# Patient Record
Sex: FEMALE | Race: WHITE | NOT HISPANIC OR LATINO | Employment: OTHER | URBAN - METROPOLITAN AREA
[De-identification: names, ages, dates, MRNs, and addresses within clinical notes are randomized per-mention and may not be internally consistent; named-entity substitution may affect disease eponyms.]

---

## 2017-01-12 RX ORDER — IOPAMIDOL 755 MG/ML
78 INJECTION, SOLUTION INTRAVASCULAR ONCE
Status: COMPLETED | OUTPATIENT
Start: 2017-01-13 | End: 2017-01-13

## 2017-01-13 ENCOUNTER — HOSPITAL ENCOUNTER (OUTPATIENT)
Dept: CT IMAGING | Facility: CLINIC | Age: 72
Discharge: HOME | End: 2017-01-13
Attending: INTERNAL MEDICINE | Admitting: INTERNAL MEDICINE
Payer: MEDICARE

## 2017-01-13 ENCOUNTER — TELEPHONE (OUTPATIENT)
Dept: FAMILY MEDICINE | Facility: CLINIC | Age: 72
End: 2017-01-13

## 2017-01-13 DIAGNOSIS — C49.A0 GIST (GASTROINTESTINAL STROMAL TUMOR), MALIGNANT (H): ICD-10-CM

## 2017-01-13 DIAGNOSIS — Z85.118 HISTORY OF LUNG CANCER: ICD-10-CM

## 2017-01-13 LAB
ALBUMIN SERPL-MCNC: 3.6 G/DL (ref 3.4–5)
ALP SERPL-CCNC: 56 U/L (ref 40–150)
ALT SERPL W P-5'-P-CCNC: 24 U/L (ref 0–50)
ANION GAP SERPL CALCULATED.3IONS-SCNC: 6 MMOL/L (ref 3–14)
AST SERPL W P-5'-P-CCNC: 27 U/L (ref 0–45)
BASOPHILS # BLD AUTO: 0.1 10E9/L (ref 0–0.2)
BASOPHILS NFR BLD AUTO: 1 %
BILIRUB SERPL-MCNC: 0.4 MG/DL (ref 0.2–1.3)
BUN SERPL-MCNC: 15 MG/DL (ref 7–30)
CALCIUM SERPL-MCNC: 8.4 MG/DL (ref 8.5–10.1)
CHLORIDE SERPL-SCNC: 107 MMOL/L (ref 94–109)
CO2 SERPL-SCNC: 28 MMOL/L (ref 20–32)
CREAT SERPL-MCNC: 1 MG/DL (ref 0.52–1.04)
DIFFERENTIAL METHOD BLD: ABNORMAL
EOSINOPHIL # BLD AUTO: 0.3 10E9/L (ref 0–0.7)
EOSINOPHIL NFR BLD AUTO: 3.6 %
ERYTHROCYTE [DISTWIDTH] IN BLOOD BY AUTOMATED COUNT: 15.2 % (ref 10–15)
GFR SERPL CREATININE-BSD FRML MDRD: 55 ML/MIN/1.7M2
GLUCOSE SERPL-MCNC: 94 MG/DL (ref 70–99)
HCT VFR BLD AUTO: 36.3 % (ref 35–47)
HGB BLD-MCNC: 11.8 G/DL (ref 11.7–15.7)
LYMPHOCYTES # BLD AUTO: 1.7 10E9/L (ref 0.8–5.3)
LYMPHOCYTES NFR BLD AUTO: 24.3 %
MCH RBC QN AUTO: 29.5 PG (ref 26.5–33)
MCHC RBC AUTO-ENTMCNC: 32.5 G/DL (ref 31.5–36.5)
MCV RBC AUTO: 91 FL (ref 78–100)
MONOCYTES # BLD AUTO: 1.1 10E9/L (ref 0–1.3)
MONOCYTES NFR BLD AUTO: 16.1 %
NEUTROPHILS # BLD AUTO: 3.8 10E9/L (ref 1.6–8.3)
NEUTROPHILS NFR BLD AUTO: 55 %
PLATELET # BLD AUTO: 185 10E9/L (ref 150–450)
POTASSIUM SERPL-SCNC: 4.2 MMOL/L (ref 3.4–5.3)
PROT SERPL-MCNC: 6.6 G/DL (ref 6.8–8.8)
RBC # BLD AUTO: 4 10E12/L (ref 3.8–5.2)
SODIUM SERPL-SCNC: 141 MMOL/L (ref 133–144)
WBC # BLD AUTO: 6.9 10E9/L (ref 4–11)

## 2017-01-13 PROCEDURE — 74177 CT ABD & PELVIS W/CONTRAST: CPT

## 2017-01-13 PROCEDURE — 80053 COMPREHEN METABOLIC PANEL: CPT | Performed by: INTERNAL MEDICINE

## 2017-01-13 PROCEDURE — 25000125 ZZHC RX 250: Performed by: RADIOLOGY

## 2017-01-13 PROCEDURE — 36415 COLL VENOUS BLD VENIPUNCTURE: CPT | Performed by: INTERNAL MEDICINE

## 2017-01-13 PROCEDURE — 85025 COMPLETE CBC W/AUTO DIFF WBC: CPT | Performed by: INTERNAL MEDICINE

## 2017-01-13 PROCEDURE — 25500064 ZZH RX 255 OP 636: Performed by: RADIOLOGY

## 2017-01-13 PROCEDURE — 71260 CT THORAX DX C+: CPT

## 2017-01-13 RX ADMIN — IOPAMIDOL 78 ML: 755 INJECTION, SOLUTION INTRAVENOUS at 09:38

## 2017-01-13 RX ADMIN — SODIUM CHLORIDE 59 ML: 9 INJECTION, SOLUTION INTRAVENOUS at 09:39

## 2017-01-13 NOTE — Clinical Note
[facility]  [address]  [facility] [address]  [phone]    January 30, 2017    Idalmis Abdul  PO   [facility] [address]      Dear Ms. Abdul,    During a recent visit to our clinic, your blood pressure was elevated above the target range for your health.  Because untreated high blood pressure could have a negative effect on your long term health, we hope you have had an opportunity to collect more blood pressure measurements and to visit with your primary care physician regarding the results.      Please schedule a brief clinic RN appointment only to have your blood pressure checked. This is a free visit to you and only takes a few minutes. You can call to schedule [phone] and ask to schedule your free RN blood pressure appointment.      Why is high blood pressure a big deal?      If blood pressure is elevated above target levels you have an increased risk of experiencing a heart attack or stroke, developing heart failure, kidney disease or other chronic diseases.    With appropriate early recognition and treatment, these health problems can be avoided in most cases.  Your physician can help you determine the need for treatment and discuss the non-medication and medication routes to treating high blood pressure as well as evaluate you for possible causes and effects of high blood pressure.    The target range for ideal blood pressure control is less than 140/90 for most individuals. For those who have been diagnosed with heart disease, diabetes, stroke or peripheral vascular disease this target range is less than 130/80.    Sincerely,    Dr. Sultana's Care Team

## 2017-01-13 NOTE — TELEPHONE ENCOUNTER
Spoke with the patient. She will schedule a nursing appointment for a blood pressure check only. She will be in next week sometime.    BP Readings from Last 3 Encounters:   12/06/16 176/93   11/25/16 142/74   11/08/16 156/77       Rosibel Dudley CMA

## 2017-01-24 ENCOUNTER — CARE COORDINATION (OUTPATIENT)
Dept: CARE COORDINATION | Facility: CLINIC | Age: 72
End: 2017-01-24

## 2017-01-24 NOTE — PROGRESS NOTES
Clinic Care Coordination Contact  [facility]/Voicemail    Referral Source: PCP  Clinical Data: Care Coordinator Outreach  Outreach attempted.  Left message on voicemail with call back information and requested return call.  Plan: Care Coordinator will try to reach patient again in 2-4 weeks.  Kelli Lama, RN-BSN  RN Clinic Care Coordinator  [facility]  [phone]

## 2017-01-27 ENCOUNTER — TELEPHONE (OUTPATIENT)
Dept: ONCOLOGY | Facility: CLINIC | Age: 72
End: 2017-01-27

## 2017-01-27 NOTE — TELEPHONE ENCOUNTER
Oral Chemotherapy Monitoring Program   Placed call to patient in follow up of Gleevec (imatinib) therapy.   Patient reports that she has been on Gleevec for so long she does not have any questions or concerns. Encouraged pt to call SP with any questions or concerns.     Mariola Garcai RN  SP-Therapy Management  [phone]

## 2017-02-03 ENCOUNTER — OFFICE VISIT (OUTPATIENT)
Dept: FAMILY MEDICINE | Facility: CLINIC | Age: 72
End: 2017-02-03
Payer: COMMERCIAL

## 2017-02-03 VITALS
SYSTOLIC BLOOD PRESSURE: 152 MMHG | BODY MASS INDEX: 30.03 KG/M2 | HEART RATE: 72 BPM | HEIGHT: 62 IN | DIASTOLIC BLOOD PRESSURE: 92 MMHG | WEIGHT: 163.2 LBS

## 2017-02-03 DIAGNOSIS — I10 ESSENTIAL HYPERTENSION: Primary | ICD-10-CM

## 2017-02-03 DIAGNOSIS — D17.30 LIPOMA OF SKIN AND SUBCUTANEOUS TISSUE: ICD-10-CM

## 2017-02-03 DIAGNOSIS — G89.4 CHRONIC PAIN SYNDROME: ICD-10-CM

## 2017-02-03 PROCEDURE — 99214 OFFICE O/P EST MOD 30 MIN: CPT | Performed by: FAMILY MEDICINE

## 2017-02-03 RX ORDER — OXYCODONE HYDROCHLORIDE 5 MG/1
5-10 TABLET ORAL
Qty: 180 TABLET | Refills: 0 | Status: SHIPPED | OUTPATIENT
Start: 2017-02-03 | End: 2017-05-08

## 2017-02-03 RX ORDER — AMLODIPINE BESYLATE 5 MG/1
5 TABLET ORAL DAILY
Qty: 30 TABLET | Refills: 11 | Status: SHIPPED | OUTPATIENT
Start: 2017-02-03 | End: 2018-01-23

## 2017-02-03 NOTE — MR AVS SNAPSHOT
After Visit Summary   2/3/2017    Idalmis Abdul    MRN: [id number]           Patient Information     Date Of Birth          1945        Visit Information        Provider Department      2/3/2017 11:20 AM Darlene Sultana MD [facility]        Today's Diagnoses     Essential hypertension    -  1     Chronic pain syndrome         Lipoma of skin and subcutaneous tissue           Care Instructions          Thank you for choosing [facility].  You may be receiving a survey in the mail from [facility] regarding your visit today.  Please take a few minutes to complete and return the survey to let us know how we are doing.      If you have questions or concerns, please contact us via [employer] or you can contact your care team at [phone].    Our Clinic hours are:  Monday 6:40 am  to 7:00 pm  Tuesday -Friday 6:40 am to 5:00 pm    The [place] outpatient lab hours are:  Monday - Friday 6:10 am to 4:45 pm  Saturdays 7:00 am to 11:00 am  Appointments are required, call [phone]    If you have clinical questions after hours or would like to schedule an appointment,  call the clinic at [phone].          Follow-ups after your visit        Additional Services     GENERAL SURG ADULT REFERRAL       Your provider has referred you to: [facility]: [facility] [phone]   [email or web address]    Please be aware that coverage of these services is subject to the terms and limitations of your health insurance plan.  Call member services at your health plan with any benefit or coverage questions.      Please bring the following to your appointment:  >>   Any x-rays, CTs or MRIs which have been performed.  Contact the facility where they were done to arrange for  prior to your scheduled appointment.  Any new CT, MRI or other procedures ordered by your specialist must be performed at a [place] facility or coordinated by your clinic's  "referral office.    >>   List of current medications   >>   This referral request   >>   Any documents/labs given to you for this referral                  Your next 10 appointments already scheduled     Mar 20, 2017 10:00 AM   Return Visit with Tootie Herman MD   [facility] ([facility])    [address] [facility] [address] [phone]              Who to contact     If you have questions or need follow up information about today's clinic visit or your schedule please contact [facility] directly at [phone].  Normal or non-critical lab and imaging results will be communicated to you by MyChart, letter or phone within 4 business days after the clinic has received the results. If you do not hear from us within 7 days, please contact the clinic through [employer] or phone. If you have a critical or abnormal lab result, we will notify you by phone as soon as possible.  Submit refill requests through [employer] or call your pharmacy and they will forward the refill request to us. Please allow 3 business days for your refill to be completed.          Additional Information About Your Visit        MyChart Information     [employer] lets you send messages to your doctor, view your test results, renew your prescriptions, schedule appointments and more. To sign up, go to [email or web address] . Click on \"Log in\" on the left side of the screen, which will take you to the Welcome page. Then click on \"Sign up Now\" on the right side of the page.     You will be asked to enter the access code listed below, as well as some personal information. Please follow the directions to create your username and password.     Your access code is: [email or web address]  Expires: 2017 11:38 AM     Your access code will  in 90 days. If you need help or a new code, please call your [facility] or [phone].        Care EveryWhere ID     This is your Care " "EveryWhere ID. This could be used by other organizations to access your [facility] medical records  [id number]        Your Vitals Were     Pulse Height BMI (Body Mass Index) Last Period          72 5' 2\" (1.575 m) 29.84 kg/m2 01/01/1992         Blood Pressure from Last 3 Encounters:   02/03/17 205/88   12/06/16 176/93   11/25/16 142/74    Weight from Last 3 Encounters:   02/03/17 163 lb 3.2 oz (74.027 kg)   12/06/16 160 lb 12.8 oz (72.938 kg)   11/25/16 162 lb (73.483 kg)              We Performed the Following     GENERAL SURG ADULT REFERRAL          Today's Medication Changes          These changes are accurate as of: 2/3/17 11:38 AM.  If you have any questions, ask your nurse or doctor.               Start taking these medicines.        Dose/Directions    amLODIPine 5 MG tablet   Commonly known as:  NORVASC   Used for:  Essential hypertension   Started by:  Darlene Sultana MD        Dose:  5 mg   Take 1 tablet (5 mg) by mouth daily   Quantity:  30 tablet   Refills:  11            Where to get your medicines      These medications were sent to [place], [place] - [address]  [address] [facility] [address]     Phone:  [phone]    - amLODIPine 5 MG tablet      Some of these will need a paper prescription and others can be bought over the counter.  Ask your nurse if you have questions.     Bring a paper prescription for each of these medications    - oxyCODONE 5 MG IR tablet             Primary Care Provider Office Phone # Fax #    Darlene Sultana [phone]       [facility] [address] [facility] [address] [facility] [address]        Thank you!     Thank you for choosing [facility]  for your care. Our goal is always to provide you with excellent care. Hearing back from our patients is one way we can continue to improve our services. Please take a few minutes to complete the written survey that you may receive in " the mail after your visit with us. Thank you!             Your Updated Medication List - Protect others around you: Learn how to safely use, store and throw away your medicines at www.disposemymeds.org.          This list is accurate as of: 2/3/17 11:38 AM.  Always use your most recent med list.                   Brand Name Dispense Instructions for use    amLODIPine 5 MG tablet    NORVASC    30 tablet    Take 1 tablet (5 mg) by mouth daily       aspirin 81 MG EC tablet     30 tablet    Take 1 tablet by mouth daily.       calcium-vitamin D-vitamin K 500-500-40 MG-UNT-MCG Chew    VIACTIV     Take 1 tablet by mouth daily       imatinib 400 MG tablet    GLEEVEC    30 tablet    Take 1 tablet (400 mg) by mouth daily Take with a meal and a large glass of water.       lisinopril 40 MG tablet    PRINIVIL/ZESTRIL    90 tablet    Take 1 tablet (40 mg) by mouth daily       LORazepam 1 MG tablet    ATIVAN    30 tablet    Take 1 tablet (1 mg) by mouth At Bedtime       methimazole 10 MG tablet    TAPAZOLE    45 tablet    Take 0.5 tablets (5 mg) by mouth daily       metoprolol 100 MG tablet    LOPRESSOR    180 tablet    Take 1 tablet (100 mg) by mouth 2 times daily       oxyCODONE 5 MG IR tablet    ROXICODONE    180 tablet    Take 1-2 tablets (5-10 mg) by mouth 2 times daily       rosuvastatin 5 MG tablet    CRESTOR    90 tablet    Take 1 tablet (5 mg) by mouth daily

## 2017-02-03 NOTE — PATIENT INSTRUCTIONS
Thank you for choosing [facility].  You may be receiving a survey in the mail from [facility] regarding your visit today.  Please take a few minutes to complete and return the survey to let us know how we are doing.      If you have questions or concerns, please contact us via [employer] or you can contact your care team at [phone].    Our Clinic hours are:  Monday 6:40 am  to 7:00 pm  Tuesday -Friday 6:40 am to 5:00 pm    The [place] outpatient lab hours are:  Monday - Friday 6:10 am to 4:45 pm  Saturdays 7:00 am to 11:00 am  Appointments are required, call [phone]    If you have clinical questions after hours or would like to schedule an appointment,  call the clinic at [phone].

## 2017-02-03 NOTE — NURSING NOTE
"Chief Complaint   Patient presents with     Hypertension     recheck blood pressure     Mass     lump on right jaw line     Refill Request     needs a refill on oxycodone, going on vacation       Initial /92 mmHg  Pulse 72  Ht 5' 2\" (1.575 m)  Wt 163 lb 3.2 oz (74.027 kg)  BMI 29.84 kg/m2  LMP 01/01/1992 Estimated body mass index is 29.84 kg/(m^2) as calculated from the following:    Height as of this encounter: 5' 2\" (1.575 m).    Weight as of this encounter: 163 lb 3.2 oz (74.027 kg).  BP completed using cuff size: regular  "

## 2017-02-03 NOTE — PROGRESS NOTES
a  SUBJECTIVE:                                                    Idalmis Abdul is 71 year old female   Chief Complaint   Patient presents with     Hypertension     recheck blood pressure     Mass     lump on right jaw line     Refill Request     needs a refill on oxycodone, going on vacation     Hypertension Follow-up      Outpatient blood pressures are not being checked.    Low Salt Diet: low salt       Amount of exercise or physical activity: None    Problems taking medications regularly: No    Medication side effects: none  Diet: regular (no restrictions)    Concern - lump on right jaw line     Onset: 1 month    Description:   Hard lump on the right side of her jaw line. Doesn't seem to move    Intensity: mild    Progression of Symptoms:  same    Accompanying Signs & Symptoms:  Right ear pain       Previous history of similar problem:   n/a    Precipitating factors:   Worsened by: n/a    Alleviating factors:  Improved by: n/a       Therapies Tried and outcome: n/a    Medication Followup of Oxycodone    Taking Medication as prescribed: yes    Side Effects:  None    Medication Helping Symptoms:  yes       Problem list and histories reviewed & adjusted, as indicated.  Additional history: as documented    Patient Active Problem List   Diagnosis     Hypertension goal BP (blood pressure) < 140/90     Vaginal prolapse     GIST (gastrointestinal stromal tumor), malignant (H)     Eyelid edema     History of lung cancer     Health Care Home     NSTEMI (non-ST elevated myocardial infarction) (H)     ASCVD (arteriosclerotic cardiovascular disease)     Postsurgical aortocoronary bypass status     S/P CABG x 4     Low back pain     Dyspnea     GERD (gastroesophageal reflux disease)     Hyperlipidemia LDL goal <160     Hyperthyroidism     Senile cataract     Thyroid eye disease     Eyelid retraction or lag     Thyrotoxic exophthalmos     Graves' disease     History of tobacco abuse     History of non-ST elevation  myocardial infarction (NSTEMI)     Chronic back pain     Hypothyroidism     Diplopia     Monocular esotropia     Hypotropia     PCO (posterior capsular opacification)     Senile nuclear sclerosis     PVD (posterior vitreous detachment)     Age-related osteoporosis without current pathological fracture     Strabismus     Hypertropia of right eye     Cortical senile cataract, right     Senile cataract of right eye     Past Surgical History   Procedure Laterality Date     Surgical history of -   1963     cholecystectomy     Surgical history of -   1970's     Tubal Ligation      Surgical history of -   08/03     Explor. Lap, Excision of Small Bowel Tumor      C thoracoscopy,dx w bx  fall 2003     benign tissue     Colonoscopy  4-12-05     Surgical history of -   4/2010     lung cancer removed right lung     Bypass graft artery coronary  6/14/2012     Procedure: BYPASS GRAFT ARTERY CORONARY;  Median Sternotomy Coronary Artery Bypass Graft  x 3        Creation pericardial window  6/15/2012     Procedure: CREATION PERICARDIAL WINDOW;  Mediastinal Exploration, Control of Bleeding;  Surgeon: Dwaine Griffin MD;  Location: [facility] OR     Cholecystectomy  1963     Gyn surgery       tubal ligation     Gi surgery  2003 and 2007     GIST tumor removal     Coronary artery bypass       X4     Eye surgery  2014     left cataract removal     Phacoemulsification with standard intraocular lens implant  3/24/2014     Procedure: PHACOEMULSIFICATION WITH STANDARD INTRAOCULAR LENS IMPLANT;  Left Kelman Phacoemulsification with Intraocular Lens Implant;  Surgeon: Magnus Mckeon MD;  Location: [facility] OR     Cataract iol, rt/lt       LE     Recession resection with adjustable suture bilateral Bilateral 7/14/2016     Procedure: RECESSION RESECTION WITH ADJUSTABLE SUTURE BILATERAL;  Surgeon: Erma Ordaz MD;  Location:  OR       Social History   Substance Use Topics     Smoking status: Former Smoker -- 0.50 packs/day for 49  years     Types: Cigarettes     Quit date: 04/19/2010     Smokeless tobacco: Never Used     Alcohol Use: No     Family History   Problem Relation Age of Onset     HEART DISEASE Mother      OSTEOPOROSIS Mother      Respiratory Mother      Emphezyma     Hypertension Mother      HEART DISEASE Father      Alcohol/Drug Father      Hypertension Father      Hypertension Maternal Grandmother      Hypertension Brother      Hypertension Sister      Hypertension Son      CANCER Son      rectal         Current Outpatient Prescriptions   Medication Sig Dispense Refill     oxyCODONE (ROXICODONE) 5 MG IR tablet Take 1-2 tablets (5-10 mg) by mouth 2 times daily 180 tablet 0     amLODIPine (NORVASC) 5 MG tablet Take 1 tablet (5 mg) by mouth daily 30 tablet 11     imatinib (GLEEVEC) 400 MG tablet Take 1 tablet (400 mg) by mouth daily Take with a meal and a large glass of water. 30 tablet 2     calcium-vitamin D-vitamin K (VIACTIV) 500-500-40 MG-UNT-MCG CHEW Take 1 tablet by mouth daily  0     LORazepam (ATIVAN) 1 MG tablet Take 1 tablet (1 mg) by mouth At Bedtime 30 tablet 5     methimazole (TAPAZOLE) 10 MG tablet Take 0.5 tablets (5 mg) by mouth daily 45 tablet 3     rosuvastatin (CRESTOR) 5 MG tablet Take 1 tablet (5 mg) by mouth daily 90 tablet 3     lisinopril (PRINIVIL,ZESTRIL) 40 MG tablet Take 1 tablet (40 mg) by mouth daily 90 tablet 3     metoprolol (LOPRESSOR) 100 MG tablet Take 1 tablet (100 mg) by mouth 2 times daily 180 tablet 3     aspirin EC 81 MG EC tablet Take 1 tablet by mouth daily. 30 tablet 2     Allergies   Allergen Reactions     Atorvastatin Cramps     Recent Labs   Lab Test  01/13/17   0854  11/08/16   1237  09/20/16   1022   07/08/16   0914  06/27/16   1110   01/15/16   0921   07/17/15   0916   03/14/14   0929   06/19/12   1550   06/15/12   0850   06/15/12   0719   A1C   --    --    --    --    --    --    --    --    --    --    --    --    --   5.4   --   Duplicate request   --   5.2   LDL   --    --   38    "--    --    --    --    --    --   31   --   59   --    --    --    --    --    --    HDL   --    --   53   --    --    --    --    --    --   46*   --   38*   --    --    --    --    --    --    TRIG   --    --   79   --    --    --    --    --    --   72   --   105   < >   --    --    --    --    --    ALT  24   --    --    --   23   --    --   26   < >  23   < >  39   < >   --    < >   --    --    --    CR  1.00   --   1.00   --   1.18*   --    --   1.19*   --   1.06*   < >  1.07*   < >   --    < >   --    --   1.82*   GFRESTIMATED  55*   --   55*   --   45*   --    --   45*   --   51*   < >  51*   < >   --    < >   --    --   28*   GFRESTBLACK  66   --   66   --   55*   --    --   54*   --   62   < >  62   < >   --    < >   --    --   34*   POTASSIUM  4.2   --   4.2   < >  4.0   --    --   4.1   --   4.0   < >  3.8   < >   --    < >  2.8*   < >  4.2   TSH   --   2.17   --    --    --   2.20   < >   --    < >   --    < >  0.03*   < >   --    --    --    --    --     < > = values in this interval not displayed.      BP Readings from Last 3 Encounters:   02/03/17 152/92   12/06/16 176/93   11/25/16 142/74    Wt Readings from Last 3 Encounters:   02/03/17 163 lb 3.2 oz (74.027 kg)   12/06/16 160 lb 12.8 oz (72.938 kg)   11/25/16 162 lb (73.483 kg)         ROS:  Constitutional, HEENT, cardiovascular, pulmonary, gi and gu systems are negative, except as otherwise noted.    OBJECTIVE:                                                    /92 mmHg  Pulse 72  Ht 5' 2\" (1.575 m)  Wt 163 lb 3.2 oz (74.027 kg)  BMI 29.84 kg/m2  LMP 01/01/1992  GENERAL APPEARANCE ADULT: Alert, no acute distress  EYES: PERRL, EOM normal, conjunctiva and lids normal, edematous upper eyelids  HENT: Ears and TMs normal, oral mucosa and posterior oropharynx normal  RESP: lungs clear to auscultation   CV: normal rate, regular rhythm, no murmur or gallop  Diagnostic Test Results:       ASSESSMENT/PLAN:                                        "             1. Essential hypertension  Not at goal on two medications, will add norvasc.  Recheck in 2 weeks  - amLODIPine (NORVASC) 5 MG tablet; Take 1 tablet (5 mg) by mouth daily  Dispense: 30 tablet; Refill: 11    2. Chronic pain syndrome  Back pain still present, has not accelerated dose, not change for now, consider pain management evaluation.  - oxyCODONE (ROXICODONE) 5 MG IR tablet; Take 1-2 tablets (5-10 mg) by mouth 2 times daily  Dispense: 180 tablet; Refill: 0    3. Lipoma of skin and subcutaneous tissue  Benign, will see surgery when get back from [place] in a month.  - GENERAL SURG ADULT REFERRAL    Darlene Sultana MD  [facility]

## 2017-02-16 NOTE — PROGRESS NOTES
Clinic Care Coordination Contact  [facility]    Referral Source: PCP  Clinical Data: Care Coordinator Outreach  Outreach attempted  - looks like pt may be in [place] at this time.  Left message on voicemail with call back information and requested return call.  Plan: Care Coordinator will try to reach patient again in 4-6 weeks.  Kelli Lama, RN-BSN  RN Clinic Care Coordinator  [facility]  [phone]

## 2017-03-10 ENCOUNTER — CARE COORDINATION (OUTPATIENT)
Dept: ONCOLOGY | Facility: CLINIC | Age: 72
End: 2017-03-10

## 2017-03-10 DIAGNOSIS — C49.A0 GIST (GASTROINTESTINAL STROMAL TUMOR), MALIGNANT (H): ICD-10-CM

## 2017-03-10 RX ORDER — LORAZEPAM 1 MG/1
1 TABLET ORAL AT BEDTIME
Qty: 30 TABLET | Refills: 5 | Status: SHIPPED | OUTPATIENT
Start: 2017-03-10 | End: 2017-04-03

## 2017-03-16 ENCOUNTER — TELEPHONE (OUTPATIENT)
Dept: ONCOLOGY | Facility: CLINIC | Age: 72
End: 2017-03-16

## 2017-03-16 DIAGNOSIS — C49.A0 MALIGNANT GASTROINTESTINAL STROMAL TUMOR, UNSPECIFIED SITE (H): Primary | ICD-10-CM

## 2017-03-16 RX ORDER — IMATINIB MESYLATE 400 MG/1
400 TABLET, FILM COATED ORAL DAILY
Qty: 30 TABLET | Refills: 2 | Status: SHIPPED | OUTPATIENT
Start: 2017-03-16 | End: 2017-04-15

## 2017-03-16 NOTE — ORAL ONC MGMT
Oral Chemotherapy Monitoring Program    I spoke with Idalmis today in regards to Gleevec refill.  I informed her that she needs an appointment with her provider.  She indicated that she will make an appointment in the near future with Ara Guzman.  Shakila Juju was contacted and the next months Gleevec refill was released to [place] in [place].  Enoch VICK.Ph.  [facility]  Oral Chemotherapy Program  [phone]

## 2017-03-16 NOTE — TELEPHONE ENCOUNTER
Oral Chemotherapy Monitoring Program    Placed call to patient in follow up of Gleevec therapy.    Calling to see when patient will have appointment with Dr. Schmitz and if she has enough Gleevec until that appointment.    Left message to please call back in follow up of therapy. No patient or drug names were mentioned.    Rocio Soriano, Pharmacy Intern  [facility]  [phone]

## 2017-03-20 ENCOUNTER — TELEPHONE (OUTPATIENT)
Dept: FAMILY MEDICINE | Facility: CLINIC | Age: 72
End: 2017-03-20

## 2017-03-20 NOTE — TELEPHONE ENCOUNTER
Blood pressure not at goal, <140/90.  Please have her get blood pressure check with nurse and if not at goal see provider.

## 2017-03-20 NOTE — TELEPHONE ENCOUNTER
Left message on patient's answering machine to return call.  There is a note in patient's chart stating [place] of [place] during the winter months.  Unsure of when she is supposed to return.  Will postphone for two weeks.

## 2017-03-29 ENCOUNTER — TELEPHONE (OUTPATIENT)
Dept: ENDOCRINOLOGY | Facility: CLINIC | Age: 72
End: 2017-03-29

## 2017-03-29 ENCOUNTER — TELEPHONE (OUTPATIENT)
Dept: OPHTHALMOLOGY | Facility: CLINIC | Age: 72
End: 2017-03-29

## 2017-03-29 DIAGNOSIS — E05.90 HYPERTHYROIDISM: ICD-10-CM

## 2017-03-29 DIAGNOSIS — E03.9 HYPOTHYROIDISM: Primary | ICD-10-CM

## 2017-03-29 NOTE — TELEPHONE ENCOUNTER
----- Message from BISI Martin sent at 3/29/2017  1:32 PM CDT -----  Regarding: FW: Pt called, requesting an appt for a follow up with Dr. Matthews (thyroid)...  Contact: [phone]  Would you like to discuss with pt?  ----- Message -----     From: Federico Matthews MD     Sent: 3/29/2017   1:28 PM       To: BISI Martin  Subject: RE: Pt called, requesting an appt for a foll#    Last time we saw we said stable and ready to have strabismus surgery.  She can see Colten Barragan MD directly if she wants to get her double vision fixed.    m  ----- Message -----     From: Ryan Foster COA     Sent: 3/29/2017  12:31 PM       To: Federico Matthews MD  Subject: FW: Pt called, requesting an appt for a foll#    Any reason you think she should be seen sooner?  ----- Message -----     From: Bety Joseph     Sent: 3/29/2017  11:24 AM       To: BISI Martin  Subject: Pt called, requesting an appt for a follow u#    Pt had surgery last July and was supposed to f/u in 6 mos.  Pt then had cataract surgery and it got to be a lot longer before f/u with Dr. Matthews.  The first avail in thyroid was 7/31/17.  Is that going to be soon enough for this Pt?      Please call Pt at # [phone].    Thank you,  Kyle    Please DO NOT send this message and/or reply back to sender.  Call Center Representatives DO NOT respond to messages.

## 2017-04-03 ENCOUNTER — ONCOLOGY VISIT (OUTPATIENT)
Dept: ONCOLOGY | Facility: CLINIC | Age: 72
End: 2017-04-03
Attending: NURSE PRACTITIONER
Payer: COMMERCIAL

## 2017-04-03 VITALS
WEIGHT: 162.5 LBS | SYSTOLIC BLOOD PRESSURE: 142 MMHG | HEART RATE: 92 BPM | DIASTOLIC BLOOD PRESSURE: 88 MMHG | TEMPERATURE: 98.2 F | RESPIRATION RATE: 18 BRPM | OXYGEN SATURATION: 94 % | BODY MASS INDEX: 28.79 KG/M2 | HEIGHT: 63 IN

## 2017-04-03 DIAGNOSIS — C49.A0 MALIGNANT GASTROINTESTINAL STROMAL TUMOR, UNSPECIFIED SITE (H): Primary | ICD-10-CM

## 2017-04-03 DIAGNOSIS — Z85.118 HISTORY OF LUNG CANCER: ICD-10-CM

## 2017-04-03 PROCEDURE — 99214 OFFICE O/P EST MOD 30 MIN: CPT | Mod: ZP | Performed by: NURSE PRACTITIONER

## 2017-04-03 PROCEDURE — 99212 OFFICE O/P EST SF 10 MIN: CPT | Mod: ZF

## 2017-04-03 RX ORDER — LORAZEPAM 1 MG/1
1 TABLET ORAL AT BEDTIME
Qty: 30 TABLET | Refills: 3 | Status: SHIPPED | OUTPATIENT
Start: 2017-04-03 | End: 2017-04-03

## 2017-04-03 RX ORDER — LORAZEPAM 1 MG/1
1 TABLET ORAL AT BEDTIME
Qty: 30 TABLET | Refills: 3 | Status: SHIPPED | OUTPATIENT
Start: 2017-04-03 | End: 2017-08-01

## 2017-04-03 RX ORDER — MULTIVIT WITH MINERALS/LUTEIN
1 TABLET ORAL
COMMUNITY
Start: 2013-11-12 | End: 2017-04-24

## 2017-04-03 ASSESSMENT — PAIN SCALES - GENERAL: PAINLEVEL: NO PAIN (0)

## 2017-04-03 NOTE — NURSING NOTE
"Idalmis Abdul is a 71 year old female who presents for:  Chief Complaint   Patient presents with     Oncology Clinic Visit     Return: GIST gastrointestinal stromal tumor, malignant        Initial Vitals:  /88 (BP Location: Right arm, Patient Position: Chair, Cuff Size: Adult Regular)  Pulse 92  Temp 98.2  F (36.8  C) (Oral)  Resp 18  Ht 1.588 m (5' 2.5\")  Wt 73.7 kg (162 lb 8 oz)  LMP 01/01/1992  SpO2 94%  BMI 29.25 kg/m2 Estimated body mass index is 29.25 kg/(m^2) as calculated from the following:    Height as of this encounter: 1.588 m (5' 2.5\").    Weight as of this encounter: 73.7 kg (162 lb 8 oz).. Body surface area is 1.8 meters squared. BP completed using cuff size: BP was taken in LAB INTAKE  No Pain (0) Patient's last menstrual period was 01/01/1992. Allergies and medications reviewed.     Medications: MEDICATION REFILLS NEEDED TODAY.  Pharmacy name entered into [facility]:    [facility] - [place], [place] - [address] [facility] AT [facility] [place], [place] - [address] [facility] [place], [place] - [address]    Comments: Lorazepam     6 minutes for nursing intake (face to face time)   Sheeba Kidd CMA          "

## 2017-04-03 NOTE — MR AVS SNAPSHOT
After Visit Summary   4/3/2017    Idalmis Abdul    MRN: [id number]           Patient Information     Date Of Birth          1945        Visit Information        Provider Department      4/3/2017 2:40 PM Ara Guzman APRN CNP [facility]        Today's Diagnoses     Malignant gastrointestinal stromal tumor, unspecified site    -  1    History of lung cancer           Follow-ups after your visit        Your next 10 appointments already scheduled     Apr 07, 2017 10:00 AM CDT   Return Visit with Tootie Herman MD   [facility] PHYSICIAN HEART AT [facility] ([facility])    [address] [facility] [address]   [phone]            Jun 19, 2017 12:00 PM CDT   NEW THYROID EYE with Federico Matthews MD   [facility] Peds Eye General ([facility])    [address] [address] [phone]            Aug 01, 2017 11:00 AM CDT   (Arrive by 10:45 AM)   RETURN ENDOCRINE with Swetha Antoine MD   [facility] Endocrinology ([facility])    [address] [facility]  [address] [facility] [address] [phone]              Future tests that were ordered for you today     Open Future Orders        Priority Expected Expires Ordered    CT Chest/Abdomen/Pelvis w Contrast Routine 7/3/2017 10/3/2017 4/3/2017    CBC with platelets differential Routine 7/3/2017 10/3/2017 4/3/2017    Comprehensive metabolic panel Routine 7/3/2017 10/3/2017 4/3/2017            Who to contact     If you have questions or need follow up information about today's clinic visit or your schedule please contact [facility] directly at [phone].  Normal or non-critical lab and imaging results will be communicated to you by MyChart, letter or phone within 4 business days after the clinic has received the results. If you do not hear from us within 7 days, please contact the  "clinic through [employer] or phone. If you have a critical or abnormal lab result, we will notify you by phone as soon as possible.  Submit refill requests through [employer] or call your pharmacy and they will forward the refill request to us. Please allow 3 business days for your refill to be completed.          Additional Information About Your Visit        [employer] Information     Mir[employer] lets you send messages to your doctor, view your test results, renew your prescriptions, schedule appointments and more. To sign up, go to [email or web address] . Click on \"Log in\" on the left side of the screen, which will take you to the Welcome page. Then click on \"Sign up Now\" on the right side of the page.     You will be asked to enter the access code listed below, as well as some personal information. Please follow the directions to create your username and password.     Your access code is: [email or web address]  Expires: 2017 12:38 PM     Your access code will  in 90 days. If you need help or a new code, please call your [place] clinic or [phone].        Care EveryWhere ID     This is your Care EveryWhere ID. This could be used by other organizations to access your [place] medical records  [id number]        Your Vitals Were     Pulse Temperature Respirations Height Last Period Pulse Oximetry    92 98.2  F (36.8  C) (Oral) 18 1.588 m (5' 2.5\") 1992 94%    BMI (Body Mass Index)                   29.25 kg/m2            Blood Pressure from Last 3 Encounters:   17 142/88   17 (!) 152/92   16 (!) 176/93    Weight from Last 3 Encounters:   17 73.7 kg (162 lb 8 oz)   17 74 kg (163 lb 3.2 oz)   16 72.9 kg (160 lb 12.8 oz)                 Today's Medication Changes          These changes are accurate as of: 4/3/17  3:12 PM.  If you have any questions, ask your nurse or doctor.               Start taking these medicines.        Dose/Directions    LORazepam 1 MG tablet   Commonly " known as:  ATIVAN   Used for:  Malignant gastrointestinal stromal tumor, unspecified site   Started by:  Ara Guzman APRN CNP        Dose:  1 mg   Take 1 tablet (1 mg) by mouth At Bedtime   Quantity:  30 tablet   Refills:  3            Where to get your medicines      Some of these will need a paper prescription and others can be bought over the counter.  Ask your nurse if you have questions.     Bring a paper prescription for each of these medications     LORazepam 1 MG tablet                Primary Care Provider Office Phone # Fax #    Darlene Daniela Sultana [phone]       [facility] [address] [facility] [address]        Thank you!     Thank you for choosing [facility]  for your care. Our goal is always to provide you with excellent care. Hearing back from our patients is one way we can continue to improve our services. Please take a few minutes to complete the written survey that you may receive in the mail after your visit with us. Thank you!             Your Updated Medication List - Protect others around you: Learn how to safely use, store and throw away your medicines at www.disposemymeds.org.          This list is accurate as of: 4/3/17  3:12 PM.  Always use your most recent med list.                   Brand Name Dispense Instructions for use    amLODIPine 5 MG tablet    NORVASC    30 tablet    Take 1 tablet (5 mg) by mouth daily       aspirin 81 MG EC tablet     30 tablet    Take 1 tablet by mouth daily.       calcium-vitamin D-vitamin K 500-500-40 MG-UNT-MCG Chew    VIACTIV     Take 1 tablet by mouth daily Reported on 4/3/2017       CENTRUM SILVER per tablet      Take 1 tablet by mouth       imatinib 400 MG tablet    GLEEVEC    30 tablet    Take 1 tablet (400 mg) by mouth daily Take with a meal and a large glass of water.       lisinopril 40 MG tablet    PRINIVIL/ZESTRIL    90 tablet    Take 1 tablet (40 mg) by mouth daily       LORazepam 1 MG  tablet    ATIVAN    30 tablet    Take 1 tablet (1 mg) by mouth At Bedtime       methimazole 10 MG tablet    TAPAZOLE    45 tablet    Take 0.5 tablets (5 mg) by mouth daily       metoprolol 100 MG tablet    LOPRESSOR    180 tablet    Take 1 tablet (100 mg) by mouth 2 times daily       oxyCODONE 5 MG IR tablet    ROXICODONE    180 tablet    Take 1-2 tablets (5-10 mg) by mouth 2 times daily       rosuvastatin 5 MG tablet    CRESTOR    90 tablet    Take 1 tablet (5 mg) by mouth daily

## 2017-04-03 NOTE — PROGRESS NOTES
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  She also has a history of a resected stage I lung cancer, now over 5 years out. She was due for follow-up in January of this year, but missed that appointment because of the weather, then went to [place].    She has been feeling fairly well except for chronic back pain. She has seen an orthopedic surgeon and had a couple of epidural injections without significant improvement. She is less active as a result of the pain and is gaining weight. Appetite is good. Bowels regular. No bloating. No nausea/vomiting. 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 She has improved to the point that she is going to start driving again. She hopes she will be able to go to the pool to do some exercises now.     In the past few months has had intermittent Right ear pain and fullness. She has also noted a puffiness in the right cheek that is mildy tender. She had her ear evaluated and was thought to not have an infection. She hasn't been to the dentist. No fevers/chills. No skin rash or bruising.  No pain in the ear/jaw for the last 3 weeks.    Other health issues including Grave's disease, hypertention, high cholesterol have been controlled.    Current Outpatient Prescriptions   Medication Sig Dispense Refill     Multiple Vitamins-Minerals (CENTRUM SILVER) per tablet Take 1 tablet by mouth       LORazepam (ATIVAN) 1 MG tablet Take 1 tablet (1 mg) by mouth At Bedtime 30 tablet 3     imatinib (GLEEVEC) 400 MG tablet Take 1 tablet (400 mg) by mouth daily Take with a meal and a large glass of water. 30 tablet 2     oxyCODONE (ROXICODONE) 5 MG IR tablet Take 1-2 tablets (5-10 mg) by mouth 2 times daily 180 tablet 0     amLODIPine (NORVASC) 5 MG tablet Take 1 tablet (5 mg) by mouth daily 30 tablet 11     calcium-vitamin D-vitamin K (VIACTIV)  "500-500-40 MG-UNT-MCG CHEW Take 1 tablet by mouth daily Reported on 4/3/2017  0     methimazole (TAPAZOLE) 10 MG tablet Take 0.5 tablets (5 mg) by mouth daily 45 tablet 3     rosuvastatin (CRESTOR) 5 MG tablet Take 1 tablet (5 mg) by mouth daily 90 tablet 3     lisinopril (PRINIVIL,ZESTRIL) 40 MG tablet Take 1 tablet (40 mg) by mouth daily 90 tablet 3     metoprolol (LOPRESSOR) 100 MG tablet Take 1 tablet (100 mg) by mouth 2 times daily 180 tablet 3     aspirin EC 81 MG EC tablet Take 1 tablet by mouth daily. 30 tablet 2     Exam: alert, appears in NAD. Blood pressure 142/88, pulse 92, temperature 98.2  F (36.8  C), temperature source Oral, resp. rate 18, height 1.588 m (5' 2.5\"), weight 73.7 kg (162 lb 8 oz), last menstrual period 01/01/1992, SpO2 94 %, not currently breastfeeding.    Wt Readings from Last 4 Encounters:   04/03/17 73.7 kg (162 lb 8 oz)   02/03/17 74 kg (163 lb 3.2 oz)   12/06/16 72.9 kg (160 lb 12.8 oz)   11/25/16 73.5 kg (162 lb)       Oropharynx is moist, no focal lesion. No icterus. Mild periorbital edema, tearing noted.  Neck supple and without adenopathy. Lungs:CTA. Heart:RRR, no murmur or rub. Abdomen: soft, nontender, BS active. No masses or organomegaly. Extremities: warm, no edema. Speech clear.    Results for PIEDAD FAN (MRN [id number]) as of 4/3/2017 14:38   Ref. Range 1/13/2017 08:54   Sodium Latest Ref Range: 133 - 144 mmol/L 141   Potassium Latest Ref Range: 3.4 - 5.3 mmol/L 4.2   Chloride Latest Ref Range: 94 - 109 mmol/L 107   Carbon Dioxide Latest Ref Range: 20 - 32 mmol/L 28   Urea Nitrogen Latest Ref Range: 7 - 30 mg/dL 15   Creatinine Latest Ref Range: 0.52 - 1.04 mg/dL 1.00   GFR Estimate Latest Ref Range: >60 mL/min/1.7m2 55 (L)   GFR Estimate If Black Latest Ref Range: >60 mL/min/1.7m2 66   Calcium Latest Ref Range: 8.5 - 10.1 mg/dL 8.4 (L)   Anion Gap Latest Ref Range: 3 - 14 mmol/L 6   Albumin Latest Ref Range: 3.4 - 5.0 g/dL 3.6   Protein Total Latest Ref Range: " 6.8 - 8.8 g/dL 6.6 (L)   Bilirubin Total Latest Ref Range: 0.2 - 1.3 mg/dL 0.4   Alkaline Phosphatase Latest Ref Range: 40 - 150 U/L 56   ALT Latest Ref Range: 0 - 50 U/L 24   AST Latest Ref Range: 0 - 45 U/L 27   Glucose Latest Ref Range: 70 - 99 mg/dL 94   WBC Latest Ref Range: 4.0 - 11.0 10e9/L 6.9   Hemoglobin Latest Ref Range: 11.7 - 15.7 g/dL 11.8   Hematocrit Latest Ref Range: 35.0 - 47.0 % 36.3   Platelet Count Latest Ref Range: 150 - 450 10e9/L 185   RBC Count Latest Ref Range: 3.8 - 5.2 10e12/L 4.00   MCV Latest Ref Range: 78 - 100 fl 91   MCH Latest Ref Range: 26.5 - 33.0 pg 29.5   MCHC Latest Ref Range: 31.5 - 36.5 g/dL 32.5   RDW Latest Ref Range: 10.0 - 15.0 % 15.2 (H)   Diff Method Unknown Automated Method   % Neutrophils Latest Units: % 55.0   % Lymphocytes Latest Units: % 24.3   % Monocytes Latest Units: % 16.1   % Eosinophils Latest Units: % 3.6   % Basophils Latest Units: % 1.0   Absolute Neutrophil Latest Ref Range: 1.6 - 8.3 10e9/L 3.8   Absolute Lymphocytes Latest Ref Range: 0.8 - 5.3 10e9/L 1.7   Absolute Monocytes Latest Ref Range: 0.0 - 1.3 10e9/L 1.1   Absolute Eosinophils Latest Ref Range: 0.0 - 0.7 10e9/L 0.3   Absolute Basophils Latest Ref Range: 0.0 - 0.2 10e9/L 0.1     CT CHEST/ABDOMEN/PELVIS W CONTRAST 1/13/2017 9:51 AM     HISTORY: Gastrointestinal stromal tumor. Prior lung cancer.     CONTRAST: 78 mL Isovue 370.     TECHNIQUE: CT of the chest, abdomen, and pelvis is performed with IV  contrast.     Routine evaluated structures in the chest include the lungs,  mediastinal structures, pleura, and chest wall.     Routine assessed structures in the abdomen include the liver, spleen,  pancreas, adrenal glands, and kidneys. Also assessed are the  retroperitoneum, gastrointestinal tract, and the abdominal wall.     Intrapelvic anatomy is also assessed.     Radiation dose for this scan is reduced using automated exposure  control, adjustment of the mA and/or kV according to patient size,  or  iterative reconstruction technique.     COMPARISON: 7/8/2016.     FINDINGS:     Chest: Centrilobular emphysema is again seen. Bandlike areas of  atelectasis or scarring in each lung are unchanged. A few tiny  subcentimeter noncalcified right lung pulmonary nodules continue to be  stable. Pleural thickening and/or subpleural atelectasis posteriorly  in each hemithorax is unchanged. A borderline in size AP window lymph  node on image #23 approaches 1 cm in short axis dimension. It is  stable from multiple prior studies. A 1.7 cm right thyroid nodule  continues to be stable from remote studies.     Abdomen: Biliary dilatation continues to be stable and no obstructing  lesion is seen. SA small subcentimeter right lobe liver lesion is  stable from remote studies and consistent with a benign entity. A  minimal fat-containing periumbilical hernia is unchanged on image #70.     Pelvis: A cystic lesion in the left ovary continues to be stable from  remote studies and measures 1.9 cm. A fibroid uterus is again seen.         IMPRESSION: Stable examination with no evidence of metastatic or  recurrent neoplasm.     MEGHANN WILKINSON MD      Impression/plan:     1.  Unresectable gastrointestinal stromal tumor.  She continues to have excellent control on Gleevec based on imaging from January. Will continue Gleevec. She will return in July for ongoing surveillance and see Dr. Schmitz at that time. We will then resume the usual 6 month surveillance schedule.    2. Right ear pain/jaw swelling. Mildly tender along the right mandible. Slight soft tissue fullness without discrete adenopathy. Cerrumen impaction on the right noted. Will f/u with PMD for ear irrigation and f/u with dentist. Could possible represent referred pain from parotitis. Suggested that she could increase hydration, suck on sour candies and use a warm pack. If pain or swelling increases, will plan on getting a CT neck    3. Hx of resected lung cancer, no evidence of  recurrence.

## 2017-04-07 ENCOUNTER — OFFICE VISIT (OUTPATIENT)
Dept: CARDIOLOGY | Facility: CLINIC | Age: 72
End: 2017-04-07
Payer: COMMERCIAL

## 2017-04-07 VITALS
BODY MASS INDEX: 29.16 KG/M2 | SYSTOLIC BLOOD PRESSURE: 148 MMHG | DIASTOLIC BLOOD PRESSURE: 88 MMHG | HEART RATE: 67 BPM | WEIGHT: 162 LBS | OXYGEN SATURATION: 97 %

## 2017-04-07 DIAGNOSIS — Z95.1 POSTSURGICAL AORTOCORONARY BYPASS STATUS: Primary | ICD-10-CM

## 2017-04-07 PROCEDURE — 99213 OFFICE O/P EST LOW 20 MIN: CPT | Performed by: INTERNAL MEDICINE

## 2017-04-07 NOTE — MR AVS SNAPSHOT
After Visit Summary   4/7/2017    Idalmis Abdul    MRN: [id number]           Patient Information     Date Of Birth          1945        Visit Information        Provider Department      4/7/2017 10:00 AM Tootie Herman MD [facility] PHYSICIAN HEART AT [facility]         Follow-ups after your visit        Your next 10 appointments already scheduled     Jun 19, 2017 12:00 PM CDT   NEW THYROID EYE with Federico Matthews MD   [facility] Peds Eye General ([facility])    [address]  [place] [address] [facility] [address]   [phone]            Jul 07, 2017  9:00 AM CDT   LAB with [facility] ([facility])    [address] [facility] [address] [phone]           Patient must bring picture ID.  Patient should be prepared to give a urine specimen  Please do not eat 10-12 hours before your appointment if you are coming in fasting for labs on lipids, cholesterol, or glucose (sugar).  Pregnant women should follow their Care Team instructions. Water with medications is okay. Do not drink coffee or other fluids.   If you have concerns about taking  your medications, please ask at office or if scheduling via [employer]eWorks, send a message by clicking on Secure Messaging, Message Your Care Team.            Jul 07, 2017  9:30 AM CDT   CT CHEST/ABDOMEN/PELVIS W CONTRAST with [facility]   [facility] CT ([facility])    [address] [facility] [facility] [address]   [phone]           Please bring any scans or X-rays taken at other hospitals, if similar tests were done. Also bring a list of your medicines, including vitamins, minerals and over-the-counter drugs. It is safest to leave personal items at home.  Be sure to tell your doctor:   If you have any allergies.   If there s any chance you are pregnant.   If you are breastfeeding.   If you have any special needs.  You may have contrast for this exam. To  prepare:   Do not eat or drink for 2 hours before your exam. If you need to take medicine, you may take it with small sips of water. (We may ask you to take liquid medicine as well.)   The day before your exam, drink extra fluids at least six 8-ounce glasses (unless your doctor tells you to restrict your fluids).  Patients over 70 or patients with diabetes or kidney problems:   If you haven t had a blood test (creatinine test) within the last 30 days, go to your clinic or Diagnostic Imaging Department for this test.  If you have diabetes:   If your kidney function is normal, continue taking your metformin (Avandamet, Glucophage, Glucovance, Metaglip) on the day of your exam.   If your kidney function is abnormal, wait 48 hours before restarting this medicine.  You will have oral contrast for this exam:   You will drink the contrast at home. Get this from your clinic or Diagnostic Imaging Department. Please follow the directions given.  Please wear loose clothing, such as a sweat suit or jogging clothes. Avoid snaps, zippers and other metal. We may ask you to undress and put on a hospital gown.  If you have any questions, please call the Imaging Department where you will have your exam.            Jul 10, 2017  8:45 AM CDT   (Arrive by 8:30 AM)   Return Visit with Blayne Schmitz MD   [facility] ([facility])    [address] [place] [id number] [phone]            Aug 01, 2017 11:00 AM CDT   (Arrive by 10:45 AM)   RETURN ENDOCRINE with Swetha Antoine MD   [facility] Endocrinology ([facility])    [address] [place] [id number] [phone]              Who to contact     If you have questions or need follow up information about today's clinic visit or your schedule please contact [facility] PHYSICIAN HEART AT [facility] directly at  "[phone].  Normal or non-critical lab and imaging results will be communicated to you by MyChart, letter or phone within 4 business days after the clinic has received the results. If you do not hear from us within 7 days, please contact the clinic through [employer] or phone. If you have a critical or abnormal lab result, we will notify you by phone as soon as possible.  Submit refill requests through [employer] or call your pharmacy and they will forward the refill request to us. Please allow 3 business days for your refill to be completed.          Additional Information About Your Visit        [employer] Information     [employer] lets you send messages to your doctor, view your test results, renew your prescriptions, schedule appointments and more. To sign up, go to [email or web address] . Click on \"Log in\" on the left side of the screen, which will take you to the Welcome page. Then click on \"Sign up Now\" on the right side of the page.     You will be asked to enter the access code listed below, as well as some personal information. Please follow the directions to create your username and password.     Your access code is: [email or web address]  Expires: 2017 12:38 PM     Your access code will  in 90 days. If you need help or a new code, please call your [place] clinic or [phone].        Care EveryWhere ID     This is your Care EveryWhere ID. This could be used by other organizations to access your [place] medical records  [id number]        Your Vitals Were     Pulse Last Period Pulse Oximetry BMI (Body Mass Index)          67 1992 97% 29.16 kg/m2         Blood Pressure from Last 3 Encounters:   17 148/88   17 142/88   17 (!) 152/92    Weight from Last 3 Encounters:   17 73.5 kg (162 lb)   17 73.7 kg (162 lb 8 oz)   17 74 kg (163 lb 3.2 oz)              Today, you had the following     No orders found for display       Primary Care Provider Office Phone # Fax #    " Darlene Sultana [phone]       [facility] [address] [facility] [address]        Thank you!     Thank you for choosing [facility] PHYSICIAN HEART AT [facility]  for your care. Our goal is always to provide you with excellent care. Hearing back from our patients is one way we can continue to improve our services. Please take a few minutes to complete the written survey that you may receive in the mail after your visit with us. Thank you!             Your Updated Medication List - Protect others around you: Learn how to safely use, store and throw away your medicines at www.disposemymeds.org.          This list is accurate as of: 4/7/17 10:28 AM.  Always use your most recent med list.                   Brand Name Dispense Instructions for use    amLODIPine 5 MG tablet    NORVASC    30 tablet    Take 1 tablet (5 mg) by mouth daily       aspirin 81 MG EC tablet     30 tablet    Take 1 tablet by mouth daily.       calcium-vitamin D-vitamin K 500-500-40 MG-UNT-MCG Chew    VIACTIV     Take 1 tablet by mouth daily Reported on 4/3/2017       CENTRUM SILVER per tablet      Take 1 tablet by mouth       imatinib 400 MG tablet    GLEEVEC    30 tablet    Take 1 tablet (400 mg) by mouth daily Take with a meal and a large glass of water.       lisinopril 40 MG tablet    PRINIVIL/ZESTRIL    90 tablet    Take 1 tablet (40 mg) by mouth daily       LORazepam 1 MG tablet    ATIVAN    30 tablet    Take 1 tablet (1 mg) by mouth At Bedtime       methimazole 10 MG tablet    TAPAZOLE    45 tablet    Take 0.5 tablets (5 mg) by mouth daily       metoprolol 100 MG tablet    LOPRESSOR    180 tablet    Take 1 tablet (100 mg) by mouth 2 times daily       oxyCODONE 5 MG IR tablet    ROXICODONE    180 tablet    Take 1-2 tablets (5-10 mg) by mouth 2 times daily       rosuvastatin 5 MG tablet    CRESTOR    90 tablet    Take 1 tablet (5 mg) by mouth daily

## 2017-04-07 NOTE — PROGRESS NOTES
HPI and Plan:   See dictation    Orders Placed This Encounter   Procedures     Follow-Up with Cardiologist       No orders of the defined types were placed in this encounter.      There are no discontinued medications.      Encounter Diagnosis   Name Primary?     Postsurgical aortocoronary bypass status Yes       CURRENT MEDICATIONS:  Current Outpatient Prescriptions   Medication Sig Dispense Refill     Multiple Vitamins-Minerals (CENTRUM SILVER) per tablet Take 1 tablet by mouth       LORazepam (ATIVAN) 1 MG tablet Take 1 tablet (1 mg) by mouth At Bedtime 30 tablet 3     imatinib (GLEEVEC) 400 MG tablet Take 1 tablet (400 mg) by mouth daily Take with a meal and a large glass of water. 30 tablet 2     oxyCODONE (ROXICODONE) 5 MG IR tablet Take 1-2 tablets (5-10 mg) by mouth 2 times daily 180 tablet 0     amLODIPine (NORVASC) 5 MG tablet Take 1 tablet (5 mg) by mouth daily 30 tablet 11     calcium-vitamin D-vitamin K (VIACTIV) 500-500-40 MG-UNT-MCG CHEW Take 1 tablet by mouth daily Reported on 4/3/2017  0     methimazole (TAPAZOLE) 10 MG tablet Take 0.5 tablets (5 mg) by mouth daily 45 tablet 3     rosuvastatin (CRESTOR) 5 MG tablet Take 1 tablet (5 mg) by mouth daily 90 tablet 3     lisinopril (PRINIVIL,ZESTRIL) 40 MG tablet Take 1 tablet (40 mg) by mouth daily 90 tablet 3     metoprolol (LOPRESSOR) 100 MG tablet Take 1 tablet (100 mg) by mouth 2 times daily 180 tablet 3     aspirin EC 81 MG EC tablet Take 1 tablet by mouth daily. 30 tablet 2       ALLERGIES     Allergies   Allergen Reactions     Atorvastatin Cramps       PAST MEDICAL HISTORY:  Past Medical History:   Diagnosis Date     ASCVD (arteriosclerotic cardiovascular disease) 6/14/2012     Benign neoplasm of duodenum, jejunum, and ileum 2003, 2007    Gastrointestinal Stromal Tumor, seen at [facility], Dr. Schmitz, GI oncology-Gleevec treatment.  Surgical removal in fall 2004-no recurrence as of 3/05.     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     Other chronic pain     back     Postsurgical aortocoronary bypass status 7/4/2012     S/P CABG x 4 8/5/2012     Strabismus      Tobacco use disorder     Quit     Unspecified essential hypertension        PAST SURGICAL HISTORY:  Past Surgical History:   Procedure Laterality Date     BYPASS GRAFT ARTERY CORONARY  6/14/2012    Procedure: BYPASS GRAFT ARTERY CORONARY;  Median Sternotomy Coronary Artery Bypass Graft  x 3        C THORACOSCOPY,DX W BX  fall 2003    benign tissue     CATARACT IOL, RT/LT      LE     CHOLECYSTECTOMY  1963     COLONOSCOPY  4-12-05     CORONARY ARTERY BYPASS      X4     CREATION PERICARDIAL WINDOW  6/15/2012    Procedure: CREATION PERICARDIAL WINDOW;  Mediastinal Exploration, Control of Bleeding;  Surgeon: Dwaine Griffin MD;  [facility]     EYE SURGERY  2014    left cataract removal     GI SURGERY  2003 and 2007    GIST tumor removal     GYN SURGERY      tubal ligation     PHACOEMULSIFICATION WITH STANDARD INTRAOCULAR LENS IMPLANT  3/24/2014    Procedure: PHACOEMULSIFICATION  WITH STANDARD INTRAOCULAR LENS IMPLANT;  Left Kelman Phacoemulsification with Intraocular Lens Implant;  Surgeon: Magnus Mckeon MD;  Location: [facility] OR     RECESSION RESECTION WITH ADJUSTABLE SUTURE BILATERAL Bilateral 7/14/2016    Procedure: RECESSION RESECTION WITH ADJUSTABLE SUTURE BILATERAL;  Surgeon: Erma Ordaz MD;  Location: [facility] OR     SURGICAL HISTORY OF -   1963    cholecystectomy     SURGICAL HISTORY OF -   1970's    Tubal Ligation      SURGICAL HISTORY OF -   08/03    Explor. Lap, Excision of Small Bowel Tumor      SURGICAL HISTORY OF -   4/2010    lung cancer removed right lung       FAMILY HISTORY:  Family History   Problem Relation Age of Onset     HEART DISEASE Mother      OSTEOPOROSIS Mother      Respiratory Mother      Emphezyma     Hypertension Mother      HEART DISEASE Father      Alcohol/Drug Father      Hypertension Father      Hypertension Maternal Grandmother      Hypertension Brother      Hypertension Sister      Hypertension Son      CANCER Son      rectal       SOCIAL HISTORY:  Social History     Social History     Marital status:      Spouse name: N/A     Number of children: N/A     Years of education: N/A     Social History Main Topics     Smoking status: Former Smoker     Packs/day: 0.50     Years: 49.00     Types: Cigarettes     Quit date: 4/19/2010     Smokeless tobacco: Never Used     Alcohol use No     Drug use: No     Sexual activity: Not Currently     Partners: Male      Comment:      Other Topics Concern      Service No     Blood Transfusions No     Caffeine Concern Yes     3 cups     Occupational Exposure No     Hobby Hazards No     Sleep Concern No     Stress Concern No     son  and much happiness in her life, big burden lifted,      Weight Concern No     Special Diet No     Back Care Yes     lower back herniated disc 1970's      Exercise No     Bike Helmet No     Seat Belt Yes     Self-Exams Yes     Parent/Sibling W/ Cabg, Mi Or  Angioplasty Before 65f 55m? 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  Son (Rk, with wife Марина and grandson) lives next door -- quadriplegic and high functioning, cannot provide physical assistance/support.       Review of Systems:  Skin:  Negative       Eyes:  Positive for visual blurring;double vision;glasses Graves Disease  ENT:  Negative      Respiratory:  Positive for shortness of breath     Cardiovascular:  Negative for;chest pain;palpitations;edema;syncope or near-syncope;fatigue;lightheadedness;dizziness   patient doesnt exercise due to back pain  Gastroenterology: Positive for constipation GERD, dry heaves  Genitourinary:  Positive for urinary frequency;urgency    Musculoskeletal:  Positive for back pain    Neurologic:  Positive for headaches    Psychiatric:  Negative      Heme/Lymph/Imm:  Negative      Endocrine:  Positive for thyroid disorder Graves disease    Physical Exam:  Vitals: /88  Pulse 67  Wt 73.5 kg (162 lb)  LMP 01/01/1992  SpO2 97%  BMI 29.16 kg/m2    Constitutional:  cooperative, alert and oriented, well developed, well nourished, in no acute distress        Skin:  warm and dry to the touch        Head:  normocephalic   upper lid droop    Eyes:  sclera white   pupils congruent    ENT:           Neck:           Chest:  normal breath sounds, clear to auscultation, normal A-P diameter, normal symmetry, normal respiratory excursion, no use of accessory muscles          Cardiac: regular rhythm;no S3 or S4       grade 1;systolic ejection murmur          Abdomen:           Vascular:                                          Extremities and Back:  no edema;no deformities, clubbing, cyanosis, erythema observed              Neurological:  affect appropriate, oriented to time, person and place;no gross motor deficits              CC  No referring provider defined for this  encounter.

## 2017-04-07 NOTE — LETTER
2017    Darlene Sultana MD  [facility]   [address] [address]    RE: Idalmis Abdul       Dear Colleague,    I had the pleasure of seeing Idalmis Abdul in the [facility].    Ms. Idalmis Abdul was seen in followup at [facility] in [place].  She was new to myself last year, but she reminded me that I had previously seen her , Atif, who  about 6-7 years ago from colon cancer.      Ms. Abdul has a history of coronary artery disease and prior coronary artery bypass graft surgery.  In , she underwent bypass surgery at [facility] after presenting with chest discomfort to the [facility] ED and being transferred to [facility].  She had evidence of multivessel disease on angiography.      A LIMA was placed to the LAD, a vein graft was placed to the circumflex marginal branch, a vein graft was placed to the ramus branch and a vein graft was placed to the right PDA.  Postoperatively, she experienced a pericardial hematoma and tamponade and that was treated with a pericardial window.  She had postoperative atrial fibrillation and was on anticoagulation, but she has not had any evidence of a recurrence.  On anticoagulation, she experienced an upper GI bleed and underwent clipping of a duodenal ulcer.      She is now doing well.  She suffered from Graves' disease and experienced the eye complications.  She has undergone bilateral strabismus surgery which was a 2-day procedure.  Her diplopia has now resolved and she is practicing her driving in preparation for return to driving.  She also is planning to have upper eyelid Plastic Surgery for impaired vision.  She has undergone cataract surgery.      She has not had any chest, neck, arm or back discomfort.  Her lipids are under excellent control with the LDL fraction being about 38 mg/dL, triglycerides 79 mg/dL 7 months ago when they were checked.  Her blood pressure is  normal.  It was initially high when she came to clinic, but I rechecked it and it was 132/85 mmHg.  She continues on good risk factor intervention including low dose aspirin, beta blockade, ACE inhibition and statin therapy.  She is also on amlodipine for dye hypertensive therapy.      PHYSICAL EXAMINATION:     GENERAL:  This is a woman in no apparent distress.   VITAL SIGNS:  The blood pressure was as noted, heart rate 72 beats per minute and regular, and respiratory rate 14-18 per minute.   CHEST:  Clear to auscultation.   CARDIAC:  On cardiac auscultation, there was an S1 and S2 without extra sounds or murmur.  Rhythm was regular.     Outpatient Encounter Prescriptions as of 4/7/2017   Medication Sig Dispense Refill     Multiple Vitamins-Minerals (CENTRUM SILVER) per tablet Take 1 tablet by mouth       LORazepam (ATIVAN) 1 MG tablet Take 1 tablet (1 mg) by mouth At Bedtime 30 tablet 3     imatinib (GLEEVEC) 400 MG tablet Take 1 tablet (400 mg) by mouth daily Take with a meal and a large glass of water. 30 tablet 2     oxyCODONE (ROXICODONE) 5 MG IR tablet Take 1-2 tablets (5-10 mg) by mouth 2 times daily 180 tablet 0     amLODIPine (NORVASC) 5 MG tablet Take 1 tablet (5 mg) by mouth daily 30 tablet 11     calcium-vitamin D-vitamin K (VIACTIV) 500-500-40 MG-UNT-MCG CHEW Take 1 tablet by mouth daily Reported on 4/3/2017  0     methimazole (TAPAZOLE) 10 MG tablet Take 0.5 tablets (5 mg) by mouth daily 45 tablet 3     rosuvastatin (CRESTOR) 5 MG tablet Take 1 tablet (5 mg) by mouth daily 90 tablet 3     lisinopril (PRINIVIL,ZESTRIL) 40 MG tablet Take 1 tablet (40 mg) by mouth daily 90 tablet 3     metoprolol (LOPRESSOR) 100 MG tablet Take 1 tablet (100 mg) by mouth 2 times daily 180 tablet 3     aspirin EC 81 MG EC tablet Take 1 tablet by mouth daily. 30 tablet 2     No facility-administered encounter medications on file as of 4/7/2017.       ASSESSMENT:  Ms. Abdul has a history of coronary artery disease and prior  coronary artery bypass graft surgery.  She has normal left ventricular systolic performance.  She is asymptomatic and on excellent risk factor intervention.      I have recommended a return at 1 year unless she has earlier symptoms.     Again, thank you for allowing me to participate in the care of your patient.      Sincerely,    Tootie Herman MD     [facility]

## 2017-04-07 NOTE — PROGRESS NOTES
HISTORY OF PRESENT ILLNESS:  Ms. Idalmis Abdul was seen in followup at [facility] in [place].  She was new to myself last year, but she reminded me that I had previously seen her , Atif, who  about 6-7 years ago from colon cancer.      Ms. Abdul has a history of coronary artery disease and prior coronary artery bypass graft surgery.  In , she underwent bypass surgery at [facility] after presenting with chest discomfort to the [facility] ED and being transferred to [facility].  She had evidence of multivessel disease on angiography.      A LIMA was placed to the LAD, a vein graft was placed to the circumflex marginal branch, a vein graft was placed to the ramus branch and a vein graft was placed to the right PDA.  Postoperatively, she experienced a pericardial hematoma and tamponade and that was treated with a pericardial window.  She had postoperative atrial fibrillation and was on anticoagulation, but she has not had any evidence of a recurrence.  On anticoagulation, she experienced an upper GI bleed and underwent clipping of a duodenal ulcer.      She is now doing well.  She suffered from Graves' disease and experienced the eye complications.  She has undergone bilateral strabismus surgery which was a 2-day procedure.  Her diplopia has now resolved and she is practicing her driving in preparation for return to driving.  She also is planning to have upper eyelid Plastic Surgery for impaired vision.  She has undergone cataract surgery.      She has not had any chest, neck, arm or back discomfort.  Her lipids are under excellent control with the LDL fraction being about 38 mg/dL, triglycerides 79 mg/dL 7 months ago when they were checked.  Her blood pressure is normal.  It was initially high when she came to clinic, but I rechecked it and it was 132/85 mmHg.  She continues on good risk factor intervention including low dose aspirin, beta blockade, ACE inhibition and statin  therapy.  She is also on amlodipine for dye hypertensive therapy.      PHYSICAL EXAMINATION:     GENERAL:  This is a woman in no apparent distress.   VITAL SIGNS:  The blood pressure was as noted, heart rate 72 beats per minute and regular, and respiratory rate 14-18 per minute.   CHEST:  Clear to auscultation.   CARDIAC:  On cardiac auscultation, there was an S1 and S2 without extra sounds or murmur.  Rhythm was regular.      ASSESSMENT:  Ms. Fan has a history of coronary artery disease and prior coronary artery bypass graft surgery.  She has normal left ventricular systolic performance.  She is asymptomatic and on excellent risk factor intervention.      I have recommended a return at 1 year unless she has earlier symptoms.         LISA BENNETT MD, [facility]             D: 2017 10:36   T: 2017 14:53   MT: JAMI      Name:     PIEDAD FAN   MRN:      [id number]        Account:      [id number]   :      1945           Service Date: 2017      Document: [id number]

## 2017-04-10 ENCOUNTER — TELEPHONE (OUTPATIENT)
Dept: ENDOCRINOLOGY | Facility: CLINIC | Age: 72
End: 2017-04-10

## 2017-04-11 ENCOUNTER — OFFICE VISIT (OUTPATIENT)
Dept: FAMILY MEDICINE | Facility: CLINIC | Age: 72
End: 2017-04-11
Payer: COMMERCIAL

## 2017-04-11 VITALS
HEART RATE: 72 BPM | TEMPERATURE: 98.1 F | RESPIRATION RATE: 16 BRPM | BODY MASS INDEX: 28.35 KG/M2 | WEIGHT: 160 LBS | HEIGHT: 63 IN | SYSTOLIC BLOOD PRESSURE: 160 MMHG | DIASTOLIC BLOOD PRESSURE: 80 MMHG

## 2017-04-11 DIAGNOSIS — H61.21 IMPACTED CERUMEN OF RIGHT EAR: Primary | ICD-10-CM

## 2017-04-11 PROCEDURE — 69209 REMOVE IMPACTED EAR WAX UNI: CPT | Mod: RT | Performed by: NURSE PRACTITIONER

## 2017-04-11 PROCEDURE — 99213 OFFICE O/P EST LOW 20 MIN: CPT | Mod: 25 | Performed by: NURSE PRACTITIONER

## 2017-04-11 NOTE — PATIENT INSTRUCTIONS
Wax removed from right ear.  If pain returns or any other concerns, can consider consult with ENT for further evaluation.    Follow up if symptoms persist or worsen and as needed.        Thank you for choosing [facility]  You may be receiving a survey in the mail from [employer] regarding your visit today.  Please take a few minutes to complete and return the survey to let us know how we are doing.      Our Clinic hours are:  Mondays    7:20 am - 7 pm  Tues -  Fri  7:20 am - 5 pm    Clinic Phone: [phone]    The clinic lab opens at 7:30 am Mon - Fri and appointments are required.    [facility]  Ph. [phone]  Monday-Thursday 8 am - 7pm  Tues/Wed/Fri 8 am - 5:30 pm

## 2017-04-11 NOTE — PROGRESS NOTES
SUBJECTIVE:                                                    Idalmis Abdul is a 71 year old female who presents to clinic today for the following health issues:      Pt is here for right ear pain she has had on and off for 2 months. She has swelling in her right jaw for 3 months.        Problem list and histories reviewed & adjusted, as indicated.  Additional history: has vague concerns of right ear pain, comes and goes and was really bad over the weekend.  It's better now. She denies itchiness, discharge. No other specific URI complaint. She doesn't feel a bump in or around her ear.  Has decreased hearing but feels that's her age.  Blood pressure is up and she states she's struggled with that. Does take her medications daily as prescribed.  No other concerns and is otherwise doing well today.        Patient Active Problem List   Diagnosis     Hypertension goal BP (blood pressure) < 140/90     Vaginal prolapse     GIST (gastrointestinal stromal tumor), malignant (H)     Eyelid edema     History of lung cancer     Health Care Home     NSTEMI (non-ST elevated myocardial infarction) (H)     ASCVD (arteriosclerotic cardiovascular disease)     Postsurgical aortocoronary bypass status     S/P CABG x 4     Low back pain     Dyspnea     GERD (gastroesophageal reflux disease)     Hyperlipidemia LDL goal <160     Hyperthyroidism     Senile cataract     Thyroid eye disease     Eyelid retraction or lag     Thyrotoxic exophthalmos     Graves' disease     History of tobacco abuse     History of non-ST elevation myocardial infarction (NSTEMI)     Chronic back pain     Hypothyroidism     Diplopia     Monocular esotropia     Hypotropia     PCO (posterior capsular opacification)     Senile nuclear sclerosis     PVD (posterior vitreous detachment)     Age-related osteoporosis without current pathological fracture     Strabismus     Hypertropia of right eye     Cortical senile cataract, right     Senile cataract of right eye      Past Surgical History:   Procedure Laterality Date     BYPASS GRAFT ARTERY CORONARY  6/14/2012    Procedure: BYPASS GRAFT ARTERY CORONARY;  Median Sternotomy Coronary Artery Bypass Graft  x 3        C THORACOSCOPY,DX W BX  fall 2003    benign tissue     CATARACT IOL, RT/LT      LE     CHOLECYSTECTOMY  1963     COLONOSCOPY  4-12-05     CORONARY ARTERY BYPASS      X4     CREATION PERICARDIAL WINDOW  6/15/2012    Procedure: CREATION PERICARDIAL WINDOW;  Mediastinal Exploration, Control of Bleeding;  Surgeon: Dwaine Griffin MD;  Location[facility]     EYE SURGERY  2014    left cataract removal     GI SURGERY  2003 and 2007    GIST tumor removal     GYN SURGERY      tubal ligation     PHACOEMULSIFICATION WITH STANDARD INTRAOCULAR LENS IMPLANT  3/24/2014    Procedure: PHACOEMULSIFICATION WITH STANDARD INTRAOCULAR LENS IMPLANT;  Left Kelman Phacoemulsification with Intraocular Lens Implant;  Surgeon: Magnus Mckeon MD;  Location: [facility] OR     RECESSION RESECTION WITH ADJUSTABLE SUTURE BILATERAL Bilateral 7/14/2016    Procedure: RECESSION RESECTION WITH ADJUSTABLE SUTURE BILATERAL;  Surgeon: Erma Ordaz MD;  Location[facility]     SURGICAL HISTORY OF -   1963    cholecystectomy     SURGICAL HISTORY OF -   1970's    Tubal Ligation      SURGICAL HISTORY OF -   08/03    Explor. Lap, Excision of Small Bowel Tumor      SURGICAL HISTORY OF -   4/2010    lung cancer removed right lung       Social History   Substance Use Topics     Smoking status: Former Smoker     Packs/day: 0.50     Years: 49.00     Types: Cigarettes     Quit date: 4/19/2010     Smokeless tobacco: Never Used     Alcohol use No     Family History   Problem Relation Age of Onset     HEART DISEASE Mother      OSTEOPOROSIS Mother      Respiratory Mother      Emphezyma     Hypertension Mother      HEART DISEASE Father      Alcohol/Drug Father      Hypertension Father      Hypertension Maternal Grandmother      Hypertension Brother      Hypertension  "Sister      Arthritis Sister      Hypertension Son      CANCER Son      rectal         Current Outpatient Prescriptions   Medication Sig Dispense Refill     Multiple Vitamins-Minerals (CENTRUM SILVER) per tablet Take 1 tablet by mouth       LORazepam (ATIVAN) 1 MG tablet Take 1 tablet (1 mg) by mouth At Bedtime 30 tablet 3     imatinib (GLEEVEC) 400 MG tablet Take 1 tablet (400 mg) by mouth daily Take with a meal and a large glass of water. 30 tablet 2     oxyCODONE (ROXICODONE) 5 MG IR tablet Take 1-2 tablets (5-10 mg) by mouth 2 times daily 180 tablet 0     amLODIPine (NORVASC) 5 MG tablet Take 1 tablet (5 mg) by mouth daily 30 tablet 11     calcium-vitamin D-vitamin K (VIACTIV) 500-500-40 MG-UNT-MCG CHEW Take 1 tablet by mouth daily Reported on 4/3/2017  0     methimazole (TAPAZOLE) 10 MG tablet Take 0.5 tablets (5 mg) by mouth daily 45 tablet 3     rosuvastatin (CRESTOR) 5 MG tablet Take 1 tablet (5 mg) by mouth daily 90 tablet 3     lisinopril (PRINIVIL,ZESTRIL) 40 MG tablet Take 1 tablet (40 mg) by mouth daily 90 tablet 3     metoprolol (LOPRESSOR) 100 MG tablet Take 1 tablet (100 mg) by mouth 2 times daily 180 tablet 3     aspirin EC 81 MG EC tablet Take 1 tablet by mouth daily. 30 tablet 2     Allergies   Allergen Reactions     Atorvastatin Cramps     BP Readings from Last 3 Encounters:   04/11/17 160/80   04/07/17 148/88   04/03/17 142/88    Wt Readings from Last 3 Encounters:   04/11/17 160 lb (72.6 kg)   04/07/17 162 lb (73.5 kg)   04/03/17 162 lb 8 oz (73.7 kg)                    Reviewed and updated as needed this visit by clinical staff  Tobacco  Allergies  Med Hx  Surg Hx  Fam Hx  Soc Hx      Reviewed and updated as needed this visit by Provider          ROS: 10 point ROS neg other than the symptoms noted above in the HPI.    OBJECTIVE:                                                    /80  Pulse 72  Temp 98.1  F (36.7  C) (Oral)  Resp 16  Ht 5' 2.5\" (1.588 m)  Wt 160 lb (72.6 kg)  " LMP 01/01/1992  BMI 28.8 kg/m2  Body mass index is 28.8 kg/(m^2).  GENERAL: healthy, alert and no distress  HENT: ear canals right is occluded with cerumen, successfully cleared with lavage only and TM's normal, pharynx without erythema, no sinus tenderness, no obvious swelling of face or ear or jaw  NECK: no adenopathy, no asymmetry  RESP: lungs clear to auscultation - no rales, rhonchi or wheezes  CV: regular rate and rhythm, normal S1 S2, no S3 or S4, no murmur  ABDOMEN: soft, nontender  MS: no gross musculoskeletal defects noted      Diagnostic Test Results:  none      ASSESSMENT/PLAN:                                                            1. Impacted cerumen of right ear    - HC REMOVAL IMPACTED CERUMEN IRRIGATION/LVG UNILAT      Briefly discussed blood pressure issues. She will continue to monitor and may consider increasing Norvasc to 10 mg for better control, she states she'll return within a month or so to further discuss.    See Patient Instructions  Patient Instructions   Wax removed from right ear.  If pain returns or any other concerns, can consider consult with ENT for further evaluation.    Follow up if symptoms persist or worsen and as needed.        Thank you for choosing [facility]  You may be receiving a survey in the mail from [employer] regarding your visit today.  Please take a few minutes to complete and return the survey to let us know how we are doing.      Our Clinic hours are:  Mondays    7:20 am - 7 pm  Tues -  Fri  7:20 am - 5 pm    Clinic Phone: [phone]    The clinic lab opens at 7:30 am Mon - Fri and appointments are required.    [facility]  Ph. [phone]  Monday-Thursday 8 am - 7pm  Tues/Wed/Fri 8 am - 5:30 pm             JAIME Howard CNP  [facility]

## 2017-04-11 NOTE — MR AVS SNAPSHOT
After Visit Summary   4/11/2017    Idalmis Abdul    MRN: [id number]           Patient Information     Date Of Birth          1945        Visit Information        Provider Department      4/11/2017 8:00 AM Emiliana Grier APRN CNP [facility]        Today's Diagnoses     Impacted cerumen of right ear    -  1      Care Instructions    Wax removed from right ear.  If pain returns or any other concerns, can consider consult with ENT for further evaluation.    Follow up if symptoms persist or worsen and as needed.        Thank you for choosing [facility].  You may be receiving a survey in the mail from [employer] regarding your visit today.  Please take a few minutes to complete and return the survey to let us know how we are doing.      Our Clinic hours are:  Mondays    7:20 am - 7 pm  Tues -  Fri  7:20 am - 5 pm    Clinic Phone: [phone]    The clinic lab opens at 7:30 am Mon - Fri and appointments are required.    [facility]. [phone]  Monday-Thursday 8 am - 7pm  Tues/Wed/Fri 8 am - 5:30 pm               Follow-ups after your visit        Your next 10 appointments already scheduled     Jun 19, 2017 12:00 PM CDT   NEW THYROID EYE with Federico Matthews MD   [facility] ([facility])    [address] [facility] [address] [address]   [phone]            Jul 07, 2017  9:00 AM CDT   LAB with [facility] ([facility])    [address] [facility] [address]   [phone]           Patient must bring picture ID.  Patient should be prepared to give a urine specimen  Please do not eat 10-12 hours before your appointment if you are coming in fasting for labs on lipids, cholesterol, or glucose (sugar).  Pregnant women should follow their Care Team instructions. Water with medications is okay. Do not drink coffee or other fluids.   If you have concerns  about taking  your medications, please ask at office or if scheduling via [employer], send a message by clicking on Secure Messaging, Message Your Care Team.            Jul 07, 2017  9:30 AM CDT   CT CHEST/ABDOMEN/PELVIS W CONTRAST with [facility]   [facility] ([facility])    [address]  [facility] [address]   [phone]           Please bring any scans or X-rays taken at other hospitals, if similar tests were done. Also bring a list of your medicines, including vitamins, minerals and over-the-counter drugs. It is safest to leave personal items at home.  Be sure to tell your doctor:   If you have any allergies.   If there s any chance you are pregnant.   If you are breastfeeding.   If you have any special needs.  You may have contrast for this exam. To prepare:   Do not eat or drink for 2 hours before your exam. If you need to take medicine, you may take it with small sips of water. (We may ask you to take liquid medicine as well.)   The day before your exam, drink extra fluids at least six 8-ounce glasses (unless your doctor tells you to restrict your fluids).  Patients over 70 or patients with diabetes or kidney problems:   If you haven t had a blood test (creatinine test) within the last 30 days, go to your clinic or Diagnostic Imaging Department for this test.  If you have diabetes:   If your kidney function is normal, continue taking your metformin (Avandamet, Glucophage, Glucovance, Metaglip) on the day of your exam.   If your kidney function is abnormal, wait 48 hours before restarting this medicine.  You will have oral contrast for this exam:   You will drink the contrast at home. Get this from your clinic or Diagnostic Imaging Department. Please follow the directions given.  Please wear loose clothing, such as a sweat suit or jogging clothes. Avoid snaps, zippers and other metal. We may ask you to undress and put on a hospital gown.  If you have any questions, please call the  "Imaging Department where you will have your exam.            Jul 10, 2017  8:45 AM CDT   (Arrive by 8:30 AM)   Return Visit with Blayne Schmitz MD   [facility] ([facility])    [address] [place] [address]  [address] [facility] [address] [phone]            Aug 01, 2017 11:00 AM CDT   (Arrive by 10:45 AM)   RETURN ENDOCRINE with Swetha Antoine MD   [facility] ([facility])    [address] [place] [id number] [phone]              Who to contact     If you have questions or need follow up information about today's clinic visit or your schedule please contact [facility] directly at [phone].  Normal or non-critical lab and imaging results will be communicated to you by MyChart, letter or phone within 4 business days after the clinic has received the results. If you do not hear from us within 7 days, please contact the clinic through MyChart or phone. If you have a critical or abnormal lab result, we will notify you by phone as soon as possible.  Submit refill requests through [employer] or call your pharmacy and they will forward the refill request to us. Please allow 3 business days for your refill to be completed.          Additional Information About Your Visit        MyChart Information     [employer] lets you send messages to your doctor, view your test results, renew your prescriptions, schedule appointments and more. To sign up, go to [email or web address] . Click on \"Log in\" on the left side of the screen, which will take you to the Welcome page. Then click on \"Sign up Now\" on the right side of the page.     You will be asked to enter the access code listed below, as well as some personal information. Please follow the directions to create your username and password.     Your access code is: [email or web address]  Expires: 5/4/2017 12:38 PM     Your access code " "will  in 90 days. If you need help or a new code, please call your [facility] clinic or [phone].        Care EveryWhere ID     This is your Care EveryWhere ID. This could be used by other organizations to access your [facility] medical records  [id number]        Your Vitals Were     Pulse Temperature Respirations Height Last Period BMI (Body Mass Index)    72 98.1  F (36.7  C) (Oral) 16 5' 2.5\" (1.588 m) 1992 28.8 kg/m2       Blood Pressure from Last 3 Encounters:   17 160/80   17 148/88   17 142/88    Weight from Last 3 Encounters:   17 160 lb (72.6 kg)   17 162 lb (73.5 kg)   17 162 lb 8 oz (73.7 kg)              Today, you had the following     No orders found for display       Primary Care Provider Office Phone # Fax #    Darlene Daniela Sultana [phone]       [facility] [address] [facility] [address]        Thank you!     Thank you for choosing [facility]  for your care. Our goal is always to provide you with excellent care. Hearing back from our patients is one way we can continue to improve our services. Please take a few minutes to complete the written survey that you may receive in the mail after your visit with us. Thank you!             Your Updated Medication List - Protect others around you: Learn how to safely use, store and throw away your medicines at www.disposemymeds.org.          This list is accurate as of: 17  8:25 AM.  Always use your most recent med list.                   Brand Name Dispense Instructions for use    amLODIPine 5 MG tablet    NORVASC    30 tablet    Take 1 tablet (5 mg) by mouth daily       aspirin 81 MG EC tablet     30 tablet    Take 1 tablet by mouth daily.       calcium-vitamin D-vitamin K 500-500-40 MG-UNT-MCG Chew    VIACTIV     Take 1 tablet by mouth daily Reported on 4/3/2017       CENTRUM SILVER per tablet      Take 1 tablet by mouth       imatinib 400 MG tablet "    GLEEVEC    30 tablet    Take 1 tablet (400 mg) by mouth daily Take with a meal and a large glass of water.       lisinopril 40 MG tablet    PRINIVIL/ZESTRIL    90 tablet    Take 1 tablet (40 mg) by mouth daily       LORazepam 1 MG tablet    ATIVAN    30 tablet    Take 1 tablet (1 mg) by mouth At Bedtime       methimazole 10 MG tablet    TAPAZOLE    45 tablet    Take 0.5 tablets (5 mg) by mouth daily       metoprolol 100 MG tablet    LOPRESSOR    180 tablet    Take 1 tablet (100 mg) by mouth 2 times daily       oxyCODONE 5 MG IR tablet    ROXICODONE    180 tablet    Take 1-2 tablets (5-10 mg) by mouth 2 times daily       rosuvastatin 5 MG tablet    CRESTOR    90 tablet    Take 1 tablet (5 mg) by mouth daily

## 2017-04-11 NOTE — NURSING NOTE
"Chief Complaint   Patient presents with     Ear Problem       Initial /75 (BP Location: Right arm, Patient Position: Chair, Cuff Size: Adult Large)  Pulse 72  Temp 98.1  F (36.7  C) (Oral)  Resp 16  Ht 5' 2.5\" (1.588 m)  Wt 160 lb (72.6 kg)  LMP 01/01/1992  BMI 28.8 kg/m2 Estimated body mass index is 28.8 kg/(m^2) as calculated from the following:    Height as of this encounter: 5' 2.5\" (1.588 m).    Weight as of this encounter: 160 lb (72.6 kg).  Medication Reconciliation: complete  "

## 2017-04-17 ENCOUNTER — TELEPHONE (OUTPATIENT)
Dept: FAMILY MEDICINE | Facility: CLINIC | Age: 72
End: 2017-04-17

## 2017-04-18 NOTE — TELEPHONE ENCOUNTER
Spoke to pt and she declined apt   Will call back and schedule later when she has her schedule     Mitra Nguyen  [facility] [place]

## 2017-04-24 ENCOUNTER — OFFICE VISIT (OUTPATIENT)
Dept: FAMILY MEDICINE | Facility: CLINIC | Age: 72
End: 2017-04-24
Payer: COMMERCIAL

## 2017-04-24 VITALS
HEART RATE: 77 BPM | SYSTOLIC BLOOD PRESSURE: 148 MMHG | DIASTOLIC BLOOD PRESSURE: 87 MMHG | WEIGHT: 162.4 LBS | HEIGHT: 63 IN | BODY MASS INDEX: 28.77 KG/M2

## 2017-04-24 DIAGNOSIS — G89.29 CHRONIC MIDLINE LOW BACK PAIN WITH SCIATICA, SCIATICA LATERALITY UNSPECIFIED: ICD-10-CM

## 2017-04-24 DIAGNOSIS — I25.10 ASCVD (ARTERIOSCLEROTIC CARDIOVASCULAR DISEASE): ICD-10-CM

## 2017-04-24 DIAGNOSIS — M54.40 CHRONIC MIDLINE LOW BACK PAIN WITH SCIATICA, SCIATICA LATERALITY UNSPECIFIED: ICD-10-CM

## 2017-04-24 DIAGNOSIS — I10 HTN, GOAL BELOW 140/90: Primary | ICD-10-CM

## 2017-04-24 DIAGNOSIS — Z95.1 POSTSURGICAL AORTOCORONARY BYPASS STATUS: ICD-10-CM

## 2017-04-24 DIAGNOSIS — I21.4 NSTEMI (NON-ST ELEVATED MYOCARDIAL INFARCTION) (H): ICD-10-CM

## 2017-04-24 LAB
AMPHETAMINES UR QL: ABNORMAL NG/ML
ANION GAP SERPL CALCULATED.3IONS-SCNC: 7 MMOL/L (ref 3–14)
BARBITURATES UR QL SCN: ABNORMAL NG/ML
BENZODIAZ UR QL SCN: ABNORMAL NG/ML
BUN SERPL-MCNC: 15 MG/DL (ref 7–30)
BUPRENORPHINE UR QL: ABNORMAL NG/ML
CALCIUM SERPL-MCNC: 8.7 MG/DL (ref 8.5–10.1)
CANNABINOIDS UR QL: ABNORMAL NG/ML
CHLORIDE SERPL-SCNC: 103 MMOL/L (ref 94–109)
CO2 SERPL-SCNC: 26 MMOL/L (ref 20–32)
COCAINE UR QL SCN: ABNORMAL NG/ML
CREAT SERPL-MCNC: 1.02 MG/DL (ref 0.52–1.04)
D-METHAMPHET UR QL: ABNORMAL NG/ML
GFR SERPL CREATININE-BSD FRML MDRD: 53 ML/MIN/1.7M2
GLUCOSE SERPL-MCNC: 108 MG/DL (ref 70–99)
METHADONE UR QL SCN: ABNORMAL NG/ML
OPIATES UR QL SCN: ABNORMAL NG/ML
OXYCODONE UR QL SCN: ABNORMAL NG/ML
PCP UR QL SCN: ABNORMAL NG/ML
POTASSIUM SERPL-SCNC: 4.4 MMOL/L (ref 3.4–5.3)
PROPOXYPH UR QL: ABNORMAL NG/ML
SODIUM SERPL-SCNC: 136 MMOL/L (ref 133–144)
TRICYCLICS UR QL SCN: ABNORMAL NG/ML

## 2017-04-24 PROCEDURE — 99214 OFFICE O/P EST MOD 30 MIN: CPT | Performed by: FAMILY MEDICINE

## 2017-04-24 PROCEDURE — 80048 BASIC METABOLIC PNL TOTAL CA: CPT | Performed by: FAMILY MEDICINE

## 2017-04-24 PROCEDURE — 36415 COLL VENOUS BLD VENIPUNCTURE: CPT | Performed by: FAMILY MEDICINE

## 2017-04-24 PROCEDURE — 80306 DRUG TEST PRSMV INSTRMNT: CPT | Performed by: FAMILY MEDICINE

## 2017-04-24 RX ORDER — HYDROCHLOROTHIAZIDE 12.5 MG/1
12.5 TABLET ORAL DAILY
Qty: 30 TABLET | Refills: 11 | Status: SHIPPED | OUTPATIENT
Start: 2017-04-24 | End: 2017-07-10

## 2017-04-24 RX ORDER — NEBULIZER AND COMPRESSOR
1 EACH MISCELLANEOUS 2 TIMES DAILY
Qty: 1 KIT | Refills: 1 | Status: SHIPPED | OUTPATIENT
Start: 2017-04-24

## 2017-04-24 NOTE — PATIENT INSTRUCTIONS
Thank you for choosing [facility].  You may be receiving a survey in the mail from [employer] regarding your visit today.  Please take a few minutes to complete and return the survey to let us know how we are doing.      If you have questions or concerns, please contact us via [address] or you can contact your care team at [phone].    Our Clinic hours are:  Monday 6:40 am  to 7:00 pm  Tuesday -Friday 6:40 am to 5:00 pm    The [place] outpatient lab hours are:  Monday - Friday 6:10 am to 4:45 pm  Saturdays 7:00 am to 11:00 am  Appointments are required, call [phone]    If you have clinical questions after hours or would like to schedule an appointment,  call the clinic at [phone].

## 2017-04-24 NOTE — NURSING NOTE
"Chief Complaint   Patient presents with     Hypertension     recheck on high blood pressure       Initial /87 (BP Location: Left arm, Patient Position: Chair, Cuff Size: Adult Regular)  Pulse 77  Ht 5' 2.5\" (1.588 m)  Wt 162 lb 6.4 oz (73.7 kg)  LMP 01/01/1992  Breastfeeding? No  BMI 29.23 kg/m2 Estimated body mass index is 29.23 kg/(m^2) as calculated from the following:    Height as of this encounter: 5' 2.5\" (1.588 m).    Weight as of this encounter: 162 lb 6.4 oz (73.7 kg).  Medication Reconciliation: complete  "

## 2017-04-24 NOTE — LETTER
[facility]  [address] [facility] [address] [address]  Phone: [phone]    April 24, 2017    Idalmis Abdul  [address]   [facility] [address]              Dear Ms. Abdul,    Drug screen shows she is taking.   Component      Latest Ref Rng & Units 4/24/2017   Cannabinoids (15-kgq-8-carboxy-9-THC)      NDET ng/mL Not Detected . . .   Phencyclidine (Phencyclidine)      NDET ng/mL Not Detected . . .   Cocaine (Benzoylecgonine)      NDET ng/mL Not Detected . . .   Methamphetamine (d-Methamphetamine)      NDET ng/mL Not Detected . . .   Opiates (Morphine)      NDET ng/mL Not Detected . . .   Amphetamine (d-Amphetamine)      NDET ng/mL Not Detected . . .   Benzodiazepines (Nordiazepam)      NDET ng/mL Detected, Abnormal Result (A) . . .   Tricyclic Antidepressants (Desipramine)      NDET ng/mL Not Detected . . .   Methadone (Methadone)      NDET ng/mL Not Detected . . .   Barbiturates (Butalbital)      NDET ng/mL Not Detected . . .   Oxycodone (Oxycodone)      NDET ng/mL Detected, Abnormal Result (A) . . .   Propoxyphene (Norpropoxyphene)      NDET ng/mL Not Detected . . .   Buprenorphine (Buprenorphine)      NDET ng/mL Not Detected . . .   Sincerely,      Darlene Sultana MD / francesca

## 2017-04-24 NOTE — PROGRESS NOTES
a  SUBJECTIVE:                                                    Idalmis Abdul is 71 year old female   Chief Complaint   Patient presents with     Hypertension     recheck on high blood pressure     Hypertension Follow-up      Outpatient blood pressures are being checked at home.  Results are usually normal.    Low Salt Diet: no added salt       Amount of exercise or physical activity: None    Problems taking medications regularly: No    Medication side effects: none    Diet: regular (no restrictions)      Problem list and histories reviewed & adjusted, as indicated.  Additional history: running bit higher since stopping lasix, feel better off if it.  Previous check provider recommended doubling Norvasc, has not done so.    Patient Active Problem List   Diagnosis     Hypertension goal BP (blood pressure) < 140/90     Vaginal prolapse     GIST (gastrointestinal stromal tumor), malignant (H)     Eyelid edema     History of lung cancer     Health Care Home     NSTEMI (non-ST elevated myocardial infarction) (H)     ASCVD (arteriosclerotic cardiovascular disease)     Postsurgical aortocoronary bypass status     S/P CABG x 4     Dyspnea     GERD (gastroesophageal reflux disease)     Hyperlipidemia LDL goal <160     Hyperthyroidism     Thyroid eye disease     Eyelid retraction or lag     Thyrotoxic exophthalmos     Graves' disease     History of tobacco abuse     History of non-ST elevation myocardial infarction (NSTEMI)     Chronic back pain     PVD (posterior vitreous detachment)     Age-related osteoporosis without current pathological fracture     Past Surgical History:   Procedure Laterality Date     BYPASS GRAFT ARTERY CORONARY  6/14/2012    Procedure: BYPASS GRAFT ARTERY CORONARY;  Median Sternotomy Coronary Artery Bypass Graft  x 3        C THORACOSCOPY,DX W BX  fall 2003    benign tissue     CATARACT IOL, RT/LT      LE     CHOLECYSTECTOMY  1963     COLONOSCOPY  4-12-05     CORONARY ARTERY BYPASS      X4      CREATION PERICARDIAL WINDOW  6/15/2012    Procedure: CREATION PERICARDIAL WINDOW;  Mediastinal Exploration, Control of Bleeding;  Surgeon: Dwaine Griffin MD;  Location: [facility] OR     EYE SURGERY  2014    left cataract removal     GI SURGERY  2003 and 2007    GIST tumor removal     GYN SURGERY      tubal ligation     PHACOEMULSIFICATION WITH STANDARD INTRAOCULAR LENS IMPLANT  3/24/2014    Procedure: PHACOEMULSIFICATION WITH STANDARD INTRAOCULAR LENS IMPLANT;  Left Kelman Phacoemulsification with Intraocular Lens Implant;  Surgeon: Magnus Mckeon MD;  Location: [facility] OR     RECESSION RESECTION WITH ADJUSTABLE SUTURE BILATERAL Bilateral 7/14/2016    Procedure: RECESSION RESECTION WITH ADJUSTABLE SUTURE BILATERAL;  Surgeon: Erma Ordaz MD;  Location: [facility] OR     SURGICAL HISTORY OF -   1963    cholecystectomy     SURGICAL HISTORY OF -   1970's    Tubal Ligation      SURGICAL HISTORY OF -   08/03    Explor. Lap, Excision of Small Bowel Tumor      SURGICAL HISTORY OF -   4/2010    lung cancer removed right lung       Social History   Substance Use Topics     Smoking status: Former Smoker     Packs/day: 0.50     Years: 49.00     Types: Cigarettes     Quit date: 4/19/2010     Smokeless tobacco: Never Used     Alcohol use No     Family History   Problem Relation Age of Onset     HEART DISEASE Mother      OSTEOPOROSIS Mother      Respiratory Mother      Emphezyma     Hypertension Mother      HEART DISEASE Father      Alcohol/Drug Father      Hypertension Father      Hypertension Maternal Grandmother      Hypertension Brother      Hypertension Sister      Arthritis Sister      Hypertension Son      CANCER Son      rectal         Current Outpatient Prescriptions   Medication Sig Dispense Refill     hydrochlorothiazide 12.5 MG TABS tablet Take 1 tablet (12.5 mg) by mouth daily 30 tablet 11     Blood Pressure Monitoring (ADULT BLOOD PRESSURE CUFF LG) KIT 1 Device 2 times daily 1 kit 1     LORazepam (ATIVAN) 1 MG  tablet Take 1 tablet (1 mg) by mouth At Bedtime 30 tablet 3     oxyCODONE (ROXICODONE) 5 MG IR tablet Take 1-2 tablets (5-10 mg) by mouth 2 times daily 180 tablet 0     amLODIPine (NORVASC) 5 MG tablet Take 1 tablet (5 mg) by mouth daily 30 tablet 11     methimazole (TAPAZOLE) 10 MG tablet Take 0.5 tablets (5 mg) by mouth daily 45 tablet 3     rosuvastatin (CRESTOR) 5 MG tablet Take 1 tablet (5 mg) by mouth daily 90 tablet 3     lisinopril (PRINIVIL,ZESTRIL) 40 MG tablet Take 1 tablet (40 mg) by mouth daily 90 tablet 3     metoprolol (LOPRESSOR) 100 MG tablet Take 1 tablet (100 mg) by mouth 2 times daily 180 tablet 3     aspirin EC 81 MG EC tablet Take 1 tablet by mouth daily. 30 tablet 2     imatinib (GLEEVEC) 400 MG tablet Take 1 tablet (400 mg) by mouth daily Take with a meal and a large glass of water. 30 tablet 2     Allergies   Allergen Reactions     Atorvastatin Cramps     Recent Labs   Lab Test  04/24/17   1103  01/13/17   0854  11/08/16   1237  09/20/16   1022   07/08/16   0914  06/27/16   1110   01/15/16   0921   07/17/15   0916   03/14/14   0929   06/19/12   1550   06/15/12   0850   06/15/12   0719   A1C   --    --    --    --    --    --    --    --    --    --    --    --    --    --   5.4   --   Duplicate request   --   5.2   LDL   --    --    --   38   --    --    --    --    --    --   31   --   59   --    --    --    --    --    --    HDL   --    --    --   53   --    --    --    --    --    --   46*   --   38*   --    --    --    --    --    --    TRIG   --    --    --   79   --    --    --    --    --    --   72   --   105   < >   --    --    --    --    --    ALT   --   24   --    --    --   23   --    --   26   < >  23   < >  39   < >   --    < >   --    --    --    CR  1.02  1.00   --   1.00   --   1.18*   --    --   1.19*   --   1.06*   < >  1.07*   < >   --    < >   --    --   1.82*   GFRESTIMATED  53*  55*   --   55*   --   45*   --    --   45*   --   51*   < >  51*   < >   --    < >   --     "--   28*   GFRESTBLACK  64  66   --   66   --   55*   --    --   54*   --   62   < >  62   < >   --    < >   --    --   34*   POTASSIUM  4.4  4.2   --   4.2   < >  4.0   --    --   4.1   --   4.0   < >  3.8   < >   --    < >  2.8*   < >  4.2   TSH   --    --   2.17   --    --    --   2.20   < >   --    < >   --    < >  0.03*   < >   --    --    --    --    --     < > = values in this interval not displayed.      BP Readings from Last 3 Encounters:   04/24/17 148/87   04/11/17 160/80   04/07/17 148/88    Wt Readings from Last 3 Encounters:   04/24/17 162 lb 6.4 oz (73.7 kg)   04/11/17 160 lb (72.6 kg)   04/07/17 162 lb (73.5 kg)         ROS:  Constitutional, HEENT, cardiovascular, pulmonary, gi and gu systems are negative, except as otherwise noted.    OBJECTIVE:                                                    /87 (BP Location: Left arm, Patient Position: Chair, Cuff Size: Adult Regular)  Pulse 77  Ht 5' 2.5\" (1.588 m)  Wt 162 lb 6.4 oz (73.7 kg)  LMP 01/01/1992  Breastfeeding? No  BMI 29.23 kg/m2  GENERAL APPEARANCE ADULT: Alert, no acute distress  RESP: lungs clear to auscultation   CV: normal rate, regular rhythm, no murmur or gallop  Diagnostic Test Results:  Results for orders placed or performed in visit on 04/24/17   Drug Abuse Screen Panel 13, Urine (Pain Care Package)   Result Value Ref Range    Cannabinoids (87-lbs-6-carboxy-9-THC)  NDET ng/mL     Not Detected   Cutoff for a negative cannabinoid is 50 ng/mL or less.      Phencyclidine (Phencyclidine)  NDET ng/mL     Not Detected   Cutoff for a negative PCP is 25 ng/mL or less.      Cocaine (Benzoylecgonine)  NDET ng/mL     Not Detected   Cutoff for a negative cocaine is 150 ng/ml or less.      Methamphetamine (d-Methamphetamine)  NDET ng/mL     Not Detected   Cutoff for a negative methamphetamine is 500 ng/ml or less.      Opiates (Morphine)  NDET ng/mL     Not Detected   Cutoff for a negative opiate is 100 ng/ml or less.      Amphetamine " (d-Amphetamine)  NDET ng/mL     Not Detected   Cutoff for a negative amphetamine is 500 ng/mL or less.      Benzodiazepines (Nordiazepam) (A) NDET ng/mL     Detected, Abnormal Result   Cutoff for a positive benzodiazepines is greater than 150 ng/ml.   This is an unconfirmed screening result to be used for medical purposes only.   Order [id number] for confirmation or individual confirmation tests to [facility].      Tricyclic Antidepressants (Desipramine)  NDET ng/mL     Not Detected   Cutoff for a negative tricyclic antidepressant is 300 ng/ml or less.      Methadone (Methadone)  NDET ng/mL     Not Detected   Cutoff for a negative methadone is 200 ng/ml or less.      Barbiturates (Butalbital)  NDET ng/mL     Not Detected   Cutoff for a negative barbituate is 200 ng/ml or less.      Oxycodone (Oxycodone) (A) NDET ng/mL     Detected, Abnormal Result   Cutoff for a positive oxycodone is greater than 100 ng/ml.   This is an unconfirmed screening result to be used for medical purposes only.   Order [id number] for confirmation or individual confirmation tests to [facility].      Propoxyphene (Norpropoxyphene)  NDET ng/mL     Not Detected   Cutoff for a negative propoxyphene is 300 ng/ml or less      Buprenorphine (Buprenorphine)  NDET ng/mL     Not Detected   Cutoff for a negative buprenorphine is 10 ng/ml or less     Basic metabolic panel  (Ca, Cl, CO2, Creat, Gluc, K, Na, BUN)   Result Value Ref Range    Sodium 136 133 - 144 mmol/L    Potassium 4.4 3.4 - 5.3 mmol/L    Chloride 103 94 - 109 mmol/L    Carbon Dioxide 26 20 - 32 mmol/L    Anion Gap 7 3 - 14 mmol/L    Glucose 108 (H) 70 - 99 mg/dL    Urea Nitrogen 15 7 - 30 mg/dL    Creatinine 1.02 0.52 - 1.04 mg/dL    GFR Estimate 53 (L) >60 mL/min/1.7m2    GFR Estimate If Black 64 >60 mL/min/1.7m2    Calcium 8.7 8.5 - 10.1 mg/dL        ASSESSMENT/PLAN:                                                    1. HTN, goal below 140/90  Will start low dose HCTZ if tolerate and potassium and  sodium remain normal will stay on it  - hydrochlorothiazide 12.5 MG TABS tablet; Take 1 tablet (12.5 mg) by mouth daily  Dispense: 30 tablet; Refill: 11  - Blood Pressure Monitoring (ADULT BLOOD PRESSURE CUFF LG) KIT; 1 Device 2 times daily  Dispense: 1 kit; Refill: 1  - Basic metabolic panel  (Ca, Cl, CO2, Creat, Gluc, K, Na, BUN)    2. NSTEMI (non-ST elevated myocardial infarction) (H)  3. Postsurgical aortocoronary bypass status  4. ASCVD (arteriosclerotic cardiovascular disease)  Cleaning up problem list reviewed ASVD    5. Chronic back pain  Will be out of oxycodone in about 2 weeks.  Due for urine drug screen.  Will refill when return for blood pressure check.  - Drug Abuse Screen Panel 13, Urine (Pain Care Package)    Darlene Sultana MD  [facility]

## 2017-04-24 NOTE — MR AVS SNAPSHOT
After Visit Summary   4/24/2017    Idalmis Abdul    MRN: [id number]           Patient Information     Date Of Birth          1945        Visit Information        Provider Department      4/24/2017 10:20 AM Darlene Sultana MD [facility]        Today's Diagnoses     HTN, goal below 140/90    -  1    NSTEMI (non-ST elevated myocardial infarction) (H)        Postsurgical aortocoronary bypass status        ASCVD (arteriosclerotic cardiovascular disease)          Care Instructions          Thank you for choosing [facility].  You may be receiving a survey in the mail from [facility] regarding your visit today.  Please take a few minutes to complete and return the survey to let us know how we are doing.      If you have questions or concerns, please contact us via [employer] or you can contact your care team at [phone].    Our Clinic hours are:  Monday 6:40 am  to 7:00 pm  Tuesday -Friday 6:40 am to 5:00 pm    The [place] outpatient lab hours are:  Monday - Friday 6:10 am to 4:45 pm  Saturdays 7:00 am to 11:00 am  Appointments are required, call [phone]    If you have clinical questions after hours or would like to schedule an appointment,  call the clinic at [phone].          Follow-ups after your visit        Your next 10 appointments already scheduled     Apr 26, 2017 10:15 AM CDT   New Visit with Rosana Nguyen MD   [facility]    [address] [facility] [address]   [phone]            Jun 19, 2017 12:00 PM CDT   NEW THYROID EYE with Federico Matthews MD   [facility] ([facility])    [address] [address] [address]   [phone]            Jul 07, 2017  9:00 AM CDT   LAB with [facility] ([facility])    [address] [address]   [phone]           Patient must bring picture ID.   Patient should be prepared to give a urine specimen  Please do not eat 10-12 hours before your appointment if you are coming in fasting for labs on lipids, cholesterol, or glucose (sugar).  Pregnant women should follow their Care Team instructions. Water with medications is okay. Do not drink coffee or other fluids.   If you have concerns about taking  your medications, please ask at office or if scheduling via [employer], send a message by clicking on Secure Messaging, Message Your Care Team.            Jul 07, 2017  9:30 AM CDT   CT CHEST/ABDOMEN/PELVIS W CONTRAST with [facility]   [facility] ([facility])    [address] [facility] [facility] [address]   [phone]           Please bring any scans or X-rays taken at other hospitals, if similar tests were done. Also bring a list of your medicines, including vitamins, minerals and over-the-counter drugs. It is safest to leave personal items at home.  Be sure to tell your doctor:   If you have any allergies.   If there s any chance you are pregnant.   If you are breastfeeding.   If you have any special needs.  You may have contrast for this exam. To prepare:   Do not eat or drink for 2 hours before your exam. If you need to take medicine, you may take it with small sips of water. (We may ask you to take liquid medicine as well.)   The day before your exam, drink extra fluids at least six 8-ounce glasses (unless your doctor tells you to restrict your fluids).  Patients over 70 or patients with diabetes or kidney problems:   If you haven t had a blood test (creatinine test) within the last 30 days, go to your clinic or Diagnostic Imaging Department for this test.  If you have diabetes:   If your kidney function is normal, continue taking your metformin (Avandamet, Glucophage, Glucovance, Metaglip) on the day of your exam.   If your kidney function is abnormal, wait 48 hours before restarting this medicine.  You will have oral contrast for this exam:    "You will drink the contrast at home. Get this from your clinic or Diagnostic Imaging Department. Please follow the directions given.  Please wear loose clothing, such as a sweat suit or jogging clothes. Avoid snaps, zippers and other metal. We may ask you to undress and put on a hospital gown.  If you have any questions, please call the Imaging Department where you will have your exam.            Jul 10, 2017  8:45 AM CDT   (Arrive by 8:30 AM)   Return Visit with Blayne Schmitz MD   [facility] ([facility])    [address] [place] [id number] [phone]            Aug 01, 2017 11:00 AM CDT   (Arrive by 10:45 AM)   RETURN ENDOCRINE with Swetha Antoine MD   [facility] ([facility])    [address] [place] [id number] [phone]              Who to contact     If you have questions or need follow up information about today's clinic visit or your schedule please contact [facility] directly at [phone].  Normal or non-critical lab and imaging results will be communicated to you by MyChart, letter or phone within 4 business days after the clinic has received the results. If you do not hear from us within 7 days, please contact the clinic through [employer] or phone. If you have a critical or abnormal lab result, we will notify you by phone as soon as possible.  Submit refill requests through [employer] or call your pharmacy and they will forward the refill request to us. Please allow 3 business days for your refill to be completed.          Additional Information About Your Visit        [employer] Information     [employer] lets you send messages to your doctor, view your test results, renew your prescriptions, schedule appointments and more. To sign up, go to [email or web address] . Click on \"Log in\" on the left side of the screen, which will take you " "to the Welcome page. Then click on \"Sign up Now\" on the right side of the page.     You will be asked to enter the access code listed below, as well as some personal information. Please follow the directions to create your username and password.     Your access code is: [email or web address]  Expires: 2017 12:38 PM     Your access code will  in 90 days. If you need help or a new code, please call your [facility] clinic or [phone].        Care EveryWhere ID     This is your Care EveryWhere ID. This could be used by other organizations to access your [facility] medical records  [id number]        Your Vitals Were     Pulse Height Last Period Breastfeeding? BMI (Body Mass Index)       77 5' 2.5\" (1.588 m) 1992 No 29.23 kg/m2        Blood Pressure from Last 3 Encounters:   17 148/87   17 160/80   17 148/88    Weight from Last 3 Encounters:   17 162 lb 6.4 oz (73.7 kg)   17 160 lb (72.6 kg)   17 162 lb (73.5 kg)              Today, you had the following     No orders found for display         Today's Medication Changes          These changes are accurate as of: 17 10:45 AM.  If you have any questions, ask your nurse or doctor.               Start taking these medicines.        Dose/Directions    Adult Blood Pressure Cuff Lg Kit   Used for:  HTN, goal below 140/90   Started by:  Darlene Sultana MD        Dose:  1 Device   1 Device 2 times daily   Quantity:  1 kit   Refills:  1       hydrochlorothiazide 12.5 MG Tabs tablet   Used for:  HTN, goal below 140/90   Started by:  Darlene Sultana MD        Dose:  12.5 mg   Take 1 tablet (12.5 mg) by mouth daily   Quantity:  30 tablet   Refills:  11         Stop taking these medicines if you haven't already. Please contact your care team if you have questions.     CENTRUM SILVER per tablet   Stopped by:  Darlene Sultana MD                Where to get your medicines      These medications were sent to [facility] " [place] - [address]  [address] [facility] [place], [facility] [address]     Phone:  [phone]     Adult Blood Pressure Cuff Lg Kit    hydrochlorothiazide 12.5 MG Tabs tablet                Primary Care Provider Office Phone # Fax #    Darlene Daniela Sultana [phone]       [facility] [address] [facility] [address]        Thank you!     Thank you for choosing [facility]  for your care. Our goal is always to provide you with excellent care. Hearing back from our patients is one way we can continue to improve our services. Please take a few minutes to complete the written survey that you may receive in the mail after your visit with us. Thank you!             Your Updated Medication List - Protect others around you: Learn how to safely use, store and throw away your medicines at www.disposemymeds.org.          This list is accurate as of: 4/24/17 10:45 AM.  Always use your most recent med list.                   Brand Name Dispense Instructions for use    Adult Blood Pressure Cuff Lg Kit     1 kit    1 Device 2 times daily       amLODIPine 5 MG tablet    NORVASC    30 tablet    Take 1 tablet (5 mg) by mouth daily       aspirin 81 MG EC tablet     30 tablet    Take 1 tablet by mouth daily.       calcium-vitamin D-vitamin K 500-500-40 MG-UNT-MCG Chew    VIACTIV     Take 1 tablet by mouth daily Reported on 4/3/2017       hydrochlorothiazide 12.5 MG Tabs tablet     30 tablet    Take 1 tablet (12.5 mg) by mouth daily       imatinib 400 MG tablet    GLEEVEC    30 tablet    Take 1 tablet (400 mg) by mouth daily Take with a meal and a large glass of water.       lisinopril 40 MG tablet    PRINIVIL/ZESTRIL    90 tablet    Take 1 tablet (40 mg) by mouth daily       LORazepam 1 MG tablet    ATIVAN    30 tablet    Take 1 tablet (1 mg) by mouth At Bedtime       methimazole 10 MG tablet    TAPAZOLE    45 tablet    Take 0.5 tablets (5 mg) by mouth  daily       metoprolol 100 MG tablet    LOPRESSOR    180 tablet    Take 1 tablet (100 mg) by mouth 2 times daily       oxyCODONE 5 MG IR tablet    ROXICODONE    180 tablet    Take 1-2 tablets (5-10 mg) by mouth 2 times daily       rosuvastatin 5 MG tablet    CRESTOR    90 tablet    Take 1 tablet (5 mg) by mouth daily

## 2017-04-26 ENCOUNTER — OFFICE VISIT (OUTPATIENT)
Dept: OTOLARYNGOLOGY | Facility: CLINIC | Age: 72
End: 2017-04-26
Payer: COMMERCIAL

## 2017-04-26 ENCOUNTER — TELEPHONE (OUTPATIENT)
Dept: OTOLARYNGOLOGY | Facility: CLINIC | Age: 72
End: 2017-04-26

## 2017-04-26 VITALS
TEMPERATURE: 98 F | WEIGHT: 162 LBS | SYSTOLIC BLOOD PRESSURE: 151 MMHG | DIASTOLIC BLOOD PRESSURE: 80 MMHG | BODY MASS INDEX: 29.16 KG/M2

## 2017-04-26 DIAGNOSIS — R51.9 FACIAL PAIN: Primary | ICD-10-CM

## 2017-04-26 PROCEDURE — 99203 OFFICE O/P NEW LOW 30 MIN: CPT | Performed by: OTOLARYNGOLOGY

## 2017-04-26 ASSESSMENT — PAIN SCALES - GENERAL: PAINLEVEL: NO PAIN (0)

## 2017-04-26 NOTE — PROGRESS NOTES
History of Present Illness - Idalmis Abdul is a 71 year old female with otalgia on the right side, which has been intermittent since more than 2 months. The patient denies otorrhea, hearing changes, or vertigo. 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 The patient has undergone dental procedures during the course of the development of symptoms. The patient denies trouble swallowing, breathing, or voice changes.    Past Medical History -   Patient Active Problem List   Diagnosis     Hypertension goal BP (blood pressure) < 140/90     Vaginal prolapse     GIST (gastrointestinal stromal tumor), malignant (H)     Eyelid edema     History of lung cancer     Health Care Home     NSTEMI (non-ST elevated myocardial infarction) (H)     ASCVD (arteriosclerotic cardiovascular disease)     Postsurgical aortocoronary bypass status     S/P CABG x 4     Dyspnea     GERD (gastroesophageal reflux disease)     Hyperlipidemia LDL goal <160     Hyperthyroidism     Thyroid eye disease     Eyelid retraction or lag     Thyrotoxic exophthalmos     Graves' disease     History of tobacco abuse     History of non-ST elevation myocardial infarction (NSTEMI)     Chronic back pain     PVD (posterior vitreous detachment)     Age-related osteoporosis without current pathological fracture       Current Medications -   Current Outpatient Prescriptions:      hydrochlorothiazide 12.5 MG TABS tablet, Take 1 tablet (12.5 mg) by mouth daily, Disp: 30 tablet, Rfl: 11     LORazepam (ATIVAN) 1 MG tablet, Take 1 tablet (1 mg) by mouth At Bedtime, Disp: 30 tablet, Rfl: 3     oxyCODONE (ROXICODONE) 5 MG IR tablet, Take 1-2 tablets (5-10 mg) by mouth 2 times daily, Disp: 180 tablet, Rfl: 0     amLODIPine (NORVASC) 5 MG tablet, Take 1 tablet (5 mg) by mouth daily, Disp: 30 tablet, Rfl: 11     methimazole (TAPAZOLE) 10 MG tablet, Take 0.5 tablets (5 mg) by mouth daily, Disp: 45 tablet, Rfl: 3     rosuvastatin (CRESTOR) 5 MG tablet, Take 1 tablet (5 mg)  by mouth daily, Disp: 90 tablet, Rfl: 3     lisinopril (PRINIVIL,ZESTRIL) 40 MG tablet, Take 1 tablet (40 mg) by mouth daily, Disp: 90 tablet, Rfl: 3     metoprolol (LOPRESSOR) 100 MG tablet, Take 1 tablet (100 mg) by mouth 2 times daily, Disp: 180 tablet, Rfl: 3     aspirin EC 81 MG EC tablet, Take 1 tablet by mouth daily., Disp: 30 tablet, Rfl: 2     Blood Pressure Monitoring (ADULT BLOOD PRESSURE CUFF LG) KIT, 1 Device 2 times daily, Disp: 1 kit, Rfl: 1     imatinib (GLEEVEC) 400 MG tablet, Take 1 tablet (400 mg) by mouth daily Take with a meal and a large glass of water., Disp: 30 tablet, Rfl: 2    Allergies -   Allergies   Allergen Reactions     Atorvastatin Cramps       Social History -   Social History     Social History     Marital status:      Spouse name: N/A     Number of children: N/A     Years of education: N/A     Social History Main Topics     Smoking status: Former Smoker     Packs/day: 0.50     Years: 49.00     Types: Cigarettes     Quit date: 4/19/2010     Smokeless tobacco: Never Used     Alcohol use No     Drug use: No     Sexual activity: Not Currently     Partners: Male      Comment:      Other Topics Concern      Service No     Blood Transfusions No     Caffeine Concern Yes     3 cups     Occupational Exposure No     Hobby Hazards No     Sleep Concern No     Stress Concern No     son  and much happiness in her life, big burden lifted,      Weight Concern No     Special Diet No     Back Care Yes     lower back herniated disc 1970's      Exercise No     Bike Helmet No     Seat Belt Yes     Self-Exams Yes     Parent/Sibling W/ Cabg, Mi Or Angioplasty Before 65f 55m? 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  Son (Rk, with wife Марина and grandson) lives next door -- quadriplegic and high functioning, cannot provide physical assistance/support.       Family History -    Family History   Problem Relation Age of Onset     HEART DISEASE Mother      OSTEOPOROSIS Mother      Respiratory Mother      Emphezyma     Hypertension Mother      HEART DISEASE Father      Alcohol/Drug Father      Hypertension Father      Hypertension Maternal Grandmother      Hypertension Brother      Hypertension Sister      Arthritis Sister      Hypertension Son      CANCER Son      rectal       Review of Systems - As per HPI and PMHx, otherwise 7 system review of the head and neck negative. 10+ system review negative.    Physical Exam  /80 (BP Location: Left arm, Patient Position: Chair, Cuff Size: Adult Large)  Temp 98  F (36.7  C) (Oral)  Wt 73.5 kg (162 lb)  LMP 01/01/1992  BMI 29.16 kg/m2  General - The patient is well nourished and well developed, and appears to have good nutritional status.  Alert and oriented to person and place, answers questions and cooperates with examination appropriately.   Head and Face - Normocephalic and atraumatic, with no gross asymmetry noted of the contour of the facial features.  The facial nerve is intact, with strong symmetric movements.  Voice and Breathing - The patient was breathing comfortably without the use of accessory muscles. There was no wheezing, stridor, or stertor.  The patients voice was clear and strong, and had appropriate pitch and quality.  Ears - Bilateral pinna and EACs with normal appearing overlying skin. Tympanic membrane intact with good mobility on pneumatic otoscopy bilaterally. Bony landmarks of the ossicular chain are normal. The tympanic membranes are normal in appearance. No retraction, perforation, or masses.  No fluid or purulence was seen in the external canal or the middle ear.   Eyes - Extraocular movements intact.  Sclera were not icteric or injected, conjunctiva were pink and moist.  Mouth - Examination of the oral cavity showed pink, healthy oral mucosa. No lesions or ulcerations noted.  The tongue was mobile and midline,  and the dentition were in good condition.  Palpation of the muscles of mastication demonstrates no tenderness. Temporomandibular joints have palpable crepitance on the right.  Throat - The walls of the oropharynx were smooth, pink, moist, symmetric, and had no lesions or ulcerations.  The tonsillar pillars and soft palate were symmetric.  The uvula was midline on elevation. Indirect laryngoscopy performed with laryngeal mirror demonstrating normal laryngeal structures.    Neck - Normal midline excursion of the laryngotracheal complex during swallowing.  Full range of motion on passive movement.  Palpation of the occipital, submental, submandibular, internal jugular chain, and supraclavicular nodes did not demonstrate any abnormal lymph nodes or masses.  The carotid pulse was palpable bilaterally.  Palpation of the thyroid was soft and smooth, with no nodules or goiter appreciated.  The trachea was mobile and midline.  Nose - External contour is symmetric, no gross deflection or scars.  Nasal mucosa is pink and moist with no abnormal mucus.  The septum was midline and non-obstructive, turbinates of normal size and position.  No polyps, masses, or purulence noted on examination.  Heart:  Regular rate and rhythm, no murmurs.  Lungs:  Chest clear to auscultation bilaterally      A/P - Idalmis Abdul is a 71 year old female with otalgia on the right side. Based on today's history and physical exam, I can find no evidence of middle ear pathology or eustachian tube dysfunction.  At this point my primary diagnosis is of temporomandibular syndrome.  I have discussed the etiology of TMJ, and the reasons why referred pain can mimic symptoms of ear disease, headaches, and even sinusitis. I have advised the patient to undergo a 2 week trial of soft diet and take ibuprofen 600mg q6h. I have given the patient an instructional sheet of things to be tried at home.  Finally, I counseled the patient that should the therapy not  help, or should the symptoms change, she may want to discuss further with her dentist.           Dr. Rosana Nguyen MD  Otolaryngology  [facility]

## 2017-04-26 NOTE — MR AVS SNAPSHOT
After Visit Summary   4/26/2017    Idalmis Abdul    MRN: [id number]           Patient Information     Date Of Birth          1945        Visit Information        Provider Department      4/26/2017 10:15 AM Rosana Nguyen MD [facility]        Today's Diagnoses     Facial pain    -  1      Care Instructions    Based on today's history and physical exam, I can find no evidence of middle ear pathology or eustachian tube dysfunction.  At this point my primary diagnosis is of temporomandibular syndrome.  I have discussed the etiology of TMJ, and the reasons why referred pain can mimic symptoms of ear disease, headaches, and even sinusitis.  Finally, I counseled the patient that should the therapy not help, or should the symptoms change, that they should return to me.    For the next 2 weeks:  1. Soft diet  2. No chewing gum  3. Use ibuprofen 600mg every 6 hours for 2 weeks  4. Use a bite guard at night, consider Willian's Grind-No-More which is available OTC.    TMJ exercises:  1. Close your mouth to touch your upper and lower teeth without clenching them. Rest the tip of your tongue on your palate behind your upper teeth.  2. Slide the tip of your tongue backwards toward the throat as far as you can, keeping your teeth together.  3. Try to touch the soft palate with the tip of your tongue, and keep it there. Then slowly open your mouth until you feel your tongue is being pulled away from the soft palate. Keep your mouth open in this position for five seconds before closing your mouth to relax.  4. Repeat the above steps slowly for 5 minutes. You may want to check in a mirror to be sure your teeth are closed aligned straight up and down, without your jaw being off to one side.    Do the exercises twice daily for the first 2 weeks, but then you can do it more often if it seems to help. You shouldn't hear any clicks or noise from your joints, and if you do then you should restart the  exercise rather than opening the mouth further. With practice, you will be able to open further without having clicking.          Follow-ups after your visit        Your next 10 appointments already scheduled     May 08, 2017 10:20 AM CDT   [facility]    [address] [address]   [phone]            Jun 19, 2017 12:00 PM CDT   NEW THYROID EYE with Federico Matthews MD   [facility] ([facility])    [address]  [place] [facility] [address]   [phone]            Jul 07, 2017  9:00 AM CDT   LAB with [facility] ([facility])    [address] [address]   [phone]           Patient must bring picture ID.  Patient should be prepared to give a urine specimen  Please do not eat 10-12 hours before your appointment if you are coming in fasting for labs on lipids, cholesterol, or glucose (sugar).  Pregnant women should follow their Care Team instructions. Water with medications is okay. Do not drink coffee or other fluids.   If you have concerns about taking  your medications, please ask at office or if scheduling via [employer], send a message by clicking on Secure Messaging, Message Your Care Team.            Jul 07, 2017  9:30 AM CDT   CT CHEST/ABDOMEN/PELVIS W CONTRAST with [id number]   [facility] ([facility])    [address] [address]   [phone]           Please bring any scans or X-rays taken at other hospitals, if similar tests were done. Also bring a list of your medicines, including vitamins, minerals and over-the-counter drugs. It is safest to leave personal items at home.  Be sure to tell your doctor:   If you have any allergies.   If there s any chance you are pregnant.   If you are breastfeeding.   If you have any special needs.  You may have contrast for this  exam. To prepare:   Do not eat or drink for 2 hours before your exam. If you need to take medicine, you may take it with small sips of water. (We may ask you to take liquid medicine as well.)   The day before your exam, drink extra fluids at least six 8-ounce glasses (unless your doctor tells you to restrict your fluids).  Patients over 70 or patients with diabetes or kidney problems:   If you haven t had a blood test (creatinine test) within the last 30 days, go to your clinic or Diagnostic Imaging Department for this test.  If you have diabetes:   If your kidney function is normal, continue taking your metformin (Avandamet, Glucophage, Glucovance, Metaglip) on the day of your exam.   If your kidney function is abnormal, wait 48 hours before restarting this medicine.  You will have oral contrast for this exam:   You will drink the contrast at home. Get this from your clinic or Diagnostic Imaging Department. Please follow the directions given.  Please wear loose clothing, such as a sweat suit or jogging clothes. Avoid snaps, zippers and other metal. We may ask you to undress and put on a hospital gown.  If you have any questions, please call the Imaging Department where you will have your exam.            Jul 10, 2017  8:45 AM CDT   (Arrive by 8:30 AM)   Return Visit with Blayne Schmitz MD   [facility] ([facility])    [address] [place] [address] [phone]            Aug 01, 2017 11:00 AM CDT   (Arrive by 10:45 AM)   RETURN ENDOCRINE with Swetha Antoine MD   [facility] ([facility])    [address] [place] [address] [phone]              Who to contact     If you have questions or need follow up information about today's clinic visit or your schedule please contact [facility] directly at [phone].  Normal or non-critical lab  "and imaging results will be communicated to you by MyChart, letter or phone within 4 business days after the clinic has received the results. If you do not hear from us within 7 days, please contact the clinic through [employer] or phone. If you have a critical or abnormal lab result, we will notify you by phone as soon as possible.  Submit refill requests through [employer] or call your pharmacy and they will forward the refill request to us. Please allow 3 business days for your refill to be completed.          Additional Information About Your Visit        [employer] Information     [employer] lets you send messages to your doctor, view your test results, renew your prescriptions, schedule appointments and more. To sign up, go to [email or web address] . Click on \"Log in\" on the left side of the screen, which will take you to the Welcome page. Then click on \"Sign up Now\" on the right side of the page.     You will be asked to enter the access code listed below, as well as some personal information. Please follow the directions to create your username and password.     Your access code is: [email or web address]  Expires: 2017 12:38 PM     Your access code will  in 90 days. If you need help or a new code, please call your [facility] clinic or [phone].        Care EveryWhere ID     This is your Care EveryWhere ID. This could be used by other organizations to access your [facility] medical records  [id number]        Your Vitals Were     Temperature Last Period BMI (Body Mass Index)             98  F (36.7  C) (Oral) 1992 29.16 kg/m2          Blood Pressure from Last 3 Encounters:   17 151/80   17 148/87   17 160/80    Weight from Last 3 Encounters:   17 73.5 kg (162 lb)   17 73.7 kg (162 lb 6.4 oz)   17 72.6 kg (160 lb)              We Performed the Following     ESR: Erythrocyte sedimentation rate        Primary Care Provider Office Phone # Fax #    Darlene Sultana MD " [phone]       [facility] [address] [address]        Thank you!     Thank you for choosing [facility]  for your care. Our goal is always to provide you with excellent care. Hearing back from our patients is one way we can continue to improve our services. Please take a few minutes to complete the written survey that you may receive in the mail after your visit with us. Thank you!             Your Updated Medication List - Protect others around you: Learn how to safely use, store and throw away your medicines at www.disposemymeds.org.          This list is accurate as of: 4/26/17 10:39 AM.  Always use your most recent med list.                   Brand Name Dispense Instructions for use    Adult Blood Pressure Cuff Lg Kit     1 kit    1 Device 2 times daily       amLODIPine 5 MG tablet    NORVASC    30 tablet    Take 1 tablet (5 mg) by mouth daily       aspirin 81 MG EC tablet     30 tablet    Take 1 tablet by mouth daily.       hydrochlorothiazide 12.5 MG Tabs tablet     30 tablet    Take 1 tablet (12.5 mg) by mouth daily       imatinib 400 MG tablet    GLEEVEC    30 tablet    Take 1 tablet (400 mg) by mouth daily Take with a meal and a large glass of water.       lisinopril 40 MG tablet    PRINIVIL/ZESTRIL    90 tablet    Take 1 tablet (40 mg) by mouth daily       LORazepam 1 MG tablet    ATIVAN    30 tablet    Take 1 tablet (1 mg) by mouth At Bedtime       methimazole 10 MG tablet    TAPAZOLE    45 tablet    Take 0.5 tablets (5 mg) by mouth daily       metoprolol 100 MG tablet    LOPRESSOR    180 tablet    Take 1 tablet (100 mg) by mouth 2 times daily       oxyCODONE 5 MG IR tablet    ROXICODONE    180 tablet    Take 1-2 tablets (5-10 mg) by mouth 2 times daily       rosuvastatin 5 MG tablet    CRESTOR    90 tablet    Take 1 tablet (5 mg) by mouth daily

## 2017-04-26 NOTE — PATIENT INSTRUCTIONS
Based on today's history and physical exam, I can find no evidence of middle ear pathology or eustachian tube dysfunction.  At this point my primary diagnosis is of temporomandibular syndrome.  I have discussed the etiology of TMJ, and the reasons why referred pain can mimic symptoms of ear disease, headaches, and even sinusitis.  Finally, I counseled the patient that should the therapy not help, or should the symptoms change, that they should return to me.    For the next 2 weeks:  1. Soft diet  2. No chewing gum  3. Use ibuprofen 600mg every 6 hours for 2 weeks  4. Use a bite guard at night, consider Willian's Grind-No-More which is available OTC.    TMJ exercises:  1. Close your mouth to touch your upper and lower teeth without clenching them. Rest the tip of your tongue on your palate behind your upper teeth.  2. Slide the tip of your tongue backwards toward the throat as far as you can, keeping your teeth together.  3. Try to touch the soft palate with the tip of your tongue, and keep it there. Then slowly open your mouth until you feel your tongue is being pulled away from the soft palate. Keep your mouth open in this position for five seconds before closing your mouth to relax.  4. Repeat the above steps slowly for 5 minutes. You may want to check in a mirror to be sure your teeth are closed aligned straight up and down, without your jaw being off to one side.    Do the exercises twice daily for the first 2 weeks, but then you can do it more often if it seems to help. You shouldn't hear any clicks or noise from your joints, and if you do then you should restart the exercise rather than opening the mouth further. With practice, you will be able to open further without having clicking.

## 2017-04-26 NOTE — TELEPHONE ENCOUNTER
Patient did not present to lab for testing today(ERYTHROCYTE SEDIMENTATION RATE).  The orders have been cancelled as NO SHOW and reordered as FUTURE. Please notify patient to return to lab for testing.    Thank  You for your attention to this matter.    [facility] Lab Staff

## 2017-04-26 NOTE — NURSING NOTE
"Initial /80 (BP Location: Left arm, Patient Position: Chair, Cuff Size: Adult Large)  Temp 98  F (36.7  C) (Oral)  Wt 73.5 kg (162 lb)  LMP 01/01/1992  BMI 29.16 kg/m2 Estimated body mass index is 29.16 kg/(m^2) as calculated from the following:    Height as of 4/24/17: 1.588 m (5' 2.5\").    Weight as of this encounter: 73.5 kg (162 lb). .    Yecenia Beaulieu LPN    "

## 2017-04-27 ENCOUNTER — CARE COORDINATION (OUTPATIENT)
Dept: CARE COORDINATION | Facility: CLINIC | Age: 72
End: 2017-04-27

## 2017-04-27 NOTE — PROGRESS NOTES
Clinic Care Coordination Contact  Care Team Conversations      Clinical Data: RN CC called to f/u with pt. She returned from [place] about a month ago. Recently f/u'd with PCP, started on HCTZ for extra BP management. Discussed concerns about monitoring NA and K. Pt has f/u with PCP on 5/8/17 to check on this. Discussed s/e and s/s to monitor for. Pt verbalized understanding. Pt is now able to see after the eye surgeries done over the past year. She is excited to say that she now has bought a car and can safely drive. Pt even plans to start pool therapy at [facility] for her back pain.     New medications to review:  See above    Clinical Pathway Documentation: NA    Goals/Progress (add to goals section): Goals met    Resources Given: No needs    Plan: RN CC will close pt to active CC outreaches. With her vision back, pt is comfortable being able to meet her own needs and is able to follow up with providers, locally, with access to being able to drive. Pt encouraged to outreach to RN CC at any time with questions or concerns.     Kelli Lama RN, BSN, PHN   Clinic Care Coordinator  [facility]:  [email or web address]   Office: [phone] Fax: [phone]

## 2017-05-08 ENCOUNTER — OFFICE VISIT (OUTPATIENT)
Dept: FAMILY MEDICINE | Facility: CLINIC | Age: 72
End: 2017-05-08
Payer: COMMERCIAL

## 2017-05-08 VITALS
HEIGHT: 63 IN | HEART RATE: 68 BPM | BODY MASS INDEX: 28.67 KG/M2 | SYSTOLIC BLOOD PRESSURE: 130 MMHG | DIASTOLIC BLOOD PRESSURE: 81 MMHG | WEIGHT: 161.8 LBS

## 2017-05-08 DIAGNOSIS — I10 ESSENTIAL HYPERTENSION: Primary | ICD-10-CM

## 2017-05-08 DIAGNOSIS — R51.9 FACIAL PAIN: ICD-10-CM

## 2017-05-08 DIAGNOSIS — G89.4 CHRONIC PAIN SYNDROME: ICD-10-CM

## 2017-05-08 LAB — ERYTHROCYTE [SEDIMENTATION RATE] IN BLOOD BY WESTERGREN METHOD: 17 MM/H (ref 0–30)

## 2017-05-08 PROCEDURE — 99214 OFFICE O/P EST MOD 30 MIN: CPT | Performed by: FAMILY MEDICINE

## 2017-05-08 PROCEDURE — 36415 COLL VENOUS BLD VENIPUNCTURE: CPT | Performed by: OTOLARYNGOLOGY

## 2017-05-08 PROCEDURE — 85652 RBC SED RATE AUTOMATED: CPT | Performed by: OTOLARYNGOLOGY

## 2017-05-08 RX ORDER — OXYCODONE HYDROCHLORIDE 5 MG/1
5-10 TABLET ORAL
Qty: 180 TABLET | Refills: 0 | Status: SHIPPED | OUTPATIENT
Start: 2017-05-08 | End: 2017-08-04

## 2017-05-08 RX ORDER — HYDROCHLOROTHIAZIDE 25 MG/1
25 TABLET ORAL DAILY
Qty: 90 TABLET | Refills: 3 | Status: SHIPPED | OUTPATIENT
Start: 2017-05-08 | End: 2018-05-14

## 2017-05-08 NOTE — PROGRESS NOTES
a  SUBJECTIVE:                                                    Idalmis Abdul is 71 year old female   Chief Complaint   Patient presents with     Hypertension     recheck on blood pressure     Refill Request     oxycodone refill     Forms     renew handicap form     Hypertension Follow-up      Outpatient blood pressures are not being checked.    Low Salt Diet: low salt       Amount of exercise or physical activity: None    Problems taking medications regularly: No    Medication side effects: none    Diet: regular (no restrictions)    Patient also is requesting a form to be filled out to renew her disability parking permit.    She also needs a refill on the Oxycodone today.    Problem list and histories reviewed & adjusted, as indicated.  Additional history: drug screen shows only oxycocone, is taking twice a day not accelerating amound. Back to driving, was off for 3 years.    Patient Active Problem List   Diagnosis     Hypertension goal BP (blood pressure) < 140/90     Vaginal prolapse     GIST (gastrointestinal stromal tumor), malignant (H)     Eyelid edema     History of lung cancer     Health Care Home     NSTEMI (non-ST elevated myocardial infarction) (H)     ASCVD (arteriosclerotic cardiovascular disease)     Postsurgical aortocoronary bypass status     S/P CABG x 4     Dyspnea     GERD (gastroesophageal reflux disease)     Hyperlipidemia LDL goal <160     Hyperthyroidism     Thyroid eye disease     Eyelid retraction or lag     Thyrotoxic exophthalmos     Graves' disease     History of tobacco abuse     History of non-ST elevation myocardial infarction (NSTEMI)     Chronic back pain     PVD (posterior vitreous detachment)     Age-related osteoporosis without current pathological fracture     Past Surgical History:   Procedure Laterality Date     BYPASS GRAFT ARTERY CORONARY  6/14/2012    Procedure: BYPASS GRAFT ARTERY CORONARY;  Median Sternotomy Coronary Artery Bypass Graft  x 3        C THORACOSCOPY,DX  W BX  fall 2003    benign tissue     CATARACT IOL, RT/LT      LE     CHOLECYSTECTOMY  1963     COLONOSCOPY  4-12-05     CORONARY ARTERY BYPASS      X4     CREATION PERICARDIAL WINDOW  6/15/2012    Procedure: CREATION PERICARDIAL WINDOW;  Mediastinal Exploration, Control of Bleeding;  Surgeon: Dwaine Griffin MD;  Location: [facility] OR     EYE SURGERY  2014    left cataract removal     GI SURGERY  2003 and 2007    GIST tumor removal     GYN SURGERY      tubal ligation     PHACOEMULSIFICATION WITH STANDARD INTRAOCULAR LENS IMPLANT  3/24/2014    Procedure: PHACOEMULSIFICATION WITH STANDARD INTRAOCULAR LENS IMPLANT;  Left Kelman Phacoemulsification with Intraocular Lens Implant;  Surgeon: Magnus Mckeon MD;  Location: [facility] OR     RECESSION RESECTION WITH ADJUSTABLE SUTURE BILATERAL Bilateral 7/14/2016    Procedure: RECESSION RESECTION WITH ADJUSTABLE SUTURE BILATERAL;  Surgeon: Erma Ordaz MD;  Location:  OR     SURGICAL HISTORY OF -   1963    cholecystectomy     SURGICAL HISTORY OF -   1970's    Tubal Ligation      SURGICAL HISTORY OF -   08/03    Explor. Lap, Excision of Small Bowel Tumor      SURGICAL HISTORY OF -   4/2010    lung cancer removed right lung       Social History   Substance Use Topics     Smoking status: Former Smoker     Packs/day: 0.50     Years: 49.00     Types: Cigarettes     Quit date: 4/19/2010     Smokeless tobacco: Never Used     Alcohol use No     Family History   Problem Relation Age of Onset     HEART DISEASE Mother      OSTEOPOROSIS Mother      Respiratory Mother      Emphezyma     Hypertension Mother      HEART DISEASE Father      Alcohol/Drug Father      Hypertension Father      Hypertension Maternal Grandmother      Hypertension Brother      Hypertension Sister      Arthritis Sister      Hypertension Son      CANCER Son      rectal         Current Outpatient Prescriptions   Medication Sig Dispense Refill     oxyCODONE (ROXICODONE) 5 MG IR tablet Take 1-2 tablets (5-10  mg) by mouth 2 times daily 180 tablet 0     hydrochlorothiazide (HYDRODIURIL) 25 MG tablet Take 1 tablet (25 mg) by mouth daily 90 tablet 3     hydrochlorothiazide 12.5 MG TABS tablet Take 1 tablet (12.5 mg) by mouth daily 30 tablet 11     Blood Pressure Monitoring (ADULT BLOOD PRESSURE CUFF LG) KIT 1 Device 2 times daily 1 kit 1     LORazepam (ATIVAN) 1 MG tablet Take 1 tablet (1 mg) by mouth At Bedtime 30 tablet 3     amLODIPine (NORVASC) 5 MG tablet Take 1 tablet (5 mg) by mouth daily 30 tablet 11     methimazole (TAPAZOLE) 10 MG tablet Take 0.5 tablets (5 mg) by mouth daily 45 tablet 3     rosuvastatin (CRESTOR) 5 MG tablet Take 1 tablet (5 mg) by mouth daily 90 tablet 3     lisinopril (PRINIVIL,ZESTRIL) 40 MG tablet Take 1 tablet (40 mg) by mouth daily 90 tablet 3     metoprolol (LOPRESSOR) 100 MG tablet Take 1 tablet (100 mg) by mouth 2 times daily 180 tablet 3     aspirin EC 81 MG EC tablet Take 1 tablet by mouth daily. 30 tablet 2     imatinib (GLEEVEC) 400 MG tablet Take 1 tablet (400 mg) by mouth daily Take with a meal and a large glass of water. 30 tablet 2     Allergies   Allergen Reactions     Atorvastatin Cramps     Recent Labs   Lab Test  04/24/17   1103  01/13/17   0854  11/08/16   1237  09/20/16   1022   07/08/16   0914  06/27/16   1110   01/15/16   0921   07/17/15   0916   03/14/14   0929   06/19/12   1550   06/15/12   0850   06/15/12   0719   A1C   --    --    --    --    --    --    --    --    --    --    --    --    --    --   5.4   --   Duplicate request   --   5.2   LDL   --    --    --   38   --    --    --    --    --    --   31   --   59   --    --    --    --    --    --    HDL   --    --    --   53   --    --    --    --    --    --   46*   --   38*   --    --    --    --    --    --    TRIG   --    --    --   79   --    --    --    --    --    --   72   --   105   < >   --    --    --    --    --    ALT   --   24   --    --    --   23   --    --   26   < >  23   < >  39   < >   --    <  ">   --    --    --    CR  1.02  1.00   --   1.00   --   1.18*   --    --   1.19*   --   1.06*   < >  1.07*   < >   --    < >   --    --   1.82*   GFRESTIMATED  53*  55*   --   55*   --   45*   --    --   45*   --   51*   < >  51*   < >   --    < >   --    --   28*   GFRESTBLACK  64  66   --   66   --   55*   --    --   54*   --   62   < >  62   < >   --    < >   --    --   34*   POTASSIUM  4.4  4.2   --   4.2   < >  4.0   --    --   4.1   --   4.0   < >  3.8   < >   --    < >  2.8*   < >  4.2   TSH   --    --   2.17   --    --    --   2.20   < >   --    < >   --    < >  0.03*   < >   --    --    --    --    --     < > = values in this interval not displayed.      BP Readings from Last 3 Encounters:   05/08/17 130/81   04/26/17 151/80   04/24/17 148/87    Wt Readings from Last 3 Encounters:   05/08/17 161 lb 12.8 oz (73.4 kg)   04/26/17 162 lb (73.5 kg)   04/24/17 162 lb 6.4 oz (73.7 kg)         ROS:  Constitutional, HEENT, cardiovascular, pulmonary, gi and gu systems are negative, except as otherwise noted.    OBJECTIVE:                                                    /81  Pulse 68  Ht 5' 2.5\" (1.588 m)  Wt 161 lb 12.8 oz (73.4 kg)  LMP 01/01/1992  BMI 29.12 kg/m2  GENERAL APPEARANCE ADULT: Alert, no acute distress  RESP: lungs clear to auscultation   CV: normal rate, regular rhythm, no murmur or gallop  Diagnostic Test Results:  Results for orders placed or performed in visit on 04/24/17   Drug Abuse Screen Panel 13, Urine (Pain Care Package)   Result Value Ref Range    Cannabinoids (37-omd-9-carboxy-9-THC)  NDET ng/mL     Not Detected   Cutoff for a negative cannabinoid is 50 ng/mL or less.      Phencyclidine (Phencyclidine)  NDET ng/mL     Not Detected   Cutoff for a negative PCP is 25 ng/mL or less.      Cocaine (Benzoylecgonine)  NDET ng/mL     Not Detected   Cutoff for a negative cocaine is 150 ng/ml or less.      Methamphetamine (d-Methamphetamine)  NDET ng/mL     Not Detected   Cutoff for a " negative methamphetamine is 500 ng/ml or less.      Opiates (Morphine)  NDET ng/mL     Not Detected   Cutoff for a negative opiate is 100 ng/ml or less.      Amphetamine (d-Amphetamine)  NDET ng/mL     Not Detected   Cutoff for a negative amphetamine is 500 ng/mL or less.      Benzodiazepines (Nordiazepam) (A) NDET ng/mL     Detected, Abnormal Result   Cutoff for a positive benzodiazepines is greater than 150 ng/ml.   This is an unconfirmed screening result to be used for medical purposes only.   Order [id number] for confirmation or individual confirmation tests to [employer].      Tricyclic Antidepressants (Desipramine)  NDET ng/mL     Not Detected   Cutoff for a negative tricyclic antidepressant is 300 ng/ml or less.      Methadone (Methadone)  NDET ng/mL     Not Detected   Cutoff for a negative methadone is 200 ng/ml or less.      Barbiturates (Butalbital)  NDET ng/mL     Not Detected   Cutoff for a negative barbituate is 200 ng/ml or less.      Oxycodone (Oxycodone) (A) NDET ng/mL     Detected, Abnormal Result   Cutoff for a positive oxycodone is greater than 100 ng/ml.   This is an unconfirmed screening result to be used for medical purposes only.   Order [id number] for confirmation or individual confirmation tests to [employer].      Propoxyphene (Norpropoxyphene)  NDET ng/mL     Not Detected   Cutoff for a negative propoxyphene is 300 ng/ml or less      Buprenorphine (Buprenorphine)  NDET ng/mL     Not Detected   Cutoff for a negative buprenorphine is 10 ng/ml or less     Basic metabolic panel  (Ca, Cl, CO2, Creat, Gluc, K, Na, BUN)   Result Value Ref Range    Sodium 136 133 - 144 mmol/L    Potassium 4.4 3.4 - 5.3 mmol/L    Chloride 103 94 - 109 mmol/L    Carbon Dioxide 26 20 - 32 mmol/L    Anion Gap 7 3 - 14 mmol/L    Glucose 108 (H) 70 - 99 mg/dL    Urea Nitrogen 15 7 - 30 mg/dL    Creatinine 1.02 0.52 - 1.04 mg/dL    GFR Estimate 53 (L) >60 mL/min/1.7m2    GFR Estimate If Black 64 >60 mL/min/1.7m2    Calcium 8.7 8.5 -  10.1 mg/dL          ASSESSMENT/PLAN:                                                    1. Chronic pain syndrome  Back pain, expect to be on rest of life  - oxyCODONE (ROXICODONE) 5 MG IR tablet; Take 1-2 tablets (5-10 mg) by mouth 2 times daily  Dispense: 180 tablet; Refill: 0    2. Essential hypertension  Not at goal will increase to 25 mg.  Recheck blood pressure in 2 weeks.  - hydrochlorothiazide (HYDRODIURIL) 25 MG tablet; Take 1 tablet (25 mg) by mouth daily  Dispense: 90 tablet; Refill: 3  - Basic metabolic panel; Future    Darlene Sultana MD  [facility]

## 2017-05-08 NOTE — PATIENT INSTRUCTIONS
Thank you for choosing [facility].  You may be receiving a survey in the mail from Sadi Snyder regarding your visit today.  Please take a few minutes to complete and return the survey to let us know how we are doing.      If you have questions or concerns, please contact us via [employer]s or you can contact your care team at [phone].    Our Clinic hours are:  Monday 6:40 am  to 7:00 pm  Tuesday -Friday 6:40 am to 5:00 pm    The [place] outpatient lab hours are:  Monday - Friday 6:10 am to 4:45 pm  Saturdays 7:00 am to 11:00 am  Appointments are required, call [phone]    If you have clinical questions after hours or would like to schedule an appointment,  call the clinic at [phone].

## 2017-05-08 NOTE — MR AVS SNAPSHOT
After Visit Summary   5/8/2017    Idalmis Abdul    MRN: [id number]           Patient Information     Date Of Birth          1945        Visit Information        Provider Department      5/8/2017 10:20 AM Darlene Sultana MD [facility]        Today's Diagnoses     Essential hypertension    -  1    Chronic pain syndrome          Care Instructions          Thank you for choosing [facility].  You may be receiving a survey in the mail from [facility] regarding your visit today.  Please take a few minutes to complete and return the survey to let us know how we are doing.      If you have questions or concerns, please contact us via Zoomingo or you can contact your care team at [phone].    Our Clinic hours are:  Monday 6:40 am  to 7:00 pm  Tuesday -Friday 6:40 am to 5:00 pm    The [place] outpatient lab hours are:  Monday - Friday 6:10 am to 4:45 pm  Saturdays 7:00 am to 11:00 am  Appointments are required, call [phone]    If you have clinical questions after hours or would like to schedule an appointment,  call the clinic at [phone].          Follow-ups after your visit        Your next 10 appointments already scheduled     Jun 19, 2017 12:00 PM CDT   NEW THYROID EYE with Federico Matthews MD   [facility] ([facility])    [address] [address]   [phone]            Jul 07, 2017  9:00 AM CDT   LAB with [facility] ([facility])    [address] [facility] [address] [phone]           Patient must bring picture ID.  Patient should be prepared to give a urine specimen  Please do not eat 10-12 hours before your appointment if you are coming in fasting for labs on lipids, cholesterol, or glucose (sugar).  Pregnant women should follow their Care Team instructions. Water with medications is okay. Do not drink coffee or other fluids.   If you have  concerns about taking  your medications, please ask at office or if scheduling via [employer], send a message by clicking on Secure Messaging, Message Your Care Team.            Jul 07, 2017  9:30 AM CDT   CT CHEST/ABDOMEN/PELVIS W CONTRAST with WYCT1   [facility] CT ([facility])    [address]  [facility] [address]   [phone]           Please bring any scans or X-rays taken at other hospitals, if similar tests were done. Also bring a list of your medicines, including vitamins, minerals and over-the-counter drugs. It is safest to leave personal items at home.  Be sure to tell your doctor:   If you have any allergies.   If there s any chance you are pregnant.   If you are breastfeeding.   If you have any special needs.  You may have contrast for this exam. To prepare:   Do not eat or drink for 2 hours before your exam. If you need to take medicine, you may take it with small sips of water. (We may ask you to take liquid medicine as well.)   The day before your exam, drink extra fluids at least six 8-ounce glasses (unless your doctor tells you to restrict your fluids).  Patients over 70 or patients with diabetes or kidney problems:   If you haven t had a blood test (creatinine test) within the last 30 days, go to your clinic or Diagnostic Imaging Department for this test.  If you have diabetes:   If your kidney function is normal, continue taking your metformin (Avandamet, Glucophage, Glucovance, Metaglip) on the day of your exam.   If your kidney function is abnormal, wait 48 hours before restarting this medicine.  You will have oral contrast for this exam:   You will drink the contrast at home. Get this from your clinic or Diagnostic Imaging Department. Please follow the directions given.  Please wear loose clothing, such as a sweat suit or jogging clothes. Avoid snaps, zippers and other metal. We may ask you to undress and put on a hospital gown.  If you have any questions, please  "call the Imaging Department where you will have your exam.            Jul 10, 2017  8:45 AM CDT   (Arrive by 8:30 AM)   Return Visit with Blayne Schmitz MD   [facility] ([facility])    [address] [place] [address]  [address] [facility] [id number] [phone]            Aug 01, 2017 11:00 AM CDT   (Arrive by 10:45 AM)   RETURN ENDOCRINE with Swetha Antoine MD   [facility] Endocrinology ([facility])    [address] [place] [address]  [address] [facility] [id number] [phone]              Future tests that were ordered for you today     Open Future Orders        Priority Expected Expires Ordered    Basic metabolic panel Routine  7/8/2017 5/8/2017            Who to contact     If you have questions or need follow up information about today's clinic visit or your schedule please contact [facility] directly at [phone].  Normal or non-critical lab and imaging results will be communicated to you by Presstlerhart, letter or phone within 4 business days after the clinic has received the results. If you do not hear from us within 7 days, please contact the clinic through Stockez[employer] or phone. If you have a critical or abnormal lab result, we will notify you by phone as soon as possible.  Submit refill requests through [employer] or call your pharmacy and they will forward the refill request to us. Please allow 3 business days for your refill to be completed.          Additional Information About Your Visit        [employer] Information     [employer] lets you send messages to your doctor, view your test results, renew your prescriptions, schedule appointments and more. To sign up, go to [email or web address] . Click on \"Log in\" on the left side of the screen, which will take you to the Welcome page. Then click on \"Sign up Now\" on the right side of the page.     You will be asked to enter the access code listed below, as well as some " "personal information. Please follow the directions to create your username and password.     Your access code is: [email or web address]  Expires: 2017 10:30 AM     Your access code will  in 90 days. If you need help or a new code, please call your [place] clinic or [phone].        Care EveryWhere ID     This is your Care EveryWhere ID. This could be used by other organizations to access your [place] medical records  [id number]        Your Vitals Were     Pulse Height Last Period BMI (Body Mass Index)          68 5' 2.5\" (1.588 m) 1992 29.12 kg/m2         Blood Pressure from Last 3 Encounters:   17 153/65   17 151/80   17 148/87    Weight from Last 3 Encounters:   17 161 lb 12.8 oz (73.4 kg)   17 162 lb (73.5 kg)   17 162 lb 6.4 oz (73.7 kg)                 Today's Medication Changes          These changes are accurate as of: 17 10:34 AM.  If you have any questions, ask your nurse or doctor.               These medicines have changed or have updated prescriptions.        Dose/Directions    * hydrochlorothiazide 12.5 MG Tabs tablet   This may have changed:  Another medication with the same name was added. Make sure you understand how and when to take each.   Used for:  HTN, goal below 140/90   Changed by:  Darlene Sultana MD        Dose:  12.5 mg   Take 1 tablet (12.5 mg) by mouth daily   Quantity:  30 tablet   Refills:  11       * hydrochlorothiazide 25 MG tablet   Commonly known as:  HYDRODIURIL   This may have changed:  You were already taking a medication with the same name, and this prescription was added. Make sure you understand how and when to take each.   Used for:  Essential hypertension   Changed by:  Darlene Sultana MD        Dose:  25 mg   Take 1 tablet (25 mg) by mouth daily   Quantity:  90 tablet   Refills:  3       * Notice:  This list has 2 medication(s) that are the same as other medications prescribed for you. Read the directions " carefully, and ask your doctor or other care provider to review them with you.         Where to get your medicines      These medications were sent to [place], [place] - [address] [facility] [address]  [address] [facility] [address]     Phone:  [phone]     hydrochlorothiazide 25 MG tablet         Some of these will need a paper prescription and others can be bought over the counter.  Ask your nurse if you have questions.     Bring a paper prescription for each of these medications     oxyCODONE 5 MG IR tablet                Primary Care Provider Office Phone # Fax #    Darlene Sultana [phone]       [facility] [address] [facility] [address]        Thank you!     Thank you for choosing [facility]  for your care. Our goal is always to provide you with excellent care. Hearing back from our patients is one way we can continue to improve our services. Please take a few minutes to complete the written survey that you may receive in the mail after your visit with us. Thank you!             Your Updated Medication List - Protect others around you: Learn how to safely use, store and throw away your medicines at www.disposemymeds.org.          This list is accurate as of: 5/8/17 10:34 AM.  Always use your most recent med list.                   Brand Name Dispense Instructions for use    Adult Blood Pressure Cuff Lg Kit     1 kit    1 Device 2 times daily       amLODIPine 5 MG tablet    NORVASC    30 tablet    Take 1 tablet (5 mg) by mouth daily       aspirin 81 MG EC tablet     30 tablet    Take 1 tablet by mouth daily.       * hydrochlorothiazide 12.5 MG Tabs tablet     30 tablet    Take 1 tablet (12.5 mg) by mouth daily       * hydrochlorothiazide 25 MG tablet    HYDRODIURIL    90 tablet    Take 1 tablet (25 mg) by mouth daily       imatinib 400 MG tablet    GLEEVEC    30 tablet    Take 1 tablet (400 mg) by mouth daily  Take with a meal and a large glass of water.       lisinopril 40 MG tablet    PRINIVIL/ZESTRIL    90 tablet    Take 1 tablet (40 mg) by mouth daily       LORazepam 1 MG tablet    ATIVAN    30 tablet    Take 1 tablet (1 mg) by mouth At Bedtime       methimazole 10 MG tablet    TAPAZOLE    45 tablet    Take 0.5 tablets (5 mg) by mouth daily       metoprolol 100 MG tablet    LOPRESSOR    180 tablet    Take 1 tablet (100 mg) by mouth 2 times daily       oxyCODONE 5 MG IR tablet    ROXICODONE    180 tablet    Take 1-2 tablets (5-10 mg) by mouth 2 times daily       rosuvastatin 5 MG tablet    CRESTOR    90 tablet    Take 1 tablet (5 mg) by mouth daily       * Notice:  This list has 2 medication(s) that are the same as other medications prescribed for you. Read the directions carefully, and ask your doctor or other care provider to review them with you.

## 2017-05-08 NOTE — NURSING NOTE
"Chief Complaint   Patient presents with     Hypertension     recheck on blood pressure     Refill Request     oxycodone refill     Forms     renew handicap form       Initial /81  Pulse 68  Ht 5' 2.5\" (1.588 m)  Wt 161 lb 12.8 oz (73.4 kg)  LMP 01/01/1992  BMI 29.12 kg/m2 Estimated body mass index is 29.12 kg/(m^2) as calculated from the following:    Height as of this encounter: 5' 2.5\" (1.588 m).    Weight as of this encounter: 161 lb 12.8 oz (73.4 kg).  Medication Reconciliation: complete  "

## 2017-05-19 ENCOUNTER — ALLIED HEALTH/NURSE VISIT (OUTPATIENT)
Dept: FAMILY MEDICINE | Facility: CLINIC | Age: 72
End: 2017-05-19
Payer: COMMERCIAL

## 2017-05-19 VITALS — SYSTOLIC BLOOD PRESSURE: 136 MMHG | DIASTOLIC BLOOD PRESSURE: 70 MMHG

## 2017-05-19 DIAGNOSIS — Z01.30 BP CHECK: Primary | ICD-10-CM

## 2017-05-19 PROCEDURE — 99207 ZZC NO CHARGE NURSE ONLY: CPT | Performed by: FAMILY MEDICINE

## 2017-05-19 NOTE — PROGRESS NOTES
Idalmis Abdul is enrolled/participating in the retail pharmacy Blood Pressure Goals Achievement Program (BPGAP).  Idalmis Abdul was evaluated at [facility] on May 19, 2017 at which time her blood pressure was:    BP Readings from Last 3 Encounters:   05/19/17 136/70   05/08/17 130/81   04/26/17 151/80     Reviewed lifestyle modifications for blood pressure control and reduction: including making healthy food choices, managing weight, getting regular exercise, smoking cessation, reducing alcohol consumption, monitoring blood pressure regularly.     Idalmis Abdul is not experiencing symptoms.    Follow-Up: BP is at goal of < 140/90mmHg (patient 18+ years of age with or without diabetes).  Recommended follow-up at pharmacy in 6 months.     Recommendation to Provider:    Completed by: luz

## 2017-05-19 NOTE — MR AVS SNAPSHOT
After Visit Summary   5/19/2017    Idalmis Abdul    MRN: [id number]           Patient Information     Date Of Birth          1945        Visit Information        Provider Department      5/19/2017 11:34 AM Darlene Sultana MD [facility]        Today's Diagnoses     BP check    -  1       Follow-ups after your visit        Your next 10 appointments already scheduled     Jun 19, 2017 12:00 PM CDT   NEW THYROID EYE with Federico Matthews MD   [facility] Eye General ([facility])    [address]  [place] [address]  [facility] [address]   [phone]            Jul 07, 2017  9:00 AM CDT   LAB with [facility] ([facility])    [address] [address] [phone]           Patient must bring picture ID.  Patient should be prepared to give a urine specimen  Please do not eat 10-12 hours before your appointment if you are coming in fasting for labs on lipids, cholesterol, or glucose (sugar).  Pregnant women should follow their Care Team instructions. Water with medications is okay. Do not drink coffee or other fluids.   If you have concerns about taking  your medications, please ask at office or if scheduling via [employer], send a message by clicking on Secure Messaging, Message Your Care Team.            Jul 07, 2017  9:30 AM CDT   CT CHEST/ABDOMEN/PELVIS W CONTRAST with WYCT1   [facility] CT ([facility])    [address] [address] [phone]           Please bring any scans or X-rays taken at other hospitals, if similar tests were done. Also bring a list of your medicines, including vitamins, minerals and over-the-counter drugs. It is safest to leave personal items at home.  Be sure to tell your doctor:   If you have any allergies.   If there s any chance you are pregnant.   If you are breastfeeding.   If you have any special needs.  You may have  contrast for this exam. To prepare:   Do not eat or drink for 2 hours before your exam. If you need to take medicine, you may take it with small sips of water. (We may ask you to take liquid medicine as well.)   The day before your exam, drink extra fluids at least six 8-ounce glasses (unless your doctor tells you to restrict your fluids).  Patients over 70 or patients with diabetes or kidney problems:   If you haven t had a blood test (creatinine test) within the last 30 days, go to your clinic or Diagnostic Imaging Department for this test.  If you have diabetes:   If your kidney function is normal, continue taking your metformin (Avandamet, Glucophage, Glucovance, Metaglip) on the day of your exam.   If your kidney function is abnormal, wait 48 hours before restarting this medicine.  You will have oral contrast for this exam:   You will drink the contrast at home. Get this from your clinic or Diagnostic Imaging Department. Please follow the directions given.  Please wear loose clothing, such as a sweat suit or jogging clothes. Avoid snaps, zippers and other metal. We may ask you to undress and put on a hospital gown.  If you have any questions, please call the Imaging Department where you will have your exam.            Jul 10, 2017  8:45 AM CDT   (Arrive by 8:30 AM)   Return Visit with Blayne Schmitz MD   [facility] ([facility])    [address]  2nd Floor  [facility] [address] [phone]            Aug 01, 2017 11:00 AM CDT   (Arrive by 10:45 AM)   RETURN ENDOCRINE with Swetha Antoine MD   [facility] Endocrinology ([facility])    [address]  3rd Floor  [facility] [address] [phone]              Who to contact     If you have questions or need follow up information about today's clinic visit or your schedule please contact [facility] directly at [phone].  Normal  "or non-critical lab and imaging results will be communicated to you by MyChart, letter or phone within 4 business days after the clinic has received the results. If you do not hear from us within 7 days, please contact the clinic through [employer] or phone. If you have a critical or abnormal lab result, we will notify you by phone as soon as possible.  Submit refill requests through [employer] or call your pharmacy and they will forward the refill request to us. Please allow 3 business days for your refill to be completed.          Additional Information About Your Visit        [employer] Information     [employer] lets you send messages to your doctor, view your test results, renew your prescriptions, schedule appointments and more. To sign up, go to [email or web address] . Click on \"Log in\" on the left side of the screen, which will take you to the Welcome page. Then click on \"Sign up Now\" on the right side of the page.     You will be asked to enter the access code listed below, as well as some personal information. Please follow the directions to create your username and password.     Your access code is: [email or web address]  Expires: 2017 10:30 AM     Your access code will  in 90 days. If you need help or a new code, please call your [place] clinic or [phone].        Care EveryWhere ID     This is your Care EveryWhere ID. This could be used by other organizations to access your [place] medical records  [id number]        Your Vitals Were     Last Period                   1992            Blood Pressure from Last 3 Encounters:   17 136/70   17 130/81   17 151/80    Weight from Last 3 Encounters:   17 161 lb 12.8 oz (73.4 kg)   17 162 lb (73.5 kg)   17 162 lb 6.4 oz (73.7 kg)              Today, you had the following     No orders found for display       Primary Care Provider Office Phone # Fax #    Darleneyaritza Sultana [phone]       [facility] [address] [facility] [address]        Thank you!     Thank you for choosing [facility]  for your care. Our goal is always to provide you with excellent care. Hearing back from our patients is one way we can continue to improve our services. Please take a few minutes to complete the written survey that you may receive in the mail after your visit with us. Thank you!             Your Updated Medication List - Protect others around you: Learn how to safely use, store and throw away your medicines at www.disposemymeds.org.          This list is accurate as of: 5/19/17 11:38 AM.  Always use your most recent med list.                   Brand Name Dispense Instructions for use    Adult Blood Pressure Cuff Lg Kit     1 kit    1 Device 2 times daily       amLODIPine 5 MG tablet    NORVASC    30 tablet    Take 1 tablet (5 mg) by mouth daily       aspirin 81 MG EC tablet     30 tablet    Take 1 tablet by mouth daily.       * hydrochlorothiazide 12.5 MG Tabs tablet     30 tablet    Take 1 tablet (12.5 mg) by mouth daily       * hydrochlorothiazide 25 MG tablet    HYDRODIURIL    90 tablet    Take 1 tablet (25 mg) by mouth daily       imatinib 400 MG tablet    GLEEVEC    30 tablet    Take 1 tablet (400 mg) by mouth daily Take with a meal and a large glass of water.       lisinopril 40 MG tablet    PRINIVIL/ZESTRIL    90 tablet    Take 1 tablet (40 mg) by mouth daily       LORazepam 1 MG tablet    ATIVAN    30 tablet    Take 1 tablet (1 mg) by mouth At Bedtime       methimazole 10 MG tablet    TAPAZOLE    45 tablet    Take 0.5 tablets (5 mg) by mouth daily       metoprolol 100 MG tablet    LOPRESSOR    180 tablet    Take 1 tablet (100 mg) by mouth 2 times daily       oxyCODONE 5 MG IR tablet    ROXICODONE    180 tablet    Take 1-2 tablets (5-10 mg) by mouth 2 times daily       rosuvastatin 5 MG tablet    CRESTOR    90 tablet    Take 1 tablet (5 mg) by mouth daily       * Notice:  This list has 2  medication(s) that are the same as other medications prescribed for you. Read the directions carefully, and ask your doctor or other care provider to review them with you.

## 2017-06-05 DIAGNOSIS — C49.A0 MALIGNANT GASTROINTESTINAL STROMAL TUMOR, UNSPECIFIED SITE (H): Primary | ICD-10-CM

## 2017-06-05 RX ORDER — IMATINIB MESYLATE 400 MG/1
400 TABLET, FILM COATED ORAL DAILY
Qty: 30 TABLET | Refills: 2 | Status: SHIPPED | OUTPATIENT
Start: 2017-06-05 | End: 2017-08-09

## 2017-07-06 RX ORDER — IOPAMIDOL 755 MG/ML
79 INJECTION, SOLUTION INTRAVASCULAR ONCE
Status: COMPLETED | OUTPATIENT
Start: 2017-07-07 | End: 2017-07-07

## 2017-07-07 ENCOUNTER — HOSPITAL ENCOUNTER (OUTPATIENT)
Dept: CT IMAGING | Facility: CLINIC | Age: 72
Discharge: HOME | End: 2017-07-07
Attending: NURSE PRACTITIONER | Admitting: NURSE PRACTITIONER
Payer: MEDICARE

## 2017-07-07 DIAGNOSIS — Z85.118 HISTORY OF LUNG CANCER: ICD-10-CM

## 2017-07-07 DIAGNOSIS — E03.9 HYPOTHYROIDISM: ICD-10-CM

## 2017-07-07 DIAGNOSIS — C49.A0 MALIGNANT GASTROINTESTINAL STROMAL TUMOR, UNSPECIFIED SITE (H): ICD-10-CM

## 2017-07-07 DIAGNOSIS — E05.90 HYPERTHYROIDISM: ICD-10-CM

## 2017-07-07 LAB
ALBUMIN SERPL-MCNC: 3.6 G/DL (ref 3.4–5)
ALP SERPL-CCNC: 54 U/L (ref 40–150)
ALT SERPL W P-5'-P-CCNC: 27 U/L (ref 0–50)
ANION GAP SERPL CALCULATED.3IONS-SCNC: 8 MMOL/L (ref 3–14)
AST SERPL W P-5'-P-CCNC: 30 U/L (ref 0–45)
BASOPHILS # BLD AUTO: 0.1 10E9/L (ref 0–0.2)
BASOPHILS NFR BLD AUTO: 0.8 %
BILIRUB SERPL-MCNC: 0.3 MG/DL (ref 0.2–1.3)
BUN SERPL-MCNC: 21 MG/DL (ref 7–30)
CALCIUM SERPL-MCNC: 8.6 MG/DL (ref 8.5–10.1)
CHLORIDE SERPL-SCNC: 100 MMOL/L (ref 94–109)
CO2 SERPL-SCNC: 25 MMOL/L (ref 20–32)
CREAT BLD-MCNC: 1.2 MG/DL (ref 0.52–1.04)
CREAT SERPL-MCNC: 1.2 MG/DL (ref 0.52–1.04)
DIFFERENTIAL METHOD BLD: ABNORMAL
EOSINOPHIL # BLD AUTO: 0.4 10E9/L (ref 0–0.7)
EOSINOPHIL NFR BLD AUTO: 5.7 %
ERYTHROCYTE [DISTWIDTH] IN BLOOD BY AUTOMATED COUNT: 14.4 % (ref 10–15)
GFR SERPL CREATININE-BSD FRML MDRD: 44 ML/MIN/1.7M2
GFR SERPL CREATININE-BSD FRML MDRD: 44 ML/MIN/1.7M2
GLUCOSE SERPL-MCNC: 100 MG/DL (ref 70–99)
HCT VFR BLD AUTO: 33.8 % (ref 35–47)
HGB BLD-MCNC: 11.1 G/DL (ref 11.7–15.7)
LYMPHOCYTES # BLD AUTO: 1.3 10E9/L (ref 0.8–5.3)
LYMPHOCYTES NFR BLD AUTO: 20.2 %
MCH RBC QN AUTO: 29.6 PG (ref 26.5–33)
MCHC RBC AUTO-ENTMCNC: 32.8 G/DL (ref 31.5–36.5)
MCV RBC AUTO: 90 FL (ref 78–100)
MONOCYTES # BLD AUTO: 1.3 10E9/L (ref 0–1.3)
MONOCYTES NFR BLD AUTO: 19.9 %
NEUTROPHILS # BLD AUTO: 3.4 10E9/L (ref 1.6–8.3)
NEUTROPHILS NFR BLD AUTO: 53.4 %
PLATELET # BLD AUTO: 234 10E9/L (ref 150–450)
POTASSIUM SERPL-SCNC: 4.2 MMOL/L (ref 3.4–5.3)
PROT SERPL-MCNC: 6.5 G/DL (ref 6.8–8.8)
RBC # BLD AUTO: 3.75 10E12/L (ref 3.8–5.2)
SODIUM SERPL-SCNC: 133 MMOL/L (ref 133–144)
T3 SERPL-MCNC: 93 NG/DL (ref 60–181)
T4 FREE SERPL-MCNC: 1.19 NG/DL (ref 0.76–1.46)
TSH SERPL DL<=0.05 MIU/L-ACNC: 1.27 MU/L (ref 0.4–4)
WBC # BLD AUTO: 6.3 10E9/L (ref 4–11)

## 2017-07-07 PROCEDURE — 82565 ASSAY OF CREATININE: CPT

## 2017-07-07 PROCEDURE — 25000128 H RX IP 250 OP 636: Performed by: NURSE PRACTITIONER

## 2017-07-07 PROCEDURE — 74177 CT ABD & PELVIS W/CONTRAST: CPT

## 2017-07-07 PROCEDURE — 84480 ASSAY TRIIODOTHYRONINE (T3): CPT | Performed by: INTERNAL MEDICINE

## 2017-07-07 PROCEDURE — 71260 CT THORAX DX C+: CPT

## 2017-07-07 PROCEDURE — 80050 GENERAL HEALTH PANEL: CPT | Performed by: INTERNAL MEDICINE

## 2017-07-07 PROCEDURE — 84439 ASSAY OF FREE THYROXINE: CPT | Performed by: INTERNAL MEDICINE

## 2017-07-07 PROCEDURE — 36415 COLL VENOUS BLD VENIPUNCTURE: CPT | Performed by: INTERNAL MEDICINE

## 2017-07-07 PROCEDURE — 25000125 ZZHC RX 250: Performed by: NURSE PRACTITIONER

## 2017-07-07 RX ADMIN — IOPAMIDOL 79 ML: 755 INJECTION, SOLUTION INTRAVENOUS at 10:05

## 2017-07-07 RX ADMIN — SODIUM CHLORIDE 60 ML: 9 INJECTION, SOLUTION INTRAVENOUS at 10:04

## 2017-07-10 ENCOUNTER — ONCOLOGY VISIT (OUTPATIENT)
Dept: ONCOLOGY | Facility: CLINIC | Age: 72
End: 2017-07-10
Attending: INTERNAL MEDICINE
Payer: COMMERCIAL

## 2017-07-10 VITALS
DIASTOLIC BLOOD PRESSURE: 82 MMHG | BODY MASS INDEX: 28.57 KG/M2 | TEMPERATURE: 97.2 F | WEIGHT: 158.73 LBS | SYSTOLIC BLOOD PRESSURE: 127 MMHG | OXYGEN SATURATION: 95 % | RESPIRATION RATE: 18 BRPM | HEART RATE: 75 BPM

## 2017-07-10 DIAGNOSIS — Z85.118 HISTORY OF LUNG CANCER: ICD-10-CM

## 2017-07-10 DIAGNOSIS — C49.A0 MALIGNANT GASTROINTESTINAL STROMAL TUMOR, UNSPECIFIED SITE (H): Primary | ICD-10-CM

## 2017-07-10 DIAGNOSIS — K59.00 CONSTIPATION, UNSPECIFIED CONSTIPATION TYPE: ICD-10-CM

## 2017-07-10 PROCEDURE — 99213 OFFICE O/P EST LOW 20 MIN: CPT | Mod: ZF

## 2017-07-10 PROCEDURE — 99215 OFFICE O/P EST HI 40 MIN: CPT | Mod: ZP | Performed by: INTERNAL MEDICINE

## 2017-07-10 ASSESSMENT — PAIN SCALES - GENERAL: PAINLEVEL: NO PAIN (0)

## 2017-07-10 NOTE — NURSING NOTE
"Oncology Rooming Note    July 10, 2017 8:43 AM   Idalmis Abdul is a 71 year old female who presents for:    Chief Complaint   Patient presents with     Oncology Clinic Visit     CT results, [facility]     Initial Vitals: /82 (BP Location: Left arm, Patient Position: Chair, Cuff Size: Adult Regular)  Pulse 75  Temp 97.2  F (36.2  C) (Oral)  Resp 18  Wt 72 kg (158 lb 11.7 oz)  LMP 01/01/1992  SpO2 95%  BMI 28.57 kg/m2 Estimated body mass index is 28.57 kg/(m^2) as calculated from the following:    Height as of 5/8/17: 1.588 m (5' 2.5\").    Weight as of this encounter: 72 kg (158 lb 11.7 oz). Body surface area is 1.78 meters squared.  No Pain (0) Comment: Data Unavailable   Patient's last menstrual period was 01/01/1992.  Allergies reviewed: Yes  Medications reviewed: Yes    Medications: Medication refills not needed today.  Pharmacy name entered into [facility]:    [facility] [place], [place] - [address] [facility] [address] [facility] - [place], [place] - [address] [facility] [place] [facility] [place] - [address]    Clinical concerns: results  Nadir was notified.    8 minutes for nursing intake (face to face time)     Tegan Serrano MA              "

## 2017-07-10 NOTE — PROGRESS NOTES
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    Mrs. Abdul was originally diagnosed many years ago had a successful resection and a long period of adjuvant treatment after going off her adjuvant treatment she recurred with unresectable disease that has been back on Gleevec ever since with excellent control of her disease. She also along the way had a stage I lung cancer resected and is been free of evidence of disease from that for more than 5 years now     She's had a multitude of other problems including coronary artery disease requiring bypass surgery in the past, chronic severe back pain for which she is on opioids, and Graves' disease.  On complete review of systems she complains today of a new problem with constipation over the past several weeks. She takes MiraLAX for this with partial relief. She's not had any blood in her stools she doesn't have any real abdominal pain. She does note that her chronic problems with mild nausea have been worse which she associates with the constipation. She believes her last colonoscopy was about 10 years ago and her primary care physician is been trying to get her to follow-up with a fresh colonoscopy in the last year or so. The remainder of her review of systems is notable for her chronic back pain which is unchanged chronic difficulty with periorbital edema with her eyelids being swollen enough to interfere with vision.    On physical exam today she is alert and well-appearing with unremarkable vital signs.  She has marked periorbital edema with mild conjunctival injection. No lesions are visible in the oropharynx.  She has no palpable adenopathy in the neck or supra-clavicular spaces. I could not palpate her thyroid.  Her lungs are clear to auscultation but with somewhat diminished breath sounds throughout and no dullness to percussion.  Her heart rate and rhythm are regular without audible murmur or gallop. I could not  discern her jugular venous pressure.  Her abdomen is soft and nontender without palpable mass organomegaly or ascites.  She has no peripheral edema she has no cyanosis.  Her cranial nerves are grossly intact she has fluent speech and unremarkable gait and station.    I reviewed with her her CT scan and lab work she has mild renal insufficiency perhaps slightly worse than typical for her. Otherwise her electrolytes and liver function are unremarkable she has marginal anemia and otherwise unremarkable blood counts.  On her CT scan she has a stable thyroid nodule nothing to suggest recurrence of either her lung cancer or her chest. There is nothing to explain her new constipation.    Assessment/plan:    #1 history of stage I lung cancer. She has no evidence of recurrence.     #2 metastatic gastrointestinal stromal tumor. She has no evidence of progressive disease her tumor is well controlled on her current dose of Gleevec. Her main ongoing toxicity from this is related to her severe periorbital edema. We will plan on seeing her back in 6 months with another CT scan. She will continue on her current dose and schedule of Gleevec.    #3 constipation. This is perhaps new or at least worse than typical. I suspect is related to her chronic opioids and part. She certainly should have a follow-up colonoscopy however and I have ordered that for her.    #4 periorbital edema secondary to Gleevec. This is sufficient that it's interfering with her vision and I think she would benefit from plastic surgery to help with that.

## 2017-07-10 NOTE — LETTER
7/10/2017      RE: Idalmis Abdul  [address]   [facility] [place] [id number]       Idalmis Abdul is here today in planned follow-up of recurrent gastrointestinal stromal tumor.    Mrs. Abdul was originally diagnosed many years ago had a successful resection and a long period of adjuvant treatment after going off her adjuvant treatment she recurred with unresectable disease that has been back on Gleevec ever since with excellent control of her disease. She also along the way had a stage I lung cancer resected and is been free of evidence of disease from that for more than 5 years now     She's had a multitude of other problems including coronary artery disease requiring bypass surgery in the past, chronic severe back pain for which she is on opioids, and Graves' disease.  On complete review of systems she complains today of a new problem with constipation over the past several weeks. She takes MiraLAX for this with partial relief. She's not had any blood in her stools she doesn't have any real abdominal pain. She does note that her chronic problems with mild nausea have been worse which she associates with the constipation. She believes her last colonoscopy was about 10 years ago and her primary care physician is been trying to get her to follow-up with a fresh colonoscopy in the last year or so. The remainder of her review of systems is notable for her chronic back pain which is unchanged chronic difficulty with periorbital edema with her eyelids being swollen enough to interfere with vision.    On physical exam today she is alert and well-appearing with unremarkable vital signs.  She has marked periorbital edema with mild conjunctival injection. No lesions are visible in the oropharynx.  She has no palpable adenopathy in the neck or supra-clavicular spaces. I could not palpate her thyroid.  Her lungs are clear to auscultation but with somewhat diminished breath sounds throughout and no dullness to  percussion.  Her heart rate and rhythm are regular without audible murmur or gallop. I could not discern her jugular venous pressure.  Her abdomen is soft and nontender without palpable mass organomegaly or ascites.  She has no peripheral edema she has no cyanosis.  Her cranial nerves are grossly intact she has fluent speech and unremarkable gait and station.    I reviewed with her her CT scan and lab work she has mild renal insufficiency perhaps slightly worse than typical for her. Otherwise her electrolytes and liver function are unremarkable she has marginal anemia and otherwise unremarkable blood counts.  On her CT scan she has a stable thyroid nodule nothing to suggest recurrence of either her lung cancer or her chest. There is nothing to explain her new constipation.    Assessment/plan:    #1 history of stage I lung cancer. She has no evidence of recurrence.     #2 metastatic gastrointestinal stromal tumor. She has no evidence of progressive disease her tumor is well controlled on her current dose of Gleevec. Her main ongoing toxicity from this is related to her severe periorbital edema. We will plan on seeing her back in 6 months with another CT scan. She will continue on her current dose and schedule of Gleevec.    #3 constipation. This is perhaps new or at least worse than typical. I suspect is related to her chronic opioids and part. She certainly should have a follow-up colonoscopy however and I have ordered that for her.    #4 periorbital edema secondary to Gleevec. This is sufficient that it's interfering with her vision and I think she would benefit from plastic surgery to help with that.      Blayne Schmitz MD

## 2017-07-10 NOTE — MR AVS SNAPSHOT
After Visit Summary   7/10/2017    Idalmis Abdul    MRN: [id number]           Patient Information     Date Of Birth          1945        Visit Information        Provider Department      7/10/2017 8:45 AM Blayne Schmitz MD [facility]        Today's Diagnoses     Malignant gastrointestinal stromal tumor, unspecified site (H)    -  1    History of lung cancer        Constipation, unspecified constipation type           Follow-ups after your visit        Additional Services     GASTROENTEROLOGY ADULT REF PROCEDURE ONLY       Last Lab Result: Creatinine (mg/dL)       Date                     Value                 07/07/2017               1.20 (H)         ----------  Body mass index is 28.57 kg/(m^2).     Needed:  No  Language:  English    Patient will be contacted to schedule procedure.     Please be aware that coverage of these services is subject to the terms and limitations of your health insurance plan.  Call member services at your health plan with any benefit or coverage questions.  Any procedures must be performed at a [facility] facility OR coordinated by your clinic's referral office.    Please bring the following with you to your appointment:    (1) Any X-Rays, CTs or MRIs which have been performed.  Contact the facility where they were done to arrange for  prior to your scheduled appointment.    (2) List of current medications   (3) This referral request   (4) Any documents/labs given to you for this referral                  Follow-up notes from your care team     Return in about 6 months (around 1/10/2018) for MD visit with CT and labs.      Your next 10 appointments already scheduled     Aug 01, 2017 11:00 AM CDT   (Arrive by 10:45 AM)   RETURN ENDOCRINE with Swetha Antoine MD   [facility] ([facility])    [address] [place] [id number] [phone]          "     Future tests that were ordered for you today     Open Future Orders        Priority Expected Expires Ordered    CT Chest Abdomen Pelvis w/o & w Contrast Routine 1/10/2018 3/10/2018 7/10/2017    Comprehensive metabolic panel Routine 1/10/2018 3/10/2018 7/10/2017    CBC with platelets differential Routine 1/10/2018 3/10/2018 7/10/2017            Who to contact     If you have questions or need follow up information about today's clinic visit or your schedule please contact [facility] directly at [phone].  Normal or non-critical lab and imaging results will be communicated to you by Connecturehart, letter or phone within 4 business days after the clinic has received the results. If you do not hear from us within 7 days, please contact the clinic through Connecturehart or phone. If you have a critical or abnormal lab result, we will notify you by phone as soon as possible.  Submit refill requests through Preedo or call your pharmacy and they will forward the refill request to us. Please allow 3 business days for your refill to be completed.          Additional Information About Your Visit        Connecturehart Information     Preedo lets you send messages to your doctor, view your test results, renew your prescriptions, schedule appointments and more. To sign up, go to [email or web address] . Click on \"Log in\" on the left side of the screen, which will take you to the Welcome page. Then click on \"Sign up Now\" on the right side of the page.     You will be asked to enter the access code listed below, as well as some personal information. Please follow the directions to create your username and password.     Your access code is: [email or web address]  Expires: 2017 10:30 AM     Your access code will  in 90 days. If you need help or a new code, please call your [facility] clinic or [phone].        Care EveryWhere ID     This is your Care EveryWhere ID. This could be used by other organizations to access " your [facility] medical records  [id number]        Your Vitals Were     Pulse Temperature Respirations Last Period Pulse Oximetry BMI (Body Mass Index)    75 97.2  F (36.2  C) (Oral) 18 01/01/1992 95% 28.57 kg/m2       Blood Pressure from Last 3 Encounters:   07/10/17 127/82   05/19/17 136/70   05/08/17 130/81    Weight from Last 3 Encounters:   07/10/17 72 kg (158 lb 11.7 oz)   05/08/17 73.4 kg (161 lb 12.8 oz)   04/26/17 73.5 kg (162 lb)              We Performed the Following     GASTROENTEROLOGY ADULT REF PROCEDURE ONLY          Today's Medication Changes          These changes are accurate as of: 7/10/17  9:28 AM.  If you have any questions, ask your nurse or doctor.               These medicines have changed or have updated prescriptions.        Dose/Directions    hydrochlorothiazide 25 MG tablet   Commonly known as:  HYDRODIURIL   This may have changed:  Another medication with the same name was removed. Continue taking this medication, and follow the directions you see here.   Used for:  Essential hypertension   Changed by:  Darlene Sultana MD        Dose:  25 mg   Take 1 tablet (25 mg) by mouth daily   Quantity:  90 tablet   Refills:  3                Primary Care Provider Office Phone # Fax #    Darlene Sultana [phone]       [facility] [address] [place] [address]        Equal Access to Services     [facility] AH: Hadii cornelia burgoso Sonemesio, waaxda luqadaha, qaybta kaalmada patricio starr . So [facility] [phone].    ATENCIÓN: Si habla español, tiene a cassidy disposición servicios gratuitos de asistencia lingüística. Llame al [phone].    We comply with applicable federal civil rights laws and [place] laws. We do not discriminate on the basis of race, color, national origin, age, disability sex, sexual orientation or gender identity.            Thank you!     Thank you for choosing [facility]  for  your care. Our goal is always to provide you with excellent care. Hearing back from our patients is one way we can continue to improve our services. Please take a few minutes to complete the written survey that you may receive in the mail after your visit with us. Thank you!             Your Updated Medication List - Protect others around you: Learn how to safely use, store and throw away your medicines at www.disposemymeds.org.          This list is accurate as of: 7/10/17  9:28 AM.  Always use your most recent med list.                   Brand Name Dispense Instructions for use Diagnosis    Adult Blood Pressure Cuff Lg Kit     1 kit    1 Device 2 times daily    HTN, goal below 140/90       amLODIPine 5 MG tablet    NORVASC    30 tablet    Take 1 tablet (5 mg) by mouth daily    Essential hypertension       aspirin 81 MG EC tablet     30 tablet    Take 1 tablet by mouth daily.    NSTEMI (non-ST elevated myocardial infarction) (H), ACS (acute coronary syndrome) (H)       hydrochlorothiazide 25 MG tablet    HYDRODIURIL    90 tablet    Take 1 tablet (25 mg) by mouth daily    Essential hypertension       imatinib 400 MG tablet    GLEEVEC    30 tablet    Take 1 tablet (400 mg) by mouth daily Take with a meal and a large glass of water.    Malignant gastrointestinal stromal tumor, unspecified site (H)       lisinopril 40 MG tablet    PRINIVIL/ZESTRIL    90 tablet    Take 1 tablet (40 mg) by mouth daily    Essential hypertension with goal blood pressure less than 140/90       LORazepam 1 MG tablet    ATIVAN    30 tablet    Take 1 tablet (1 mg) by mouth At Bedtime    Malignant gastrointestinal stromal tumor, unspecified site (H)       methimazole 10 MG tablet    TAPAZOLE    45 tablet    Take 0.5 tablets (5 mg) by mouth daily    Thyrotoxicosis       metoprolol 100 MG tablet    LOPRESSOR    180 tablet    Take 1 tablet (100 mg) by mouth 2 times daily    Essential hypertension with goal blood pressure less than 140/90        oxyCODONE 5 MG IR tablet    ROXICODONE    180 tablet    Take 1-2 tablets (5-10 mg) by mouth 2 times daily    Chronic pain syndrome       rosuvastatin 5 MG tablet    CRESTOR    90 tablet    Take 1 tablet (5 mg) by mouth daily    Hyperlipidemia LDL goal <160

## 2017-07-11 ENCOUNTER — TELEPHONE (OUTPATIENT)
Dept: ONCOLOGY | Facility: CLINIC | Age: 72
End: 2017-07-11

## 2017-08-01 ENCOUNTER — OFFICE VISIT (OUTPATIENT)
Dept: ENDOCRINOLOGY | Facility: CLINIC | Age: 72
End: 2017-08-01

## 2017-08-01 VITALS
BODY MASS INDEX: 27.98 KG/M2 | HEART RATE: 78 BPM | DIASTOLIC BLOOD PRESSURE: 75 MMHG | HEIGHT: 63 IN | WEIGHT: 157.9 LBS | SYSTOLIC BLOOD PRESSURE: 115 MMHG

## 2017-08-01 DIAGNOSIS — E05.90 HYPERTHYROIDISM: Primary | ICD-10-CM

## 2017-08-01 DIAGNOSIS — C49.A0 MALIGNANT GASTROINTESTINAL STROMAL TUMOR, UNSPECIFIED SITE (H): ICD-10-CM

## 2017-08-01 RX ORDER — LORAZEPAM 1 MG/1
1 TABLET ORAL AT BEDTIME
Qty: 30 TABLET | Refills: 5 | Status: SHIPPED | OUTPATIENT
Start: 2017-08-01 | End: 2018-02-19

## 2017-08-01 RX ORDER — METHIMAZOLE 5 MG/1
5 TABLET ORAL DAILY
Qty: 90 TABLET | Refills: 3 | Status: SHIPPED | OUTPATIENT
Start: 2017-08-01 | End: 2018-05-22

## 2017-08-01 ASSESSMENT — PAIN SCALES - GENERAL: PAINLEVEL: NO PAIN (0)

## 2017-08-01 NOTE — MR AVS SNAPSHOT
After Visit Summary   8/1/2017    Idalmis Abdul    MRN: [id number]           Patient Information     Date Of Birth          1945        Visit Information        Provider Department      8/1/2017 11:00 AM Swetha Antoine MD [facility]        Today's Diagnoses     Hyperthyroidism    -  1       Follow-ups after your visit        Follow-up notes from your care team     Return in about 4 months (around 12/1/2017).      Your next 10 appointments already scheduled     Aug 03, 2017 10:00 AM CDT   SHORT with Darlene Sultana MD   [facility] ([facility]    [address] [address]   [phone]            Aug 09, 2017 11:00 AM CDT   TELEMEDICINE with Jessy Osman [facility] ([facility])    [address] [address]   [phone]           Note: this is not an onsite visit; there is no need to come to the facility.            Jan 09, 2018  9:00 AM CST   US THYROID with UCUS2   [facility] ([facility])    [address] [place] [address] [phone]           Please bring a list of your medicines (including vitamins, minerals and over-the-counter drugs). Also, tell your doctor about any allergies you may have. Wear comfortable clothes and leave your valuables at home.  You do not need to do anything special to prepare for your exam.  Please call the Imaging Department at your exam site with any questions.            Jan 09, 2018 11:00 AM CST   (Arrive by 10:45 AM)   RETURN ENDOCRINE with Swetha Antoine MD   [facility] ([facility])    [address] [place] [id number] [phone]            Jan 12, 2018  9:30 AM CST   CT CHEST ABDOMEN PELVIS W/O & W CONTRAST with [id number]   [facility] CT ([place]  [facility]    [address] [address]   [phone]           Please bring any scans or X-rays taken at other hospitals, if similar tests were done. Also bring a list of your medicines, including vitamins, minerals and over-the-counter drugs. It is safest to leave personal items at home.  Be sure to tell your doctor:   If you have any allergies.   If there s any chance you are pregnant.   If you are breastfeeding.   If you have any special needs.  You may have contrast for this exam. To prepare:   Do not eat or drink for 2 hours before your exam. If you need to take medicine, you may take it with small sips of water. (We may ask you to take liquid medicine as well.)   The day before your exam, drink extra fluids at least six 8-ounce glasses (unless your doctor tells you to restrict your fluids).  Patients over 70 or patients with diabetes or kidney problems:   If you haven t had a blood test (creatinine test) within the last 30 days, go to your clinic or Diagnostic Imaging Department for this test.  If you have diabetes:   If your kidney function is normal, continue taking your metformin (Avandamet, Glucophage, Glucovance, Metaglip) on the day of your exam.   If your kidney function is abnormal, wait 48 hours before restarting this medicine.  You will have oral contrast for this exam:   You will drink the contrast at home. Get this from your clinic or Diagnostic Imaging Department. Please follow the directions given.  Please wear loose clothing, such as a sweat suit or jogging clothes. Avoid snaps, zippers and other metal. We may ask you to undress and put on a hospital gown.  If you have any questions, please call the Imaging Department where you will have your exam.            Marcos 15, 2018  8:45 AM CST   (Arrive by 8:30 AM)   Return Visit with Blayne Schmitz MD   [facility] ([facility])    [address] [place] [address]  "[place] [address]   [phone]              Future tests that were ordered for you today     Open Future Orders        Priority Expected Expires Ordered    US Thyroid Routine  2018    TSH Routine 2017 10/1/2017 2017    T4 free Routine 2017 10/1/2017 2017    T3 total Routine 2017 10/1/2017 2017    Thyroid stimulating immunoglobulin Routine 2017 10/1/2017 2017            Who to contact     Please call your clinic at [phone] to:    Ask questions about your health    Make or cancel appointments    Discuss your medicines    Learn about your test results    Speak to your doctor   If you have compliments or concerns about an experience at your clinic, or if you wish to file a complaint, please contact [facility] Patient Relations at [phone] or email us at [email or web address]         Additional Information About Your Visit        [employer] Information     [employer] is an electronic gateway that provides easy, online access to your medical records. With [employer], you can request a clinic appointment, read your test results, renew a prescription or communicate with your care team.     To sign up for [employer] visit the website at [email or web address]   You will be asked to enter the access code listed below, as well as some personal information. Please follow the directions to create your username and password.     Your access code is: [email or web address]  Expires: 2017 10:30 AM     Your access code will  in 90 days. If you need help or a new code, please contact your [facility] or call [phone] for assistance.        Care EveryWhere ID     This is your Care EveryWhere ID. This could be used by other organizations to access your [place] medical records  [id number]        Your Vitals Were     Pulse Height Last Period BMI (Body Mass Index)          78 1.588 m (5' 2.5\") 1992 28.42 kg/m2      "    Blood Pressure from Last 3 Encounters:   08/01/17 115/75   07/10/17 127/82   05/19/17 136/70    Weight from Last 3 Encounters:   08/01/17 71.6 kg (157 lb 14.4 oz)   07/10/17 72 kg (158 lb 11.7 oz)   05/08/17 73.4 kg (161 lb 12.8 oz)                 Today's Medication Changes          These changes are accurate as of: 8/1/17 12:09 PM.  If you have any questions, ask your nurse or doctor.               These medicines have changed or have updated prescriptions.        Dose/Directions    * methimazole 10 MG tablet   Commonly known as:  TAPAZOLE   This may have changed:  Another medication with the same name was added. Make sure you understand how and when to take each.   Used for:  Thyrotoxicosis        Dose:  5 mg   Take 0.5 tablets (5 mg) by mouth daily   Quantity:  45 tablet   Refills:  3       * methimazole 5 MG tablet   Commonly known as:  TAPAZOLE   This may have changed:  You were already taking a medication with the same name, and this prescription was added. Make sure you understand how and when to take each.   Used for:  Hyperthyroidism        Dose:  5 mg   Take 1 tablet (5 mg) by mouth daily ALTERNATE 5 MG AND 2.5 MG (1/2 TABLET) EVERY DAY   Quantity:  90 tablet   Refills:  3       * Notice:  This list has 2 medication(s) that are the same as other medications prescribed for you. Read the directions carefully, and ask your doctor or other care provider to review them with you.         Where to get your medicines      These medications were sent to [place] - [address]  [address] [address] [facility] [address] [address]     Phone:  [phone]     methimazole 5 MG tablet                Primary Care Provider Office Phone # Fax #    Darlene Sultana [phone]       [facility] [address] [address]        Equal Access to Services     KIKE EWING AH: haritha Grimes qaybta  patricio nievestierra gomez ah. [facility] [phone].    ATENCIÓN: Si holly cevallos, tiene a cassidy disposición servicios gratuitos de asistencia lingüística. Cherry al [phone].    We comply with applicable federal civil rights laws and [place] laws. We do not discriminate on the basis of race, color, national origin, age, disability sex, sexual orientation or gender identity.            Thank you!     Thank you for choosing [facility]  for your care. Our goal is always to provide you with excellent care. Hearing back from our patients is one way we can continue to improve our services. Please take a few minutes to complete the written survey that you may receive in the mail after your visit with us. Thank you!             Your Updated Medication List - Protect others around you: Learn how to safely use, store and throw away your medicines at www.disposemymeds.org.          This list is accurate as of: 8/1/17 12:09 PM.  Always use your most recent med list.                   Brand Name Dispense Instructions for use Diagnosis    Adult Blood Pressure Cuff Lg Kit     1 kit    1 Device 2 times daily    HTN, goal below 140/90       amLODIPine 5 MG tablet    NORVASC    30 tablet    Take 1 tablet (5 mg) by mouth daily    Essential hypertension       aspirin 81 MG EC tablet     30 tablet    Take 1 tablet by mouth daily.    NSTEMI (non-ST elevated myocardial infarction) (H), ACS (acute coronary syndrome) (H)       hydrochlorothiazide 25 MG tablet    HYDRODIURIL    90 tablet    Take 1 tablet (25 mg) by mouth daily    Essential hypertension       imatinib 400 MG tablet    GLEEVEC    30 tablet    Take 1 tablet (400 mg) by mouth daily Take with a meal and a large glass of water.    Malignant gastrointestinal stromal tumor, unspecified site (H)       lisinopril 40 MG tablet    PRINIVIL/ZESTRIL    90 tablet    Take 1 tablet (40 mg) by mouth daily    Essential hypertension with goal  blood pressure less than 140/90       LORazepam 1 MG tablet    ATIVAN    30 tablet    Take 1 tablet (1 mg) by mouth At Bedtime    Malignant gastrointestinal stromal tumor, unspecified site (H)       * methimazole 10 MG tablet    TAPAZOLE    45 tablet    Take 0.5 tablets (5 mg) by mouth daily    Thyrotoxicosis       * methimazole 5 MG tablet    TAPAZOLE    90 tablet    Take 1 tablet (5 mg) by mouth daily ALTERNATE 5 MG AND 2.5 MG (1/2 TABLET) EVERY DAY    Hyperthyroidism       metoprolol 100 MG tablet    LOPRESSOR    180 tablet    Take 1 tablet (100 mg) by mouth 2 times daily    Essential hypertension with goal blood pressure less than 140/90       oxyCODONE 5 MG IR tablet    ROXICODONE    180 tablet    Take 1-2 tablets (5-10 mg) by mouth 2 times daily    Chronic pain syndrome       rosuvastatin 5 MG tablet    CRESTOR    90 tablet    Take 1 tablet (5 mg) by mouth daily    Hyperlipidemia LDL goal <160       * Notice:  This list has 2 medication(s) that are the same as other medications prescribed for you. Read the directions carefully, and ask your doctor or other care provider to review them with you.

## 2017-08-01 NOTE — LETTER
8/1/2017       RE: Idalmis Abdul  PO   [facility] [id number]     Dear Colleague,    Thank you for referring your patient, Idalmis Abdul, to the [facility] ENDOCRINOLOGY at [facility]. Please see a copy of my visit note below.    Endocrinology and diabetes outpatient clinic  Name: Idalmis Abdul  HPI:  Ms. Abdul is a very pleasant to 71-year-old female patient with thyroid dysfunction diagnosed in Feb 2014.  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 She is here today with her grandson  Thyroid dysfunction: Briefly, Ms. Abdul reported thyroid function studies were abnormal since August 2013. The patient was seen by Dr. Rama Coburn in February 2014.  Hyperthyroidism was confirmed, and a thyroid uptake and scan prior to starting therapy showed an homogenous uptake of 19%. Ms. Abdul was placed on methimazole 20 mg BID and the dose was adjusted multiple times up to 60 mg per day. Ms. Abdul had discontinued methimazole 1 and 1/2 week before I initially saw her in clinic, when she was told she was hypothyroid.  I have previously reviewed the patient's medical records, that showed that Ms. Abdul actually had a suppressed TSH and a positive TSI in June 2012.  She reports she was at the hospital at that time due to heart issues (atrial fibrillation).  It seems the patient was seen by the endocrinology consult team at that time, but she didn't have any follow-up as an outpatient.   I have initially titrated her methimazole up, and more recently titrated it down due to hypothyroidism. The patient continues on methimazole 5 mg/day and she is feeling well. She had eye surgery in 7/2016, and that has helped with her diplopia.  She denies palpitations, tremors or changes in her bowel movements. She has chronic constipation, likely related to chronic oxycodone use (for back pain). She takes senna docusate to help with  her constipation.   Ms. Abdul previous medical history includes a gastrointestinal stromal tumor diagnosed in 2003. The tumor was surgically removed  She was on Gleevec for 1 year, then off for 3 years, and had a recurrence in 2007, with 3 new tumors.  She has continuously been on Gleevec since then, and the plans are for her to be on it long term, as her tumor has been stable while on it.  She continues to follow with Dr. Schmitz in oncology, and has a CT armenta with contrast every 6 months.   Lung cancer diagnosed in 2010.  She is a former smoker, quitting in 2010.  Ms. Abdul had quadruple bypass in 2012. She has HTN, and is on lisinopril, metoprolol, and Lasix, as per her cardiologist.  She is also on Rosuvastatin.  She also had a perforated bowel mid 2014 that was managed conservatively, and has lost renal function during that hospital admission, likely related to antibiotic use.  Her GFR has somewhat recovered and is now around 50 mL/min  There are no changes on her interval history.  PMH/PSH:  Past Medical History:   Diagnosis Date     ASCVD (arteriosclerotic cardiovascular disease) 6/14/2012     Benign neoplasm of duodenum, jejunum, and ileum 2003, 2007    Gastrointestinal Stromal Tumor, seen at [facility], Dr. Schmitz, GI oncology-Gleevec treatment.  Surgical removal in fall 2004-no recurrence as of 3/05.     CA - lung cancer 4/2010    [facility], treated surgically     choledochal cyst 1963    CHOLEDOCHAL CYST, mild dilatation of biliary system     Coronary artery disease      Dislocated finger, left middle PIP 7/26/2013     Dyspnea 8/27/2013     Eyelid edema 12/15/2011    side effect of gastric cancer medication      GERD (gastroesophageal reflux disease) 10/8/2013     GIST (gastrointestinal stromal tumor), malignant (H) 5/10/2011     Graves disease      Grief reaction 5/11/2009     History of lung cancer 12/15/2011    S/p resection of right lung.  Sees  @ [facility]      Hyperlipidaemia      Hyperthyroidism 2/4/2014      Hypokalemia 8/5/2012     LBP (low back pain) 7/26/2013     Leiomyoma of uterus, unspecified      NSTEMI (non-ST elevated myocardial infarction) (H) 6/12/2012     NSTEMI (non-ST elevated myocardial infarction) (H)      osteopenia      Other benign neoplasm of connective and other soft tissue of thorax 2003    Hamartoma, lung-?right-s/p  resection 2004     Other chronic pain     back     Postsurgical aortocoronary bypass status 7/4/2012     S/P CABG x 4 8/5/2012     Strabismus      Tobacco use disorder     Quit     Unspecified essential hypertension      Past Surgical History:   Procedure Laterality Date     BYPASS GRAFT ARTERY CORONARY  6/14/2012    Procedure: BYPASS GRAFT ARTERY CORONARY;  Median Sternotomy Coronary Artery Bypass Graft  x 3        C THORACOSCOPY,DX W BX  fall 2003    benign tissue     CATARACT IOL, RT/LT      LE     CHOLECYSTECTOMY  1963     COLONOSCOPY  4-12-05     CORONARY ARTERY BYPASS      X4     CREATION PERICARDIAL WINDOW  6/15/2012    Procedure: CREATION PERICARDIAL WINDOW;  Mediastinal Exploration, Control of Bleeding;  Surgeon: Dwaine Griffin MD;  Location:  OR     EYE SURGERY  2014    left cataract removal     GI SURGERY  2003 and 2007    GIST tumor removal     GYN SURGERY      tubal ligation     PHACOEMULSIFICATION WITH STANDARD INTRAOCULAR LENS IMPLANT  3/24/2014    Procedure: PHACOEMULSIFICATION WITH STANDARD INTRAOCULAR LENS IMPLANT;  Left Kelman Phacoemulsification with Intraocular Lens Implant;  Surgeon: Magnus Mckeon MD;  Location: [facility] OR     RECESSION RESECTION WITH ADJUSTABLE SUTURE BILATERAL Bilateral 7/14/2016    Procedure: RECESSION RESECTION WITH ADJUSTABLE SUTURE BILATERAL;  Surgeon: Erma Ordaz MD;  Location: [facility] OR     SURGICAL HISTORY OF -   1963    cholecystectomy     SURGICAL HISTORY OF -   1970's    Tubal Ligation      SURGICAL HISTORY OF -   08/03    Explor. Lap, Excision of Small Bowel Tumor      SURGICAL HISTORY OF -   4/2010    lung  cancer removed right lung     Family Hx:  Family History   Problem Relation Age of Onset     HEART DISEASE Mother      OSTEOPOROSIS Mother      Respiratory Mother      Emphezyma     Hypertension Mother      HEART DISEASE Father      Alcohol/Drug Father      Hypertension Father      Hypertension Maternal Grandmother      Hypertension Brother      Hypertension Sister      Arthritis Sister      Hypertension Son      CANCER Son      rectal   Father:  at age 58, heat attack, alcoholism  Mother:  at age 84, heart attack, emphysema  2 siblings: Sister has arthritis and has had multiple surgeries due to that, also has HTN.  Brother also has HTN  Thyroid disease: No         DM2: No         Autoimmune: there is no family history of DM1, SLE, RA, or Vitiligo     Social Hx:  Social History     Social History     Marital status:      Spouse name: N/A     Number of children: N/A     Years of education: N/A     Occupational History     Not on file.     Social History Main Topics     Smoking status: Former Smoker     Packs/day: 0.50     Years: 49.00     Types: Cigarettes     Quit date: 2010     Smokeless tobacco: Never Used     Alcohol use No     Drug use: No     Sexual activity: Not Currently     Partners: Male      Comment:      Other Topics Concern      Service No     Blood Transfusions No     Caffeine Concern Yes     3 cups     Occupational Exposure No     Hobby Hazards No     Sleep Concern No     Stress Concern No     son  and much happiness in her life, big burden lifted,      Weight Concern No     Special Diet No     Back Care Yes     lower back herniated disc 's      Exercise No     Bike Helmet No     Seat Belt Yes     Self-Exams Yes     Parent/Sibling W/ Cabg, Mi Or Angioplasty Before 65f 55m? 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   in .  Son (Rk, with wife Марина and  "grandson) lives next door -- quadriplegic and high functioning, cannot provide physical assistance/support.          MEDICATIONS:  Current Outpatient Prescriptions   Medication     oxyCODONE (ROXICODONE) 5 MG IR tablet     hydrochlorothiazide (HYDRODIURIL) 25 MG tablet     Blood Pressure Monitoring (ADULT BLOOD PRESSURE CUFF LG) KIT     LORazepam (ATIVAN) 1 MG tablet     amLODIPine (NORVASC) 5 MG tablet     methimazole (TAPAZOLE) 10 MG tablet     rosuvastatin (CRESTOR) 5 MG tablet     lisinopril (PRINIVIL,ZESTRIL) 40 MG tablet     metoprolol (LOPRESSOR) 100 MG tablet     aspirin EC 81 MG EC tablet     imatinib (GLEEVEC) 400 MG tablet     No current facility-administered medications for this visit.        ROS   ROS: 11 point ROS neg other than the symptoms noted above in the HPI.    Physical Exam   VS: /75  Pulse 78  Ht 1.588 m (5' 2.5\")  Wt 71.6 kg (157 lb 14.4 oz)  LMP 01/01/1992  BMI 28.42 kg/m2  GENERAL: NAD, well dressed, answering questions appropriately, appears stated age.  HEENT: OP clear, no exophthalmos, no proptosis, EOMI, no lig lag, no retraction, no scleral icterus.    THYROID: Thyroid is palpable, about 1.5X normal size.  A nodule is palpable on the lower aspect of the right thyroid lobe, this is similar to previous evaluation.  RESPIRATORY: Normal respiratory effort.  CARDIOVASCULAR: No peripheral edema.  EXTREMITIES: no edema, +pulses, no rashes, no lesions  NEUROLOGY: CN grossly intact, + tremor out of the outstretched hands (more significant on the right hand than on the left)  MSK: grossly intact, No digital cyanosis. Normal gait and station.  SKIN: no rashes, no lesions, no ulcers, skin warm with normal turgor and brisk capillary refill.   PSYCH: Intact judgment and insight. A&OX3 with a cordial affect.      LABS:  TFTs:    ENDO THYROID LABS-[facility] Latest Ref Rng & Units 7/7/2017 11/8/2016   TSH 0.40 - 4.00 mU/L 1.27 2.17   T4 FREE 0.76 - 1.46 ng/dL 1.19 1.14   FREE T3 2.3 - 4.2 pg/mL   "   TRIIODOTHYRONINE(T3) 60 - 181 ng/dL 93 109     ENDO THYROID LABS-[facility] Latest Ref Rng 6/27/2016   TSH 0.40 - 4.00 mU/L 2.20   T4 FREE 0.76 - 1.46 ng/dL 1.38   FREE T3 2.3 - 4.2 pg/mL    TRIIODOTHYRONINE(T3) 60 - 181 ng/dL 93   THYR PEROXIDASE PAPO <35 IU/mL    THYROID STIM IMMUNOG  3.2 (H)       ENDO THYROID LABS-[facility] Latest Ref Rng 3/3/2016 12/21/2015   TSH 0.40 - 4.00 mU/L 1.94 2.35   T4 FREE 0.76 - 1.46 ng/dL 1.07 1.07   FREE T3 2.3 - 4.2 pg/mL     TRIIODOTHYRONINE(T3) 60 - 181 ng/dL 112    THYR PEROXIDASE PAPO <35 IU/mL     THYROID STIM IMMUNOG  1.6 (H)      [facility]   TSH 0.40 - 4.00 mU/L 2.42   T4 FREE 0.76 - 1.46 ng/dL 1.16   FREE T3 2.3 - 4.2 pg/mL    TRIIODOTHYRONINE(T3) 60 - 181 ng/dL 103   THYR PEROXIDASE PAPO <35 IU/mL    THYROID STIM IMMUNOG  1.8 (H)     [facility] 6/30/2015   TSH 0.40 - 4.00 mU/L 9.27 (H)   T4 FREE 0.76 - 1.46 ng/dL 0.92   FREE T3 2.3 - 4.2 pg/mL    TRIIODOTHYRONINE(T3) 60 - 181 ng/dL 129   This on Methimazole 10 mg/day      [facility] 3/30/2015   TSH 0.40 - 4.00 mU/L 1.62   T4 FREE 0.76 - 1.46 ng/dL 0.89   FREE T3 2.3 - 4.2 pg/mL    TRIIODOTHYRONINE(T3) 60 - 181 ng/dL 98     [facility] 1/9/2015   TSH 0.40 - 4.00 mU/L 0.02 (L)   T4 FREE 0.76 - 1.46 ng/dL 1.87 (H)   FREE T3 2.3 - 4.2 pg/mL    TRIIODOTHYRONINE(T3) 60 - 181 ng/dL 156     ENDO THYROID LABS-[facility] Latest Ref Rng 10/16/2014 9/10/2014   TSH 0.40 - 4.00 mU/L 0.02 (L) 0.26 (L)   T4 FREE 0.76 - 1.46 ng/dL 1.59 (H) 1.32   FREE T3 2.3 - 4.2 pg/mL     TRIIODOTHYRONINE(T3) 60 - 181 ng/dL 155 137     ENDO THYROID LABS-[facility] Latest Ref Rng 8/14/2014 8/7/2014 6/24/2014   TSH 0.40 - 4.00 mU/L 115.57 (H) 96.24 (H)    T4 FREE 0.76 - 1.46 ng/dL 0.40 (L) 0.39 (L)    FREE T3 2.3 - 4.2 pg/mL 2.2 (L) 1.7 (L)    THYROID STIM IMMUNOG    7.4 (H)     ENDO THYROID LABS-[facility] Latest Ref Rng 5/23/2014   TSH 0.40 - 4.00 mU/L 0.05 (L)   T4 FREE  0.76 - 1.46 ng/dL 0.96     !THYROID Latest Ref Rng 4/25/2014 3/31/2014 3/14/2014   TSH 0.4 - 5.0 mU/L 0.07 (L) 0.03 (L) 0.03 (L)   T4 FREE 0.70 - 1.85 ng/dL 2.03 (H) 2.82 (H) 3.02 (H)     TSI:  Component    Latest Ref Rng 2/25/2014   Thyroid Stim Immunog     4.6 (H)     CBC:  !HEMATOLOGY Latest Ref Rng 11/12/2013   WBC  4.9   RBC  3.55   HGB 11.7 - 15.7 gm/dL 9.8 (A)   HCT  29.8   MCV  84   MCH  27.6   MCHC  32.9   RDW  15.4     LFTs:  !COMPREHENSIVE Latest Ref Rng 11/12/2013 11/12/2013   CALCIUM 8.5 - 10.4 mg/dL 9.1 9.1   ALBUMIN  3.4 3.4   PROTEIN, TOTAL  6.1 6.1   AST  26 26   ALT  20 20   ALKPHOS  106 106   BILIRUBIN TOTAL  0.3 0.3     US Thyroid:  US THYROID 2/5/2014 10:06 AM    HISTORY: Hyperthyroid.    COMPARISON: None.    FINDINGS: The right lobe of the thyroid gland measures 5.6 x 1.4 x 1.5 cm. The left lobe of the thyroid gland measures 3.5 x 1.6 x 1.5 cm. The isthmus measures 0.2 cm.  There is a somewhat ill-defined hypoechoic nodule in the mid to upper pole of the right lobe of the thyroid gland that measures 0.4 x 0.4 x 0.3 cm. This has calcifications within it. There is a predominantly solid nodule at the posterior margin of the lower pole of the right lobe of the thyroid gland that measures 2.0 x 1.3 x 1.1 cm. This nodule shows no significant change in size in retrospect from prior chest CTs dating back to 9/11/2012. This stability is supportive evidence for a benign entity.  Uptake and Scan: 2/28/2014  The uptake at 24 hours was measured at 19 %. The normal range at 24 hours is from 10 to 30%. Uptake on right: 12 %, Uptake on Left: 6.8 %.    Total computer estimated weight of the gland: 44.8 kg, Right gland weight: 29.9 kg, Left gland weight: 14.9 kg.    Images of the gland demonstrates normal appearance of the right and left lobes. No additional findings. No autonomous nodules were identified.    All pertinent notes, labs, and images personally reviewed by me.      Assessment and Plan:  Ms.Suzanne MAYORGA  Franko is a 71 year old female here for the management of thyroid dysfunction.    1. Graves' Disease vs. Gleevec induced Hyperthyroidism.  The positive TSI and eye disease suggest this is Graves' disease, although thyroid dysfunction (usually hypothyroidism) but also hyperthyroidism have been reported with Gleevec.  Ms. Abdul is clinically euthyroid. We will try to reduce methimazole dose by using 5 mg (M, W, F) and 2.5 mg (Tu, Th, Sa and Reyes), and we will check TFTs 4 weeks later. We will continue to adjust methimazole dose as needed.  We will also obtain a TSI.  If TSI remains positive, it is unlikely we will be able to d/c methimazole in the near future.     2. Thyroid Nodule.  Ms. Abdul has thyroid nodules.  We will plan for a US now and subsequent FNA of these nodules if indicated.      3. 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  Eye symptoms significantly improved after surgery and she no longer has diplopia. She continues presenting lacrimation, and she is followed by ophthalmology.        Orders Placed This Encounter   Procedures     US Thyroid     TSH     T4 free     T3 total     Thyroid stimulating immunoglobulin     I will contact the patient with the test results and see her back in clinic in three months or sooner if indicated.    Zenia Antoine MD PhD    Division of Endocrinology and Diabetes

## 2017-08-01 NOTE — PROGRESS NOTES
Endocrinology and diabetes outpatient clinic  Name: Idalmis Abdul  HPI:  Ms. Abdul is a very pleasant to 71-year-old female patient with thyroid dysfunction diagnosed in Feb 2014.  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 She is here today with her grandson  Thyroid dysfunction: Briefly, Ms. Abdul reported thyroid function studies were abnormal since August 2013. The patient was seen by Dr. Rama Coburn in February 2014.  Hyperthyroidism was confirmed, and a thyroid uptake and scan prior to starting therapy showed an homogenous uptake of 19%. Ms. Abdul was placed on methimazole 20 mg BID and the dose was adjusted multiple times up to 60 mg per day. Ms. Abdul had discontinued methimazole 1 and 1/2 week before I initially saw her in clinic, when she was told she was hypothyroid.  I have previously reviewed the patient's medical records, that showed that Ms. Abdul actually had a suppressed TSH and a positive TSI in June 2012.  She reports she was at the hospital at that time due to heart issues (atrial fibrillation).  It seems the patient was seen by the endocrinology consult team at that time, but she didn't have any follow-up as an outpatient.   I have initially titrated her methimazole up, and more recently titrated it down due to hypothyroidism. The patient continues on methimazole 5 mg/day and she is feeling well. She had eye surgery in 7/2016, and that has helped with her diplopia.  She denies palpitations, tremors or changes in her bowel movements. She has chronic constipation, likely related to chronic oxycodone use (for back pain). She takes senna docusate to help with her constipation.   Ms. Abdul previous medical history includes a gastrointestinal stromal tumor diagnosed in 2003. The tumor was surgically removed  She was on Gleevec for 1 year, then off for 3 years, and had a recurrence in 2007, with 3 new tumors.  She has continuously been on  Gleevec since then, and the plans are for her to be on it long term, as her tumor has been stable while on it.  She continues to follow with Dr. Schmitz in oncology, and has a CT armenta with contrast every 6 months.   Lung cancer diagnosed in 2010.  She is a former smoker, quitting in 2010.  Ms. Abdul had quadruple bypass in 2012. She has HTN, and is on lisinopril, metoprolol, and Lasix, as per her cardiologist.  She is also on Rosuvastatin.  She also had a perforated bowel mid 2014 that was managed conservatively, and has lost renal function during that hospital admission, likely related to antibiotic use.  Her GFR has somewhat recovered and is now around 50 mL/min  There are no changes on her interval history.  PMH/PSH:  Past Medical History:   Diagnosis Date     ASCVD (arteriosclerotic cardiovascular disease) 6/14/2012     Benign neoplasm of duodenum, jejunum, and ileum 2003, 2007    Gastrointestinal Stromal Tumor, seen at [facility], Dr. Schmitz, GI oncology-Gleevec treatment.  Surgical removal in fall 2004-no recurrence as of 3/05.     CA - lung cancer 4/2010    [facility], treated surgically     choledochal cyst 1963    CHOLEDOCHAL CYST, mild dilatation of biliary system     Coronary artery disease      Dislocated finger, left middle PIP 7/26/2013     Dyspnea 8/27/2013     Eyelid edema 12/15/2011    side effect of gastric cancer medication      GERD (gastroesophageal reflux disease) 10/8/2013     GIST (gastrointestinal stromal tumor), malignant (H) 5/10/2011     Graves disease      Grief reaction 5/11/2009     History of lung cancer 12/15/2011    S/p resection of right lung.  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-s/p  resection 2004     Other chronic pain     back     Postsurgical aortocoronary bypass status 7/4/2012     S/P CABG x 4 8/5/2012     Strabismus      Tobacco use disorder     Quit     Unspecified essential hypertension      Past Surgical History:   Procedure Laterality Date     BYPASS GRAFT ARTERY CORONARY  6/14/2012    Procedure: BYPASS GRAFT ARTERY CORONARY;  Median Sternotomy Coronary Artery Bypass Graft  x 3        C THORACOSCOPY,DX W BX  fall 2003    benign tissue     CATARACT IOL, RT/LT      LE     CHOLECYSTECTOMY  1963     COLONOSCOPY  4-12-05     CORONARY ARTERY BYPASS      X4     CREATION PERICARDIAL WINDOW  6/15/2012    Procedure: CREATION PERICARDIAL WINDOW;  Mediastinal Exploration, Control of Bleeding;  Surgeon: Dwaine Griffin MD;  Location: [facility] OR     EYE SURGERY  2014    left cataract removal     GI SURGERY  2003 and 2007    GIST tumor removal     GYN SURGERY      tubal ligation     PHACOEMULSIFICATION WITH STANDARD INTRAOCULAR LENS IMPLANT  3/24/2014    Procedure: PHACOEMULSIFICATION WITH STANDARD INTRAOCULAR LENS IMPLANT;  Left Kelman Phacoemulsification with Intraocular Lens Implant;  Surgeon: Magnus Mckeon MD;  Location: [facility] OR     RECESSION RESECTION WITH ADJUSTABLE SUTURE BILATERAL Bilateral 7/14/2016    Procedure: RECESSION RESECTION WITH ADJUSTABLE SUTURE BILATERAL;  Surgeon: Erma Ordaz MD;  Location: [facility] OR     SURGICAL HISTORY OF -   1963    cholecystectomy     SURGICAL HISTORY OF -   1970's    Tubal Ligation      SURGICAL HISTORY OF -   08/03    Explor. Lap, Excision of Small Bowel Tumor      SURGICAL HISTORY OF -   4/2010    lung cancer removed right lung     Family Hx:  Family History   Problem Relation Age of Onset     HEART DISEASE Mother      OSTEOPOROSIS Mother      Respiratory Mother      Emphezyma     Hypertension Mother      HEART DISEASE Father      Alcohol/Drug Father      Hypertension Father       Hypertension Maternal Grandmother      Hypertension Brother      Hypertension Sister      Arthritis Sister      Hypertension Son      CANCER Son      rectal   Father:  at age 58, heat attack, alcoholism  Mother:  at age 84, heart attack, emphysema  2 siblings: Sister has arthritis and has had multiple surgeries due to that, also has HTN.  Brother also has HTN  Thyroid disease: No         DM2: No         Autoimmune: there is no family history of DM1, SLE, RA, or Vitiligo     Social Hx:  Social History     Social History     Marital status:      Spouse name: N/A     Number of children: N/A     Years of education: N/A     Occupational History     Not on file.     Social History Main Topics     Smoking status: Former Smoker     Packs/day: 0.50     Years: 49.00     Types: Cigarettes     Quit date: 2010     Smokeless tobacco: Never Used     Alcohol use No     Drug use: No     Sexual activity: Not Currently     Partners: Male      Comment:      Other Topics Concern      Service No     Blood Transfusions No     Caffeine Concern Yes     3 cups     Occupational Exposure No     Hobby Hazards No     Sleep Concern No     Stress Concern No     son  and much happiness in her life, big burden lifted,      Weight Concern No     Special Diet No     Back Care Yes     lower back herniated disc       Exercise No     Bike Helmet No     Seat Belt Yes     Self-Exams Yes     Parent/Sibling W/ Cabg, Mi Or Angioplasty Before 65f 55m? 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   in .  Son (Rk, with wife Марина and grandson) lives next door -- quadriplegic and high functioning, cannot provide physical assistance/support.          MEDICATIONS:  Current Outpatient Prescriptions   Medication     oxyCODONE (ROXICODONE) 5 MG IR tablet     hydrochlorothiazide (HYDRODIURIL) 25 MG tablet     Blood Pressure  "Monitoring (ADULT BLOOD PRESSURE CUFF LG) KIT     LORazepam (ATIVAN) 1 MG tablet     amLODIPine (NORVASC) 5 MG tablet     methimazole (TAPAZOLE) 10 MG tablet     rosuvastatin (CRESTOR) 5 MG tablet     lisinopril (PRINIVIL,ZESTRIL) 40 MG tablet     metoprolol (LOPRESSOR) 100 MG tablet     aspirin EC 81 MG EC tablet     imatinib (GLEEVEC) 400 MG tablet     No current facility-administered medications for this visit.        ROS   ROS: 11 point ROS neg other than the symptoms noted above in the HPI.    Physical Exam   VS: /75  Pulse 78  Ht 1.588 m (5' 2.5\")  Wt 71.6 kg (157 lb 14.4 oz)  LMP 01/01/1992  BMI 28.42 kg/m2  GENERAL: NAD, well dressed, answering questions appropriately, appears stated age.  HEENT: OP clear, no exophthalmos, no proptosis, EOMI, no lig lag, no retraction, no scleral icterus.    THYROID: Thyroid is palpable, about 1.5X normal size.  A nodule is palpable on the lower aspect of the right thyroid lobe, this is similar to previous evaluation.  RESPIRATORY: Normal respiratory effort.  CARDIOVASCULAR: No peripheral edema.  EXTREMITIES: no edema, +pulses, no rashes, no lesions  NEUROLOGY: CN grossly intact, + tremor out of the outstretched hands (more significant on the right hand than on the left)  MSK: grossly intact, No digital cyanosis. Normal gait and station.  SKIN: no rashes, no lesions, no ulcers, skin warm with normal turgor and brisk capillary refill.   PSYCH: Intact judgment and insight. A&OX3 with a cordial affect.      LABS:  TFTs:    ENDO THYROID LABS-P Latest Ref Rng & Units 7/7/2017 11/8/2016   TSH 0.40 - 4.00 mU/L 1.27 2.17   T4 FREE 0.76 - 1.46 ng/dL 1.19 1.14   FREE T3 2.3 - 4.2 pg/mL     TRIIODOTHYRONINE(T3) 60 - 181 ng/dL 93 109     ENDO THYROID LABS-[facility] Latest Ref Rng 6/27/2016   TSH 0.40 - 4.00 mU/L 2.20   T4 FREE 0.76 - 1.46 ng/dL 1.38   FREE T3 2.3 - 4.2 pg/mL    TRIIODOTHYRONINE(T3) 60 - 181 ng/dL 93   THYR PEROXIDASE PAPO <35 IU/mL    THYROID STIM IMMUNOG  3.2 (H) "       ENDO THYROID LABS-[facility] Latest Ref Rng 3/3/2016 12/21/2015   TSH 0.40 - 4.00 mU/L 1.94 2.35   T4 FREE 0.76 - 1.46 ng/dL 1.07 1.07   FREE T3 2.3 - 4.2 pg/mL     TRIIODOTHYRONINE(T3) 60 - 181 ng/dL 112    THYR PEROXIDASE PAPO <35 IU/mL     THYROID STIM IMMUNOG  1.6 (H)      ENDO THYROID LABS-[facility] Latest Ref Rng 10/20/2015   TSH 0.40 - 4.00 mU/L 2.42   T4 FREE 0.76 - 1.46 ng/dL 1.16   FREE T3 2.3 - 4.2 pg/mL    TRIIODOTHYRONINE(T3) 60 - 181 ng/dL 103   THYR PEROXIDASE PAPO <35 IU/mL    THYROID STIM IMMUNOG  1.8 (H)     ENDO THYROID LABS-[facility] Latest Ref Rng 6/30/2015   TSH 0.40 - 4.00 mU/L 9.27 (H)   T4 FREE 0.76 - 1.46 ng/dL 0.92   FREE T3 2.3 - 4.2 pg/mL    TRIIODOTHYRONINE(T3) 60 - 181 ng/dL 129   This on Methimazole 10 mg/day      ENDO THYROID LABS-[facility] Latest Ref Rng 3/30/2015   TSH 0.40 - 4.00 mU/L 1.62   T4 FREE 0.76 - 1.46 ng/dL 0.89   FREE T3 2.3 - 4.2 pg/mL    TRIIODOTHYRONINE(T3) 60 - 181 ng/dL 98     ENDO THYROID LABS-[facility] Latest Ref Rng 1/9/2015   TSH 0.40 - 4.00 mU/L 0.02 (L)   T4 FREE 0.76 - 1.46 ng/dL 1.87 (H)   FREE T3 2.3 - 4.2 pg/mL    TRIIODOTHYRONINE(T3) 60 - 181 ng/dL 156     ENDO THYROID LABS-[facility] Latest Ref Rng 10/16/2014 9/10/2014   TSH 0.40 - 4.00 mU/L 0.02 (L) 0.26 (L)   T4 FREE 0.76 - 1.46 ng/dL 1.59 (H) 1.32   FREE T3 2.3 - 4.2 pg/mL     TRIIODOTHYRONINE(T3) 60 - 181 ng/dL 155 137     ENDO THYROID LABS-[facility] Latest Ref Rng 8/14/2014 8/7/2014 6/24/2014   TSH 0.40 - 4.00 mU/L 115.57 (H) 96.24 (H)    T4 FREE 0.76 - 1.46 ng/dL 0.40 (L) 0.39 (L)    FREE T3 2.3 - 4.2 pg/mL 2.2 (L) 1.7 (L)    THYROID STIM IMMUNOG    7.4 (H)     ENDO THYROID LABS-[facility] Latest Ref Rng 5/23/2014   TSH 0.40 - 4.00 mU/L 0.05 (L)   T4 FREE 0.76 - 1.46 ng/dL 0.96     !THYROID Latest Ref Rng 4/25/2014 3/31/2014 3/14/2014   TSH 0.4 - 5.0 mU/L 0.07 (L) 0.03 (L) 0.03 (L)   T4 FREE 0.70 - 1.85 ng/dL 2.03 (H) 2.82 (H) 3.02 (H)     TSI:  Component    Latest Ref Rng 2/25/2014   Thyroid Stim Immunog     4.6 (H)     CBC:  !HEMATOLOGY  Latest Ref Rng 11/12/2013   WBC  4.9   RBC  3.55   HGB 11.7 - 15.7 gm/dL 9.8 (A)   HCT  29.8   MCV  84   MCH  27.6   MCHC  32.9   RDW  15.4     LFTs:  !COMPREHENSIVE Latest Ref Rng 11/12/2013 11/12/2013   CALCIUM 8.5 - 10.4 mg/dL 9.1 9.1   ALBUMIN  3.4 3.4   PROTEIN, TOTAL  6.1 6.1   AST  26 26   ALT  20 20   ALKPHOS  106 106   BILIRUBIN TOTAL  0.3 0.3     US Thyroid:  US THYROID 2/5/2014 10:06 AM    HISTORY: Hyperthyroid.    COMPARISON: None.    FINDINGS: The right lobe of the thyroid gland measures 5.6 x 1.4 x 1.5 cm. The left lobe of the thyroid gland measures 3.5 x 1.6 x 1.5 cm. The isthmus measures 0.2 cm.  There is a somewhat ill-defined hypoechoic nodule in the mid to upper pole of the right lobe of the thyroid gland that measures 0.4 x 0.4 x 0.3 cm. This has calcifications within it. There is a predominantly solid nodule at the posterior margin of the lower pole of the right lobe of the thyroid gland that measures 2.0 x 1.3 x 1.1 cm. This nodule shows no significant change in size in retrospect from prior chest CTs dating back to 9/11/2012. This stability is supportive evidence for a benign entity.  Uptake and Scan: 2/28/2014  The uptake at 24 hours was measured at 19 %. The normal range at 24 hours is from 10 to 30%. Uptake on right: 12 %, Uptake on Left: 6.8 %.    Total computer estimated weight of the gland: 44.8 kg, Right gland weight: 29.9 kg, Left gland weight: 14.9 kg.    Images of the gland demonstrates normal appearance of the right and left lobes. No additional findings. No autonomous nodules were identified.    All pertinent notes, labs, and images personally reviewed by me.      Assessment and Plan:  Ms.Suzanne TIERRA Abdul is a 71 year old female here for the management of thyroid dysfunction.    1. Graves' Disease vs. Gleevec induced Hyperthyroidism.  The positive TSI and eye disease suggest this is Graves' disease, although thyroid dysfunction (usually hypothyroidism) but also hyperthyroidism  have been reported with Gleevec.  Ms. Abdul is clinically euthyroid. We will try to reduce methimazole dose by using 5 mg (M, W, F) and 2.5 mg (Tu, Th, Sa and Reyes), and we will check TFTs 4 weeks later. We will continue to adjust methimazole dose as needed.  We will also obtain a TSI.  If TSI remains positive, it is unlikely we will be able to d/c methimazole in the near future.     2. Thyroid Nodule.  Ms. Abdul has thyroid nodules.  We will plan for a US now and subsequent FNA of these nodules if indicated.      3. 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  Eye symptoms significantly improved after surgery and she no longer has diplopia. She continues presenting lacrimation, and she is followed by ophthalmology.        Orders Placed This Encounter   Procedures     US Thyroid     TSH     T4 free     T3 total     Thyroid stimulating immunoglobulin     I will contact the patient with the test results and see her back in clinic in three months or sooner if indicated.    Zenia Antoine MD PhD    Division of Endocrinology and Diabetes

## 2017-08-04 ENCOUNTER — OFFICE VISIT (OUTPATIENT)
Dept: FAMILY MEDICINE | Facility: CLINIC | Age: 72
End: 2017-08-04
Payer: COMMERCIAL

## 2017-08-04 VITALS
TEMPERATURE: 97.6 F | HEART RATE: 73 BPM | BODY MASS INDEX: 28.26 KG/M2 | OXYGEN SATURATION: 100 % | WEIGHT: 157 LBS | SYSTOLIC BLOOD PRESSURE: 139 MMHG | DIASTOLIC BLOOD PRESSURE: 80 MMHG

## 2017-08-04 DIAGNOSIS — G89.4 CHRONIC PAIN SYNDROME: ICD-10-CM

## 2017-08-04 DIAGNOSIS — R11.0 NAUSEA: Primary | ICD-10-CM

## 2017-08-04 PROCEDURE — 99214 OFFICE O/P EST MOD 30 MIN: CPT | Performed by: FAMILY MEDICINE

## 2017-08-04 RX ORDER — ONDANSETRON 4 MG/1
4-8 TABLET, ORALLY DISINTEGRATING ORAL
Qty: 20 TABLET | Refills: 1 | Status: SHIPPED | OUTPATIENT
Start: 2017-08-04 | End: 2017-10-12

## 2017-08-04 RX ORDER — OXYCODONE HYDROCHLORIDE 5 MG/1
5-10 TABLET ORAL
Qty: 180 TABLET | Refills: 0 | Status: SHIPPED | OUTPATIENT
Start: 2017-08-04 | End: 2017-10-19

## 2017-08-04 NOTE — MR AVS SNAPSHOT
After Visit Summary   8/4/2017    Idalmis Abdul    MRN: [id number]           Patient Information     Date Of Birth          1945        Visit Information        Provider Department      8/4/2017 11:20 AM Darlene Sultana MD [facility]        Today's Diagnoses     Nausea    -  1    Chronic pain syndrome           Follow-ups after your visit        Your next 10 appointments already scheduled     Aug 09, 2017 11:00 AM CDT   TELEMEDICINE with Jessy Osman [facility] ([facility])    [address] [address]   [phone]           Note: this is not an onsite visit; there is no need to come to the facility.            Jan 09, 2018  9:00 AM CST   US THYROID with UCUS2   [facility] ([facility])    [address] [id number] [phone]           Please bring a list of your medicines (including vitamins, minerals and over-the-counter drugs). Also, tell your doctor about any allergies you may have. Wear comfortable clothes and leave your valuables at home.  You do not need to do anything special to prepare for your exam.  Please call the Imaging Department at your exam site with any questions.            Jan 09, 2018 11:00 AM CST   (Arrive by 10:45 AM)   RETURN ENDOCRINE with Swetha Antoine MD   [facility] ([facility])    [address] [place] [id number] [phone]            Jan 12, 2018  9:30 AM CST   CT CHEST ABDOMEN PELVIS W/O & W CONTRAST with [id number]   [facility] ([facility])    [address] [address] [phone]           Please bring any scans or X-rays taken at other hospitals, if similar tests were done. Also bring a list of your medicines, including vitamins, minerals and over-the-counter drugs. It  is safest to leave personal items at home.  Be sure to tell your doctor:   If you have any allergies.   If there s any chance you are pregnant.   If you are breastfeeding.   If you have any special needs.  You may have contrast for this exam. To prepare:   Do not eat or drink for 2 hours before your exam. If you need to take medicine, you may take it with small sips of water. (We may ask you to take liquid medicine as well.)   The day before your exam, drink extra fluids at least six 8-ounce glasses (unless your doctor tells you to restrict your fluids).  Patients over 70 or patients with diabetes or kidney problems:   If you haven t had a blood test (creatinine test) within the last 30 days, go to your clinic or Diagnostic Imaging Department for this test.  If you have diabetes:   If your kidney function is normal, continue taking your metformin (Avandamet, Glucophage, Glucovance, Metaglip) on the day of your exam.   If your kidney function is abnormal, wait 48 hours before restarting this medicine.  You will have oral contrast for this exam:   You will drink the contrast at home. Get this from your clinic or Diagnostic Imaging Department. Please follow the directions given.  Please wear loose clothing, such as a sweat suit or jogging clothes. Avoid snaps, zippers and other metal. We may ask you to undress and put on a hospital gown.  If you have any questions, please call the Imaging Department where you will have your exam.            Marcos 15, 2018  8:45 AM CST   (Arrive by 8:30 AM)   Return Visit with Blayne Schmitz MD   [facility] ([facility])    [address] [address] [phone]              Who to contact     If you have questions or need follow up information about today's clinic visit or your schedule please contact [facility] directly at [phone].  Normal or non-critical lab and imaging results  "will be communicated to you by MyChart, letter or phone within 4 business days after the clinic has received the results. If you do not hear from us within 7 days, please contact the clinic through [employer] or phone. If you have a critical or abnormal lab result, we will notify you by phone as soon as possible.  Submit refill requests through [employer] or call your pharmacy and they will forward the refill request to us. Please allow 3 business days for your refill to be completed.          Additional Information About Your Visit        [employer] Information     [employer] lets you send messages to your doctor, view your test results, renew your prescriptions, schedule appointments and more. To sign up, go to [email or web address] . Click on \"Log in\" on the left side of the screen, which will take you to the Welcome page. Then click on \"Sign up Now\" on the right side of the page.     You will be asked to enter the access code listed below, as well as some personal information. Please follow the directions to create your username and password.     Your access code is: [email or web address]  Expires: 2017 10:30 AM     Your access code will  in 90 days. If you need help or a new code, please call your [place] clinic or [phone].        Care EveryWhere ID     This is your Care EveryWhere ID. This could be used by other organizations to access your [place] medical records  [id number]        Your Vitals Were     Pulse Temperature Last Period Pulse Oximetry BMI (Body Mass Index)       73 97.6  F (36.4  C) (Tympanic) 1992 100% 28.26 kg/m2        Blood Pressure from Last 3 Encounters:   17 139/80   17 115/75   07/10/17 127/82    Weight from Last 3 Encounters:   17 157 lb (71.2 kg)   17 157 lb 14.4 oz (71.6 kg)   07/10/17 158 lb 11.7 oz (72 kg)              Today, you had the following     No orders found for display         Today's Medication Changes          These changes are accurate as of: " 8/4/17 11:43 AM.  If you have any questions, ask your nurse or doctor.               Start taking these medicines.        Dose/Directions    ondansetron 4 MG ODT tab   Commonly known as:  ZOFRAN ODT   Used for:  Nausea        Dose:  4-8 mg   Take 1-2 tablets (4-8 mg) by mouth 3 times daily (before meals)   Quantity:  20 tablet   Refills:  1         These medicines have changed or have updated prescriptions.        Dose/Directions    oxyCODONE 5 MG IR tablet   Commonly known as:  ROXICODONE   This may have changed:  additional instructions   Used for:  Chronic pain syndrome        Dose:  5-10 mg   Take 1-2 tablets (5-10 mg) by mouth 2 times daily Wean off.  1/2 tab and 1 and 1/2 3 days, then 1/2 twice a day for three days, then 1/2 a day for 3 days then every other day for 3 days.   Quantity:  180 tablet   Refills:  0            Where to get your medicines      [place] [facility] [address]  [address] [facility] [address]     Phone:  [phone]     ondansetron 4 MG ODT tab         Some of these will need a paper prescription and others can be bought over the counter.  Ask your nurse if you have questions.     Bring a paper prescription for each of these medications     oxyCODONE 5 MG IR tablet                Primary Care Provider Office Phone # Fax #    Darlene Daniela Sultana [phone]       [facility] [address] [address]        Equal Access to Services     ARNOLD EWING AH: Hadii cornelia ku hadasho Soomaali, waaxda luqadaha, qaybta kaalmada adeegyaeusebio, waxay idimei muse. So [facility] [phone].    ATENCIÓN: Si habla español, tiene a cassidy disposición servicios gratuitos de asistencia lingüística. Llame al [phone].    We comply with applicable federal civil rights laws and [place] laws. We do not discriminate on the basis of race, color, national origin, age,  disability sex, sexual orientation or gender identity.            Thank you!     Thank you for choosing [facility]  for your care. Our goal is always to provide you with excellent care. Hearing back from our patients is one way we can continue to improve our services. Please take a few minutes to complete the written survey that you may receive in the mail after your visit with us. Thank you!             Your Updated Medication List - Protect others around you: Learn how to safely use, store and throw away your medicines at www.disposemymeds.org.          This list is accurate as of: 8/4/17 11:43 AM.  Always use your most recent med list.                   Brand Name Dispense Instructions for use Diagnosis    Adult Blood Pressure Cuff Lg Kit     1 kit    1 Device 2 times daily    HTN, goal below 140/90       amLODIPine 5 MG tablet    NORVASC    30 tablet    Take 1 tablet (5 mg) by mouth daily    Essential hypertension       aspirin 81 MG EC tablet     30 tablet    Take 1 tablet by mouth daily.    NSTEMI (non-ST elevated myocardial infarction) (H), ACS (acute coronary syndrome) (H)       hydrochlorothiazide 25 MG tablet    HYDRODIURIL    90 tablet    Take 1 tablet (25 mg) by mouth daily    Essential hypertension       imatinib 400 MG tablet    GLEEVEC    30 tablet    Take 1 tablet (400 mg) by mouth daily Take with a meal and a large glass of water.    Malignant gastrointestinal stromal tumor, unspecified site (H)       lisinopril 40 MG tablet    PRINIVIL/ZESTRIL    90 tablet    Take 1 tablet (40 mg) by mouth daily    Essential hypertension with goal blood pressure less than 140/90       LORazepam 1 MG tablet    ATIVAN    30 tablet    Take 1 tablet (1 mg) by mouth At Bedtime    Malignant gastrointestinal stromal tumor, unspecified site (H)       * methimazole 10 MG tablet    TAPAZOLE    45 tablet    Take 0.5 tablets (5 mg) by mouth daily    Thyrotoxicosis       * methimazole 5 MG tablet    TAPAZOLE    90  tablet    Take 1 tablet (5 mg) by mouth daily ALTERNATE 5 MG AND 2.5 MG (1/2 TABLET) EVERY DAY    Hyperthyroidism       metoprolol 100 MG tablet    LOPRESSOR    180 tablet    Take 1 tablet (100 mg) by mouth 2 times daily    Essential hypertension with goal blood pressure less than 140/90       ondansetron 4 MG ODT tab    ZOFRAN ODT    20 tablet    Take 1-2 tablets (4-8 mg) by mouth 3 times daily (before meals)    Nausea       oxyCODONE 5 MG IR tablet    ROXICODONE    180 tablet    Take 1-2 tablets (5-10 mg) by mouth 2 times daily Wean off.  1/2 tab and 1 and 1/2 3 days, then 1/2 twice a day for three days, then 1/2 a day for 3 days then every other day for 3 days.    Chronic pain syndrome       rosuvastatin 5 MG tablet    CRESTOR    90 tablet    Take 1 tablet (5 mg) by mouth daily    Hyperlipidemia LDL goal <160       * Notice:  This list has 2 medication(s) that are the same as other medications prescribed for you. Read the directions carefully, and ask your doctor or other care provider to review them with you.

## 2017-08-04 NOTE — NURSING NOTE
"Initial /80 (BP Location: Left arm, Patient Position: Chair, Cuff Size: Adult Large)  Pulse 73  Temp 97.6  F (36.4  C) (Tympanic)  Wt 157 lb (71.2 kg)  LMP 01/01/1992  BMI 28.26 kg/m2 Estimated body mass index is 28.26 kg/(m^2) as calculated from the following:    Height as of 8/1/17: 5' 2.5\" (1.588 m).    Weight as of this encounter: 157 lb (71.2 kg). .    Gina Cruz    "

## 2017-08-04 NOTE — PROGRESS NOTES
a  SUBJECTIVE:                                                    Idalmis Abdul is 71 year old female   Chief Complaint   Patient presents with     Recheck Medication     chronic pain     Nausea     Back Pain Follow Up      Description:   Location of pain:  Low back pain  Character of pain: constant  Pain radiation: Does radiates to knee and foot  Since last visit, pain is:  unchanged  New numbness or weakness in legs, not attributed to pain:  no     Intensity: Currently 3/10    History:   Pain interferes with job: Not applicable,   Therapies tried without relief: opioids  Therapies tried with relief: opioids     C/O Nausea and dry heaves for 5-6 weeks. Denies all other SX's       Accompanying Signs & Symptoms:  Risk of Fracture:  None  Risk of Cauda Equina:  None  Risk of Infection:  None  Risk of Cancer:  None        Problem list and histories reviewed & adjusted, as indicated.  Additional history: nausea with dry heaves, GI doc recommending colonoscopy, he is treating her stomach cancer, she plans to do that closer to home.  Patient Active Problem List   Diagnosis     Hypertension goal BP (blood pressure) < 140/90     Vaginal prolapse     GIST (gastrointestinal stromal tumor), malignant (H)     Eyelid edema     History of lung cancer     Health Care Home     NSTEMI (non-ST elevated myocardial infarction) (H)     ASCVD (arteriosclerotic cardiovascular disease)     Postsurgical aortocoronary bypass status     S/P CABG x 4     Dyspnea     GERD (gastroesophageal reflux disease)     Hyperlipidemia LDL goal <160     Hyperthyroidism     Thyroid eye disease     Eyelid retraction or lag     Thyrotoxic exophthalmos     Graves' disease     History of tobacco abuse     History of non-ST elevation myocardial infarction (NSTEMI)     Chronic back pain     PVD (posterior vitreous detachment)     Age-related osteoporosis without current pathological fracture     Past Surgical History:   Procedure Laterality Date     BYPASS  GRAFT ARTERY CORONARY  6/14/2012    Procedure: BYPASS GRAFT ARTERY CORONARY;  Median Sternotomy Coronary Artery Bypass Graft  x 3        C THORACOSCOPY,DX W BX  fall 2003    benign tissue     CATARACT IOL, RT/LT      LE     CHOLECYSTECTOMY  1963     COLONOSCOPY  4-12-05     CORONARY ARTERY BYPASS      X4     CREATION PERICARDIAL WINDOW  6/15/2012    Procedure: CREATION PERICARDIAL WINDOW;  Mediastinal Exploration, Control of Bleeding;  Surgeon: Dwaine Griffin MD;  Location: [facility] OR     EYE SURGERY  2014    left cataract removal     GI SURGERY  2003 and 2007    GIST tumor removal     GYN SURGERY      tubal ligation     PHACOEMULSIFICATION WITH STANDARD INTRAOCULAR LENS IMPLANT  3/24/2014    Procedure: PHACOEMULSIFICATION WITH STANDARD INTRAOCULAR LENS IMPLANT;  Left Kelman Phacoemulsification with Intraocular Lens Implant;  Surgeon: Magnus Mckeon MD;  Location: [facility] OR     RECESSION RESECTION WITH ADJUSTABLE SUTURE BILATERAL Bilateral 7/14/2016    Procedure: RECESSION RESECTION WITH ADJUSTABLE SUTURE BILATERAL;  Surgeon: Erma Ordaz MD;  Location: [facility] OR     SURGICAL HISTORY OF -   1963    cholecystectomy     SURGICAL HISTORY OF -   1970's    Tubal Ligation      SURGICAL HISTORY OF -   08/03    Explor. Lap, Excision of Small Bowel Tumor      SURGICAL HISTORY OF -   4/2010    lung cancer removed right lung       Social History   Substance Use Topics     Smoking status: Former Smoker     Packs/day: 0.50     Years: 49.00     Types: Cigarettes     Quit date: 4/19/2010     Smokeless tobacco: Never Used     Alcohol use No     Family History   Problem Relation Age of Onset     HEART DISEASE Mother      OSTEOPOROSIS Mother      Respiratory Mother      Emphezyma     Hypertension Mother      HEART DISEASE Father      Alcohol/Drug Father      Hypertension Father      Hypertension Maternal Grandmother      Hypertension Brother      Hypertension Sister      Arthritis Sister      Hypertension Son      CANCER  Son      rectal         Current Outpatient Prescriptions   Medication Sig Dispense Refill     oxyCODONE (ROXICODONE) 5 MG IR tablet Take 1-2 tablets (5-10 mg) by mouth 2 times daily Wean off.  1/2 tab and 1 and 1/2 3 days, then 1/2 twice a day for three days, then 1/2 a day for 3 days then every other day for 3 days. 180 tablet 0     ondansetron (ZOFRAN ODT) 4 MG ODT tab Take 1-2 tablets (4-8 mg) by mouth 3 times daily (before meals) 20 tablet 1     methimazole (TAPAZOLE) 5 MG tablet Take 1 tablet (5 mg) by mouth daily ALTERNATE 5 MG AND 2.5 MG (1/2 TABLET) EVERY DAY 90 tablet 3     LORazepam (ATIVAN) 1 MG tablet Take 1 tablet (1 mg) by mouth At Bedtime 30 tablet 5     hydrochlorothiazide (HYDRODIURIL) 25 MG tablet Take 1 tablet (25 mg) by mouth daily 90 tablet 3     amLODIPine (NORVASC) 5 MG tablet Take 1 tablet (5 mg) by mouth daily 30 tablet 11     rosuvastatin (CRESTOR) 5 MG tablet Take 1 tablet (5 mg) by mouth daily 90 tablet 3     lisinopril (PRINIVIL,ZESTRIL) 40 MG tablet Take 1 tablet (40 mg) by mouth daily 90 tablet 3     aspirin EC 81 MG EC tablet Take 1 tablet by mouth daily. 30 tablet 2     imatinib (GLEEVEC) 400 MG tablet Take 1 tablet (400 mg) by mouth daily Take with a meal and a large glass of water. 30 tablet 2     Blood Pressure Monitoring (ADULT BLOOD PRESSURE CUFF LG) KIT 1 Device 2 times daily 1 kit 1     methimazole (TAPAZOLE) 10 MG tablet Take 0.5 tablets (5 mg) by mouth daily 45 tablet 3     metoprolol (LOPRESSOR) 100 MG tablet Take 1 tablet (100 mg) by mouth 2 times daily 180 tablet 3     Allergies   Allergen Reactions     Atorvastatin Cramps     Recent Labs   Lab Test  07/07/17   1012  07/07/17   0903  04/24/17   1103  01/13/17   0854  11/08/16   1237  09/20/16   1022   07/08/16   0914   07/17/15   0916   03/14/14   0929   06/19/12   1550   06/15/12   0850   06/15/12   0719   A1C   --    --    --    --    --    --    --    --    --    --    --    --    --   5.4   --   Duplicate request   --    5.2   LDL   --    --    --    --    --   38   --    --    --   31   --   59   --    --    --    --    --    --    HDL   --    --    --    --    --   53   --    --    --   46*   --   38*   --    --    --    --    --    --    TRIG   --    --    --    --    --   79   --    --    --   72   --   105   < >   --    --    --    --    --    ALT   --   27   --   24   --    --    --   23   < >  23   < >  39   < >   --    < >   --    --    --    CR   --   1.20*  1.02  1.00   --   1.00   --   1.18*   < >  1.06*   < >  1.07*   < >   --    < >   --    --   1.82*   GFRESTIMATED  44*  44*  53*  55*   --   55*   --   45*   < >  51*   < >  51*   < >   --    < >   --    --   28*   GFRESTBLACK  53*  53*  64  66   --   66   --   55*   < >  62   < >  62   < >   --    < >   --    --   34*   POTASSIUM   --   4.2  4.4  4.2   --   4.2   < >  4.0   < >  4.0   < >  3.8   < >   --    < >  2.8*   < >  4.2   TSH   --   1.27   --    --   2.17   --    --    --    < >   --    < >  0.03*   < >   --    --    --    --    --     < > = values in this interval not displayed.      BP Readings from Last 3 Encounters:   08/04/17 139/80   08/01/17 115/75   07/10/17 127/82    Wt Readings from Last 3 Encounters:   08/04/17 157 lb (71.2 kg)   08/01/17 157 lb 14.4 oz (71.6 kg)   07/10/17 158 lb 11.7 oz (72 kg)         ROS:  Constitutional, HEENT, cardiovascular, pulmonary, gi and gu systems are negative, except as otherwise noted.    OBJECTIVE:                                                    /80 (BP Location: Left arm, Patient Position: Chair, Cuff Size: Adult Large)  Pulse 73  Temp 97.6  F (36.4  C) (Tympanic)  Wt 157 lb (71.2 kg)  LMP 01/01/1992  SpO2 100%  BMI 28.26 kg/m2  GENERAL APPEARANCE ADULT: Alert, no acute distress, appears older than stated age, tired appearing  RESP: lungs clear to auscultation   CV: normal rate, regular rhythm, no murmur or gallop  ABDOMEN: soft, no organomegaly, masses or tenderness  NEURO: Alert, oriented, speech  and mentation normal  PSYCH: mentation appears normal., affect and mood normal  Diagnostic Test Results:  none      ASSESSMENT/PLAN:                                                    1. Chronic pain syndrome  2. Nausea  Consider weaning off oxycodone and see if nausea resolves.  Will swan slowly, example below.  zofran in the mean time to help with discomfort.  Encouraged her to get colonoscopy as recommended by GI  - oxyCODONE (ROXICODONE) 5 MG IR tablet; Take 1-2 tablets (5-10 mg) by mouth 2 times daily Wean off.  1/2 tab and 1 and 1/2 3 days, then 1/2 twice a day for three days, then 1/2 a day for 3 days then every other day for 3 days.  Dispense: 180 tablet; Refill: 0  - ondansetron (ZOFRAN ODT) 4 MG ODT tab; Take 1-2 tablets (4-8 mg) by mouth 3 times daily (before meals)  Dispense: 20 tablet; Refill: 1    Darlene Sultana MD  [facility]

## 2017-08-09 ENCOUNTER — ALLIED HEALTH/NURSE VISIT (OUTPATIENT)
Dept: PHARMACY | Facility: CLINIC | Age: 72
End: 2017-08-09
Payer: COMMERCIAL

## 2017-08-09 DIAGNOSIS — C49.A0 MALIGNANT GASTROINTESTINAL STROMAL TUMOR, UNSPECIFIED SITE (H): ICD-10-CM

## 2017-08-09 DIAGNOSIS — F41.9 ANXIETY: ICD-10-CM

## 2017-08-09 DIAGNOSIS — G89.29 CHRONIC BACK PAIN, UNSPECIFIED BACK LOCATION, UNSPECIFIED BACK PAIN LATERALITY: Primary | ICD-10-CM

## 2017-08-09 DIAGNOSIS — E05.90 HYPERTHYROIDISM: ICD-10-CM

## 2017-08-09 DIAGNOSIS — M54.9 CHRONIC BACK PAIN, UNSPECIFIED BACK LOCATION, UNSPECIFIED BACK PAIN LATERALITY: Primary | ICD-10-CM

## 2017-08-09 DIAGNOSIS — I10 HYPERTENSION GOAL BP (BLOOD PRESSURE) < 140/90: ICD-10-CM

## 2017-08-09 DIAGNOSIS — E78.5 HYPERLIPIDEMIA LDL GOAL <160: ICD-10-CM

## 2017-08-09 DIAGNOSIS — I25.10 ASCVD (ARTERIOSCLEROTIC CARDIOVASCULAR DISEASE): ICD-10-CM

## 2017-08-09 DIAGNOSIS — R11.0 NAUSEA: ICD-10-CM

## 2017-08-09 PROCEDURE — 99607 MTMS BY PHARM ADDL 15 MIN: CPT | Mod: U4 | Performed by: PHARMACIST

## 2017-08-09 PROCEDURE — 99605 MTMS BY PHARM NP 15 MIN: CPT | Mod: U4 | Performed by: PHARMACIST

## 2017-08-09 RX ORDER — ROSUVASTATIN CALCIUM 5 MG/1
2.5 TABLET, COATED ORAL DAILY
Qty: 45 TABLET | Refills: 3
Start: 2017-08-09 | End: 2017-08-24

## 2017-08-09 RX ORDER — GABAPENTIN 100 MG/1
100 CAPSULE ORAL 3 TIMES DAILY
Qty: 90 CAPSULE | Refills: 1 | Status: SHIPPED | OUTPATIENT
Start: 2017-08-09 | End: 2017-08-22

## 2017-08-09 NOTE — MR AVS SNAPSHOT
After Visit Summary   8/9/2017    Idalmis Abdul    MRN: [id number]           Patient Information     Date Of Birth          1945        Visit Information        Provider Department      8/9/2017 11:00 AM Jessy Osman [facility] MT        Today's Diagnoses     Chronic back pain, unspecified back location, unspecified back pain laterality    -  1    Hyperlipidemia LDL goal <160          Care Instructions    Recommendations from today's MT visit                                                      1. Start Gabapentin: Take 1 tablet (100mg) by mouth three times a day. Take your first dose at bedtime and gradually increase to three times daily as tolerated. Gabapentin helps with nerve pain. Monitor your pain and see if gabapentin helps with your pain. I sent a prescription to your pharmacy.     3. Decrease your dose of rosuvastatin: Take 1/2 tablet (2.5 mg) by mouth once daily. Monitor your leg cramps and see if you have fewer cramps with the lower dose.     Next MT visit: follow-up by tele phone on 8/22/17 at 2:30pm    To schedule another [facility] appointment, please call the clinic directly or you may call the [facility] scheduling line at [phone] or toll-free at [phone].     My [facility] Pharmacist's contact information:                                                      It was a pleasure speaking with you today!  Please feel free to contact me with any questions or concerns you have.      Jessy Osman, Erin  Medication Therapy Management                    Follow-ups after your visit        Your next 10 appointments already scheduled     Aug 22, 2017  2:30 PM CDT   TELEMEDICINE with Jessy Omsan [facility] ([address] [address]   [phone]           Note: this is not an onsite visit; there is no need to come to the facility.            Jan 09, 2018  9:00 AM CST    US THYROID with [email or web address]   [facility] US ([facility])    [address]  [address]st [facility] [id number] [phone]           Please bring a list of your medicines (including vitamins, minerals and over-the-counter drugs). Also, tell your doctor about any allergies you may have. Wear comfortable clothes and leave your valuables at home.  You do not need to do anything special to prepare for your exam.  Please call the Imaging Department at your exam site with any questions.            Jan 09, 2018 11:00 AM CST   (Arrive by 10:45 AM)   RETURN ENDOCRINE with Swetha Antoine MD   [facility] ([facility])    [address]  3rd Floor  [facility] [id number] [phone]            Jan 12, 2018  9:30 AM CST   CT CHEST ABDOMEN PELVIS W/O & W CONTRAST with [id number]   [facility] CT ([facility])    [address] [address] [phone]           Please bring any scans or X-rays taken at other hospitals, if similar tests were done. Also bring a list of your medicines, including vitamins, minerals and over-the-counter drugs. It is safest to leave personal items at home.  Be sure to tell your doctor:   If you have any allergies.   If there s any chance you are pregnant.   If you are breastfeeding.   If you have any special needs.  You may have contrast for this exam. To prepare:   Do not eat or drink for 2 hours before your exam. If you need to take medicine, you may take it with small sips of water. (We may ask you to take liquid medicine as well.)   The day before your exam, drink extra fluids at least six 8-ounce glasses (unless your doctor tells you to restrict your fluids).  Patients over 70 or patients with diabetes or kidney problems:   If you haven t had a blood test (creatinine test) within the last 30 days, go to your clinic or Diagnostic Imaging Department for this  "test.  If you have diabetes:   If your kidney function is normal, continue taking your metformin (Avandamet, Glucophage, Glucovance, Metaglip) on the day of your exam.   If your kidney function is abnormal, wait 48 hours before restarting this medicine.  You will have oral contrast for this exam:   You will drink the contrast at home. Get this from your clinic or Diagnostic Imaging Department. Please follow the directions given.  Please wear loose clothing, such as a sweat suit or jogging clothes. Avoid snaps, zippers and other metal. We may ask you to undress and put on a hospital gown.  If you have any questions, please call the Imaging Department where you will have your exam.            Marcos 15, 2018  8:45 AM CST   (Arrive by 8:30 AM)   Return Visit with Blayne Schmitz MD   [facility] ([facility])    [address] [place] [address] [phone]              Who to contact     If you have questions or need follow up information about today's clinic visit or your schedule please contact [facility] directly at [phone].  Normal or non-critical lab and imaging results will be communicated to you by MyChart, letter or phone within 4 business days after the clinic has received the results. If you do not hear from us within 7 days, please contact the clinic through MyChart or phone. If you have a critical or abnormal lab result, we will notify you by phone as soon as possible.  Submit refill requests through [employer] or call your pharmacy and they will forward the refill request to us. Please allow 3 business days for your refill to be completed.          Additional Information About Your Visit        [employer] Information     [employer] lets you send messages to your doctor, view your test results, renew your prescriptions, schedule appointments and more. To sign up, go to [email or web address] . Click on \"Log in\" " "on the left side of the screen, which will take you to the Welcome page. Then click on \"Sign up Now\" on the right side of the page.     You will be asked to enter the access code listed below, as well as some personal information. Please follow the directions to create your username and password.     Your access code is: [email or web address]  Expires: 2017  5:20 PM     Your access code will  in 90 days. If you need help or a new code, please call your [facility] or [phone].        Care EveryWhere ID     This is your Care EveryWhere ID. This could be used by other organizations to access your [place] medical records  [id number]        Your Vitals Were     Last Period                   1992            Blood Pressure from Last 3 Encounters:   17 139/80   17 115/75   07/10/17 127/82    Weight from Last 3 Encounters:   17 157 lb (71.2 kg)   17 157 lb 14.4 oz (71.6 kg)   07/10/17 158 lb 11.7 oz (72 kg)              Today, you had the following     No orders found for display         Today's Medication Changes          These changes are accurate as of: 17  5:23 PM.  If you have any questions, ask your nurse or doctor.               Start taking these medicines.        Dose/Directions    gabapentin 100 MG capsule   Commonly known as:  NEURONTIN   Used for:  Chronic back pain, unspecified back location, unspecified back pain laterality        Dose:  100 mg   Take 1 capsule (100 mg) by mouth 3 times daily   Quantity:  90 capsule   Refills:  1         These medicines have changed or have updated prescriptions.        Dose/Directions    methimazole 5 MG tablet   Commonly known as:  TAPAZOLE   This may have changed:  Another medication with the same name was removed. Continue taking this medication, and follow the directions you see here.   Used for:  Hyperthyroidism        Dose:  5 mg   Take 1 tablet (5 mg) by mouth daily ALTERNATE 5 MG AND 2.5 MG (1/2 TABLET) EVERY DAY "   Quantity:  90 tablet   Refills:  3       rosuvastatin 5 MG tablet   Commonly known as:  CRESTOR   This may have changed:  how much to take   Used for:  Hyperlipidemia LDL goal <160        Dose:  2.5 mg   Take 0.5 tablets (2.5 mg) by mouth daily   Quantity:  45 tablet   Refills:  3            Where to get your medicines      These medications were sent to [place], [place] - [address] [facility] [address]  [address] [facility] [address]     Phone:  [phone]     gabapentin 100 MG capsule         Some of these will need a paper prescription and others can be bought over the counter.  Ask your nurse if you have questions.     You don't need a prescription for these medications     rosuvastatin 5 MG tablet                Primary Care Provider Office Phone # Fax #    Darlene Daniela Sultana [phone] [address] [facility] [address]        Equal Access to Services     KIKE EWING AH: Hadii cornelia ku hadasho Soomaali, waaxda luqadaha, qaybta kaalmada adeegyada, waxay timoin haycandacen angus muse. So [facility] [phone].    ATENCIÓN: Si habla español, tiene a cassidy disposición servicios gratuitos de asistencia lingüística. Llame al [phone].    We comply with applicable federal civil rights laws and [place] laws. We do not discriminate on the basis of race, color, national origin, age, disability sex, sexual orientation or gender identity.            Thank you!     Thank you for choosing [facility]  for your care. Our goal is always to provide you with excellent care. Hearing back from our patients is one way we can continue to improve our services. Please take a few minutes to complete the written survey that you may receive in the mail after your visit with us. Thank you!             Your Updated Medication List - Protect others around you: Learn how to safely use, store and throw away your medicines at  www.disposemymeds.org.          This list is accurate as of: 8/9/17  5:23 PM.  Always use your most recent med list.                   Brand Name Dispense Instructions for use Diagnosis    Adult Blood Pressure Cuff Lg Kit     1 kit    1 Device 2 times daily    HTN, goal below 140/90       amLODIPine 5 MG tablet    NORVASC    30 tablet    Take 1 tablet (5 mg) by mouth daily    Essential hypertension       aspirin 81 MG EC tablet     30 tablet    Take 1 tablet by mouth daily.    NSTEMI (non-ST elevated myocardial infarction) (H), ACS (acute coronary syndrome) (H)       gabapentin 100 MG capsule    NEURONTIN    90 capsule    Take 1 capsule (100 mg) by mouth 3 times daily    Chronic back pain, unspecified back location, unspecified back pain laterality       hydrochlorothiazide 25 MG tablet    HYDRODIURIL    90 tablet    Take 1 tablet (25 mg) by mouth daily    Essential hypertension       imatinib 400 MG tablet    GLEEVEC    30 tablet    Take 1 tablet (400 mg) by mouth daily Take with a meal and a large glass of water.    Malignant gastrointestinal stromal tumor, unspecified site (H)       lisinopril 40 MG tablet    PRINIVIL/ZESTRIL    90 tablet    Take 1 tablet (40 mg) by mouth daily    Essential hypertension with goal blood pressure less than 140/90       LORazepam 1 MG tablet    ATIVAN    30 tablet    Take 1 tablet (1 mg) by mouth At Bedtime    Malignant gastrointestinal stromal tumor, unspecified site (H)       methimazole 5 MG tablet    TAPAZOLE    90 tablet    Take 1 tablet (5 mg) by mouth daily ALTERNATE 5 MG AND 2.5 MG (1/2 TABLET) EVERY DAY    Hyperthyroidism       metoprolol 100 MG tablet    LOPRESSOR    180 tablet    Take 1 tablet (100 mg) by mouth 2 times daily    Essential hypertension with goal blood pressure less than 140/90       ondansetron 4 MG ODT tab    ZOFRAN ODT    20 tablet    Take 1-2 tablets (4-8 mg) by mouth 3 times daily (before meals)    Nausea       oxyCODONE 5 MG IR tablet    ROXICODONE     180 tablet    Take 1-2 tablets (5-10 mg) by mouth 2 times daily Wean off.  1/2 tab and 1 and 1/2 3 days, then 1/2 twice a day for three days, then 1/2 a day for 3 days then every other day for 3 days.    Chronic pain syndrome       rosuvastatin 5 MG tablet    CRESTOR    45 tablet    Take 0.5 tablets (2.5 mg) by mouth daily    Hyperlipidemia LDL goal <160

## 2017-08-09 NOTE — LETTER
"     [facility] MTM     Date: 2017    Idalmis Abdul  [place]   [facility] [address]    Dear Ms. Abdul,    Thank you for talking with me on 2017 about your health and medications. Medicare s MTM (Medication Therapy Management) program helps you understand your medications and use them safely.      This letter includes an action plan (Medication Action Plan) and medication list (Personal Medication List). The action plan has steps you should take to help you get the best results from your medications. The medication list will help you keep track of your medications and how to use them the right way.       Have your action plan and medication list with you when you talk with your doctors, pharmacists, and other healthcare providers in your care team.     Ask your doctors, pharmacists, and other healthcare providers to update the action plan and medication list at every visit.     Take your medication list with you if you go to the hospital or emergency room.     Give a copy of the action plan and medication list to your family or caregivers.     If you want to talk about this letter or any of the papers with it, please call   [phone].   We look forward to working with you, your doctors, and other healthcare providers to help you stay healthy.     Sincerely,    Jessy Osman      Enclosed: Medication Action Plan and Personal Medication List    MEDICATION ACTION PLAN FOR Idalmis Abdul,  1945     This action plan will help you get the best results from your medications if you:   1. Read \"What we talked about.\"   2. Take the steps listed in the \"What I need to do\" boxes.   3. Fill in \"What I did and when I did it.\"   4. Fill in \"My follow-up plan\" and \"Questions I want to ask.\"     Have this action plan with you when you talk with your doctors, pharmacists, and other healthcare providers in your care team. Share this with your family or caregivers " too.  DATE PREPARED: 2017  What we talked about: your back pain and possible alternatives to oxycodone.                                                   What I need to do: start taking gabapentin 100 mg three times daily to see if your pain improves. I sent a prescription to your pharmacy. Start with one capsule at bedtime and gradually increase as tolerated.        What I did and when I did it:                                              What we talked about: you are getting leg cramps - these could possibly be a side effect of the statin (Crestor) you are taking.                                                  What I need to do: Decrease your Crestor dose to 1/2 tablet (2.5 mg) once daily. Monitor your leg cramps.        What I did and when I did it:                                               My follow-up plan:                 Questions I want to ask:              If you have any questions about your action plan, call Jessy Osman, Phone: [phone], Monday-Friday 9-4:30pm.           MEDICATION LIST FOR Idalmis Abdul,  1945     This medication list was made for you after we talked. We also used information from your doctor's chart.      Use blank rows to add new medications. Then fill in the dates you started using them.    Cross out medications when you no longer use them. Then write the date and why you stopped using them.    Ask your doctors, pharmacists, and other healthcare providers to update this list at every visit. Keep this list up-to-date with:       Prescription medications    Over the counter drugs     Herbals    Vitamins    Minerals      If you go to the hospital or emergency room, take this list with you. Share this with your family or caregivers too.     DATE PREPARED: 2017  Allergies or side effects: Atorvastatin     Medication:  AMLODIPINE BESYLATE 5 MG PO TABS      How I use it:  Take 1 tablet (5 mg) by mouth daily      Why I use it: Essential hypertension     Prescriber:  Darlene Sultana MD      Date I started using it:       Date I stopped using it:         Why I stopped using it:            Medication:  ASPIRIN 81 MG PO TBEC      How I use it:  Take 1 tablet by mouth daily.      Why I use it: NSTEMI (non-ST elevated myocardial infarction) (H); ACS (acute coronary syndrome) (H)    Prescriber:  JAIME Perez CNP      Date I started using it:       Date I stopped using it:         Why I stopped using it:            Medication:  GABAPENTIN 100 MG PO CAPS      How I use it:  Take 1 capsule (100 mg) by mouth 3 times daily      Why I use it: Chronic back pain, unspecified back location, unspecified back pain laterality    Prescriber:  Darlene Sultana MD      Date I started using it:       Date I stopped using it:         Why I stopped using it:            Medication:  HYDROCHLOROTHIAZIDE 25 MG PO TABS      How I use it:  Take 1 tablet (25 mg) by mouth daily      Why I use it: Essential hypertension    Prescriber:  Darlene Sultana MD      Date I started using it:       Date I stopped using it:         Why I stopped using it:            Medication:  IMATINIB MESYLATE 400 MG PO TABS      How I use it:  Take 1 tablet (400 mg) by mouth daily Take with a meal and a large glass of water.      Why I use it: Malignant gastrointestinal stromal tumor, unspecified site (H)    Prescriber:  JAIME Lobo CNP      Date I started using it:       Date I stopped using it:         Why I stopped using it:            Medication:  LISINOPRIL 40 MG PO TABS      How I use it:  Take 1 tablet (40 mg) by mouth daily      Why I use it: Essential hypertension with goal blood pressure less than 140/90    Prescriber:  Darlene Sultana MD      Date I started using it:       Date I stopped using it:         Why I stopped using it:            Medication:  LORAZEPAM 1 MG PO TABS      How I use it:  Take 1 tablet (1 mg) by mouth At Bedtime      Why I use it: Malignant  gastrointestinal stromal tumor, unspecified site (H)    Prescriber:  JAIME Lobo CNP      Date I started using it:       Date I stopped using it:         Why I stopped using it:            Medication:  METHIMAZOLE 5 MG PO TABS      How I use it:  Take 1 tablet (5 mg) by mouth daily ALTERNATE 5 MG AND 2.5 MG (1/2 TABLET) EVERY DAY      Why I use it: Hyperthyroidism    Prescriber:  Swetha Antoine MD      Date I started using it:       Date I stopped using it:         Why I stopped using it:            Medication:  METOPROLOL TARTRATE 100 MG PO TABS      How I use it:  Take 1 tablet (100 mg) by mouth 2 times daily      Why I use it: Essential hypertension with goal blood pressure less than 140/90    Prescriber:  Darlene Sulatna MD      Date I started using it:       Date I stopped using it:         Why I stopped using it:            Medication:  ONDANSETRON 4 MG PO TBDP      How I use it:  Take 1-2 tablets (4-8 mg) by mouth 3 times daily (before meals)      Why I use it: Nausea    Prescriber:  Darlene Sultana MD      Date I started using it:       Date I stopped using it:         Why I stopped using it:            Medication:  OXYCODONE HCL 5 MG PO TABS      How I use it:  Take 1-2 tablets (5-10 mg) by mouth 2 times daily Wean off.  1/2 tab and 1 and 1/2 3 days, then 1/2 twice a day for three days, then 1/2 a day for 3 days then every other day for 3 days.      Why I use it: Chronic pain syndrome    Prescriber:  Darlene Sultana MD      Date I started using it:       Date I stopped using it:         Why I stopped using it:            Medication:  ROSUVASTATIN CALCIUM 5 MG PO TABS      How I use it:  Take 1 tablet (5 mg) by mouth daily      Why I use it: Hyperlipidemia LDL goal <160    Prescriber:  Darlene Sultana MD      Date I started using it:       Date I stopped using it:         Why I stopped using it:            Medication:         How I use it:         Why I use it:       Prescriber:         Date I started using it:       Date I stopped using it:         Why I stopped using it:            Medication:         How I use it:         Why I use it:      Prescriber:         Date I started using it:       Date I stopped using it:         Why I stopped using it:            Medication:         How I use it:         Why I use it:      Prescriber:         Date I started using it:       Date I stopped using it:         Why I stopped using it:              Other Information:     If you have any questions about your action plan, call [phone].    According to the Paperwork Reduction Act of 1995, no persons are required to respond to a collection of information unless it displays a valid OMB control number. The valid OMB number for this information collection is 0519-4689. The time required to complete this information collection is estimated to average 40 minutes per response, including the time to review instructions, searching existing data resources, gather the data needed, and complete and review the information collection. If you have any comments concerning the accuracy of the time estimate(s) or suggestions for improving this form, please write to: CMS, Attn: DIANA Reports Clearance Officer, [address] [place] [id number].

## 2017-08-09 NOTE — PROGRESS NOTES
SUBJECTIVE/OBJECTIVE:                Idalmis Abdul is a 71 year old female called for a follow-up visit for Medication Therapy Management.  She was referred to me from [facility]. This is patient's first visit this year.     Chief Complaint: Follow up from our visit on 3/31/15. Pt is wondering if tizanidine would be appropriate for her because her doctor would like her to get off opioid therapy .    Tobacco: History of tobacco dependence - quit 2010  Alcohol: not currently using  Caffeine: 1 cup/day, occasionally extra cup/tea   Activity/Diet: limited due to pain and SOB but does try to do steps and arms exercises. Pt tries to maintain a low fat diet and thinks is eating healthier than before. Also working on portion control such as 4 smaller meals versus 3 large meals. Occasionally eats out (Latvian and Chinese).     Medication Adherence: no issues reported    Back Pain: Currently taking oxycodone 5mg twice daily (since 2012). History of a herniated disc, which has progressively worsened over the years. Difficulty bending over and completeing daily tasks. Pt reports symptom as constant pain that radiates down the leg. Does have some sharp pains at times. Denies having muscle spasm or tension. Pt states PCP would like to taper her off the oxycodone. Family history of degenerative spinal disease (sister has used tizanidine and finds that it helps and recommends pt try this medication). Trial of gabapentin in the past and had stomach upset but does not think she gave it a long enough trial. Not using acetaminophen or NSAIDs.     CAD/HTN: Currently taking HCTZ 25mg every morning, amlodipine 5mg every evening, lisinopril 40mg every morning, metoprolol 100mg twice daily. History of MI and received a bypass in 2012.  Patient does self-monitor BP and states they have been within goal. Pt also on statin therapy, see below. She is also taking aspirin 81 mg daily.     Hyperlipidemia: Current therapy includes  rosuvastatin 5mg once daily.  Pt reports the following side effects: leg cramps that occur almost daily. Pt thought cramps were due to dehydration and was not aware whether or not statin induced symptoms.    Nausea: Currently taking Zofran ODT 4mg twice daily. Pt was using with meals (three times daily) but got too constipated. Unknown cause of symptoms. Current regimen works well.     Hyperthyroidism: Currently taking methimazole 5mg and 2.5mg alternating every other day. Pt states PCP's goal to taper off due to ADRs and may try ablation in the future. Labs wnls. Notes no side effects.      Anxiety: Currently taking Ativan 1mg at bedtime. Started this when  was getting very ill. Therapy works well. Notes no side effects.    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 Notes no side effects.     Current labs include:  BP Readings from Last 3 Encounters:   08/04/17 139/80   08/01/17 115/75   07/10/17 127/82     Today's Vitals: LMP 01/01/1992  Lab Results   Component Value Date    A1C 5.4 06/19/2012   .  Lab Results   Component Value Date    CHOL 107 09/20/2016     Lab Results   Component Value Date    TRIG 79 09/20/2016     Lab Results   Component Value Date    HDL 53 09/20/2016     Lab Results   Component Value Date    LDL 38 09/20/2016       Liver Function Studies -   Recent Labs   Lab Test  07/07/17   0903   PROTTOTAL  6.5*   ALBUMIN  3.6   BILITOTAL  0.3   ALKPHOS  54   AST  30   ALT  27       Lab Results   Component Value Date    UCRR 6 07/08/2012       Last Basic Metabolic Panel:  Lab Results   Component Value Date     07/07/2017      Lab Results   Component Value Date    POTASSIUM 4.2 07/07/2017     Lab Results   Component Value Date    CHLORIDE 100 07/07/2017     Lab Results   Component Value Date    BUN 21 07/07/2017     Lab Results   Component Value Date    CR 1.20 07/07/2017     GFR Estimate   Date Value Ref Range Status   07/07/2017 44 (L) >60 mL/min/1.7m2  Final   07/07/2017 44 (L) >60 mL/min/1.7m2 Final     Comment:     Non  GFR Calc   04/24/2017 53 (L) >60 mL/min/1.7m2 Final     Comment:     Non  GFR Calc     GFR Estimate If Black   Date Value Ref Range Status   07/07/2017 53 (L) >60 mL/min/1.7m2 Final   07/07/2017 53 (L) >60 mL/min/1.7m2 Final     Comment:      GFR Calc   04/24/2017 64 >60 mL/min/1.7m2 Final     Comment:      GFR Calc     TSH   Date Value Ref Range Status   07/07/2017 1.27 0.40 - 4.00 mU/L Final   ]    Most Recent Immunizations   Administered Date(s) Administered     Influenza (High Dose) 3 valent vaccine 08/25/2016     Influenza (IIV3) 12/12/2013     Influenza Vaccine IM 3yrs+ 4 Valent IIV4 10/16/2014     Influenza Vaccine, 3 YRS +, IM (QUADRIVALENT W/PRESERVATIVES) 09/01/2016     Pneumococcal (PCV 13) 08/25/2016     Pneumococcal 23 valent 02/15/2013     TD (ADULT, 7+) 07/29/2004     TDAP Vaccine (Adacel) 09/13/2013       ASSESSMENT:              Current medications were reviewed today as discussed above.  Medicare Part D topics discussed:Medication safety and Medication changes    Medication Adherence: no issues identified    Back Pain: Stable while on opioid therapy. Possible therapeutic options once off opioid include neuropathics. Avoid NSAID due to kidney impairment. Avoid scheduled acetaminophen due to major interaction with Gleevec, increasing acetaminophen levels and risk of liver impairment. A case of acute liver failure seen with acetaminophen 500-1000mg daily and imatinib 400mg daily and potential increase in acetaminophen AUC by 22% (per [employer]). Recommended a trial of gabapentin as there seems to possibly be neuropathic component. Due to past GI intolerance, recommend starting low and titrate slowly. No drug interactions with gabapentin therapy identified. Tizanidine, muscle relaxant may not be effective for current symptoms and has a the possible drug interaction:  Tizanidine and Zofran caution increased risk of Qtc prolongation.    CAD/Hypertension: Stable. Patient is meeting BP goal of < 140/90mmHg.     Hyperlipidemia: Needs Improvement. Pt is on moderate intensity statin which is indicated based on 2013 ACC/AHA guidelines for lipid management. Leg cramps might be a possible side effect, recommend dose reduction and monitor if symptoms improve.    Nausea: Stable.     Hyperthyroidism: Stable.    Anxiety: Stable. Gabapentin may provide additional anxiolytic relief.     GIST: Stable.     PLAN:                  1. Start Gabapentin: Take 100 mg by mouth three times a day. Monitor pain level. I sent a prescription to patient's pharmacy.   2. Reduce Crestor dose to 2.5mg daily. Monitor frequency and severity of leg cramps.     I spent 45 minutes with this patient today (an extra 15 minutes was spent creating the Medication Action Plan). All changes were made via collaborative practice agreement with Darlene Sultana. A copy of the visit note was provided to the patient's primary care provider.     Will follow up in 2 weeks. Assess if cramps have improved and tolerating gabapentin.     The patient was mailed a summary of these recommendations as an after visit summary.    Aminta Guy, PharmD-4  Jessy Osman, PharmD  Medication Therapy Management

## 2017-08-09 NOTE — PATIENT INSTRUCTIONS
Recommendations from today's MTM visit                                                      1. Start Gabapentin: Take 1 capsule (100mg) by mouth three times a day. Take your first dose at bedtime and gradually increase to three times daily as tolerated. Gabapentin helps with nerve pain. Monitor your pain and see if gabapentin helps with your pain. I sent a prescription to your pharmacy.     3. Decrease your dose of rosuvastatin: Take 1/2 tablet (2.5 mg) by mouth once daily. Monitor your leg cramps and see if you have fewer cramps with the lower dose.     Next MTM visit: follow-up by telephone on 8/22/17 at 2:30pm    To schedule another MT appointment, please call the clinic directly or you may call the [facility] scheduling line at [phone] or toll-free at [phone].     My MT Pharmacist's contact information:                                                      It was a pleasure speaking with you today!  Please feel free to contact me with any questions or concerns you have.      Jessy Osman, PharmD  Medication Therapy Management

## 2017-08-16 DIAGNOSIS — I10 ESSENTIAL HYPERTENSION WITH GOAL BLOOD PRESSURE LESS THAN 140/90: ICD-10-CM

## 2017-08-16 NOTE — TELEPHONE ENCOUNTER
Metoprolol      Last Written Prescription Date: 08/25/2016  Last Fill Quantity: 180, # refills: 3    Last Office Visit with G, P or [facility] prescribing provider:  8/04/2017   Future Office Visit:        BP Readings from Last 3 Encounters:   08/04/17 139/80   08/01/17 115/75   07/10/17 127/82

## 2017-08-18 RX ORDER — METOPROLOL TARTRATE 100 MG
TABLET ORAL
Qty: 180 TABLET | Refills: 3 | Status: SHIPPED | OUTPATIENT
Start: 2017-08-18 | End: 2018-08-15

## 2017-08-18 NOTE — TELEPHONE ENCOUNTER
Prescription approved per [facility] Refill Protocol.    Cielo Thomas RN

## 2017-08-22 ENCOUNTER — ALLIED HEALTH/NURSE VISIT (OUTPATIENT)
Dept: PHARMACY | Facility: CLINIC | Age: 72
End: 2017-08-22
Payer: COMMERCIAL

## 2017-08-22 DIAGNOSIS — M54.9 CHRONIC BACK PAIN, UNSPECIFIED BACK LOCATION, UNSPECIFIED BACK PAIN LATERALITY: ICD-10-CM

## 2017-08-22 DIAGNOSIS — E78.5 HYPERLIPIDEMIA LDL GOAL <160: Primary | ICD-10-CM

## 2017-08-22 DIAGNOSIS — G89.29 CHRONIC BACK PAIN, UNSPECIFIED BACK LOCATION, UNSPECIFIED BACK PAIN LATERALITY: ICD-10-CM

## 2017-08-22 PROCEDURE — 99606 MTMS BY PHARM EST 15 MIN: CPT | Mod: U4 | Performed by: PHARMACIST

## 2017-08-22 RX ORDER — GABAPENTIN 100 MG/1
100 CAPSULE ORAL 2 TIMES DAILY
Qty: 180 CAPSULE | Refills: 1
Start: 2017-08-22 | End: 2017-10-12

## 2017-08-22 NOTE — PROGRESS NOTES
SUBJECTIVE/OBJECTIVE:                Idalmis Abdul is a 71 year old female called for a follow-up visit for Medication Therapy Management.  She was referred to me from [facility].      Chief Complaint: Follow up from our visit on 8/9/17.     Tobacco: History of tobacco dependence - quit 2010  Alcohol: not currently using  Caffeine: 1 cup/day, occasionally extra cup/tea   Activity/Diet: limited due to pain and SOB but does try to do steps and arms exercises. Pt tries to maintain a low fat diet and thinks is eating healthier than before. Also working on portion control such as 4 smaller meals versus 3 large meals. Occasionally eats out (Ind[place]ian and Chinese).     Medication Adherence: no issues reported    Back Pain: Currently taking oxycodone 5mg twice daily (since 2012). History of a herniated disc, which has progressively worsened over the years. Difficulty bending over and completeing daily tasks. Pt reports symptom as constant pain that radiates down the leg. Does have some sharp pains at times. Denies having muscle spasm or tension. Pt states PCP would like to taper her off the oxycodone. Family history of degenerative spinal disease. Trial of gabapentin in the past and had stomach upset but does not think she gave it a long enough trial. Not using acetaminophen or NSAIDs. Last visit we discussed starting low dose gabapentin. Patient has not started gabapentin due to concerns with side effects of double vision (she previously had problems with double vision with her Grave's Disease) and recommendation on medication handout to talk with her provider if having decreased renal function.     Hyperlipidemia: Current therapy includes rosuvastatin 5mg once daily, she tried to cut her tablet in 1/2 to decrease her dose to 2.5 mg daily due to having cramping - but she states that her tablets were crushing - so she has not lowered her dose yet.     Current labs include:  BP Readings from Last 3 Encounters:   08/04/17  139/80   08/01/17 115/75   07/10/17 127/82     Today's Vitals: LMP 01/01/1992  Lab Results   Component Value Date    A1C 5.4 06/19/2012   .  Lab Results   Component Value Date    CHOL 107 09/20/2016     Lab Results   Component Value Date    TRIG 79 09/20/2016     Lab Results   Component Value Date    HDL 53 09/20/2016     Lab Results   Component Value Date    LDL 38 09/20/2016       Liver Function Studies -   Recent Labs   Lab Test  07/07/17   0903   PROTTOTAL  6.5*   ALBUMIN  3.6   BILITOTAL  0.3   ALKPHOS  54   AST  30   ALT  27       Lab Results   Component Value Date    UCRR 6 07/08/2012       Last Basic Metabolic Panel:  Lab Results   Component Value Date     07/07/2017      Lab Results   Component Value Date    POTASSIUM 4.2 07/07/2017     Lab Results   Component Value Date    CHLORIDE 100 07/07/2017     Lab Results   Component Value Date    BUN 21 07/07/2017     Lab Results   Component Value Date    CR 1.20 07/07/2017     GFR Estimate   Date Value Ref Range Status   07/07/2017 44 (L) >60 mL/min/1.7m2 Final   07/07/2017 44 (L) >60 mL/min/1.7m2 Final     Comment:     Non  GFR Calc   04/24/2017 53 (L) >60 mL/min/1.7m2 Final     Comment:     Non  GFR Calc     GFR Estimate If Black   Date Value Ref Range Status   07/07/2017 53 (L) >60 mL/min/1.7m2 Final   07/07/2017 53 (L) >60 mL/min/1.7m2 Final     Comment:      GFR Calc   04/24/2017 64 >60 mL/min/1.7m2 Final     Comment:      GFR Calc     TSH   Date Value Ref Range Status   07/07/2017 1.27 0.40 - 4.00 mU/L Final   ]    Most Recent Immunizations   Administered Date(s) Administered     Influenza (High Dose) 3 valent vaccine 08/25/2016     Influenza (IIV3) 12/12/2013     Influenza Vaccine IM 3yrs+ 4 Valent IIV4 10/16/2014     Influenza Vaccine, 3 YRS +, IM (QUADRIVALENT W/PRESERVATIVES) 09/01/2016     Pneumococcal (PCV 13) 08/25/2016     Pneumococcal 23 valent 02/15/2013     TD (ADULT, 7+)  07/29/2004     TDAP Vaccine (Adacel) 09/13/2013       ASSESSMENT:              Current medications were reviewed today as discussed above.        Medication Adherence: no issues identified    Back Pain: stable. Recommend that with Idalmis's renal function CrCl of 39.2 from 7/7/17 labs and CrCl of 47 from 1/13/17 labs that she split her gabapentin dose into twice daily dosing. Reviewed with her that maximum recommended gabapentin dose for CrCl of >30 to 59 is 700 mg twice daily. I recommended if she does start taking gabapentin to start with one capsule at bedtime and gradually increase to one capsule twice daily if tolerated. Reviewed with her common side effects of gabapentin and encouraged patient to discuss with her PCP if questions/concerns. I do not see a connection between Grave's Disease and double vision side effect from gabapentin.     Hyperlipidemia: Needs Improvement. Pt is on moderate intensity statin which is indicated based on 2013 ACC/AHA guidelines for lipid management. Reviewed with patient that she can try taking her rosuvastatin 5 mg every other day to see if her cramping improves.      PLAN:                  1. Reviewed with patient that for her CrCl to only use gabapentin twice daily. Start with 100 mg at bedtime and increase to 100 mg twice daily if tolerated. Encouraged her to talk with her PCP if she has questions/concerns.  2. Okay to take rosuvastatin 5 mg every other day to see if her cramping improves.     I spent 15 minutes with this patient today. All changes were made via collaborative practice agreement with Darlene Sultana. A copy of the visit note was provided to the patient's primary care provider.     Will follow up in 1 month.    The patient declined a summary of these recommendations as an after visit summary.    Jessy Osman, PharmD  Medication Therapy Management

## 2017-08-24 RX ORDER — ROSUVASTATIN CALCIUM 5 MG/1
5 TABLET, COATED ORAL EVERY OTHER DAY
Qty: 45 TABLET | Refills: 3
Start: 2017-08-24 | End: 2018-08-20

## 2017-08-24 NOTE — PATIENT INSTRUCTIONS
1. Reviewed that for your CrCl to only use gabapentin twice daily. Start with 100 mg at bedtime and increase to 100 mg twice daily if tolerated. I encourage that you talk with your doctor if you have any questions/concerns.  2. Okay to take rosuvastatin 5 mg every other day to see if your cramping improves.    I will call you to follow up in 1 month.    Jessy Osman, PharmD  [phone] in clinic on Tuesday and Wednesday

## 2017-08-29 DIAGNOSIS — E05.90 HYPERTHYROIDISM: ICD-10-CM

## 2017-08-29 LAB
T3 SERPL-MCNC: 98 NG/DL (ref 60–181)
T4 FREE SERPL-MCNC: 1.28 NG/DL (ref 0.76–1.46)
TSH SERPL DL<=0.005 MIU/L-ACNC: 1.54 MU/L (ref 0.4–4)

## 2017-08-29 PROCEDURE — 84445 ASSAY OF TSI GLOBULIN: CPT | Mod: 90 | Performed by: INTERNAL MEDICINE

## 2017-08-29 PROCEDURE — 84480 ASSAY TRIIODOTHYRONINE (T3): CPT | Performed by: INTERNAL MEDICINE

## 2017-08-29 PROCEDURE — 84443 ASSAY THYROID STIM HORMONE: CPT | Performed by: INTERNAL MEDICINE

## 2017-08-29 PROCEDURE — 36415 COLL VENOUS BLD VENIPUNCTURE: CPT | Performed by: INTERNAL MEDICINE

## 2017-08-29 PROCEDURE — 99000 SPECIMEN HANDLING OFFICE-LAB: CPT | Performed by: INTERNAL MEDICINE

## 2017-08-29 PROCEDURE — 84439 ASSAY OF FREE THYROXINE: CPT | Performed by: INTERNAL MEDICINE

## 2017-08-30 ENCOUNTER — TELEPHONE (OUTPATIENT)
Dept: ENDOCRINOLOGY | Facility: CLINIC | Age: 72
End: 2017-08-30

## 2017-08-30 NOTE — TELEPHONE ENCOUNTER
Pt wants Dr. Antoine to know she did her thyroid labs 8/29/17 at [facility]. Sent to [employer].

## 2017-08-31 LAB — TSI SER-ACNC: 4.8 TSI INDEX

## 2017-09-03 ENCOUNTER — HOSPITAL ENCOUNTER (EMERGENCY)
Facility: CLINIC | Age: 72
Discharge: HOME | End: 2017-09-03
Attending: EMERGENCY MEDICINE | Admitting: EMERGENCY MEDICINE
Payer: MEDICARE

## 2017-09-03 VITALS
OXYGEN SATURATION: 93 % | RESPIRATION RATE: 18 BRPM | DIASTOLIC BLOOD PRESSURE: 88 MMHG | BODY MASS INDEX: 27.9 KG/M2 | WEIGHT: 155 LBS | SYSTOLIC BLOOD PRESSURE: 151 MMHG | TEMPERATURE: 97.8 F | HEART RATE: 101 BPM

## 2017-09-03 DIAGNOSIS — K59.09 OTHER CONSTIPATION: ICD-10-CM

## 2017-09-03 PROCEDURE — 99283 EMERGENCY DEPT VISIT LOW MDM: CPT

## 2017-09-03 PROCEDURE — 99284 EMERGENCY DEPT VISIT MOD MDM: CPT | Performed by: EMERGENCY MEDICINE

## 2017-09-03 NOTE — ED PROVIDER NOTES
History     Chief Complaint   Patient presents with     Constipation     HPI  Idalmis Abdul is a 71 year old female with a past medical history of Graves' disease, malignant gastrointestinal stromal tumor, NSTEMI, ASCVD, s/p CABG, GERD, hyperlipidemia, hyperthyroidism, Graves' disease who presents to the ED for the evaluation of constipation. Patient states that she has been having a couple bowel movements a week for the past few months and takes stool softeners daily and Miralax PRN. Patient has been unable to pass stool for the past 3-4 days; her last successful bowel movement was with an enema. Patient had another enema this morning, without success. She states she has pressure on her bladder and discomfort when sitting due to constipation; the pressure on her bladder is also causing her difficulty urinating. She reports that she often experiences dehydration, takes oxycodone, and suspects she has a tipped uterus, all of which she suspect may contribute to her symptoms.     Patient Active Problem List   Diagnosis     Hypertension goal BP (blood pressure) < 140/90     Vaginal prolapse     GIST (gastrointestinal stromal tumor), malignant (H)     Eyelid edema     History of lung cancer     Health Care Home     NSTEMI (non-ST elevated myocardial infarction) (H)     ASCVD (arteriosclerotic cardiovascular disease)     Postsurgical aortocoronary bypass status     S/P CABG x 4     Dyspnea     GERD (gastroesophageal reflux disease)     Hyperlipidemia LDL goal <160     Hyperthyroidism     Thyroid eye disease     Eyelid retraction or lag     Thyrotoxic exophthalmos     Graves' disease     History of tobacco abuse     History of non-ST elevation myocardial infarction (NSTEMI)     Chronic back pain     PVD (posterior vitreous detachment)     Age-related osteoporosis without current pathological fracture     Current Outpatient Prescriptions   Medication Sig Dispense Refill     imatinib (GLEEVEC) 400 MG tablet Take 1  tablet (400 mg) by mouth daily Take with a meal and a large glass of water. 30 tablet 2     rosuvastatin (CRESTOR) 5 MG tablet Take 1 tablet (5 mg) by mouth every other day 45 tablet 3     gabapentin (NEURONTIN) 100 MG capsule Take 1 capsule (100 mg) by mouth 2 times daily (Patient not taking: Reported on 8/24/2017) 180 capsule 1     metoprolol (LOPRESSOR) 100 MG tablet TAKE ONE TABLET BY MOUTH TWICE A  tablet 3     oxyCODONE (ROXICODONE) 5 MG IR tablet Take 1-2 tablets (5-10 mg) by mouth 2 times daily Wean off.  1/2 tab and 1 and 1/2 3 days, then 1/2 twice a day for three days, then 1/2 a day for 3 days then every other day for 3 days. 180 tablet 0     ondansetron (ZOFRAN ODT) 4 MG ODT tab Take 1-2 tablets (4-8 mg) by mouth 3 times daily (before meals) 20 tablet 1     methimazole (TAPAZOLE) 5 MG tablet Take 1 tablet (5 mg) by mouth daily ALTERNATE 5 MG AND 2.5 MG (1/2 TABLET) EVERY DAY 90 tablet 3     LORazepam (ATIVAN) 1 MG tablet Take 1 tablet (1 mg) by mouth At Bedtime 30 tablet 5     hydrochlorothiazide (HYDRODIURIL) 25 MG tablet Take 1 tablet (25 mg) by mouth daily 90 tablet 3     Blood Pressure Monitoring (ADULT BLOOD PRESSURE CUFF LG) KIT 1 Device 2 times daily 1 kit 1     amLODIPine (NORVASC) 5 MG tablet Take 1 tablet (5 mg) by mouth daily 30 tablet 11     lisinopril (PRINIVIL,ZESTRIL) 40 MG tablet Take 1 tablet (40 mg) by mouth daily 90 tablet 3     aspirin EC 81 MG EC tablet Take 1 tablet by mouth daily. 30 tablet 2     Allergies   Allergen Reactions     Atorvastatin Cramps       I have reviewed the Medications, Allergies, Past Medical and Surgical History, and Social History in the Epic system.    Review of Systems  All other systems are reviewed and are negative.    Physical Exam   BP: 151/88  Pulse: 101  Temp: 97.8  F (36.6  C)  Resp: 18  Weight: 70.3 kg (155 lb)  SpO2: 93 %  Physical Exam  Nontoxic appearing no respiratory distress alert and oriented ×3  Head atraumatic normocephalic  Lungs  clear to auscultation  Heart regular no murmur  Abdomen soft mild left lower quadrant tenderness without guarding or rebound bowel sounds positive no masses or HSM  Strength and sensation grossly intact throughout the extremities, gait and station normal  Speech is fluent, good eye contact, thought processes are rational    ED Course     ED Course     Procedures             Critical Care time:  none   Medications - No data to display  Large results with TWE.  Pain resolved and feeling ready to go home.            Labs Ordered and Resulted from Time of ED Arrival Up to the Time of Departure from the ED - No data to display  No results found for this or any previous visit (from the past 24 hour(s)).    Medications - No data to display    10:07 AM Patient assessed.    Assessments & Plan (with Medical Decision Making)     I have reviewed the nursing notes.    I have reviewed the findings, diagnosis, plan and need for follow up with the patient.       Discharge Medication List as of 9/3/2017 12:13 PM          Final diagnoses:   Other constipation     This document serves as a record of the services and decisions personally performed and made by Rk Abdul MD. It was created on HIS/HER behalf by   Augustina Ramirez, a trained medical scribe. The creation of this document is based the provider's statements to the medical scribe.  Augustina Ramirez 10:07 AM 9/3/2017    Provider:   The information in this document, created by the medical scribe for me, accurately reflects the services I personally performed and the decisions made by me. I have reviewed and approved this document for accuracy prior to leaving the patient care area.  Rk Abdul MD 10:07 AM 9/3/2017    9/3/2017   [facility] EMERGENCY DEPARTMENT     Rk Abdul MD  09/07/17 2092

## 2017-09-03 NOTE — ED AVS SNAPSHOT
[facility] Emergency Department    [address] [facility] [address]    Phone:  [phone]    Fax:  [phone]                                       Idalmis Abdul   MRN: [id number]    Department:  [facility] Emergency Department   Date of Visit:  9/3/2017           Patient Information     Date Of Birth          1945        Your diagnoses for this visit were:     Other constipation        You were seen by Rk Abdul MD.      Follow-up Information     Follow up with Darlene Sultana MD.    Specialty:  Family Practice    Contact information:    [address] [facility] [address]  [phone]          Discharge Instructions         Constipation (Adult)  Constipation means that you have bowel movements that are less frequent than usual. Stools often become very hard and difficult to pass.  Constipation is very common. At some point in life it affects almost everyone. Since everyone's bowel habits are different, what is constipation to one person may not be to another. Your healthcare provider may do tests to diagnose constipation. It depends on what he or she finds when evaluating you.    Symptoms of constipation include:    Abdominal pain    Bloating    Vomiting    Painful bowel movements    Itching, swelling, bleeding, or pain around the anus  Causes  Constipation can have many causes. These include:    Diet low in fiber    Too much dairy    Not drinking enough liquids    Lack of exercise or physical activity. This is especially true for older adults.    Changes in lifestyle or daily routine, including pregnancy, aging, work, and travel    Frequent use or misuse of laxatives    Ignoring the urge to have a bowel movement or delaying it until later    Medicines, such as certain prescription pain medicines, iron supplements, antacids, certain antidepressants, and calcium supplements    Diseases like irritable bowel syndrome, bowel obstructions, stroke, diabetes, thyroid  disease, Parkinson disease, hemorrhoids, and colon cancer  Complications  Potential complications of constipation can include:    Hemorrhoids    Rectal bleeding from hemorrhoids or anal fissures (skin tears)    Hernias    Dependency on laxatives    Chronic constipation    Fecal impaction    Bowel obstruction or perforation  Home care  All treatment should be done after talking with your healthcare provider. This is especially true if you have another medical problems, are taking prescription medicines, or are an older adult. Treatment most often involves lifestyle changes. You may also need medicines. Your healthcare provider will tell you which will work best for you. Follow the advice below to help avoid this problem in the future.  Lifestyle changes  These lifestyle changes can help prevent constipation:    Diet. Eat a high-fiber diet, with fresh fruit and vegetables, and reduce dairy intake, meats, and processed foods    Fluids. It's important to get enough fluids each day. Drink plenty of water when you eat more fiber. If you are on diet that limits the amount of fluid you can have, talk about this with your healthcare provider.    Regular exercise. Check with your healthcare provider first.  Medications  Take any medicines as directed. Some laxatives are safe to use only every now and then. Others can be taken on a regular basis. Talk with your doctor or pharmacist if you have questions.  Prescription pain medicines can cause constipation. If you are taking this kind of medicine, ask your healthcare provider if you should also take a stool softener.  Medicines you may take to treat constipation include:    Fiber supplements    Stool softeners    Laxatives    Enemas    Rectal suppositories  Follow-up care  Follow up with your healthcare provider if symptoms don't get better in the next few days. You may need to have more tests or see a specialist.  Call 911  Call 911 if any of these occur:    Trouble  breathing    Stiff, rigid abdomen that is severely painful to touch    Confusion    Fainting or loss of consciousness    Rapid heart rate    Chest pain  When to seek medical advice  Call your healthcare provider right away if any of these occur:    Fever over 100.4 F (38 C)    Failure to resume normal bowel movements    Pain in your abdomen or back gets worse    Nausea or vomiting    Swelling in your abdomen    Blood in the stool    Black, tarry stool    Involuntary weight loss    Weakness  Date Last Reviewed: 12/30/2015 2000-2017 [employer]. [address] [place] [address]. All rights reserved. This information is not intended as a substitute for professional medical care. Always follow your healthcare professional's instructions.          Future Appointments        Provider Department Dept Phone Center    9/19/2017 2:30 PM Jessy Osman [facility] [phone] [place]    1/9/2018 9:00 AM [facility] CENTER ULTRASOUND ROOM 2 [facility] -[phone] [facility]    1/9/2018 11:00 AM Swetha Antoine MD [facility] Endocrinology [phone] [facility]    1/12/2018 9:30 AM [facility] [place] [facility] ROOM 1 [facility] -[phone] [place] [facility]    1/15/2018 8:45 AM Blayne Schmitz MD [facility] [phone] [facility]      24 Hour Appointment Hotline       To make an appointment at any [facility], call [phone] ([phone]). If you don't have a family doctor or clinic, we will help you find one. [facility] are conveniently located to serve the needs of you and your family.             Review of your medicines      Our records show that you are taking the medicines listed below. If these are incorrect, please call your family doctor or clinic.        Dose / Directions Last dose taken    Adult Blood Pressure Cuff Lg Kit   Dose:  1 Device   Quantity:  1 kit        1 Device 2 times daily    Refills:  1        amLODIPine 5 MG tablet   Commonly known as:  NORVASC   Dose:  5 mg   Quantity:  30 tablet        Take 1 tablet (5 mg) by mouth daily   Refills:  11        aspirin 81 MG EC tablet   Dose:  81 mg   Quantity:  30 tablet        Take 1 tablet by mouth daily.   Refills:  2        gabapentin 100 MG capsule   Commonly known as:  NEURONTIN   Dose:  100 mg   Quantity:  180 capsule        Take 1 capsule (100 mg) by mouth 2 times daily   Refills:  1        hydrochlorothiazide 25 MG tablet   Commonly known as:  HYDRODIURIL   Dose:  25 mg   Quantity:  90 tablet        Take 1 tablet (25 mg) by mouth daily   Refills:  3        imatinib 400 MG tablet   Commonly known as:  GLEEVEC   Dose:  400 mg   Quantity:  30 tablet        Take 1 tablet (400 mg) by mouth daily Take with a meal and a large glass of water.   Refills:  2        lisinopril 40 MG tablet   Commonly known as:  PRINIVIL/ZESTRIL   Dose:  40 mg   Quantity:  90 tablet        Take 1 tablet (40 mg) by mouth daily   Refills:  3        LORazepam 1 MG tablet   Commonly known as:  ATIVAN   Dose:  1 mg   Quantity:  30 tablet        Take 1 tablet (1 mg) by mouth At Bedtime   Refills:  5        methimazole 5 MG tablet   Commonly known as:  TAPAZOLE   Dose:  5 mg   Quantity:  90 tablet        Take 1 tablet (5 mg) by mouth daily ALTERNATE 5 MG AND 2.5 MG (1/2 TABLET) EVERY DAY   Refills:  3        metoprolol 100 MG tablet   Commonly known as:  LOPRESSOR   Quantity:  180 tablet        TAKE ONE TABLET BY MOUTH TWICE A DAY   Refills:  3        ondansetron 4 MG ODT tab   Commonly known as:  ZOFRAN ODT   Dose:  4-8 mg   Quantity:  20 tablet        Take 1-2 tablets (4-8 mg) by mouth 3 times daily (before meals)   Refills:  1        oxyCODONE 5 MG IR tablet   Commonly known as:  ROXICODONE   Dose:  5-10 mg   Quantity:  180 tablet        Take 1-2 tablets (5-10 mg) by mouth 2 times daily Wean off.  1/2 tab and 1 and 1/2 3 days, then 1/2 twice a day for three days, then 1/2  "a day for 3 days then every other day for 3 days.   Refills:  0        rosuvastatin 5 MG tablet   Commonly known as:  CRESTOR   Dose:  5 mg   Quantity:  45 tablet        Take 1 tablet (5 mg) by mouth every other day   Refills:  3                Orders Needing Specimen Collection     None      Pending Results     No orders found from 2017 to 2017.            Pending Culture Results     No orders found from 2017 to 2017.            Pending Results Instructions     If you had any lab results that were not finalized at the time of your Discharge, you can call the ED Lab Result RN at [phone]. You will be contacted by this team for any positive Lab results or changes in treatment. The nurses are available 7 days a week from 10A to 6:30P.  You can leave a message 24 hours per day and they will return your call.        Test Results From Your Hospital Stay               Thank you for choosing [facility]       Thank you for choosing [facility] for your care. Our goal is always to provide you with excellent care. Hearing back from our patients is one way we can continue to improve our services. Please take a few minutes to complete the written survey that you may receive in the mail after you visit with us. Thank you!        [employer] Information     [employer] lets you send messages to your doctor, view your test results, renew your prescriptions, schedule appointments and more. To sign up, go to [email or web address] . Click on \"Log in\" on the left side of the screen, which will take you to the Welcome page. Then click on \"Sign up Now\" on the right side of the page.     You will be asked to enter the access code listed below, as well as some personal information. Please follow the directions to create your username and password.     Your access code is: [email or web address]  Expires: 2017  5:20 PM     Your access code will  in 90 days. If you need help or a new code, please call your [facility] clinic or " [phone].        Care EveryWhere ID     This is your Care EveryWhere ID. This could be used by other organizations to access your [facility] medical records  [id number]        Equal Access to Services     KIKE EWING : Teddy Maloney, haritha vuong, jocelynsupa kajeffyesuebio starr, patricio muse. So [facility] [phone].    ATENCIÓN: Si habla español, tiene a cassidy disposición servicios gratuitos de asistencia lingüística. Llame al [phone].    We comply with applicable federal civil rights laws and [place] laws. We do not discriminate on the basis of race, color, national origin, age, disability sex, sexual orientation or gender identity.            After Visit Summary       This is your record. Keep this with you and show to your community pharmacist(s) and doctor(s) at your next visit.

## 2017-09-03 NOTE — DISCHARGE INSTRUCTIONS

## 2017-09-03 NOTE — ED AVS SNAPSHOT
[facility] Emergency Department    [address] [facility] [address]    Phone:  [phone]    Fax:  [phone]                                       Idalmis Abdul   MRN: [id number]    Department:  [facility] Emergency Department   Date of Visit:  9/3/2017           After Visit Summary Signature Page     I have received my discharge instructions, and my questions have been answered. I have discussed any challenges I see with this plan with the nurse or doctor.    ..........................................................................................................................................  Patient/Patient Representative Signature      ..........................................................................................................................................  Patient Representative Print Name and Relationship to Patient    ..................................................               ................................................  Date                                            Time    ..........................................................................................................................................  Reviewed by Signature/Title    ...................................................              ..............................................  Date                                                            Time

## 2017-09-05 DIAGNOSIS — C49.A0 MALIGNANT GASTROINTESTINAL STROMAL TUMOR, UNSPECIFIED SITE (H): Primary | ICD-10-CM

## 2017-09-05 RX ORDER — IMATINIB MESYLATE 400 MG/1
400 TABLET, FILM COATED ORAL DAILY
Qty: 30 TABLET | Refills: 2 | Status: SHIPPED | OUTPATIENT
Start: 2017-09-05 | End: 2017-10-12

## 2017-09-07 DIAGNOSIS — C49.A0 MALIGNANT GASTROINTESTINAL STROMAL TUMOR, UNSPECIFIED SITE (H): ICD-10-CM

## 2017-09-08 RX ORDER — IMATINIB MESYLATE 400 MG/1
TABLET ORAL
Qty: 30 TABLET | Refills: 2 | OUTPATIENT
Start: 2017-09-08

## 2017-09-19 ENCOUNTER — TELEPHONE (OUTPATIENT)
Dept: PHARMACY | Facility: CLINIC | Age: 72
End: 2017-09-19

## 2017-09-19 NOTE — TELEPHONE ENCOUNTER
Called patient to follow up. Left a message asking her to return my call.    Jessy Osman, PharmD  Medication Therapy Management

## 2017-09-25 ENCOUNTER — OFFICE VISIT (OUTPATIENT)
Dept: OBGYN | Facility: CLINIC | Age: 72
End: 2017-09-25
Payer: COMMERCIAL

## 2017-09-25 VITALS
HEIGHT: 63 IN | WEIGHT: 158 LBS | DIASTOLIC BLOOD PRESSURE: 69 MMHG | BODY MASS INDEX: 28 KG/M2 | HEART RATE: 76 BPM | SYSTOLIC BLOOD PRESSURE: 132 MMHG

## 2017-09-25 DIAGNOSIS — N81.4 UTEROVAGINAL PROLAPSE: Primary | ICD-10-CM

## 2017-09-25 LAB
ALBUMIN UR-MCNC: NEGATIVE MG/DL
APPEARANCE UR: CLEAR
BACTERIA #/AREA URNS HPF: ABNORMAL /HPF
BILIRUB UR QL STRIP: NEGATIVE
COLOR UR AUTO: YELLOW
GLUCOSE UR STRIP-MCNC: NEGATIVE MG/DL
HGB UR QL STRIP: NEGATIVE
KETONES UR STRIP-MCNC: NEGATIVE MG/DL
LEUKOCYTE ESTERASE UR QL STRIP: NEGATIVE
NITRATE UR QL: POSITIVE
NON-SQ EPI CELLS #/AREA URNS LPF: ABNORMAL /LPF
PH UR STRIP: 6 PH (ref 5–7)
RBC #/AREA URNS AUTO: ABNORMAL /HPF
SOURCE: ABNORMAL
SP GR UR STRIP: 1.01 (ref 1–1.03)
UROBILINOGEN UR STRIP-ACNC: 1 EU/DL (ref 0.2–1)
WBC #/AREA URNS AUTO: ABNORMAL /HPF

## 2017-09-25 PROCEDURE — 51798 US URINE CAPACITY MEASURE: CPT | Performed by: OBSTETRICS & GYNECOLOGY

## 2017-09-25 PROCEDURE — 99203 OFFICE O/P NEW LOW 30 MIN: CPT | Mod: 25 | Performed by: OBSTETRICS & GYNECOLOGY

## 2017-09-25 PROCEDURE — 81001 URINALYSIS AUTO W/SCOPE: CPT | Performed by: OBSTETRICS & GYNECOLOGY

## 2017-09-25 NOTE — MR AVS SNAPSHOT
After Visit Summary   9/25/2017    Idalmis Abdul    MRN: [id number]           Patient Information     Date Of Birth          1945        Visit Information        Provider Department      9/25/2017 9:30 AM Ruth Farooq MD [facility]        Today's Diagnoses     Uterovaginal prolapse    -  1       Follow-ups after your visit        Your next 10 appointments already scheduled     Jan 09, 2018  9:00 AM CST   US THYROID with UCUS2   [facility] US ([facility])    [address] [place] [address] [phone]           Please bring a list of your medicines (including vitamins, minerals and over-the-counter drugs). Also, tell your doctor about any allergies you may have. Wear comfortable clothes and leave your valuables at home.  You do not need to do anything special to prepare for your exam.  Please call the Imaging Department at your exam site with any questions.            Jan 09, 2018 11:00 AM CST   (Arrive by 10:45 AM)   RETURN ENDOCRINE with Swetha Antoine MD   [facility] Endocrinology [facility])    [address] [place] [address] [phone]            Jan 12, 2018  9:30 AM CST   CT CHEST ABDOMEN PELVIS W/O & W CONTRAST with [id number]   [facility] CT ([facility])    [address] [facility] [address] [phone]           Please bring any scans or X-rays taken at other hospitals, if similar tests were done. Also bring a list of your medicines, including vitamins, minerals and over-the-counter drugs. It is safest to leave personal items at home.  Be sure to tell your doctor:   If you have any allergies.   If there s any chance you are pregnant.   If you are breastfeeding.   If you have any special needs.  You may have contrast for this exam. To prepare:   Do not eat or drink for 2 hours before your  exam. If you need to take medicine, you may take it with small sips of water. (We may ask you to take liquid medicine as well.)   The day before your exam, drink extra fluids at least six 8-ounce glasses (unless your doctor tells you to restrict your fluids).  Patients over 70 or patients with diabetes or kidney problems:   If you haven t had a blood test (creatinine test) within the last 30 days, go to your clinic or Diagnostic Imaging Department for this test.  If you have diabetes:   If your kidney function is normal, continue taking your metformin (Avandamet, Glucophage, Glucovance, Metaglip) on the day of your exam.   If your kidney function is abnormal, wait 48 hours before restarting this medicine.  You will have oral contrast for this exam:   You will drink the contrast at home. Get this from your clinic or Diagnostic Imaging Department. Please follow the directions given.  Please wear loose clothing, such as a sweat suit or jogging clothes. Avoid snaps, zippers and other metal. We may ask you to undress and put on a hospital gown.  If you have any questions, please call the Imaging Department where you will have your exam.            Marcos 15, 2018  8:45 AM CST   (Arrive by 8:30 AM)   Return Visit with Blayne Schmitz MD   [facility] ([facility])    [address] [place] [address] [phone]              Who to contact     If you have questions or need follow up information about today's clinic visit or your schedule please contact [facility] directly at [phone].  Normal or non-critical lab and imaging results will be communicated to you by MyChart, letter or phone within 4 business days after the clinic has received the results. If you do not hear from us within 7 days, please contact the clinic through MyChart or phone. If you have a critical or abnormal lab result, we will notify you by phone as soon as  "possible.  Submit refill requests through [employer] or call your pharmacy and they will forward the refill request to us. Please allow 3 business days for your refill to be completed.          Additional Information About Your Visit        [employer] Information     [employer] lets you send messages to your doctor, view your test results, renew your prescriptions, schedule appointments and more. To sign up, go to [email or web address] . Click on \"Log in\" on the left side of the screen, which will take you to the Welcome page. Then click on \"Sign up Now\" on the right side of the page.     You will be asked to enter the access code listed below, as well as some personal information. Please follow the directions to create your username and password.     Your access code is: [email or web address]  Expires: 2017  5:20 PM     Your access code will  in 90 days. If you need help or a new code, please call your [place] clinic or [phone].        Care EveryWhere ID     This is your Care EveryWhere ID. This could be used by other organizations to access your [place] medical records  [id number]        Your Vitals Were     Pulse Height Last Period BMI (Body Mass Index)          76 5' 2.5\" (1.588 m) 1992 28.44 kg/m2         Blood Pressure from Last 3 Encounters:   17 132/69   17 151/88   17 139/80    Weight from Last 3 Encounters:   17 158 lb (71.7 kg)   17 155 lb (70.3 kg)   17 157 lb (71.2 kg)              We Performed the Following     *UA reflex to Microscopic     MEASURE POST-VOID RESIDUAL URINE/BLADDER CAPACITY, US NON-IMAGING     Urine Microscopic        Primary Care Provider Office Phone # Fax #    Darlene Sultana [phone] [address] [facility] [address]        Equal Access to Services     KIKE EWING : Teddy Maloney, waaxda luqadaha, qaybta kaalmaeusebio starr, patricio gomez . So [facility] " [phone].    ATENCIÓN: Si holly cevallos, tiene a cassidy disposición servicios gratuitos de asistencia lingüística. Cherry flores [phone].    We comply with applicable federal civil rights laws and [place] laws. We do not discriminate on the basis of race, color, national origin, age, disability sex, sexual orientation or gender identity.            Thank you!     Thank you for choosing [facility]  for your care. Our goal is always to provide you with excellent care. Hearing back from our patients is one way we can continue to improve our services. Please take a few minutes to complete the written survey that you may receive in the mail after your visit with us. Thank you!             Your Updated Medication List - Protect others around you: Learn how to safely use, store and throw away your medicines at www.disposemymeds.org.          This list is accurate as of: 9/25/17 10:34 AM.  Always use your most recent med list.                   Brand Name Dispense Instructions for use Diagnosis    Adult Blood Pressure Cuff Lg Kit     1 kit    1 Device 2 times daily    HTN, goal below 140/90       amLODIPine 5 MG tablet    NORVASC    30 tablet    Take 1 tablet (5 mg) by mouth daily    Essential hypertension       aspirin 81 MG EC tablet     30 tablet    Take 1 tablet by mouth daily.    NSTEMI (non-ST elevated myocardial infarction) (H), ACS (acute coronary syndrome) (H)       gabapentin 100 MG capsule    NEURONTIN    180 capsule    Take 1 capsule (100 mg) by mouth 2 times daily    Chronic back pain, unspecified back location, unspecified back pain laterality       hydrochlorothiazide 25 MG tablet    HYDRODIURIL    90 tablet    Take 1 tablet (25 mg) by mouth daily    Essential hypertension       imatinib 400 MG tablet    GLEEVEC    30 tablet    Take 1 tablet (400 mg) by mouth daily Take with a meal and a large glass of water.    Malignant gastrointestinal stromal tumor, unspecified site (H)       lisinopril 40  MG tablet    PRINIVIL/ZESTRIL    90 tablet    Take 1 tablet (40 mg) by mouth daily    Essential hypertension with goal blood pressure less than 140/90       LORazepam 1 MG tablet    ATIVAN    30 tablet    Take 1 tablet (1 mg) by mouth At Bedtime    Malignant gastrointestinal stromal tumor, unspecified site (H)       methimazole 5 MG tablet    TAPAZOLE    90 tablet    Take 1 tablet (5 mg) by mouth daily ALTERNATE 5 MG AND 2.5 MG (1/2 TABLET) EVERY DAY    Hyperthyroidism       metoprolol 100 MG tablet    LOPRESSOR    180 tablet    TAKE ONE TABLET BY MOUTH TWICE A DAY    Essential hypertension with goal blood pressure less than 140/90       ondansetron 4 MG ODT tab    ZOFRAN ODT    20 tablet    Take 1-2 tablets (4-8 mg) by mouth 3 times daily (before meals)    Nausea       oxyCODONE 5 MG IR tablet    ROXICODONE    180 tablet    Take 1-2 tablets (5-10 mg) by mouth 2 times daily Wean off.  1/2 tab and 1 and 1/2 3 days, then 1/2 twice a day for three days, then 1/2 a day for 3 days then every other day for 3 days.    Chronic pain syndrome       rosuvastatin 5 MG tablet    CRESTOR    45 tablet    Take 1 tablet (5 mg) by mouth every other day    Hyperlipidemia LDL goal <160

## 2017-09-25 NOTE — NURSING NOTE
"Initial /69 (BP Location: Left arm, Patient Position: Chair, Cuff Size: Adult Small)  Pulse 76  Ht 5' 2.5\" (1.588 m)  Wt 158 lb (71.7 kg)  LMP 01/01/1992  BMI 28.44 kg/m2 Estimated body mass index is 28.44 kg/(m^2) as calculated from the following:    Height as of this encounter: 5' 2.5\" (1.588 m).    Weight as of this encounter: 158 lb (71.7 kg). .      "

## 2017-09-25 NOTE — PROGRESS NOTES
Leesa Raygoza is a 71 year old   female who presents for 8 months of symptomatic pelvic prolapse; feels pressure sensation around the rectum, even after a BM; feels bulge at opening when she wipes after urination; bulge will slide up then recur; has to double void to empty but no incontinence and no hesitancy; has back problems, on chronic narcs with resultant constipation (takes Miralax QOD)  Denies vaginal bleeding or discharge.  She has h/o  x 2; no history of hysterectomy.    Patient Active Problem List    Diagnosis Date Noted     Age-related osteoporosis without current pathological fracture 2014     Priority: Medium     PVD (posterior vitreous detachment) 2014     Priority: Medium     History of tobacco abuse 2014     Priority: Medium     QUIT in        History of non-ST elevation myocardial infarction (NSTEMI) 2014     Priority: Medium     Chronic back pain 2014     Priority: Medium     Patient is followed by KANWAL Patel and [facility]   Med: 5mg oxycodone, 1-2 tabs q8 hours prn  Pain diagnosis: chronic back pain  Maximum use per month: #180 (but generally lasts about 90 days)  Expected duration: lifetime  Narcotic agreement on file:  YES - contract signed   Clinic visit recommended: Q 3 month         Graves' disease 2014     Priority: Medium     Thyroid eye disease 2014     Priority: Medium     Eyelid retraction or lag 2014     Priority: Medium     Thyrotoxic exophthalmos 2014     Priority: Medium     Problem list name updated by automated process. Provider to review       Hyperthyroidism 2014     Priority: Medium     Hyperlipidemia LDL goal <160 2013     Priority: Medium     GERD (gastroesophageal reflux disease) 10/08/2013     Priority: Medium     Dyspnea 2013     Priority: Medium     S/P CABG x 4 2012     Priority: Medium     Postsurgical aortocoronary bypass status 2012     Priority: Medium      ASCVD (arteriosclerotic cardiovascular disease) 06/14/2012     Priority: Medium     NSTEMI (non-ST elevated myocardial infarction) (H) 06/12/2012     Priority: Medium     Eyelid edema 12/15/2011     Priority: Medium     side effect of gastric cancer medication       History of lung cancer 12/15/2011     Priority: Medium     S/p resection of right lung.  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    Past Medical History:   Diagnosis Date     ASCVD (arteriosclerotic cardiovascular disease) 6/14/2012     Benign neoplasm of duodenum, jejunum, and ileum 2003, 2007    Gastrointestinal Stromal Tumor, seen at [facility], Dr. Schmitz, GI oncology-Gleevec treatment.  Surgical removal in fall 2004-no recurrence as of 3/05.     CA - lung cancer 4/2010    [facility] [place], treated surgically     choledochal cyst 1963    CHOLEDOCHAL CYST, mild dilatation of biliary system     Coronary artery disease      Dislocated finger, left middle PIP 7/26/2013     Dyspnea 8/27/2013     Eyelid edema 12/15/2011    side effect of gastric cancer medication      GERD (gastroesophageal reflux disease) 10/8/2013     GIST (gastrointestinal stromal tumor), malignant (H) 5/10/2011     Graves disease      Grief reaction 5/11/2009     History of lung cancer 12/15/2011    S/p resection of right lung.  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 2004     Other chronic pain     back     Postsurgical aortocoronary bypass status 7/4/2012     S/P CABG x 4 8/5/2012     Strabismus      Tobacco use disorder     Quit     Unspecified essential hypertension        Past Surgical History:   Procedure Laterality Date     BYPASS GRAFT ARTERY CORONARY  6/14/2012    Procedure: BYPASS GRAFT ARTERY CORONARY;  Median Sternotomy Coronary Artery Bypass Graft  x 3        C THORACOSCOPY,DX W BX  fall 2003    benign tissue     CATARACT IOL, RT/LT      LE     CHOLECYSTECTOMY  1963     COLONOSCOPY  4-12-05     CORONARY ARTERY BYPASS      X4     CREATION PERICARDIAL WINDOW  6/15/2012    Procedure: CREATION PERICARDIAL WINDOW;  Mediastinal Exploration, Control of Bleeding;  Surgeon: Dwaine Griffin MD;  [facility]     EYE SURGERY  2014    left cataract removal     GI SURGERY  2003 and 2007    GIST tumor removal     GYN SURGERY      tubal ligation     PHACOEMULSIFICATION WITH STANDARD INTRAOCULAR LENS IMPLANT  3/24/2014    Procedure: PHACOEMULSIFICATION WITH STANDARD INTRAOCULAR LENS IMPLANT;  Left Kelman Phacoemulsification with Intraocular Lens Implant;  Surgeon: Magnus Mckeon MD;  Location: [facility]     RECESSION RESECTION WITH ADJUSTABLE SUTURE BILATERAL Bilateral 7/14/2016    Procedure: RECESSION RESECTION WITH ADJUSTABLE SUTURE BILATERAL;  Surgeon: Erma Ordaz MD;  [facility]     SURGICAL HISTORY OF -   1963    cholecystectomy     SURGICAL HISTORY OF -   1970's    Tubal Ligation      SURGICAL HISTORY OF -   08/03    Explor. Lap, Excision of Small Bowel Tumor      SURGICAL HISTORY OF -   4/2010    lung cancer removed right lung         Current Outpatient Prescriptions:      imatinib (GLEEVEC) 400 MG tablet, Take 1 tablet (400 mg) by mouth daily Take with a meal and a large glass  of water., Disp: 30 tablet, Rfl: 2     rosuvastatin (CRESTOR) 5 MG tablet, Take 1 tablet (5 mg) by mouth every other day, Disp: 45 tablet, Rfl: 3     metoprolol (LOPRESSOR) 100 MG tablet, TAKE ONE TABLET BY MOUTH TWICE A DAY, Disp: 180 tablet, Rfl: 3     oxyCODONE (ROXICODONE) 5 MG IR tablet, Take 1-2 tablets (5-10 mg) by mouth 2 times daily Wean off.  1/2 tab and 1 and 1/2 3 days, then 1/2 twice a day for three days, then 1/2 a day for 3 days then every other day for 3 days., Disp: 180 tablet, Rfl: 0     ondansetron (ZOFRAN ODT) 4 MG ODT tab, Take 1-2 tablets (4-8 mg) by mouth 3 times daily (before meals), Disp: 20 tablet, Rfl: 1     methimazole (TAPAZOLE) 5 MG tablet, Take 1 tablet (5 mg) by mouth daily ALTERNATE 5 MG AND 2.5 MG (1/2 TABLET) EVERY DAY, Disp: 90 tablet, Rfl: 3     LORazepam (ATIVAN) 1 MG tablet, Take 1 tablet (1 mg) by mouth At Bedtime, Disp: 30 tablet, Rfl: 5     hydrochlorothiazide (HYDRODIURIL) 25 MG tablet, Take 1 tablet (25 mg) by mouth daily, Disp: 90 tablet, Rfl: 3     Blood Pressure Monitoring (ADULT BLOOD PRESSURE CUFF LG) KIT, 1 Device 2 times daily, Disp: 1 kit, Rfl: 1     amLODIPine (NORVASC) 5 MG tablet, Take 1 tablet (5 mg) by mouth daily, Disp: 30 tablet, Rfl: 11     lisinopril (PRINIVIL,ZESTRIL) 40 MG tablet, Take 1 tablet (40 mg) by mouth daily, Disp: 90 tablet, Rfl: 3     aspirin EC 81 MG EC tablet, Take 1 tablet by mouth daily., Disp: 30 tablet, Rfl: 2     gabapentin (NEURONTIN) 100 MG capsule, Take 1 capsule (100 mg) by mouth 2 times daily (Patient not taking: Reported on 8/24/2017), Disp: 180 capsule, Rfl: 1    ALLERGIES:  Atorvastatin    Social History     Social History     Marital status:      Spouse name: N/A     Number of children: N/A     Years of education: N/A     Social History Main Topics     Smoking status: Former Smoker     Packs/day: 0.50     Years: 49.00     Types: Cigarettes     Quit date: 4/19/2010     Smokeless tobacco: Never Used     Alcohol use No      "Drug use: No     Sexual activity: Not Currently     Partners: Male      Comment:      Other Topics Concern      Service No     Blood Transfusions No     Caffeine Concern Yes     3 cups     Occupational Exposure No     Hobby Hazards No     Sleep Concern No     Stress Concern No     son  and much happiness in her life, big burden lifted,      Weight Concern No     Special Diet No     Back Care Yes     lower back herniated disc 1970's      Exercise No     Bike Helmet No     Seat Belt Yes     Self-Exams Yes     Parent/Sibling W/ Cabg, Mi Or Angioplasty Before 65f 55m? 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  Son (Rk, with wife Марина and grandson) lives next door -- quadriplegic and high functioning, cannot provide physical assistance/support.       Family History   Problem Relation Age of Onset     HEART DISEASE Mother      OSTEOPOROSIS Mother      Respiratory Mother      Emphezyma     Hypertension Mother      HEART DISEASE Father      Alcohol/Drug Father      Hypertension Father      Hypertension Maternal Grandmother      Hypertension Brother      Hypertension Sister      Arthritis Sister      Hypertension Son      CANCER Son      rectal       OBJECTIVE:  Vitals: /69 (BP Location: Left arm, Patient Position: Chair, Cuff Size: Adult Small)  Pulse 76  Ht 5' 2.5\" (1.588 m)  Wt 158 lb (71.7 kg)  LMP 01/01/1992  BMI 28.44 kg/m2 BMI= Body mass index is 28.44 kg/(m^2).   Patient's last menstrual period was 01/01/1992.     GENERAL APPEARANCE: healthy, alert and no distress  PELVIC:  POSTVOID RESIDUAL= 0cc  With Strain, cervix visible to introitus  No leak with cough or strain visible    POP-Q staging:  Aa : -2        Ba : -4      C : 0        D : 0    Bp : -4        Ap : -2       TVL : 8cm    Paravaginal Defects :   Right no          Left no    Ordinal stage: (0=no prolapse; I=>1cm above hymen; " 2=within 1cm of hymen;                                    3=>1cm below hymen      4=complete eversion)              Anterior compartment= 1+            Apical compartment= 3+            Posterior compartment= 2+        ASSESSMENT:      ICD-10-CM    1. Uterovaginal prolapse N81.4 MEASURE POST-VOID RESIDUAL URINE/BLADDER CAPACITY, US NON-IMAGING     *UA reflex to Microscopic     Urine Microscopic       PLAN:  I discussed at length with Idalmis the options 1) living with it 2) pelvic floor exercises, 3) pessary (she used years ago; did not like it and 4) surgery (either TOTAL VAGINAL HYSTERECTOMY, A & P, SACROSPINOUS VAULT SUSPENSION or  Colpocleisis)  She was given written info to review and advised that if the prolapse became severe, obstructing urination or unable to reduce, then this would be an emergency  Ruth Farooq MD  [facility]      Ruth Farooq MD

## 2017-09-26 ENCOUNTER — TELEPHONE (OUTPATIENT)
Dept: OBGYN | Facility: CLINIC | Age: 72
End: 2017-09-26

## 2017-09-26 NOTE — TELEPHONE ENCOUNTER
"Reason for Call:  Other call back    Detailed comments: Pt would like to schedule the surgery that will remove her uterus.  \"at 71 I don't see any reason to keep it any more - it's very uncomfortable\"  Would like to schedule asap.  States things have gotten even more uncomfortable since her appt.    Phone Number Patient can be reached at: Cell number on file:    Telephone Information:   Mobile [phone]       Best Time: any    Can we leave a detailed message on this number? YES    Call taken on 9/26/2017 at 1:15 PM by Meera Diana      "

## 2017-09-27 DIAGNOSIS — N81.4 UTEROVAGINAL PROLAPSE: Primary | ICD-10-CM

## 2017-09-27 RX ORDER — CEFAZOLIN SODIUM 2 G/100ML
2 INJECTION, SOLUTION INTRAVENOUS
Status: CANCELLED | OUTPATIENT
Start: 2017-09-27

## 2017-09-27 RX ORDER — OXYCODONE HCL 10 MG/1
10 TABLET, FILM COATED, EXTENDED RELEASE ORAL ONCE
Status: CANCELLED | OUTPATIENT
Start: 2017-09-27 | End: 2017-09-27

## 2017-09-27 NOTE — TELEPHONE ENCOUNTER
Pt scheduled for surgery 10-17-17.  All instructions given over the phone and mailed.    -Meera NAILS [facility]

## 2017-10-04 DIAGNOSIS — N39.3 STRESS INCONTINENCE: ICD-10-CM

## 2017-10-04 DIAGNOSIS — N81.4 UTEROVAGINAL PROLAPSE: Primary | ICD-10-CM

## 2017-10-09 DIAGNOSIS — N81.4 UTEROVAGINAL PROLAPSE: ICD-10-CM

## 2017-10-09 DIAGNOSIS — N39.3 STRESS INCONTINENCE: ICD-10-CM

## 2017-10-09 PROCEDURE — 87086 URINE CULTURE/COLONY COUNT: CPT | Performed by: OBSTETRICS & GYNECOLOGY

## 2017-10-11 LAB
BACTERIA SPEC CULT: NORMAL
Lab: NORMAL
SPECIMEN SOURCE: NORMAL

## 2017-10-11 NOTE — PROGRESS NOTES
Call to pt to notify of below.  Unable to reach.  Left message for pt to call back     Giovanna Malone   [facility]  RN

## 2017-10-11 NOTE — PROGRESS NOTES
Pt notified of below.  Pt reports understanding.  Pt does not have further questions or concerns.    Giovanna Malone   [facility]  RN

## 2017-10-12 ENCOUNTER — OFFICE VISIT (OUTPATIENT)
Dept: FAMILY MEDICINE | Facility: CLINIC | Age: 72
End: 2017-10-12
Payer: COMMERCIAL

## 2017-10-12 ENCOUNTER — HOSPITAL ENCOUNTER (EMERGENCY)
Facility: CLINIC | Age: 72
Discharge: HOME | End: 2017-10-12
Attending: EMERGENCY MEDICINE | Admitting: EMERGENCY MEDICINE
Payer: MEDICARE

## 2017-10-12 ENCOUNTER — APPOINTMENT (OUTPATIENT)
Dept: GENERAL RADIOLOGY | Facility: CLINIC | Age: 72
End: 2017-10-12
Attending: EMERGENCY MEDICINE
Payer: MEDICARE

## 2017-10-12 VITALS
WEIGHT: 157.6 LBS | DIASTOLIC BLOOD PRESSURE: 65 MMHG | TEMPERATURE: 98.9 F | BODY MASS INDEX: 28.37 KG/M2 | SYSTOLIC BLOOD PRESSURE: 119 MMHG | HEART RATE: 69 BPM

## 2017-10-12 VITALS
SYSTOLIC BLOOD PRESSURE: 108 MMHG | DIASTOLIC BLOOD PRESSURE: 62 MMHG | HEART RATE: 97 BPM | RESPIRATION RATE: 16 BRPM | OXYGEN SATURATION: 92 % | TEMPERATURE: 98 F

## 2017-10-12 DIAGNOSIS — G89.29 CHRONIC MIDLINE LOW BACK PAIN WITHOUT SCIATICA: ICD-10-CM

## 2017-10-12 DIAGNOSIS — Z86.2 HISTORY OF IRON DEFICIENCY ANEMIA: ICD-10-CM

## 2017-10-12 DIAGNOSIS — K59.03 DRUG-INDUCED CONSTIPATION: ICD-10-CM

## 2017-10-12 DIAGNOSIS — Z01.818 PREOP GENERAL PHYSICAL EXAM: ICD-10-CM

## 2017-10-12 DIAGNOSIS — Z87.42 HISTORY OF UTERINE PROLAPSE: ICD-10-CM

## 2017-10-12 DIAGNOSIS — M54.50 CHRONIC MIDLINE LOW BACK PAIN WITHOUT SCIATICA: ICD-10-CM

## 2017-10-12 DIAGNOSIS — N81.4 UTEROVAGINAL PROLAPSE: Primary | ICD-10-CM

## 2017-10-12 DIAGNOSIS — Z12.11 COLON CANCER SCREENING: ICD-10-CM

## 2017-10-12 LAB
ANION GAP SERPL CALCULATED.3IONS-SCNC: 6 MMOL/L (ref 3–14)
BASOPHILS # BLD AUTO: 0.1 10E9/L (ref 0–0.2)
BASOPHILS NFR BLD AUTO: 0.8 %
BUN SERPL-MCNC: 21 MG/DL (ref 7–30)
CALCIUM SERPL-MCNC: 8.5 MG/DL (ref 8.5–10.1)
CHLORIDE SERPL-SCNC: 100 MMOL/L (ref 94–109)
CO2 SERPL-SCNC: 27 MMOL/L (ref 20–32)
CREAT SERPL-MCNC: 1.26 MG/DL (ref 0.52–1.04)
DIFFERENTIAL METHOD BLD: ABNORMAL
EOSINOPHIL # BLD AUTO: 0.1 10E9/L (ref 0–0.7)
EOSINOPHIL NFR BLD AUTO: 2.3 %
ERYTHROCYTE [DISTWIDTH] IN BLOOD BY AUTOMATED COUNT: 14.9 % (ref 10–15)
GFR SERPL CREATININE-BSD FRML MDRD: 42 ML/MIN/1.7M2
GLUCOSE SERPL-MCNC: 106 MG/DL (ref 70–99)
HCT VFR BLD AUTO: 32.8 % (ref 35–47)
HGB BLD-MCNC: 10.7 G/DL (ref 11.7–15.7)
LYMPHOCYTES # BLD AUTO: 1.2 10E9/L (ref 0.8–5.3)
LYMPHOCYTES NFR BLD AUTO: 19.1 %
MCH RBC QN AUTO: 29.7 PG (ref 26.5–33)
MCHC RBC AUTO-ENTMCNC: 32.6 G/DL (ref 31.5–36.5)
MCV RBC AUTO: 91 FL (ref 78–100)
MONOCYTES # BLD AUTO: 1 10E9/L (ref 0–1.3)
MONOCYTES NFR BLD AUTO: 16.6 %
NEUTROPHILS # BLD AUTO: 3.7 10E9/L (ref 1.6–8.3)
NEUTROPHILS NFR BLD AUTO: 61.2 %
PLATELET # BLD AUTO: 193 10E9/L (ref 150–450)
POTASSIUM SERPL-SCNC: 4.2 MMOL/L (ref 3.4–5.3)
RBC # BLD AUTO: 3.6 10E12/L (ref 3.8–5.2)
SODIUM SERPL-SCNC: 133 MMOL/L (ref 133–144)
WBC # BLD AUTO: 6 10E9/L (ref 4–11)

## 2017-10-12 PROCEDURE — 74010 XR KUB: CPT | Mod: 52

## 2017-10-12 PROCEDURE — 96374 THER/PROPH/DIAG INJ IV PUSH: CPT | Performed by: EMERGENCY MEDICINE

## 2017-10-12 PROCEDURE — 99214 OFFICE O/P EST MOD 30 MIN: CPT | Performed by: FAMILY MEDICINE

## 2017-10-12 PROCEDURE — 85025 COMPLETE CBC W/AUTO DIFF WBC: CPT | Performed by: FAMILY MEDICINE

## 2017-10-12 PROCEDURE — 80048 BASIC METABOLIC PNL TOTAL CA: CPT | Performed by: FAMILY MEDICINE

## 2017-10-12 PROCEDURE — 99285 EMERGENCY DEPT VISIT HI MDM: CPT | Mod: 25 | Performed by: EMERGENCY MEDICINE

## 2017-10-12 PROCEDURE — 99285 EMERGENCY DEPT VISIT HI MDM: CPT | Mod: Z6 | Performed by: EMERGENCY MEDICINE

## 2017-10-12 PROCEDURE — 96376 TX/PRO/DX INJ SAME DRUG ADON: CPT | Performed by: EMERGENCY MEDICINE

## 2017-10-12 PROCEDURE — 93000 ELECTROCARDIOGRAM COMPLETE: CPT | Performed by: FAMILY MEDICINE

## 2017-10-12 PROCEDURE — A9270 NON-COVERED ITEM OR SERVICE: HCPCS | Mod: GY | Performed by: EMERGENCY MEDICINE

## 2017-10-12 PROCEDURE — 36415 COLL VENOUS BLD VENIPUNCTURE: CPT | Performed by: FAMILY MEDICINE

## 2017-10-12 PROCEDURE — 25000132 ZZH RX MED GY IP 250 OP 250 PS 637: Mod: GY | Performed by: EMERGENCY MEDICINE

## 2017-10-12 PROCEDURE — 96375 TX/PRO/DX INJ NEW DRUG ADDON: CPT | Performed by: EMERGENCY MEDICINE

## 2017-10-12 PROCEDURE — 25000128 H RX IP 250 OP 636: Performed by: EMERGENCY MEDICINE

## 2017-10-12 RX ORDER — ONDANSETRON 2 MG/ML
4 INJECTION INTRAMUSCULAR; INTRAVENOUS
Status: COMPLETED | OUTPATIENT
Start: 2017-10-12 | End: 2017-10-12

## 2017-10-12 RX ORDER — HYDROMORPHONE HYDROCHLORIDE 1 MG/ML
0.5 INJECTION, SOLUTION INTRAMUSCULAR; INTRAVENOUS; SUBCUTANEOUS
Status: DISCONTINUED | OUTPATIENT
Start: 2017-10-12 | End: 2017-10-13 | Stop reason: HOSPADM

## 2017-10-12 RX ORDER — HYDROMORPHONE HYDROCHLORIDE 1 MG/ML
0.5 INJECTION, SOLUTION INTRAMUSCULAR; INTRAVENOUS; SUBCUTANEOUS
Status: COMPLETED | OUTPATIENT
Start: 2017-10-12 | End: 2017-10-12

## 2017-10-12 RX ORDER — POLYETHYLENE GLYCOL 3350 17 G/17G
17 POWDER, FOR SOLUTION ORAL 2 TIMES DAILY
COMMUNITY
End: 2021-09-10

## 2017-10-12 RX ORDER — HYDROMORPHONE HYDROCHLORIDE 1 MG/ML
0.5 INJECTION, SOLUTION INTRAMUSCULAR; INTRAVENOUS; SUBCUTANEOUS ONCE
Status: COMPLETED | OUTPATIENT
Start: 2017-10-12 | End: 2017-10-12

## 2017-10-12 RX ADMIN — HYDROMORPHONE HYDROCHLORIDE 0.5 MG: 1 INJECTION, SOLUTION INTRAMUSCULAR; INTRAVENOUS; SUBCUTANEOUS at 19:21

## 2017-10-12 RX ADMIN — ONDANSETRON 4 MG: 2 INJECTION INTRAMUSCULAR; INTRAVENOUS at 17:25

## 2017-10-12 RX ADMIN — HYDROMORPHONE HYDROCHLORIDE 0.5 MG: 1 INJECTION, SOLUTION INTRAMUSCULAR; INTRAVENOUS; SUBCUTANEOUS at 17:24

## 2017-10-12 RX ADMIN — DOCUSATE SODIUM 286 ML: 50 LIQUID ORAL at 19:41

## 2017-10-12 RX ADMIN — SODIUM CHLORIDE 500 ML: 9 INJECTION, SOLUTION INTRAVENOUS at 21:58

## 2017-10-12 ASSESSMENT — ENCOUNTER SYMPTOMS
HEMATOLOGIC/LYMPHATIC NEGATIVE: 1
NEUROLOGICAL NEGATIVE: 1
ALLERGIC/IMMUNOLOGIC NEGATIVE: 1
RESPIRATORY NEGATIVE: 1
FREQUENCY: 1
ENDOCRINE NEGATIVE: 1
EYES NEGATIVE: 1
BACK PAIN: 1
CARDIOVASCULAR NEGATIVE: 1
PSYCHIATRIC NEGATIVE: 1
CONSTITUTIONAL NEGATIVE: 1
ABDOMINAL PAIN: 1

## 2017-10-12 NOTE — ED NOTES
Pt scheduled for surgery next week with Dr. Che for prolapsed vagina. Feels that the prolapse has moved, unable to feel it in her vagina as usual, has increased pressure on her back, has not had BM x 3 days despite use of miralax and enema.

## 2017-10-12 NOTE — LETTER
October 17, 2017      Idalmis Abdul     [facility] [id number]        Dear ,    We are writing to inform you of your test results.    Labs are a bit off but ok. A copy of the results is enclosed.  If you have any questions or concerns, please call the clinic at the number listed above.       Sincerely,        Darlene Sultana MD

## 2017-10-12 NOTE — NURSING NOTE
"Initial /65  Pulse 69  Temp 98.9  F (37.2  C) (Tympanic)  Wt 157 lb 9.6 oz (71.5 kg)  LMP 01/01/1992  BMI 28.37 kg/m2 Estimated body mass index is 28.37 kg/(m^2) as calculated from the following:    Height as of 9/25/17: 5' 2.5\" (1.588 m).    Weight as of this encounter: 157 lb 9.6 oz (71.5 kg). .      "

## 2017-10-12 NOTE — ED PROVIDER NOTES
History     Chief Complaint   Patient presents with     Abdominal Pain     has prolapsed uterus, problems with urination and bowels     HPI  Idalmis Abdlu is a 71 year old female with a history of uterovaginal prolapse and chronic back pain who presents today with concerns of constipation. She was seen by Dr. Rk Abdul in [facility] on 9/3/17 for constipation. She has been using Miralax since her last emergency department visit, which has been working to manage her constipation until 3 days ago when she developed her current symptoms. Has not had a bowel movement for 3 days. She has frequent urination (every 15 minutes) but does not feel like she is fully emptying her bladder. She has a 3/4 uterovaginal prolapse and will be getting a combined hysterectomy and colporrhaphy with Dr. Farooq on 10/17/17 to help resolve her symptoms. She thinks that her uterus has moved because she can no longer feel it like she normally could, and she thinks this explains her change in symptoms. Her back pain has also increased with her current symptoms. She has been taking oxycodone for her back pain since her surgery in 2012, currently taking 5 mg twice daily. She is allergic to atorvastatin.     Social History: Patient lives in [place], [place]. She arrived here today with her daughter in law.         Past Medical History:  Past Medical History:   Diagnosis Date     ASCVD (arteriosclerotic cardiovascular disease) 6/14/2012     Benign neoplasm of duodenum, jejunum, and ileum 2003, 2007    Gastrointestinal Stromal Tumor, seen at [facility], Dr. Schmitz, GI oncology-Gleevec treatment.  Surgical removal in fall 2004-no recurrence as of 3/05.     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     Other chronic pain     back     Postsurgical aortocoronary bypass status 7/4/2012     S/P CABG x 4 8/5/2012     Strabismus      Tobacco use disorder     Quit     Unspecified essential hypertension        Medications:  Current Outpatient Prescriptions   Medication Sig Dispense Refill     Imatinib Mesylate (GLEEVEC PO) Take 400 mg by mouth daily       polyethylene glycol (MIRALAX/GLYCOLAX) powder Take 17 g by mouth daily       rosuvastatin (CRESTOR) 5 MG tablet Take 1 tablet (5 mg) by mouth every other day 45 tablet 3     metoprolol (LOPRESSOR) 100 MG tablet TAKE ONE TABLET BY MOUTH TWICE A  tablet 3     oxyCODONE (ROXICODONE) 5 MG IR tablet Take 1-2 tablets (5-10 mg) by mouth 2 times daily Wean off.  1/2 tab and 1 and 1/2 3 days, then 1/2 twice a day for three days, then 1/2 a day for 3 days then every other day for 3 days. 180 tablet 0     methimazole (TAPAZOLE) 5 MG tablet Take 1 tablet (5 mg) by mouth daily ALTERNATE 5 MG AND 2.5 MG (1/2 TABLET) EVERY DAY 90 tablet 3     LORazepam (ATIVAN) 1 MG tablet Take 1 tablet (1 mg) by mouth At Bedtime 30 tablet 5     hydrochlorothiazide (HYDRODIURIL) 25 MG tablet Take 1 tablet (25 mg) by mouth daily 90 tablet 3     amLODIPine (NORVASC) 5 MG tablet Take 1 tablet (5 mg) by  mouth daily 30 tablet 11     lisinopril (PRINIVIL,ZESTRIL) 40 MG tablet Take 1 tablet (40 mg) by mouth daily 90 tablet 3     aspirin EC 81 MG EC tablet Take 1 tablet by mouth daily. 30 tablet 2     [DISCONTINUED] imatinib (GLEEVEC) 400 MG tablet Take 1 tablet (400 mg) by mouth daily Take with a meal and a large glass of water. 30 tablet 2     Blood Pressure Monitoring (ADULT BLOOD PRESSURE CUFF LG) KIT 1 Device 2 times daily 1 kit 1       Allergies:  Allergies   Allergen Reactions     Atorvastatin Cramps       I have reviewed the Medications, Allergies, Past Medical and Surgical History, and Social History in the Epic system.         Review of Systems   Constitutional: Negative.    HENT: Negative.    Eyes: Negative.    Respiratory: Negative.    Cardiovascular: Negative.    Gastrointestinal: Positive for abdominal pain.   Endocrine: Negative.    Genitourinary: Positive for frequency. Pelvic pain: uterine prolapse.   Musculoskeletal: Positive for back pain.   Allergic/Immunologic: Negative.    Neurological: Negative.    Hematological: Negative.    Psychiatric/Behavioral: Negative.        Physical Exam   BP: 146/84  Pulse: 101  Temp: 98  F (36.7  C)  Resp: 18  SpO2: 95 %       Physical Exam   Constitutional: She appears well-developed and well-nourished. No distress.   HENT:   Head: Normocephalic and atraumatic.   Eyes: Conjunctivae and EOM are normal. Pupils are equal, round, and reactive to light.   Neck: Normal range of motion. Neck supple.   Cardiovascular: Normal rate.    Pulmonary/Chest: Effort normal.   Abdominal: Soft. She exhibits no mass. There is no tenderness. There is no rebound and no guarding.   Genitourinary: No vaginal discharge found.   Neurological: She is alert.   Skin: No rash noted. She is not diaphoretic. No erythema. No pallor.   Psychiatric: She has a normal mood and affect. Her behavior is normal. Judgment and thought content normal.       ED Course     ED Course     Procedures                Critical Care time:  none               Labs Ordered and Resulted from Time of ED Arrival Up to the Time of Departure from the ED - No data to display  ED Medications:  Medications   HYDROmorphone (PF) (DILAUDID) injection 0.5 mg (not administered)   0.9% sodium chloride BOLUS (not administered)   HYDROmorphone (PF) (DILAUDID) injection 0.5 mg (0.5 mg Intravenous Given 10/12/17 1724)   ondansetron (ZOFRAN) injection 4 mg (4 mg Intravenous Given 10/12/17 1725)   pink lady enema (286 mLs Rectal Given 10/12/17 1941)   HYDROmorphone (PF) (DILAUDID) injection 0.5 mg (0.5 mg Intravenous Given 10/12/17 1921)       ED Vitals:  Vitals:    10/12/17 1644 10/12/17 1924 10/12/17 2030   BP: 146/84 119/84    Pulse: 101 97    Resp: 18 16    Temp: 98  F (36.7  C)     TempSrc: Oral     SpO2: 95% 92% 95%     Vitals:    10/12/17 1644 10/12/17 1924 10/12/17 2030 10/12/17 2100   BP: 146/84 119/84  (!) 89/57   Pulse: 101 97     Resp: 18 16     Temp: 98  F (36.7  C)      TempSrc: Oral      SpO2: 95% 92% 95% 93%       ED Labs and Imaging:  Results for orders placed or performed during the hospital encounter of 10/12/17 (from the past 24 hour(s))   KUB XR    Narrative    ABDOMEN ONE VIEW  10/12/2017 5:41 PM      HISTORY: Abdominal pain. History of uterine prolapse and constipation.  Evaluate for stool volume versus other acute intra-abdominal process.     COMPARISON: None.      Impression    IMPRESSION: The bowel gas pattern is unremarkable. Moderate amount of  stool in the colon. Probable calcified uterine fibroids. Thoracolumbar  scoliosis.     LENNY STUART MD     4:52 PM Patient Assessed    Assessments & Plan (with Medical Decision Making)   Clinical Impression:  Pleasant 71-year-old female who presented for evaluation for abdominal pain.  Symptoms are related to opiate use causing constipation.    Patient reports she has a history of uterine prolapse and is on chronic oxycodone for chronic pain.  She is scheduled for Combined  vaginal hysterectomy and colporrhaphy on Tuesday Oct 17th- with Dr Farooq. She was seen in the emergency department on September 3 for constipation.  She reports she's been using MiraLAX since her last ED visit.  Over the last 3 days she has felt constipated and reports low-back ache with active and ongoing uterine prolapse.  She is here in the emergency department per recommendations from Dr. Farooq for follow-up care if she is unable to reduce her uterine prolapse.  She reports urinary frequency.  Chaperoned pelvic and rectal exam with [facility] RN-does not show any active prolapse of the uterus.  The cervical os and opening was identified without difficulty.  Bimanual exam did not show any tenderness or pain or fullness in the  pelvis.      ED Course and Plan:  I reviewed her ED visit on September 3 in bowel regimen treatment options she was given a tap water enema. An x-ray of the abdomen was obtained to evaluate for degree of stool with  history of opiate-induced constipation.  We also discussed bowel regimen and moving forward given constipation despite daily MiraLAX use. x-ray today shows moderate amount of colonic stool consistent with constipation.    x-ray was reviewed independently.  There is probable calcified uterine fibroids per radiology.  Please see radiologist's interpretation and detailed report above.  We discussed options for bowel regimen.  I reviewed the regimen given during her ED visit on September 3.  We elected to give her a pink lady.  She was offered IV Dilaudid for chronic low back pain due to scoliosis and spondylolisthesis.  Patient had marked relief after a pink lady in the ED.  She is discharged home after counseling on opiate-induced constipation.  Keep you patient's procedure plan with Dr. Farooq.  Consider suggestive bowel regimen to help reduce the risk of becoming constipated.       ADDENDUM:   just prior to discharge patient had a vasovagal episode with nursing staff  while on the commode.  She was apparently unresponsive for about 10 seconds and hypotensive while defacating.  She was reevaluated and noted to be hypotensive and pale and given IV fluids.  She continued to complain of intermittent abdominal cramping which resolved after a period of observation  Patient was eager to go home with her family   Family was comfortable taking her home.  I reviewed precautions for care.  Low concern for intra-abdominal catastrophe based and care provided in the ED.    I have reviewed the nursing notes.    I have reviewed the findings, diagnosis, plan and need for follow up with the patient.       Disclaimer: This note consists of symbols derived from keyboarding, dictation and/or voice recognition software. As a result, there may be errors in the script that have gone undetected. Please consider this when interpreting information found in this chart.      New Prescriptions    No medications on file       Final diagnoses:   Drug-induced constipation - Related to chronic opiate use.   History of uterine prolapse   Chronic midline low back pain without sciatica - Due to scoliosis and spondylolisthesis     This document serves as a record of the services and decisions personally performed and made by William Carrillo, *. The HPI was created on his behalf by Waqas Hdz, a trained medical scribe. The creation of this document is based the provider's statements to the medical scribe.  Waqas Hdz 4:51 PM 10/12/2017    Provider:   The information in this document, created by the medical scribe for me, accurately reflects the services I personally performed and the decisions made by me. I have reviewed and approved this document for accuracy prior to leaving the patient care area.  William Carrillo, * 4:51 PM 10/12/2017    10/12/2017   [facility] EMERGENCY DEPARTMENT     William Carrillo MD  10/12/17 2044       Wililam Carrillo MD  10/12/17 2137

## 2017-10-12 NOTE — PROGRESS NOTES
[facility]  [address] [address]  [phone]  Dept: [phone]    PRE-OP EVALUATION:  Today's date: 10/12/2017    Idalmis Abdul (: 1945) presents for pre-operative evaluation assessment as requested by Dr. Farooq.  She requires evaluation and anesthesia risk assessment prior to undergoing surgery/procedure for treatment of Uterovaginal prolapse .  Proposed procedure: Total Vaginal Hysterectomy,Anterior and Posterior Repair,Sacrospinous Vault Suspension    Date of Surgery/ Procedure: 10/17/17  Time of Surgery/ Procedure: 10:15 Am  Hospital/Surgical Facility: [facility] [place]  Primary Physician: Darlene Sultana  Type of Anesthesia Anticipated: General    Patient has a Health Care Directive or Living Will:  YES, has one at home.    1. YES - DO YOU HAVE A HISTORY OF HEART ATTACK, STROKE, STENT, BYPASS OR SURGERY ON AN ARTERY IN THE HEAD, NECK, HEART OR LEG?  CABG x 4  2. NO - Do you ever have any pain or discomfort in your chest?  3. NO - Do you have a history of  Heart Failure?  4. YES - ARE YOUR TROUBLED BY SHORTNESS OF BREATH WHEN WALKING ON THE LEVEL, UP A SLIGHT HILL OR AT NIGHT? From smoking, history of lung cancer  5. NO - Do you currently have a cold, bronchitis or other respiratory infection?  6. YES - DO YOU HAVE A COUGH, SHORTNESS OF BREATH OR WHEEZING? Former smoker  7. NO - Do you sometimes get pains in the calves of your legs when you walk?  8. NO - Do you or anyone in your family have previous history of blood clots?  9. YES - DO YOU OR DOES ANYONE IN YOUR FAMILY HAVE A SERIOUS BLEEDING PROBLEM SUCH AS PROLONGED BLEEDING FOLLOWING SURGERIES OR CUTS? 10 transfusions after heart surgery  10. NO - Have you ever had problems with anemia or been told to take iron pills?  11. NO - HAVE YOU HAD ANY ABNORMAL BLOOD LOSS SUCH AS BLACK, TARRY OR BLOODY STOOLS, OR ABNORMAL VAGINAL BLEEDING?   12. YES - HAVE YOU EVER HAD A BLOOD TRANSFUSION? 10  units 2012 after CABG  13. NO - Have you or any of your relatives ever had problems with anesthesia?  14. NO - Do you have sleep apnea, excessive snoring or daytime drowsiness?  15. NO - Do you have any prosthetic heart valves?  16. NO - Do you have prosthetic joints?  17. NO - Is there any chance that you may be pregnant?        HPI:                                                      Brief HPI related to upcoming procedure: Idalmis Abdul is 71 year old white female with uterovaginal prolapse, osteoporosis, NSTEMI, chronic back pain on oxycodone, Grave's disease, hyperthyroidism on medication, gastric cancer  who is here to get clearance to have general anesthesia.        See problem list for active medical problems.  Problems all longstanding and stable, except as noted/documented.  See ROS for pertinent symptoms related to these conditions.                                                                                                  .    MEDICAL HISTORY:                                                    Patient Active Problem List    Diagnosis Date Noted     Uterovaginal prolapse 09/25/2017     Priority: Medium     Age-related osteoporosis without current pathological fracture 12/16/2014     Priority: Medium     PVD (posterior vitreous detachment) 11/17/2014     Priority: Medium     History of tobacco abuse 09/02/2014     Priority: Medium     QUIT in 2010       History of non-ST elevation myocardial infarction (NSTEMI) 09/02/2014     Priority: Medium     Chronic back pain 09/02/2014     Priority: Medium     Patient is followed by [facility]el and [facility]   Med: 5mg oxycodone, 1-2 tabs q8 hours prn  Pain diagnosis: chronic back pain  Maximum use per month: #180 (but generally lasts about 90 days)  Expected duration: lifetime  Narcotic agreement on file:  YES - contract signed   Clinic visit recommended: Q 3 month         Graves' disease 08/19/2014     Priority: Medium     Thyroid eye  disease 04/28/2014     Priority: Medium     Eyelid retraction or lag 04/28/2014     Priority: Medium     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 Provider to review       Hyperthyroidism 02/04/2014     Priority: Medium     Hyperlipidemia LDL goal <160 12/17/2013     Priority: Medium     GERD (gastroesophageal reflux disease) 10/08/2013     Priority: Medium     Dyspnea 08/27/2013     Priority: Medium     S/P CABG x 4 08/05/2012     Priority: Medium     Postsurgical aortocoronary bypass status 07/04/2012     Priority: Medium     ASCVD (arteriosclerotic cardiovascular disease) 06/14/2012     Priority: Medium     NSTEMI (non-ST elevated myocardial infarction) (H) 06/12/2012     Priority: Medium     Eyelid edema 12/15/2011     Priority: Medium     side effect of gastric cancer medication       History of lung cancer 12/15/2011     Priority: Medium     S/p resection of right lung.  Sees  @ 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 Schmitz, GI oncology-Gleevec treatment.  Surgical removal in fall 2004-no recurrence as of 3/05.     CA - lung cancer 4/2010    [facility], treated surgically     choledochal cyst 1963    CHOLEDOCHAL CYST, mild dilatation of biliary system     Coronary artery disease      Dislocated finger, left middle PIP 7/26/2013     Dyspnea 8/27/2013     Eyelid edema 12/15/2011    side effect of gastric cancer medication      GERD (gastroesophageal  reflux disease) 10/8/2013     GIST (gastrointestinal stromal tumor), malignant (H) 5/10/2011     Graves disease      Grief reaction 5/11/2009     History of lung cancer 12/15/2011    S/p resection of right lung.  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     Other chronic pain     back     Postsurgical aortocoronary bypass status 7/4/2012     S/P CABG x 4 8/5/2012     Strabismus      Tobacco use disorder     Quit     Unspecified essential hypertension      Past Surgical History:   Procedure Laterality Date     BYPASS GRAFT ARTERY CORONARY  6/14/2012    Procedure: BYPASS GRAFT ARTERY CORONARY;  Median Sternotomy Coronary Artery Bypass Graft  x 3        C THORACOSCOPY,DX W BX  fall 2003    benign tissue     CATARACT IOL, RT/LT      LE     CHOLECYSTECTOMY  1963     COLONOSCOPY  4-12-05     CORONARY ARTERY BYPASS      X4     CREATION PERICARDIAL WINDOW  6/15/2012    Procedure: CREATION PERICARDIAL WINDOW;  Mediastinal Exploration, Control of Bleeding;  Surgeon: Dwaine Griffin MD;  Location:  OR     EYE SURGERY  2014    left cataract removal     GI SURGERY  2003 and 2007    GIST tumor removal     GYN SURGERY      tubal ligation     PHACOEMULSIFICATION WITH STANDARD INTRAOCULAR LENS IMPLANT  3/24/2014    Procedure: PHACOEMULSIFICATION WITH STANDARD INTRAOCULAR LENS IMPLANT;  Left Kelman Phacoemulsification with Intraocular Lens Implant;  Surgeon: Magnus Mckeon MD;  Location: [facility] OR     RECESSION RESECTION WITH ADJUSTABLE SUTURE BILATERAL Bilateral 7/14/2016    Procedure: RECESSION RESECTION WITH ADJUSTABLE SUTURE BILATERAL;   Surgeon: Erma Ordaz MD;  Location: [facility] OR     SURGICAL HISTORY OF -   1963    cholecystectomy     SURGICAL HISTORY OF -   1970's    Tubal Ligation      SURGICAL HISTORY OF -   08/03    Explor. Lap, Excision of Small Bowel Tumor      SURGICAL HISTORY OF -   4/2010    lung cancer removed right lung     Current Outpatient Prescriptions   Medication Sig Dispense Refill     rosuvastatin (CRESTOR) 5 MG tablet Take 1 tablet (5 mg) by mouth every other day 45 tablet 3     oxyCODONE (ROXICODONE) 5 MG IR tablet Take 1-2 tablets (5-10 mg) by mouth 2 times daily Wean off.  1/2 tab and 1 and 1/2 3 days, then 1/2 twice a day for three days, then 1/2 a day for 3 days then every other day for 3 days. 180 tablet 0     methimazole (TAPAZOLE) 5 MG tablet Take 1 tablet (5 mg) by mouth daily ALTERNATE 5 MG AND 2.5 MG (1/2 TABLET) EVERY DAY 90 tablet 3     LORazepam (ATIVAN) 1 MG tablet Take 1 tablet (1 mg) by mouth At Bedtime 30 tablet 5     hydrochlorothiazide (HYDRODIURIL) 25 MG tablet Take 1 tablet (25 mg) by mouth daily 90 tablet 3     amLODIPine (NORVASC) 5 MG tablet Take 1 tablet (5 mg) by mouth daily 30 tablet 11     lisinopril (PRINIVIL,ZESTRIL) 40 MG tablet Take 1 tablet (40 mg) by mouth daily 90 tablet 3     Imatinib Mesylate (GLEEVEC PO) Take 400 mg by mouth daily       polyethylene glycol (MIRALAX/GLYCOLAX) powder Take 17 g by mouth daily       metoprolol (LOPRESSOR) 100 MG tablet TAKE ONE TABLET BY MOUTH TWICE A  tablet 3     Blood Pressure Monitoring (ADULT BLOOD PRESSURE CUFF LG) KIT 1 Device 2 times daily 1 kit 1     aspirin EC 81 MG EC tablet Take 1 tablet by mouth daily. 30 tablet 2     OTC products: None, except as noted above    Allergies   Allergen Reactions     Atorvastatin Cramps      Latex Allergy: NO    Social History   Substance Use Topics     Smoking status: Former Smoker     Packs/day: 0.50     Years: 49.00     Types: Cigarettes     Quit date: 4/19/2010     Smokeless tobacco: Never Used      Alcohol use No     History   Drug Use No       REVIEW OF SYSTEMS:                                                    Constitutional, HEENT, cardiovascular, pulmonary, gi and gu systems are negative, except as otherwise noted.      EXAM:                                                    /65  Pulse 69  Temp 98.9  F (37.2  C) (Tympanic)  Wt 157 lb 9.6 oz (71.5 kg)  LMP 01/01/1992  BMI 28.37 kg/m2    GENERAL APPEARANCE: healthy, alert and no distress     EYES: EOMI, PERRL     HENT: ear canals and TM's normal and nose and mouth without ulcers or lesions     NECK: no adenopathy, no asymmetry, masses, or scars and thyroid normal to palpation     RESP: lungs clear to auscultation - no rales, rhonchi or wheezes     CV: regular rates and rhythm, normal S1 S2, no S3 or S4 and no murmur, click or rub     ABDOMEN:  soft, nontender, no HSM or masses and bowel sounds normal     MS: extremities normal- no gross deformities noted, no evidence of inflammation in joints, FROM in all extremities.     SKIN: no suspicious lesions or rashes     NEURO: Normal strength and tone, sensory exam grossly normal, mentation intact and speech normal     PSYCH: mentation appears normal. and affect normal/bright     LYMPHATICS: No axillary, cervical, or supraclavicular nodes    DIAGNOSTICS:                                                    EKG: appears normal, NSR, normal axis, normal intervals, no acute ST/T changes c/w ischemia, no LVH by voltage criteria, unchanged from previous tracings    Recent Labs   Lab Test  07/07/17   0903  04/24/17   1103  01/13/17   0854   07/12/12   0653   07/09/12   1629   06/19/12   1550   06/15/12   0850   HGB  11.1*   --   11.8   < >  11.0*   < >  11.6*   < >   --    < >  9.3*   PLT  234   --   185   < >  189   < >   --    < >   --    < >  118*   INR   --    --    --    --   1.01   --   1.00   < >   --    < >  2.28*   NA  133  136  141   < >  131*   < >  134   < >   --    < >  147*   POTASSIUM  4.2  4.4   4.2   < >  3.4   < >  3.6   < >   --    < >  2.8*   CR  1.20*  1.02  1.00   < >  0.94   < >  1.23*   < >   --    < >   --    A1C   --    --    --    --    --    --    --    --   5.4   --   Duplicate request    < > = values in this interval not displayed.        IMPRESSION:                                                      Diagnosis/reason for consult:   1. Uterovaginal prolapse   cleared for general anesthesia  - Basic metabolic panel    2. Preop general physical exam  - EKG 12-lead complete w/read - Clinics    3. History of iron deficiency anemia  - CBC with platelets differential    4. Colon cancer screening  - Fecal colorectal cancer screen FIT; Future      The proposed surgical procedure is considered INTERMEDIATE risk.    REVISED CARDIAC RISK INDEX  The patient has the following serious cardiovascular risks for perioperative complications such as (MI, PE, VFib and 3  AV Block):  No serious cardiac risks  INTERPRETATION: The ASCVD Risk score (Kole COTTRELL Jr, et al., 2013) failed to calculate for the following reasons:    The patient has a prior MCI or stroke diagnosis      The patient has the following additional risks for perioperative complications:  No identified additional risks      ICD-10-CM    1. Uterovaginal prolapse N81.4 Basic metabolic panel   2. Preop general physical exam Z01.818 EKG 12-lead complete w/read - Clinics   3. History of iron deficiency anemia Z86.2 CBC with platelets differential   4. Colon cancer screening Z12.11 Fecal colorectal cancer screen FIT       RECOMMENDATIONS:                                                      --Consult hospital rounder / IM to assist post-op medical management    Cardiovascular Risk  Performs 4 METs exercise without symptoms (Light housework (dusting, washing dishes)) .   Patient is already on a Beta Blocker. Continue Betablocker therapy after surgery, using Beta blocker order set as necessary for NPO status.      Pulmonary Risk  Incentive spirometry post  op  Respiratory Therapy (Respiratory Care IP Consult)  post op  NG tube decompression if abdominal distension or significant vomiting       --Patient is to take all scheduled medications on the day of surgery EXCEPT for modifications listed below.    APPROVAL GIVEN to proceed with proposed procedure, without further diagnostic evaluation       Signed Electronically by: Darlene Sultana MD    Copy of this evaluation report is provided to requesting physician.    [place] Preop Guidelines

## 2017-10-12 NOTE — ED AVS SNAPSHOT
[facility] Emergency Department    [address] [address]    Phone:  [phone]    Fax:  [phone]                                       Idalmis Abdul   MRN: [id number]    Department:  [facility] Emergency Department   Date of Visit:  10/12/2017           After Visit Summary Signature Page     I have received my discharge instructions, and my questions have been answered. I have discussed any challenges I see with this plan with the nurse or doctor.    ..........................................................................................................................................  Patient/Patient Representative Signature      ..........................................................................................................................................  Patient Representative Print Name and Relationship to Patient    ..................................................               ................................................  Date                                            Time    ..........................................................................................................................................  Reviewed by Signature/Title    ...................................................              ..............................................  Date                                                            Time

## 2017-10-12 NOTE — MR AVS SNAPSHOT
After Visit Summary   10/12/2017    Idalmis Abdul    MRN: [id number]           Patient Information     Date Of Birth          1945        Visit Information        Provider Department      10/12/2017 9:20 AM Darlene Sultana MD [facility]        Today's Diagnoses     Uterovaginal prolapse    -  1    Preop general physical exam        History of iron deficiency anemia          Care Instructions      Before Your Surgery      Call your surgeon if there is any change in your health. This includes signs of a cold or flu (such as a sore throat, runny nose, cough, rash or fever).    Do not smoke, drink alcohol or take over the counter medicine (unless your surgeon or primary care doctor tells you to) for the 24 hours before and after surgery.    If you take prescribed drugs: Follow your doctor s orders about which medicines to take and which to stop until after surgery.    Eating and drinking prior to surgery: follow the instructions from your surgeon    Take a shower or bath the night before surgery. Use the soap your surgeon gave you to gently clean your skin. If you do not have soap from your surgeon, use your regular soap. Do not shave or scrub the surgery site.  Wear clean pajamas and have clean sheets on your bed.           Follow-ups after your visit        Your next 10 appointments already scheduled     Oct 17, 2017   Procedure with Ruth Farooq MD   [facility] PeriOP Services (--)    [address] [place] [address]   [phone]           The medical center is located at [address] [place] [address]. (between [address] in [place], [place] miles north of [place]).            Jan 09, 2018  9:00 AM CST   US THYROID with UCUS2    [facility])    [address]  [facility] [address] [phone]           Please bring a list of your medicines (including vitamins, minerals and  over-the-counter drugs). Also, tell your doctor about any allergies you may have. Wear comfortable clothes and leave your valuables at home.  You do not need to do anything special to prepare for your exam.  Please call the Imaging Department at your exam site with any questions.            Jan 09, 2018 11:00 AM CST   (Arrive by 10:45 AM)   RETURN ENDOCRINE with Swetha Antoine MD   [facility] Endocrinology ([facility] [place])    [address] [facility]  3rd Floor  [facility] [address] [phone]            Jan 12, 2018  9:30 AM CST   CT CHEST ABDOMEN PELVIS W/O & W CONTRAST with WYCT1   [facility] CT ([facility])    [address] [facility] [address] [phone]           Please bring any scans or X-rays taken at other hospitals, if similar tests were done. Also bring a list of your medicines, including vitamins, minerals and over-the-counter drugs. It is safest to leave personal items at home.  Be sure to tell your doctor:   If you have any allergies.   If there s any chance you are pregnant.   If you are breastfeeding.   If you have any special needs.  You may have contrast for this exam. To prepare:   Do not eat or drink for 2 hours before your exam. If you need to take medicine, you may take it with small sips of water. (We may ask you to take liquid medicine as well.)   The day before your exam, drink extra fluids at least six 8-ounce glasses (unless your doctor tells you to restrict your fluids).  Patients over 70 or patients with diabetes or kidney problems:   If you haven t had a blood test (creatinine test) within the last 30 days, go to your clinic or Diagnostic Imaging Department for this test.  If you have diabetes:   If your kidney function is normal, continue taking your metformin (Avandamet, Glucophage, Glucovance, Metaglip) on the day of your exam.   If your kidney function is abnormal, wait 48 hours before restarting this  "medicine.  You will have oral contrast for this exam:   You will drink the contrast at home. Get this from your clinic or Diagnostic Imaging Department. Please follow the directions given.  Please wear loose clothing, such as a sweat suit or jogging clothes. Avoid snaps, zippers and other metal. We may ask you to undress and put on a hospital gown.  If you have any questions, please call the Imaging Department where you will have your exam.            Marcos 15, 2018  8:45 AM CST   (Arrive by 8:30 AM)   Return Visit with Blayne Schmitz MD   [facility] ([facility] [place])    [address] [facility]  2nd Floor  [facility] [address] [phone]              Who to contact     If you have questions or need follow up information about today's clinic visit or your schedule please contact [facility] directly at [phone].  Normal or non-critical lab and imaging results will be communicated to you by MyChart, letter or phone within 4 business days after the clinic has received the results. If you do not hear from us within 7 days, please contact the clinic through [employer] or phone. If you have a critical or abnormal lab result, we will notify you by phone as soon as possible.  Submit refill requests through [employer] or call your pharmacy and they will forward the refill request to us. Please allow 3 business days for your refill to be completed.          Additional Information About Your Visit        [employer] Information     [employer] lets you send messages to your doctor, view your test results, renew your prescriptions, schedule appointments and more. To sign up, go to [email or web address] . Click on \"Log in\" on the left side of the screen, which will take you to the Welcome page. Then click on \"Sign up Now\" on the right side of the page.     You will be asked to enter the access code listed below, as well as some personal information. Please follow the " directions to create your username and password.     Your access code is: [email or web address]  Expires: 2017  5:20 PM     Your access code will  in 90 days. If you need help or a new code, please call your [facility] or [phone].        Care EveryWhere ID     This is your Care EveryWhere ID. This could be used by other organizations to access your [place] medical records  [id number]        Your Vitals Were     Pulse Temperature Last Period BMI (Body Mass Index)          69 98.9  F (37.2  C) (Tympanic) 1992 28.37 kg/m2         Blood Pressure from Last 3 Encounters:   10/12/17 119/65   17 132/69   17 151/88    Weight from Last 3 Encounters:   10/12/17 157 lb 9.6 oz (71.5 kg)   17 158 lb (71.7 kg)   17 155 lb (70.3 kg)              We Performed the Following     Basic metabolic panel     CBC with platelets differential     EKG 12-lead complete w/read - Clinics        Primary Care Provider Office Phone # Fax #    Darlene Daniela Sultana [phone] [address] [address]        Equal Access to Services     KIKE EWING : Hadii aad ku hadasho Soomaali, waaxda luqadaha, qaybta kaalmada adeegyada, waxay timoin lore gomez . So [facility] [phone].    ATENCIÓN: Si habla español, tiene a cassidy disposición servicios gratuitos de asistencia lingüística. Llroque al [phone].    We comply with applicable federal civil rights laws and [place] laws. We do not discriminate on the basis of race, color, national origin, age, disability, sex, sexual orientation, or gender identity.            Thank you!     Thank you for choosing [facility]  for your care. Our goal is always to provide you with excellent care. Hearing back from our patients is one way we can continue to improve our services. Please take a few minutes to complete the written survey that you may receive in the mail after your visit with us. Thank you!              Your Updated Medication List - Protect others around you: Learn how to safely use, store and throw away your medicines at www.disposemymeds.org.          This list is accurate as of: 10/12/17  9:56 AM.  Always use your most recent med list.                   Brand Name Dispense Instructions for use Diagnosis    Adult Blood Pressure Cuff Lg Kit     1 kit    1 Device 2 times daily    HTN, goal below 140/90       amLODIPine 5 MG tablet    NORVASC    30 tablet    Take 1 tablet (5 mg) by mouth daily    Essential hypertension       aspirin 81 MG EC tablet     30 tablet    Take 1 tablet by mouth daily.    NSTEMI (non-ST elevated myocardial infarction) (H), ACS (acute coronary syndrome) (H)       gabapentin 100 MG capsule    NEURONTIN    180 capsule    Take 1 capsule (100 mg) by mouth 2 times daily    Chronic back pain, unspecified back location, unspecified back pain laterality       hydrochlorothiazide 25 MG tablet    HYDRODIURIL    90 tablet    Take 1 tablet (25 mg) by mouth daily    Essential hypertension       imatinib 400 MG tablet    GLEEVEC    30 tablet    Take 1 tablet (400 mg) by mouth daily Take with a meal and a large glass of water.    Malignant gastrointestinal stromal tumor, unspecified site (H)       lisinopril 40 MG tablet    PRINIVIL/ZESTRIL    90 tablet    Take 1 tablet (40 mg) by mouth daily    Essential hypertension with goal blood pressure less than 140/90       LORazepam 1 MG tablet    ATIVAN    30 tablet    Take 1 tablet (1 mg) by mouth At Bedtime    Malignant gastrointestinal stromal tumor, unspecified site (H)       methimazole 5 MG tablet    TAPAZOLE    90 tablet    Take 1 tablet (5 mg) by mouth daily ALTERNATE 5 MG AND 2.5 MG (1/2 TABLET) EVERY DAY    Hyperthyroidism       metoprolol 100 MG tablet    LOPRESSOR    180 tablet    TAKE ONE TABLET BY MOUTH TWICE A DAY    Essential hypertension with goal blood pressure less than 140/90       oxyCODONE 5 MG IR tablet    ROXICODONE    180 tablet     Take 1-2 tablets (5-10 mg) by mouth 2 times daily Wean off.  1/2 tab and 1 and 1/2 3 days, then 1/2 twice a day for three days, then 1/2 a day for 3 days then every other day for 3 days.    Chronic pain syndrome       rosuvastatin 5 MG tablet    CRESTOR    45 tablet    Take 1 tablet (5 mg) by mouth every other day    Hyperlipidemia LDL goal <160

## 2017-10-12 NOTE — ED AVS SNAPSHOT
[facility] Emergency Department    [address] [facility] [address]    Phone:  [phone]    Fax:  [phone]                                       Idalmis Abdul   MRN: [id number]    Department:  [facility] Emergency Department   Date of Visit:  10/12/2017           Patient Information     Date Of Birth          1945        Your diagnoses for this visit were:     Drug-induced constipation Related to chronic opiate use.    History of uterine prolapse     Chronic midline low back pain without sciatica Due to scoliosis and spondylolisthesis       You were seen by William Carrillo MD.      Follow-up Information     Follow up with Ruth Farooq MD.    Specialty:  OB/Gyn    Why:  Keep you coming procedure with Dr. bird for care for uterine prolapse    Contact information:    [facility] [address]  [phone]          Follow up with Darlene Sultana MD.    Specialty:  Family Practice    Why:  Consider adding milk of magnesia every other day to MiraLAX to produce constipation particularly with chronic opiate use    Contact information:    [address] [facility] [address]  [phone]        Discharge References/Attachments     CONSTIPATION, TREATING (ENGLISH)    CONSTIPATION (ADULT) (ENGLISH)      Future Appointments        Provider Department Dept Phone Center    1/9/2018 9:00 AM [facility] IMAGING CENTER ULTRASOUND ROOM 2 [facility] [phone] [facility]    1/9/2018 11:00 AM Swetha Antoine MD [facility] Endocrinology [phone] [facility]    1/12/2018 9:30 AM [facility] CT ROOM 1 [facility] [phone] [facility]    1/15/2018 8:45 AM Blayne Schmitz MD [facility] [phone] [facility]      24 Hour Appointment Hotline       To make an appointment at any [facility], call [phone] ([phone]). If you don't have a family doctor or clinic, we will help you  find one. [facility] are conveniently located to serve the needs of you and your family.             Review of your medicines      Our records show that you are taking the medicines listed below. If these are incorrect, please call your family doctor or clinic.        Dose / Directions Last dose taken    Adult Blood Pressure Cuff Lg Kit   Dose:  1 Device   Quantity:  1 kit        1 Device 2 times daily   Refills:  1        amLODIPine 5 MG tablet   Commonly known as:  NORVASC   Dose:  5 mg   Quantity:  30 tablet        Take 1 tablet (5 mg) by mouth daily   Refills:  11        aspirin 81 MG EC tablet   Dose:  81 mg   Quantity:  30 tablet        Take 1 tablet by mouth daily.   Refills:  2        GLEEVEC PO   Dose:  400 mg        Take 400 mg by mouth daily   Refills:  0        hydrochlorothiazide 25 MG tablet   Commonly known as:  HYDRODIURIL   Dose:  25 mg   Quantity:  90 tablet        Take 1 tablet (25 mg) by mouth daily   Refills:  3        lisinopril 40 MG tablet   Commonly known as:  PRINIVIL/ZESTRIL   Dose:  40 mg   Quantity:  90 tablet        Take 1 tablet (40 mg) by mouth daily   Refills:  3        LORazepam 1 MG tablet   Commonly known as:  ATIVAN   Dose:  1 mg   Quantity:  30 tablet        Take 1 tablet (1 mg) by mouth At Bedtime   Refills:  5        methimazole 5 MG tablet   Commonly known as:  TAPAZOLE   Dose:  5 mg   Quantity:  90 tablet        Take 1 tablet (5 mg) by mouth daily ALTERNATE 5 MG AND 2.5 MG (1/2 TABLET) EVERY DAY   Refills:  3        metoprolol 100 MG tablet   Commonly known as:  LOPRESSOR   Quantity:  180 tablet        TAKE ONE TABLET BY MOUTH TWICE A DAY   Refills:  3        oxyCODONE 5 MG IR tablet   Commonly known as:  ROXICODONE   Dose:  5-10 mg   Quantity:  180 tablet        Take 1-2 tablets (5-10 mg) by mouth 2 times daily Wean off.  1/2 tab and 1 and 1/2 3 days, then 1/2 twice a day for three days, then 1/2 a day for 3 days then every other day for 3 days.   Refills:  0         polyethylene glycol powder   Commonly known as:  MIRALAX/GLYCOLAX   Dose:  17 g        Take 17 g by mouth daily   Refills:  0        rosuvastatin 5 MG tablet   Commonly known as:  CRESTOR   Dose:  5 mg   Quantity:  45 tablet        Take 1 tablet (5 mg) by mouth every other day   Refills:  3                Procedures and tests performed during your visit     KUB XR    Saline Lock IV      Orders Needing Specimen Collection     None      Pending Results     No orders found from 10/10/2017 to 10/13/2017.            Pending Culture Results     No orders found from 10/10/2017 to 10/13/2017.            Pending Results Instructions     If you had any lab results that were not finalized at the time of your Discharge, you can call the ED Lab Result RN at [phone]. You will be contacted by this team for any positive Lab results or changes in treatment. The nurses are available 7 days a week from 10A to 6:30P.  You can leave a message 24 hours per day and they will return your call.        Test Results From Your Hospital Stay        10/12/2017  5:47 PM      Narrative     ABDOMEN ONE VIEW  10/12/2017 5:41 PM      HISTORY: Abdominal pain. History of uterine prolapse and constipation.  Evaluate for stool volume versus other acute intra-abdominal process.     COMPARISON: None.        Impression     IMPRESSION: The bowel gas pattern is unremarkable. Moderate amount of  stool in the colon. Probable calcified uterine fibroids. Thoracolumbar  scoliosis.     LENNY STUART MD                Thank you for choosing [facility]       Thank you for choosing [facility] for your care. Our goal is always to provide you with excellent care. Hearing back from our patients is one way we can continue to improve our services. Please take a few minutes to complete the written survey that you may receive in the mail after you visit with us. Thank you!        [employer] Information     [employer] lets you send messages to your doctor, view your test  "results, renew your prescriptions, schedule appointments and more. To sign up, go to [email or web address] . Click on \"Log in\" on the left side of the screen, which will take you to the Welcome page. Then click on \"Sign up Now\" on the right side of the page.     You will be asked to enter the access code listed below, as well as some personal information. Please follow the directions to create your username and password.     Your access code is: [email or web address]  Expires: 2017  5:20 PM     Your access code will  in 90 days. If you need help or a new code, please call your [place] clinic or [phone].        Care EveryWhere ID     This is your Care EveryWhere ID. This could be used by other organizations to access your [place] medical records  [id number]        Equal Access to Services     [facility] : Hadgretel Maloney, haritha vuong, dacia oglesbyalolaf starr, patricio gomez . So [facility] [phone].    ATENCIÓN: Si habla español, tiene a cassidy disposición servicios gratuitos de asistencia lingüística. Llame al [phone].    We comply with applicable federal civil rights laws and [place] laws. We do not discriminate on the basis of race, color, national origin, age, disability, sex, sexual orientation, or gender identity.            After Visit Summary       This is your record. Keep this with you and show to your community pharmacist(s) and doctor(s) at your next visit.                  "

## 2017-10-13 ENCOUNTER — TELEPHONE (OUTPATIENT)
Dept: OBGYN | Facility: CLINIC | Age: 72
End: 2017-10-13

## 2017-10-13 NOTE — ED NOTES
Pt had moderate to large size formed BM. Pt reports some abdominal cramping. Pt wondering if all stool has exited.

## 2017-10-13 NOTE — LETTER
October 13, 2017      Idalmis Abdul     [facility] [address]    Dear ,      This letter is to remind you that you are due for your mammogram.    Please call [phone] to schedule your appointment at your earliest convenience.     If you have completed the tests outside of [place], please have the results forwarded to our office. We will update the chart for your primary Physician to review before your next annual physical.     Sincerely,      Ruth Farooq MD/tk

## 2017-10-13 NOTE — ED NOTES
Pt with 10 second LOC on toilet, RN with patient during episode. Pt noted to feel nauseated with diaphoresis. Pt with abdominal cramping and frequent episodes of stooling. RN to hold patient on toilet until ERT able to assist in helping patient back to bed. Pt was able to stand on her own. MD aware. IV fluids infusing.     Pt has had additional large unformed BM.

## 2017-10-13 NOTE — TELEPHONE ENCOUNTER
Panel Management Review      Patient has the following on her problem list: None      Composite cancer screening  Chart review shows that this patient is due/due soon for the following Mammogram  Summary:    Patient is due/failing the following:   MAMMOGRAM    Action needed:   Patient needs office visit for mammogram.    Type of outreach:    Sent letter.    Questions for provider review:    None                                                                                                                                    Tegan Hernandez

## 2017-10-16 ENCOUNTER — ANESTHESIA EVENT (OUTPATIENT)
Dept: SURGERY | Facility: CLINIC | Age: 72
End: 2017-10-16
Payer: MEDICARE

## 2017-10-17 ENCOUNTER — HOSPITAL ENCOUNTER (OUTPATIENT)
Facility: CLINIC | Age: 72
Discharge: HOME | End: 2017-10-17
Attending: OBSTETRICS & GYNECOLOGY | Admitting: OBSTETRICS & GYNECOLOGY
Payer: MEDICARE

## 2017-10-17 ENCOUNTER — SURGERY (OUTPATIENT)
Age: 72
End: 2017-10-17

## 2017-10-17 ENCOUNTER — ANESTHESIA (OUTPATIENT)
Dept: SURGERY | Facility: CLINIC | Age: 72
End: 2017-10-17
Payer: MEDICARE

## 2017-10-17 VITALS
TEMPERATURE: 97.4 F | SYSTOLIC BLOOD PRESSURE: 113 MMHG | RESPIRATION RATE: 16 BRPM | WEIGHT: 157 LBS | HEIGHT: 63 IN | OXYGEN SATURATION: 93 % | BODY MASS INDEX: 27.82 KG/M2 | DIASTOLIC BLOOD PRESSURE: 48 MMHG

## 2017-10-17 DIAGNOSIS — Z98.890 POSTOPERATIVE STATE: Primary | ICD-10-CM

## 2017-10-17 LAB
ABO + RH BLD: NORMAL
ABO + RH BLD: NORMAL
ANION GAP SERPL CALCULATED.3IONS-SCNC: 6 MMOL/L (ref 3–14)
BLD GP AB SCN SERPL QL: NORMAL
BLOOD BANK CMNT PATIENT-IMP: NORMAL
BUN SERPL-MCNC: 17 MG/DL (ref 7–30)
CALCIUM SERPL-MCNC: 8.8 MG/DL (ref 8.5–10.1)
CHLORIDE SERPL-SCNC: 103 MMOL/L (ref 94–109)
CO2 SERPL-SCNC: 26 MMOL/L (ref 20–32)
CREAT SERPL-MCNC: 1.15 MG/DL (ref 0.52–1.04)
ERYTHROCYTE [DISTWIDTH] IN BLOOD BY AUTOMATED COUNT: 14.7 % (ref 10–15)
GFR SERPL CREATININE-BSD FRML MDRD: 46 ML/MIN/1.7M2
GLUCOSE SERPL-MCNC: 106 MG/DL (ref 70–99)
HBA1C MFR BLD: 5.5 % (ref 4.3–6)
HCT VFR BLD AUTO: 32.6 % (ref 35–47)
HGB BLD-MCNC: 10.8 G/DL (ref 11.7–15.7)
MCH RBC QN AUTO: 29.1 PG (ref 26.5–33)
MCHC RBC AUTO-ENTMCNC: 33.1 G/DL (ref 31.5–36.5)
MCV RBC AUTO: 88 FL (ref 78–100)
PLATELET # BLD AUTO: 211 10E9/L (ref 150–450)
POTASSIUM SERPL-SCNC: 3.6 MMOL/L (ref 3.4–5.3)
RBC # BLD AUTO: 3.71 10E12/L (ref 3.8–5.2)
SODIUM SERPL-SCNC: 135 MMOL/L (ref 133–144)
SPECIMEN EXP DATE BLD: NORMAL
WBC # BLD AUTO: 6.4 10E9/L (ref 4–11)

## 2017-10-17 PROCEDURE — 83036 HEMOGLOBIN GLYCOSYLATED A1C: CPT | Performed by: OBSTETRICS & GYNECOLOGY

## 2017-10-17 PROCEDURE — 85027 COMPLETE CBC AUTOMATED: CPT | Performed by: OBSTETRICS & GYNECOLOGY

## 2017-10-17 PROCEDURE — 25000128 H RX IP 250 OP 636: Performed by: NURSE ANESTHETIST, CERTIFIED REGISTERED

## 2017-10-17 PROCEDURE — 27210794 ZZH OR GENERAL SUPPLY STERILE: Performed by: OBSTETRICS & GYNECOLOGY

## 2017-10-17 PROCEDURE — 58260 VAGINAL HYSTERECTOMY: CPT | Performed by: OBSTETRICS & GYNECOLOGY

## 2017-10-17 PROCEDURE — 86900 BLOOD TYPING SEROLOGIC ABO: CPT | Performed by: OBSTETRICS & GYNECOLOGY

## 2017-10-17 PROCEDURE — 25000125 ZZHC RX 250: Performed by: NURSE ANESTHETIST, CERTIFIED REGISTERED

## 2017-10-17 PROCEDURE — 86901 BLOOD TYPING SEROLOGIC RH(D): CPT | Performed by: OBSTETRICS & GYNECOLOGY

## 2017-10-17 PROCEDURE — 25000125 ZZHC RX 250: Performed by: OBSTETRICS & GYNECOLOGY

## 2017-10-17 PROCEDURE — A9270 NON-COVERED ITEM OR SERVICE: HCPCS | Mod: GY | Performed by: OBSTETRICS & GYNECOLOGY

## 2017-10-17 PROCEDURE — 86850 RBC ANTIBODY SCREEN: CPT | Performed by: OBSTETRICS & GYNECOLOGY

## 2017-10-17 PROCEDURE — 57282 COLPOPEXY EXTRAPERITONEAL: CPT | Mod: 80 | Performed by: OBSTETRICS & GYNECOLOGY

## 2017-10-17 PROCEDURE — 25000128 H RX IP 250 OP 636: Performed by: OBSTETRICS & GYNECOLOGY

## 2017-10-17 PROCEDURE — 71000013 ZZH RECOVERY PHASE 1 LEVEL 1 EA ADDTL HR: Performed by: OBSTETRICS & GYNECOLOGY

## 2017-10-17 PROCEDURE — 36000058 ZZH SURGERY LEVEL 3 EA 15 ADDTL MIN: Performed by: OBSTETRICS & GYNECOLOGY

## 2017-10-17 PROCEDURE — 57282 COLPOPEXY EXTRAPERITONEAL: CPT | Mod: 59 | Performed by: OBSTETRICS & GYNECOLOGY

## 2017-10-17 PROCEDURE — 36000056 ZZH SURGERY LEVEL 3 1ST 30 MIN: Performed by: OBSTETRICS & GYNECOLOGY

## 2017-10-17 PROCEDURE — 36415 COLL VENOUS BLD VENIPUNCTURE: CPT | Performed by: OBSTETRICS & GYNECOLOGY

## 2017-10-17 PROCEDURE — 25000132 ZZH RX MED GY IP 250 OP 250 PS 637: Mod: GY | Performed by: OBSTETRICS & GYNECOLOGY

## 2017-10-17 PROCEDURE — 71000012 ZZH RECOVERY PHASE 1 LEVEL 1 FIRST HR: Performed by: OBSTETRICS & GYNECOLOGY

## 2017-10-17 PROCEDURE — 80048 BASIC METABOLIC PNL TOTAL CA: CPT | Performed by: OBSTETRICS & GYNECOLOGY

## 2017-10-17 PROCEDURE — 88307 TISSUE EXAM BY PATHOLOGIST: CPT | Performed by: OBSTETRICS & GYNECOLOGY

## 2017-10-17 PROCEDURE — 37000008 ZZH ANESTHESIA TECHNICAL FEE, 1ST 30 MIN: Performed by: OBSTETRICS & GYNECOLOGY

## 2017-10-17 PROCEDURE — 71000027 ZZH RECOVERY PHASE 2 EACH 15 MINS: Performed by: OBSTETRICS & GYNECOLOGY

## 2017-10-17 PROCEDURE — 37000009 ZZH ANESTHESIA TECHNICAL FEE, EACH ADDTL 15 MIN: Performed by: OBSTETRICS & GYNECOLOGY

## 2017-10-17 PROCEDURE — 40000306 ZZH STATISTIC PRE PROC ASSESS II: Performed by: OBSTETRICS & GYNECOLOGY

## 2017-10-17 PROCEDURE — 88307 TISSUE EXAM BY PATHOLOGIST: CPT | Mod: 26 | Performed by: OBSTETRICS & GYNECOLOGY

## 2017-10-17 PROCEDURE — 58260 VAGINAL HYSTERECTOMY: CPT | Mod: 80 | Performed by: OBSTETRICS & GYNECOLOGY

## 2017-10-17 RX ORDER — KETOROLAC TROMETHAMINE 30 MG/ML
INJECTION, SOLUTION INTRAMUSCULAR; INTRAVENOUS PRN
Status: DISCONTINUED | OUTPATIENT
Start: 2017-10-17 | End: 2017-10-17

## 2017-10-17 RX ORDER — LIDOCAINE 40 MG/G
CREAM TOPICAL
Status: DISCONTINUED | OUTPATIENT
Start: 2017-10-17 | End: 2017-10-17 | Stop reason: HOSPADM

## 2017-10-17 RX ORDER — DEXAMETHASONE SODIUM PHOSPHATE 4 MG/ML
INJECTION, SOLUTION INTRA-ARTICULAR; INTRALESIONAL; INTRAMUSCULAR; INTRAVENOUS; SOFT TISSUE PRN
Status: DISCONTINUED | OUTPATIENT
Start: 2017-10-17 | End: 2017-10-17

## 2017-10-17 RX ORDER — ONDANSETRON 2 MG/ML
4 INJECTION INTRAMUSCULAR; INTRAVENOUS EVERY 30 MIN PRN
Status: DISCONTINUED | OUTPATIENT
Start: 2017-10-17 | End: 2017-10-17 | Stop reason: HOSPADM

## 2017-10-17 RX ORDER — ONDANSETRON 4 MG/1
4-8 TABLET, ORALLY DISINTEGRATING ORAL EVERY 8 HOURS PRN
Qty: 4 TABLET | Refills: 0 | Status: SHIPPED | OUTPATIENT
Start: 2017-10-17 | End: 2018-05-22

## 2017-10-17 RX ORDER — CEFAZOLIN SODIUM 2 G/100ML
2 INJECTION, SOLUTION INTRAVENOUS
Status: COMPLETED | OUTPATIENT
Start: 2017-10-17 | End: 2017-10-17

## 2017-10-17 RX ORDER — AMOXICILLIN 250 MG
1-2 CAPSULE ORAL 2 TIMES DAILY
Qty: 30 TABLET | Refills: 0 | Status: SHIPPED | OUTPATIENT
Start: 2017-10-17 | End: 2017-11-27

## 2017-10-17 RX ORDER — HYDROXYZINE HYDROCHLORIDE 10 MG/1
10 TABLET, FILM COATED ORAL
Status: COMPLETED | OUTPATIENT
Start: 2017-10-17 | End: 2017-10-17

## 2017-10-17 RX ORDER — CEFAZOLIN SODIUM 1 G/3ML
1 INJECTION, POWDER, FOR SOLUTION INTRAMUSCULAR; INTRAVENOUS SEE ADMIN INSTRUCTIONS
Status: DISCONTINUED | OUTPATIENT
Start: 2017-10-17 | End: 2017-10-17 | Stop reason: HOSPADM

## 2017-10-17 RX ORDER — ONDANSETRON 4 MG/1
4 TABLET, ORALLY DISINTEGRATING ORAL
Status: DISCONTINUED | OUTPATIENT
Start: 2017-10-17 | End: 2017-10-17 | Stop reason: HOSPADM

## 2017-10-17 RX ORDER — OXYCODONE HYDROCHLORIDE 5 MG/1
5-10 TABLET ORAL
Qty: 40 TABLET | Refills: 0 | Status: SHIPPED | OUTPATIENT
Start: 2017-10-17 | End: 2017-11-27

## 2017-10-17 RX ORDER — FENTANYL CITRATE 50 UG/ML
25-50 INJECTION, SOLUTION INTRAMUSCULAR; INTRAVENOUS
Status: DISCONTINUED | OUTPATIENT
Start: 2017-10-17 | End: 2017-10-17 | Stop reason: HOSPADM

## 2017-10-17 RX ORDER — IBUPROFEN 200 MG
600 TABLET ORAL
Status: DISCONTINUED | OUTPATIENT
Start: 2017-10-17 | End: 2017-10-17 | Stop reason: HOSPADM

## 2017-10-17 RX ORDER — FENTANYL CITRATE 50 UG/ML
INJECTION, SOLUTION INTRAMUSCULAR; INTRAVENOUS PRN
Status: DISCONTINUED | OUTPATIENT
Start: 2017-10-17 | End: 2017-10-17

## 2017-10-17 RX ORDER — NEOSTIGMINE METHYLSULFATE 1 MG/ML
VIAL (ML) INJECTION PRN
Status: DISCONTINUED | OUTPATIENT
Start: 2017-10-17 | End: 2017-10-17

## 2017-10-17 RX ORDER — MEPERIDINE HYDROCHLORIDE 25 MG/ML
12.5 INJECTION INTRAMUSCULAR; INTRAVENOUS; SUBCUTANEOUS
Status: DISCONTINUED | OUTPATIENT
Start: 2017-10-17 | End: 2017-10-17 | Stop reason: HOSPADM

## 2017-10-17 RX ORDER — NALOXONE HYDROCHLORIDE 0.4 MG/ML
.1-.4 INJECTION, SOLUTION INTRAMUSCULAR; INTRAVENOUS; SUBCUTANEOUS
Status: DISCONTINUED | OUTPATIENT
Start: 2017-10-17 | End: 2017-10-17 | Stop reason: HOSPADM

## 2017-10-17 RX ORDER — ONDANSETRON 2 MG/ML
INJECTION INTRAMUSCULAR; INTRAVENOUS PRN
Status: DISCONTINUED | OUTPATIENT
Start: 2017-10-17 | End: 2017-10-17

## 2017-10-17 RX ORDER — PROPOFOL 10 MG/ML
INJECTION, EMULSION INTRAVENOUS CONTINUOUS PRN
Status: DISCONTINUED | OUTPATIENT
Start: 2017-10-17 | End: 2017-10-17

## 2017-10-17 RX ORDER — BUPIVACAINE HYDROCHLORIDE AND EPINEPHRINE 2.5; 5 MG/ML; UG/ML
INJECTION, SOLUTION INFILTRATION; PERINEURAL PRN
Status: DISCONTINUED | OUTPATIENT
Start: 2017-10-17 | End: 2017-10-17 | Stop reason: HOSPADM

## 2017-10-17 RX ORDER — ALBUTEROL SULFATE 0.83 MG/ML
2.5 SOLUTION RESPIRATORY (INHALATION) EVERY 4 HOURS PRN
Status: DISCONTINUED | OUTPATIENT
Start: 2017-10-17 | End: 2017-10-17 | Stop reason: HOSPADM

## 2017-10-17 RX ORDER — PROMETHAZINE HYDROCHLORIDE 25 MG/ML
12.5 INJECTION, SOLUTION INTRAMUSCULAR; INTRAVENOUS
Status: DISCONTINUED | OUTPATIENT
Start: 2017-10-17 | End: 2017-10-17 | Stop reason: HOSPADM

## 2017-10-17 RX ORDER — LIDOCAINE HYDROCHLORIDE 10 MG/ML
INJECTION, SOLUTION INFILTRATION; PERINEURAL PRN
Status: DISCONTINUED | OUTPATIENT
Start: 2017-10-17 | End: 2017-10-17

## 2017-10-17 RX ORDER — GLYCOPYRROLATE 0.2 MG/ML
INJECTION, SOLUTION INTRAMUSCULAR; INTRAVENOUS PRN
Status: DISCONTINUED | OUTPATIENT
Start: 2017-10-17 | End: 2017-10-17

## 2017-10-17 RX ORDER — OXYCODONE HYDROCHLORIDE 5 MG/1
5-10 TABLET ORAL
Status: COMPLETED | OUTPATIENT
Start: 2017-10-17 | End: 2017-10-17

## 2017-10-17 RX ORDER — SODIUM CHLORIDE, SODIUM LACTATE, POTASSIUM CHLORIDE, CALCIUM CHLORIDE 600; 310; 30; 20 MG/100ML; MG/100ML; MG/100ML; MG/100ML
INJECTION, SOLUTION INTRAVENOUS CONTINUOUS
Status: DISCONTINUED | OUTPATIENT
Start: 2017-10-17 | End: 2017-10-17 | Stop reason: HOSPADM

## 2017-10-17 RX ORDER — OXYCODONE HCL 10 MG/1
10 TABLET, FILM COATED, EXTENDED RELEASE ORAL ONCE
Status: COMPLETED | OUTPATIENT
Start: 2017-10-17 | End: 2017-10-17

## 2017-10-17 RX ORDER — PROPOFOL 10 MG/ML
INJECTION, EMULSION INTRAVENOUS PRN
Status: DISCONTINUED | OUTPATIENT
Start: 2017-10-17 | End: 2017-10-17

## 2017-10-17 RX ORDER — ONDANSETRON 4 MG/1
4 TABLET, ORALLY DISINTEGRATING ORAL EVERY 30 MIN PRN
Status: DISCONTINUED | OUTPATIENT
Start: 2017-10-17 | End: 2017-10-17 | Stop reason: HOSPADM

## 2017-10-17 RX ORDER — HYDROMORPHONE HYDROCHLORIDE 1 MG/ML
.3-.5 INJECTION, SOLUTION INTRAMUSCULAR; INTRAVENOUS; SUBCUTANEOUS EVERY 10 MIN PRN
Status: DISCONTINUED | OUTPATIENT
Start: 2017-10-17 | End: 2017-10-17 | Stop reason: HOSPADM

## 2017-10-17 RX ORDER — METOCLOPRAMIDE HYDROCHLORIDE 5 MG/ML
INJECTION INTRAMUSCULAR; INTRAVENOUS PRN
Status: DISCONTINUED | OUTPATIENT
Start: 2017-10-17 | End: 2017-10-17

## 2017-10-17 RX ORDER — PHYSOSTIGMINE SALICYLATE 1 MG/ML
1.2 INJECTION INTRAVENOUS
Status: DISCONTINUED | OUTPATIENT
Start: 2017-10-17 | End: 2017-10-17 | Stop reason: HOSPADM

## 2017-10-17 RX ADMIN — PROPOFOL 200 MG: 10 INJECTION, EMULSION INTRAVENOUS at 09:08

## 2017-10-17 RX ADMIN — MIDAZOLAM HYDROCHLORIDE 2 MG: 1 INJECTION, SOLUTION INTRAMUSCULAR; INTRAVENOUS at 09:03

## 2017-10-17 RX ADMIN — GLYCOPYRROLATE 0.4 MG: 0.2 INJECTION, SOLUTION INTRAMUSCULAR; INTRAVENOUS at 10:09

## 2017-10-17 RX ADMIN — ONDANSETRON 4 MG: 2 INJECTION INTRAMUSCULAR; INTRAVENOUS at 09:21

## 2017-10-17 RX ADMIN — HYDROMORPHONE HYDROCHLORIDE 0.5 MG: 1 INJECTION, SOLUTION INTRAMUSCULAR; INTRAVENOUS; SUBCUTANEOUS at 11:34

## 2017-10-17 RX ADMIN — FENTANYL CITRATE 50 MCG: 50 INJECTION, SOLUTION INTRAMUSCULAR; INTRAVENOUS at 09:03

## 2017-10-17 RX ADMIN — GLYCOPYRROLATE 0.1 MG: 0.2 INJECTION, SOLUTION INTRAMUSCULAR; INTRAVENOUS at 09:08

## 2017-10-17 RX ADMIN — FENTANYL CITRATE 25 MCG: 50 INJECTION INTRAMUSCULAR; INTRAVENOUS at 11:04

## 2017-10-17 RX ADMIN — OXYCODONE HYDROCHLORIDE 10 MG: 10 TABLET, FILM COATED, EXTENDED RELEASE ORAL at 07:55

## 2017-10-17 RX ADMIN — FENTANYL CITRATE 50 MCG: 50 INJECTION INTRAMUSCULAR; INTRAVENOUS at 11:16

## 2017-10-17 RX ADMIN — HYDROMORPHONE HYDROCHLORIDE 0.5 MG: 1 INJECTION, SOLUTION INTRAMUSCULAR; INTRAVENOUS; SUBCUTANEOUS at 11:19

## 2017-10-17 RX ADMIN — SODIUM CHLORIDE, POTASSIUM CHLORIDE, SODIUM LACTATE AND CALCIUM CHLORIDE: 600; 310; 30; 20 INJECTION, SOLUTION INTRAVENOUS at 07:54

## 2017-10-17 RX ADMIN — KETOROLAC TROMETHAMINE 15 MG: 30 INJECTION, SOLUTION INTRAMUSCULAR at 10:27

## 2017-10-17 RX ADMIN — VASOPRESSIN 9 ML: 20 INJECTION INTRAMUSCULAR; SUBCUTANEOUS at 10:08

## 2017-10-17 RX ADMIN — PROPOFOL 250 MCG/KG/MIN: 10 INJECTION, EMULSION INTRAVENOUS at 09:12

## 2017-10-17 RX ADMIN — DEXAMETHASONE SODIUM PHOSPHATE 8 MG: 4 INJECTION, SOLUTION INTRA-ARTICULAR; INTRALESIONAL; INTRAMUSCULAR; INTRAVENOUS; SOFT TISSUE at 09:21

## 2017-10-17 RX ADMIN — CEFAZOLIN SODIUM 2 G: 2 INJECTION, SOLUTION INTRAVENOUS at 09:03

## 2017-10-17 RX ADMIN — LIDOCAINE HYDROCHLORIDE 100 MG: 10 INJECTION, SOLUTION INFILTRATION; PERINEURAL at 09:08

## 2017-10-17 RX ADMIN — OXYCODONE HYDROCHLORIDE 5 MG: 5 TABLET ORAL at 11:43

## 2017-10-17 RX ADMIN — PHENYLEPHRINE HYDROCHLORIDE 150 MCG: 10 INJECTION, SOLUTION INTRAMUSCULAR; INTRAVENOUS; SUBCUTANEOUS at 09:45

## 2017-10-17 RX ADMIN — FENTANYL CITRATE 25 MCG: 50 INJECTION INTRAMUSCULAR; INTRAVENOUS at 11:09

## 2017-10-17 RX ADMIN — FENTANYL CITRATE 200 MCG: 50 INJECTION, SOLUTION INTRAMUSCULAR; INTRAVENOUS at 09:08

## 2017-10-17 RX ADMIN — SODIUM CHLORIDE, POTASSIUM CHLORIDE, SODIUM LACTATE AND CALCIUM CHLORIDE: 600; 310; 30; 20 INJECTION, SOLUTION INTRAVENOUS at 10:08

## 2017-10-17 RX ADMIN — Medication 3 MG: at 10:09

## 2017-10-17 RX ADMIN — LIDOCAINE HYDROCHLORIDE 1 ML: 10 INJECTION, SOLUTION EPIDURAL; INFILTRATION; INTRACAUDAL; PERINEURAL at 07:53

## 2017-10-17 RX ADMIN — HYDROMORPHONE HYDROCHLORIDE 0.5 MG: 1 INJECTION, SOLUTION INTRAMUSCULAR; INTRAVENOUS; SUBCUTANEOUS at 09:43

## 2017-10-17 RX ADMIN — METOCLOPRAMIDE HYDROCHLORIDE 10 MG: 5 INJECTION INTRAMUSCULAR; INTRAVENOUS at 09:43

## 2017-10-17 RX ADMIN — PHENYLEPHRINE HYDROCHLORIDE 150 MCG: 10 INJECTION, SOLUTION INTRAMUSCULAR; INTRAVENOUS; SUBCUTANEOUS at 09:56

## 2017-10-17 RX ADMIN — BUPIVACAINE HYDROCHLORIDE AND EPINEPHRINE BITARTRATE 9 ML: 2.5; .005 INJECTION, SOLUTION INFILTRATION; PERINEURAL at 10:07

## 2017-10-17 RX ADMIN — HYDROXYZINE HYDROCHLORIDE 10 MG: 10 TABLET ORAL at 12:46

## 2017-10-17 RX ADMIN — ROCURONIUM BROMIDE 40 MG: 10 INJECTION INTRAVENOUS at 09:08

## 2017-10-17 RX ADMIN — PHENYLEPHRINE HYDROCHLORIDE 150 MCG: 10 INJECTION, SOLUTION INTRAMUSCULAR; INTRAVENOUS; SUBCUTANEOUS at 09:16

## 2017-10-17 ASSESSMENT — LIFESTYLE VARIABLES: TOBACCO_USE: 1

## 2017-10-17 NOTE — IP AVS SNAPSHOT
[facility] PreOP/Phase II    [address] [facility] [address]    Phone:  [phone]    Fax:  [phone]                                       After Visit Summary   10/17/2017    Idalmis Abdul    MRN: [id number]           After Visit Summary Signature Page     I have received my discharge instructions, and my questions have been answered. I have discussed any challenges I see with this plan with the nurse or doctor.    ..........................................................................................................................................  Patient/Patient Representative Signature      ..........................................................................................................................................  Patient Representative Print Name and Relationship to Patient    ..................................................               ................................................  Date                                            Time    ..........................................................................................................................................  Reviewed by Signature/Title    ...................................................              ..............................................  Date                                                            Time

## 2017-10-17 NOTE — ANESTHESIA PREPROCEDURE EVALUATION
Anesthesia Evaluation     . Pt has had prior anesthetic. Type: MAC and General    History of anesthetic complications   - PONV        ROS/MED HX    ENT/Pulmonary:     (+)tobacco use, Past use , . .    Neurologic:  - neg neurologic ROS     Cardiovascular:     (+) Dyslipidemia, hypertension--CAD, -past MI,CABG-. : . . ELIZALDE, . :. . Previous cardiac testing date:results:date: results:ECG reviewed date: results:Sinus Rhythm   -RSR(V1) -nondiagnostic.     PROBABLY NORMAL date: results:          METS/Exercise Tolerance:  3 - Able to walk 1-2 blocks without stopping   Hematologic:     (+) Anemia, History of Transfusion no previous transfusion reaction -      Musculoskeletal:   (+) arthritis, , , other musculoskeletal- back pain      GI/Hepatic: Comment: Takes meds occasionally    (+) GERD Other,       Renal/Genitourinary:  - ROS Renal section negative       Endo:     (+) thyroid problem  Thyroid disease - Other, Other Endocrine Disorder h/o graves disease.      Psychiatric:  - neg psychiatric ROS       Infectious Disease:  - neg infectious disease ROS       Malignancy:   (+) Malignancy History of Lung and Other  Lung CA Remission status post Surgery. Other CA GIST Remission status post Surgery         Other:    (+) No chance of pregnancy C-spine cleared: N/A, H/O Chronic Pain,H/O chronic opiod use , no other significant disability                    Physical Exam  Normal systems: cardiovascular, pulmonary and dental    Airway   Mallampati: I  TM distance: >3 FB  Neck ROM: full    Dental     Cardiovascular       Pulmonary                     Anesthesia Plan      History & Physical Review  History and physical reviewed and following examination; no interval change.    ASA Status:  3 .    NPO Status:  > 8 hours    Plan for General and ETT with Intravenous and Propofol induction. Maintenance will be TIVA.    PONV prophylaxis:  Ondansetron (or other 5HT-3) and Dexamethasone or Solumedrol       Postoperative  Care  Postoperative pain management:  IV analgesics and Oral pain medications.      Consents  Anesthetic plan, risks, benefits and alternatives discussed with:  Patient..                          .

## 2017-10-17 NOTE — ANESTHESIA POSTPROCEDURE EVALUATION
Patient: Idalmis Abdul    Procedure(s):  Total Vaginal Hysterectomy and Posterior Repair,Sacrospinous Vault Suspension - Wound Class: II-Clean Contaminated    Diagnosis:uterovaginal prolapse  Diagnosis Additional Information: No value filed.    Anesthesia Type:  General, ETT    Note:  Anesthesia Post Evaluation    Patient location during evaluation: Bedside  Patient participation: Able to fully participate in evaluation  Pain management: adequate  Airway patency: patent  Cardiovascular status: acceptable  Respiratory status: acceptable  Hydration status: acceptable  PONV: none     Anesthetic complications: None          Last vitals:  Vitals:    10/17/17 1200 10/17/17 1214 10/17/17 1250   BP: 106/51 113/48 (P) 112/58   Resp: 16 16 (P) 16   Temp:      SpO2:  93%          Electronically Signed By: JAIME Payne CRNA  October 17, 2017  4:06 PM

## 2017-10-17 NOTE — ANESTHESIA CARE TRANSFER NOTE
Patient: Idalmis Abdul    Procedure(s):  Total Vaginal Hysterectomy and Posterior Repair,Sacrospinous Vault Suspension - Wound Class: II-Clean Contaminated    Diagnosis: uterovaginal prolapse  Diagnosis Additional Information: No value filed.    Anesthesia Type:   General, ETT     Note:  Airway :Face Mask  Patient transferred to:PACU  Handoff Report: Identifed the Patient, Identified the Reponsible Provider, Reviewed the pertinent medical history, Discussed the surgical course, Reviewed Intra-OP anesthesia mangement and issues during anesthesia, Set expectations for post-procedure period and Allowed opportunity for questions and acknowledgement of understanding      Vitals: (Last set prior to Anesthesia Care Transfer)    CRNA VITALS  10/17/2017 0959 - 10/17/2017 1034      10/17/2017             Pulse: 68    SpO2: 99 %    Resp Rate (observed): 13                Electronically Signed By: Fielmon Ann CRNA, APRN CRNA  October 17, 2017  10:34 AM

## 2017-10-17 NOTE — H&P (VIEW-ONLY)
[facility]  [address] [address]  [phone]  Dept: [phone]    PRE-OP EVALUATION:  Today's date: 10/12/2017    Idalmis Abdul (: 1945) presents for pre-operative evaluation assessment as requested by Dr. Farooq.  She requires evaluation and anesthesia risk assessment prior to undergoing surgery/procedure for treatment of Uterovaginal prolapse .  Proposed procedure: Total Vaginal Hysterectomy,Anterior and Posterior Repair,Sacrospinous Vault Suspension    Date of Surgery/ Procedure: 10/17/17  Time of Surgery/ Procedure: 10:15 Am  Hospital/Surgical Facility: [facility]  Primary Physician: Darlene Sultana  Type of Anesthesia Anticipated: General    Patient has a Health Care Directive or Living Will:  YES, has one at home.    1. YES - DO YOU HAVE A HISTORY OF HEART ATTACK, STROKE, STENT, BYPASS OR SURGERY ON AN ARTERY IN THE HEAD, NECK, HEART OR LEG?  CABG x 4  2. NO - Do you ever have any pain or discomfort in your chest?  3. NO - Do you have a history of  Heart Failure?  4. YES - ARE YOUR TROUBLED BY SHORTNESS OF BREATH WHEN WALKING ON THE LEVEL, UP A SLIGHT HILL OR AT NIGHT? From smoking, history of lung cancer  5. NO - Do you currently have a cold, bronchitis or other respiratory infection?  6. YES - DO YOU HAVE A COUGH, SHORTNESS OF BREATH OR WHEEZING? Former smoker  7. NO - Do you sometimes get pains in the calves of your legs when you walk?  8. NO - Do you or anyone in your family have previous history of blood clots?  9. YES - DO YOU OR DOES ANYONE IN YOUR FAMILY HAVE A SERIOUS BLEEDING PROBLEM SUCH AS PROLONGED BLEEDING FOLLOWING SURGERIES OR CUTS? 10 transfusions after heart surgery  10. NO - Have you ever had problems with anemia or been told to take iron pills?  11. NO - HAVE YOU HAD ANY ABNORMAL BLOOD LOSS SUCH AS BLACK, TARRY OR BLOODY STOOLS, OR ABNORMAL VAGINAL BLEEDING?   12. YES - HAVE YOU EVER HAD A BLOOD TRANSFUSION? 10  units 2012 after CABG  13. NO - Have you or any of your relatives ever had problems with anesthesia?  14. NO - Do you have sleep apnea, excessive snoring or daytime drowsiness?  15. NO - Do you have any prosthetic heart valves?  16. NO - Do you have prosthetic joints?  17. NO - Is there any chance that you may be pregnant?        HPI:                                                      Brief HPI related to upcoming procedure: Idalmis Abdul is 71 year old white female with uterovaginal prolapse, osteoporosis, NSTEMI, chronic back pain on oxycodone, Grave's disease, hyperthyroidism on medication, gastric cancer  who is here to get clearance to have general anesthesia.        See problem list for active medical problems.  Problems all longstanding and stable, except as noted/documented.  See ROS for pertinent symptoms related to these conditions.                                                                                                  .    MEDICAL HISTORY:                                                    Patient Active Problem List    Diagnosis Date Noted     Uterovaginal prolapse 09/25/2017     Priority: Medium     Age-related osteoporosis without current pathological fracture 12/16/2014     Priority: Medium     PVD (posterior vitreous detachment) 11/17/2014     Priority: Medium     History of tobacco abuse 09/02/2014     Priority: Medium     QUIT in 2010       History of non-ST elevation myocardial infarction (NSTEMI) 09/02/2014     Priority: Medium     Chronic back pain 09/02/2014     Priority: Medium     Patient is followed by [facility] and [facility]   Med: 5mg oxycodone, 1-2 tabs q8 hours prn  Pain diagnosis: chronic back pain  Maximum use per month: #180 (but generally lasts about 90 days)  Expected duration: lifetime  Narcotic agreement on file:  YES - contract signed   Clinic visit recommended: Q 3 month         Graves' disease 08/19/2014     Priority: Medium     Thyroid eye  disease 04/28/2014     Priority: Medium     Eyelid retraction or lag 04/28/2014     Priority: Medium     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 Provider to review       Hyperthyroidism 02/04/2014     Priority: Medium     Hyperlipidemia LDL goal <160 12/17/2013     Priority: Medium     GERD (gastroesophageal reflux disease) 10/08/2013     Priority: Medium     Dyspnea 08/27/2013     Priority: Medium     S/P CABG x 4 08/05/2012     Priority: Medium     Postsurgical aortocoronary bypass status 07/04/2012     Priority: Medium     ASCVD (arteriosclerotic cardiovascular disease) 06/14/2012     Priority: Medium     NSTEMI (non-ST elevated myocardial infarction) (H) 06/12/2012     Priority: Medium     Eyelid edema 12/15/2011     Priority: Medium     side effect of gastric cancer medication       History of lung cancer 12/15/2011     Priority: Medium     S/p resection of right lung.  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 Schmitz, GI oncology-Gleevec treatment.  Surgical removal in fall 2004-no recurrence as of 3/05.     CA - lung cancer 4/2010    [facility], treated surgically     choledochal cyst 1963    CHOLEDOCHAL CYST, mild dilatation of biliary system     Coronary artery disease      Dislocated finger, left middle PIP 7/26/2013     Dyspnea 8/27/2013     Eyelid edema 12/15/2011    side effect of gastric cancer medication      GERD (gastroesophageal  reflux disease) 10/8/2013     GIST (gastrointestinal stromal tumor), malignant (H) 5/10/2011     Graves disease      Grief reaction 5/11/2009     History of lung cancer 12/15/2011    S/p resection of right lung.  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     Other chronic pain     back     Postsurgical aortocoronary bypass status 7/4/2012     S/P CABG x 4 8/5/2012     Strabismus      Tobacco use disorder     Quit     Unspecified essential hypertension      Past Surgical History:   Procedure Laterality Date     BYPASS GRAFT ARTERY CORONARY  6/14/2012    Procedure: BYPASS GRAFT ARTERY CORONARY;  Median Sternotomy Coronary Artery Bypass Graft  x 3        C THORACOSCOPY,DX W BX  fall 2003    benign tissue     CATARACT IOL, RT/LT      LE     CHOLECYSTECTOMY  1963     COLONOSCOPY  4-12-05     CORONARY ARTERY BYPASS      X4     CREATION PERICARDIAL WINDOW  6/15/2012    Procedure: CREATION PERICARDIAL WINDOW;  Mediastinal Exploration, Control of Bleeding;  Surgeon: Dwaine Griffin MD;  Location:  OR     EYE SURGERY  2014    left cataract removal     GI SURGERY  2003 and 2007    GIST tumor removal     GYN SURGERY      tubal ligation     PHACOEMULSIFICATION WITH STANDARD INTRAOCULAR LENS IMPLANT  3/24/2014    Procedure: PHACOEMULSIFICATION WITH STANDARD INTRAOCULAR LENS IMPLANT;  Left Kelman Phacoemulsification with Intraocular Lens Implant;  Surgeon: Magnus Mckeon MD;  Location: [facility] OR     RECESSION RESECTION WITH ADJUSTABLE SUTURE BILATERAL Bilateral 7/14/2016    Procedure: RECESSION RESECTION WITH ADJUSTABLE SUTURE BILATERAL;   Surgeon: Erma Ordaz MD;  Location: [facility] OR     SURGICAL HISTORY OF -   1963    cholecystectomy     SURGICAL HISTORY OF -   1970's    Tubal Ligation      SURGICAL HISTORY OF -   08/03    Explor. Lap, Excision of Small Bowel Tumor      SURGICAL HISTORY OF -   4/2010    lung cancer removed right lung     Current Outpatient Prescriptions   Medication Sig Dispense Refill     rosuvastatin (CRESTOR) 5 MG tablet Take 1 tablet (5 mg) by mouth every other day 45 tablet 3     oxyCODONE (ROXICODONE) 5 MG IR tablet Take 1-2 tablets (5-10 mg) by mouth 2 times daily Wean off.  1/2 tab and 1 and 1/2 3 days, then 1/2 twice a day for three days, then 1/2 a day for 3 days then every other day for 3 days. 180 tablet 0     methimazole (TAPAZOLE) 5 MG tablet Take 1 tablet (5 mg) by mouth daily ALTERNATE 5 MG AND 2.5 MG (1/2 TABLET) EVERY DAY 90 tablet 3     LORazepam (ATIVAN) 1 MG tablet Take 1 tablet (1 mg) by mouth At Bedtime 30 tablet 5     hydrochlorothiazide (HYDRODIURIL) 25 MG tablet Take 1 tablet (25 mg) by mouth daily 90 tablet 3     amLODIPine (NORVASC) 5 MG tablet Take 1 tablet (5 mg) by mouth daily 30 tablet 11     lisinopril (PRINIVIL,ZESTRIL) 40 MG tablet Take 1 tablet (40 mg) by mouth daily 90 tablet 3     Imatinib Mesylate (GLEEVEC PO) Take 400 mg by mouth daily       polyethylene glycol (MIRALAX/GLYCOLAX) powder Take 17 g by mouth daily       metoprolol (LOPRESSOR) 100 MG tablet TAKE ONE TABLET BY MOUTH TWICE A  tablet 3     Blood Pressure Monitoring (ADULT BLOOD PRESSURE CUFF LG) KIT 1 Device 2 times daily 1 kit 1     aspirin EC 81 MG EC tablet Take 1 tablet by mouth daily. 30 tablet 2     OTC products: None, except as noted above    Allergies   Allergen Reactions     Atorvastatin Cramps      Latex Allergy: NO    Social History   Substance Use Topics     Smoking status: Former Smoker     Packs/day: 0.50     Years: 49.00     Types: Cigarettes     Quit date: 4/19/2010     Smokeless tobacco: Never Used      Alcohol use No     History   Drug Use No       REVIEW OF SYSTEMS:                                                    Constitutional, HEENT, cardiovascular, pulmonary, gi and gu systems are negative, except as otherwise noted.      EXAM:                                                    /65  Pulse 69  Temp 98.9  F (37.2  C) (Tympanic)  Wt 157 lb 9.6 oz (71.5 kg)  LMP 01/01/1992  BMI 28.37 kg/m2    GENERAL APPEARANCE: healthy, alert and no distress     EYES: EOMI, PERRL     HENT: ear canals and TM's normal and nose and mouth without ulcers or lesions     NECK: no adenopathy, no asymmetry, masses, or scars and thyroid normal to palpation     RESP: lungs clear to auscultation - no rales, rhonchi or wheezes     CV: regular rates and rhythm, normal S1 S2, no S3 or S4 and no murmur, click or rub     ABDOMEN:  soft, nontender, no HSM or masses and bowel sounds normal     MS: extremities normal- no gross deformities noted, no evidence of inflammation in joints, FROM in all extremities.     SKIN: no suspicious lesions or rashes     NEURO: Normal strength and tone, sensory exam grossly normal, mentation intact and speech normal     PSYCH: mentation appears normal. and affect normal/bright     LYMPHATICS: No axillary, cervical, or supraclavicular nodes    DIAGNOSTICS:                                                    EKG: appears normal, NSR, normal axis, normal intervals, no acute ST/T changes c/w ischemia, no LVH by voltage criteria, unchanged from previous tracings    Recent Labs   Lab Test  07/07/17   0903  04/24/17   1103  01/13/17   0854   07/12/12   0653   07/09/12   1629   06/19/12   1550   06/15/12   0850   HGB  11.1*   --   11.8   < >  11.0*   < >  11.6*   < >   --    < >  9.3*   PLT  234   --   185   < >  189   < >   --    < >   --    < >  118*   INR   --    --    --    --   1.01   --   1.00   < >   --    < >  2.28*   NA  133  136  141   < >  131*   < >  134   < >   --    < >  147*   POTASSIUM  4.2  4.4   4.2   < >  3.4   < >  3.6   < >   --    < >  2.8*   CR  1.20*  1.02  1.00   < >  0.94   < >  1.23*   < >   --    < >   --    A1C   --    --    --    --    --    --    --    --   5.4   --   Duplicate request    < > = values in this interval not displayed.        IMPRESSION:                                                      Diagnosis/reason for consult:   1. Uterovaginal prolapse   cleared for general anesthesia  - Basic metabolic panel    2. Preop general physical exam  - EKG 12-lead complete w/read - Clinics    3. History of iron deficiency anemia  - CBC with platelets differential    4. Colon cancer screening  - Fecal colorectal cancer screen FIT; Future      The proposed surgical procedure is considered INTERMEDIATE risk.    REVISED CARDIAC RISK INDEX  The patient has the following serious cardiovascular risks for perioperative complications such as (MI, PE, VFib and 3  AV Block):  No serious cardiac risks  INTERPRETATION: The ASCVD Risk score (Kole COTTRELL Jr, et al., 2013) failed to calculate for the following reasons:    The patient has a prior MCI or stroke diagnosis      The patient has the following additional risks for perioperative complications:  No identified additional risks      ICD-10-CM    1. Uterovaginal prolapse N81.4 Basic metabolic panel   2. Preop general physical exam Z01.818 EKG 12-lead complete w/read - Clinics   3. History of iron deficiency anemia Z86.2 CBC with platelets differential   4. Colon cancer screening Z12.11 Fecal colorectal cancer screen FIT       RECOMMENDATIONS:                                                      --Consult hospital rounder / IM to assist post-op medical management    Cardiovascular Risk  Performs 4 METs exercise without symptoms (Light housework (dusting, washing dishes)) .   Patient is already on a Beta Blocker. Continue Betablocker therapy after surgery, using Beta blocker order set as necessary for NPO status.      Pulmonary Risk  Incentive spirometry post  op  Respiratory Therapy (Respiratory Care IP Consult)  post op  NG tube decompression if abdominal distension or significant vomiting       --Patient is to take all scheduled medications on the day of surgery EXCEPT for modifications listed below.    APPROVAL GIVEN to proceed with proposed procedure, without further diagnostic evaluation       Signed Electronically by: Darlene Sultana MD    Copy of this evaluation report is provided to requesting physician.    [facility] Preop Guidelines

## 2017-10-17 NOTE — DISCHARGE INSTRUCTIONS
Same Day Surgery Discharge Instructions  Special Precautions After Surgery - Adult    1. It is not unusual to feel lightheaded or faint, up to 24 hours after surgery or while taking pain medication.  If you have these symptoms; sit for a few minutes before standing and have someone assist you when getting up.  2. You should rest and relax for the next 24 hours and must have someone stay with you for at least 24 hours after your discharge.  3. DO NOT DRIVE any vehicle or operate mechanical equipment for 24 hours following the end of your surgery.  DO NOT DRIVE while taking narcotic pain medications that have been prescribed by your physician.  If you had a limb operated on, you must be able to use it fully to drive.  4. DO NOT drink alcoholic beverages for 24 hours following surgery or while taking prescription pain medication.  5. Drink clear liquids (apple juice, ginger ale, broth, 7-Up, etc.).  Progress to your regular diet as you feel able.  6. Any questions call your physician and do not make important decisions for 24 hours.    ACTIVITY  ? Up as tolerated with rest periods as needed.     INCISIONAL CARE  ? May shower tomorrow.  Cold pack to lower abdomen & warm pack OK to lower back.     Call for an appointment to return to the clinic.    Medications:  ? Had pain pill at 11:45am, had hydroxyzine at 12:45pm.     Additional discharge instructions: No pools or hot tubs either until OK by surgeon.  Be alert for fever or foul vaginal discharge & report to surgeon.  __________________________________________________________________________________________________________________________________  IMPORTANT NUMBERS:    [facility] Main Number:  [phone], [phone]  Pharmacy:  [phone]  Same Day Surgery:  [phone], Monday - Friday until 8:30 p.m.  Urgent Care:  [phone]  Emergency Room:  [phone]      [facility]:  [phone]                                                                              [facility]:  [phone] option 1  [facility]:  [phone]     [facility]:  [phone]   [facility]:  [phone]   Home Medical Equipment: [phone]  [facility]:  [phone]

## 2017-10-17 NOTE — BRIEF OP NOTE
[facility] Gynecology Brief Operative Note    Pre-operative diagnosis: uterovaginal prolapse   Post-operative diagnosis: Same   Procedure: Total vaginal hysterectomy, sacrospinous vault suspension, posterior vaginal repair   Surgeon: Ruth Farooq MD   Assistant(s): Derick   Anesthesia: General Endotracheal Anesthesia   Estimated blood loss: 50ml   Total IV fluids: (See anesthesia record)   Blood transfusion: No transfusion was given during surgery   Total urine output: (See anesthesia record)   Drains: None   Specimens: uterus   Findings:    Complications: None   Condition: Stable   Comments: See dictated operative report for full details

## 2017-10-17 NOTE — IP AVS SNAPSHOT
MRN:[id number]                      After Visit Summary   10/17/2017    Idalmis Abdul    MRN: [id number]           Thank you!     Thank you for choosing [facility] for your care. Our goal is always to provide you with excellent care. Hearing back from our patients is one way we can continue to improve our services. Please take a few minutes to complete the written survey that you may receive in the mail after you visit with us. Thank you!        Patient Information     Date Of Birth          1945        About your hospital stay     You were admitted on:  October 17, 2017 You last received care in the:  [facility] PreOP/Phase II    You were discharged on:  October 17, 2017       Who to Call     For medical emergencies, please call 911.  For non-urgent questions about your medical care, please call your primary care provider or clinic, [phone]  For questions related to your surgery, please call your surgery clinic        Attending Provider     Provider Specialty    Ruth Farooq MD OB/Gyn       Primary Care Provider Office Phone # Fax #    Darlene Daniela Sultana [phone]      After Care Instructions     Discharge Instructions       Resume pre procedure diet            Discharge Instructions       Pelvic Rest. No tampons, douching or intercourse for  6  weeks.            Discharge Instructions       Patient to arrange follow up appointment in 2  weeks            No alcohol       NO ALCOHOL for 24 hours post procedure            No driving or operating machinery       No driving or operating machinery until day after procedure            No lifting       No lifting over 10 pounds and no strenuous physical activity.  For 2 weeks            Shower        Shower on Post-op day  1.   DO NOT take a bath                  Your next 10 appointments already scheduled     Jan 09, 2018  9:00 AM CST   US THYROID with [address] [facility]  and [facility] [place])    [address]  [address] [facility] [address] [phone]           Please bring a list of your medicines (including vitamins, minerals and over-the-counter drugs). Also, tell your doctor about any allergies you may have. Wear comfortable clothes and leave your valuables at home.  You do not need to do anything special to prepare for your exam.  Please call the Imaging Department at your exam site with any questions.            Jan 09, 2018 11:00 AM CST   (Arrive by 10:45 AM)   RETURN ENDOCRINE with Swetha Antoine MD   [facility] ([facility] [place])    [address]  [address] [facility] [id number] [phone]            Jan 12, 2018  9:30 AM CST   CT CHEST ABDOMEN PELVIS W/O & W CONTRAST with [facility]   [facility] ([facility])    [address] [facility] [facility] [id number] [phone]           Please bring any scans or X-rays taken at other hospitals, if similar tests were done. Also bring a list of your medicines, including vitamins, minerals and over-the-counter drugs. It is safest to leave personal items at home.  Be sure to tell your doctor:   If you have any allergies.   If there s any chance you are pregnant.   If you are breastfeeding.   If you have any special needs.  You may have contrast for this exam. To prepare:   Do not eat or drink for 2 hours before your exam. If you need to take medicine, you may take it with small sips of water. (We may ask you to take liquid medicine as well.)   The day before your exam, drink extra fluids at least six 8-ounce glasses (unless your doctor tells you to restrict your fluids).  Patients over 70 or patients with diabetes or kidney problems:   If you haven t had a blood test (creatinine test) within the last 30 days, go to your clinic or Diagnostic Imaging Department for this test.  If you have diabetes:   If your kidney function is normal,  continue taking your metformin (Avandamet, Glucophage, Glucovance, Metaglip) on the day of your exam.   If your kidney function is abnormal, wait 48 hours before restarting this medicine.  You will have oral contrast for this exam:   You will drink the contrast at home. Get this from your clinic or Diagnostic Imaging Department. Please follow the directions given.  Please wear loose clothing, such as a sweat suit or jogging clothes. Avoid snaps, zippers and other metal. We may ask you to undress and put on a hospital gown.  If you have any questions, please call the Imaging Department where you will have your exam.            Marcos 15, 2018  8:45 AM CST   (Arrive by 8:30 AM)   Return Visit with Blayne Schmitz MD   [facility] ([facility])    [address] [place] [address] [phone]              Further instructions from your care team                           Same Day Surgery Discharge Instructions  Special Precautions After Surgery - Adult    1. It is not unusual to feel lightheaded or faint, up to 24 hours after surgery or while taking pain medication.  If you have these symptoms; sit for a few minutes before standing and have someone assist you when getting up.  2. You should rest and relax for the next 24 hours and must have someone stay with you for at least 24 hours after your discharge.  3. DO NOT DRIVE any vehicle or operate mechanical equipment for 24 hours following the end of your surgery.  DO NOT DRIVE while taking narcotic pain medications that have been prescribed by your physician.  If you had a limb operated on, you must be able to use it fully to drive.  4. DO NOT drink alcoholic beverages for 24 hours following surgery or while taking prescription pain medication.  5. Drink clear liquids (apple juice, ginger ale, broth, 7-Up, etc.).  Progress to your regular diet as you feel able.  6. Any questions call your  "physician and do not make important decisions for 24 hours.    ACTIVITY  ? Up as tolerated with rest periods as needed.     INCISIONAL CARE  ? May shower tomorrow.  Cold pack to lower abdomen & warm pack OK to lower back.     Call for an appointment to return to the clinic.    Medications:  ? Had pain pill at 11:45am, had hydroxyzine at 12:45pm.     Additional discharge instructions: No pools or hot tubs either until OK by surgeon.  Be alert for fever or foul vaginal discharge & report to surgeon.  __________________________________________________________________________________________________________________________________  IMPORTANT NUMBERS:    [facility] Main Number:  [phone], [phone]  Pharmacy:  [phone]  Same Day Surgery:  [phone], Monday - Friday until 8:30 p.m.  Urgent Care:  [phone]  Emergency Room:  [phone]      [facility]:  [phone]                                                                             [facility]:  [phone] option 1  [facility]:  [phone]     OB Clinic:  [phone]   Surgery Specialty Clinic:  [phone]   Home Medical Equipment: [phone]  [facility]:  [phone]        Pending Results     Date and Time Order Name Status Description    10/17/2017 1014 Surgical pathology exam In process             Admission Information     Date & Time Provider Department Dept. Phone    10/17/2017 Ruth Farooq MD [facility] PreOP/Phase [phone]      Your Vitals Were     Blood Pressure Temperature Respirations Height Weight Last Period    113/48 97.4  F (36.3  C) (Oral) 16 1.6 m (5' 3\") 71.2 kg (157 lb) 01/01/1992    Pulse Oximetry BMI (Body Mass Index)                93% 27.81 kg/m2          [employer] Information     [employer] lets you send messages to your doctor, view your test results, renew your prescriptions, schedule appointments and more. To sign up, go to " "[email or web address] . Click on \"Log in\" on the left side of the screen, which will take you to the Welcome page. Then click on \"Sign up Now\" on the right side of the page.     You will be asked to enter the access code listed below, as well as some personal information. Please follow the directions to create your username and password.     Your access code is: [email or web address]  Expires: 2017  5:20 PM     Your access code will  in 90 days. If you need help or a new code, please call your [facility] clinic or [phone].        Care EveryWhere ID     This is your Care EveryWhere ID. This could be used by other organizations to access your [facility] medical records  [id number]        Equal Access to Services     KIKE EWING : Teddy Maloney, haritha vuong, dacia starr, patricio gomez . So [facility] [phone].    ATENCIÓN: Si habla español, tiene a cassidy disposición servicios gratuitos de asistencia lingüística. Llame al [phone].    We comply with applicable federal civil rights laws and [place] laws. We do not discriminate on the basis of race, color, national origin, age, disability, sex, sexual orientation, or gender identity.               Review of your medicines      START taking        Dose / Directions    ondansetron 4 MG ODT tab   Commonly known as:  ZOFRAN-ODT   Used for:  Postoperative state        Dose:  4-8 mg   Take 1-2 tablets (4-8 mg) by mouth every 8 hours as needed for nausea Dissolve ON the tongue.   Quantity:  4 tablet   Refills:  0       senna-docusate 8.6-50 MG per tablet   Commonly known as:  SENOKOT-S;PERICOLACE   Used for:  Postoperative state        Dose:  1-2 tablet   Take 1-2 tablets by mouth 2 times daily Take while on oral narcotics to prevent or treat constipation.   Quantity:  30 tablet   Refills:  0         CONTINUE these medicines which may have CHANGED, or have new prescriptions. If we are uncertain of the size of " tablets/capsules you have at home, strength may be listed as something that might have changed.        Dose / Directions    * oxyCODONE 5 MG IR tablet   Commonly known as:  ROXICODONE   This may have changed:  Another medication with the same name was added. Make sure you understand how and when to take each.   Used for:  Chronic pain syndrome        Dose:  5-10 mg   Take 1-2 tablets (5-10 mg) by mouth 2 times daily Wean off.  1/2 tab and 1 and 1/2 3 days, then 1/2 twice a day for three days, then 1/2 a day for 3 days then every other day for 3 days.   Quantity:  180 tablet   Refills:  0       * oxyCODONE 5 MG IR tablet   Commonly known as:  ROXICODONE   This may have changed:  You were already taking a medication with the same name, and this prescription was added. Make sure you understand how and when to take each.   Used for:  Postoperative state        Dose:  5-10 mg   Take 1-2 tablets (5-10 mg) by mouth every 3 hours as needed for pain or other (Moderate to Severe)   Quantity:  40 tablet   Refills:  0       * Notice:  This list has 2 medication(s) that are the same as other medications prescribed for you. Read the directions carefully, and ask your doctor or other care provider to review them with you.      CONTINUE these medicines which have NOT CHANGED        Dose / Directions    Adult Blood Pressure Cuff Lg Kit   Used for:  HTN, goal below 140/90        Dose:  1 Device   1 Device 2 times daily   Quantity:  1 kit   Refills:  1       amLODIPine 5 MG tablet   Commonly known as:  NORVASC   Used for:  Essential hypertension        Dose:  5 mg   Take 1 tablet (5 mg) by mouth daily   Quantity:  30 tablet   Refills:  11       aspirin 81 MG EC tablet   Used for:  NSTEMI (non-ST elevated myocardial infarction) (H), ACS (acute coronary syndrome) (H)        Dose:  81 mg   Take 1 tablet by mouth daily.   Quantity:  30 tablet   Refills:  2       GLEEVEC PO        Dose:  400 mg   Take 400 mg by mouth daily   Refills:  0        hydrochlorothiazide 25 MG tablet   Commonly known as:  HYDRODIURIL   Used for:  Essential hypertension        Dose:  25 mg   Take 1 tablet (25 mg) by mouth daily   Quantity:  90 tablet   Refills:  3       lisinopril 40 MG tablet   Commonly known as:  PRINIVIL/ZESTRIL   Used for:  Essential hypertension with goal blood pressure less than 140/90        Dose:  40 mg   Take 1 tablet (40 mg) by mouth daily   Quantity:  90 tablet   Refills:  3       LORazepam 1 MG tablet   Commonly known as:  ATIVAN   Used for:  Malignant gastrointestinal stromal tumor, unspecified site (H)        Dose:  1 mg   Take 1 tablet (1 mg) by mouth At Bedtime   Quantity:  30 tablet   Refills:  5       methimazole 5 MG tablet   Commonly known as:  TAPAZOLE   Used for:  Hyperthyroidism        Dose:  5 mg   Take 1 tablet (5 mg) by mouth daily ALTERNATE 5 MG AND 2.5 MG (1/2 TABLET) EVERY DAY   Quantity:  90 tablet   Refills:  3       metoprolol 100 MG tablet   Commonly known as:  LOPRESSOR   Used for:  Essential hypertension with goal blood pressure less than 140/90        TAKE ONE TABLET BY MOUTH TWICE A DAY   Quantity:  180 tablet   Refills:  3       polyethylene glycol powder   Commonly known as:  MIRALAX/GLYCOLAX        Dose:  17 g   Take 17 g by mouth daily   Refills:  0       rosuvastatin 5 MG tablet   Commonly known as:  CRESTOR   Used for:  Hyperlipidemia LDL goal <160        Dose:  5 mg   Take 1 tablet (5 mg) by mouth every other day   Quantity:  45 tablet   Refills:  3            Where to get your medicines      These medications were sent to [facility] [place], [place] - [address]  [address] [facility] [address]     Phone:  [phone]     ondansetron 4 MG ODT tab    senna-docusate 8.6-50 MG per tablet         Some of these will need a paper prescription and others can be bought over the counter. Ask your nurse if you have questions.     Bring a paper prescription for each of these medications     oxyCODONE 5  MG IR tablet                Protect others around you: Learn how to safely use, store and throw away your medicines at www.disposemymeds.org.             Medication List: This is a list of all your medications and when to take them. Check marks below indicate your daily home schedule. Keep this list as a reference.      Medications           Morning Afternoon Evening Bedtime As Needed    Adult Blood Pressure Cuff Lg Kit   1 Device 2 times daily                                amLODIPine 5 MG tablet   Commonly known as:  NORVASC   Take 1 tablet (5 mg) by mouth daily                                aspirin 81 MG EC tablet   Take 1 tablet by mouth daily.                                GLEEVEC PO   Take 400 mg by mouth daily                                hydrochlorothiazide 25 MG tablet   Commonly known as:  HYDRODIURIL   Take 1 tablet (25 mg) by mouth daily                                lisinopril 40 MG tablet   Commonly known as:  PRINIVIL/ZESTRIL   Take 1 tablet (40 mg) by mouth daily                                LORazepam 1 MG tablet   Commonly known as:  ATIVAN   Take 1 tablet (1 mg) by mouth At Bedtime                                methimazole 5 MG tablet   Commonly known as:  TAPAZOLE   Take 1 tablet (5 mg) by mouth daily ALTERNATE 5 MG AND 2.5 MG (1/2 TABLET) EVERY DAY                                metoprolol 100 MG tablet   Commonly known as:  LOPRESSOR   TAKE ONE TABLET BY MOUTH TWICE A DAY                                ondansetron 4 MG ODT tab   Commonly known as:  ZOFRAN-ODT   Take 1-2 tablets (4-8 mg) by mouth every 8 hours as needed for nausea Dissolve ON the tongue.                                * oxyCODONE 5 MG IR tablet   Commonly known as:  ROXICODONE   Take 1-2 tablets (5-10 mg) by mouth 2 times daily Wean off.  1/2 tab and 1 and 1/2 3 days, then 1/2 twice a day for three days, then 1/2 a day for 3 days then every other day for 3 days.   Last time this was given:  5 mg on 10/17/2017 11:43 AM                                 * oxyCODONE 5 MG IR tablet   Commonly known as:  ROXICODONE   Take 1-2 tablets (5-10 mg) by mouth every 3 hours as needed for pain or other (Moderate to Severe)   Last time this was given:  5 mg on 10/17/2017 11:43 AM                                polyethylene glycol powder   Commonly known as:  MIRALAX/GLYCOLAX   Take 17 g by mouth daily                                rosuvastatin 5 MG tablet   Commonly known as:  CRESTOR   Take 1 tablet (5 mg) by mouth every other day                                senna-docusate 8.6-50 MG per tablet   Commonly known as:  SENOKOT-S;PERICOLACE   Take 1-2 tablets by mouth 2 times daily Take while on oral narcotics to prevent or treat constipation.                                * Notice:  This list has 2 medication(s) that are the same as other medications prescribed for you. Read the directions carefully, and ask your doctor or other care provider to review them with you.

## 2017-10-17 NOTE — OP NOTE
[facility]:  [facility]      PREOPERATIVE DIAGNOSIS:  Uterovaginal prolapse.      POSTOPERATIVE DIAGNOSIS:  Uterovaginal prolapse.      TITLE OF OPERATION:  Total vaginal hysterectomy with sacrospinous vault suspension and posterior vaginal repair.        SURGEON:  Ruth Farooq MD.      ASSISTANT:  Lizbeth Bill MD.      A surgical assistant was vital for retraction, hemostasis and closure.      ANESTHESIA:  General.      DESCRIPTION OF PROCEDURE:  After assuring informed consent, Idalmsi Abdul was taken to the operating suite, where a general anesthetic was induced.  The patient was placed in the dorsal lithotomy position.  The vagina and perineum were sterilely prepped and draped.  A timeout and fire safety assessment were performed.  A Garsia catheter was placed in the bladder for drainage.  The cervix was grasped with Santi clamps and gently retracted down and just outside the introitus.  The cervicovaginal junction was then injected with dilute Pitressin, incised circumferentially and, with sharp and blunt dissection, the anterior and posterior cul-de-sacs were entered without difficulty.  The elongated uterosacral ligaments were cross-clamped, ligated, divided bilaterally and fixed to the vaginal apex as possible.  Then, in a serial fashion, the residual cardinal ligaments, broad ligaments and round ligaments were serially crossclamped, ligated and divided with the LigaSure cautery cut device.  Utero-ovarian ligament was then located, crossclamped, ligated and divided.  The pedicle lines were then inspected and appeared hemostatic.  The vaginal cuff was then closed in a running locking fashion with 0 Vicryl suture.      The introitus was then grasped at the 4 and 7 o'clock positions posteriorly, and the submucosal region across the introitus, distal vagina and perineum was injected with diluted Pitressin.  A colt-shaped piece of mucosa was then excised at the introitus and, in a  serial fashion, the vaginal mucosa regrasped in the midline, undermined and cut with scissors up toward the apex posteriorly.  With blunt dissection, the rectum was dissected off the sidewall and vagina.  The rectovaginal space was then developed with blunt dissection until the sacrospinous ligament could be located.  Utilizing a Capio needle  and polyester suture, 2sutures were placed as neatly as possible within the sacrospinous ligament and then fixed in a pulley fashion to the submucosal region at the apex of the vagina posteriorly just off the midline to the right and left.  The vagina was then closed beginning at the apex with a running locking 3-0 Vicryl suture, approximately 50% of the distance.  The Capio sutures were then gently tied with excellent apposition of the vaginal wall to close proximity of the sacrospinous ligament with excellent suspension.  These were then secured with knots and the then additional vaginal tissue reapproximated in a running locking fashion.  The rectovaginal fascia was then plicated across the midline with interrupted sutures as well suture at the introitus, and then the skin overlying the peritoneum was reapproximated in a subcuticular fashion.  The pedicle lines were then inspected and appeared hemostatic.  The urine was clear.  The Garsia catheter was removed.  The patient was awakened and taken to Postanesthesia Recovery in stable condition.      ESTIMATED BLOOD LOSS:  50 cc.      SPECIMENS:  Uterus and cervix.         ANGELA JAIME MD             D: 10/17/2017 10:18   T: 10/17/2017 11:23   MT: [id number]      Name:     PIEDAD FAN   MRN:      [id number]        Account:        [id number]   :      1945           Procedure Date: 10/17/2017      Document: [id number]

## 2017-10-18 LAB — COPATH REPORT: NORMAL

## 2017-10-19 ENCOUNTER — HOSPITAL ENCOUNTER (EMERGENCY)
Facility: CLINIC | Age: 72
Discharge: HOME | End: 2017-10-19
Attending: EMERGENCY MEDICINE | Admitting: EMERGENCY MEDICINE
Payer: MEDICARE

## 2017-10-19 VITALS
DIASTOLIC BLOOD PRESSURE: 82 MMHG | TEMPERATURE: 98 F | HEIGHT: 63 IN | HEART RATE: 91 BPM | RESPIRATION RATE: 18 BRPM | OXYGEN SATURATION: 90 % | BODY MASS INDEX: 27.82 KG/M2 | SYSTOLIC BLOOD PRESSURE: 110 MMHG | WEIGHT: 157 LBS

## 2017-10-19 DIAGNOSIS — K59.01 SLOW TRANSIT CONSTIPATION: ICD-10-CM

## 2017-10-19 PROCEDURE — 99283 EMERGENCY DEPT VISIT LOW MDM: CPT | Mod: Z6 | Performed by: EMERGENCY MEDICINE

## 2017-10-19 PROCEDURE — 99282 EMERGENCY DEPT VISIT SF MDM: CPT

## 2017-10-19 NOTE — ED AVS SNAPSHOT
[facility] Emergency Department    [address] [address]    Phone:  [phone]    Fax:  [phone]                                       Idalmis Abdul   MRN: [id number]    Department:  [facility] Emergency Department   Date of Visit:  10/19/2017           Patient Information     Date Of Birth          1945        Your diagnoses for this visit were:     Slow transit constipation        You were seen by Jerry Abdul DO.        Discharge Instructions       Starting tomorrow morning:  Complete bowel prep  Mix 32 ounces of Gatorade with 4 ounces/ 0.5 cup of MiraLAX  Shake well and drinks 6 ounces every 20 minutes until gone  Once cleaned out would recommend increasing water intake as discussed and using MiraLAX twice daily while on oxycodone  Increase fiber in diet      Future Appointments        Provider Department Dept Phone Center    11/1/2017 9:30 AM Ruth Farooq MD [facility] [phone] [facility]    1/9/2018 9:00 AM [facility] ULTRASOUND ROOM 2 [facility] [phone] [facility]    1/9/2018 11:00 AM Swetha Antoine MD [facility] Endocrinology [phone] [facility]    1/12/2018 9:30 AM [facility] [phone] [facility]    1/15/2018 8:45 AM Blayne Schmitz MD [facility] [phone] [facility]      24 Hour Appointment Hotline       To make an appointment at any [facility], call [phone] ([phone]). If you don't have a family doctor or clinic, we will help you find one. [facility] are conveniently located to serve the needs of you and your family.             Review of your medicines      Our records show that you are taking the medicines listed below. If these are incorrect, please call your family doctor or clinic.        Dose / Directions Last dose taken    Adult Blood Pressure Cuff Lg Kit   Dose:  1 Device   Quantity:  1 kit         1 Device 2 times daily   Refills:  1        amLODIPine 5 MG tablet   Commonly known as:  NORVASC   Dose:  5 mg   Quantity:  30 tablet        Take 1 tablet (5 mg) by mouth daily   Refills:  11        aspirin 81 MG EC tablet   Dose:  81 mg   Quantity:  30 tablet        Take 1 tablet by mouth daily.   Refills:  2        GLEEVEC PO   Dose:  400 mg        Take 400 mg by mouth daily   Refills:  0        hydrochlorothiazide 25 MG tablet   Commonly known as:  HYDRODIURIL   Dose:  25 mg   Quantity:  90 tablet        Take 1 tablet (25 mg) by mouth daily   Refills:  3        lisinopril 40 MG tablet   Commonly known as:  PRINIVIL/ZESTRIL   Dose:  40 mg   Quantity:  90 tablet        Take 1 tablet (40 mg) by mouth daily   Refills:  3        LORazepam 1 MG tablet   Commonly known as:  ATIVAN   Dose:  1 mg   Quantity:  30 tablet        Take 1 tablet (1 mg) by mouth At Bedtime   Refills:  5        methimazole 5 MG tablet   Commonly known as:  TAPAZOLE   Dose:  5 mg   Quantity:  90 tablet        Take 1 tablet (5 mg) by mouth daily ALTERNATE 5 MG AND 2.5 MG (1/2 TABLET) EVERY DAY   Refills:  3        metoprolol 100 MG tablet   Commonly known as:  LOPRESSOR   Quantity:  180 tablet        TAKE ONE TABLET BY MOUTH TWICE A DAY   Refills:  3        ondansetron 4 MG ODT tab   Commonly known as:  ZOFRAN-ODT   Dose:  4-8 mg   Quantity:  4 tablet        Take 1-2 tablets (4-8 mg) by mouth every 8 hours as needed for nausea Dissolve ON the tongue.   Refills:  0        oxyCODONE 5 MG IR tablet   Commonly known as:  ROXICODONE   Dose:  5-10 mg   Quantity:  40 tablet        Take 1-2 tablets (5-10 mg) by mouth every 3 hours as needed for pain or other (Moderate to Severe)   Refills:  0        polyethylene glycol powder   Commonly known as:  MIRALAX/GLYCOLAX   Dose:  17 g        Take 17 g by mouth daily   Refills:  0        rosuvastatin 5 MG tablet   Commonly known as:  CRESTOR   Dose:  5 mg   Quantity:  45 tablet        Take 1 tablet (5 mg) by  "mouth every other day   Refills:  3        senna-docusate 8.6-50 MG per tablet   Commonly known as:  SENOKOT-S;PERICOLACE   Dose:  1-2 tablet   Quantity:  30 tablet        Take 1-2 tablets by mouth 2 times daily Take while on oral narcotics to prevent or treat constipation.   Refills:  0                Orders Needing Specimen Collection     None      Pending Results     No orders found from 10/17/2017 to 10/20/2017.            Pending Culture Results     No orders found from 10/17/2017 to 10/20/2017.            Pending Results Instructions     If you had any lab results that were not finalized at the time of your Discharge, you can call the ED Lab Result RN at [phone]. You will be contacted by this team for any positive Lab results or changes in treatment. The nurses are available 7 days a week from 10A to 6:30P.  You can leave a message 24 hours per day and they will return your call.        Test Results From Your Hospital Stay               Thank you for choosing [facility]       Thank you for choosing [facility] for your care. Our goal is always to provide you with excellent care. Hearing back from our patients is one way we can continue to improve our services. Please take a few minutes to complete the written survey that you may receive in the mail after you visit with us. Thank you!        [employer] Information     [employer] lets you send messages to your doctor, view your test results, renew your prescriptions, schedule appointments and more. To sign up, go to [email or web address] . Click on \"Log in\" on the left side of the screen, which will take you to the Welcome page. Then click on \"Sign up Now\" on the right side of the page.     You will be asked to enter the access code listed below, as well as some personal information. Please follow the directions to create your username and password.     Your access code is: [email or web address]  Expires: 2017  5:20 PM     Your access code will  in 90 days. If you " need help or a new code, please call your [facility] clinic or [phone].        Care EveryWhere ID     This is your Care EveryWhere ID. This could be used by other organizations to access your [facility] medical records  [id number]        Equal Access to Services     KIKE EWING : Teddy Maloney, haritha vuong, dacia starr, patricio muse. So [facility] [phone].    ATENCIÓN: Si habla español, tiene a cassidy disposición servicios gratuitos de asistencia lingüística. Llame al [phone].    We comply with applicable federal civil rights laws and [place] laws. We do not discriminate on the basis of race, color, national origin, age, disability, sex, sexual orientation, or gender identity.            After Visit Summary       This is your record. Keep this with you and show to your community pharmacist(s) and doctor(s) at your next visit.

## 2017-10-19 NOTE — ED AVS SNAPSHOT
[facility] Emergency Department    [address] [address]    Phone:  [phone]    Fax:  [phone]                                       Idalmis Abdul   MRN: [id number]    Department:  [facility] Emergency Department   Date of Visit:  10/19/2017           After Visit Summary Signature Page     I have received my discharge instructions, and my questions have been answered. I have discussed any challenges I see with this plan with the nurse or doctor.    ..........................................................................................................................................  Patient/Patient Representative Signature      ..........................................................................................................................................  Patient Representative Print Name and Relationship to Patient    ..................................................               ................................................  Date                                            Time    ..........................................................................................................................................  Reviewed by Signature/Title    ...................................................              ..............................................  Date                                                            Time

## 2017-10-20 ENCOUNTER — TELEPHONE (OUTPATIENT)
Dept: FAMILY MEDICINE | Facility: CLINIC | Age: 72
End: 2017-10-20

## 2017-10-20 DIAGNOSIS — E89.40 POST HYSTERECTOMY MENOPAUSE: Primary | ICD-10-CM

## 2017-10-20 DIAGNOSIS — Z90.710 POST HYSTERECTOMY MENOPAUSE: Primary | ICD-10-CM

## 2017-10-20 NOTE — ED NOTES
Pt has ongoing issues with constipation    Has hysterectomy 10/17 has not had a BM since has been taking narcotic pain meds every three hours and taking stool softeners 2 a day and miralax without results  Tonight while eating supper abd felt full got nauseated and was unable to keep eating she is passing gas   No vomiting  Family brings her in

## 2017-10-20 NOTE — TELEPHONE ENCOUNTER
Reason for Call: Request for an order or referral:    Order or referral being requested: order for a walker    Date needed: as soon as possible    Has the patient been seen by the PCP for this problem? NO    Additional comments: pt's son Rk calling to see if they could get an order for a walker. He states she had a hysterectomy last Tuesday and is having a hard time getting around. They would like that to go to our pharmacy or [facility].    Phone number Patient can be reached at: Rk [phone]    Best Time:  any    Can we leave a detailed message on this number?  YES    Call taken on 10/20/2017 at 1:32 PM by Dottie Colbert

## 2017-10-20 NOTE — DISCHARGE INSTRUCTIONS
Starting tomorrow morning:  Complete bowel prep  Mix 32 ounces of Gatorade with 4 ounces/ 0.5 cup of MiraLAX  Shake well and drinks 6 ounces every 20 minutes until gone  Once cleaned out would recommend increasing water intake as discussed and using MiraLAX twice daily while on oxycodone  Increase fiber in diet

## 2017-10-20 NOTE — ED NOTES
Has had large results was about to get off commode and had some cramping decided to remain on the commode  Has paper and moist wipes at bedside  Daughter remains in room

## 2017-10-20 NOTE — ED PROVIDER NOTES
History     Chief Complaint   Patient presents with     Post-op Problem     constipation post    able to void        HPI  Idalmis Abdul is a 71 year old female who presents of post operative concerns for constipation.  Patient has a history for chronic constipation.  Uses daily MiraLAX for prophylaxis.  The patient was seen in the emergency department one week ago and treated with Fleet enemas.  Surgery for total abdominal hysterectomy was completed this past Monday.  Since that time she's had no stool output.  Is passing gas per rectum.  Has had intermittent nausea.  Use oxycodone for pain control.  No previous bowel obstruction.    Problem List:    Patient Active Problem List    Diagnosis Date Noted     Uterovaginal prolapse 09/25/2017     Priority: Medium     Age-related osteoporosis without current pathological fracture 12/16/2014     Priority: Medium     PVD (posterior vitreous detachment) 11/17/2014     Priority: Medium     History of tobacco abuse 09/02/2014     Priority: Medium     QUIT in 2010       History of non-ST elevation myocardial infarction (NSTEMI) 09/02/2014     Priority: Medium     Chronic back pain 09/02/2014     Priority: Medium     Patient is followed by KANWAL Patel and 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 Provider to review       Hyperthyroidism 02/04/2014     Priority: Medium     Hyperlipidemia LDL goal <160 12/17/2013     Priority: Medium     GERD  (gastroesophageal reflux disease) 10/08/2013     Priority: Medium     Dyspnea 08/27/2013     Priority: Medium     S/P CABG x 4 08/05/2012     Priority: Medium     Postsurgical aortocoronary bypass status 07/04/2012     Priority: Medium     ASCVD (arteriosclerotic cardiovascular disease) 06/14/2012     Priority: Medium     NSTEMI (non-ST elevated myocardial infarction) (H) 06/12/2012     Priority: Medium     Eyelid edema 12/15/2011     Priority: Medium     side effect of gastric cancer medication       History of lung cancer 12/15/2011     Priority: Medium     S/p resection of right lung.  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     Priority: Medium     resected 2003, recurred 2007, resected, and now on adjuvant gleevec       Hypertension goal BP (blood pressure) < 140/90 03/24/2005     Priority: Medium        Past Medical History:    Past Medical History:   Diagnosis Date     ASCVD (arteriosclerotic cardiovascular disease) 6/14/2012     Benign neoplasm of duodenum, jejunum, and ileum 2003, 2007     CA - lung cancer 4/2010     choledochal cyst 1963     Coronary artery disease      Dislocated finger, left middle PIP 7/26/2013     Dyspnea 8/27/2013     Eyelid edema 12/15/2011     GERD (gastroesophageal reflux disease) 10/8/2013     GIST (gastrointestinal stromal tumor), malignant (H) 5/10/2011     Graves disease      Grief reaction 5/11/2009     History of lung cancer 12/15/2011     Hyperlipidaemia      Hyperthyroidism 2/4/2014     Hypokalemia 8/5/2012     LBP (low back pain) 7/26/2013     Leiomyoma of uterus, unspecified      NSTEMI (non-ST elevated myocardial infarction) (H) 6/12/2012     NSTEMI (non-ST elevated myocardial infarction) (H)      osteopenia      Other benign neoplasm of connective and other soft tissue of thorax 2003     Other chronic pain      PONV (postoperative nausea and vomiting)       Postsurgical aortocoronary bypass status 7/4/2012     S/P CABG x 4 8/5/2012     Strabismus      Tobacco use disorder      Unspecified essential hypertension        Past Surgical History:    Past Surgical History:   Procedure Laterality Date     BYPASS GRAFT ARTERY CORONARY  6/14/2012    Procedure: BYPASS GRAFT ARTERY CORONARY;  Median Sternotomy Coronary Artery Bypass Graft  x 3        C THORACOSCOPY,DX W BX  fall 2003    benign tissue     CATARACT IOL, RT/LT      LE     CHOLECYSTECTOMY  1963     COLONOSCOPY  4-12-05     CORONARY ARTERY BYPASS      X4     CREATION PERICARDIAL WINDOW  6/15/2012    Procedure: CREATION PERICARDIAL WINDOW;  Mediastinal Exploration, Control of Bleeding;  Surgeon: Dwaine Griffin MD;  Location: [facility] OR     EYE SURGERY  2014    left cataract removal     GI SURGERY  2003 and 2007    GIST tumor removal     GYN SURGERY      tubal ligation     HYSTERECTOMY VAGINAL, COLPORRHAPHY ANTERIOR, POSTERIOR, COMBINED N/A 10/17/2017    Procedure: COMBINED HYSTERECTOMY VAGINAL, COLPORRHAPHY ANTERIOR, POSTERIOR;  Total Vaginal Hysterectomy and Posterior Repair,Sacrospinous Vault Suspension;  Surgeon: Ruth Farooq MD;  Location: [facility] OR     PHACOEMULSIFICATION WITH STANDARD INTRAOCULAR LENS IMPLANT  3/24/2014    Procedure: PHACOEMULSIFICATION WITH STANDARD INTRAOCULAR LENS IMPLANT;  Left Kelman Phacoemulsification with Intraocular Lens Implant;  Surgeon: Magnus Mckeon MD;  Location: [facility] OR     RECESSION RESECTION WITH ADJUSTABLE SUTURE BILATERAL Bilateral 7/14/2016    Procedure: RECESSION RESECTION WITH ADJUSTABLE SUTURE BILATERAL;  Surgeon: Erma Ordaz MD;  Location:  OR     SURGICAL HISTORY OF -   1963    cholecystectomy     SURGICAL HISTORY OF -   1970's    Tubal Ligation      SURGICAL HISTORY OF -   08/03    Explor. Lap, Excision of Small Bowel Tumor      SURGICAL HISTORY OF -   4/2010    lung cancer removed right lung       Family History:    Family History  "  Problem Relation Age of Onset     HEART DISEASE Mother      OSTEOPOROSIS Mother      Respiratory Mother      Emphezyma     Hypertension Mother      HEART DISEASE Father      Alcohol/Drug Father      Hypertension Father      Hypertension Maternal Grandmother      Hypertension Brother      Hypertension Sister      Arthritis Sister      Hypertension Son      CANCER Son      rectal       Social History:  Marital Status:   [5]  Social History   Substance Use Topics     Smoking status: Former Smoker     Packs/day: 0.50     Years: 49.00     Types: Cigarettes     Quit date: 4/19/2010     Smokeless tobacco: Never Used     Alcohol use No        Medications:      oxyCODONE (ROXICODONE) 5 MG IR tablet   senna-docusate (SENOKOT-S;PERICOLACE) 8.6-50 MG per tablet   ondansetron (ZOFRAN-ODT) 4 MG ODT tab   Imatinib Mesylate (GLEEVEC PO)   polyethylene glycol (MIRALAX/GLYCOLAX) powder   rosuvastatin (CRESTOR) 5 MG tablet   metoprolol (LOPRESSOR) 100 MG tablet   methimazole (TAPAZOLE) 5 MG tablet   LORazepam (ATIVAN) 1 MG tablet   hydrochlorothiazide (HYDRODIURIL) 25 MG tablet   Blood Pressure Monitoring (ADULT BLOOD PRESSURE CUFF LG) KIT   amLODIPine (NORVASC) 5 MG tablet   lisinopril (PRINIVIL,ZESTRIL) 40 MG tablet   aspirin EC 81 MG EC tablet         Review of Systems  All pertinent positives and negatives are documented in the HPI.  All others reviewed and are negative .    Physical Exam   BP: 106/73  Pulse: 91  Temp: 98  F (36.7  C)  Resp: 18  Height: 160 cm (5' 3\")  Weight: 71.2 kg (157 lb)  SpO2: 95 %      Physical Exam  Vital signs reviewed  Skin is warm and dry without rash  No respiratory difficulties with lungs clear to auscultation  Heart is regular without murmur  Abdomen: No distention.  Bowel sounds present.  No surgical findings with no guarding and rebound.  Rectal: Fecal impaction present on digital examination.  Normal rectal tone.  The rectal skin tags present no hemorrhage.  Stool was light brown in " color.    ED Course     ED Course     Procedures                   Labs Ordered and Resulted from Time of ED Arrival Up to the Time of Departure from the ED - No data to display    Assessments & Plan (with Medical Decision Making)  7:31 PM  71-year-old female presents to days post op from total abdominal hysterectomy with acute on chronic constipation.  No clinical indications for bowel obstruction or ileus.  Examination notes a nonsurgical abdomen.  Large amounts of stool.  At the rectum.  Proceed with Fleet enema.    8:20 PM  Excellent results after tapwater enema   Discharged home on MiraLAX- Gatorade partial prep      I have reviewed the nursing notes.    I have reviewed the findings, diagnosis, plan and need for follow up with the patient.      New Prescriptions    No medications on file       Final diagnoses:   Slow transit constipation       10/19/2017   [facility] EMERGENCY DEPARTMENT     Jerry Abdul DO  10/19/17 2020

## 2017-11-01 ENCOUNTER — OFFICE VISIT (OUTPATIENT)
Dept: OBGYN | Facility: CLINIC | Age: 72
End: 2017-11-01
Payer: COMMERCIAL

## 2017-11-01 VITALS
BODY MASS INDEX: 27.64 KG/M2 | SYSTOLIC BLOOD PRESSURE: 115 MMHG | HEART RATE: 68 BPM | WEIGHT: 156 LBS | DIASTOLIC BLOOD PRESSURE: 60 MMHG | HEIGHT: 63 IN

## 2017-11-01 DIAGNOSIS — N81.4 UTEROVAGINAL PROLAPSE: Primary | ICD-10-CM

## 2017-11-01 LAB
ALBUMIN UR-MCNC: ABNORMAL MG/DL
APPEARANCE UR: ABNORMAL
BACTERIA #/AREA URNS HPF: ABNORMAL /HPF
BILIRUB UR QL STRIP: NEGATIVE
COLOR UR AUTO: YELLOW
GLUCOSE UR STRIP-MCNC: NEGATIVE MG/DL
HGB UR QL STRIP: ABNORMAL
KETONES UR STRIP-MCNC: NEGATIVE MG/DL
LEUKOCYTE ESTERASE UR QL STRIP: ABNORMAL
NITRATE UR QL: NEGATIVE
NON-SQ EPI CELLS #/AREA URNS LPF: ABNORMAL /LPF
PH UR STRIP: 5.5 PH (ref 5–7)
RBC #/AREA URNS AUTO: ABNORMAL /HPF
SOURCE: ABNORMAL
SP GR UR STRIP: 1.01 (ref 1–1.03)
URATE CRY #/AREA URNS HPF: ABNORMAL /HPF
UROBILINOGEN UR STRIP-ACNC: 0.2 EU/DL (ref 0.2–1)
WBC #/AREA URNS AUTO: ABNORMAL /HPF

## 2017-11-01 PROCEDURE — 81001 URINALYSIS AUTO W/SCOPE: CPT | Performed by: OBSTETRICS & GYNECOLOGY

## 2017-11-01 PROCEDURE — 99024 POSTOP FOLLOW-UP VISIT: CPT | Performed by: OBSTETRICS & GYNECOLOGY

## 2017-11-01 PROCEDURE — 51798 US URINE CAPACITY MEASURE: CPT | Performed by: OBSTETRICS & GYNECOLOGY

## 2017-11-01 NOTE — PROGRESS NOTES
"Idalmis is a 71 year old female 2 weeks S/P total vaginal hysterectomy, A & P repair and Sacrospinous vault suspension, complicated by no problems reported.  She is currently requiring Percocet for pain management.  The pathology report showed benign uterine pathology.  She is currently reporting scant vaginal drainage; she is defecating with Miralax daily.    Exam: /60 (BP Location: Left arm, Patient Position: Chair, Cuff Size: Adult Small)  Pulse 68  Ht 5' 3\" (1.6 m)  Wt 156 lb (70.8 kg)  LMP 01/01/1992  BMI 27.63 kg/m2  vaginal cuff intact, no drainage or granulation tissue, nontender  POSTVOID RESIDUAL=0cc    Assessment:  Postop      Plan:  Pelvic rest for 6 weeks total.  Report excessive pain, fever, chills, bleeding, or foul wound odor promptly.  Increase activity as possible, excluding heavy lifting or exertion.  Follow-up visit in 4 weeks.  Ruth Farooq MD  [facility]      "

## 2017-11-01 NOTE — MR AVS SNAPSHOT
After Visit Summary   11/1/2017    Idalmis Abdul    MRN: [id number]           Patient Information     Date Of Birth          1945        Visit Information        Provider Department      11/1/2017 9:30 AM Ruth Farooq MD [facility]        Today's Diagnoses     Uterovaginal prolapse    -  1       Follow-ups after your visit        Your next 10 appointments already scheduled     Jan 09, 2018  9:00 AM CST   US THYROID with [email or web address]   [facility] US ([facility])    [address] [place] [id number] [phone]           Please bring a list of your medicines (including vitamins, minerals and over-the-counter drugs). Also, tell your doctor about any allergies you may have. Wear comfortable clothes and leave your valuables at home.  You do not need to do anything special to prepare for your exam.  Please call the Imaging Department at your exam site with any questions.            Jan 09, 2018 11:00 AM CST   (Arrive by 10:45 AM)   RETURN ENDOCRINE with Swetha Antoine MD   [facility])    [address] [place] [id number] [phone]            Jan 12, 2018  9:30 AM CST   CT CHEST ABDOMEN PELVIS W/O & W CONTRAST with [id number]   [facility] CT ([facility])    [address] [facility] [address] [phone]           Please bring any scans or X-rays taken at other hospitals, if similar tests were done. Also bring a list of your medicines, including vitamins, minerals and over-the-counter drugs. It is safest to leave personal items at home.  Be sure to tell your doctor:   If you have any allergies.   If there s any chance you are pregnant.   If you are breastfeeding.   If you have any special needs.  You may have contrast for this exam. To prepare:   Do not eat or drink for 2 hours before your  exam. If you need to take medicine, you may take it with small sips of water. (We may ask you to take liquid medicine as well.)   The day before your exam, drink extra fluids at least six 8-ounce glasses (unless your doctor tells you to restrict your fluids).  Patients over 70 or patients with diabetes or kidney problems:   If you haven t had a blood test (creatinine test) within the last 30 days, go to your clinic or Diagnostic Imaging Department for this test.  If you have diabetes:   If your kidney function is normal, continue taking your metformin (Avandamet, Glucophage, Glucovance, Metaglip) on the day of your exam.   If your kidney function is abnormal, wait 48 hours before restarting this medicine.  You will have oral contrast for this exam:   You will drink the contrast at home. Get this from your clinic or Diagnostic Imaging Department. Please follow the directions given.  Please wear loose clothing, such as a sweat suit or jogging clothes. Avoid snaps, zippers and other metal. We may ask you to undress and put on a hospital gown.  If you have any questions, please call the Imaging Department where you will have your exam.            Marcos 15, 2018  8:45 AM CST   (Arrive by 8:30 AM)   Return Visit with Blayne Schmitz MD   [facility] ([facility])    [address] [place] [id number] [phone]              Who to contact     If you have questions or need follow up information about today's clinic visit or your schedule please contact [facility] directly at [phone].  Normal or non-critical lab and imaging results will be communicated to you by MyChart, letter or phone within 4 business days after the clinic has received the results. If you do not hear from us within 7 days, please contact the clinic through MyChart or phone. If you have a critical or abnormal lab result, we will notify you by phone as soon as  "possible.  Submit refill requests through [employer] or call your pharmacy and they will forward the refill request to us. Please allow 3 business days for your refill to be completed.          Additional Information About Your Visit        [email or web address] Information     [employer] lets you send messages to your doctor, view your test results, renew your prescriptions, schedule appointments and more. To sign up, go to [email or web address] . Click on \"Log in\" on the left side of the screen, which will take you to the Welcome page. Then click on \"Sign up Now\" on the right side of the page.     You will be asked to enter the access code listed below, as well as some personal information. Please follow the directions to create your username and password.     Your access code is: [email or web address]  Expires: 2017  5:20 PM     Your access code will  in 90 days. If you need help or a new code, please call your [place] clinic or [phone].        Care EveryWhere ID     This is your Care EveryWhere ID. This could be used by other organizations to access your [place] medical records  [id number]        Your Vitals Were     Pulse Height Last Period BMI (Body Mass Index)          68 5' 3\" (1.6 m) 1992 27.63 kg/m2         Blood Pressure from Last 3 Encounters:   17 115/60   10/19/17 110/82   10/17/17 (P) 112/58    Weight from Last 3 Encounters:   17 156 lb (70.8 kg)   10/19/17 157 lb (71.2 kg)   10/17/17 157 lb (71.2 kg)              We Performed the Following     *UA reflex to Microscopic     MEASURE POST-VOID RESIDUAL URINE/BLADDER CAPACITY, US NON-IMAGING     Urine Microscopic        Primary Care Provider Office Phone # Fax #    Darlene Sultana [phone] [address] [facility] [address]        Equal Access to Services     KIKE EWING : Teddy Maloney, waaxda luqadaha, qaybta kaalmaeusebio starr, patricio gomez . So [facility] " [phone].    ATENCIÓN: Si holly cevallos, tiene a cassidy disposición servicios gratuitos de asistencia lingüística. Cherry flores [phone].    We comply with applicable federal civil rights laws and [place] laws. We do not discriminate on the basis of race, color, national origin, age, disability, sex, sexual orientation, or gender identity.            Thank you!     Thank you for choosing [facility]  for your care. Our goal is always to provide you with excellent care. Hearing back from our patients is one way we can continue to improve our services. Please take a few minutes to complete the written survey that you may receive in the mail after your visit with us. Thank you!             Your Updated Medication List - Protect others around you: Learn how to safely use, store and throw away your medicines at www.disposemymeds.org.          This list is accurate as of: 11/1/17  9:48 AM.  Always use your most recent med list.                   Brand Name Dispense Instructions for use Diagnosis    Adult Blood Pressure Cuff Lg Kit     1 kit    1 Device 2 times daily    HTN, goal below 140/90       amLODIPine 5 MG tablet    NORVASC    30 tablet    Take 1 tablet (5 mg) by mouth daily    Essential hypertension       aspirin 81 MG EC tablet     30 tablet    Take 1 tablet by mouth daily.    NSTEMI (non-ST elevated myocardial infarction) (H), ACS (acute coronary syndrome) (H)       GLEEVEC PO      Take 400 mg by mouth daily        hydrochlorothiazide 25 MG tablet    HYDRODIURIL    90 tablet    Take 1 tablet (25 mg) by mouth daily    Essential hypertension       lisinopril 40 MG tablet    PRINIVIL/ZESTRIL    90 tablet    Take 1 tablet (40 mg) by mouth daily    Essential hypertension with goal blood pressure less than 140/90       LORazepam 1 MG tablet    ATIVAN    30 tablet    Take 1 tablet (1 mg) by mouth At Bedtime    Malignant gastrointestinal stromal tumor, unspecified site (H)       methimazole 5 MG tablet     TAPAZOLE    90 tablet    Take 1 tablet (5 mg) by mouth daily ALTERNATE 5 MG AND 2.5 MG (1/2 TABLET) EVERY DAY    Hyperthyroidism       metoprolol 100 MG tablet    LOPRESSOR    180 tablet    TAKE ONE TABLET BY MOUTH TWICE A DAY    Essential hypertension with goal blood pressure less than 140/90       ondansetron 4 MG ODT tab    ZOFRAN-ODT    4 tablet    Take 1-2 tablets (4-8 mg) by mouth every 8 hours as needed for nausea Dissolve ON the tongue.    Postoperative state       order for DME     1 Device    Walker    Post hysterectomy menopause       oxyCODONE 5 MG IR tablet    ROXICODONE    40 tablet    Take 1-2 tablets (5-10 mg) by mouth every 3 hours as needed for pain or other (Moderate to Severe)    Postoperative state       polyethylene glycol powder    MIRALAX/GLYCOLAX     Take 17 g by mouth daily        rosuvastatin 5 MG tablet    CRESTOR    45 tablet    Take 1 tablet (5 mg) by mouth every other day    Hyperlipidemia LDL goal <160       senna-docusate 8.6-50 MG per tablet    SENOKOT-S;PERICOLACE    30 tablet    Take 1-2 tablets by mouth 2 times daily Take while on oral narcotics to prevent or treat constipation.    Postoperative state

## 2017-11-01 NOTE — NURSING NOTE
"Initial /60 (BP Location: Left arm, Patient Position: Chair, Cuff Size: Adult Small)  Pulse 68  Ht 5' 3\" (1.6 m)  Wt 156 lb (70.8 kg)  LMP 01/01/1992  BMI 27.63 kg/m2 Estimated body mass index is 27.63 kg/(m^2) as calculated from the following:    Height as of this encounter: 5' 3\" (1.6 m).    Weight as of this encounter: 156 lb (70.8 kg). .      "

## 2017-11-14 DIAGNOSIS — I10 ESSENTIAL HYPERTENSION WITH GOAL BLOOD PRESSURE LESS THAN 140/90: ICD-10-CM

## 2017-11-17 RX ORDER — LISINOPRIL 40 MG/1
TABLET ORAL
Qty: 90 TABLET | Refills: 1 | Status: SHIPPED | OUTPATIENT
Start: 2017-11-17 | End: 2018-05-22

## 2017-11-27 ENCOUNTER — OFFICE VISIT (OUTPATIENT)
Dept: FAMILY MEDICINE | Facility: CLINIC | Age: 72
End: 2017-11-27
Payer: COMMERCIAL

## 2017-11-27 VITALS
TEMPERATURE: 99 F | DIASTOLIC BLOOD PRESSURE: 70 MMHG | BODY MASS INDEX: 27.92 KG/M2 | SYSTOLIC BLOOD PRESSURE: 120 MMHG | HEART RATE: 80 BPM | WEIGHT: 157.6 LBS

## 2017-11-27 DIAGNOSIS — K59.03 DRUG-INDUCED CONSTIPATION: ICD-10-CM

## 2017-11-27 DIAGNOSIS — C49.A0 MALIGNANT GASTROINTESTINAL STROMAL TUMOR, UNSPECIFIED SITE (H): Primary | ICD-10-CM

## 2017-11-27 DIAGNOSIS — M54.5 CHRONIC LOW BACK PAIN, UNSPECIFIED BACK PAIN LATERALITY, WITH SCIATICA PRESENCE UNSPECIFIED: Primary | ICD-10-CM

## 2017-11-27 DIAGNOSIS — Z98.890 POSTOPERATIVE STATE: ICD-10-CM

## 2017-11-27 DIAGNOSIS — G89.29 CHRONIC LOW BACK PAIN, UNSPECIFIED BACK PAIN LATERALITY, WITH SCIATICA PRESENCE UNSPECIFIED: Primary | ICD-10-CM

## 2017-11-27 PROCEDURE — 99214 OFFICE O/P EST MOD 30 MIN: CPT | Performed by: FAMILY MEDICINE

## 2017-11-27 RX ORDER — OXYCODONE HYDROCHLORIDE 5 MG/1
5-10 TABLET ORAL
Qty: 40 TABLET | Refills: 0 | Status: SHIPPED | OUTPATIENT
Start: 2017-11-27 | End: 2017-12-11

## 2017-11-27 RX ORDER — IMATINIB MESYLATE 400 MG/1
400 TABLET, FILM COATED ORAL DAILY
Qty: 30 TABLET | Refills: 2 | Status: SHIPPED | OUTPATIENT
Start: 2017-11-27 | End: 2019-01-10

## 2017-11-27 ASSESSMENT — ANXIETY QUESTIONNAIRES
1. FEELING NERVOUS, ANXIOUS, OR ON EDGE: SEVERAL DAYS
6. BECOMING EASILY ANNOYED OR IRRITABLE: NOT AT ALL
3. WORRYING TOO MUCH ABOUT DIFFERENT THINGS: NOT AT ALL
7. FEELING AFRAID AS IF SOMETHING AWFUL MIGHT HAPPEN: NOT AT ALL
GAD7 TOTAL SCORE: 3
2. NOT BEING ABLE TO STOP OR CONTROL WORRYING: NOT AT ALL
5. BEING SO RESTLESS THAT IT IS HARD TO SIT STILL: SEVERAL DAYS

## 2017-11-27 ASSESSMENT — PATIENT HEALTH QUESTIONNAIRE - PHQ9
SUM OF ALL RESPONSES TO PHQ QUESTIONS 1-9: 5
5. POOR APPETITE OR OVEREATING: SEVERAL DAYS

## 2017-11-27 NOTE — MR AVS SNAPSHOT
After Visit Summary   11/27/2017    Idalmis Abdul    MRN: [id number]           Patient Information     Date Of Birth          1945        Visit Information        Provider Department      11/27/2017 2:20 PM Darlene Sultana MD [facility]        Today's Diagnoses     Chronic low back pain, unspecified back pain laterality, with sciatica presence unspecified    -  1    Drug-induced constipation        Postoperative state           Follow-ups after your visit        Additional Services     GASTROENTEROLOGY ADULT REF PROCEDURE ONLY       Last Lab Result: Creatinine (mg/dL)       Date                     Value                 10/17/2017               1.15 (H)         ----------  Body mass index is 27.92 kg/(m^2).      Patient will be contacted to schedule procedure.     Please be aware that coverage of these services is subject to the terms and limitations of your health insurance plan.  Call member services at your health plan with any benefit or coverage questions.  Any procedures must be performed at a [facility] facility OR coordinated by your clinic's referral office.    Please bring the following with you to your appointment:    (1) Any X-Rays, CTs or MRIs which have been performed.  Contact the facility where they were done to arrange for  prior to your scheduled appointment.    (2) List of current medications   (3) This referral request   (4) Any documents/labs given to you for this referral            PAIN MANAGEMENT REFERRAL       Your provider has referred you to: [facility] Imaging pain management Scheduling Line: [phone]  [facility] Phone: [phone]  Clinic Fax: [phone]    **ANY DIAGNOSTIC TESTS THAT ARE NOT IN [facility] SHOULD BE SENT TO THE PAIN CENTER**    REGARDING OPIOID MEDICATIONS:  We will always address appropriateness of opioid pain medications, but we generally will not automatically take on a prescribing role. When we do take on prescribing of  opioids for chronic pain, it is in collaboration with the referring physician for an intermediate period of time (months), with an expectation that the primary physician or provider will assume the prescribing role if medications are effective at stable doses with demonstrated compliance.  Therefore, please do not assume that your prescribing responsibilities end on the day of pain clinic consultation.  Is this agreeable to you? YES    Please be aware that coverage of these services is subject to the terms and limitations of your health insurance plan.  Call member services at your health plan with any benefit or coverage questions.      Please bring the following with you to your appointment:    (1) Any X-Rays, CTs or MRIs which have been performed.  Contact the facility where they were done to arrange for  prior to your scheduled appointment.    (2) List of current medications   (3) This referral request   (4) Any documents/labs given to you for this referral            PAIN MANAGEMENT REFERRAL       Your provider has referred you to: [facility] [place] -    Reason for Referral: Comprehensive Evaluation and Management    Please complete the following questions:    What is your diagnosis for the patient's pain? Lumbar stenosis and DJD    Do you have any specific questions for the pain specialist? No    Are there any red flags that may impact the assessment or management of the patient? None    For any questions, contact the [facility] at [phone].     **ANY DIAGNOSTIC TESTS THAT ARE NOT IN [facility] SHOULD BE SENT TO THE PAIN CENTER**    REGARDING OPIOID MEDICATIONS:  We will always address appropriateness of opioid pain medications, but we generally will not automatically take on a prescribing role. When we do take on prescribing of opioids for chronic pain, it is in collaboration with the referring physician for an intermediate period of time (months), with an  expectation that the primary physician or provider will assume the prescribing role if medications are effective at stable doses with demonstrated compliance.  Therefore, please do not assume that your prescribing responsibilities end on the day of pain clinic consultation.  Is this agreeable to you? YES    Please be aware that coverage of these services is subject to the terms and limitations of your health insurance plan.  Call member services at your health plan with any benefit or coverage questions.      Please bring the following with you to your appointment:    (1) Any X-Rays, CTs or MRIs which have been performed.  Contact the facility where they were done to arrange for  prior to your scheduled appointment.    (2) List of current medications   (3) This referral request   (4) Any documents/labs given to you for this referral                  Your next 10 appointments already scheduled     Nov 29, 2017 11:30 AM CST   SHORT with Ruth Farooq MD   [facility]    [address] [address]   [phone]            Jan 09, 2018  9:00 AM CST   US THYROID with UCUS2   [facility] US ([facility])    [address] [place] [address] [id number] [phone]           Please bring a list of your medicines (including vitamins, minerals and over-the-counter drugs). Also, tell your doctor about any allergies you may have. Wear comfortable clothes and leave your valuables at home.  You do not need to do anything special to prepare for your exam.  Please call the Imaging Department at your exam site with any questions.            Jan 09, 2018 11:00 AM CST   (Arrive by 10:45 AM)   RETURN ENDOCRINE with Swetha Antoine MD   [facility] ([facility])    [address] [place] [address] [id number] [phone]            Jan 12,  2018  9:30 AM CST   CT CHEST ABDOMEN PELVIS W/O & W CONTRAST with [facility]CT1   [facility] CT ([facility])    [address]  [facility] [address] [phone]           Please bring any scans or X-rays taken at other hospitals, if similar tests were done. Also bring a list of your medicines, including vitamins, minerals and over-the-counter drugs. It is safest to leave personal items at home.  Be sure to tell your doctor:   If you have any allergies.   If there s any chance you are pregnant.   If you are breastfeeding.   If you have any special needs.  You may have contrast for this exam. To prepare:   Do not eat or drink for 2 hours before your exam. If you need to take medicine, you may take it with small sips of water. (We may ask you to take liquid medicine as well.)   The day before your exam, drink extra fluids at least six 8-ounce glasses (unless your doctor tells you to restrict your fluids).  Patients over 70 or patients with diabetes or kidney problems:   If you haven t had a blood test (creatinine test) within the last 30 days, go to your clinic or Diagnostic Imaging Department for this test.  If you have diabetes:   If your kidney function is normal, continue taking your metformin (Avandamet, Glucophage, Glucovance, Metaglip) on the day of your exam.   If your kidney function is abnormal, wait 48 hours before restarting this medicine.  You will have oral contrast for this exam:   You will drink the contrast at home. Get this from your clinic or Diagnostic Imaging Department. Please follow the directions given.  Please wear loose clothing, such as a sweat suit or jogging clothes. Avoid snaps, zippers and other metal. We may ask you to undress and put on a hospital gown.  If you have any questions, please call the Imaging Department where you will have your exam.            Marcos 15, 2018  8:45 AM CST   (Arrive by 8:30 AM)   Return Visit with Blayne Schmitz MD   [facility]  "[facility] ([facility] [place])    [address]  [address]  [facility] [address] [phone]              Future tests that were ordered for you today     Open Future Orders        Priority Expected Expires Ordered    MR Lumbar Spine w/o Contrast Routine  2018            Who to contact     If you have questions or need follow up information about today's clinic visit or your schedule please contact [facility] directly at [phone].  Normal or non-critical lab and imaging results will be communicated to you by MyChart, letter or phone within 4 business days after the clinic has received the results. If you do not hear from us within 7 days, please contact the clinic through [facility] or phone. If you have a critical or abnormal lab result, we will notify you by phone as soon as possible.  Submit refill requests through [employer] or call your pharmacy and they will forward the refill request to us. Please allow 3 business days for your refill to be completed.          Additional Information About Your Visit        [employer] Information     [employer] lets you send messages to your doctor, view your test results, renew your prescriptions, schedule appointments and more. To sign up, go to [email or web address] . Click on \"Log in\" on the left side of the screen, which will take you to the Welcome page. Then click on \"Sign up Now\" on the right side of the page.     You will be asked to enter the access code listed below, as well as some personal information. Please follow the directions to create your username and password.     Your access code is: [email or web address]  Expires: 2018  2:50 PM     Your access code will  in 90 days. If you need help or a new code, please call your [facility] or [phone].        Care EveryWhere ID     This is your Care EveryWhere ID. This could be used by other organizations to access your [facility] medical " records  [id number]        Your Vitals Were     Pulse Temperature Last Period BMI (Body Mass Index)          80 99  F (37.2  C) (Tympanic) 01/01/1992 27.92 kg/m2         Blood Pressure from Last 3 Encounters:   11/27/17 120/70   11/01/17 115/60   10/19/17 110/82    Weight from Last 3 Encounters:   11/27/17 157 lb 9.6 oz (71.5 kg)   11/01/17 156 lb (70.8 kg)   10/19/17 157 lb (71.2 kg)              We Performed the Following     GASTROENTEROLOGY ADULT REF PROCEDURE ONLY     PAIN MANAGEMENT REFERRAL     PAIN MANAGEMENT REFERRAL          Today's Medication Changes          These changes are accurate as of: 11/27/17  2:55 PM.  If you have any questions, ask your nurse or doctor.               These medicines have changed or have updated prescriptions.        Dose/Directions    imatinib 400 MG tablet   Commonly known as:  GLEEVEC   This may have changed:    - medication strength  - additional instructions   Used for:  Malignant gastrointestinal stromal tumor, unspecified site (H)   Changed by:  Shakila Matute formerly [facility]        Dose:  400 mg   Take 1 tablet (400 mg) by mouth daily Take with a meal and a large glass of water.   Quantity:  30 tablet   Refills:  2         Stop taking these medicines if you haven't already. Please contact your care team if you have questions.     senna-docusate 8.6-50 MG per tablet   Commonly known as:  SENOKOT-S;PERICOLACE   Stopped by:  Darlene Sultana MD                Where to get your medicines      These medications were sent to [facility] [place], [place] - [address] [facility]  [address] [facility] [address] [address]     Phone:  [phone]     imatinib 400 MG tablet         Some of these will need a paper prescription and others can be bought over the counter.  Ask your nurse if you have questions.     Bring a paper prescription for each of these medications     oxyCODONE IR 5 MG tablet                Primary Care Provider Office Phone # Fax #     Darlene Sultana [phone]       [address] [facility] [address]        Equal Access to Services     KIKE EWING : Hadii aad ku hadhernandezbebe Bharatinemesio, tikaeusebio staffordkristianha, dacia mendeldarrell roselucio, patricio timoin hayaan rosetierra navas jason muse. So [facility] [phone].    ATENCIÓN: Si habla español, tiene a cassidy disposición servicios gratuitos de asistencia lingüística. Llame al [phone].    We comply with applicable federal civil rights laws and [place] laws. We do not discriminate on the basis of race, color, national origin, age, disability, sex, sexual orientation, or gender identity.            Thank you!     Thank you for choosing [facility]  for your care. Our goal is always to provide you with excellent care. Hearing back from our patients is one way we can continue to improve our services. Please take a few minutes to complete the written survey that you may receive in the mail after your visit with us. Thank you!             Your Updated Medication List - Protect others around you: Learn how to safely use, store and throw away your medicines at www.disposemymeds.org.          This list is accurate as of: 11/27/17  2:55 PM.  Always use your most recent med list.                   Brand Name Dispense Instructions for use Diagnosis    Adult Blood Pressure Cuff Lg Kit     1 kit    1 Device 2 times daily    HTN, goal below 140/90       amLODIPine 5 MG tablet    NORVASC    30 tablet    Take 1 tablet (5 mg) by mouth daily    Essential hypertension       aspirin 81 MG EC tablet     30 tablet    Take 1 tablet by mouth daily.    NSTEMI (non-ST elevated myocardial infarction) (H), ACS (acute coronary syndrome) (H)       hydrochlorothiazide 25 MG tablet    HYDRODIURIL    90 tablet    Take 1 tablet (25 mg) by mouth daily    Essential hypertension       imatinib 400 MG tablet    GLEEVEC    30 tablet    Take 1 tablet (400 mg) by mouth daily Take with a meal and a large glass of water.     Malignant gastrointestinal stromal tumor, unspecified site (H)       lisinopril 40 MG tablet    PRINIVIL/ZESTRIL    90 tablet    TAKE ONE TABLET BY MOUTH EVERY DAY    Essential hypertension with goal blood pressure less than 140/90       LORazepam 1 MG tablet    ATIVAN    30 tablet    Take 1 tablet (1 mg) by mouth At Bedtime    Malignant gastrointestinal stromal tumor, unspecified site (H)       methimazole 5 MG tablet    TAPAZOLE    90 tablet    Take 1 tablet (5 mg) by mouth daily ALTERNATE 5 MG AND 2.5 MG (1/2 TABLET) EVERY DAY    Hyperthyroidism       metoprolol 100 MG tablet    LOPRESSOR    180 tablet    TAKE ONE TABLET BY MOUTH TWICE A DAY    Essential hypertension with goal blood pressure less than 140/90       ondansetron 4 MG ODT tab    ZOFRAN-ODT    4 tablet    Take 1-2 tablets (4-8 mg) by mouth every 8 hours as needed for nausea Dissolve ON the tongue.    Postoperative state       order for DME     1 Device    Walker    Post hysterectomy menopause       oxyCODONE IR 5 MG tablet    ROXICODONE    40 tablet    Take 1-2 tablets (5-10 mg) by mouth every 3 hours as needed for pain or other (Moderate to Severe)    Chronic low back pain, unspecified back pain laterality, with sciatica presence unspecified       polyethylene glycol powder    MIRALAX/GLYCOLAX     Take 17 g by mouth daily        rosuvastatin 5 MG tablet    CRESTOR    45 tablet    Take 1 tablet (5 mg) by mouth every other day    Hyperlipidemia LDL goal <160

## 2017-11-27 NOTE — PROGRESS NOTES
SUBJECTIVE:                                                    Idalmis Abdul is 72 year old female   Chief Complaint   Patient presents with     Referral     Requesting a referral to [facility] for back issues for ongoing Low back pain. Requesting something to take for the pain until able to bee seen by [facility].         Problem list and histories reviewed & adjusted, as indicated.  Additional history: sister sees colby there and now injections are not effective so her sister is getting medical marijuana.  Idalmis has researched and knows [facility] dose not prescribe such.  She stopped using oxycodone twice a day because of constipation.  Now she is getting sharp stabbing back pain goes into legs since off narcotics 4 days.     Patient Active Problem List   Diagnosis     Hypertension goal BP (blood pressure) < 140/90     GIST (gastrointestinal stromal tumor), malignant (H)     Eyelid edema     History of lung cancer     Health Care Home     NSTEMI (non-ST elevated myocardial infarction) (H)     ASCVD (arteriosclerotic cardiovascular disease)     Postsurgical aortocoronary bypass status     S/P CABG x 4     Dyspnea     GERD (gastroesophageal reflux disease)     Hyperlipidemia LDL goal <160     Hyperthyroidism     Thyroid eye disease     Eyelid retraction or lag     Thyrotoxic exophthalmos     Graves' disease     History of tobacco abuse     History of non-ST elevation myocardial infarction (NSTEMI)     Chronic back pain     PVD (posterior vitreous detachment)     Age-related osteoporosis without current pathological fracture     Uterovaginal prolapse     Past Surgical History:   Procedure Laterality Date     BYPASS GRAFT ARTERY CORONARY  6/14/2012    Procedure: BYPASS GRAFT ARTERY CORONARY;  Median Sternotomy Coronary Artery Bypass Graft  x 3        C THORACOSCOPY,DX W BX  fall 2003    benign tissue     CATARACT IOL, RT/LT      LE     CHOLECYSTECTOMY  1963     COLONOSCOPY  4-12-05      CORONARY ARTERY BYPASS      X4     CREATION PERICARDIAL WINDOW  6/15/2012    Procedure: CREATION PERICARDIAL WINDOW;  Mediastinal Exploration, Control of Bleeding;  Surgeon: Dwaine Griffin MD;  Location: [facility] OR     EYE SURGERY  2014    left cataract removal     GI SURGERY  2003 and 2007    GIST tumor removal     GYN SURGERY      tubal ligation     HYSTERECTOMY VAGINAL, COLPORRHAPHY ANTERIOR, POSTERIOR, COMBINED N/A 10/17/2017    Procedure: COMBINED HYSTERECTOMY VAGINAL, COLPORRHAPHY ANTERIOR, POSTERIOR;  Total Vaginal Hysterectomy and Posterior Repair,Sacrospinous Vault Suspension;  Surgeon: Ruth Farooq MD;  Location: [facility]     PHACOEMULSIFICATION WITH STANDARD INTRAOCULAR LENS IMPLANT  3/24/2014    Procedure: PHACOEMULSIFICATION WITH STANDARD INTRAOCULAR LENS IMPLANT;  Left Kelman Phacoemulsification with Intraocular Lens Implant;  Surgeon: Magnus Mckeon MD;  Location: [facility] OR     RECESSION RESECTION WITH ADJUSTABLE SUTURE BILATERAL Bilateral 7/14/2016    Procedure: RECESSION RESECTION WITH ADJUSTABLE SUTURE BILATERAL;  Surgeon: Erma Ordaz MD;  Location:  OR     SURGICAL HISTORY OF -   1963    cholecystectomy     SURGICAL HISTORY OF -   1970's    Tubal Ligation      SURGICAL HISTORY OF -   08/03    Explor. Lap, Excision of Small Bowel Tumor      SURGICAL HISTORY OF -   4/2010    lung cancer removed right lung       Social History   Substance Use Topics     Smoking status: Former Smoker     Packs/day: 0.50     Years: 49.00     Types: Cigarettes     Quit date: 4/19/2010     Smokeless tobacco: Never Used     Alcohol use No     Family History   Problem Relation Age of Onset     HEART DISEASE Mother      OSTEOPOROSIS Mother      Respiratory Mother      Emphezyma     Hypertension Mother      HEART DISEASE Father      Alcohol/Drug Father      Hypertension Father      Hypertension Maternal Grandmother      Hypertension Brother      Hypertension Sister      Arthritis Sister       Hypertension Son      CANCER Son      rectal         Current Outpatient Prescriptions   Medication Sig Dispense Refill     imatinib (GLEEVEC) 400 MG tablet Take 1 tablet (400 mg) by mouth daily Take with a meal and a large glass of water. 30 tablet 2     oxyCODONE IR (ROXICODONE) 5 MG tablet Take 1-2 tablets (5-10 mg) by mouth every 3 hours as needed for pain or other (Moderate to Severe) 40 tablet 0     lisinopril (PRINIVIL/ZESTRIL) 40 MG tablet TAKE ONE TABLET BY MOUTH EVERY DAY 90 tablet 1     ondansetron (ZOFRAN-ODT) 4 MG ODT tab Take 1-2 tablets (4-8 mg) by mouth every 8 hours as needed for nausea Dissolve ON the tongue. 4 tablet 0     rosuvastatin (CRESTOR) 5 MG tablet Take 1 tablet (5 mg) by mouth every other day 45 tablet 3     metoprolol (LOPRESSOR) 100 MG tablet TAKE ONE TABLET BY MOUTH TWICE A  tablet 3     methimazole (TAPAZOLE) 5 MG tablet Take 1 tablet (5 mg) by mouth daily ALTERNATE 5 MG AND 2.5 MG (1/2 TABLET) EVERY DAY 90 tablet 3     LORazepam (ATIVAN) 1 MG tablet Take 1 tablet (1 mg) by mouth At Bedtime 30 tablet 5     hydrochlorothiazide (HYDRODIURIL) 25 MG tablet Take 1 tablet (25 mg) by mouth daily 90 tablet 3     Blood Pressure Monitoring (ADULT BLOOD PRESSURE CUFF LG) KIT 1 Device 2 times daily 1 kit 1     amLODIPine (NORVASC) 5 MG tablet Take 1 tablet (5 mg) by mouth daily 30 tablet 11     aspirin EC 81 MG EC tablet Take 1 tablet by mouth daily. 30 tablet 2     order for MELINA Gomez (Patient not taking: Reported on 11/1/2017) 1 Device 0     polyethylene glycol (MIRALAX/GLYCOLAX) powder Take 17 g by mouth daily       Allergies   Allergen Reactions     Atorvastatin Cramps     Recent Labs   Lab Test  10/17/17   0755  10/12/17   1014  08/29/17   1030   07/07/17   0903   01/13/17   0854   09/20/16   1022   07/08/16   0914   07/17/15   0916   03/14/14   0929   06/19/12   1550   06/15/12   0850   A1C  5.5   --    --    --    --    --    --    --    --    --    --    --    --    --    --    --    5.4   --   Duplicate request   LDL   --    --    --    --    --    --    --    --   38   --    --    --   31   --   59   --    --    --    --    HDL   --    --    --    --    --    --    --    --   53   --    --    --   46*   --   38*   --    --    --    --    TRIG   --    --    --    --    --    --    --    --   79   --    --    --   72   --   105   < >   --    --    --    ALT   --    --    --    --   27   --   24   --    --    --   23   < >  23   < >  39   < >   --    < >   --    CR  1.15*  1.26*   --    --   1.20*   < >  1.00   --   1.00   --   1.18*   < >  1.06*   < >  1.07*   < >   --    < >   --    GFRESTIMATED  46*  42*   --    < >  44*   < >  55*   --   55*   --   45*   < >  51*   < >  51*   < >   --    < >   --    GFRESTBLACK  56*  50*   --    < >  53*   < >  66   --   66   --   55*   < >  62   < >  62   < >   --    < >   --    POTASSIUM  3.6  4.2   --    --   4.2   < >  4.2   --   4.2   < >  4.0   < >  4.0   < >  3.8   < >   --    < >  2.8*   TSH   --    --   1.54   --   1.27   --    --    < >   --    --    --    < >   --    < >  0.03*   < >   --    --    --     < > = values in this interval not displayed.      BP Readings from Last 3 Encounters:   11/27/17 120/70   11/01/17 115/60   10/19/17 110/82    Wt Readings from Last 3 Encounters:   11/27/17 157 lb 9.6 oz (71.5 kg)   11/01/17 156 lb (70.8 kg)   10/19/17 157 lb (71.2 kg)         ROS:  Constitutional, HEENT, cardiovascular, pulmonary, gi and gu systems are negative, except as otherwise noted.    OBJECTIVE:                                                    /70  Pulse 80  Temp 99  F (37.2  C) (Tympanic)  Wt 157 lb 9.6 oz (71.5 kg)  LMP 01/01/1992  BMI 27.92 kg/m2  GENERAL APPEARANCE ADULT: Alert, no acute distress  MS: extremities normal, no peripheral edema  back exam: general movements in the exam room are impaired pain  PSYCH: mentation appears normal., affect and mood normal  Diagnostic Test Results:  none      ASSESSMENT/PLAN:                                                     1. Chronic low back pain, unspecified back pain laterality, with sciatica presence unspecified  Was told someday would need surgery, is thinking that day is soon.  If injections are not effective will go back to her spine doctor.  - PAIN MANAGEMENT REFERRAL  - MR Lumbar Spine w/o Contrast; Future  - oxyCODONE IR (ROXICODONE) 5 MG tablet; Take 1-2 tablets (5-10 mg) by mouth every 3 hours as needed for pain or other (Moderate to Severe)  Dispense: 40 tablet; Refill: 0      2. Drug-induced constipation  Is due for colonoscopy, recommend doing here, she did not like Dr. DEVINE and her son did not like Dr. OCHOA.  - GASTROENTEROLOGY ADULT REF PROCEDURE ONLY        Darlene Sultana MD  [facility]

## 2017-11-28 ENCOUNTER — TELEPHONE (OUTPATIENT)
Dept: PALLIATIVE MEDICINE | Facility: CLINIC | Age: 72
End: 2017-11-28

## 2017-11-28 ENCOUNTER — TELEPHONE (OUTPATIENT)
Dept: FAMILY MEDICINE | Facility: CLINIC | Age: 72
End: 2017-11-28

## 2017-11-28 DIAGNOSIS — G89.29 CHRONIC LOW BACK PAIN, UNSPECIFIED BACK PAIN LATERALITY, WITH SCIATICA PRESENCE UNSPECIFIED: ICD-10-CM

## 2017-11-28 DIAGNOSIS — M54.5 CHRONIC LOW BACK PAIN, UNSPECIFIED BACK PAIN LATERALITY, WITH SCIATICA PRESENCE UNSPECIFIED: ICD-10-CM

## 2017-11-28 RX ORDER — OXYCODONE HYDROCHLORIDE 5 MG/1
5-10 TABLET ORAL
Qty: 40 TABLET | Refills: 0 | Status: CANCELLED | OUTPATIENT
Start: 2017-11-28

## 2017-11-28 ASSESSMENT — ANXIETY QUESTIONNAIRES: GAD7 TOTAL SCORE: 3

## 2017-11-28 NOTE — TELEPHONE ENCOUNTER
[facility] Management Center Referral      1. Confirmed address with patient? Yes  2. Confirmed phone number with patient? Yes  3. Confirmed referring provider? Yes  4. Is the PCP the same as the referring provider? Yes  5. Has the patient been to any previous pain clinics? No  (If yes, send JENNIFER with welcome letter)  6. Which insurance are we to bill for this appointment?  /Medicare    7. Informed pt of cancellation (48 hour) policy? Yes    REGARDING OPIOID MEDICATIONS: We will always address appropriateness of opioid pain medications, but we generally will not automatically take on a prescribing role. When we do take on prescribing of opioids for chronic pain, it is in collaboration with the referring physician for an intermediate period of time (months), with an expectation that the primary physician or provider will assume the prescribing role if medications are effective at stable doses with demonstrated compliance. Therefore, please do not assume that your prescribing responsibilities end on the day of pain clinic consultation.  7. Informed pt of prescribing policy? Yes      8. Referring Provider: Darlene Sultana

## 2017-11-28 NOTE — TELEPHONE ENCOUNTER
She has her pain clinic visit scheduled. appt to refill narcotics until then is scheduled for 12/11/17    Alondra Mullen RN

## 2017-11-28 NOTE — LETTER
November 28, 2017    Idalmis Abdul  [address]   [facility] [address]    Dear Idalmis,            Welcome to the [facility].  We are located on the 2nd floor ([address]) of the [facility], located at [address] [place], [place] [address], [place] [address].    Your appointment at the [facility] has been scheduled on February 12th at 3:30pm with Jose Bustos MD.    At your first visit, you will meet your team of caregivers who will help you to develop pain management strategies that will last a lifetime. You will meet with our support staff to review your insurance information, and collect your co-payment if required by your insurance company. You will also meet with a medical pain specialist and care coordinator who will assess your pain and develop a plan of care for your successful pain rehabilitation. You should expect to spend 1-2 hours at your first visit with us. Usually, patients work with us for a period of 6-12 months, and eventually return to their primary doctor once their pain management has stabilized.      To help us make your visit go as smoothly as possible, please bring the following items with you on your visit:     Completed Pain Questionnaire enclosed in this packet.  If you do not bring the completed questionnaire, we may have to reschedule your appointment.  List of any medicines that you are currently taking or have been prescribed  Important NON-[facility] medical information such as medical records or tests results (X-rays, or laboratory tests)  Your health insurance card  Financial resources to cover your co-payment or balance due at the time of service (cash, personal check, Visa, and MasterCard are acceptable methods of payment)     Due to the demand for new patient evaluations, you must notify the scheduling department 48 hours in advance if you are not able to keep this appointment. Failure to do so could affect your ability to  reschedule with our clinic. Please be aware that we will not prescribe any medications at your first visit.     Please call [phone] with any questions regarding your appointment. We look forward to meeting you and working to address your health care needs.     Sincerely,      [facility]

## 2017-11-29 ENCOUNTER — OFFICE VISIT (OUTPATIENT)
Dept: OBGYN | Facility: CLINIC | Age: 72
End: 2017-11-29
Payer: COMMERCIAL

## 2017-11-29 VITALS
BODY MASS INDEX: 27.46 KG/M2 | HEART RATE: 73 BPM | SYSTOLIC BLOOD PRESSURE: 116 MMHG | WEIGHT: 155 LBS | HEIGHT: 63 IN | DIASTOLIC BLOOD PRESSURE: 68 MMHG

## 2017-11-29 DIAGNOSIS — E07.9 THYROID EYE DISEASE: Primary | ICD-10-CM

## 2017-11-29 DIAGNOSIS — H57.89 THYROID EYE DISEASE: Primary | ICD-10-CM

## 2017-11-29 DIAGNOSIS — Z98.890 POSTOPERATIVE STATE: Primary | ICD-10-CM

## 2017-11-29 PROBLEM — N81.4 UTEROVAGINAL PROLAPSE: Status: RESOLVED | Noted: 2017-09-25 | Resolved: 2017-11-29

## 2017-11-29 PROCEDURE — 99024 POSTOP FOLLOW-UP VISIT: CPT | Performed by: OBSTETRICS & GYNECOLOGY

## 2017-11-29 NOTE — MR AVS SNAPSHOT
After Visit Summary   11/29/2017    Idalmis Abdul    MRN: [id number]           Patient Information     Date Of Birth          1945        Visit Information        Provider Department      11/29/2017 11:30 AM Ruth Farooq MD [facility]        Today's Diagnoses     Postoperative state    -  1       Follow-ups after your visit        Your next 10 appointments already scheduled     Dec 11, 2017  9:40 AM CST   [facility]    [address] [facility] [place] [address]   [phone]            Jan 09, 2018  9:00 AM CST   US THYROID with UCUS2   [facility] US ([facility])    [address] [place] [id number] [phone]           Please bring a list of your medicines (including vitamins, minerals and over-the-counter drugs). Also, tell your doctor about any allergies you may have. Wear comfortable clothes and leave your valuables at home.  You do not need to do anything special to prepare for your exam.  Please call the Imaging Department at your exam site with any questions.            Jan 09, 2018 11:00 AM CST   (Arrive by 10:45 AM)   RETURN ENDOCRINE with Swetha Antoine MD   [facility] ([facility])    [address] [place] [id number] [phone]            Jan 12, 2018  9:30 AM CST   CT CHEST ABDOMEN PELVIS W/O & W CONTRAST with [id number]   [facility] CT ([facility])    [address] [facility] [facility] [place] [address] [phone]           Please bring any scans or X-rays taken at other hospitals, if similar tests were done. Also bring a list of your medicines, including vitamins, minerals and over-the-counter drugs. It is safest to leave personal items at home.  Be sure to tell your doctor:   If you have any allergies.   If  there s any chance you are pregnant.   If you are breastfeeding.   If you have any special needs.  You may have contrast for this exam. To prepare:   Do not eat or drink for 2 hours before your exam. If you need to take medicine, you may take it with small sips of water. (We may ask you to take liquid medicine as well.)   The day before your exam, drink extra fluids at least six 8-ounce glasses (unless your doctor tells you to restrict your fluids).  Patients over 70 or patients with diabetes or kidney problems:   If you haven t had a blood test (creatinine test) within the last 30 days, go to your clinic or Diagnostic Imaging Department for this test.  If you have diabetes:   If your kidney function is normal, continue taking your metformin (Avandamet, Glucophage, Glucovance, Metaglip) on the day of your exam.   If your kidney function is abnormal, wait 48 hours before restarting this medicine.  You will have oral contrast for this exam:   You will drink the contrast at home. Get this from your clinic or Diagnostic Imaging Department. Please follow the directions given.  Please wear loose clothing, such as a sweat suit or jogging clothes. Avoid snaps, zippers and other metal. We may ask you to undress and put on a hospital gown.  If you have any questions, please call the Imaging Department where you will have your exam.            Marcos 15, 2018  8:45 AM CST   (Arrive by 8:30 AM)   Return Visit with Blayne Schmitz MD   [facility] ([facility])    [address]  [address] [facility] [id number] [phone]            Jan 19, 2018   Procedure with Ryan Prater MD   [facility] ([facility])    [address] [place] [address]   [phone]           The medical center is located at [address] (between [address] in [place], [place] north of [place]).            Feb 12, 2018  3:30 PM CST  "  New Visit with Jose Bustos MD   [facility] [place] ([facility] [place])    [address] [facility]  [place] [address] [phone]              Who to contact     If you have questions or need follow up information about today's clinic visit or your schedule please contact [facility] directly at [phone].  Normal or non-critical lab and imaging results will be communicated to you by MyChart, letter or phone within 4 business days after the clinic has received the results. If you do not hear from us within 7 days, please contact the clinic through [employer] or phone. If you have a critical or abnormal lab result, we will notify you by phone as soon as possible.  Submit refill requests through "[employer]" or call your pharmacy and they will forward the refill request to us. Please allow 3 business days for your refill to be completed.          Additional Information About Your Visit        [employer] Information     "[employer]" lets you send messages to your doctor, view your test results, renew your prescriptions, schedule appointments and more. To sign up, go to [email or web address]" . Click on \"Log in\" on the left side of the screen, which will take you to the Welcome page. Then click on \"Sign up Now\" on the right side of the page.     You will be asked to enter the access code listed below, as well as some personal information. Please follow the directions to create your username and password.     Your access code is: [email or web address]  Expires: 2018  2:50 PM     Your access code will  in 90 days. If you need help or a new code, please call your [place] clinic or [phone].        Care EveryWhere ID     This is your Care EveryWhere ID. This could be used by other organizations to access your [place] medical records  [id number]        Your Vitals Were     Pulse Height Last Period BMI (Body Mass Index)          73 5' 3\" (1.6 m) 1992 27.46 kg/m2         " Blood Pressure from Last 3 Encounters:   11/29/17 116/68   11/27/17 120/70   11/01/17 115/60    Weight from Last 3 Encounters:   11/29/17 155 lb (70.3 kg)   11/27/17 157 lb 9.6 oz (71.5 kg)   11/01/17 156 lb (70.8 kg)              Today, you had the following     No orders found for display       Primary Care Provider Office Phone # Fax #    Darlene Daniela Sultana [phone] [address] [address]        Equal Access to Services     [facility] Hospital[facility] : Hadii aad ku hadasho Soomaali, waaxda luqadaha, qaybta kaalmada adeegyada, patricio gomez . So [facility] [phone].    ATENCIÓN: Si habla español, tiene a cassidy disposición servicios gratuitos de asistencia lingüística. Llame al [phone].    We comply with applicable federal civil rights laws and [place] laws. We do not discriminate on the basis of race, color, national origin, age, disability, sex, sexual orientation, or gender identity.            Thank you!     Thank you for choosing [facility]  for your care. Our goal is always to provide you with excellent care. Hearing back from our patients is one way we can continue to improve our services. Please take a few minutes to complete the written survey that you may receive in the mail after your visit with us. Thank you!             Your Updated Medication List - Protect others around you: Learn how to safely use, store and throw away your medicines at www.disposemymeds.org.          This list is accurate as of: 11/29/17 11:55 AM.  Always use your most recent med list.                   Brand Name Dispense Instructions for use Diagnosis    Adult Blood Pressure Cuff Lg Kit     1 kit    1 Device 2 times daily    HTN, goal below 140/90       amLODIPine 5 MG tablet    NORVASC    30 tablet    Take 1 tablet (5 mg) by mouth daily    Essential hypertension       aspirin 81 MG EC tablet     30 tablet    Take 1 tablet by mouth daily.    NSTEMI (non-ST  elevated myocardial infarction) (H), ACS (acute coronary syndrome) (H)       hydrochlorothiazide 25 MG tablet    HYDRODIURIL    90 tablet    Take 1 tablet (25 mg) by mouth daily    Essential hypertension       imatinib 400 MG tablet    GLEEVEC    30 tablet    Take 1 tablet (400 mg) by mouth daily Take with a meal and a large glass of water.    Malignant gastrointestinal stromal tumor, unspecified site (H)       lisinopril 40 MG tablet    PRINIVIL/ZESTRIL    90 tablet    TAKE ONE TABLET BY MOUTH EVERY DAY    Essential hypertension with goal blood pressure less than 140/90       LORazepam 1 MG tablet    ATIVAN    30 tablet    Take 1 tablet (1 mg) by mouth At Bedtime    Malignant gastrointestinal stromal tumor, unspecified site (H)       methimazole 5 MG tablet    TAPAZOLE    90 tablet    Take 1 tablet (5 mg) by mouth daily ALTERNATE 5 MG AND 2.5 MG (1/2 TABLET) EVERY DAY    Hyperthyroidism       metoprolol 100 MG tablet    LOPRESSOR    180 tablet    TAKE ONE TABLET BY MOUTH TWICE A DAY    Essential hypertension with goal blood pressure less than 140/90       ondansetron 4 MG ODT tab    ZOFRAN-ODT    4 tablet    Take 1-2 tablets (4-8 mg) by mouth every 8 hours as needed for nausea Dissolve ON the tongue.    Postoperative state       order for DME     1 Device    Walker    Post hysterectomy menopause       oxyCODONE IR 5 MG tablet    ROXICODONE    40 tablet    Take 1-2 tablets (5-10 mg) by mouth every 3 hours as needed for pain or other (Moderate to Severe)    Chronic low back pain, unspecified back pain laterality, with sciatica presence unspecified       polyethylene glycol powder    MIRALAX/GLYCOLAX     Take 17 g by mouth daily        rosuvastatin 5 MG tablet    CRESTOR    45 tablet    Take 1 tablet (5 mg) by mouth every other day    Hyperlipidemia LDL goal <160

## 2017-11-29 NOTE — NURSING NOTE
"Initial /68 (BP Location: Right arm, Patient Position: Chair, Cuff Size: Adult Small)  Pulse 73  Ht 5' 3\" (1.6 m)  Wt 155 lb (70.3 kg)  LMP 01/01/1992  BMI 27.46 kg/m2 Estimated body mass index is 27.46 kg/(m^2) as calculated from the following:    Height as of this encounter: 5' 3\" (1.6 m).    Weight as of this encounter: 155 lb (70.3 kg). .      "

## 2017-11-29 NOTE — PROGRESS NOTES
"Idalmis is a 72 year old female 6 weeks S/P total vaginal hysterectomy, A & P repair and Sacrospinous vault suspension, complicated by no problems reported.  She is currently requiring nothing for pain management.  The pathology report showed benign uterine pathology.  She is currently reporting ongoing back pain that predates the procedure.    Exam: /68 (BP Location: Right arm, Patient Position: Chair, Cuff Size: Adult Small)  Pulse 73  Ht 5' 3\" (1.6 m)  Wt 155 lb (70.3 kg)  LMP 01/01/1992  BMI 27.46 kg/m2  vaginal cuff intact, no drainage or granulation tissue, nontender and vaginal walls well supported    Assessment:  Postop      Plan:  F/u prn  Avoid constipation  Ruth Farooq MD  [facility]      "

## 2017-12-11 ENCOUNTER — OFFICE VISIT (OUTPATIENT)
Dept: FAMILY MEDICINE | Facility: CLINIC | Age: 72
End: 2017-12-11
Payer: COMMERCIAL

## 2017-12-11 VITALS
DIASTOLIC BLOOD PRESSURE: 78 MMHG | WEIGHT: 155 LBS | BODY MASS INDEX: 27.46 KG/M2 | HEART RATE: 77 BPM | SYSTOLIC BLOOD PRESSURE: 116 MMHG | TEMPERATURE: 99 F

## 2017-12-11 DIAGNOSIS — G89.29 CHRONIC LOW BACK PAIN, UNSPECIFIED BACK PAIN LATERALITY, WITH SCIATICA PRESENCE UNSPECIFIED: ICD-10-CM

## 2017-12-11 DIAGNOSIS — M54.5 CHRONIC LOW BACK PAIN, UNSPECIFIED BACK PAIN LATERALITY, WITH SCIATICA PRESENCE UNSPECIFIED: ICD-10-CM

## 2017-12-11 PROCEDURE — 99213 OFFICE O/P EST LOW 20 MIN: CPT | Performed by: FAMILY MEDICINE

## 2017-12-11 RX ORDER — OXYCODONE HYDROCHLORIDE 5 MG/1
5-10 TABLET ORAL
Qty: 180 TABLET | Refills: 0 | Status: SHIPPED | OUTPATIENT
Start: 2017-12-11 | End: 2018-03-22

## 2017-12-11 NOTE — NURSING NOTE
"Initial /78  Pulse 77  Temp 99  F (37.2  C) (Tympanic)  Wt 155 lb (70.3 kg)  LMP 01/01/1992  BMI 27.46 kg/m2 Estimated body mass index is 27.46 kg/(m^2) as calculated from the following:    Height as of 11/29/17: 5' 3\" (1.6 m).    Weight as of this encounter: 155 lb (70.3 kg). .      "

## 2017-12-11 NOTE — PROGRESS NOTES
SUBJECTIVE:                                                    Idalmis Abdul is 72 year old female   Chief Complaint   Patient presents with     Refill Request     Requesting refill of pain medication to last until pain management appointment 2/12/17.         Problem list and histories reviewed & adjusted, as indicated.  Additional history: back getting worse, has appointment for MRI tomorrow, need referral to spine doctor.    Patient Active Problem List   Diagnosis     Hypertension goal BP (blood pressure) < 140/90     GIST (gastrointestinal stromal tumor), malignant (H)     Eyelid edema     History of lung cancer     Health Care Home     NSTEMI (non-ST elevated myocardial infarction) (H)     ASCVD (arteriosclerotic cardiovascular disease)     Postsurgical aortocoronary bypass status     S/P CABG x 4     Dyspnea     GERD (gastroesophageal reflux disease)     Hyperlipidemia LDL goal <160     Hyperthyroidism     Thyroid eye disease     Eyelid retraction or lag     Thyrotoxic exophthalmos     Graves' disease     History of tobacco abuse     History of non-ST elevation myocardial infarction (NSTEMI)     Chronic back pain     PVD (posterior vitreous detachment)     Age-related osteoporosis without current pathological fracture     Past Surgical History:   Procedure Laterality Date     BYPASS GRAFT ARTERY CORONARY  6/14/2012    Procedure: BYPASS GRAFT ARTERY CORONARY;  Median Sternotomy Coronary Artery Bypass Graft  x 3        C THORACOSCOPY,DX W BX  fall 2003    benign tissue     CATARACT IOL, RT/LT      LE     CHOLECYSTECTOMY  1963     COLONOSCOPY  4-12-05     CORONARY ARTERY BYPASS      X4     CREATION PERICARDIAL WINDOW  6/15/2012    Procedure: CREATION PERICARDIAL WINDOW;  Mediastinal Exploration, Control of Bleeding;  Surgeon: Dwaine Griffin MD;  Location: [facility] OR     EYE SURGERY  2014    left cataract removal     GI SURGERY  2003 and 2007    GIST tumor removal     GYN SURGERY      tubal ligation      HYSTERECTOMY VAGINAL, COLPORRHAPHY ANTERIOR, POSTERIOR, COMBINED N/A 10/17/2017    Procedure: COMBINED HYSTERECTOMY VAGINAL, COLPORRHAPHY ANTERIOR, POSTERIOR;  Total Vaginal Hysterectomy and Posterior Repair,Sacrospinous Vault Suspension;  Surgeon: Ruth Farooq MD;  Location: [facility] OR     PHACOEMULSIFICATION WITH STANDARD INTRAOCULAR LENS IMPLANT  3/24/2014    Procedure: PHACOEMULSIFICATION WITH STANDARD INTRAOCULAR LENS IMPLANT;  Left Kelman Phacoemulsification with Intraocular Lens Implant;  Surgeon: Magnus Mckeon MD;  Location: [facility] OR     RECESSION RESECTION WITH ADJUSTABLE SUTURE BILATERAL Bilateral 7/14/2016    Procedure: RECESSION RESECTION WITH ADJUSTABLE SUTURE BILATERAL;  Surgeon: Erma Ordaz MD;  Location: [facility] OR     SURGICAL HISTORY OF -   1963    cholecystectomy     SURGICAL HISTORY OF -   1970's    Tubal Ligation      SURGICAL HISTORY OF -   08/03    Explor. Lap, Excision of Small Bowel Tumor      SURGICAL HISTORY OF -   4/2010    lung cancer removed right lung       Social History   Substance Use Topics     Smoking status: Former Smoker     Packs/day: 0.50     Years: 49.00     Types: Cigarettes     Quit date: 4/19/2010     Smokeless tobacco: Never Used     Alcohol use No     Family History   Problem Relation Age of Onset     HEART DISEASE Mother      OSTEOPOROSIS Mother      Respiratory Mother      Emphezyma     Hypertension Mother      HEART DISEASE Father      Alcohol/Drug Father      Hypertension Father      Hypertension Maternal Grandmother      Hypertension Brother      Hypertension Sister      Arthritis Sister      Hypertension Son      CANCER Son      rectal         Current Outpatient Prescriptions   Medication Sig Dispense Refill     oxyCODONE IR (ROXICODONE) 5 MG tablet Take 1-2 tablets (5-10 mg) by mouth every 3 hours as needed for pain or other (Moderate to Severe) 180 tablet 0     imatinib (GLEEVEC) 400 MG tablet Take 1 tablet (400 mg) by mouth daily Take with a  meal and a large glass of water. 30 tablet 2     lisinopril (PRINIVIL/ZESTRIL) 40 MG tablet TAKE ONE TABLET BY MOUTH EVERY DAY 90 tablet 1     ondansetron (ZOFRAN-ODT) 4 MG ODT tab Take 1-2 tablets (4-8 mg) by mouth every 8 hours as needed for nausea Dissolve ON the tongue. 4 tablet 0     polyethylene glycol (MIRALAX/GLYCOLAX) powder Take 17 g by mouth daily       rosuvastatin (CRESTOR) 5 MG tablet Take 1 tablet (5 mg) by mouth every other day 45 tablet 3     metoprolol (LOPRESSOR) 100 MG tablet TAKE ONE TABLET BY MOUTH TWICE A  tablet 3     methimazole (TAPAZOLE) 5 MG tablet Take 1 tablet (5 mg) by mouth daily ALTERNATE 5 MG AND 2.5 MG (1/2 TABLET) EVERY DAY 90 tablet 3     LORazepam (ATIVAN) 1 MG tablet Take 1 tablet (1 mg) by mouth At Bedtime 30 tablet 5     hydrochlorothiazide (HYDRODIURIL) 25 MG tablet Take 1 tablet (25 mg) by mouth daily 90 tablet 3     Blood Pressure Monitoring (ADULT BLOOD PRESSURE CUFF LG) KIT 1 Device 2 times daily 1 kit 1     amLODIPine (NORVASC) 5 MG tablet Take 1 tablet (5 mg) by mouth daily 30 tablet 11     aspirin EC 81 MG EC tablet Take 1 tablet by mouth daily. 30 tablet 2     order for MELINA Gomez (Patient not taking: Reported on 11/1/2017) 1 Device 0     Allergies   Allergen Reactions     Atorvastatin Cramps     Recent Labs   Lab Test  10/17/17   0755  10/12/17   1014  08/29/17   1030   07/07/17   0903   01/13/17   0854   09/20/16   1022   07/08/16   0914   07/17/15   0916   03/14/14   0929   06/19/12   1550   06/15/12   0850   A1C  5.5   --    --    --    --    --    --    --    --    --    --    --    --    --    --    --   5.4   --   Duplicate request   LDL   --    --    --    --    --    --    --    --   38   --    --    --   31   --   59   --    --    --    --    HDL   --    --    --    --    --    --    --    --   53   --    --    --   46*   --   38*   --    --    --    --    TRIG   --    --    --    --    --    --    --    --   79   --    --    --   72   --   105   < >    --    --    --    ALT   --    --    --    --   27   --   24   --    --    --   23   < >  23   < >  39   < >   --    < >   --    CR  1.15*  1.26*   --    --   1.20*   < >  1.00   --   1.00   --   1.18*   < >  1.06*   < >  1.07*   < >   --    < >   --    GFRESTIMATED  46*  42*   --    < >  44*   < >  55*   --   55*   --   45*   < >  51*   < >  51*   < >   --    < >   --    GFRESTBLACK  56*  50*   --    < >  53*   < >  66   --   66   --   55*   < >  62   < >  62   < >   --    < >   --    POTASSIUM  3.6  4.2   --    --   4.2   < >  4.2   --   4.2   < >  4.0   < >  4.0   < >  3.8   < >   --    < >  2.8*   TSH   --    --   1.54   --   1.27   --    --    < >   --    --    --    < >   --    < >  0.03*   < >   --    --    --     < > = values in this interval not displayed.      BP Readings from Last 3 Encounters:   12/11/17 116/78   11/29/17 116/68   11/27/17 120/70    Wt Readings from Last 3 Encounters:   12/11/17 155 lb (70.3 kg)   11/29/17 155 lb (70.3 kg)   11/27/17 157 lb 9.6 oz (71.5 kg)         ROS:  Constitutional, HEENT, cardiovascular, pulmonary, gi and gu systems are negative, except as otherwise noted.    OBJECTIVE:                                                    /78  Pulse 77  Temp 99  F (37.2  C) (Tympanic)  Wt 155 lb (70.3 kg)  LMP 01/01/1992  BMI 27.46 kg/m2  GENERAL APPEARANCE ADULT: alert, anxious, cooperative  PSYCH: mentation appears normal., affect and mood normal  Diagnostic Test Results:  none      ASSESSMENT/PLAN:                                                    1. Chronic low back pain, unspecified back pain laterality, with sciatica presence unspecified  Worsening sxs with sharp lumbar back pain, has appointment in Feb for pain specialist, MRI tomorrow and will see ortho after that.  - oxyCODONE IR (ROXICODONE) 5 MG tablet; Take 1-2 tablets (5-10 mg) by mouth every 3 hours as needed for pain or other (Moderate to Severe)  Dispense: 180 tablet; Refill: 0  - ORTHO   REFERRAL    Darlene Sultana MD  [facility]

## 2017-12-11 NOTE — MR AVS SNAPSHOT
After Visit Summary   12/11/2017    Idalmis Abdul    MRN: [id number]           Patient Information     Date Of Birth          1945        Visit Information        Provider Department      12/11/2017 9:40 AM Darlene Sultana MD [facility]        Today's Diagnoses     Chronic low back pain, unspecified back pain laterality, with sciatica presence unspecified           Follow-ups after your visit        Additional Services     ORTHO  REFERRAL       [facility] is referring you to the Orthopedic  Services at [facility].       The  Representative will assist you in the coordination of your Orthopedic and Musculoskeletal Care as prescribed by your physician.    The  Representative will call you within 1 business day to help schedule your appointment, or you may contact the  Representative at:    All areas ~ [phone]     Type of Referral : Spine: Lumbar  **Choose Medical Spine Specialist (unless patient was seen by a Medical Spine Specialist within the past 6 months).**  Surgical Evaluation is advised if the patient presents with one or more of the following red flags: Evidence of Spinal Tumor, Infection or Fracture, Cauda Equina Syndrome, Sudden or Progressive Weakness, Loss of Bowel or Bladder Control, or any other documented emergent neurological condition resulting from a Lumbar Spinal Condition. Spine Surgeon        Timeframe requested: 3 - 5 days    Coverage of these services is subject to the terms and limitations of your health insurance plan.  Please call member services at your health plan with any benefit or coverage questions.      If X-rays, CT or MRI's have been performed, please contact the facility where they were done to arrange for , prior to your scheduled appointment.  Please bring this referral request to your appointment and present it to your specialist.                   Your next 10 appointments already scheduled     Dec 12, 2017 10:30 AM CST   (Arrive by 10:15 AM)   MR LUMBAR SPINE W/O CONTRAST with WYMR2   [facility] MRI ([facility])    [address]  [facility] [place] [address] [phone]           Take your medicines as usual, unless your doctor tells you not to. Bring a list of your current medicines to your exam (including vitamins, minerals and over-the-counter drugs). Also bring the results of similar scans you may have had.  Please remove any body piercings and hair extensions before you arrive.  Follow your doctor s orders. If you do not, we may have to postpone your exam.  You will not have contrast for this exam. You do not need to do anything special to prepare.  The MRI machine uses a strong magnet. Please wear clothes without metal (snaps, zippers). A sweatsuit works well, or we may give you a hospital gown.   **IMPORTANT** THE INSTRUCTIONS BELOW ARE ONLY FOR THOSE PATIENTS WHO HAVE BEEN TOLD THEY WILL RECEIVE SEDATION OR GENERAL ANESTHESIA DURING THEIR MRI PROCEDURE:  IF YOU WILL RECEIVE SEDATION (take medicine to help you relax during your exam):   You must get the medicine from your doctor before you arrive. Bring the medicine to the exam. Do not take it at home.   Arrive one hour early. Bring someone who can take you home after the test. Your medicine will make you sleepy. After the exam, you may not drive, take a bus or take a taxi by yourself.   No eating 8 hours before your exam. You may have clear liquids up until 4 hours before your exam. (Clear liquids include water, clear tea, black coffee and fruit juice without pulp.)  IF YOU WILL RECEIVE ANESTHESIA (be asleep for your exam):   Arrive 1 1/2 hours early. Bring someone who can take you home after the test. You may not drive, take a bus or take a taxi by yourself.   No eating 8 hours before your exam. You may have clear liquids up until 4 hours before your exam. (Clear  liquids include water, clear tea, black coffee and fruit juice without pulp.)   You will spend four to five hours in the recovery room.  Please call the Imaging Department at your exam site with any questions.            Jan 09, 2018  9:00 AM CST   US THYROID with UCUS2   [facility] US ([facility])    [address] [place] [id number] [phone]           Please bring a list of your medicines (including vitamins, minerals and over-the-counter drugs). Also, tell your doctor about any allergies you may have. Wear comfortable clothes and leave your valuables at home.  You do not need to do anything special to prepare for your exam.  Please call the Imaging Department at your exam site with any questions.            Jan 09, 2018 11:00 AM CST   (Arrive by 10:45 AM)   RETURN ENDOCRINE with Swetha Atnoine MD   [facility] Endocrinology ([facility])    [address] [place] [id number] [phone]            Jan 12, 2018  9:30 AM CST   CT CHEST ABDOMEN PELVIS W/O & W CONTRAST with WYCT1   [facility] CT ([facility])    [address] [facility] [address] [phone]           Please bring any scans or X-rays taken at other hospitals, if similar tests were done. Also bring a list of your medicines, including vitamins, minerals and over-the-counter drugs. It is safest to leave personal items at home.  Be sure to tell your doctor:   If you have any allergies.   If there s any chance you are pregnant.   If you are breastfeeding.   If you have any special needs.  You may have contrast for this exam. To prepare:   Do not eat or drink for 2 hours before your exam. If you need to take medicine, you may take it with small sips of water. (We may ask you to take liquid medicine as well.)   The day before your exam, drink extra fluids at least six 8-ounce glasses (unless  your doctor tells you to restrict your fluids).  Patients over 70 or patients with diabetes or kidney problems:   If you haven t had a blood test (creatinine test) within the last 30 days, go to your clinic or Diagnostic Imaging Department for this test.  If you have diabetes:   If your kidney function is normal, continue taking your metformin (Avandamet, Glucophage, Glucovance, Metaglip) on the day of your exam.   If your kidney function is abnormal, wait 48 hours before restarting this medicine.  You will have oral contrast for this exam:   You will drink the contrast at home. Get this from your clinic or Diagnostic Imaging Department. Please follow the directions given.  Please wear loose clothing, such as a sweat suit or jogging clothes. Avoid snaps, zippers and other metal. We may ask you to undress and put on a hospital gown.  If you have any questions, please call the Imaging Department where you will have your exam.            Marcos 15, 2018  8:45 AM CST   (Arrive by 8:30 AM)   Return Visit with Blayne Schmitz MD   [facility] ([facility])    [address] [place] [address] [phone]            Jan 19, 2018   Procedure with Ryan Prater MD   [facility] Endoscopy ([facility])    [address] [facility] [address] [phone]           The medical center is located at [address] [facility] [address] (between [address] in [place], [place] north of [place]).            Feb 12, 2018  3:30 PM CST   New Visit with Jose Bustos MD   [facility] ([facility])    [address] [facility] [address] [phone]              Who to contact     If you have questions or need follow up information about today's clinic visit or your schedule please contact [facility] directly at [phone].  Normal or non-critical lab and imaging  "results will be communicated to you by MyChart, letter or phone within 4 business days after the clinic has received the results. If you do not hear from us within 7 days, please contact the clinic through [employer]t or phone. If you have a critical or abnormal lab result, we will notify you by phone as soon as possible.  Submit refill requests through [employer] or call your pharmacy and they will forward the refill request to us. Please allow 3 business days for your refill to be completed.          Additional Information About Your Visit        [employer] Information     [employer] lets you send messages to your doctor, view your test results, renew your prescriptions, schedule appointments and more. To sign up, go to [email or web address] . Click on \"Log in\" on the left side of the screen, which will take you to the Welcome page. Then click on \"Sign up Now\" on the right side of the page.     You will be asked to enter the access code listed below, as well as some personal information. Please follow the directions to create your username and password.     Your access code is: [email or web address]  Expires: 2018  2:50 PM     Your access code will  in 90 days. If you need help or a new code, please call your [facility] clinic or [phone].        Care EveryWhere ID     This is your Care EveryWhere ID. This could be used by other organizations to access your [facility] medical records  [id number]        Your Vitals Were     Pulse Temperature Last Period BMI (Body Mass Index)          77 99  F (37.2  C) (Tympanic) 1992 27.46 kg/m2         Blood Pressure from Last 3 Encounters:   17 116/78   17 116/68   17 120/70    Weight from Last 3 Encounters:   17 155 lb (70.3 kg)   17 155 lb (70.3 kg)   17 157 lb 9.6 oz (71.5 kg)              We Performed the Following     ORTHO  REFERRAL          Where to get your medicines      Some of these will need a paper prescription and others " can be bought over the counter.  Ask your nurse if you have questions.     Bring a paper prescription for each of these medications     oxyCODONE IR 5 MG tablet          Primary Care Provider Office Phone # Fax #    Darlene Daniela Sultana [phone] [address] [facility] [address]        Equal Access to Services     KIKE EWING : Hadii aad ku hadasho Soomaali, waaxda luqadaha, qaybta kaalmada adeegyada, waxay timoin hayaan adetierra khgregoriaparis lajinny . So [facility] [phone].    ATENCIÓN: Si habla español, tiene a cassidy disposición servicios gratuitos de asistencia lingüística. Llame al [phone].    We comply with applicable federal civil rights laws and [place] laws. We do not discriminate on the basis of race, color, national origin, age, disability, sex, sexual orientation, or gender identity.            Thank you!     Thank you for choosing [facility]  for your care. Our goal is always to provide you with excellent care. Hearing back from our patients is one way we can continue to improve our services. Please take a few minutes to complete the written survey that you may receive in the mail after your visit with us. Thank you!             Your Updated Medication List - Protect others around you: Learn how to safely use, store and throw away your medicines at www.disposemymeds.org.          This list is accurate as of: 12/11/17 10:00 AM.  Always use your most recent med list.                   Brand Name Dispense Instructions for use Diagnosis    Adult Blood Pressure Cuff Lg Kit     1 kit    1 Device 2 times daily    HTN, goal below 140/90       amLODIPine 5 MG tablet    NORVASC    30 tablet    Take 1 tablet (5 mg) by mouth daily    Essential hypertension       aspirin 81 MG EC tablet     30 tablet    Take 1 tablet by mouth daily.    NSTEMI (non-ST elevated myocardial infarction) (H), ACS (acute coronary syndrome) (H)       hydrochlorothiazide 25 MG tablet    HYDRODIURIL    90  tablet    Take 1 tablet (25 mg) by mouth daily    Essential hypertension       imatinib 400 MG tablet    GLEEVEC    30 tablet    Take 1 tablet (400 mg) by mouth daily Take with a meal and a large glass of water.    Malignant gastrointestinal stromal tumor, unspecified site (H)       lisinopril 40 MG tablet    PRINIVIL/ZESTRIL    90 tablet    TAKE ONE TABLET BY MOUTH EVERY DAY    Essential hypertension with goal blood pressure less than 140/90       LORazepam 1 MG tablet    ATIVAN    30 tablet    Take 1 tablet (1 mg) by mouth At Bedtime    Malignant gastrointestinal stromal tumor, unspecified site (H)       methimazole 5 MG tablet    TAPAZOLE    90 tablet    Take 1 tablet (5 mg) by mouth daily ALTERNATE 5 MG AND 2.5 MG (1/2 TABLET) EVERY DAY    Hyperthyroidism       metoprolol 100 MG tablet    LOPRESSOR    180 tablet    TAKE ONE TABLET BY MOUTH TWICE A DAY    Essential hypertension with goal blood pressure less than 140/90       ondansetron 4 MG ODT tab    ZOFRAN-ODT    4 tablet    Take 1-2 tablets (4-8 mg) by mouth every 8 hours as needed for nausea Dissolve ON the tongue.    Postoperative state       order for DME     1 Device    Walker    Post hysterectomy menopause       oxyCODONE IR 5 MG tablet    ROXICODONE    180 tablet    Take 1-2 tablets (5-10 mg) by mouth every 3 hours as needed for pain or other (Moderate to Severe)    Chronic low back pain, unspecified back pain laterality, with sciatica presence unspecified       polyethylene glycol powder    MIRALAX/GLYCOLAX     Take 17 g by mouth daily        rosuvastatin 5 MG tablet    CRESTOR    45 tablet    Take 1 tablet (5 mg) by mouth every other day    Hyperlipidemia LDL goal <160

## 2017-12-12 ENCOUNTER — HOSPITAL ENCOUNTER (OUTPATIENT)
Dept: MRI IMAGING | Facility: CLINIC | Age: 72
Discharge: HOME | End: 2017-12-12
Attending: FAMILY MEDICINE | Admitting: FAMILY MEDICINE
Payer: MEDICARE

## 2017-12-12 DIAGNOSIS — M54.5 CHRONIC LOW BACK PAIN, UNSPECIFIED BACK PAIN LATERALITY, WITH SCIATICA PRESENCE UNSPECIFIED: ICD-10-CM

## 2017-12-12 DIAGNOSIS — G89.29 CHRONIC LOW BACK PAIN, UNSPECIFIED BACK PAIN LATERALITY, WITH SCIATICA PRESENCE UNSPECIFIED: ICD-10-CM

## 2017-12-12 PROCEDURE — 72148 MRI LUMBAR SPINE W/O DYE: CPT

## 2017-12-14 ENCOUNTER — TRANSFERRED RECORDS (OUTPATIENT)
Dept: HEALTH INFORMATION MANAGEMENT | Facility: CLINIC | Age: 72
End: 2017-12-14

## 2017-12-14 NOTE — PROGRESS NOTES
MRI is not normal. Is seeing spine and pain clinics. Please notify.        Thank you. CHELLY STUART MD    IMPRESSION: Degenerative changes of the lumbar spine with no focal  disc herniation demonstrated. There is severe central canal stenosis  at L4-L5. There is moderate foraminal stenosis bilaterally at L5-S1  and on the right at L1-L2. Less extensive stenosis is seen elsewhere  as described above.

## 2017-12-15 ENCOUNTER — TELEPHONE (OUTPATIENT)
Dept: ENDOCRINOLOGY | Facility: CLINIC | Age: 72
End: 2017-12-15

## 2017-12-15 NOTE — TELEPHONE ENCOUNTER
Patient called and would like to know if she should come in to do labs before her appt 1/9/2018.  Patient is requesting a return call.   Mireya Mendenhall LPN

## 2017-12-18 DIAGNOSIS — E05.00 GRAVES' DISEASE: Primary | ICD-10-CM

## 2017-12-18 NOTE — TELEPHONE ENCOUNTER
Spoke w/ Pt and informed her Lab orders had been placed. She stated she will complete Lab orders at her local .

## 2017-12-26 DIAGNOSIS — E78.5 HYPERLIPIDEMIA LDL GOAL <160: ICD-10-CM

## 2017-12-29 DIAGNOSIS — E05.00 GRAVES' DISEASE: ICD-10-CM

## 2017-12-29 PROCEDURE — 84439 ASSAY OF FREE THYROXINE: CPT | Performed by: INTERNAL MEDICINE

## 2017-12-29 PROCEDURE — 36415 COLL VENOUS BLD VENIPUNCTURE: CPT | Performed by: INTERNAL MEDICINE

## 2017-12-29 PROCEDURE — 84480 ASSAY TRIIODOTHYRONINE (T3): CPT | Performed by: INTERNAL MEDICINE

## 2017-12-29 PROCEDURE — 84443 ASSAY THYROID STIM HORMONE: CPT | Performed by: INTERNAL MEDICINE

## 2017-12-29 RX ORDER — ROSUVASTATIN CALCIUM 5 MG/1
TABLET, COATED ORAL
Qty: 90 TABLET | Refills: 3 | OUTPATIENT
Start: 2017-12-29

## 2017-12-30 LAB
T3 SERPL-MCNC: 118 NG/DL (ref 60–181)
T4 FREE SERPL-MCNC: 1.15 NG/DL (ref 0.76–1.46)
TSH SERPL DL<=0.005 MIU/L-ACNC: 1.41 MU/L (ref 0.4–4)

## 2018-01-03 DIAGNOSIS — E78.5 HYPERLIPIDEMIA LDL GOAL <160: ICD-10-CM

## 2018-01-03 NOTE — TELEPHONE ENCOUNTER
Requested Prescriptions   Pending Prescriptions Disp Refills     rosuvastatin (CRESTOR) 5 MG tablet [Pharmacy Med Name: ROSUVASTATIN CALCIUM 5MG TABS]  Last Written Prescription Date:  08/24/2017  Last Fill Quantity: 45,  # refills: 3   Last Office Visit with [facility], [facility] or [facility] prescribing provider:  12/11/2017   Future Office Visit:      90 tablet 3     Sig: TAKE ONE TABLET BY MOUTH EVERY DAY    Statins Protocol Failed    1/3/2018 11:31 AM       Failed - LDL on file in past 12 months    Recent Labs   Lab Test  09/20/16   1022   LDL  38            Passed - No abnormal creatine kinase in past 12 months    No lab results found.         Passed - Recent or future visit with authorizing provider    Patient had office visit in the last year or has a visit in the next 30 days with authorizing provider.  See chart review.              Passed - Patient is age 18 or older       Passed - No active pregnancy on record       Passed - No positive pregnancy test in past 12 months        Ahsan STANTON (R)

## 2018-01-08 RX ORDER — ROSUVASTATIN CALCIUM 5 MG/1
TABLET, COATED ORAL
Qty: 90 TABLET | Refills: 0 | Status: SHIPPED | OUTPATIENT
Start: 2018-01-08 | End: 2018-03-22

## 2018-01-09 ENCOUNTER — RADIANT APPOINTMENT (OUTPATIENT)
Dept: ULTRASOUND IMAGING | Facility: CLINIC | Age: 73
End: 2018-01-09
Attending: INTERNAL MEDICINE
Payer: COMMERCIAL

## 2018-01-09 ENCOUNTER — OFFICE VISIT (OUTPATIENT)
Dept: ENDOCRINOLOGY | Facility: CLINIC | Age: 73
End: 2018-01-09
Payer: COMMERCIAL

## 2018-01-09 VITALS
SYSTOLIC BLOOD PRESSURE: 103 MMHG | HEIGHT: 63 IN | WEIGHT: 158.2 LBS | BODY MASS INDEX: 28.03 KG/M2 | DIASTOLIC BLOOD PRESSURE: 61 MMHG | HEART RATE: 85 BPM

## 2018-01-09 DIAGNOSIS — E05.90 HYPERTHYROIDISM: ICD-10-CM

## 2018-01-09 DIAGNOSIS — E05.00 GRAVES' DISEASE: Primary | ICD-10-CM

## 2018-01-09 ASSESSMENT — PAIN SCALES - GENERAL: PAINLEVEL: NO PAIN (0)

## 2018-01-09 NOTE — PROGRESS NOTES
Endocrinology and diabetes outpatient clinic  Name: Idalmis Abdul  HPI:  Ms. Abdul is a very pleasant to 72-year-old female patient with thyroid dysfunction diagnosed in Feb 2014.  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 She is here today with her son  Thyroid dysfunction: Briefly, Ms. Abdul reported thyroid function studies were abnormal since August 2013. The patient was seen by Dr. Rama Coburn in February 2014.  Hyperthyroidism was confirmed, and a thyroid uptake and scan prior to starting therapy showed an homogenous uptake of 19%. Ms. Abdul was placed on methimazole 20 mg BID and the dose was adjusted multiple times up to 60 mg per day. Ms. Abdul had discontinued methimazole 1 and 1/2 week before I initially saw her in clinic, when she was told she was hypothyroid.  I have previously reviewed the patient's medical records, that showed that Ms. Abdul actually had a suppressed TSH and a positive TSI in June 2012.  She reports she was at the hospital at that time due to heart issues (atrial fibrillation).  It seems the patient was seen by the endocrinology consult team at that time, but she didn't have any follow-up as an outpatient.   I have initially titrated her methimazole up, and more recently titrated it down due to hypothyroidism. The patient is alternating methimazole 2.5 and 5 mg daily and she is feeling well. She had eye surgery in 7/2016, and that has helped with her diplopia.  She denies palpitations, tremors or changes in her bowel movements. She has chronic constipation, likely related to chronic oxycodone use (for back pain). She takes senna docusate to help with her constipation.   Gastrointestinal stromal tumor:  Ms. Abdul previous medical history includes a gastrointestinal stromal tumor diagnosed in 2003. The tumor was surgically removed  She was on Gleevec for 1 year, then off for 3 years, and had a recurrence in 2007, with 3  new tumors.  She has continuously been on Gleevec since then, and the plans are for her to be on it long term, as her tumor has been stable while on it.  She continues to follow with Dr. Schmitz in oncology, and has a CT armenta with contrast every 6 months.   Lung cancer diagnosed in 2010.  She is a former smoker, quitting in 2010.  Coronary artery disease: Ms. Abdul had quadruple bypass in 2012. She has HTN, and is on lisinopril, metoprolol, and Lasix, as per her cardiologist.  She is also on Rosuvastatin.  Perforated bowel: She also had a perforated bowel mid 2014 that was managed conservatively, and lost renal function during that hospital admission, likely related to antibiotic use.  Her GFR has somewhat recovered and is now around 45 mL/min  New since her last visit she underwent a total vaginal hysterectomy, A & P repair and Sacrospinous vault suspensiontotal vaginal hysterectomy, for uterovaginal prolapse, in 10/17/17. This was uncomplicated and she is followed by Dr. Farooq in OB/Gyn.   She continues to deny hyperthyroid symptoms, including diplopia, palpitations, diarrhea, nausea/vomiting. She denies skin changes. She endorses 'floaters' in her vision for one week, but are not present today. She also endorses cough, sometimes strong cough that can make her feel nauseous. She denies difficulty swallowing. She has chronic back pain and is seen by orthopedic surgery, and is pursuing additional input from neurosurgery. She states symptoms have been getting worse in the past year. She describes intermittent sharp right buttock pain and left leg numbness; both are worsened when standing for long periods of time.     PMH/PSH:  Past Medical History:   Diagnosis Date     ASCVD (arteriosclerotic cardiovascular disease) 6/14/2012     Benign neoplasm of duodenum, jejunum, and ileum 2003, 2007    Gastrointestinal Stromal Tumor, seen at [facility], Dr. Schmitz, GI oncology-Gleevec treatment.  Surgical removal in fall 2004-no  recurrence as of 3/05.     CA - lung cancer 4/2010    [facility], treated surgically     choledochal cyst 1963    CHOLEDOCHAL CYST, mild dilatation of biliary system     Coronary artery disease      Dislocated finger, left middle PIP 7/26/2013     Dyspnea 8/27/2013     Eyelid edema 12/15/2011    side effect of gastric cancer medication      GERD (gastroesophageal reflux disease) 10/8/2013     GIST (gastrointestinal stromal tumor), malignant (H) 5/10/2011     Graves disease      Grief reaction 5/11/2009     History of lung cancer 12/15/2011    S/p resection of right lung.  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     Other chronic pain     back     PONV (postoperative nausea and vomiting)      Postsurgical aortocoronary bypass status 7/4/2012     S/P CABG x 4 8/5/2012     Strabismus      Tobacco use disorder     Quit     Unspecified essential hypertension      Past Surgical History:   Procedure Laterality Date     BYPASS GRAFT ARTERY CORONARY  6/14/2012    Procedure: BYPASS GRAFT ARTERY CORONARY;  Median Sternotomy Coronary Artery Bypass Graft  x 3        C THORACOSCOPY,DX W BX  fall 2003    benign tissue     CATARACT IOL, RT/LT      LE     CHOLECYSTECTOMY  1963     COLONOSCOPY  4-12-05     CORONARY ARTERY BYPASS      X4     CREATION PERICARDIAL WINDOW  6/15/2012    Procedure: CREATION PERICARDIAL WINDOW;  Mediastinal Exploration, Control of Bleeding;  Surgeon: Dwaine Griffin MD;  Location: [facility] OR     EYE SURGERY  2014    left cataract removal     GI SURGERY  2003 and 2007    GIST tumor removal     GYN SURGERY      tubal ligation      HYSTERECTOMY VAGINAL, COLPORRHAPHY ANTERIOR, POSTERIOR, COMBINED N/A 10/17/2017    Procedure: COMBINED HYSTERECTOMY VAGINAL, COLPORRHAPHY ANTERIOR, POSTERIOR;  Total Vaginal Hysterectomy and Posterior Repair,Sacrospinous Vault Suspension;  Surgeon: Ruth Farooq MD;  Location: [facility] OR     PHACOEMULSIFICATION WITH STANDARD INTRAOCULAR LENS IMPLANT  3/24/2014    Procedure: PHACOEMULSIFICATION WITH STANDARD INTRAOCULAR LENS IMPLANT;  Left Kelman Phacoemulsification with Intraocular Lens Implant;  Surgeon: Magnus Mckeon MD;  Location: [facility] OR     RECESSION RESECTION WITH ADJUSTABLE SUTURE BILATERAL Bilateral 2016    Procedure: RECESSION RESECTION WITH ADJUSTABLE SUTURE BILATERAL;  Surgeon: Erma Ordaz MD;  Location: [facility] OR     SURGICAL HISTORY OF -       cholecystectomy     SURGICAL HISTORY OF -       Tubal Ligation      SURGICAL HISTORY OF -       Explor. Lap, Excision of Small Bowel Tumor      SURGICAL HISTORY OF -   2010    lung cancer removed right lung     Family Hx:  Family History   Problem Relation Age of Onset     HEART DISEASE Mother      OSTEOPOROSIS Mother      Respiratory Mother      Emphezyma     Hypertension Mother      HEART DISEASE Father      Alcohol/Drug Father      Hypertension Father      Hypertension Maternal Grandmother      Hypertension Brother      Hypertension Sister      Arthritis Sister      Hypertension Son      CANCER Son      rectal   Father:  at age 58, heat attack, alcoholism  Mother:  at age 84, heart attack, emphysema  2 siblings: Sister has arthritis and has had multiple surgeries due to that, also has HTN.  Brother also has HTN  Thyroid disease: No         DM2: No         Autoimmune: there is no family history of DM1, SLE, RA, or Vitiligo     Social Hx:  Social History     Social History     Marital status:      Spouse name: N/A     Number of children: N/A     Years of education: N/A     Occupational History      Not on file.     Social History Main Topics     Smoking status: Former Smoker     Packs/day: 0.50     Years: 49.00     Types: Cigarettes     Quit date: 4/19/2010     Smokeless tobacco: Never Used     Alcohol use No     Drug use: No     Sexual activity: Not Currently     Partners: Male      Comment:      Other Topics Concern      Service No     Blood Transfusions No     Caffeine Concern Yes     3 cups     Occupational Exposure No     Hobby Hazards No     Sleep Concern No     Stress Concern No     son  and much happiness in her life, big burden lifted,      Weight Concern No     Special Diet No     Back Care Yes     lower back herniated disc 1970's      Exercise No     Bike Helmet No     Seat Belt Yes     Self-Exams Yes     Parent/Sibling W/ Cabg, Mi Or Angioplasty Before 65f 55m? 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  Son (Rk, with wife Марина and grandson) lives next door -- quadriplegic and high functioning, cannot provide physical assistance/support.          MEDICATIONS:  Current Outpatient Prescriptions   Medication     rosuvastatin (CRESTOR) 5 MG tablet     oxyCODONE IR (ROXICODONE) 5 MG tablet     lisinopril (PRINIVIL/ZESTRIL) 40 MG tablet     order for DME     ondansetron (ZOFRAN-ODT) 4 MG ODT tab     polyethylene glycol (MIRALAX/GLYCOLAX) powder     rosuvastatin (CRESTOR) 5 MG tablet     metoprolol (LOPRESSOR) 100 MG tablet     methimazole (TAPAZOLE) 5 MG tablet     LORazepam (ATIVAN) 1 MG tablet     hydrochlorothiazide (HYDRODIURIL) 25 MG tablet     Blood Pressure Monitoring (ADULT BLOOD PRESSURE CUFF LG) KIT     amLODIPine (NORVASC) 5 MG tablet     aspirin EC 81 MG EC tablet     imatinib (GLEEVEC) 400 MG tablet     No current facility-administered medications for this visit.        ROS   ROS: 11 point ROS neg other than the symptoms noted above in the HPI.    Physical Exam   VS: BP  "103/61  Pulse 85  Ht 1.6 m (5' 3\")  Wt 71.8 kg (158 lb 3.2 oz)  LMP 01/01/1992  BMI 28.02 kg/m2  GENERAL: NAD, well groomed, smiling. Voice is normal without hoarseness.   HEENT: OP clear, no exophthalmos, no proptosis, EOMI, no lig lag, no retraction, no scleral icterus.    THYROID: Thyroid is palpable, about 1.5X normal size.  A nodule is palpable on the lower aspect of the right thyroid lobe, this is similar to previous evaluation.   RESPIRATORY: Normal respiratory effort, normal breath sounds.   CARDIOVASCULAR: No peripheral edema, no murmurs  EXTREMITIES: no edema, no rashes, no myxedema  NEUROLOGY: CN grossly intact, again + tremor out of the outstretched hands (more significant on the right hand than on the left, worse with palms facing up). No dysmetria on finger-nose-finger, heel-sheen exam normal.    MSK: grossly intact  SKIN: no rashes, no lesions, no ulcers, skin warm and dry.   PSYCH: Intact judgment and insight. A&OX3 with a cordial affect.      LABS:  TFTs:    Recent Labs   Lab Test  12/29/17   1350  08/29/17   1030  07/07/17   0903   08/14/14   1343  08/07/14   1404   T4  1.15  1.28  1.19   < >  0.40*  0.39*   FT3   --    --    --    --   2.2*  1.7*   T3  118  98  93   < >   --    --    TSH  1.41  1.54  1.27   < >  115.57*  96.24*    < > = values in this interval not displayed.     Thyroid stim immunoglob: 4.8 on 8/29/17    This is on Methimazole 5mg (M, W, F) and 2.5mg (Tu, Thur, Sa, Sun)        ENDO THYROID LABS-[facility] Latest Ref Rng & Units 7/7/2017 11/8/2016   TSH 0.40 - 4.00 mU/L 1.27 2.17   T4 FREE 0.76 - 1.46 ng/dL 1.19 1.14   FREE T3 2.3 - 4.2 pg/mL     TRIIODOTHYRONINE(T3) 60 - 181 ng/dL 93 109     ENDO THYROID LABS-[facility] Latest Ref Rng 6/27/2016   TSH 0.40 - 4.00 mU/L 2.20   T4 FREE 0.76 - 1.46 ng/dL 1.38   FREE T3 2.3 - 4.2 pg/mL    TRIIODOTHYRONINE(T3) 60 - 181 ng/dL 93   THYR PEROXIDASE PAPO <35 IU/mL    THYROID STIM IMMUNOG  3.2 (H)       ENDO THYROID LABS-[facility] Latest Ref Rng 3/3/2016 " 12/21/2015   TSH 0.40 - 4.00 mU/L 1.94 2.35   T4 FREE 0.76 - 1.46 ng/dL 1.07 1.07   FREE T3 2.3 - 4.2 pg/mL     TRIIODOTHYRONINE(T3) 60 - 181 ng/dL 112    THYR PEROXIDASE PAPO <35 IU/mL     THYROID STIM IMMUNOG  1.6 (H)      ENDO THYROID LABS-[facility] Latest Ref Rng 10/20/2015   TSH 0.40 - 4.00 mU/L 2.42   T4 FREE 0.76 - 1.46 ng/dL 1.16   FREE T3 2.3 - 4.2 pg/mL    TRIIODOTHYRONINE(T3) 60 - 181 ng/dL 103   THYR PEROXIDASE PAPO <35 IU/mL    THYROID STIM IMMUNOG  1.8 (H)     ENDO THYROID LABS-[facility] Latest Ref Rng 6/30/2015   TSH 0.40 - 4.00 mU/L 9.27 (H)   T4 FREE 0.76 - 1.46 ng/dL 0.92   FREE T3 2.3 - 4.2 pg/mL    TRIIODOTHYRONINE(T3) 60 - 181 ng/dL 129   This on Methimazole 10 mg/day      ENDO THYROID LABS-[facility] Latest Ref Rng 3/30/2015   TSH 0.40 - 4.00 mU/L 1.62   T4 FREE 0.76 - 1.46 ng/dL 0.89   FREE T3 2.3 - 4.2 pg/mL    TRIIODOTHYRONINE(T3) 60 - 181 ng/dL 98     [facility]   TSH 0.40 - 4.00 mU/L 0.02 (L)   T4 FREE 0.76 - 1.46 ng/dL 1.87 (H)   FREE T3 2.3 - 4.2 pg/mL    TRIIODOTHYRONINE(T3) 60 - 181 ng/dL 156     ENDO THYROID LABS-[facility] Latest Ref Rng 10/16/2014 9/10/2014   TSH 0.40 - 4.00 mU/L 0.02 (L) 0.26 (L)   T4 FREE 0.76 - 1.46 ng/dL 1.59 (H) 1.32   FREE T3 2.3 - 4.2 pg/mL     TRIIODOTHYRONINE(T3) 60 - 181 ng/dL 155 137     ENDO THYROID LABS-[facility] Latest Ref Rng 8/14/2014 8/7/2014 6/24/2014   TSH 0.40 - 4.00 mU/L 115.57 (H) 96.24 (H)    T4 FREE 0.76 - 1.46 ng/dL 0.40 (L) 0.39 (L)    FREE T3 2.3 - 4.2 pg/mL 2.2 (L) 1.7 (L)    THYROID STIM IMMUNOG    7.4 (H)     ENDO THYROID LABS-[facility] Latest Ref Rng 5/23/2014   TSH 0.40 - 4.00 mU/L 0.05 (L)   T4 FREE 0.76 - 1.46 ng/dL 0.96     !THYROID Latest Ref Rng 4/25/2014 3/31/2014 3/14/2014   TSH 0.4 - 5.0 mU/L 0.07 (L) 0.03 (L) 0.03 (L)   T4 FREE 0.70 - 1.85 ng/dL 2.03 (H) 2.82 (H) 3.02 (H)     TSI:  Component    Latest Ref Rng 2/25/2014   Thyroid Stim Immunog     4.6 (H)     CBC:  !HEMATOLOGY Latest Ref Rng 11/12/2013   WBC  4.9   RBC  3.55   HGB  11.7 - 15.7 gm/dL 9.8 (A)   HCT  29.8   MCV  84   MCH  27.6   MCHC  32.9   RDW  15.4     LFTs:  !COMPREHENSIVE Latest Ref Rng 11/12/2013 11/12/2013   CALCIUM 8.5 - 10.4 mg/dL 9.1 9.1   ALBUMIN  3.4 3.4   PROTEIN, TOTAL  6.1 6.1   AST  26 26   ALT  20 20   ALKPHOS  106 106   BILIRUBIN TOTAL  0.3 0.3     US Thyroid:  EXAMINATION: US THYROID, 1/9/2018 9:30 AM      COMPARISON: 2/5/2014, 6/18/2012, 11/9/2007.     HISTORY: Hyperthyroidism     Technique: Grayscale and color ultrasound imaging of the thyroid was performed.     Findings:    Thyroid parenchyma: Several thyroid nodules described below, the thyroid parenchyma is otherwise homogeneous and unremarkable.  The right lobe of the thyroid measures: 2.1 x 2.2 x 5.7 cm   The thyroid isthmus measures: 0.3 cm   The left lobe of the thyroid measures: 1.6 x 1.6 x 3.8 cm      Right lobe:  Nodule 1: mid  Nodule measurement: 0.5 x 0.3 x 0.6 cm  Echogenicity: Hypoechoic  Consistency: Cystic/spongiform  Calcifications: no  Hypervascular: no  Interval growth (>20%): no     Nodule 2: Inferior  Nodule measurement: 1.5 x 1.8 x 2.1 cm  Echogenicity: Heterogeneous, predominantly isoechoic  Consistency: Mostly solid  Calcifications: no  Hypervascular: yes  Interval growth (>20%): Yes (previously 2.0 x 1.3 x 1.1 cm).    Isthmus:   No nodules identified.     Left Lobe:   Nodule 1: Superior  Nodule measurement: 0.5 x 0.3 x 0.7 cm  Echogenicity: Heterogeneous, predominantly hypoechoic  Consistency: spongiform  Calcifications: no  Hypervascular: Minimal internal vascularity  Interval growth (>20%): NA     Nodule 2: Mid  Nodule measurement: 0.3 x 0.2 x 0.4 cm  Echogenicity: Heterogeneous, predominantly hypoechoic  Consistency: spongiform  Calcifications: no  Hypervascular: no  Interval growth (>20%): NA     Nodule 3: Mid to inferior posteriorly  Nodule measurement: 0.5 x 0.3 x 1.0 cm  Echogenicity: Hypoechoic  Consistency: Cystic/spongiform  Calcifications: no  Hypervascular: no  Interval  growth (>20%): NA            Impression:  Several thyroid nodules as described above, the largest of which is exophytic along the posterior and inferior right thyroid lobe and measures up to 2.1 cm. This appears increased compared to 2014 and not significantly changed from 7/7/2017 CT given differences in technique. Consider further evaluation with fine-needle aspiration.           I have personally reviewed the examination and initial interpretation and I agree with the findings.     YARELY ROLLE MD             US THYROID 2/5/2014 10:06 AM    HISTORY: Hyperthyroid.    COMPARISON: None.    FINDINGS: The right lobe of the thyroid gland measures 5.6 x 1.4 x 1.5 cm. The left lobe of the thyroid gland measures 3.5 x 1.6 x 1.5 cm. The isthmus measures 0.2 cm.  There is a somewhat ill-defined hypoechoic nodule in the mid to upper pole of the right lobe of the thyroid gland that measures 0.4 x 0.4 x 0.3 cm. This has calcifications within it. There is a predominantly solid nodule at the posterior margin of the lower pole of the right lobe of the thyroid gland that measures 2.0 x 1.3 x 1.1 cm. This nodule shows no significant change in size in retrospect from prior chest CTs dating back to 9/11/2012. This stability is supportive evidence for a benign entity.  Uptake and Scan: 2/28/2014  The uptake at 24 hours was measured at 19 %. The normal range at 24 hours is from 10 to 30%. Uptake on right: 12 %, Uptake on Left: 6.8 %.    Total computer estimated weight of the gland: 44.8 kg, Right gland weight: 29.9 kg, Left gland weight: 14.9 kg.    Images of the gland demonstrates normal appearance of the right and left lobes. No additional findings. No autonomous nodules were identified.    All pertinent notes, labs, and images personally reviewed by me.        Assessment and Plan:  Ms.Suzanne TIERRA Abdul is a 71 year old female here for the management of thyroid dysfunction.     1. Graves' Disease vs. Gleevec induced  Hyperthyroidism.  The positive TSI and eye disease suggest this is Graves' disease.  Thyroid dysfunction (usually hypothyroidism) but also hyperthyroidism have been reported with Gleevec.  Ms. Abdul is clinically euthyroid. She is currently on methimazole dose to 5 mg (M, W, F) and 2.5 mg (Tu, Th, Sa and Reyes). Subsequent TFTs were within normal limits. However, TSI remained elevated (4.8). In light of her elevated TSI, it is unlikely she will be able to go off methimazole and remain euthyroid. We discussed options to trial weaning off methimazole to 2.5mg daily and re-check TFTs in 6 weeks or consider radioiodine therapy. She is planning to travel to [place]; indicated she can have her lab work done there if needed to monitor her thyroid function.  If tolerated, will continue to taper off methimazole (2.5 mg / 0mg) and monitor labs. If hyperthyroidism recurrs, will decide to continue management of her Grave's disease medically versus with Radioiodine ablation. Pros and cons were discussed again today with pt and her son. She agrees with this plan.     2. Thyroid Nodule.  Ms. Abdul has thyroid nodules. US redemonstrated nodules, one of which was concerning for growth. At this time, uncertain if nodule is thyroid in origin vs. Lymph node vs. Another source. Images suggest it is thyroid.  Previous thyroid scan showed no uptake in the area, what increases the likelihood if malignancy if this is truly a thyroid nodule.   I have discussed in detail the differential diagnosis of thyroid nodules with the patient.  The patient understands that most thyroid nodules (about 85%) are benign, and that this diagnosis can only be confirmed by cytological analysis.  The risks of the FNA procedure, including but not limited to pain, infection, local bruising, hematoma formation, and significant bleeding, were discussed in detail.  The possible results of the FNA of the thyroid gland were also discussed. We will request for the  "Pathology service to hold onto materials for DNA testing if the FNA results indicate atypical cells of undetermined significance.  Will discuss with radiology and Dr. Schmitz as I would like for this nodule to be biopsied and I will refer the patient for an US-guided FNA. This will nai to be done at radiology.  Patient will hold her aspirin 1 week prior to the FNA.    3. 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  Eye symptoms significantly improved after surgery and she no longer has diplopia. She continues presenting lacrimation, and she is followed by ophthalmology. She endorsed 1 week of \"floaters\" in her vision, but these are not present today. She will follow up with opthalmology. If she is to receive radioiodine, we will discuss use of steroids with opthalmology.       Orders Placed This Encounter   Procedures     TSH     T4 free     T3 total     Thyroid stimulating immunoglobulin       Follow up in 4-5 months    Note scribed by Lexus Langford, PhD, MS4    for    Zenia Antoine MD PhD    Division of Endocrinology and Diabetes    "

## 2018-01-09 NOTE — MR AVS SNAPSHOT
After Visit Summary   1/9/2018    Idalmis Abdul    MRN: [id number]           Patient Information     Date Of Birth          1945        Visit Information        Provider Department      1/9/2018 11:00 AM Swetha Antoine MD [facility]        Today's Diagnoses     Graves' disease    -  1       Follow-ups after your visit        Follow-up notes from your care team     Return in about 4 months (around 5/9/2018).      Your next 10 appointments already scheduled     Jan 12, 2018  8:50 AM CST   LAB with [facility] ([facility])    [address] [address]   [phone]           Please do not eat 10-12 hours before your appointment if you are coming in fasting for labs on lipids, cholesterol, or glucose (sugar). This does not apply to pregnant women. Water, hot tea and black coffee (with nothing added) are okay. Do not drink other fluids, diet soda or chew gum.            Jan 12, 2018  9:30 AM CST   CT CHEST ABDOMEN PELVIS W/O & W CONTRAST with [facility]   [facility] CT ([facility])    [address] [address]   [phone]           Please bring any scans or X-rays taken at other hospitals, if similar tests were done. Also bring a list of your medicines, including vitamins, minerals and over-the-counter drugs. It is safest to leave personal items at home.  Be sure to tell your doctor:   If you have any allergies.   If there s any chance you are pregnant.   If you are breastfeeding.   If you have any special needs.  You may have contrast for this exam. To prepare:   Do not eat or drink for 2 hours before your exam. If you need to take medicine, you may take it with small sips of water. (We may ask you to take liquid medicine as well.)   The day before your exam, drink extra fluids at least six 8-ounce glasses (unless your doctor tells you to restrict your fluids).  Patients  over 70 or patients with diabetes or kidney problems:   If you haven t had a blood test (creatinine test) within the last 30 days, go to your clinic or Diagnostic Imaging Department for this test.  If you have diabetes:   If your kidney function is normal, continue taking your metformin (Avandamet, Glucophage, Glucovance, Metaglip) on the day of your exam.   If your kidney function is abnormal, wait 48 hours before restarting this medicine.  You will have oral contrast for this exam:   You will drink the contrast at home. Get this from your clinic or Diagnostic Imaging Department. Please follow the directions given.  Please wear loose clothing, such as a sweat suit or jogging clothes. Avoid snaps, zippers and other metal. We may ask you to undress and put on a hospital gown.  If you have any questions, please call the Imaging Department where you will have your exam.            Marcos 15, 2018  8:45 AM CST   (Arrive by 8:30 AM)   Return Visit with Blayne Schmitz MD   [facility] ([facility])    [address] [place] [address]  [address]  [facility] [address] [phone]            Jan 19, 2018   Procedure with Ryan Prater MD   [facility] ([facility])    [facility] [address]   [phone]           The medical center is located at [address] (between [address] in [place], [place] north of [place]).            Feb 12, 2018  3:30 PM CST   New Visit with Jose Bustos MD   [facility] ([facility])    [address] [facility] [id number] [phone]            May 08, 2018 10:00 AM CDT   (Arrive by 9:45 AM)   RETURN ENDOCRINE with Swetha Antoine MD   [facility] ([facility])    [address] [place] [address]  3rd Floor  [facility] [address] [phone]              Who to contact     Please  "call your clinic at [phone] to:    Ask questions about your health    Make or cancel appointments    Discuss your medicines    Learn about your test results    Speak to your doctor   If you have compliments or concerns about an experience at your clinic, or if you wish to file a complaint, please contact [facility] Patient Relations at [phone] or email us at [email or web address]         Additional Information About Your Visit        Towandas bookhart Information     EventBoard is an electronic gateway that provides easy, online access to your medical records. With EventBoard, you can request a clinic appointment, read your test results, renew a prescription or communicate with your care team.     To sign up for EventBoard visit the website at [email or web address]   You will be asked to enter the access code listed below, as well as some personal information. Please follow the directions to create your username and password.     Your access code is: [email or web address]  Expires: 2018  2:50 PM     Your access code will  in 90 days. If you need help or a new code, please contact your [facility] or call [phone] for assistance.        Care EveryWhere ID     This is your Care EveryWhere ID. This could be used by other organizations to access your [facility] medical records  [id number]        Your Vitals Were     Pulse Height Last Period BMI (Body Mass Index)          85 1.6 m (5' 3\") 1992 28.02 kg/m2         Blood Pressure from Last 3 Encounters:   18 103/61   17 116/78   17 116/68    Weight from Last 3 Encounters:   18 71.8 kg (158 lb 3.2 oz)   17 70.3 kg (155 lb)   17 70.3 kg (155 lb)               Primary Care Provider Office Phone # Fax #    Darlene Sultana [phone] [address] [facility] [facility] [address]        Equal Access to Services     KIKE EWING AH: Teddy hunter " Sonemesio, walillyda luqadaha, qaybta kadarrell starr, patricio greco sandratriny tapiamerced micha. So [facility] [phone].    ATENCIÓN: Si holly cevallos, tiene a cassidy disposición servicios gratuitos de asistencia lingüística. Cherry al [phone].    We comply with applicable federal civil rights laws and [place] laws. We do not discriminate on the basis of race, color, national origin, age, disability, sex, sexual orientation, or gender identity.            Thank you!     Thank you for choosing [facility]  for your care. Our goal is always to provide you with excellent care. Hearing back from our patients is one way we can continue to improve our services. Please take a few minutes to complete the written survey that you may receive in the mail after your visit with us. Thank you!             Your Updated Medication List - Protect others around you: Learn how to safely use, store and throw away your medicines at www.disposemymeds.org.          This list is accurate as of: 1/9/18 11:59 PM.  Always use your most recent med list.                   Brand Name Dispense Instructions for use Diagnosis    Adult Blood Pressure Cuff Lg Kit     1 kit    1 Device 2 times daily    HTN, goal below 140/90       amLODIPine 5 MG tablet    NORVASC    30 tablet    Take 1 tablet (5 mg) by mouth daily    Essential hypertension       aspirin 81 MG EC tablet     30 tablet    Take 1 tablet by mouth daily.    NSTEMI (non-ST elevated myocardial infarction) (H), ACS (acute coronary syndrome) (H)       hydrochlorothiazide 25 MG tablet    HYDRODIURIL    90 tablet    Take 1 tablet (25 mg) by mouth daily    Essential hypertension       imatinib 400 MG tablet    GLEEVEC    30 tablet    Take 1 tablet (400 mg) by mouth daily Take with a meal and a large glass of water.    Malignant gastrointestinal stromal tumor, unspecified site (H)       lisinopril 40 MG tablet    PRINIVIL/ZESTRIL    90 tablet    TAKE ONE TABLET BY MOUTH EVERY DAY     Essential hypertension with goal blood pressure less than 140/90       LORazepam 1 MG tablet    ATIVAN    30 tablet    Take 1 tablet (1 mg) by mouth At Bedtime    Malignant gastrointestinal stromal tumor, unspecified site (H)       methimazole 5 MG tablet    TAPAZOLE    90 tablet    Take 1 tablet (5 mg) by mouth daily ALTERNATE 5 MG AND 2.5 MG (1/2 TABLET) EVERY DAY    Hyperthyroidism       metoprolol 100 MG tablet    LOPRESSOR    180 tablet    TAKE ONE TABLET BY MOUTH TWICE A DAY    Essential hypertension with goal blood pressure less than 140/90       ondansetron 4 MG ODT tab    ZOFRAN-ODT    4 tablet    Take 1-2 tablets (4-8 mg) by mouth every 8 hours as needed for nausea Dissolve ON the tongue.    Postoperative state       order for DME     1 Device    Walker    Post hysterectomy menopause       oxyCODONE IR 5 MG tablet    ROXICODONE    180 tablet    Take 1-2 tablets (5-10 mg) by mouth every 3 hours as needed for pain or other (Moderate to Severe)    Chronic low back pain, unspecified back pain laterality, with sciatica presence unspecified       polyethylene glycol powder    MIRALAX/GLYCOLAX     Take 17 g by mouth daily        * rosuvastatin 5 MG tablet    CRESTOR    45 tablet    Take 1 tablet (5 mg) by mouth every other day    Hyperlipidemia LDL goal <160       * rosuvastatin 5 MG tablet    CRESTOR    90 tablet    TAKE ONE TABLET BY MOUTH EVERY DAY    Hyperlipidemia LDL goal <160       * Notice:  This list has 2 medication(s) that are the same as other medications prescribed for you. Read the directions carefully, and ask your doctor or other care provider to review them with you.

## 2018-01-09 NOTE — LETTER
1/9/2018       RE: Idalmis Abdul  PO   [facility] [id number]     Dear Colleague,    Thank you for referring your patient, Idalmis Abdul, to the [facility] ENDOCRINOLOGY at [facility]. Please see a copy of my visit note below.    Endocrinology and diabetes outpatient clinic  Name: Idalmis Abdul  HPI:  Ms. Abdul is a very pleasant to 72-year-old female patient with thyroid dysfunction diagnosed in Feb 2014.  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 She is here today with her son  Thyroid dysfunction: Briefly, Ms. Abdul reported thyroid function studies were abnormal since August 2013. The patient was seen by Dr. Rama Coburn in February 2014.  Hyperthyroidism was confirmed, and a thyroid uptake and scan prior to starting therapy showed an homogenous uptake of 19%. Ms. Abdul was placed on methimazole 20 mg BID and the dose was adjusted multiple times up to 60 mg per day. Ms. Abdul had discontinued methimazole 1 and 1/2 week before I initially saw her in clinic, when she was told she was hypothyroid.  I have previously reviewed the patient's medical records, that showed that Ms. Abdul actually had a suppressed TSH and a positive TSI in June 2012.  She reports she was at the hospital at that time due to heart issues (atrial fibrillation).  It seems the patient was seen by the endocrinology consult team at that time, but she didn't have any follow-up as an outpatient.   I have initially titrated her methimazole up, and more recently titrated it down due to hypothyroidism. The patient is alternating methimazole 2.5 and 5 mg daily and she is feeling well. She had eye surgery in 7/2016, and that has helped with her diplopia.  She denies palpitations, tremors or changes in her bowel movements. She has chronic constipation, likely related to chronic oxycodone use (for back pain). She takes senna docusate to  help with her constipation.   Gastrointestinal stromal tumor:  Ms. Abdul previous medical history includes a gastrointestinal stromal tumor diagnosed in 2003. The tumor was surgically removed  She was on Gleevec for 1 year, then off for 3 years, and had a recurrence in 2007, with 3 new tumors.  She has continuously been on Gleevec since then, and the plans are for her to be on it long term, as her tumor has been stable while on it.  She continues to follow with Dr. Schmitz in oncology, and has a CT armenta with contrast every 6 months.   Lung cancer diagnosed in 2010.  She is a former smoker, quitting in 2010.  Coronary artery disease: Ms. Abdul had quadruple bypass in 2012. She has HTN, and is on lisinopril, metoprolol, and Lasix, as per her cardiologist.  She is also on Rosuvastatin.  Perforated bowel: She also had a perforated bowel mid 2014 that was managed conservatively, and lost renal function during that hospital admission, likely related to antibiotic use.  Her GFR has somewhat recovered and is now around 45 mL/min  New since her last visit she underwent a total vaginal hysterectomy, A & P repair and Sacrospinous vault suspensiontotal vaginal hysterectomy, for uterovaginal prolapse, in 10/17/17. This was uncomplicated and she is followed by Dr. Farooq in OB/Gyn.   She continues to deny hyperthyroid symptoms, including diplopia, palpitations, diarrhea, nausea/vomiting. She denies skin changes. She endorses 'floaters' in her vision for one week, but are not present today. She also endorses cough, sometimes strong cough that can make her feel nauseous. She denies difficulty swallowing. She has chronic back pain and is seen by orthopedic surgery, and is pursuing additional input from neurosurgery. She states symptoms have been getting worse in the past year. She describes intermittent sharp right buttock pain and left leg numbness; both are worsened when standing for long periods of time.     PMH/PSH:  Past  Medical History:   Diagnosis Date     ASCVD (arteriosclerotic cardiovascular disease) 6/14/2012     Benign neoplasm of duodenum, jejunum, and ileum 2003, 2007    Gastrointestinal Stromal Tumor, seen at [facility], Dr. Schmitz, GI oncology-Gleevec treatment.  Surgical removal in fall 2004-no recurrence as of 3/05.     CA - lung cancer 4/2010    [facility] [place], treated surgically     choledochal cyst 1963    CHOLEDOCHAL CYST, mild dilatation of biliary system     Coronary artery disease      Dislocated finger, left middle PIP 7/26/2013     Dyspnea 8/27/2013     Eyelid edema 12/15/2011    side effect of gastric cancer medication      GERD (gastroesophageal reflux disease) 10/8/2013     GIST (gastrointestinal stromal tumor), malignant (H) 5/10/2011     Graves disease      Grief reaction 5/11/2009     History of lung cancer 12/15/2011    S/p resection of right lung.  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     Other chronic pain     back     PONV (postoperative nausea and vomiting)      Postsurgical aortocoronary bypass status 7/4/2012     S/P CABG x 4 8/5/2012     Strabismus      Tobacco use disorder     Quit     Unspecified essential hypertension      Past Surgical History:   Procedure Laterality Date     BYPASS GRAFT ARTERY CORONARY  6/14/2012    Procedure: BYPASS GRAFT ARTERY CORONARY;  Median Sternotomy Coronary Artery Bypass Graft  x 3        C THORACOSCOPY,DX W BX  fall 2003    benign tissue     CATARACT IOL, RT/LT      LE     CHOLECYSTECTOMY  1963     COLONOSCOPY  4-12-05     CORONARY ARTERY BYPASS      X4     CREATION PERICARDIAL WINDOW  6/15/2012     Procedure: CREATION PERICARDIAL WINDOW;  Mediastinal Exploration, Control of Bleeding;  Surgeon: Dwaine Griffin MD;  Location: [facility] OR     EYE SURGERY      left cataract removal     GI SURGERY   and     GIST tumor removal     GYN SURGERY      tubal ligation     HYSTERECTOMY VAGINAL, COLPORRHAPHY ANTERIOR, POSTERIOR, COMBINED N/A 10/17/2017    Procedure: COMBINED HYSTERECTOMY VAGINAL, COLPORRHAPHY ANTERIOR, POSTERIOR;  Total Vaginal Hysterectomy and Posterior Repair,Sacrospinous Vault Suspension;  Surgeon: Ruth Farooq MD;  Location: [facility] OR     PHACOEMULSIFICATION WITH STANDARD INTRAOCULAR LENS IMPLANT  3/24/2014    Procedure: PHACOEMULSIFICATION WITH STANDARD INTRAOCULAR LENS IMPLANT;  Left Kelman Phacoemulsification with Intraocular Lens Implant;  Surgeon: Magnus Mckeon MD;  Location: [facility] OR     RECESSION RESECTION WITH ADJUSTABLE SUTURE BILATERAL Bilateral 2016    Procedure: RECESSION RESECTION WITH ADJUSTABLE SUTURE BILATERAL;  Surgeon: Erma Ordaz MD;  Location: [facility] OR     SURGICAL HISTORY OF -       cholecystectomy     SURGICAL HISTORY OF -       Tubal Ligation      SURGICAL HISTORY OF -       Explor. Lap, Excision of Small Bowel Tumor      SURGICAL HISTORY OF -   2010    lung cancer removed right lung     Family Hx:  Family History   Problem Relation Age of Onset     HEART DISEASE Mother      OSTEOPOROSIS Mother      Respiratory Mother      Emphezyma     Hypertension Mother      HEART DISEASE Father      Alcohol/Drug Father      Hypertension Father      Hypertension Maternal Grandmother      Hypertension Brother      Hypertension Sister      Arthritis Sister      Hypertension Son      CANCER Son      rectal   Father:  at age 58, heat attack, alcoholism  Mother:  at age 84, heart attack, emphysema  2 siblings: Sister has arthritis and has had multiple surgeries due to that, also has HTN.  Brother also has HTN  Thyroid  disease: No         DM2: No         Autoimmune: there is no family history of DM1, SLE, RA, or Vitiligo     Social Hx:  Social History     Social History     Marital status:      Spouse name: N/A     Number of children: N/A     Years of education: N/A     Occupational History     Not on file.     Social History Main Topics     Smoking status: Former Smoker     Packs/day: 0.50     Years: 49.00     Types: Cigarettes     Quit date: 4/19/2010     Smokeless tobacco: Never Used     Alcohol use No     Drug use: No     Sexual activity: Not Currently     Partners: Male      Comment:      Other Topics Concern      Service No     Blood Transfusions No     Caffeine Concern Yes     3 cups     Occupational Exposure No     Hobby Hazards No     Sleep Concern No     Stress Concern No     son  and much happiness in her life, big burden lifted,      Weight Concern No     Special Diet No     Back Care Yes     lower back herniated disc 1970's      Exercise No     Bike Helmet No     Seat Belt Yes     Self-Exams Yes     Parent/Sibling W/ Cabg, Mi Or Angioplasty Before 65f 55m? 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  Son (Rk, with wife Марина and grandson) lives next door -- quadriplegic and high functioning, cannot provide physical assistance/support.          MEDICATIONS:  Current Outpatient Prescriptions   Medication     rosuvastatin (CRESTOR) 5 MG tablet     oxyCODONE IR (ROXICODONE) 5 MG tablet     lisinopril (PRINIVIL/ZESTRIL) 40 MG tablet     order for DME     ondansetron (ZOFRAN-ODT) 4 MG ODT tab     polyethylene glycol (MIRALAX/GLYCOLAX) powder     rosuvastatin (CRESTOR) 5 MG tablet     metoprolol (LOPRESSOR) 100 MG tablet     methimazole (TAPAZOLE) 5 MG tablet     LORazepam (ATIVAN) 1 MG tablet     hydrochlorothiazide (HYDRODIURIL) 25 MG tablet     Blood Pressure Monitoring (ADULT BLOOD PRESSURE CUFF  "LG) KIT     amLODIPine (NORVASC) 5 MG tablet     aspirin EC 81 MG EC tablet     imatinib (GLEEVEC) 400 MG tablet     No current facility-administered medications for this visit.        ROS   ROS: 11 point ROS neg other than the symptoms noted above in the HPI.    Physical Exam   VS: /61  Pulse 85  Ht 1.6 m (5' 3\")  Wt 71.8 kg (158 lb 3.2 oz)  LMP 01/01/1992  BMI 28.02 kg/m2  GENERAL: NAD, well groomed, smiling. Voice is normal without hoarseness.   HEENT: OP clear, no exophthalmos, no proptosis, EOMI, no lig lag, no retraction, no scleral icterus.    THYROID: Thyroid is palpable, about 1.5X normal size.  A nodule is palpable on the lower aspect of the right thyroid lobe, this is similar to previous evaluation.   RESPIRATORY: Normal respiratory effort, normal breath sounds.   CARDIOVASCULAR: No peripheral edema, no murmurs  EXTREMITIES: no edema, no rashes, no myxedema  NEUROLOGY: CN grossly intact, again + tremor out of the outstretched hands (more significant on the right hand than on the left, worse with palms facing up). No dysmetria on finger-nose-finger, heel-sheen exam normal.    MSK: grossly intact  SKIN: no rashes, no lesions, no ulcers, skin warm and dry.   PSYCH: Intact judgment and insight. A&OX3 with a cordial affect.      LABS:  TFTs:    Recent Labs   Lab Test  12/29/17   1350  08/29/17   1030  07/07/17   0903   08/14/14   1343  08/07/14   1404   T4  1.15  1.28  1.19   < >  0.40*  0.39*   FT3   --    --    --    --   2.2*  1.7*   T3  118  98  93   < >   --    --    TSH  1.41  1.54  1.27   < >  115.57*  96.24*    < > = values in this interval not displayed.     Thyroid stim immunoglob: 4.8 on 8/29/17    This is on Methimazole 5mg (M, W, F) and 2.5mg (Tu, Thur, Sa, Sun)        ENDO THYROID LABS-[facility] Latest Ref Rng & Units 7/7/2017 11/8/2016   TSH 0.40 - 4.00 mU/L 1.27 2.17   T4 FREE 0.76 - 1.46 ng/dL 1.19 1.14   FREE T3 2.3 - 4.2 pg/mL     TRIIODOTHYRONINE(T3) 60 - 181 ng/dL 93 109     ENDO " THYROID LABS-[facility] Latest Ref Rng 6/27/2016   TSH 0.40 - 4.00 mU/L 2.20   T4 FREE 0.76 - 1.46 ng/dL 1.38   FREE T3 2.3 - 4.2 pg/mL    TRIIODOTHYRONINE(T3) 60 - 181 ng/dL 93   THYR PEROXIDASE PAPO <35 IU/mL    THYROID STIM IMMUNOG  3.2 (H)       ENDO THYROID LABS-[facility] Latest Ref Rng 3/3/2016 12/21/2015   TSH 0.40 - 4.00 mU/L 1.94 2.35   T4 FREE 0.76 - 1.46 ng/dL 1.07 1.07   FREE T3 2.3 - 4.2 pg/mL     TRIIODOTHYRONINE(T3) 60 - 181 ng/dL 112    THYR PEROXIDASE PAPO <35 IU/mL     THYROID STIM IMMUNOG  1.6 (H)      [facility]2015   TSH 0.40 - 4.00 mU/L 2.42   T4 FREE 0.76 - 1.46 ng/dL 1.16   FREE T3 2.3 - 4.2 pg/mL    TRIIODOTHYRONINE(T3) 60 - 181 ng/dL 103   THYR PEROXIDASE PAPO <35 IU/mL    THYROID STIM IMMUNOG  1.8 (H)     [facility] 6/30/2015   TSH 0.40 - 4.00 mU/L 9.27 (H)   T4 FREE 0.76 - 1.46 ng/dL 0.92   FREE T3 2.3 - 4.2 pg/mL    TRIIODOTHYRONINE(T3) 60 - 181 ng/dL 129   This on Methimazole 10 mg/day      ENDO THYROID LABS-[facility] Latest Ref Rng 3/30/2015   TSH 0.40 - 4.00 mU/L 1.62   T4 FREE 0.76 - 1.46 ng/dL 0.89   FREE T3 2.3 - 4.2 pg/mL    TRIIODOTHYRONINE(T3) 60 - 181 ng/dL 98     [facility] 1/9/2015   TSH 0.40 - 4.00 mU/L 0.02 (L)   T4 FREE 0.76 - 1.46 ng/dL 1.87 (H)   FREE T3 2.3 - 4.2 pg/mL    TRIIODOTHYRONINE(T3) 60 - 181 ng/dL 156     ENDO THYROID LABS-[facility] Latest Ref Rng 10/16/2014 9/10/2014   TSH 0.40 - 4.00 mU/L 0.02 (L) 0.26 (L)   T4 FREE 0.76 - 1.46 ng/dL 1.59 (H) 1.32   FREE T3 2.3 - 4.2 pg/mL     TRIIODOTHYRONINE(T3) 60 - 181 ng/dL 155 137     ENDO THYROID LABS-[facility] Latest Ref Rng 8/14/2014 8/7/2014 6/24/2014   TSH 0.40 - 4.00 mU/L 115.57 (H) 96.24 (H)    T4 FREE 0.76 - 1.46 ng/dL 0.40 (L) 0.39 (L)    FREE T3 2.3 - 4.2 pg/mL 2.2 (L) 1.7 (L)    THYROID STIM IMMUNOG    7.4 (H)     ENDO THYROID LABS-[facility] Latest Ref Rng 5/23/2014   TSH 0.40 - 4.00 mU/L 0.05 (L)   T4 FREE 0.76 - 1.46 ng/dL 0.96     !THYROID Latest Ref Rng  4/25/2014 3/31/2014 3/14/2014   TSH 0.4 - 5.0 mU/L 0.07 (L) 0.03 (L) 0.03 (L)   T4 FREE 0.70 - 1.85 ng/dL 2.03 (H) 2.82 (H) 3.02 (H)     TSI:  Component    Latest Ref Rng 2/25/2014   Thyroid Stim Immunog     4.6 (H)     CBC:  !HEMATOLOGY Latest Ref Rng 11/12/2013   WBC  4.9   RBC  3.55   HGB 11.7 - 15.7 gm/dL 9.8 (A)   HCT  29.8   MCV  84   MCH  27.6   MCHC  32.9   RDW  15.4     LFTs:  !COMPREHENSIVE Latest Ref Rng 11/12/2013 11/12/2013   CALCIUM 8.5 - 10.4 mg/dL 9.1 9.1   ALBUMIN  3.4 3.4   PROTEIN, TOTAL  6.1 6.1   AST  26 26   ALT  20 20   ALKPHOS  106 106   BILIRUBIN TOTAL  0.3 0.3     US Thyroid:  EXAMINATION: US THYROID, 1/9/2018 9:30 AM      COMPARISON: 2/5/2014, 6/18/2012, 11/9/2007.     HISTORY: Hyperthyroidism     Technique: Grayscale and color ultrasound imaging of the thyroid was performed.     Findings:    Thyroid parenchyma: Several thyroid nodules described below, the thyroid parenchyma is otherwise homogeneous and unremarkable.  The right lobe of the thyroid measures: 2.1 x 2.2 x 5.7 cm   The thyroid isthmus measures: 0.3 cm   The left lobe of the thyroid measures: 1.6 x 1.6 x 3.8 cm      Right lobe:  Nodule 1: mid  Nodule measurement: 0.5 x 0.3 x 0.6 cm  Echogenicity: Hypoechoic  Consistency: Cystic/spongiform  Calcifications: no  Hypervascular: no  Interval growth (>20%): no     Nodule 2: Inferior  Nodule measurement: 1.5 x 1.8 x 2.1 cm  Echogenicity: Heterogeneous, predominantly isoechoic  Consistency: Mostly solid  Calcifications: no  Hypervascular: yes  Interval growth (>20%): Yes (previously 2.0 x 1.3 x 1.1 cm).    Isthmus:   No nodules identified.     Left Lobe:   Nodule 1: Superior  Nodule measurement: 0.5 x 0.3 x 0.7 cm  Echogenicity: Heterogeneous, predominantly hypoechoic  Consistency: spongiform  Calcifications: no  Hypervascular: Minimal internal vascularity  Interval growth (>20%): NA     Nodule 2: Mid  Nodule measurement: 0.3 x 0.2 x 0.4 cm  Echogenicity: Heterogeneous, predominantly  hypoechoic  Consistency: spongiform  Calcifications: no  Hypervascular: no  Interval growth (>20%): NA     Nodule 3: Mid to inferior posteriorly  Nodule measurement: 0.5 x 0.3 x 1.0 cm  Echogenicity: Hypoechoic  Consistency: Cystic/spongiform  Calcifications: no  Hypervascular: no  Interval growth (>20%): NA            Impression:  Several thyroid nodules as described above, the largest of which is exophytic along the posterior and inferior right thyroid lobe and measures up to 2.1 cm. This appears increased compared to 2014 and not significantly changed from 7/7/2017 CT given differences in technique. Consider further evaluation with fine-needle aspiration.           I have personally reviewed the examination and initial interpretation and I agree with the findings.     YARELY ROLLE MD             US THYROID 2/5/2014 10:06 AM    HISTORY: Hyperthyroid.    COMPARISON: None.    FINDINGS: The right lobe of the thyroid gland measures 5.6 x 1.4 x 1.5 cm. The left lobe of the thyroid gland measures 3.5 x 1.6 x 1.5 cm. The isthmus measures 0.2 cm.  There is a somewhat ill-defined hypoechoic nodule in the mid to upper pole of the right lobe of the thyroid gland that measures 0.4 x 0.4 x 0.3 cm. This has calcifications within it. There is a predominantly solid nodule at the posterior margin of the lower pole of the right lobe of the thyroid gland that measures 2.0 x 1.3 x 1.1 cm. This nodule shows no significant change in size in retrospect from prior chest CTs dating back to 9/11/2012. This stability is supportive evidence for a benign entity.  Uptake and Scan: 2/28/2014  The uptake at 24 hours was measured at 19 %. The normal range at 24 hours is from 10 to 30%. Uptake on right: 12 %, Uptake on Left: 6.8 %.    Total computer estimated weight of the gland: 44.8 kg, Right gland weight: 29.9 kg, Left gland weight: 14.9 kg.    Images of the gland demonstrates normal appearance of the right and left lobes. No additional  findings. No autonomous nodules were identified.    All pertinent notes, labs, and images personally reviewed by me.        Assessment and Plan:  Ms.Suzanne TIERRA Abdul is a 71 year old female here for the management of thyroid dysfunction.     1. Graves' Disease vs. Gleevec induced Hyperthyroidism.  The positive TSI and eye disease suggest this is Graves' disease.  Thyroid dysfunction (usually hypothyroidism) but also hyperthyroidism have been reported with Gleevec.  Ms. Abdul is clinically euthyroid. She is currently on methimazole dose to 5 mg (M, W, F) and 2.5 mg (Tu, Th, Sa and Reyes). Subsequent TFTs were within normal limits. However, TSI remained elevated (4.8). In light of her elevated TSI, it is unlikely she will be able to go off methimazole and remain euthyroid. We discussed options to trial weaning off methimazole to 2.5mg daily and re-check TFTs in 6 weeks or consider radioiodine therapy. She is planning to travel to [place]; indicated she can have her lab work done there if needed to monitor her thyroid function.  If tolerated, will continue to taper off methimazole (2.5 mg / 0mg) and monitor labs. If hyperthyroidism recurrs, will decide to continue management of her Grave's disease medically versus with Radioiodine ablation. Pros and cons were discussed again today with pt and her son. She agrees with this plan.     2. Thyroid Nodule.  Ms. Abdul has thyroid nodules. US redemonstrated nodules, one of which was concerning for growth. At this time, uncertain if nodule is thyroid in origin vs. Lymph node vs. Another source. Images suggest it is thyroid.  Previous thyroid scan showed no uptake in the area, what increases the likelihood if malignancy if this is truly a thyroid nodule.   I have discussed in detail the differential diagnosis of thyroid nodules with the patient.  The patient understands that most thyroid nodules (about 85%) are benign, and that this diagnosis can only be confirmed by cytological  "analysis.  The risks of the FNA procedure, including but not limited to pain, infection, local bruising, hematoma formation, and significant bleeding, were discussed in detail.  The possible results of the FNA of the thyroid gland were also discussed. We will request for the Pathology service to hold onto materials for DNA testing if the FNA results indicate atypical cells of undetermined significance.  Will discuss with radiology and Dr. Schmitz as I would like for this nodule to be biopsied and I will refer the patient for an US-guided FNA. This will nai to be done at radiology.  Patient will hold her aspirin 1 week prior to the FNA.    3. 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  Eye symptoms significantly improved after surgery and she no longer has diplopia. She continues presenting lacrimation, and she is followed by ophthalmology. She endorsed 1 week of \"floaters\" in her vision, but these are not present today. She will follow up with opthalmology. If she is to receive radioiodine, we will discuss use of steroids with opthalmology.       Orders Placed This Encounter   Procedures     TSH     T4 free     T3 total     Thyroid stimulating immunoglobulin       Follow up in 4-5 months    Note scribed by Lexus Langford, PhD, MS4      Again, thank you for allowing me to participate in the care of your patient.      Sincerely,    Swetha Antoine MD      "

## 2018-01-11 RX ORDER — IOPAMIDOL 755 MG/ML
77 INJECTION, SOLUTION INTRAVASCULAR ONCE
Status: COMPLETED | OUTPATIENT
Start: 2018-01-12 | End: 2018-01-12

## 2018-01-12 ENCOUNTER — HOSPITAL ENCOUNTER (OUTPATIENT)
Dept: CT IMAGING | Facility: CLINIC | Age: 73
Discharge: HOME | End: 2018-01-12
Attending: INTERNAL MEDICINE | Admitting: INTERNAL MEDICINE
Payer: MEDICARE

## 2018-01-12 DIAGNOSIS — C49.A0 MALIGNANT GASTROINTESTINAL STROMAL TUMOR, UNSPECIFIED SITE (H): ICD-10-CM

## 2018-01-12 DIAGNOSIS — Z85.118 HISTORY OF LUNG CANCER: ICD-10-CM

## 2018-01-12 LAB
ALBUMIN SERPL-MCNC: 3.5 G/DL (ref 3.4–5)
ALP SERPL-CCNC: 61 U/L (ref 40–150)
ALT SERPL W P-5'-P-CCNC: 24 U/L (ref 0–50)
ANION GAP SERPL CALCULATED.3IONS-SCNC: 7 MMOL/L (ref 3–14)
AST SERPL W P-5'-P-CCNC: 23 U/L (ref 0–45)
BASOPHILS # BLD AUTO: 0 10E9/L (ref 0–0.2)
BASOPHILS NFR BLD AUTO: 0.4 %
BILIRUB SERPL-MCNC: 0.4 MG/DL (ref 0.2–1.3)
BUN SERPL-MCNC: 20 MG/DL (ref 7–30)
CALCIUM SERPL-MCNC: 8.2 MG/DL (ref 8.5–10.1)
CHLORIDE SERPL-SCNC: 101 MMOL/L (ref 94–109)
CO2 SERPL-SCNC: 27 MMOL/L (ref 20–32)
CREAT SERPL-MCNC: 1.2 MG/DL (ref 0.52–1.04)
DIFFERENTIAL METHOD BLD: ABNORMAL
EOSINOPHIL # BLD AUTO: 0.3 10E9/L (ref 0–0.7)
EOSINOPHIL NFR BLD AUTO: 5 %
ERYTHROCYTE [DISTWIDTH] IN BLOOD BY AUTOMATED COUNT: 15.2 % (ref 10–15)
GFR SERPL CREATININE-BSD FRML MDRD: 44 ML/MIN/1.7M2
GLUCOSE SERPL-MCNC: 103 MG/DL (ref 70–99)
HCT VFR BLD AUTO: 32.5 % (ref 35–47)
HGB BLD-MCNC: 10.7 G/DL (ref 11.7–15.7)
LYMPHOCYTES # BLD AUTO: 1.3 10E9/L (ref 0.8–5.3)
LYMPHOCYTES NFR BLD AUTO: 24.8 %
MCH RBC QN AUTO: 29.9 PG (ref 26.5–33)
MCHC RBC AUTO-ENTMCNC: 32.9 G/DL (ref 31.5–36.5)
MCV RBC AUTO: 91 FL (ref 78–100)
MONOCYTES # BLD AUTO: 1 10E9/L (ref 0–1.3)
MONOCYTES NFR BLD AUTO: 18.8 %
NEUTROPHILS # BLD AUTO: 2.7 10E9/L (ref 1.6–8.3)
NEUTROPHILS NFR BLD AUTO: 51 %
PLATELET # BLD AUTO: 206 10E9/L (ref 150–450)
POTASSIUM SERPL-SCNC: 4.2 MMOL/L (ref 3.4–5.3)
PROT SERPL-MCNC: 6.6 G/DL (ref 6.8–8.8)
RBC # BLD AUTO: 3.58 10E12/L (ref 3.8–5.2)
SODIUM SERPL-SCNC: 135 MMOL/L (ref 133–144)
WBC # BLD AUTO: 5.4 10E9/L (ref 4–11)

## 2018-01-12 PROCEDURE — 36415 COLL VENOUS BLD VENIPUNCTURE: CPT | Performed by: INTERNAL MEDICINE

## 2018-01-12 PROCEDURE — 74177 CT ABD & PELVIS W/CONTRAST: CPT

## 2018-01-12 PROCEDURE — 80053 COMPREHEN METABOLIC PANEL: CPT | Performed by: INTERNAL MEDICINE

## 2018-01-12 PROCEDURE — 85025 COMPLETE CBC W/AUTO DIFF WBC: CPT | Performed by: INTERNAL MEDICINE

## 2018-01-12 PROCEDURE — 25000125 ZZHC RX 250: Performed by: RADIOLOGY

## 2018-01-12 PROCEDURE — 25000128 H RX IP 250 OP 636: Performed by: RADIOLOGY

## 2018-01-12 RX ADMIN — IOPAMIDOL 77 ML: 755 INJECTION, SOLUTION INTRAVENOUS at 09:36

## 2018-01-12 RX ADMIN — SODIUM CHLORIDE 59 ML: 9 INJECTION, SOLUTION INTRAVENOUS at 09:36

## 2018-01-16 ENCOUNTER — TELEPHONE (OUTPATIENT)
Dept: PHARMACY | Facility: CLINIC | Age: 73
End: 2018-01-16

## 2018-01-16 NOTE — TELEPHONE ENCOUNTER
I talked with Idalmis. She confirmed that she is aware that she should be stopping her aspirin 1 week before having FNA procedure, it this is decided course of action.   Jessy Osman, PharmD  Medication Therapy Management

## 2018-01-19 ENCOUNTER — TRANSFERRED RECORDS (OUTPATIENT)
Dept: HEALTH INFORMATION MANAGEMENT | Facility: CLINIC | Age: 73
End: 2018-01-19

## 2018-01-26 ENCOUNTER — RADIANT APPOINTMENT (OUTPATIENT)
Dept: GENERAL RADIOLOGY | Facility: CLINIC | Age: 73
End: 2018-01-26
Attending: ORTHOPAEDIC SURGERY
Payer: COMMERCIAL

## 2018-01-26 ENCOUNTER — TELEPHONE (OUTPATIENT)
Dept: ENDOCRINOLOGY | Facility: CLINIC | Age: 73
End: 2018-01-26

## 2018-01-26 DIAGNOSIS — M53.2X6 SPINAL INSTABILITY, LUMBAR: ICD-10-CM

## 2018-01-26 PROCEDURE — 72100 X-RAY EXAM L-S SPINE 2/3 VWS: CPT | Mod: FY

## 2018-01-26 NOTE — TELEPHONE ENCOUNTER
----- Message from Swetha Antoine MD sent at 1/25/2018  8:22 PM CST -----  Regarding: RE: Thyroid  He now replied saying he is has no concerns with this plan.  Thanks  ----- Message -----     From: Jasmine Trejo RN     Sent: 1/24/2018   1:56 PM       To: Swetha Antoine MD  Subject: FW: Thyroid                                      His nurse Ara Guzman  was contacted but she sent him a  staff message also to reply  with no reply yet. Frustrating   ----- Message -----     From: Swetha Antoine MD     Sent: 1/19/2018   9:32 AM       To: Jasmine Trejo RN  Subject: RE: Thyroid                                      Sent him the Epic message you saw and have not heard back.  Can you try to reach his nurse before I place the orders to make sure they are on board with this plan?    Thanks,  Zenia    ----- Message -----     From: Jasmine Trejo RN     Sent: 1/16/2018   4:51 PM       To: Swetha Antoine MD  Subject: FW: Thyroid                                      Did you  consult with Dr Schmitz ? She is questioning ? She will hold her aspirin  1 week  Before per Jessy Pharm. s  ----- Message -----     From: Swetha Antoine MD     Sent: 1/11/2018  10:07 AM       To: Blayne Schmitz MD, #  Subject: Thyroid                                          Hi Ed,  I just saw our pt, Ms. Abdul, on Tuesday and I think her large thyroid nodule deserves a biopsy.  It is quite posterior, and we will need to do it at radiology rather than here.   I am also copying Jessy in Pharm, as she needs to stop her aspirin for that.  Please let me know if you have any concerns.  Thanks,  Zenia Antoine MD PhD    Division of Endocrinology and Diabetes

## 2018-01-26 NOTE — TELEPHONE ENCOUNTER
Dr Schmitz is ok with  Thyroid Biopsy . She will hold her aspirin one week before . Ok to schedule once Dr Antoine enters  orders

## 2018-01-29 DIAGNOSIS — E04.1 THYROID NODULE: Primary | ICD-10-CM

## 2018-01-29 NOTE — PROGRESS NOTES
Called Idalmis to see how she is doing and to see when she can follow up with Ara Guzman DNP.  She is currently still in [place] until mid next week.  She will call and arrange and appointment when she returns.  She needs to coordinate transportation.  She also requested a refill on her lorazepam.  Let her know that that will be called in to [facility] pharmacy [phone].    
No

## 2018-02-07 ENCOUNTER — RADIANT APPOINTMENT (OUTPATIENT)
Dept: ULTRASOUND IMAGING | Facility: CLINIC | Age: 73
End: 2018-02-07
Attending: INTERNAL MEDICINE
Payer: COMMERCIAL

## 2018-02-07 DIAGNOSIS — E04.1 THYROID NODULE: ICD-10-CM

## 2018-02-07 RX ORDER — LIDOCAINE HYDROCHLORIDE 10 MG/ML
5 INJECTION, SOLUTION INFILTRATION; PERINEURAL ONCE
Status: DISCONTINUED | OUTPATIENT
Start: 2018-02-07 | End: 2018-02-07 | Stop reason: CLARIF

## 2018-02-07 RX ORDER — LIDOCAINE HYDROCHLORIDE 10 MG/ML
5 INJECTION, SOLUTION INFILTRATION; PERINEURAL ONCE
Status: COMPLETED | OUTPATIENT
Start: 2018-02-07 | End: 2018-02-07

## 2018-02-07 RX ADMIN — LIDOCAINE HYDROCHLORIDE 3 ML: 10 INJECTION, SOLUTION INFILTRATION; PERINEURAL at 11:01

## 2018-02-07 NOTE — DISCHARGE INSTRUCTIONS
Directions for after your Thyroid Fine Needle Aspiration:    You can remove your bandage within a few hours.  The site of the biopsy may be sore for a day or two after the procedure. You can take over-the-counter pain medicine if needed.  Notify your doctor if you have any of the following:    Fever above 101 degrees    Swelling in the area of the biopsy    Redness or leaking from the biopsy site  Your doctor will notify you of the results in 2-3 days.

## 2018-02-07 NOTE — LETTER
Patient:  Piedad Abdul  :   1945  MRN:     [id number]        Ms.Suzanne TIERRA Abdul  [place]   [facility] [address]        February 15, 2018    Dear ,    We are writing to inform you of your test results.      Resulted Orders   Fine needle aspiration   Result Value Ref Range    Copath Report       Patient Name: PIEDAD ABDUL  MR#: [id number]  Specimen #: [id number]  Collected: 2018  Received: 2018  Reported: 2018 10:13  Ordering Phy(s): EVELYN NEGRETE    For improved result formatting, select 'View Enhanced Report Format' under   Linked Documents section.    SPECIMEN/STAIN PROCESS:  FNA-thyroid, Right Inferior (1.5x1.8x2.1 cm)       Pap-Cyto x 3, Diff Quick Stain-cyto x 3    ----------------------------------------------------------------    CYTOLOGIC INTERPRETATION:    Thyroid, Right Inferior, Ultrasound-Guided Fine Needle Aspiration:     Benign  Consistent with a benign nodule (includes adenomatoid nodule, colloid   nodule, etc.)  Specimen Adequacy: Satisfactory for evaluation.    The [place] implied risk of malignancy and recommended clinical   management:  Benign has a 0-3% risk of malignancy, recommended management is clinical   follow-up    I have personally reviewed all specimens and/or slides, including the   listed special stains, and used  them  with my medical judgement to determine or confirm the final diagnosis.    Electronically signed out by:  Akhil Dubon M.D., Physicians    Processed and screened at [facility],   [facility]    CLINICAL HISTORY:  The patient is a 72-year-old female with a history of thyroid dysfunction   (diagnosed 2014) and lung cancer  (diagnosed ). She now has a 1.5 x 1.8 x 2.1 cm right inferior thyroid   nodule.    ,    GROSS:  FNA-thyroid, Right Inferior (1.5 x 1.8 x 2.1 cm): Received are 3 fixed   slides, processed for Pap stain, and 3  air dried slides,  processed for Diff Quik stain. Afirma tube held.    INTRAOPERATIVE CONSULTATION:  FNA Performance: Fine needle aspiration was not performed by [facility],    Pathology staff.  Immediate Adequacy: On site specimen adequacy evaluation was performed by   Dr. BHAVANI Epperson MD via telepathology.    Onsite adequacy/interpretation:  Pass A1 adequate. Pass A2 put directly into Afirma tube. Pass A3-A 4   adequate.    MICROSCOPIC:  Microscopic examination performed.    Malachi Fang MD, Cytopathology Fellow          Akhil Dubon MD    CPT Codes:   29095-NADH-UMK, 61962-RBRV-YPT  A: 38304-HXAM    TESTING LAB LOCATION:  [facility], [address]   [address] [phone]    COLLECTION SITE:  Client:  [facility]  Location:  [facility] (B)    Resident  IFH1         [facility] PROCED[facility] RAD    These are the results of the thyroid biopsy we discussed over the phone.  It looks good!  It was a pleasure to see you at your recent visit.  Please let me know if you have any questions or concerns.  Sincerely,    Zenia Antoine MD PhD    Division of Endocrinology and Diabetes

## 2018-02-07 NOTE — MR AVS SNAPSHOT
MRN:[id number]                      After Visit Summary   2/7/2018    Idalmis Abdul    MRN: [id number]           Visit Information        Provider Department      2/7/2018 10:00 AM  PROCEDURAL RAD;  IMAGING NURSE; [address] [facility]            Review of your medicines          These changes are accurate as of 2/7/18 10:28 AM.  If you have any questions, ask your nurse or doctor.               CONTINUE these medicines which have NOT CHANGED        Dose / Directions    Adult Blood Pressure Cuff Lg Kit   Used for:  HTN, goal below 140/90        Dose:  1 Device   1 Device 2 times daily   Quantity:  1 kit   Refills:  1       amLODIPine 5 MG tablet   Commonly known as:  NORVASC   Used for:  Essential hypertension        TAKE ONE TABLET BY MOUTH EVERY DAY   Quantity:  30 tablet   Refills:  9       aspirin 81 MG EC tablet   Used for:  NSTEMI (non-ST elevated myocardial infarction) (H), ACS (acute coronary syndrome) (H)        Dose:  81 mg   Take 1 tablet by mouth daily.   Quantity:  30 tablet   Refills:  2       hydrochlorothiazide 25 MG tablet   Commonly known as:  HYDRODIURIL   Used for:  Essential hypertension        Dose:  25 mg   Take 1 tablet (25 mg) by mouth daily   Quantity:  90 tablet   Refills:  3       imatinib 400 MG tablet   Commonly known as:  GLEEVEC   Used for:  Malignant gastrointestinal stromal tumor, unspecified site (H)        Dose:  400 mg   Take 1 tablet (400 mg) by mouth daily Take with a meal and a large glass of water.   Quantity:  30 tablet   Refills:  2       lisinopril 40 MG tablet   Commonly known as:  PRINIVIL/ZESTRIL   Used for:  Essential hypertension with goal blood pressure less than 140/90        TAKE ONE TABLET BY MOUTH EVERY DAY   Quantity:  90 tablet   Refills:  1       LORazepam 1 MG tablet   Commonly known as:  ATIVAN   Used for:  Malignant gastrointestinal stromal tumor, unspecified site (H)        Dose:  1 mg   Take 1 tablet (1 mg) by mouth At  Bedtime   Quantity:  30 tablet   Refills:  5       methimazole 5 MG tablet   Commonly known as:  TAPAZOLE   Used for:  Hyperthyroidism        Dose:  5 mg   Take 1 tablet (5 mg) by mouth daily ALTERNATE 5 MG AND 2.5 MG (1/2 TABLET) EVERY DAY   Quantity:  90 tablet   Refills:  3       metoprolol tartrate 100 MG tablet   Commonly known as:  LOPRESSOR   Used for:  Essential hypertension with goal blood pressure less than 140/90        TAKE ONE TABLET BY MOUTH TWICE A DAY   Quantity:  180 tablet   Refills:  3       ondansetron 4 MG ODT tab   Commonly known as:  ZOFRAN-ODT   Used for:  Postoperative state        Dose:  4-8 mg   Take 1-2 tablets (4-8 mg) by mouth every 8 hours as needed for nausea Dissolve ON the tongue.   Quantity:  4 tablet   Refills:  0       order for DME   Used for:  Post hysterectomy menopause        Walker   Quantity:  1 Device   Refills:  0       oxyCODONE IR 5 MG tablet   Commonly known as:  ROXICODONE   Used for:  Chronic low back pain, unspecified back pain laterality, with sciatica presence unspecified        Dose:  5-10 mg   Take 1-2 tablets (5-10 mg) by mouth every 3 hours as needed for pain or other (Moderate to Severe)   Quantity:  180 tablet   Refills:  0       polyethylene glycol powder   Commonly known as:  MIRALAX/GLYCOLAX        Dose:  17 g   Take 17 g by mouth daily   Refills:  0       * rosuvastatin 5 MG tablet   Commonly known as:  CRESTOR   Used for:  Hyperlipidemia LDL goal <160        Dose:  5 mg   Take 1 tablet (5 mg) by mouth every other day   Quantity:  45 tablet   Refills:  3       * rosuvastatin 5 MG tablet   Commonly known as:  CRESTOR   Used for:  Hyperlipidemia LDL goal <160        TAKE ONE TABLET BY MOUTH EVERY DAY   Quantity:  90 tablet   Refills:  0       * Notice:  This list has 2 medication(s) that are the same as other medications prescribed for you. Read the directions carefully, and ask your doctor or other care provider to review them with you.              Protect others around you: Learn how to safely use, store and throw away your medicines at www.disposemymeds.org.         Follow-ups after your visit        Your next 10 appointments already scheduled     2018  8:45 AM CST   (Arrive by 8:30 AM)   Return Visit with Blayne Schmitz MD   [facility] ([facility])    [address]  [address]  [facility] [address]   [phone]            May 08, 2018 10:00 AM CDT   (Arrive by 9:45 AM)   RETURN ENDOCRINE with Swetha Antoine MD   [facility] ([facility])    [address]  [address]  [facility] [id number] [phone]               Care Instructions        Further instructions from your care team       Directions for after your Thyroid Fine Needle Aspiration:    You can remove your bandage within a few hours.  The site of the biopsy may be sore for a day or two after the procedure. You can take over-the-counter pain medicine if needed.  Notify your doctor if you have any of the following:    Fever above 101 degrees    Swelling in the area of the biopsy    Redness or leaking from the biopsy site  Your doctor will notify you of the results in 2-3 days.     Additional Information About Your Visit        [employer] Information     [employer] is an electronic gateway that provides easy, online access to your medical records. With We [employer], you can request a clinic appointment, read your test results, renew a prescription or communicate with your care team.     To sign up for [employer] visit the website at [email or web address]   You will be asked to enter the access code listed below, as well as some personal information. Please follow the directions to create your username and password.     Your access code is: [email or web address]  Expires: 2018  2:50 PM     Your access code will  in 90 days. If you need help or a new code, please contact  your [facility] or call [phone] for assistance.        Care EveryWhere ID     This is your Care EveryWhere ID. This could be used by other organizations to access your [facility] medical records  [id number]        Your Vitals Were     Last Period                   01/01/1992            Primary Care Provider Office Phone # Fax #    Darlene Daniela Sultana [phone]      Equal Access to Services     KIKE EWING : Hadii aad ku hadasho Soomaali, waaxda luqadaha, qaybta kaalmada adeegyada, waxay timoin hayaan adeeg sandragregoriaparis gomez . So [facility] [phone].    ATENCIÓN: Si habla español, tiene a cassidy disposición servicios gratuitos de asistencia lingüística. Melindaame al [phone].    We comply with applicable federal civil rights laws and [place] laws. We do not discriminate on the basis of race, color, national origin, age, disability, sex, sexual orientation, or gender identity.            Thank you!     Thank you for choosing [facility] for your care. Our goal is always to provide you with excellent care. Hearing back from our patients is one way we can continue to improve our services. Please take a few minutes to complete the written survey that you may receive in the mail after you visit with us. Thank you!             Medication List: This is a list of all your medications and when to take them. Check marks below indicate your daily home schedule. Keep this list as a reference.          This list is accurate as of 2/7/18 10:28 AM.  Always use your most recent med list.               Medications           Morning Afternoon Evening Bedtime As Needed    Adult Blood Pressure Cuff Lg Kit   1 Device 2 times daily                                amLODIPine 5 MG tablet   Commonly known as:  NORVASC   TAKE ONE TABLET BY MOUTH EVERY DAY                                aspirin 81 MG EC tablet   Take 1 tablet by mouth daily.                                hydrochlorothiazide 25 MG  tablet   Commonly known as:  HYDRODIURIL   Take 1 tablet (25 mg) by mouth daily                                imatinib 400 MG tablet   Commonly known as:  GLEEVEC   Take 1 tablet (400 mg) by mouth daily Take with a meal and a large glass of water.                                lisinopril 40 MG tablet   Commonly known as:  PRINIVIL/ZESTRIL   TAKE ONE TABLET BY MOUTH EVERY DAY                                LORazepam 1 MG tablet   Commonly known as:  ATIVAN   Take 1 tablet (1 mg) by mouth At Bedtime                                methimazole 5 MG tablet   Commonly known as:  TAPAZOLE   Take 1 tablet (5 mg) by mouth daily ALTERNATE 5 MG AND 2.5 MG (1/2 TABLET) EVERY DAY                                metoprolol tartrate 100 MG tablet   Commonly known as:  LOPRESSOR   TAKE ONE TABLET BY MOUTH TWICE A DAY                                ondansetron 4 MG ODT tab   Commonly known as:  ZOFRAN-ODT   Take 1-2 tablets (4-8 mg) by mouth every 8 hours as needed for nausea Dissolve ON the tongue.                                order for DME   Walker                                oxyCODONE IR 5 MG tablet   Commonly known as:  ROXICODONE   Take 1-2 tablets (5-10 mg) by mouth every 3 hours as needed for pain or other (Moderate to Severe)                                polyethylene glycol powder   Commonly known as:  MIRALAX/GLYCOLAX   Take 17 g by mouth daily                                * rosuvastatin 5 MG tablet   Commonly known as:  CRESTOR   Take 1 tablet (5 mg) by mouth every other day                                * rosuvastatin 5 MG tablet   Commonly known as:  CRESTOR   TAKE ONE TABLET BY MOUTH EVERY DAY                                * Notice:  This list has 2 medication(s) that are the same as other medications prescribed for you. Read the directions carefully, and ask your doctor or other care provider to review them with you.

## 2018-02-08 LAB — COPATH REPORT: NORMAL

## 2018-02-12 ENCOUNTER — TELEPHONE (OUTPATIENT)
Dept: ENDOCRINOLOGY | Facility: CLINIC | Age: 73
End: 2018-02-12

## 2018-02-14 ENCOUNTER — HOSPITAL ENCOUNTER (OUTPATIENT)
Dept: PHYSICAL THERAPY | Facility: CLINIC | Age: 73
Setting detail: THERAPIES SERIES
End: 2018-02-14
Attending: ORTHOPAEDIC SURGERY
Payer: MEDICARE

## 2018-02-14 DIAGNOSIS — E05.00 GRAVES' DISEASE: ICD-10-CM

## 2018-02-14 PROCEDURE — G8979 MOBILITY GOAL STATUS: HCPCS | Mod: GP,CJ | Performed by: PHYSICAL THERAPIST

## 2018-02-14 PROCEDURE — 97161 PT EVAL LOW COMPLEX 20 MIN: CPT | Mod: GP | Performed by: PHYSICAL THERAPIST

## 2018-02-14 PROCEDURE — 97110 THERAPEUTIC EXERCISES: CPT | Mod: GP | Performed by: PHYSICAL THERAPIST

## 2018-02-14 PROCEDURE — 36415 COLL VENOUS BLD VENIPUNCTURE: CPT | Performed by: INTERNAL MEDICINE

## 2018-02-14 PROCEDURE — 97530 THERAPEUTIC ACTIVITIES: CPT | Mod: GP | Performed by: PHYSICAL THERAPIST

## 2018-02-14 PROCEDURE — G8978 MOBILITY CURRENT STATUS: HCPCS | Mod: GP,CM | Performed by: PHYSICAL THERAPIST

## 2018-02-14 PROCEDURE — 40000718 ZZHC STATISTIC PT DEPARTMENT ORTHO VISIT: Performed by: PHYSICAL THERAPIST

## 2018-02-14 PROCEDURE — 84480 ASSAY TRIIODOTHYRONINE (T3): CPT | Performed by: INTERNAL MEDICINE

## 2018-02-14 PROCEDURE — 84443 ASSAY THYROID STIM HORMONE: CPT | Performed by: INTERNAL MEDICINE

## 2018-02-14 PROCEDURE — 84445 ASSAY OF TSI GLOBULIN: CPT | Mod: 90 | Performed by: INTERNAL MEDICINE

## 2018-02-14 PROCEDURE — 99000 SPECIMEN HANDLING OFFICE-LAB: CPT | Performed by: INTERNAL MEDICINE

## 2018-02-14 PROCEDURE — 84439 ASSAY OF FREE THYROXINE: CPT | Performed by: INTERNAL MEDICINE

## 2018-02-15 DIAGNOSIS — E05.00 GRAVES' DISEASE: Primary | ICD-10-CM

## 2018-02-15 LAB
T3 SERPL-MCNC: 101 NG/DL (ref 60–181)
T4 FREE SERPL-MCNC: 1.27 NG/DL (ref 0.76–1.46)
TSH SERPL DL<=0.005 MIU/L-ACNC: 0.79 MU/L (ref 0.4–4)

## 2018-02-15 NOTE — PROGRESS NOTES
Idalmis MAYORGA Franko  1945 02/14/18 1400   General Information   Type of Visit Initial OP Ortho PT Evaluation   Start of Care Date 02/14/18   Referring Physician Jerry Taylor MD   Patient/Family Goals Statement decrease pain   Orders Evaluate and Treat   Date of Order 02/02/18   Insurance Type Medicare   Medical Diagnosis Spondylolisthesis; Lumbar radiculopathy; stenosis with claudicaiton   Surgical/Medical history reviewed Yes   Body Part(s)   Body Part(s) Lumbar Spine/SI   Presentation and Etiology   Pertinent history of current problem (include personal factors and/or comorbidities that impact the POC) 71 yo female with 30 yr h/o of back pain significantly worse over past 6 months;  Now pain at all times; with very limited mobility; only relief is sitting in recliner with heating pad; numbness in L LE; shooting pains in R hip and entire LE; takes 1.5  (5 mg) oxycodone on good day, 2.5 tabs on bad dayPMH: s/p quad bypass 2012; 2 cancer episodes gastric 2007; lung 2010; Currently referred to PT prior to possible lumbar fusion. Pain is increased with any change in position; uses walker outside of home is able to walk minimally inside home   Impairments A. Pain;B. Decreased WB tolerance;D. Decreased ROM;E. Decreased flexibility;F. Decreased strength and endurance;G. Impaired balance;H. Impaired gait;K. Numbness   Functional Limitations perform activities of daily living;perform desired leisure / sports activities   Symptom Location back; L LE; R hip and leg   How/Where did it occur Other  (long stancing)   Onset date of current episode/exacerbation 02/02/18  (PT order)   Chronicity Chronic   Pain rating (0-10 point scale) Best (/10);Worst (/10)   Best (/10) 5   Worst (/10) 9   Pain quality A. Sharp;C. Aching;E. Shooting;F. Stabbing   Frequency of pain/symptoms A. Constant   Pain/symptoms exacerbated by B. Walking;C. Lifting;D. Carrying;H. Overhead reach;I. Bending;K. Home tasks;J. ADL   Pain/symptoms eased by A.  "Sitting;F. Certain positions   Prior Level of Function   Functional Level Prior Comment much pain and difficulty with all movements   Current Level of Function   Current Community Support Family/friend caregiver  (lives alone in house; ?grandson? helps with yard work)   Patient role/employment history F. Retired   [facility]   Home/community accessibility 3 steps to enter; with rail   Current equipment-Gait/Locomotion Front wheeled walker  (4 WW outdoors; nothing inside)   Fall Risk Screen   Fall screen completed by PT   Have you fallen 2 or more times in the past year? No   Is patient a fall risk? No   Fall screen comments Because so cautious and uses walker outdoors and in community   Lumbar Spine/SI Objective Findings   Observation \"jumps\" periodically due to sharp shooting pains; very slow and painful with all movements especially sit to stand and sit to supine; holds breath with all movements   Integumentary not inspected   Posture sitting: post pelvic tilt; slight rounded shoulders forward head; Standing bilateral hip and knee flxn; post pelvic tilt, flat back;    Gait/Locomotion slow, guarded, intentional steps, almost no spinal motion during gait   Balance/Proprioception (Single Leg Stance) unable   Flexion ROM minimal ~25*   Extension ROM essentially none   Right Side Bending ROM minimal ~20*   Left Side Bending ROM minimal ~20*   Hip Flexion (L2) Strength assists with hands due to pain   Knee Extension (L3) Strength 4 painful   Lumbar/Hip/Knee/Foot Strength Comments ALL movements produce pain   Hamstring Flexibility tight R>L   Hip Flexor Flexibility tight   Quadricep Flexibility tight   Lumbar/SI Flexibility Comments unable to perform standard exam due to pain and muscle guarding   Planned Therapy Interventions   Planned Therapy Interventions balance training;bed mobility training;gait training;manual therapy;neuromuscular re-education;ROM;strengthening;stretching;transfer training "   Planned Therapy Interventions Comment relaxation training   Clinical Impression   Criteria for Skilled Therapeutic Interventions Met yes, treatment indicated   PT Diagnosis chroninc back pain   Influenced by the following impairments pain; muscle tightness   Functional limitations due to impairments difficulty with all mobiity and ADLs   Clinical Presentation Stable/Uncomplicated   Clinical Decision Making (Complexity) Low complexity   Therapy Frequency 2 times/Week  (x 2 weeks to establish HEP then 1x/week)   Predicted Duration of Therapy Intervention (days/wks) 90 days to progress slowly and establish exercise habit   Risk & Benefits of therapy have been explained Yes   Patient, Family & other staff in agreement with plan of care Yes   Clinical Impression Comments Pt will benefit from skilled intervention for ROM, stretching, strengthening and functional mobility at very slow pace due to h/o chronic back pain with spondylolisthesis and stenosis with claudication;     Education Assessment   Preferred Learning Style Demonstration;Pictures/video;Listening   Barriers to Learning No barriers   ORTHO GOALS   PT Ortho Eval Goals 1;2;3;4   Ortho Goal 1   Goal Description Independent in HEP stretching and strengthening as able   Target Date 05/14/18   Ortho Goal 2   Goal Description Independent in mindfulness strategies to manage chronic pain   Target Date 05/14/18   Ortho Goal 3   Goal Description sit to stand using UEs with minimal difficulty   Target Date 05/14/18   Ortho Goal 4   Goal Description sit to/from supine with minimal difficulty   Target Date 05/14/18   Total Evaluation Time   Total Evaluation Time 20   Therapy Certification   Certification date from 02/14/18   Certification date to 05/11/18   Medical Diagnosis lumbar spondylolisthesis; lumbar radiculopathy

## 2018-02-15 NOTE — PROGRESS NOTES
[facility]          OUTPATIENT PHYSICAL THERAPY ORTHOPEDIC EVALUATION  PLAN OF TREATMENT FOR OUTPATIENT REHABILITATION  (COMPLETE FOR INITIAL CLAIMS ONLY)  Patient's Last Name, First Name, M.I.  YOB: 1945  Holli Abdule  TIERRA    Provider s Name:  [facility]   Medical Record No.  [id number]   Start of Care Date:  02/14/18   Onset Date:  02/02/18 (PT order)   Type:     _X__PT   ___OT   ___SLP Medical Diagnosis:  lumbar spondylolisthesis; lumbar radiculopathy     PT Diagnosis:  chroninc back pain   Visits from SOC:  1      _________________________________________________________________________________  Plan of Treatment/Functional Goals:  balance training, bed mobility training, gait training, manual therapy, neuromuscular re-education, ROM, strengthening, stretching, transfer training  relaxation training        Goals     Goal Description: Independent in HEP stretching and strengthening as able  Target Date: 05/14/18       Goal Description: Independent in mindfulness strategies to manage chronic pain  Target Date: 05/14/18       Goal Description: sit to stand using UEs with minimal difficulty  Target Date: 05/14/18       Goal Description: sit to/from supine with minimal difficulty  Target Date: 05/14/18         Therapy Frequency:  2 times/Week (x 2 weeks to establish HEP then 1x/week)  Predicted Duration of Therapy Intervention:  90 days to progress slowly and establish exercise habit    Isabella Carvajal, PT, DPT                 I CERTIFY THE NEED FOR THESE SERVICES FURNISHED UNDER        THIS PLAN OF TREATMENT AND WHILE UNDER MY CARE .             Physician Signature               Date    X_____________________________________________________                             Certification Date From:  02/14/18   Certification Date To:  05/11/18    Referring Provider:  Jerry Taylor MD    Initial Assessment        See Epic Evaluation Start of Care Date:  02/14/18

## 2018-02-19 ENCOUNTER — TELEPHONE (OUTPATIENT)
Dept: ONCOLOGY | Facility: CLINIC | Age: 73
End: 2018-02-19

## 2018-02-19 DIAGNOSIS — C49.A0 MALIGNANT GASTROINTESTINAL STROMAL TUMOR, UNSPECIFIED SITE (H): ICD-10-CM

## 2018-02-19 RX ORDER — LORAZEPAM 1 MG/1
1 TABLET ORAL AT BEDTIME
Qty: 30 TABLET | Refills: 0
Start: 2018-02-19 | End: 2018-05-10

## 2018-02-19 NOTE — TELEPHONE ENCOUNTER
----- Message from Blayne Schmitz MD sent at 2/19/2018  6:18 AM CST -----  Regarding: RE: lorazepam refill  ok  ----- Message -----     From: Jaida Buhs RN     Sent: 2/16/2018  12:02 PM       To: Blayne Schmitz MD, Nadine West LPN, #  Subject: lorazepam refill                                 Requesting refill of Ativan 1 mg take at bedtime. Last filled 1/3/18 #30 tabs. Has FU with you on 2/16. If ok to fill will call in to pharmacy.    Thank You,    Zenobia Smith Triage RN  [phone]

## 2018-02-21 ENCOUNTER — HOSPITAL ENCOUNTER (OUTPATIENT)
Dept: PHYSICAL THERAPY | Facility: CLINIC | Age: 73
Setting detail: THERAPIES SERIES
End: 2018-02-21
Attending: ORTHOPAEDIC SURGERY
Payer: MEDICARE

## 2018-02-21 PROCEDURE — 40000718 ZZHC STATISTIC PT DEPARTMENT ORTHO VISIT: Performed by: PHYSICAL THERAPIST

## 2018-02-21 PROCEDURE — 97530 THERAPEUTIC ACTIVITIES: CPT | Mod: GP | Performed by: PHYSICAL THERAPIST

## 2018-02-21 PROCEDURE — 97110 THERAPEUTIC EXERCISES: CPT | Mod: GP | Performed by: PHYSICAL THERAPIST

## 2018-02-22 LAB — TSI SER-ACNC: 3.5 TSI INDEX

## 2018-02-23 NOTE — ADDENDUM NOTE
Encounter addended by: Isabella Carvajal, PT on: 2/23/2018  4:21 PM<BR>     Actions taken: Flowsheet accepted

## 2018-02-28 DIAGNOSIS — C49.A0 MALIGNANT GASTROINTESTINAL STROMAL TUMOR, UNSPECIFIED SITE (H): Primary | ICD-10-CM

## 2018-02-28 RX ORDER — IMATINIB MESYLATE 400 MG/1
400 TABLET, FILM COATED ORAL DAILY
Qty: 30 TABLET | Refills: 2 | Status: SHIPPED | OUTPATIENT
Start: 2018-02-28 | End: 2018-05-24

## 2018-03-07 ENCOUNTER — HOSPITAL ENCOUNTER (OUTPATIENT)
Dept: PHYSICAL THERAPY | Facility: CLINIC | Age: 73
Setting detail: THERAPIES SERIES
End: 2018-03-07
Attending: ORTHOPAEDIC SURGERY
Payer: MEDICARE

## 2018-03-07 PROCEDURE — 40000718 ZZHC STATISTIC PT DEPARTMENT ORTHO VISIT: Performed by: PHYSICAL THERAPIST

## 2018-03-07 PROCEDURE — 97110 THERAPEUTIC EXERCISES: CPT | Mod: GP | Performed by: PHYSICAL THERAPIST

## 2018-03-22 ENCOUNTER — OFFICE VISIT (OUTPATIENT)
Dept: FAMILY MEDICINE | Facility: CLINIC | Age: 73
End: 2018-03-22
Payer: COMMERCIAL

## 2018-03-22 VITALS
DIASTOLIC BLOOD PRESSURE: 75 MMHG | HEIGHT: 63 IN | HEART RATE: 71 BPM | TEMPERATURE: 99 F | WEIGHT: 157.8 LBS | SYSTOLIC BLOOD PRESSURE: 136 MMHG | BODY MASS INDEX: 27.96 KG/M2

## 2018-03-22 DIAGNOSIS — M54.5 CHRONIC LOW BACK PAIN, UNSPECIFIED BACK PAIN LATERALITY, WITH SCIATICA PRESENCE UNSPECIFIED: ICD-10-CM

## 2018-03-22 DIAGNOSIS — G89.29 CHRONIC LOW BACK PAIN, UNSPECIFIED BACK PAIN LATERALITY, WITH SCIATICA PRESENCE UNSPECIFIED: ICD-10-CM

## 2018-03-22 DIAGNOSIS — M51.369 DDD (DEGENERATIVE DISC DISEASE), LUMBAR: ICD-10-CM

## 2018-03-22 DIAGNOSIS — M48.07 SPINAL STENOSIS OF LUMBOSACRAL REGION: ICD-10-CM

## 2018-03-22 DIAGNOSIS — Z01.818 PREOP GENERAL PHYSICAL EXAM: Primary | ICD-10-CM

## 2018-03-22 PROCEDURE — 99214 OFFICE O/P EST MOD 30 MIN: CPT | Performed by: FAMILY MEDICINE

## 2018-03-22 RX ORDER — OXYCODONE HYDROCHLORIDE 5 MG/1
5-10 TABLET ORAL
Qty: 180 TABLET | Refills: 0 | Status: SHIPPED | OUTPATIENT
Start: 2018-03-22 | End: 2018-12-07

## 2018-03-22 NOTE — PROGRESS NOTES
[facility]  [address] [place] [address]  [phone]  Dept: [phone]    PRE-OP EVALUATION:  Today's date: 3/22/2018    Idalmis Abdul (: 1945) presents for pre-operative evaluation assessment as requested by Dr. Kumar Taylor.  She requires evaluation and anesthesia risk assessment prior to undergoing surgery/procedure for treatment of Back pain .    Proposed Surgery/ Procedure: Lumbar fusion  Date of Surgery/ Procedure: 18  Time of Surgery/ Procedure: TBD  Hospital/Surgical Facility: [facility]  Fax number for surgical facility: [phone]  Primary Physician: Darlene Sultana  Type of Anesthesia Anticipated: to be determined    Patient has a Health Care Directive or Living Will:  NO    1. YES - DO YOU HAVE A HISTORY OF HEART ATTACK, STROKE, STENT, BYPASS OR SURGERY ON AN ARTERY IN THE HEAD, NECK, HEART OR LEG? CABG x4   2. NO - Do you ever have any pain or discomfort in your chest?  3. NO - Do you have a history of  Heart Failure?  4. YES - ARE YOUR TROUBLED BY SHORTNESS OF BREATH WHEN WALKING ON THE LEVEL, UP A SLIGHT HILL OR AT NIGHT? COPD  5. NO - Do you currently have a cold, bronchitis or other respiratory infection?  6. NO - Do you have a cough, shortness of breath or wheezing?  7. NO - Do you sometimes get pains in the calves of your legs when you walk?  8. NO - Do you or anyone in your family have previous history of blood clots?  9. YES - DO YOU OR DOES ANYONE IN YOUR FAMILY HAVE A SERIOUS BLEEDING PROBLEM SUCH AS PROLONGED BLEEDING FOLLOWING SURGERIES OR CUTS? After heart surgery  10. NO - Have you ever had problems with anemia or been told to take iron pills?  11. NO - Have you had any abnormal blood loss such as black, tarry or bloody stools, or abnormal vaginal bleeding?  12. YES - HAVE YOU EVER HAD A BLOOD TRANSFUSION?   13. NO - Have you or any of your relatives ever had problems with anesthesia?  14. NO - Do you have  sleep apnea, excessive snoring or daytime drowsiness?  15. NO - Do you have any prosthetic heart valves?  16. NO - Do you have prosthetic joints?  17. NO - Is there any chance that you may be pregnant?      HPI:     HPI related to upcoming procedure: Idalmis Abdul is 72 year old white female with osteoporosis, chronic back pain, graves disease, CAD with CABG x 4, history of lung and stomach cancer who is here to get clearance to have general anesthesia.        See problem list for active medical problems.  Problems all longstanding and stable, except as noted/documented.  See ROS for pertinent symptoms related to these conditions.                                                                                                  .    MEDICAL HISTORY:     Patient Active Problem List    Diagnosis Date Noted     Spinal stenosis of lumbosacral region 03/22/2018     Priority: Medium     DDD (degenerative disc disease), lumbar 03/22/2018     Priority: Medium     Age-related osteoporosis without current pathological fracture 12/16/2014     Priority: Medium     PVD (posterior vitreous detachment) 11/17/2014     Priority: Medium     History of tobacco abuse 09/02/2014     Priority: Medium     QUIT in 2010       History of non-ST elevation myocardial infarction (NSTEMI) 09/02/2014     Priority: Medium     Chronic back pain 09/02/2014     Priority: Medium     Patient is followed by KANWAL Patel and [facility]   Med: 5mg oxycodone, 1-2 tabs q8 hours prn  Pain diagnosis: chronic back pain  Maximum use per month: #180 (but generally lasts about 90 days)  Expected duration: lifetime  Narcotic agreement on file:  YES - contract signed   Clinic visit recommended: Q 3 month         Graves' disease 08/19/2014     Priority: Medium     Thyroid eye disease 04/28/2014     Priority: Medium     Eyelid retraction or lag 04/28/2014     Priority: Medium     Thyrotoxic exophthalmos 04/28/2014     Priority: Medium     Problem list  name updated by automated process. Provider to review       Hyperthyroidism 02/04/2014     Priority: Medium     Hyperlipidemia LDL goal <160 12/17/2013     Priority: Medium     GERD (gastroesophageal reflux disease) 10/08/2013     Priority: Medium     Dyspnea 08/27/2013     Priority: Medium     S/P CABG x 4 08/05/2012     Priority: Medium     Postsurgical aortocoronary bypass status 07/04/2012     Priority: Medium     ASCVD (arteriosclerotic cardiovascular disease) 06/14/2012     Priority: Medium     NSTEMI (non-ST elevated myocardial infarction) (H) 06/12/2012     Priority: Medium     Eyelid edema 12/15/2011     Priority: Medium     side effect of gastric cancer medication       History of lung cancer 12/15/2011     Priority: Medium     S/p resection of right lung.  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 Schmitz, GI oncology-Gleevec treatment.  Surgical removal in fall 2004-no recurrence as of 3/05.     CA - lung cancer 4/2010    [facility], treated surgically     choledochal cyst 1963    CHOLEDOCHAL CYST, mild dilatation of biliary system     Coronary artery disease      DDD (degenerative disc disease), lumbar 3/22/2018     Dislocated finger, left middle PIP 7/26/2013     Dyspnea 8/27/2013     Eyelid edema 12/15/2011    side effect of gastric cancer medication      GERD (gastroesophageal reflux disease) 10/8/2013     GIST (gastrointestinal stromal tumor), malignant (H) 5/10/2011     Graves disease      Grief  reaction 5/11/2009     History of lung cancer 12/15/2011    S/p resection of right lung.  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     Other chronic pain     back     PONV (postoperative nausea and vomiting)      Postsurgical aortocoronary bypass status 7/4/2012     S/P CABG x 4 8/5/2012     Strabismus      Tobacco use disorder     Quit     Unspecified essential hypertension      Past Surgical History:   Procedure Laterality Date     BYPASS GRAFT ARTERY CORONARY  6/14/2012    Procedure: BYPASS GRAFT ARTERY CORONARY;  Median Sternotomy Coronary Artery Bypass Graft  x 3        C THORACOSCOPY,DX W BX  fall 2003    benign tissue     CATARACT IOL, RT/LT      LE     CHOLECYSTECTOMY  1963     COLONOSCOPY  4-12-05     CORONARY ARTERY BYPASS      X4     CREATION PERICARDIAL WINDOW  6/15/2012    Procedure: CREATION PERICARDIAL WINDOW;  Mediastinal Exploration, Control of Bleeding;  Surgeon: Dwaine Griffin MD;  Location: [facility] OR     EYE SURGERY  2014    left cataract removal     GI SURGERY  2003 and 2007    GIST tumor removal     GYN SURGERY      tubal ligation     HYSTERECTOMY VAGINAL, COLPORRHAPHY ANTERIOR, POSTERIOR, COMBINED N/A 10/17/2017    Procedure: COMBINED HYSTERECTOMY VAGINAL, COLPORRHAPHY ANTERIOR, POSTERIOR;  Total Vaginal Hysterectomy and Posterior Repair,Sacrospinous Vault Suspension;  Surgeon: Ruth Farooq MD;  Location: [facility] OR     PHACOEMULSIFICATION WITH STANDARD INTRAOCULAR LENS IMPLANT  3/24/2014    Procedure: PHACOEMULSIFICATION WITH STANDARD INTRAOCULAR LENS IMPLANT;  Left Kelman Phacoemulsification with  Intraocular Lens Implant;  Surgeon: Magnus Mckeon MD;  Location: [facility] OR     RECESSION RESECTION WITH ADJUSTABLE SUTURE BILATERAL Bilateral 7/14/2016    Procedure: RECESSION RESECTION WITH ADJUSTABLE SUTURE BILATERAL;  Surgeon: Erma Ordaz MD;  Location: [facility] OR     SURGICAL HISTORY OF -   1963    cholecystectomy     SURGICAL HISTORY OF -   1970's    Tubal Ligation      SURGICAL HISTORY OF -   08/03    Explor. Lap, Excision of Small Bowel Tumor      SURGICAL HISTORY OF -   4/2010    lung cancer removed right lung     Current Outpatient Prescriptions   Medication Sig Dispense Refill     oxyCODONE IR (ROXICODONE) 5 MG tablet Take 1-2 tablets (5-10 mg) by mouth every 3 hours as needed for pain or other (Moderate to Severe) 180 tablet 0     imatinib (GLEEVEC) 400 MG tablet Take 1 tablet (400 mg) by mouth daily Take with a meal and a large glass of water. 30 tablet 2     LORazepam (ATIVAN) 1 MG tablet Take 1 tablet (1 mg) by mouth At Bedtime 30 tablet 0     amLODIPine (NORVASC) 5 MG tablet TAKE ONE TABLET BY MOUTH EVERY DAY 30 tablet 9     lisinopril (PRINIVIL/ZESTRIL) 40 MG tablet TAKE ONE TABLET BY MOUTH EVERY DAY 90 tablet 1     polyethylene glycol (MIRALAX/GLYCOLAX) powder Take 17 g by mouth daily       rosuvastatin (CRESTOR) 5 MG tablet Take 1 tablet (5 mg) by mouth every other day 45 tablet 3     metoprolol (LOPRESSOR) 100 MG tablet TAKE ONE TABLET BY MOUTH TWICE A  tablet 3     methimazole (TAPAZOLE) 5 MG tablet Take 1 tablet (5 mg) by mouth daily ALTERNATE 5 MG AND 2.5 MG (1/2 TABLET) EVERY DAY 90 tablet 3     hydrochlorothiazide (HYDRODIURIL) 25 MG tablet Take 1 tablet (25 mg) by mouth daily 90 tablet 3     aspirin EC 81 MG EC tablet Take 1 tablet by mouth daily. 30 tablet 2     order for DME Walker 1 Device 0     ondansetron (ZOFRAN-ODT) 4 MG ODT tab Take 1-2 tablets (4-8 mg) by mouth every 8 hours as needed for nausea Dissolve ON the tongue. (Patient not taking: Reported on 3/22/2018) 4  "tablet 0     Blood Pressure Monitoring (ADULT BLOOD PRESSURE CUFF LG) KIT 1 Device 2 times daily 1 kit 1     OTC products: None, except as noted above    Allergies   Allergen Reactions     Atorvastatin Cramps      Latex Allergy: NO    Social History   Substance Use Topics     Smoking status: Former Smoker     Packs/day: 0.50     Years: 49.00     Types: Cigarettes     Quit date: 4/19/2010     Smokeless tobacco: Never Used     Alcohol use No     History   Drug Use No       REVIEW OF SYSTEMS:   Constitutional, HEENT, cardiovascular, pulmonary, gi and gu systems are negative, except as otherwise noted.    EXAM:   /75  Pulse 71  Temp 99  F (37.2  C) (Tympanic)  Ht 5' 3\" (1.6 m)  Wt 157 lb 12.8 oz (71.6 kg)  LMP 01/01/1992  BMI 27.95 kg/m2    GENERAL APPEARANCE: healthy, alert and no distress     EYES: EOMI, PERRL     HENT: ear canals and TM's normal and nose and mouth without ulcers or lesions     NECK: no adenopathy, no asymmetry, masses, or scars and thyroid normal to palpation     RESP: lungs clear to auscultation - no rales, rhonchi or wheezes     CV: regular rates and rhythm, normal S1 S2, no S3 or S4 and no murmur, click or rub     ABDOMEN:  soft, nontender, no HSM or masses and bowel sounds normal     MS: extremities normal- no gross deformities noted, no evidence of inflammation in joints, FROM in all extremities.     SKIN: no suspicious lesions or rashes     NEURO: Normal strength and tone, sensory exam grossly normal, mentation intact and speech normal     PSYCH: mentation appears normal. and affect normal/bright     LYMPHATICS: No cervical adenopathy    DIAGNOSTICS:   EKG: appears normal, NSR, normal axis, normal intervals, no acute ST/T changes c/w ischemia, no LVH by voltage criteria, unchanged from previous tracings    Recent Labs   Lab Test  01/12/18   0902  10/17/17   0755   07/12/12   0653   07/09/12   1629   06/19/12   1550   HGB  10.7*  10.8*   < >  11.0*   < >  11.6*   < >   --    PLT  206 "  211   < >  189   < >   --    < >   --    INR   --    --    --   1.01   --   1.00   < >   --    NA  135  135   < >  131*   < >  134   < >   --    POTASSIUM  4.2  3.6   < >  3.4   < >  3.6   < >   --    CR  1.20*  1.15*   < >  0.94   < >  1.23*   < >   --    A1C   --   5.5   --    --    --    --    --   5.4    < > = values in this interval not displayed.        IMPRESSION:   Reason for surgery/procedure:       The proposed surgical procedure is considered INTERMEDIATE risk.    REVISED CARDIAC RISK INDEX  The patient has the following serious cardiovascular risks for perioperative complications such as (MI, PE, VFib and 3  AV Block):  No serious cardiac risks  INTERPRETATION: The ASCVD Risk score (Kolevalentino COTTRELL Jr, et al., 2013) failed to calculate for the following reasons:    The patient has a prior MCI or stroke diagnosis      The patient has the following additional risks for perioperative complications:  No identified additional risks      ICD-10-CM    1. Preop general physical exam Z01.818    2. DDD (degenerative disc disease), lumbar M51.36    3. Spinal stenosis of lumbosacral region M48.07    4. Chronic low back pain, unspecified back pain laterality, with sciatica presence unspecified M54.5 oxyCODONE IR (ROXICODONE) 5 MG tablet    G89.29        RECOMMENDATIONS:     --Consult hospital rounder / IM to assist post-op medical management    --Patient is to take all scheduled medications on the day of surgery EXCEPT for modifications listed below.    APPROVAL GIVEN to proceed with proposed procedure, without further diagnostic evaluation       Signed Electronically by: Darlene Sultana MD    Copy of this evaluation report is provided to requesting physician.    [place] Preop Guidelines

## 2018-03-22 NOTE — MR AVS SNAPSHOT
After Visit Summary   3/22/2018    Idalmis Abdul    MRN: [id number]           Patient Information     Date Of Birth          1945        Visit Information        Provider Department      3/22/2018 12:40 PM Darlene Sultana MD [facility]        Today's Diagnoses     Preop general physical exam    -  1    DDD (degenerative disc disease), lumbar        Spinal stenosis of lumbosacral region        Chronic low back pain, unspecified back pain laterality, with sciatica presence unspecified          Care Instructions      Before Your Surgery      Call your surgeon if there is any change in your health. This includes signs of a cold or flu (such as a sore throat, runny nose, cough, rash or fever).    Do not smoke, drink alcohol or take over the counter medicine (unless your surgeon or primary care doctor tells you to) for the 24 hours before and after surgery.    If you take prescribed drugs: Follow your doctor s orders about which medicines to take and which to stop until after surgery.    Eating and drinking prior to surgery: follow the instructions from your surgeon    Take a shower or bath the night before surgery. Use the soap your surgeon gave you to gently clean your skin. If you do not have soap from your surgeon, use your regular soap. Do not shave or scrub the surgery site.  Wear clean pajamas and have clean sheets on your bed.           Follow-ups after your visit        Your next 10 appointments already scheduled     Apr 02, 2018  9:15 AM CDT   (Arrive by 9:00 AM)   Return Visit with Blayne Schmitz MD   [facility] ([facility])    [address] [place] [id number] [phone]            May 08, 2018 10:00 AM CDT   (Arrive by 9:45 AM)   RETURN ENDOCRINE with Swetha Antoine MD   [facility] ([facility])    [address] [place] [address]  "[facility] [address] [phone]              Who to contact     If you have questions or need follow up information about today's clinic visit or your schedule please contact [facility] directly at [phone].  Normal or non-critical lab and imaging results will be communicated to you by MyChart, letter or phone within 4 business days after the clinic has received the results. If you do not hear from us within 7 days, please contact the clinic through MyChart or phone. If you have a critical or abnormal lab result, we will notify you by phone as soon as possible.  Submit refill requests through [employer] or call your pharmacy and they will forward the refill request to us. Please allow 3 business days for your refill to be completed.          Additional Information About Your Visit        [facility] Information     [employer] lets you send messages to your doctor, view your test results, renew your prescriptions, schedule appointments and more. To sign up, go to [email or web address] . Click on \"Log in\" on the left side of the screen, which will take you to the Welcome page. Then click on \"Sign up Now\" on the right side of the page.     You will be asked to enter the access code listed below, as well as some personal information. Please follow the directions to create your username and password.     Your access code is: [email or web address]  Expires: 2018  6:30 AM     Your access code will  in 90 days. If you need help or a new code, please call your [facility] or [phone].        Care EveryWhere ID     This is your Care EveryWhere ID. This could be used by other organizations to access your [facility] medical records  [id number]        Your Vitals Were     Pulse Temperature Height Last Period BMI (Body Mass Index)       71 99  F (37.2  C) (Tympanic) 5' 3\" (1.6 m) 1992 27.95 kg/m2        Blood Pressure from Last 3 Encounters:   18 136/75   18 103/61   17 " 116/78    Weight from Last 3 Encounters:   03/22/18 157 lb 12.8 oz (71.6 kg)   01/09/18 158 lb 3.2 oz (71.8 kg)   12/11/17 155 lb (70.3 kg)              Today, you had the following     No orders found for display         Today's Medication Changes          These changes are accurate as of 3/22/18  1:05 PM.  If you have any questions, ask your nurse or doctor.               These medicines have changed or have updated prescriptions.        Dose/Directions    rosuvastatin 5 MG tablet   Commonly known as:  CRESTOR   This may have changed:  Another medication with the same name was removed. Continue taking this medication, and follow the directions you see here.   Used for:  Hyperlipidemia LDL goal <160   Changed by:  Darlene Sultana MD        Dose:  5 mg   Take 1 tablet (5 mg) by mouth every other day   Quantity:  45 tablet   Refills:  3            Where to get your medicines      Some of these will need a paper prescription and others can be bought over the counter.  Ask your nurse if you have questions.     Bring a paper prescription for each of these medications     oxyCODONE IR 5 MG tablet                Primary Care Provider Office Phone # Fax #    Darlene Sultana [phone] [address] [facility] [address]        Equal Access to Services     KIKE EWING AH: Hadii cornelia morrison hadasho Soomaali, waaxda luqadaha, qaybta kaalmada adeegyada, patricio muse. So [facility] [phone].    ATENCIÓN: Si habla español, tiene a cassidy disposición servicios gratuitos de asistencia lingüística. Llame al [phone].    We comply with applicable federal civil rights laws and [place] laws. We do not discriminate on the basis of race, color, national origin, age, disability, sex, sexual orientation, or gender identity.            Thank you!     Thank you for choosing [facility]  for your care. Our goal is always to provide you with excellent care. Hearing back  from our patients is one way we can continue to improve our services. Please take a few minutes to complete the written survey that you may receive in the mail after your visit with us. Thank you!             Your Updated Medication List - Protect others around you: Learn how to safely use, store and throw away your medicines at www.disposemymeds.org.          This list is accurate as of 3/22/18  1:05 PM.  Always use your most recent med list.                   Brand Name Dispense Instructions for use Diagnosis    Adult Blood Pressure Cuff Lg Kit     1 kit    1 Device 2 times daily    HTN, goal below 140/90       amLODIPine 5 MG tablet    NORVASC    30 tablet    TAKE ONE TABLET BY MOUTH EVERY DAY    Essential hypertension       aspirin 81 MG EC tablet     30 tablet    Take 1 tablet by mouth daily.    NSTEMI (non-ST elevated myocardial infarction) (H), ACS (acute coronary syndrome) (H)       hydrochlorothiazide 25 MG tablet    HYDRODIURIL    90 tablet    Take 1 tablet (25 mg) by mouth daily    Essential hypertension       imatinib 400 MG tablet    GLEEVEC    30 tablet    Take 1 tablet (400 mg) by mouth daily Take with a meal and a large glass of water.    Malignant gastrointestinal stromal tumor, unspecified site (H)       lisinopril 40 MG tablet    PRINIVIL/ZESTRIL    90 tablet    TAKE ONE TABLET BY MOUTH EVERY DAY    Essential hypertension with goal blood pressure less than 140/90       LORazepam 1 MG tablet    ATIVAN    30 tablet    Take 1 tablet (1 mg) by mouth At Bedtime    Malignant gastrointestinal stromal tumor, unspecified site (H)       methimazole 5 MG tablet    TAPAZOLE    90 tablet    Take 1 tablet (5 mg) by mouth daily ALTERNATE 5 MG AND 2.5 MG (1/2 TABLET) EVERY DAY    Hyperthyroidism       metoprolol tartrate 100 MG tablet    LOPRESSOR    180 tablet    TAKE ONE TABLET BY MOUTH TWICE A DAY    Essential hypertension with goal blood pressure less than 140/90       ondansetron 4 MG ODT tab    ZOFRAN-ODT     4 tablet    Take 1-2 tablets (4-8 mg) by mouth every 8 hours as needed for nausea Dissolve ON the tongue.    Postoperative state       order for DME     1 Device    Walker    Post hysterectomy menopause       oxyCODONE IR 5 MG tablet    ROXICODONE    180 tablet    Take 1-2 tablets (5-10 mg) by mouth every 3 hours as needed for pain or other (Moderate to Severe)    Chronic low back pain, unspecified back pain laterality, with sciatica presence unspecified       polyethylene glycol powder    MIRALAX/GLYCOLAX     Take 17 g by mouth daily        rosuvastatin 5 MG tablet    CRESTOR    45 tablet    Take 1 tablet (5 mg) by mouth every other day    Hyperlipidemia LDL goal <160

## 2018-03-22 NOTE — NURSING NOTE
"Initial /75  Pulse 71  Temp 99  F (37.2  C) (Tympanic)  Ht 5' 3\" (1.6 m)  Wt 157 lb 12.8 oz (71.6 kg)  LMP 01/01/1992  BMI 27.95 kg/m2 Estimated body mass index is 27.95 kg/(m^2) as calculated from the following:    Height as of this encounter: 5' 3\" (1.6 m).    Weight as of this encounter: 157 lb 12.8 oz (71.6 kg). .      "

## 2018-04-02 ENCOUNTER — ONCOLOGY VISIT (OUTPATIENT)
Dept: ONCOLOGY | Facility: CLINIC | Age: 73
End: 2018-04-02
Attending: INTERNAL MEDICINE
Payer: COMMERCIAL

## 2018-04-02 VITALS
HEART RATE: 73 BPM | BODY MASS INDEX: 28.07 KG/M2 | SYSTOLIC BLOOD PRESSURE: 136 MMHG | RESPIRATION RATE: 18 BRPM | TEMPERATURE: 98.1 F | HEIGHT: 63 IN | WEIGHT: 158.4 LBS | DIASTOLIC BLOOD PRESSURE: 78 MMHG | OXYGEN SATURATION: 96 %

## 2018-04-02 DIAGNOSIS — H02.849 EDEMA OF EYELID, UNSPECIFIED LATERALITY: ICD-10-CM

## 2018-04-02 DIAGNOSIS — Z85.118 HISTORY OF LUNG CANCER: ICD-10-CM

## 2018-04-02 DIAGNOSIS — C49.A0 MALIGNANT GASTROINTESTINAL STROMAL TUMOR, UNSPECIFIED SITE (H): Primary | ICD-10-CM

## 2018-04-02 PROCEDURE — G0463 HOSPITAL OUTPT CLINIC VISIT: HCPCS | Mod: ZF

## 2018-04-02 PROCEDURE — 99214 OFFICE O/P EST MOD 30 MIN: CPT | Mod: ZP | Performed by: INTERNAL MEDICINE

## 2018-04-02 ASSESSMENT — PAIN SCALES - GENERAL: PAINLEVEL: EXTREME PAIN (8)

## 2018-04-02 NOTE — LETTER
4/2/2018      RE: Idalmis Abdul  PO   [facility] [address]       Idalmis Abdul is here today in follow-up of metastatic gastrointestinal stromal tumor.    She was originally diagnosed many years ago, had a complete resection and 5 years of adjuvant Gleevec. Shortly after she stopped her adjuvant therapy she recurred with unresectable disease within the abdomen. She has now been on Gleevec for 7 years without evidence of disease. With respect to this tumor she feels like she is doing well. She has ongoing problems with eyelid edema, and has not gotten around to having that fixed because of her other medical issues.     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 She has a past history of Graves' disease and more recently has had biopsy of a thyroid nodule which was benign. She is past history of a stage I lung cancer, and currently has no new respiratory symptoms-just her typical cough. She hasn't had any new problems with her heart, but does have some ongoing problems with lower extremity edema.    10 point complete review of systems is negative except as mentioned above.    On physical exam she appears quite uncomfortable with any movement. Her vital signs as noted in the chart unremarkable.  She has marked periorbital edema-to the point of her eyes being barely visible. She has no scleral icterus or conjunctival injection.  She has no adenopathy palpable in neck or supraclavicular spaces.  Lungs are clear to auscultation dullness to percussion.  Her heart rate and rhythm are regular without audible murmur or gallop. I could not discern her jugular venous pressure.  Abdomen is soft and nontender without demonstrable ascites.  She has 1+ pitting edema to just above the ankles. There is no tenderness of her calves or thighs. She is mild clubbing of her digits.  Her speech is fluent and cranial nerves  are grossly intact. She was able to mount the exam table only with great difficulty due to her back pain.    I reviewed with her most recent CT scan which is now about 6 weeks old and that shows no evidence of recurrence of her disease. Her lab work shows normal electrolytes, mildly abnormal renal function consistent with prior values and normal liver enzymes. She has mild anemia and otherwise unremarkable blood counts.    Assessment/plan: Metastatic GIST, currently without radiographic evidence of disease and ongoing good tolerance of Gleevec. We will continue on with the same dose and schedule and reevaluate disease status again in another 6 months or so.    Blayne Schmitz MD

## 2018-04-02 NOTE — PROGRESS NOTES
Idalmis Abdul is here today in follow-up of metastatic gastrointestinal stromal tumor.    She was originally diagnosed many years ago, had a complete resection and 5 years of adjuvant Gleevec. Shortly after she stopped her adjuvant therapy she recurred with unresectable disease within the abdomen. She has now been on Gleevec for 7 years without evidence of disease. With respect to this tumor she feels like she is doing well. She has ongoing problems with eyelid edema, and has not gotten around to having that fixed because of her other medical issues.     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 She has a past history of Graves' disease and more recently has had biopsy of a thyroid nodule which was benign. She is past history of a stage I lung cancer, and currently has no new respiratory symptoms-just her typical cough. She hasn't had any new problems with her heart, but does have some ongoing problems with lower extremity edema.    10 point complete review of systems is negative except as mentioned above.    On physical exam she appears quite uncomfortable with any movement. Her vital signs as noted in the chart unremarkable.  She has marked periorbital edema-to the point of her eyes being barely visible. She has no scleral icterus or conjunctival injection.  She has no adenopathy palpable in neck or supraclavicular spaces.  Lungs are clear to auscultation dullness to percussion.  Her heart rate and rhythm are regular without audible murmur or gallop. I could not discern her jugular venous pressure.  Abdomen is soft and nontender without demonstrable ascites.  She has 1+ pitting edema to just above the ankles. There is no tenderness of her calves or thighs. She is mild clubbing of her digits.  Her speech is fluent and cranial nerves are grossly intact. She was able to mount the exam table only with great difficulty  due to her back pain.    I reviewed with her most recent CT scan which is now about 6 weeks old and that shows no evidence of recurrence of her disease. Her lab work shows normal electrolytes, mildly abnormal renal function consistent with prior values and normal liver enzymes. She has mild anemia and otherwise unremarkable blood counts.    Assessment/plan: Metastatic GIST, currently without radiographic evidence of disease and ongoing good tolerance of Gleevec. We will continue on with the same dose and schedule and reevaluate disease status again in another 6 months or so.

## 2018-04-02 NOTE — MR AVS SNAPSHOT
After Visit Summary   4/2/2018    Idalmis Abdul    MRN: [id number]           Patient Information     Date Of Birth          1945        Visit Information        Provider Department      4/2/2018 9:15 AM Blayne Schmitz MD [facility]        Today's Diagnoses     Malignant gastrointestinal stromal tumor, unspecified site (H)    -  1    History of lung cancer        Edema of eyelid, unspecified laterality           Follow-ups after your visit        Follow-up notes from your care team     Return in about 6 months (around 10/2/2018) for MD visit with CT and labs.      Your next 10 appointments already scheduled     May 08, 2018 10:00 AM CDT   (Arrive by 9:45 AM)   RETURN ENDOCRINE with Swetha Antoine MD   [facility] Endocr[facility]ology ([facility])    [address] [place] [id number] [phone]            Oct 05, 2018 10:00 AM CDT   LAB with [facility] ([facility])    [address] [address]   [phone]           Please do not eat 10-12 hours before your appointment if you are coming in fasting for labs on lipids, cholesterol, or glucose (sugar). This does not apply to pregnant women. Water, hot tea and black coffee (with nothing added) are okay. Do not drink other fluids, diet soda or chew gum.            Oct 05, 2018 10:30 AM CDT   (Arrive by 10:15 AM)   CT CHEST ABDOMEN PELVIS W/O & W CONTRAST with [facility]   [facility] CT ([facility])    [address] [address]   [phone]           Please bring any scans or X-rays taken at other hospitals, if similar tests were done. Also bring a list of your medicines, including vitamins, minerals and over-the-counter drugs. It is safest to leave personal items at home.  Be sure to tell your doctor:   If you have any allergies.   If there s any chance you are  pregnant.   If you are breastfeeding.  You may have contrast for this exam. To prepare:   Do not eat or drink for 2 hours before your exam. If you need to take medicine, you may take it with small sips of water. (We may ask you to take liquid medicine as well.)   The day before your exam, drink extra fluids at least six 8-ounce glasses (unless your doctor tells you to restrict your fluids).   You will be given instructions on how to drink the contrast.  Patients over 70 or patients with diabetes or kidney problems:   If you haven t had a blood test (creatinine test) within the last 30 days, the Cardiologist/Radiologist may require you to get this test prior to your exam.  If you have diabetes:   Continue to take your metformin medication on the day of your exam  Please wear loose clothing, such as a sweat suit or jogging clothes. Avoid snaps, zippers and other metal. We may ask you to undress and put on a hospital gown.  If you have any questions, please call the Imaging Department where you will have your exam.            Oct 08, 2018  9:15 AM CDT   (Arrive by 9:00 AM)   Return Visit with Blayne Schmitz MD   [facility] [place] ([facility])    [address]  [address]  [facility] [address] [phone]              Future tests that were ordered for you today     Open Future Orders        Priority Expected Expires Ordered    CT Chest Abdomen Pelvis w/o & w Contrast Routine 10/2/2018 12/2/2018 4/2/2018    Comprehensive metabolic panel Routine 10/2/2018 12/2/2018 4/2/2018    CBC with platelets differential Routine 10/2/2018 12/2/2018 4/2/2018            Who to contact     If you have questions or need follow up information about today's clinic visit or your schedule please contact [facility] directly at [phone].  Normal or non-critical lab and imaging results will be communicated to you by MyChart, letter or phone within 4 business  "days after the clinic has received the results. If you do not hear from us within 7 days, please contact the clinic through [employer] or phone. If you have a critical or abnormal lab result, we will notify you by phone as soon as possible.  Submit refill requests through [employer] or call your pharmacy and they will forward the refill request to us. Please allow 3 business days for your refill to be completed.          Additional Information About Your Visit        [employer] Information     [employer] lets you send messages to your doctor, view your test results, renew your prescriptions, schedule appointments and more. To sign up, go to [email or web address] . Click on \"Log in\" on the left side of the screen, which will take you to the Welcome page. Then click on \"Sign up Now\" on the right side of the page.     You will be asked to enter the access code listed below, as well as some personal information. Please follow the directions to create your username and password.     Your access code is: [email or web address]  Expires: 2018  6:30 AM     Your access code will  in 90 days. If you need help or a new code, please call your [facility] clinic or [phone].        Care EveryWhere ID     This is your Care EveryWhere ID. This could be used by other organizations to access your [facility] medical records  [id number]        Your Vitals Were     Pulse Temperature Respirations Height Last Period Pulse Oximetry    73 98.1  F (36.7  C) (Tympanic) 18 1.6 m (5' 2.99\") 1992 96%    BMI (Body Mass Index)                   28.07 kg/m2            Blood Pressure from Last 3 Encounters:   18 136/78   18 136/75   18 103/61    Weight from Last 3 Encounters:   18 71.8 kg (158 lb 6.4 oz)   18 71.6 kg (157 lb 12.8 oz)   18 71.8 kg (158 lb 3.2 oz)               Primary Care Provider Office Phone # Fax #    Darlene Daniela Sultana [phone] [address] [address]   "      Equal Access to Services     [facility] : Hadii cornelia morrison aubreybebe Amara, walillyda luqadaha, qaybta kajeffypatricio browning. So [facility] [phone].    ATENCIÓN: Si habla español, tiene a cassidy disposición servicios gratuitos de asistencia lingüística. Melindaame al [phone].    We comply with applicable federal civil rights laws and [place] laws. We do not discriminate on the basis of race, color, national origin, age, disability, sex, sexual orientation, or gender identity.            Thank you!     Thank you for choosing [facility]  for your care. Our goal is always to provide you with excellent care. Hearing back from our patients is one way we can continue to improve our services. Please take a few minutes to complete the written survey that you may receive in the mail after your visit with us. Thank you!             Your Updated Medication List - Protect others around you: Learn how to safely use, store and throw away your medicines at www.disposemymeds.org.          This list is accurate as of 4/2/18 11:59 PM.  Always use your most recent med list.                   Brand Name Dispense Instructions for use Diagnosis    Adult Blood Pressure Cuff Lg Kit     1 kit    1 Device 2 times daily    HTN, goal below 140/90       amLODIPine 5 MG tablet    NORVASC    30 tablet    TAKE ONE TABLET BY MOUTH EVERY DAY    Essential hypertension       aspirin 81 MG EC tablet     30 tablet    Take 1 tablet by mouth daily.    NSTEMI (non-ST elevated myocardial infarction) (H), ACS (acute coronary syndrome) (H)       hydrochlorothiazide 25 MG tablet    HYDRODIURIL    90 tablet    Take 1 tablet (25 mg) by mouth daily    Essential hypertension       * imatinib 400 MG tablet    GLEEVEC    30 tablet    Take 1 tablet (400 mg) by mouth daily Take with a meal and a large glass of water.    Malignant gastrointestinal stromal tumor, unspecified site (H)       * imatinib 400 MG  tablet    GLEEVEC    30 tablet    Take 1 tablet (400 mg) by mouth daily Take with a meal and a large glass of water.    Malignant gastrointestinal stromal tumor, unspecified site (H)       lisinopril 40 MG tablet    PRINIVIL/ZESTRIL    90 tablet    TAKE ONE TABLET BY MOUTH EVERY DAY    Essential hypertension with goal blood pressure less than 140/90       LORazepam 1 MG tablet    ATIVAN    30 tablet    Take 1 tablet (1 mg) by mouth At Bedtime    Malignant gastrointestinal stromal tumor, unspecified site (H)       methimazole 5 MG tablet    TAPAZOLE    90 tablet    Take 1 tablet (5 mg) by mouth daily ALTERNATE 5 MG AND 2.5 MG (1/2 TABLET) EVERY DAY    Hyperthyroidism       metoprolol tartrate 100 MG tablet    LOPRESSOR    180 tablet    TAKE ONE TABLET BY MOUTH TWICE A DAY    Essential hypertension with goal blood pressure less than 140/90       ondansetron 4 MG ODT tab    ZOFRAN-ODT    4 tablet    Take 1-2 tablets (4-8 mg) by mouth every 8 hours as needed for nausea Dissolve ON the tongue.    Postoperative state       order for DME     1 Device    Walker    Post hysterectomy menopause       oxyCODONE IR 5 MG tablet    ROXICODONE    180 tablet    Take 1-2 tablets (5-10 mg) by mouth every 3 hours as needed for pain or other (Moderate to Severe)    Chronic low back pain, unspecified back pain laterality, with sciatica presence unspecified       polyethylene glycol powder    MIRALAX/GLYCOLAX     Take 17 g by mouth daily        rosuvastatin 5 MG tablet    CRESTOR    45 tablet    Take 1 tablet (5 mg) by mouth every other day    Hyperlipidemia LDL goal <160       * Notice:  This list has 2 medication(s) that are the same as other medications prescribed for you. Read the directions carefully, and ask your doctor or other care provider to review them with you.

## 2018-04-02 NOTE — NURSING NOTE
"Oncology Rooming Note    April 2, 2018 9:10 AM   Idalmis Abdul is a 72 year old female who presents for:    Chief Complaint   Patient presents with     Oncology Clinic Visit     Return visit related to GIST     Initial Vitals: /78 (BP Location: Left arm, Patient Position: Sitting, Cuff Size: Adult Regular)  Pulse 73  Temp 98.1  F (36.7  C) (Tympanic)  Resp 18  Ht 1.6 m (5' 2.99\")  Wt 71.8 kg (158 lb 6.4 oz)  LMP 01/01/1992  SpO2 96%  BMI 28.07 kg/m2 Estimated body mass index is 28.07 kg/(m^2) as calculated from the following:    Height as of this encounter: 1.6 m (5' 2.99\").    Weight as of this encounter: 71.8 kg (158 lb 6.4 oz). Body surface area is 1.79 meters squared.  Extreme Pain (8) Comment: Data Unavailable   Patient's last menstrual period was 01/01/1992.  Allergies reviewed: Yes  Medications reviewed: Yes    Medications: Medication refills not needed today.  Pharmacy name entered into [facility]:    [place], [place] - [address] [facility] [place], [place] - [address]    Clinical concerns: No new concerns. Provider was notified.    10 minutes for nursing intake (face to face time)     Jannette Elizabeth LPN            "

## 2018-04-05 ENCOUNTER — TRANSFERRED RECORDS (OUTPATIENT)
Dept: HEALTH INFORMATION MANAGEMENT | Facility: CLINIC | Age: 73
End: 2018-04-05

## 2018-04-10 ENCOUNTER — TELEPHONE (OUTPATIENT)
Dept: CARE COORDINATION | Facility: CLINIC | Age: 73
End: 2018-04-10

## 2018-04-10 ENCOUNTER — PATIENT OUTREACH (OUTPATIENT)
Dept: CARE COORDINATION | Facility: CLINIC | Age: 73
End: 2018-04-10

## 2018-04-10 NOTE — PROGRESS NOTES
Clinic Care Coordination Contact  OUTREACH  Referral Information:  Referral Source: [facility] (hospital/surgery at [place])  Primary Diagnosis: Orthopedic (fusion/laminectoy for spinal stenosis)  Chief Complaint   Patient presents with     Clinic Care Coordination - Post Hospital     RN CC   [place] Utilization: Surgery was at [facility]  Utilization    Last refreshed: 4/10/2018  9:21 AM:  No Show Count (past year) 1       Last refreshed: 4/10/2018  9:21 AM:  ED visits 3       Last refreshed: 4/10/2018  9:21 AM:  Hospital admissions 1          Current as of: 4/10/2018  9:21 AM         Clinical Concerns:  Current Medical Concerns:  Patient hospitalized at [facility] from 4/5-4/9 for fusion/laminectomy for spinal stenosis. Discharged home in the care of family. Did have irregular heart rhythm at one point in hospital that resolved.  Current Behavioral Concerns: None    Education Provided to patient: Role of CC,follow up, fluids for diarrhea   Health Maintenance Reviewed: Due/Overdue: (Mammo, Dexa, AD Planning, FIT, Lipids)    Medication Management:  Verbalizes good understanding of medications and takes appropriately. Is taking Oxycodone for pain as surgery has been more painful than she imagines. Has had some nausea from Oxy. Discussed taking only 1 Oxy along with a Tylenol to see if that helps nausea. Has taken Zofran that her sister had with little effect. Advise she discuss nausea with PCP at visit on Friday.     Functional Status:  Mobility Status: Independent w/Device (using walker at this time)  Equipment Currently Used at Home: walker, rolling  Transportation means: Accessible car, Family, Regular car     Psychosocial:  Current living arrangement:: I live alone, I live in a private home  Type of residence: Private home - stairs (one level, stairs to basement). Is currently staying with her sister until she is able to get around better/take care of her needs.   Financial/Insurance: No  concerns     Resources and Interventions:  Current Resources: None    Advanced Care Plans/Directives on file: No     Patient/Caregiver understanding: Patient reports that she is doing well post hospital though she is surprised at how painful it has been. She is taking pain meds as ordered and experiencing some nausea. Discussed alternative meds as above. She is getting around with walker. Is sleeping in a recliner as she does not want her sister to have to get up to move her legs at night. She is unable to do this lying flat. So far appetite is not great. She is hoping it will improve with home cooked meals. Reports they gave her lots of laxatives in the hospital and she started having diarrhea. They did not test stools in hospital. She is drinking water, Gatorade and Pedialyte to keep hydrated and will talk to PCP at visit on Friday or will call before if neededAlthough more pain and slower recovery than she expected, denies any ongoing CC needs.     Future Appointments              In 3 days Darlene Sultana MD [facility]    In 4 weeks Swetha Antoine MD [facility] Endocrinology, [facility]    In 5 months [facility] [facility]    In 5 months [facility] CT, [place] [facility]    In 6 months Blayne Schmitz MD [facility], [facility]           Plan: Patient denied needs concerns at this time. Would like contact information in case needs arise in the future. Will not enroll in CC at this time, no further outreaches planned.    Taran HERNANDES,RN- BC  Clinic Care Coordinator  [facility]  Phone: [phone]

## 2018-04-10 NOTE — LETTER
[place] CARE COORDINATION  [address] [place] [address]        April 10, 2018      Idalmis Abdul   [address] [place] [address]    Dear Idalmis,    I am a clinic care coordinator who works with Dr. Sultana at the [facility]. I wanted to thank you for spending the time to talk with me. I also wanted to provide you with my contact information so that you can call me with questions or concerns about your health care. Below is a description of clinic care coordination and how I can further assist you.     The clinic care coordinator is a registered nurse and/or  who understand the health care system. The goal of clinic care coordination is to help you manage your health and improve access to the [facility] in the most efficient manner. The registered nurse can assist you in meeting your health care goals by providing education, coordinating services, and strengthening the communication among your providers. The  can assist you with financial, behavioral, psychosocial, chemical dependency, counseling, and/or psychiatric resources.    Please feel free to contact me at [phone], with any questions or concerns. We at [facility] are focused on providing you with the highest-quality healthcare experience possible and that all starts with you.     Sincerely,     Taran HERNANDES,RN- BC  Clinic Care Coordinator  [facility]  Phone: [phone]    Enclosed: I have enclosed a copy of a 24 Hour Access Plan. This has helpful phone numbers for you to call when needed. Please keep this in an easy to access place to use as needed. and I have enclosed helpful educational material. Please review and call me with any questions.

## 2018-04-10 NOTE — LETTER
Health Care Home - Access Care Plan  About Me  Patient Name:  Idalmis Fan  YOB: 1945  Age:                             72 year old   [place] MRN:            [id number] Telephone Information:     Home Phone [phone]   Mobile [phone]       Address:    [address] [address] Email address:  No e-mail address on record      Emergency Contact(s)  Name Relationship Lgl Grd Work Phone Home Phone Mobile Phone   1. LOREN FAN Son  [phone]   2. DAISY CEVALLOS Sister   [phone]         My Access Plan  Medical Emergency 911   Questions or concerns during clinic hours Primary Clinic Line, I will call the clinic directly: [facility] - [phone]   24 Hour Appointment Line [phone] or  [phone]) (toll free)   24 Hour Nurse Line [phone] (toll free)   Questions or concerns outside clinic hours 24 Hour Appointment Line, I will call the after-hours on-call line:   [facility] [phone] or [phone] ([phone]) (toll-free)   Preferred Urgent Care [facility] - [place], [phone]   Preferred Hospital [place], [place]  [phone]   Preferred Pharmacy [facility] [address] [facility] AT [facility]     Behavioral Health Crisis Line The National Suicide Prevention Lifeline at 1-331.312.1067 or 911                   My Care Team Members  Patient Care Team       Relationship Specialty Notifications Start End    Darlene Sultana MD PCP - General Family Practice  5/26/16     Phone: [phone] Fax: [phone] [address] [facility] [address]    Blayne Schmitz MD MD Oncology Admissions 11/20/13     Phone: [phone] Fax: [phone]         [facility] [address] [facility] [address]    Ara Guzman, APRN CNP Nurse Practitioner Nurse Practitioner Admissions 11/20/13     Phone: [phone]  Fax: [phone]         [address] [facility] [address] [facility] [address]    Swetha Antoine MD MD INTERNAL MEDICINE - ENDOCRINOLOGY, DIABETES & METABOLISM  7/7/15     Phone: [phone] Pager: [phone] Fax: [phone]        [address] [facility] [address] [facility] [address]    Darlene Sultana MD MD Family Practice  7/9/15     Comment:  referred to endocrine    Phone: [phone] Fax: [phone]         [address] [facility] [address]    Magnus Mckeon MD MD Ophthalmology  5/20/16     Comment:  Referring Ophthalmologist    Phone: [phone] Fax: [phone]         [facility] [address] [facility] [address]    Erma Ordaz MD MD Ophthalmology  5/20/16     Phone: [phone] Fax: [phone]         [address] [facility] [address] [address]    Yenifer Driver, RN Nurse Coordinator Oncology Admissions 4/6/18     Phone: [phone] Pager: [phone]               My Medical and Care Information  Problem List   Patient Active Problem List   Diagnosis     Hypertension goal BP (blood pressure) < 140/90     GIST (gastrointestinal stromal tumor), malignant (H)     Eyelid edema     History of lung cancer     Health Care Home     NSTEMI (non-ST elevated myocardial infarction) (H)     ASCVD (arteriosclerotic cardiovascular disease)     Postsurgical aortocoronary bypass status     S/P CABG x 4     Dyspnea     GERD (gastroesophageal reflux disease)     Hyperlipidemia LDL goal <160     Hyperthyroidism     Thyroid eye disease     Eyelid retraction or lag     Thyrotoxic exophthalmos     Graves' disease     History of tobacco abuse     History of non-ST elevation myocardial infarction (NSTEMI)     Chronic back pain     PVD (posterior vitreous detachment)     Age-related osteoporosis without current pathological fracture     Spinal stenosis of lumbosacral region     DDD (degenerative disc disease), lumbar      Current Medications and Allergies:  See printed Medication Report

## 2018-04-10 NOTE — TELEPHONE ENCOUNTER
DC'd from [place] on 4/9/18 to Home self care   Primary Problem: S/P lumbar spinal fusion  LACE: 53 Moderate

## 2018-04-13 ENCOUNTER — OFFICE VISIT (OUTPATIENT)
Dept: FAMILY MEDICINE | Facility: CLINIC | Age: 73
End: 2018-04-13
Payer: COMMERCIAL

## 2018-04-13 VITALS
DIASTOLIC BLOOD PRESSURE: 68 MMHG | SYSTOLIC BLOOD PRESSURE: 115 MMHG | HEIGHT: 63 IN | BODY MASS INDEX: 27.89 KG/M2 | WEIGHT: 157.4 LBS | HEART RATE: 79 BPM | TEMPERATURE: 99.2 F

## 2018-04-13 DIAGNOSIS — D50.9 IRON DEFICIENCY ANEMIA, UNSPECIFIED IRON DEFICIENCY ANEMIA TYPE: Primary | ICD-10-CM

## 2018-04-13 LAB
BASOPHILS # BLD AUTO: 0 10E9/L (ref 0–0.2)
BASOPHILS NFR BLD AUTO: 0.4 %
DIFFERENTIAL METHOD BLD: ABNORMAL
EOSINOPHIL # BLD AUTO: 0.2 10E9/L (ref 0–0.7)
EOSINOPHIL NFR BLD AUTO: 2.8 %
ERYTHROCYTE [DISTWIDTH] IN BLOOD BY AUTOMATED COUNT: 14.5 % (ref 10–15)
HCT VFR BLD AUTO: 28.6 % (ref 35–47)
HGB BLD-MCNC: 9.4 G/DL (ref 11.7–15.7)
LYMPHOCYTES # BLD AUTO: 0.9 10E9/L (ref 0.8–5.3)
LYMPHOCYTES NFR BLD AUTO: 12 %
MCH RBC QN AUTO: 29.5 PG (ref 26.5–33)
MCHC RBC AUTO-ENTMCNC: 32.9 G/DL (ref 31.5–36.5)
MCV RBC AUTO: 90 FL (ref 78–100)
MONOCYTES # BLD AUTO: 1.2 10E9/L (ref 0–1.3)
MONOCYTES NFR BLD AUTO: 17.2 %
NEUTROPHILS # BLD AUTO: 4.8 10E9/L (ref 1.6–8.3)
NEUTROPHILS NFR BLD AUTO: 67.6 %
PLATELET # BLD AUTO: 288 10E9/L (ref 150–450)
RBC # BLD AUTO: 3.19 10E12/L (ref 3.8–5.2)
WBC # BLD AUTO: 7.2 10E9/L (ref 4–11)

## 2018-04-13 PROCEDURE — 36415 COLL VENOUS BLD VENIPUNCTURE: CPT | Performed by: FAMILY MEDICINE

## 2018-04-13 PROCEDURE — 99213 OFFICE O/P EST LOW 20 MIN: CPT | Performed by: FAMILY MEDICINE

## 2018-04-13 PROCEDURE — 85025 COMPLETE CBC W/AUTO DIFF WBC: CPT | Performed by: FAMILY MEDICINE

## 2018-04-13 RX ORDER — FERROUS SULFATE 325(65) MG
325 TABLET ORAL
Qty: 30 TABLET | Refills: 2 | Status: SHIPPED | OUTPATIENT
Start: 2018-04-13 | End: 2018-05-22

## 2018-04-13 RX ORDER — METHOCARBAMOL 750 MG/1
750 TABLET, FILM COATED ORAL
COMMUNITY
Start: 2018-04-09 | End: 2018-05-22

## 2018-04-13 NOTE — PROGRESS NOTES
SUBJECTIVE:                                                    Idalmis Abdul is 72 year old female   Chief Complaint   Patient presents with     Hospital F/U         Hospital Follow-up Visit:  Patient states that she had some heart issues after surgery (states that her BP was very high after surgery), was brought to their cardiology department after being brought to the recovery room. States that she was told by the surgeon to follow up with PCP and checking the hemoglobin. She states this was low   While she was in the hospital.      Hospital/Nursing Home/ Rehab Facility: [facility]  Date of Admission: 4/5/18  Date of Discharge: 4/9/18  Reason(s) for Admission: Fusion/laminectomy            Problems taking medications regularly:  None. Was having constipation from pain medication, has decreased the dose of pain medication. Has been taking Mirelax and stool softner since discharge.       Medication changes since discharge: Given a muscle relaxant       Problems adhering to non-medication therapy:  None        Summary of hospitalization:  [facility] discharge summary reviewed  Diagnostic Tests/Treatments reviewed.  Follow up needed: none  Other Healthcare Providers Involved in Patient s Care:         None  Update since discharge: improved.     Post Discharge Medication Reconciliation: discharge medications reconciled, continue medications without change.  Plan of care communicated with patient     Coding guidelines for this visit:  Type of Medical   Decision Making Face-to-Face Visit       within 7 Days of discharge Face-to-Face Visit        within 14 days of discharge   Moderate Complexity [id number]   High Complexity [id number]              Problem list and histories reviewed & adjusted, as indicated.  Additional history: surgery was 2 hours longer than planned, had to be cut 5 times, lost more blood than expected.    Patient Active Problem List   Diagnosis     Hypertension goal BP (blood  pressure) < 140/90     GIST (gastrointestinal stromal tumor), malignant (H)     Eyelid edema     History of lung cancer     Health Care Home     NSTEMI (non-ST elevated myocardial infarction) (H)     ASCVD (arteriosclerotic cardiovascular disease)     Postsurgical aortocoronary bypass status     S/P CABG x 4     Dyspnea     GERD (gastroesophageal reflux disease)     Hyperlipidemia LDL goal <160     Hyperthyroidism     Thyroid eye disease     Eyelid retraction or lag     Thyrotoxic exophthalmos     Graves' disease     History of tobacco abuse     History of non-ST elevation myocardial infarction (NSTEMI)     Chronic back pain     PVD (posterior vitreous detachment)     Age-related osteoporosis without current pathological fracture     Spinal stenosis of lumbosacral region     DDD (degenerative disc disease), lumbar     Past Surgical History:   Procedure Laterality Date     BYPASS GRAFT ARTERY CORONARY  6/14/2012    Procedure: BYPASS GRAFT ARTERY CORONARY;  Median Sternotomy Coronary Artery Bypass Graft  x 3        C THORACOSCOPY,DX W BX  fall 2003    benign tissue     CATARACT IOL, RT/LT      LE     CHOLECYSTECTOMY  1963     COLONOSCOPY  4-12-05     CORONARY ARTERY BYPASS      X4     CREATION PERICARDIAL WINDOW  6/15/2012    Procedure: CREATION PERICARDIAL WINDOW;  Mediastinal Exploration, Control of Bleeding;  Surgeon: Dwaine Griffin MD;  Location: [facility] OR     EYE SURGERY  2014    left cataract removal     GI SURGERY  2003 and 2007    GIST tumor removal     GYN SURGERY      tubal ligation     HYSTERECTOMY VAGINAL, COLPORRHAPHY ANTERIOR, POSTERIOR, COMBINED N/A 10/17/2017    Procedure: COMBINED HYSTERECTOMY VAGINAL, COLPORRHAPHY ANTERIOR, POSTERIOR;  Total Vaginal Hysterectomy and Posterior Repair,Sacrospinous Vault Suspension;  Surgeon: Ruth Farooq MD;  Location: [facility] OR     PHACOEMULSIFICATION WITH STANDARD INTRAOCULAR LENS IMPLANT  3/24/2014    Procedure: PHACOEMULSIFICATION WITH STANDARD  INTRAOCULAR LENS IMPLANT;  Left Kelman Phacoemulsification with Intraocular Lens Implant;  Surgeon: Magnus Mckeon MD;  Location: [facility] OR     RECESSION RESECTION WITH ADJUSTABLE SUTURE BILATERAL Bilateral 7/14/2016    Procedure: RECESSION RESECTION WITH ADJUSTABLE SUTURE BILATERAL;  Surgeon: Erma Ordaz MD;  Location: [facility] OR     SURGICAL HISTORY OF -   1963    cholecystectomy     SURGICAL HISTORY OF -   1970's    Tubal Ligation      SURGICAL HISTORY OF -   08/03    Explor. Lap, Excision of Small Bowel Tumor      SURGICAL HISTORY OF -   4/2010    lung cancer removed right lung       Social History   Substance Use Topics     Smoking status: Former Smoker     Packs/day: 0.50     Years: 49.00     Types: Cigarettes     Quit date: 4/19/2010     Smokeless tobacco: Never Used     Alcohol use No     Family History   Problem Relation Age of Onset     HEART DISEASE Mother      OSTEOPOROSIS Mother      Respiratory Mother      Emphezyma     Hypertension Mother      HEART DISEASE Father      Alcohol/Drug Father      Hypertension Father      Hypertension Maternal Grandmother      Hypertension Brother      Hypertension Sister      Arthritis Sister      Hypertension Son      CANCER Son      rectal         Current Outpatient Prescriptions   Medication Sig Dispense Refill     methocarbamol (ROBAXIN) 750 MG tablet Take 750 mg by mouth       ferrous sulfate (IRON) 325 (65 Fe) MG tablet Take 1 tablet (325 mg) by mouth daily (with breakfast) 30 tablet 2     oxyCODONE IR (ROXICODONE) 5 MG tablet Take 1-2 tablets (5-10 mg) by mouth every 3 hours as needed for pain or other (Moderate to Severe) 180 tablet 0     imatinib (GLEEVEC) 400 MG tablet Take 1 tablet (400 mg) by mouth daily Take with a meal and a large glass of water. 30 tablet 2     LORazepam (ATIVAN) 1 MG tablet Take 1 tablet (1 mg) by mouth At Bedtime 30 tablet 0     amLODIPine (NORVASC) 5 MG tablet TAKE ONE TABLET BY MOUTH EVERY DAY 30 tablet 9     lisinopril  (PRINIVIL/ZESTRIL) 40 MG tablet TAKE ONE TABLET BY MOUTH EVERY DAY 90 tablet 1     ondansetron (ZOFRAN-ODT) 4 MG ODT tab Take 1-2 tablets (4-8 mg) by mouth every 8 hours as needed for nausea Dissolve ON the tongue. 4 tablet 0     polyethylene glycol (MIRALAX/GLYCOLAX) powder Take 17 g by mouth daily       rosuvastatin (CRESTOR) 5 MG tablet Take 1 tablet (5 mg) by mouth every other day 45 tablet 3     metoprolol (LOPRESSOR) 100 MG tablet TAKE ONE TABLET BY MOUTH TWICE A  tablet 3     methimazole (TAPAZOLE) 5 MG tablet Take 1 tablet (5 mg) by mouth daily ALTERNATE 5 MG AND 2.5 MG (1/2 TABLET) EVERY DAY 90 tablet 3     hydrochlorothiazide (HYDRODIURIL) 25 MG tablet Take 1 tablet (25 mg) by mouth daily 90 tablet 3     aspirin EC 81 MG EC tablet Take 1 tablet by mouth daily. 30 tablet 2     order for DME Walker 1 Device 0     Blood Pressure Monitoring (ADULT BLOOD PRESSURE CUFF LG) KIT 1 Device 2 times daily 1 kit 1     Allergies   Allergen Reactions     Atorvastatin Cramps     Recent Labs   Lab Test  02/14/18   1451  01/12/18   0902  12/29/17   1350  10/17/17   0755   07/07/17   0903   01/13/17   0854   09/20/16   1022   07/17/15   0916   03/14/14   0929   06/19/12   1550   06/15/12   0850   A1C   --    --    --   5.5   --    --    --    --    --    --    --    --    --    --    --   5.4   --   Duplicate request   LDL   --    --    --    --    --    --    --    --    --   38   --   31   --   59   --    --    --    --    HDL   --    --    --    --    --    --    --    --    --   53   --   46*   --   38*   --    --    --    --    TRIG   --    --    --    --    --    --    --    --    --   79   --   72   --   105   < >   --    --    --    ALT   --   24   --    --    --   27   --   24   --    --    < >  23   < >  39   < >   --    < >   --    CR   --   1.20*   --   1.15*   < >  1.20*   < >  1.00   --   1.00   < >  1.06*   < >  1.07*   < >   --    < >   --    GFRESTIMATED   --   44*   --   46*   < >  44*   < >  55*    "--   55*   < >  51*   < >  51*   < >   --    < >   --    GFRESTBLACK   --   53*   --   56*   < >  53*   < >  66   --   66   < >  62   < >  62   < >   --    < >   --    POTASSIUM   --   4.2   --   3.6   < >  4.2   < >  4.2   --   4.2   < >  4.0   < >  3.8   < >   --    < >  2.8*   TSH  0.79   --   1.41   --    < >  1.27   --    --    < >   --    < >   --    < >  0.03*   < >   --    --    --     < > = values in this interval not displayed.      BP Readings from Last 3 Encounters:   04/13/18 115/68   04/02/18 136/78   03/22/18 136/75    Wt Readings from Last 3 Encounters:   04/13/18 157 lb 6.4 oz (71.4 kg)   04/02/18 158 lb 6.4 oz (71.8 kg)   03/22/18 157 lb 12.8 oz (71.6 kg)         ROS:  Constitutional, HEENT, cardiovascular, pulmonary, gi and gu systems are negative, except as otherwise noted.    OBJECTIVE:                                                    /68  Pulse 79  Temp 99.2  F (37.3  C) (Tympanic)  Ht 5' 2.99\" (1.6 m)  Wt 157 lb 6.4 oz (71.4 kg)  LMP 01/01/1992  BMI 27.89 kg/m2  GENERAL APPEARANCE ADULT: Alert, no acute distress, grandson with her  SKIN: no suspicious lesions or rashes, wound appearance: abrasion on right face 1x3 cm, dry scabbed  PSYCH: mentation appears normal., affect and mood normal  Diagnostic Test Results:  none      ASSESSMENT/PLAN:                                                    1. Iron deficiency anemia, unspecified iron deficiency anemia type  Stable, due   - CBC with platelets differential  - ferrous sulfate (IRON) 325 (65 Fe) MG tablet; Take 1 tablet (325 mg) by mouth daily (with breakfast)  Dispense: 30 tablet; Refill: 2    Darlene Sultana MD  [facility]    "

## 2018-05-01 ENCOUNTER — TELEPHONE (OUTPATIENT)
Dept: ENDOCRINOLOGY | Facility: CLINIC | Age: 73
End: 2018-05-01
Payer: MEDICARE

## 2018-05-01 NOTE — TELEPHONE ENCOUNTER
[facility] Call Center    Phone Message    May a detailed message be left on voicemail: yes    Reason for Call: Other: patient called in needing to reschedule her appointment w/ Dr. Antoine as she had Back surgery a couple weeks ago and she is not feel well enough to come to the appointment yet. We did reschedule to August 28 but she wanted to make sure that would be okay with Dr Antoine as she is waiting an extra 3 month to come in to see her. please let patient know if it is okay to wait or not.      Action Taken: Message routed to:  [facility] ([facility] ENDO

## 2018-05-07 ENCOUNTER — RADIANT APPOINTMENT (OUTPATIENT)
Dept: GENERAL RADIOLOGY | Facility: CLINIC | Age: 73
End: 2018-05-07
Attending: ORTHOPAEDIC SURGERY
Payer: COMMERCIAL

## 2018-05-07 DIAGNOSIS — M48.062 SPINAL STENOSIS, LUMBAR REGION WITH NEUROGENIC CLAUDICATION: ICD-10-CM

## 2018-05-07 DIAGNOSIS — M43.16 SPONDYLOLISTHESIS OF LUMBAR REGION: ICD-10-CM

## 2018-05-07 PROCEDURE — 72100 X-RAY EXAM L-S SPINE 2/3 VWS: CPT | Mod: FY

## 2018-05-08 ENCOUNTER — TELEPHONE (OUTPATIENT)
Dept: ONCOLOGY | Facility: CLINIC | Age: 73
End: 2018-05-08

## 2018-05-08 NOTE — TELEPHONE ENCOUNTER
Patient called to initiate refill on lorazepam. Message sent to Dr. Supa Schmitz via [employer] at 1408 to request approval. Writer will close the communication loop with a call back to patient upon answer from Dr. Schmitz.

## 2018-05-10 ENCOUNTER — PATIENT OUTREACH (OUTPATIENT)
Dept: CARE COORDINATION | Facility: CLINIC | Age: 73
End: 2018-05-10

## 2018-05-10 ENCOUNTER — TELEPHONE (OUTPATIENT)
Dept: CARE COORDINATION | Facility: CLINIC | Age: 73
End: 2018-05-10

## 2018-05-10 DIAGNOSIS — C49.A0 MALIGNANT GASTROINTESTINAL STROMAL TUMOR, UNSPECIFIED SITE (H): ICD-10-CM

## 2018-05-10 RX ORDER — LORAZEPAM 1 MG/1
1 TABLET ORAL AT BEDTIME
Qty: 30 TABLET | Refills: 0
Start: 2018-05-10 | End: 2018-07-09

## 2018-05-10 NOTE — TELEPHONE ENCOUNTER
RN CC received call from patient for help in getting a script refilled. She takes Lorazepam 1 mg at bedtime. Dr. Schmitz has been ordering this for patient since 2009 when she was having anxiety for health issues and the loss of her . He has refilled it over all these years. However, patient does not see him much anymore so it is more difficult to get a hold of him for ongoing refills. She has currently requested a refill earlier this week and the [facility] has faxed refill request X2 with no response. I have spoken with nurse at Dr Schmitz's clinic. They need to hear from him before they can approve refill.   Patient is asking for Dr. Sultana to take over ordering this medication as she sees her regularly and Dr. Sultana is familiar with her health issues.  She takes it every bedtime for sleep/anxiety. It is not PRN-she only takes at bedtime.   If Dr. Sultana will order, needs to be sent to [facility]

## 2018-05-10 NOTE — PROGRESS NOTES
Clinic Care Coordination Contact  Care Team Conversations  D/I: RN CC received call from patient for help in getting a script refilled. She takes Lorazepam 1 mg at bedtime. Dr. Schmitz has been ordering this for patient since 2009 when she was having anxiety for health issues and the loss of her . He has refilled it over all these years. However, patient does not see him much anymore so it is more difficult to get a hold of him for ongoing refills. She has currently requested a refill earlier this week and the [facility] has faxed refill request X2 with no response. I have spoken with nurse at Dr Schmitz's clinic. They need to hear from him before they can approve refill. She suggested that PCP may want to take over ordering this medication. Spoke with patient regarding this. She was also thinking that it may be easier to have PCP order.   Patient is asking for Dr. Sultana to take over ordering this medication as she sees her regularly and Dr. Sultana is familiar with her health issues.  She takes it every bedtime for sleep/anxiety. It is not PRN-she only takes at bedtime.   Placed telephone encounter to clinic pool and Dr. Sultana. Patient is aware. Oncology is also trying to get a hold of  for refill approval.    P: RN CC will f/u next business day.    Taran HERNANDES,RN- BC  Clinic Care Coordinator  [facility]  Phone: [phone]

## 2018-05-10 NOTE — PROGRESS NOTES
Clinic Care Coordination Contact  Care Team Conversations  D/I: Dr. Sultana's care team working o refill as Dr. Sultana is out for quite some time. However, Oncology RN CC messaged me back with the following:    Thanks for your message. It looks like Dr. Schmitz ok'd the refill of the ativan and it was called into the [facility] pharmacy. I'm sorry she wasn't getting a response from our clinic. I think you are probably right we must not have been getting the faxes from the pharmacy. Thanks for your help with Idalmis.     This RN CC contacted the patient to let her know. Discussed that she may want to discuss further with Dr. Sultana at her next visit to simplify things for her next refill. Patient is in agreement with this and will discuss.   Notified PCP care team that the refill request telephone encounter can be closed as med has been filled.    P: Patient had declined CC services on first encounter but did keep materials sent and called this RN CC for assistance. Ongoing CC needs are not apparent at this time. Will reassess within 30 days and will outreach or close at that time.    Taran DUNNN,RN- BC  Clinic Care Coordinator  [facility]  Phone: [phone]

## 2018-05-10 NOTE — TELEPHONE ENCOUNTER
Dr. Sultana is out of office until ~ June 18 2018. Is there another provider pt would like to send this to in her absence?  Thanks,   Nadine NAILS RN

## 2018-05-10 NOTE — TELEPHONE ENCOUNTER
Dr. Schmitz's team has responded and wrote the refill so we re good. The encounter can be closed.  Thanks!  Taran

## 2018-05-14 DIAGNOSIS — I10 ESSENTIAL HYPERTENSION: ICD-10-CM

## 2018-05-15 NOTE — TELEPHONE ENCOUNTER
"Requested Prescriptions   Pending Prescriptions Disp Refills     hydrochlorothiazide (HYDRODIURIL) 25 MG tablet [Pharmacy Med Name: HYDROCHLOROTHIAZIDE 25MG TABS]        Last Written Prescription Date:  5-8-17  Last Fill Quantity: 90,   # refills: 3  Last Office Visit: 4-13-18  Future Office visit:      90 tablet 3     Sig: TAKE ONE TABLET BY MOUTH EVERY DAY    Diuretics (Including Combos) Protocol Failed    5/14/2018 10:54 AM       Failed - Normal serum creatinine on file in past 12 months    Recent Labs   Lab Test  01/12/18 0902   CR  1.20*             Passed - Blood pressure under 140/90 in past 12 months    BP Readings from Last 3 Encounters:   04/13/18 115/68   04/02/18 136/78   03/22/18 136/75                Passed - Recent (12 mo) or future (30 days) visit within the authorizing provider's specialty    Patient had office visit in the last 12 months or has a visit in the next 30 days with authorizing provider or within the authorizing provider's specialty.  See \"Patient Info\" tab in inbasket, or \"Choose Columns\" in Meds & Orders section of the refill encounter.           Passed - Patient is age 18 or older       Passed - No active pregancy on record       Passed - Normal serum potassium on file in past 12 months    Recent Labs   Lab Test  01/12/18 0902   POTASSIUM  4.2                   Passed - Normal serum sodium on file in past 12 months    Recent Labs   Lab Test  01/12/18 0902   NA  135             Passed - No positive pregnancy test in past 12 months          "

## 2018-05-16 RX ORDER — HYDROCHLOROTHIAZIDE 25 MG/1
TABLET ORAL
Qty: 90 TABLET | Refills: 3 | Status: SHIPPED | OUTPATIENT
Start: 2018-05-16 | End: 2019-05-15

## 2018-05-16 NOTE — TELEPHONE ENCOUNTER
Routing refill request to provider for review/approval because:  Labs out of range:  See below    Emiliana Jain RN

## 2018-05-22 ENCOUNTER — OFFICE VISIT (OUTPATIENT)
Dept: PHARMACY | Facility: CLINIC | Age: 73
End: 2018-05-22
Payer: COMMERCIAL

## 2018-05-22 DIAGNOSIS — C49.A0 MALIGNANT GASTROINTESTINAL STROMAL TUMOR, UNSPECIFIED SITE (H): ICD-10-CM

## 2018-05-22 DIAGNOSIS — I25.10 ASCVD (ARTERIOSCLEROTIC CARDIOVASCULAR DISEASE): ICD-10-CM

## 2018-05-22 DIAGNOSIS — I10 HYPERTENSION GOAL BP (BLOOD PRESSURE) < 140/90: ICD-10-CM

## 2018-05-22 DIAGNOSIS — F41.9 ANXIETY: ICD-10-CM

## 2018-05-22 DIAGNOSIS — G47.00 INSOMNIA, UNSPECIFIED TYPE: ICD-10-CM

## 2018-05-22 DIAGNOSIS — E78.5 HYPERLIPIDEMIA LDL GOAL <160: Primary | ICD-10-CM

## 2018-05-22 DIAGNOSIS — K59.00 CONSTIPATION, UNSPECIFIED CONSTIPATION TYPE: ICD-10-CM

## 2018-05-22 DIAGNOSIS — M54.9 CHRONIC BACK PAIN, UNSPECIFIED BACK LOCATION, UNSPECIFIED BACK PAIN LATERALITY: ICD-10-CM

## 2018-05-22 DIAGNOSIS — E05.90 HYPERTHYROIDISM: ICD-10-CM

## 2018-05-22 DIAGNOSIS — G89.29 CHRONIC BACK PAIN, UNSPECIFIED BACK LOCATION, UNSPECIFIED BACK PAIN LATERALITY: ICD-10-CM

## 2018-05-22 PROCEDURE — 99605 MTMS BY PHARM NP 15 MIN: CPT | Performed by: PHARMACIST

## 2018-05-22 PROCEDURE — 99607 MTMS BY PHARM ADDL 15 MIN: CPT | Performed by: PHARMACIST

## 2018-05-22 RX ORDER — TRAMADOL HYDROCHLORIDE 50 MG/1
50 TABLET ORAL 3 TIMES DAILY
COMMUNITY
End: 2018-12-07

## 2018-05-22 RX ORDER — METHIMAZOLE 5 MG/1
2.5 TABLET ORAL DAILY
Qty: 45 TABLET | Refills: 0
Start: 2018-05-22 | End: 2018-08-28

## 2018-05-22 NOTE — PROGRESS NOTES
SUBJECTIVE/OBJECTIVE:                Idalmis Abdul is a 72 year old female coming in for a follow-up visit for Medication Therapy Management.  She was referred to me from [facility]. This is patient's first visit this year.     Chief Complaint: Follow up from our visit on 8/22/17. Patient did not bring in her medication bottles today - brought a list of her medications.     Tobacco: History of tobacco dependence - quit 2010  Alcohol: not currently using    Medication Adherence: no issues reported    Back Pain: Had back surgery 2 months ago. Her nerve pain has stopped since having surgery. Has been cutting down on oxycodone 5 mg, currently taking 1/2 tablet daily. She felt oxycodone was no longer working. She was given Tramadol  50 mg three times daily by her surgeon - working well for pain. Recommended by surgeon that she start a calcium supplement to help her bones heal faster - she is taking Viactiv two chews daily.      CAD/HTN: Currently taking HCTZ 25 mg every morning, amlodipine 5 mg every evening, lisinopril 40 mg every morning, metoprolol 100 mg twice daily. History of MI and received a bypass in 2012.  Patient does self-monitor BP and states they have been around 135/75. Pt also on statin therapy, see below. She is also taking aspirin 81 mg daily.     Hyperlipidemia: Current therapy includes rosuvastatin 5mg every other day.  Pt reports the following side effects: none.     Hyperthyroidism: Currently taking methimazole 5 mg 1/2 tablet daily.     TSH   Date Value Ref Range Status   02/14/2018 0.79 0.40 - 4.00 mU/L Final     Anxiety/Insomnia: Currently taking lorazepam 1 mg - taking 1/2 tablet at bedtime and melatonin 5 mg at bedtime. She lowered her lorazepam dose 3 weeks ago - working well. Started lorazepam when  was getting very ill. Therapy works well. Notes no side effects. Sleeping well, has nocturia which is waking her up.  Falls back asleep easily.     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 Notes no side effects.    Constipation: using Miralax daily - working well. Using Senna S as needed.     Current labs include:  Today's Vitals: /59  Pulse 67  LMP 01/01/1992  BP Readings from Last 3 Encounters:   04/13/18 115/68   04/02/18 136/78   03/22/18 136/75     Lab Results   Component Value Date    A1C 5.5 10/17/2017   .  Lab Results   Component Value Date    CHOL 107 09/20/2016     Lab Results   Component Value Date    TRIG 79 09/20/2016     Lab Results   Component Value Date    HDL 53 09/20/2016     Lab Results   Component Value Date    LDL 38 09/20/2016       Liver Function Studies -   Recent Labs   Lab Test  01/12/18   0902   PROTTOTAL  6.6*   ALBUMIN  3.5   BILITOTAL  0.4   ALKPHOS  61   AST  23   ALT  24       Lab Results   Component Value Date    UCRR 6 07/08/2012       Last Basic Metabolic Panel:  Lab Results   Component Value Date     01/12/2018      Lab Results   Component Value Date    POTASSIUM 4.2 01/12/2018     Lab Results   Component Value Date    CHLORIDE 101 01/12/2018     Lab Results   Component Value Date    BUN 20 01/12/2018     Lab Results   Component Value Date    CR 1.20 01/12/2018     GFR Estimate   Date Value Ref Range Status   01/12/2018 44 (L) >60 mL/min/1.7m2 Final     Comment:     Non  GFR Calc   10/17/2017 46 (L) >60 mL/min/1.7m2 Final     Comment:     Non  GFR Calc   10/12/2017 42 (L) >60 mL/min/1.7m2 Final     Comment:     Non  GFR Calc       Most Recent Immunizations   Administered Date(s) Administered     Flu 65+ Years 09/01/2016     Influenza (High Dose) 3 valent vaccine 11/28/2017     Influenza (IIV3) PF 12/12/2013     Influenza Vaccine IM 3yrs+ 4 Valent IIV4 10/16/2014     Influenza Vaccine, 3 YRS +, IM (QUADRIVALENT W/PRESERVATIVES) 09/01/2016     Pneumo Conj 13-V (2010&after) 08/25/2016     Pneumococcal 23 valent 02/15/2013     TD (ADULT, 7+) 07/29/2004     TDAP  Vaccine (Adacel) 09/13/2013       ASSESSMENT:              Current medications were reviewed today as discussed above.  Medicare Part D topics discussed:Medications reviewed-no issues identified or changes needed    Medication Adherence: good, no issues identified    Back Pain: improved    CAD/HTN: stable    Hyperlipidemia: stable. Patient is due for cholesterol lab.    Hyperthyroidism: stable - weaning off of methimazole per endocrinology.    Anxiety/Insomnia: stable    GIST: stable - followed by oncology.    Constipation: stable     PLAN:                  1. You are due for a cholesterol lab, I put I a future lab order for this. You should be fasting for this lab.     I spent 30 minutes with this patient today (an extra 15 minutes was spent creating the Medication Action Plan). All changes were made via collaborative practice agreement with Darlene Sultana. A copy of the visit note was provided to the patient's primary care provider.     Will follow up in 1 year.    The patient was given a summary of these recommendations as an after visit summary.    Jessy Osman, PharmD  Medication Therapy Management

## 2018-05-22 NOTE — PATIENT INSTRUCTIONS
1. You are due for a cholesterol lab, I put I a future lab order for this. You should be fasting for this lab.     We should follow up in 1 year.    Jessy Osman, PharmD  [phone] in clinic on Tuesday and Wednesday

## 2018-05-22 NOTE — MR AVS SNAPSHOT
After Visit Summary   5/22/2018    Idalmis Abdul    MRN: [id number]           Patient Information     Date Of Birth          1945        Visit Information        Provider Department      5/22/2018 2:30 PM Jessy Osman, [facility]        Today's Diagnoses     Hyperlipidemia LDL goal <160    -  1    Hyperthyroidism          Care Instructions    1. You are due for a cholesterol lab, I put I a future lab order for this. You should be fasting for this lab.     We should follow up in 1 year.    Jessy Osman, PharmD  [phone] in clinic on Tuesday and Wednesday            Follow-ups after your visit        Your next 10 appointments already scheduled     Aug 28, 2018 10:00 AM CDT   (Arrive by 9:45 AM)   RETURN ENDOCRINE with TIERRA Antoine MD   [facility] ([facility])    [address] [place] [id number] [phone]            Oct 05, 2018 10:00 AM CDT   LAB with [facility] ([facility])    [address] [id number] [phone]           Please do not eat 10-12 hours before your appointment if you are coming in fasting for labs on lipids, cholesterol, or glucose (sugar). This does not apply to pregnant women. Water, hot tea and black coffee (with nothing added) are okay. Do not drink other fluids, diet soda or chew gum.            Oct 05, 2018 10:30 AM CDT   CT CHEST ABDOMEN PELVIS W/O & W CONTRAST with [facility]   [facility] CT ([facility])    [address] [id number] [phone]           Please bring any scans or X-rays taken at other hospitals, if similar tests were done. Also bring a list of your medicines, including vitamins, minerals and over-the-counter drugs. It is safest to leave personal items at home.  Be sure to tell your doctor:   If you have any allergies.   If there s  any chance you are pregnant.   If you are breastfeeding.  How to prepare:   Do not eat or drink for 2 hours before your exam. If you need to take medicine, you may take it with small sips of water. (We may ask you to take liquid medicine as well.)   Please wear loose clothing, such as a sweat suit or jogging clothes. Avoid snaps, zippers and other metal. We may ask you to undress and put on a hospital gown.  Please arrive 30 minutes early for your CT. Once in the department you might be asked to drink water 15-20 minutes prior to your exam.  If indicated you may be asked to drink an oral contrast in advance of your CT.  If this is the case, the imaging team will let you know or be in contact with you prior to your appointment  Patients over 70 or patients with diabetes or kidney problems:   If you haven t had a blood test (creatinine test) within the last 30 days, the Cardiologist/Radiologist may require you to get this test prior to your exam.  If you have diabetes:   Continue to take your metformin medication on the day of your exam  If you have any questions, please call the Imaging Department where you will have your exam.            Oct 08, 2018  9:15 AM CDT   (Arrive by 9:00 AM)   Return Visit with Blayne Schmitz MD   [facility] ([facility])    [address] [place] [address]  [address] [place] [id number] [phone]              Future tests that were ordered for you today     Open Future Orders        Priority Expected Expires Ordered    Lipid panel reflex to direct LDL Fasting Routine  5/22/2019 5/22/2018            Who to contact     If you have questions or need follow up information about today's clinic visit or your schedule please contact [facility] directly at [phone].  Normal or non-critical lab and imaging results will be communicated to you by MyChart, letter or phone within 4 business days after the clinic has  "received the results. If you do not hear from us within 7 days, please contact the clinic through [employer] or phone. If you have a critical or abnormal lab result, we will notify you by phone as soon as possible.  Submit refill requests through [employer] or call your pharmacy and they will forward the refill request to us. Please allow 3 business days for your refill to be completed.          Additional Information About Your Visit        [employer] Information     [employer] lets you send messages to your doctor, view your test results, renew your prescriptions, schedule appointments and more. To sign up, go to [email or web address] . Click on \"Log in\" on the left side of the screen, which will take you to the Welcome page. Then click on \"Sign up Now\" on the right side of the page.     You will be asked to enter the access code listed below, as well as some personal information. Please follow the directions to create your username and password.     Your access code is: [email or web address]  Expires: 2018  6:30 AM     Your access code will  in 90 days. If you need help or a new code, please call your [place] clinic or [phone].        Care EveryWhere ID     This is your Care EveryWhere ID. This could be used by other organizations to access your [place] medical records  [id number]        Your Vitals Were     Last Period                   1992            Blood Pressure from Last 3 Encounters:   18 115/68   18 136/78   18 136/75    Weight from Last 3 Encounters:   18 157 lb 6.4 oz (71.4 kg)   18 158 lb 6.4 oz (71.8 kg)   18 157 lb 12.8 oz (71.6 kg)                 Today's Medication Changes          These changes are accurate as of 18  2:57 PM.  If you have any questions, ask your nurse or doctor.               These medicines have changed or have updated prescriptions.        Dose/Directions    LORazepam 1 MG tablet   Commonly known as:  ATIVAN   This may have " changed:  how much to take   Used for:  Malignant gastrointestinal stromal tumor, unspecified site (H)        Dose:  1 mg   Take 1 tablet (1 mg) by mouth At Bedtime   Quantity:  30 tablet   Refills:  0       methimazole 5 MG tablet   Commonly known as:  TAPAZOLE   This may have changed:    - how much to take  - additional instructions   Used for:  Hyperthyroidism   Changed by:  Jessy Osman RPH        Dose:  2.5 mg   Take 0.5 tablets (2.5 mg) by mouth daily   Quantity:  45 tablet   Refills:  0       oxyCODONE IR 5 MG tablet   Commonly known as:  ROXICODONE   This may have changed:    - how much to take  - when to take this   Used for:  Chronic low back pain, unspecified back pain laterality, with sciatica presence unspecified        Dose:  5-10 mg   Take 1-2 tablets (5-10 mg) by mouth every 3 hours as needed for pain or other (Moderate to Severe)   Quantity:  180 tablet   Refills:  0            Where to get your medicines      Some of these will need a paper prescription and others can be bought over the counter.  Ask your nurse if you have questions.     You don't need a prescription for these medications     methimazole 5 MG tablet               Information about OPIOIDS     PRESCRIPTION OPIOIDS: WHAT YOU NEED TO KNOW   You have a prescription for an opioid (narcotic) pain medicine. Opioids can cause addiction. If you have a history of chemical dependency of any type, you are at a higher risk of becoming addicted to opioids. Only take this medicine after all other options have been tried. Take it for as short a time and as few doses as possible.     Do not:    Drive. If you drive while taking these medicines, you could be arrested for driving under the influence (DUI).    Operate heavy machinery    Do any other dangerous activities while taking these medicines.     Drink any alcohol while taking these medicines.      Take with any other medicines that contain acetaminophen. Read all labels carefully.  Look for the word  acetaminophen  or  Tylenol.  Ask your pharmacist if you have questions or are unsure.    Store your pills in a secure place, locked if possible. We will not replace any lost or stolen medicine. If you don t finish your medicine, please throw away (dispose) as directed by your pharmacist. The [place] Pollution Control Agency has more information about safe disposal: https://www.pca.[facility].[place].us/living-green/managing-unwanted-medications    All opioids tend to cause constipation. Drink plenty of water and eat foods that have a lot of fiber, such as fruits, vegetables, prune juice, apple juice and high-fiber cereal. Take a laxative (Miralax, milk of magnesia, Colace, Senna) if you don t move your bowels at least every other day.          Primary Care Provider Office Phone # Fax #    Darlene Daniela Sultana [phone] [address] [address]        Equal Access to Services     KIKE EWING : Hadii cornelia morrison hadasho Soomaali, waaxda luqadaha, qaybta kaalmada adeegyaeusebio, patricio gomez . So [facility] [phone].    ATENCIÓN: Si habla español, tiene a cassidy disposición servicios gratuitos de asistencia lingüística. Llame al [phone].    We comply with applicable federal civil rights laws and [place] laws. We do not discriminate on the basis of race, color, national origin, age, disability, sex, sexual orientation, or gender identity.            Thank you!     Thank you for choosing [facility]  for your care. Our goal is always to provide you with excellent care. Hearing back from our patients is one way we can continue to improve our services. Please take a few minutes to complete the written survey that you may receive in the mail after your visit with us. Thank you!             Your Updated Medication List - Protect others around you: Learn how to safely use, store and throw away your medicines at www.disposemymeds.org.           This list is accurate as of 5/22/18  2:57 PM.  Always use your most recent med list.                   Brand Name Dispense Instructions for use Diagnosis    Adult Blood Pressure Cuff Lg Kit     1 kit    1 Device 2 times daily    HTN, goal below 140/90       amLODIPine 5 MG tablet    NORVASC    30 tablet    TAKE ONE TABLET BY MOUTH EVERY DAY    Essential hypertension       aspirin 81 MG EC tablet     30 tablet    Take 1 tablet by mouth daily.    NSTEMI (non-ST elevated myocardial infarction) (H), ACS (acute coronary syndrome) (H)       hydrochlorothiazide 25 MG tablet    HYDRODIURIL    90 tablet    TAKE ONE TABLET BY MOUTH EVERY DAY    Essential hypertension       imatinib 400 MG tablet    GLEEVEC    30 tablet    Take 1 tablet (400 mg) by mouth daily Take with a meal and a large glass of water.    Malignant gastrointestinal stromal tumor, unspecified site (H)       lisinopril 40 MG tablet    PRINIVIL/ZESTRIL    90 tablet    TAKE ONE TABLET BY MOUTH EVERY DAY    Essential hypertension with goal blood pressure less than 140/90       LORazepam 1 MG tablet    ATIVAN    30 tablet    Take 1 tablet (1 mg) by mouth At Bedtime    Malignant gastrointestinal stromal tumor, unspecified site (H)       melatonin 5 MG tablet      Take 5 mg by mouth nightly as needed for sleep        methimazole 5 MG tablet    TAPAZOLE    45 tablet    Take 0.5 tablets (2.5 mg) by mouth daily    Hyperthyroidism       metoprolol tartrate 100 MG tablet    LOPRESSOR    180 tablet    TAKE ONE TABLET BY MOUTH TWICE A DAY    Essential hypertension with goal blood pressure less than 140/90       order for DME     1 Device    Walker    Post hysterectomy menopause       oxyCODONE IR 5 MG tablet    ROXICODONE    180 tablet    Take 1-2 tablets (5-10 mg) by mouth every 3 hours as needed for pain or other (Moderate to Severe)    Chronic low back pain, unspecified back pain laterality, with sciatica presence unspecified       polyethylene glycol powder     MIRALAX/GLYCOLAX     Take 17 g by mouth daily        rosuvastatin 5 MG tablet    CRESTOR    45 tablet    Take 1 tablet (5 mg) by mouth every other day    Hyperlipidemia LDL goal <160       traMADol 50 MG tablet    ULTRAM     Take 50 mg by mouth 3 times daily        VIACTIV 500-500-40 MG-UNT-MCG Chew   Generic drug:  calcium-vitamin D-vitamin K      Take 2 tablets by mouth daily

## 2018-05-22 NOTE — LETTER
"     [facility] MTM     Date: 2018    Idalmis Abdul  [address]   [facility] [address]    Dear Ms. Abdul,    Thank you for talking with me on 2018 about your health and medications. Medicare s MTM (Medication Therapy Management) program helps you understand your medications and use them safely.      This letter includes an action plan (Medication Action Plan) and medication list (Personal Medication List). The action plan has steps you should take to help you get the best results from your medications. The medication list will help you keep track of your medications and how to use them the right way.       Have your action plan and medication list with you when you talk with your doctors, pharmacists, and other healthcare providers in your care team.     Ask your doctors, pharmacists, and other healthcare providers to update the action plan and medication list at every visit.     Take your medication list with you if you go to the hospital or emergency room.     Give a copy of the action plan and medication list to your family or caregivers.     If you want to talk about this letter or any of the papers with it, please call   [phone].   We look forward to working with you, your doctors, and other healthcare providers to help you stay healthy.     Sincerely,    Jessy Osman      Enclosed: Medication Action Plan and Personal Medication List    MEDICATION ACTION PLAN FOR Idalmis Abdul,  1945     This action plan will help you get the best results from your medications if you:   1. Read \"What we talked about.\"   2. Take the steps listed in the \"What I need to do\" boxes.   3. Fill in \"What I did and when I did it.\"   4. Fill in \"My follow-up plan\" and \"Questions I want to ask.\"     Have this action plan with you when you talk with your doctors, pharmacists, and other healthcare providers in your care team. Share this with your family or caregivers " too.  DATE PREPARED: 2018  What we talked about: What my medicines are for, how to know if my medicines are working, made sure my medicines are safe for me and reviewed how to take my medicines.                                                     What I need to do: Take my medicines every day         What I did and when I did it:                                              My follow-up plan:                 Questions I want to ask:              If you have any questions about your action plan, call Jessy Osman, Phone: [phone] , Monday-Friday 9-4:30pm.           MEDICATION LIST FOR Idalmis Abdul,  1945     This medication list was made for you after we talked. We also used information from your doctor's chart.      Use blank rows to add new medications. Then fill in the dates you started using them.    Cross out medications when you no longer use them. Then write the date and why you stopped using them.    Ask your doctors, pharmacists, and other healthcare providers to update this list at every visit. Keep this list up-to-date with:       Prescription medications    Over the counter drugs     Herbals    Vitamins    Minerals      If you go to the hospital or emergency room, take this list with you. Share this with your family or caregivers too.     DATE PREPARED: 2018  Allergies or side effects: Atorvastatin     Medication:  AMLODIPINE BESYLATE 5 MG PO TABS      How I use it:  TAKE ONE TABLET BY MOUTH EVERY DAY      Why I use it: Essential hypertension    Prescriber:  Darlene Sultana MD      Date I started using it:       Date I stopped using it:         Why I stopped using it:            Medication:  ASPIRIN 81 MG PO TBEC      How I use it:  Take 1 tablet by mouth daily.      Why I use it: NSTEMI (non-ST elevated myocardial infarction) (H); ACS (acute coronary syndrome) (H)    Prescriber:  JAIME Perez CNP      Date I started using it:       Date I  stopped using it:         Why I stopped using it:            Medication:  CALCIUM-VITAMIN D-VITAMIN K 500-500-40 MG-UNT-MCG PO CHEW      How I use it:  Take 2 tablets by mouth daily      Why I use it:  supplement    Prescriber:  Patient Reported      Date I started using it:       Date I stopped using it:         Why I stopped using it:            Medication:  HYDROCHLOROTHIAZIDE 25 MG PO TABS      How I use it:  TAKE ONE TABLET BY MOUTH EVERY DAY      Why I use it: Essential hypertension    Prescriber:  Alexa Meredith NP      Date I started using it:       Date I stopped using it:         Why I stopped using it:            Medication:  IMATINIB MESYLATE 400 MG PO TABS      How I use it:  Take 1 tablet (400 mg) by mouth daily Take with a meal and a large glass of water.      Why I use it: Malignant gastrointestinal stromal tumor, unspecified site (H)    Prescriber:  Blayne Schmitz MD      Date I started using it:       Date I stopped using it:         Why I stopped using it:            Medication:  LISINOPRIL 40 MG PO TABS      How I use it:  TAKE ONE TABLET BY MOUTH EVERY DAY      Why I use it: Essential hypertension with goal blood pressure less than 140/90    Prescriber:  Alexa Meredith NP      Date I started using it:       Date I stopped using it:         Why I stopped using it:            Medication:  LORAZEPAM 1 MG PO TABS      How I use it:  Take 1 tablet (1 mg) by mouth At Bedtime      Why I use it: Malignant gastrointestinal stromal tumor, unspecified site (H)    Prescriber:  Blayne Schmitz MD      Date I started using it:       Date I stopped using it:         Why I stopped using it:            Medication:  MELATONIN 5 MG PO TABS      How I use it:  Take 5 mg by mouth nightly as needed for sleep      Why I use it:  insomnia    Prescriber:  Patient Reported      Date I started using it:       Date I stopped using it:         Why I stopped using it:            Medication:   METHIMAZOLE 5 MG PO TABS      How I use it:  Take 0.5 tablets (2.5 mg) by mouth daily      Why I use it: Hyperthyroidism    Prescriber:  Darlene Sultana MD      Date I started using it:       Date I stopped using it:         Why I stopped using it:            Medication:  METOPROLOL TARTRATE 100 MG PO TABS      How I use it:  TAKE ONE TABLET BY MOUTH TWICE A DAY      Why I use it: Essential hypertension with goal blood pressure less than 140/90    Prescriber:  Darlene Sultana MD      Date I started using it:       Date I stopped using it:         Why I stopped using it:         Medication:  OXYCODONE HCL 5 MG PO TABS      How I use it:  Take 1-2 tablets (5-10 mg) by mouth every 3 hours as needed for pain or other (Moderate to Severe)      Why I use it: Chronic low back pain, unspecified back pain laterality, with sciatica presence unspecified    Prescriber:  Darlene Sultana MD      Date I started using it:       Date I stopped using it:         Why I stopped using it:            Medication:  POLYETHYLENE GLYCOL 3350 PO POWD      How I use it:  Take 17 g by mouth daily      Why I use it:  constipation    Prescriber:  Patient Reported      Date I started using it:       Date I stopped using it:         Why I stopped using it:            Medication:  ROSUVASTATIN CALCIUM 5 MG PO TABS      How I use it:  Take 1 tablet (5 mg) by mouth every other day      Why I use it: Hyperlipidemia LDL goal <160    Prescriber:  Darlene Sultana MD      Date I started using it:       Date I stopped using it:         Why I stopped using it:            Medication:  TRAMADOL HCL 50 MG PO TABS      How I use it:  Take 50 mg by mouth 3 times daily      Why I use it:  back pain    Prescriber:  Patient Reported      Date I started using it:       Date I stopped using it:         Why I stopped using it:            Medication:         How I use it:         Why I use it:      Prescriber:         Date I started using it:       Date  I stopped using it:         Why I stopped using it:            Medication:         How I use it:         Why I use it:      Prescriber:         Date I started using it:       Date I stopped using it:         Why I stopped using it:            Medication:         How I use it:         Why I use it:      Prescriber:         Date I started using it:       Date I stopped using it:         Why I stopped using it:              Other Information:     If you have any questions about your action plan, call [phone].    According to the Paperwork Reduction Act of 1995, no persons are required to respond to a collection of information unless it displays a valid OMB control number. The valid OMB number for this information collection is [id number]. The time required to complete this information collection is estimated to average 40 minutes per response, including the time to review instructions, searching existing data resources, gather the data needed, and complete and review the information collection. If you have any comments concerning the accuracy of the time estimate(s) or suggestions for improving this form, please write to: CMS, Attn: DIANA Reports Clearance Officer, [address] [place] [phone].

## 2018-05-23 VITALS — SYSTOLIC BLOOD PRESSURE: 104 MMHG | DIASTOLIC BLOOD PRESSURE: 59 MMHG | HEART RATE: 67 BPM

## 2018-05-24 DIAGNOSIS — C49.A0 MALIGNANT GASTROINTESTINAL STROMAL TUMOR, UNSPECIFIED SITE (H): Primary | ICD-10-CM

## 2018-05-24 RX ORDER — IMATINIB MESYLATE 400 MG/1
400 TABLET, FILM COATED ORAL DAILY
Qty: 30 TABLET | Refills: 2 | Status: SHIPPED | OUTPATIENT
Start: 2018-05-24 | End: 2019-01-10

## 2018-06-05 ENCOUNTER — PATIENT OUTREACH (OUTPATIENT)
Dept: CARE COORDINATION | Facility: CLINIC | Age: 73
End: 2018-06-05

## 2018-06-05 NOTE — PROGRESS NOTES
Clinic Care Coordination Contact    Situation: Patient chart reviewed by care coordinator.    Background: Original referral after hospitalization at [facility] for spinal surgery. Patient denied ongoing needs at that time but did call RN CC for help with obtaining refill for her ativan. This was done but kept patient open in case further needs should arise. In chart review today, note that she has followed up with PCP and surgeon. Surgeon referred for MTM f/u. This has been done and MTM f/u recommended in 1 yr.     Assessment: No new CC needs noted at this time.    Plan/Recommendations: RN CC will do no further outreaches at this time.    Taran DUNNN,RN- BC  Clinic Care Coordinator  [facility]  Phone: [phone]

## 2018-06-14 ENCOUNTER — TELEPHONE (OUTPATIENT)
Dept: FAMILY MEDICINE | Facility: CLINIC | Age: 73
End: 2018-06-14

## 2018-06-14 NOTE — TELEPHONE ENCOUNTER
Panel Management Review      Summary:    Patient is due/failing the following:   FIT and MAMMOGRAM    Action needed:   Patient needs office visit for mammo.    Type of outreach:    Sent letter.    Questions for provider review:    None                                                                                                                                    Sarah MENDEZ CMA       Chart routed to None .

## 2018-06-14 NOTE — LETTER
June 14, 2018      Idalmis Abdul     [facility] [address]        Dear Dr. Kayy Raygoza's Care Team has been reviewing your chart and it appears that there are aspects of your care that could be improved. At this time you are due for a Mammogram Breast Cancer screening and a take home Colon Cancer screening.  If you could plan to do that in the near future it would be very helpful.  We are trying to help our patients achieve their health care goals.    Please let us know if you have had this testing done at another health care facility, we can update this information in your chart.    If you have any questions or would like to make an appointment to complete these, please feel free to call the clinic at [phone].        Sincerely,    Dr. Sultana's care team

## 2018-06-20 DIAGNOSIS — E05.00 GRAVES' DISEASE: ICD-10-CM

## 2018-06-20 LAB
T3 SERPL-MCNC: 95 NG/DL (ref 60–181)
T4 FREE SERPL-MCNC: 1.13 NG/DL (ref 0.76–1.46)
TSH SERPL DL<=0.005 MIU/L-ACNC: 0.78 MU/L (ref 0.4–4)

## 2018-06-20 PROCEDURE — 84445 ASSAY OF TSI GLOBULIN: CPT | Mod: 90 | Performed by: INTERNAL MEDICINE

## 2018-06-20 PROCEDURE — 99000 SPECIMEN HANDLING OFFICE-LAB: CPT | Performed by: INTERNAL MEDICINE

## 2018-06-20 PROCEDURE — 84443 ASSAY THYROID STIM HORMONE: CPT | Performed by: INTERNAL MEDICINE

## 2018-06-20 PROCEDURE — 36415 COLL VENOUS BLD VENIPUNCTURE: CPT | Performed by: INTERNAL MEDICINE

## 2018-06-20 PROCEDURE — 84439 ASSAY OF FREE THYROXINE: CPT | Performed by: INTERNAL MEDICINE

## 2018-06-20 PROCEDURE — 84480 ASSAY TRIIODOTHYRONINE (T3): CPT | Performed by: INTERNAL MEDICINE

## 2018-06-22 ENCOUNTER — TELEPHONE (OUTPATIENT)
Dept: PEDIATRICS | Facility: CLINIC | Age: 73
End: 2018-06-22

## 2018-06-22 DIAGNOSIS — I10 ESSENTIAL HYPERTENSION WITH GOAL BLOOD PRESSURE LESS THAN 140/90: ICD-10-CM

## 2018-06-22 DIAGNOSIS — E78.5 HYPERLIPIDEMIA LDL GOAL <160: ICD-10-CM

## 2018-06-22 DIAGNOSIS — Z85.118 HISTORY OF LUNG CANCER: ICD-10-CM

## 2018-06-22 DIAGNOSIS — C49.A0 MALIGNANT GASTROINTESTINAL STROMAL TUMOR, UNSPECIFIED SITE (H): ICD-10-CM

## 2018-06-22 LAB
ALBUMIN SERPL-MCNC: 3.4 G/DL (ref 3.4–5)
ALP SERPL-CCNC: 80 U/L (ref 40–150)
ALT SERPL W P-5'-P-CCNC: 23 U/L (ref 0–50)
ANION GAP SERPL CALCULATED.3IONS-SCNC: 6 MMOL/L (ref 3–14)
AST SERPL W P-5'-P-CCNC: 26 U/L (ref 0–45)
BASOPHILS # BLD AUTO: 0.1 10E9/L (ref 0–0.2)
BASOPHILS NFR BLD AUTO: 1.2 %
BILIRUB SERPL-MCNC: 0.3 MG/DL (ref 0.2–1.3)
BUN SERPL-MCNC: 16 MG/DL (ref 7–30)
CALCIUM SERPL-MCNC: 8.7 MG/DL (ref 8.5–10.1)
CHLORIDE SERPL-SCNC: 98 MMOL/L (ref 94–109)
CHOLEST SERPL-MCNC: 110 MG/DL
CO2 SERPL-SCNC: 27 MMOL/L (ref 20–32)
CREAT SERPL-MCNC: 1.04 MG/DL (ref 0.52–1.04)
DIFFERENTIAL METHOD BLD: ABNORMAL
EOSINOPHIL # BLD AUTO: 0.2 10E9/L (ref 0–0.7)
EOSINOPHIL NFR BLD AUTO: 4.1 %
ERYTHROCYTE [DISTWIDTH] IN BLOOD BY AUTOMATED COUNT: 14.6 % (ref 10–15)
GFR SERPL CREATININE-BSD FRML MDRD: 52 ML/MIN/1.7M2
GLUCOSE SERPL-MCNC: 99 MG/DL (ref 70–99)
HCT VFR BLD AUTO: 30.3 % (ref 35–47)
HDLC SERPL-MCNC: 48 MG/DL
HGB BLD-MCNC: 10.1 G/DL (ref 11.7–15.7)
LDLC SERPL CALC-MCNC: 46 MG/DL
LYMPHOCYTES # BLD AUTO: 1.2 10E9/L (ref 0.8–5.3)
LYMPHOCYTES NFR BLD AUTO: 19.9 %
MCH RBC QN AUTO: 29 PG (ref 26.5–33)
MCHC RBC AUTO-ENTMCNC: 33.3 G/DL (ref 31.5–36.5)
MCV RBC AUTO: 87 FL (ref 78–100)
MONOCYTES # BLD AUTO: 0.9 10E9/L (ref 0–1.3)
MONOCYTES NFR BLD AUTO: 15.3 %
NEUTROPHILS # BLD AUTO: 3.5 10E9/L (ref 1.6–8.3)
NEUTROPHILS NFR BLD AUTO: 59.5 %
NONHDLC SERPL-MCNC: 62 MG/DL
PLATELET # BLD AUTO: 203 10E9/L (ref 150–450)
POTASSIUM SERPL-SCNC: 3.9 MMOL/L (ref 3.4–5.3)
PROT SERPL-MCNC: 6.6 G/DL (ref 6.8–8.8)
RBC # BLD AUTO: 3.48 10E12/L (ref 3.8–5.2)
SODIUM SERPL-SCNC: 131 MMOL/L (ref 133–144)
TRIGL SERPL-MCNC: 82 MG/DL
WBC # BLD AUTO: 5.9 10E9/L (ref 4–11)

## 2018-06-22 PROCEDURE — 80053 COMPREHEN METABOLIC PANEL: CPT | Performed by: INTERNAL MEDICINE

## 2018-06-22 PROCEDURE — 85025 COMPLETE CBC W/AUTO DIFF WBC: CPT | Performed by: INTERNAL MEDICINE

## 2018-06-22 PROCEDURE — 80061 LIPID PANEL: CPT | Performed by: INTERNAL MEDICINE

## 2018-06-22 PROCEDURE — 36415 COLL VENOUS BLD VENIPUNCTURE: CPT | Performed by: INTERNAL MEDICINE

## 2018-06-22 RX ORDER — LISINOPRIL 40 MG/1
TABLET ORAL
Qty: 90 TABLET | Refills: 1 | Status: SHIPPED | OUTPATIENT
Start: 2018-06-22 | End: 2019-01-16

## 2018-06-22 NOTE — TELEPHONE ENCOUNTER
Refilled Lisinopril. Sodium level was slightly low recommend to hold hydrochlorothiazide for 2 days. Recommend follow up with PCP in few weeks and recheck electrolytes again after restarting hydrochlorothiazide. Sodium levels were normal before while she was on hydrochlorothiazide.    JAIME Oseguera CNP, covering for PCP

## 2018-06-22 NOTE — TELEPHONE ENCOUNTER
Routing refill request to provider for review/approval because:  Neha given x1 on 6/20/18  Patient had labs done for CMP today ordered by oncologist, they are still in process,     Lisinopril 40 mg tablet    Anat MOREL Rn

## 2018-06-22 NOTE — TELEPHONE ENCOUNTER
Patient notified of provider's recommendations regarding sodium level and HCTZ medication, and informed Lisinopril sent to pharmacy today  Patient verbalized understanding and agrees with provider's plan    Anat MOREL Rn

## 2018-06-22 NOTE — TELEPHONE ENCOUNTER
Patient called stating that pharmacy told her she needed to have labs done before next refill of lisinopril. Please note patient had labs drawn for today 06/22/2018 for  Oncologist: labs that were drawn CBC, CMP. tsh.  Unsure if these labs will work for future refill on lisinopril or if additional labs could be added to lab draw from today.      [facility] pharmacy.     Brittany Carpio

## 2018-06-25 LAB — TSI SER-ACNC: 4.4 TSI INDEX

## 2018-06-26 DIAGNOSIS — E78.5 HYPERLIPIDEMIA LDL GOAL <160: ICD-10-CM

## 2018-06-27 NOTE — TELEPHONE ENCOUNTER
"Requested Prescriptions   Pending Prescriptions Disp Refills     rosuvastatin (CRESTOR) 5 MG tablet [Pharmacy Med Name: ROSUVASTATIN CALCIUM 5MG TABS] 90 tablet 0     Sig: TAKE ONE TABLET BY MOUTH EVERY DAY    Statins Protocol Passed    6/26/2018  9:59 AM       Passed - LDL on file in past 12 months    Recent Labs   Lab Test  06/22/18   0840   LDL  46            Passed - No abnormal creatine kinase in past 12 months    Recent Labs   Lab Test  07/11/12 2000   CKT  36               Passed - Recent (12 mo) or future (30 days) visit within the authorizing provider's specialty    Patient had office visit in the last 12 months or has a visit in the next 30 days with authorizing provider or within the authorizing provider's specialty.  See \"Patient Info\" tab in inbasket, or \"Choose Columns\" in Meds & Orders section of the refill encounter.           Passed - Patient is age 18 or older       Passed - No active pregnancy on record       Passed - No positive pregnancy test in past 12 months        Last Written Prescription Date:  6/26/18  Last Fill Quantity: 90,  # refills: 0   Last office visit: 4/13/2018 with prescribing provider:  Kayy   Future Office Visit:   Next 5 appointments (look out 90 days)     Aug 20, 2018  2:30 PM CDT   Return Visit with Tootie Herman MD   [facility]    [address] [place] [id number] [phone]                   "

## 2018-06-28 RX ORDER — ROSUVASTATIN CALCIUM 5 MG/1
TABLET, COATED ORAL
Qty: 90 TABLET | Refills: 2 | Status: SHIPPED | OUTPATIENT
Start: 2018-06-28 | End: 2019-12-31

## 2018-06-28 NOTE — TELEPHONE ENCOUNTER
Prescription approved per [facility] Refill Protocol.  Nadine NAILS RN

## 2018-07-05 ENCOUNTER — TELEPHONE (OUTPATIENT)
Dept: FAMILY MEDICINE | Facility: CLINIC | Age: 73
End: 2018-07-05

## 2018-07-05 DIAGNOSIS — C49.A0 GIST (GASTROINTESTINAL STROMAL TUMOR), MALIGNANT (H): Primary | ICD-10-CM

## 2018-07-05 NOTE — TELEPHONE ENCOUNTER
I have called the patient and have straightened out the confusion of appts and labs.  Emily SHEIKH RN

## 2018-07-05 NOTE — TELEPHONE ENCOUNTER
"Reason for call:  Patient reporting a symptom    Symptom or request: Pt is upset.  She states that someone from Dr. Sultana's office called her 7/2 and told her that she needed to make an appt with Dr. Sultana because her \"lab tests were abnormal.\"  So she made an appt 7/2 and was just called and told that Dr. Sultana cannot see her until October?  She wants to know if an appt is necessary and why she needs the appt and who should she see, if she does need an appt and her lab work is indeed abnormal?      Duration (how long have symptoms been present): today    Have you been treated for this before? No    Additional comments:     Phone Number patient can be reached at:  Cell number on file:    Telephone Information:   Mobile [phone]       Best Time:  any    Can we leave a detailed message on this number:  YES    Call taken on 7/5/2018 at 2:38 PM by Casandra Claudio    "

## 2018-07-09 DIAGNOSIS — C49.A0 MALIGNANT GASTROINTESTINAL STROMAL TUMOR, UNSPECIFIED SITE (H): ICD-10-CM

## 2018-07-09 RX ORDER — LORAZEPAM 0.5 MG/1
0.5 TABLET ORAL AT BEDTIME
Qty: 90 TABLET | Refills: 0 | Status: SHIPPED | OUTPATIENT
Start: 2018-07-09 | End: 2018-10-08

## 2018-07-23 ENCOUNTER — OFFICE VISIT (OUTPATIENT)
Dept: FAMILY MEDICINE | Facility: CLINIC | Age: 73
End: 2018-07-23
Payer: COMMERCIAL

## 2018-07-23 VITALS
OXYGEN SATURATION: 99 % | BODY MASS INDEX: 27.46 KG/M2 | TEMPERATURE: 98.9 F | SYSTOLIC BLOOD PRESSURE: 138 MMHG | HEART RATE: 71 BPM | DIASTOLIC BLOOD PRESSURE: 86 MMHG | WEIGHT: 155 LBS | HEIGHT: 63 IN

## 2018-07-23 DIAGNOSIS — E87.1 HYPONATREMIA: Primary | ICD-10-CM

## 2018-07-23 LAB
ANION GAP SERPL CALCULATED.3IONS-SCNC: 6 MMOL/L (ref 3–14)
BUN SERPL-MCNC: 20 MG/DL (ref 7–30)
CALCIUM SERPL-MCNC: 8.1 MG/DL (ref 8.5–10.1)
CHLORIDE SERPL-SCNC: 98 MMOL/L (ref 94–109)
CO2 SERPL-SCNC: 26 MMOL/L (ref 20–32)
CREAT SERPL-MCNC: 1.08 MG/DL (ref 0.52–1.04)
GFR SERPL CREATININE-BSD FRML MDRD: 50 ML/MIN/1.7M2
GLUCOSE SERPL-MCNC: 107 MG/DL (ref 70–99)
POTASSIUM SERPL-SCNC: 3.9 MMOL/L (ref 3.4–5.3)
SODIUM SERPL-SCNC: 130 MMOL/L (ref 133–144)

## 2018-07-23 PROCEDURE — 80048 BASIC METABOLIC PNL TOTAL CA: CPT | Performed by: FAMILY MEDICINE

## 2018-07-23 PROCEDURE — 99213 OFFICE O/P EST LOW 20 MIN: CPT | Performed by: FAMILY MEDICINE

## 2018-07-23 PROCEDURE — 36415 COLL VENOUS BLD VENIPUNCTURE: CPT | Performed by: FAMILY MEDICINE

## 2018-07-23 NOTE — MR AVS SNAPSHOT
After Visit Summary   7/23/2018    Idalmis Abdul    MRN: [id number]           Patient Information     Date Of Birth          1945        Visit Information        Provider Department      7/23/2018 11:20 AM Michael Green MD [facility]        Today's Diagnoses     Hyponatremia    -  1      Care Instructions          Thank you for choosing [facility].  You may be receiving a survey in the mail from [facility] regarding your visit today.  Please take a few minutes to complete and return the survey to let us know how we are doing.      If you have questions or concerns, please contact us via Virtual View App or you can contact your care team at [phone].    Our Clinic hours are:  Monday 6:40 am  to 7:00 pm  Tuesday -Friday 6:40 am to 5:00 pm    The [place] outpatient lab hours are:  Monday - Friday 6:10 am to 4:45 pm  Saturdays 7:00 am to 11:00 am  Appointments are required, call [phone]    If you have clinical questions after hours or would like to schedule an appointment,  call the clinic at [phone].  Hyponatremia  Hyponatremia means low sodium levels in the blood. This condition most often occurs after prolonged vomiting or diarrhea, which causes your body to lose too much water and sodium. It can also result from drinking excess amounts of water or the use of diuretics (water pills). Rarely, it can be associated with disorders of your endocrine system, as side effects of illicit drug use (ecstasy), as a complication of some cancers especially small cell lung cancer, or as a complication of renal and liver disease or heart failure.  Mild hyponatremia causes no symptoms. It is only discovered with a blood test. As sodium levels in the blood decreases, symptoms begin to appear. This includes weakness, confusion, muscle cramping and seizures.  Home care    Reduce your daily water intake until the problem is corrected.    If you have been taking diuretics, you  may be asked to stop taking them for a short time.    If you are having symptoms of weakness or confusion, do not drive or operate dangerous machinery until symptoms resolve.    If your sodium levels are too low to be managed at home with the above recommendations, you will be asked to go to the hospital to have your sodium replaced through your vein.  Follow-up care  Follow up with your healthcare provider for a repeat blood test within the next week, or as advised.  When to seek medical advice  Call your healthcare provider if any of the following occur:    Increasing weakness    Dizziness    Irregular heartbeat, extra beats or very fast heart rate    Increasing confusion    Fainting or loss of consciousness    Seizure  Date Last Reviewed: 7/1/2017 2000-2017 [employer]. [address] [place] [address]. All rights reserved. This information is not intended as a substitute for professional medical care. Always follow your healthcare professional's instructions.                Follow-ups after your visit        Your next 10 appointments already scheduled     Aug 20, 2018  2:30 PM CDT   Return Visit with Tootie Herman MD   [facility] ([facility])    [address] [place] [id number]   [phone]            Aug 28, 2018 10:00 AM CDT   (Arrive by 9:45 AM)   RETURN ENDOCRINE with TIERRA Antoine MD   [facility] ([facility])    [address] [place] [id number] [phone]            Oct 05, 2018 10:00 AM CDT   LAB with [facility] ([facility])    [address] [place] [address]   [phone]           Please do not eat 10-12 hours before your appointment if you are coming in fasting for labs on lipids, cholesterol, or glucose (sugar). This does not apply to pregnant women. Water, hot tea and black coffee  (with nothing added) are okay. Do not drink other fluids, diet soda or chew gum.            Oct 05, 2018 10:30 AM CDT   CT CHEST ABDOMEN PELVIS W/O & W CONTRAST with [facility]   [facility] CT ([facility])    [address] [facility] [address]   [phone]           Please bring any scans or X-rays taken at other hospitals, if similar tests were done. Also bring a list of your medicines, including vitamins, minerals and over-the-counter drugs. It is safest to leave personal items at home.  Be sure to tell your doctor:   If you have any allergies.   If there s any chance you are pregnant.   If you are breastfeeding.  How to prepare:   Do not eat or drink for 2 hours before your exam. If you need to take medicine, you may take it with small sips of water. (We may ask you to take liquid medicine as well.)   Please wear loose clothing, such as a sweat suit or jogging clothes. Avoid snaps, zippers and other metal. We may ask you to undress and put on a hospital gown.  Please arrive 30 minutes early for your CT. Once in the department you might be asked to drink water 15-20 minutes prior to your exam.  If indicated you may be asked to drink an oral contrast in advance of your CT.  If this is the case, the imaging team will let you know or be in contact with you prior to your appointment  Patients over 70 or patients with diabetes or kidney problems:   If you haven t had a blood test (creatinine test) within the last 30 days, the Cardiologist/Radiologist may require you to get this test prior to your exam.  If you have diabetes:   Continue to take your metformin medication on the day of your exam  If you have any questions, please call the Imaging Department where you will have your exam.            Oct 08, 2018  9:15 AM CDT   (Arrive by 9:00 AM)   Return Visit with Blayne Schmitz MD   [facility] ([facility])    [address] [place] [address]  [address]  [facility] [address] [phone]              Who to contact     If you have questions or need follow up information about today's clinic visit or your schedule please contact [facility] directly at [phone].  Normal or non-critical lab and imaging results will be communicated to you by MyChart, letter or phone within 4 business days after the clinic has received the results. If you do not hear from us within 7 days, please contact the clinic through MyChart or phone. If you have a critical or abnormal lab result, we will notify you by phone as soon as possible.  Submit refill requests through [employer] or call your pharmacy and they will forward the refill request to us. Please allow 3 business days for your refill to be completed.          Additional Information About Your Visit        Care EveryWhere ID     This is your Care EveryWhere ID. This could be used by other organizations to access your [facility] medical records  [id number]        Your Vitals Were     Pulse Temperature Height Last Period Pulse Oximetry BMI (Body Mass Index)    71 98.9  F (37.2  C) (Tympanic) 5' 3\" (1.6 m) 01/01/1992 99% 27.46 kg/m2       Blood Pressure from Last 3 Encounters:   07/23/18 138/86   05/22/18 104/59   04/13/18 115/68    Weight from Last 3 Encounters:   07/23/18 155 lb (70.3 kg)   04/13/18 157 lb 6.4 oz (71.4 kg)   04/02/18 158 lb 6.4 oz (71.8 kg)              We Performed the Following     Basic metabolic panel          Today's Medication Changes          These changes are accurate as of 7/23/18 11:39 AM.  If you have any questions, ask your nurse or doctor.               These medicines have changed or have updated prescriptions.        Dose/Directions    oxyCODONE IR 5 MG tablet   Commonly known as:  ROXICODONE   This may have changed:    - how much to take  - when to take this   Used for:  Chronic low back pain, unspecified back pain laterality, with sciatica presence unspecified        Dose:  " 5-10 mg   Take 1-2 tablets (5-10 mg) by mouth every 3 hours as needed for pain or other (Moderate to Severe)   Quantity:  180 tablet   Refills:  0                Primary Care Provider Office Phone # Fax #    Darlene Daniela Sultana [phone] [address] [facility] [address]        Equal Access to Services     [facility] : Hadii aad ku hadasho Soomaali, waaxda luqadaha, qaybta kaalmada adeegyada, waxay idiin hayaan adeeg kharash lanoan . So [facility] [phone].    ATENCIÓN: Si habla español, tiene a cassidy disposición servicios gratuitos de asistencia lingüística. Llame al [phone].    We comply with applicable federal civil rights laws and [place] laws. We do not discriminate on the basis of race, color, national origin, age, disability, sex, sexual orientation, or gender identity.            Thank you!     Thank you for choosing [facility]  for your care. Our goal is always to provide you with excellent care. Hearing back from our patients is one way we can continue to improve our services. Please take a few minutes to complete the written survey that you may receive in the mail after your visit with us. Thank you!             Your Updated Medication List - Protect others around you: Learn how to safely use, store and throw away your medicines at www.disposemymeds.org.          This list is accurate as of 7/23/18 11:39 AM.  Always use your most recent med list.                   Brand Name Dispense Instructions for use Diagnosis    Adult Blood Pressure Cuff Lg Kit     1 kit    1 Device 2 times daily    HTN, goal below 140/90       amLODIPine 5 MG tablet    NORVASC    30 tablet    TAKE ONE TABLET BY MOUTH EVERY DAY    Essential hypertension       aspirin 81 MG EC tablet     30 tablet    Take 1 tablet by mouth daily.    NSTEMI (non-ST elevated myocardial infarction) (H), ACS (acute coronary syndrome) (H)       hydrochlorothiazide 25 MG tablet    HYDRODIURIL    90 tablet     TAKE ONE TABLET BY MOUTH EVERY DAY    Essential hypertension       imatinib 400 MG tablet    GLEEVEC    30 tablet    Take 1 tablet (400 mg) by mouth daily Take with a meal and a large glass of water.    Malignant gastrointestinal stromal tumor, unspecified site (H)       lisinopril 40 MG tablet    PRINIVIL/ZESTRIL    90 tablet    TAKE ONE TABLET BY MOUTH EVERY DAY    Essential hypertension with goal blood pressure less than 140/90       LORazepam 0.5 MG tablet    ATIVAN    90 tablet    Take 1 tablet (0.5 mg) by mouth At Bedtime    Malignant gastrointestinal stromal tumor, unspecified site (H)       melatonin 5 MG tablet      Take 5 mg by mouth nightly as needed for sleep        methimazole 5 MG tablet    TAPAZOLE    45 tablet    Take 0.5 tablets (2.5 mg) by mouth daily    Hyperthyroidism       metoprolol tartrate 100 MG tablet    LOPRESSOR    180 tablet    TAKE ONE TABLET BY MOUTH TWICE A DAY    Essential hypertension with goal blood pressure less than 140/90       order for DME     1 Device    Walker    Post hysterectomy menopause       oxyCODONE IR 5 MG tablet    ROXICODONE    180 tablet    Take 1-2 tablets (5-10 mg) by mouth every 3 hours as needed for pain or other (Moderate to Severe)    Chronic low back pain, unspecified back pain laterality, with sciatica presence unspecified       polyethylene glycol powder    MIRALAX/GLYCOLAX     Take 17 g by mouth daily        * rosuvastatin 5 MG tablet    CRESTOR    45 tablet    Take 1 tablet (5 mg) by mouth every other day    Hyperlipidemia LDL goal <160       * rosuvastatin 5 MG tablet    CRESTOR    90 tablet    TAKE ONE TABLET BY MOUTH EVERY DAY    Hyperlipidemia LDL goal <160       traMADol 50 MG tablet    ULTRAM     Take 50 mg by mouth 3 times daily        VIACTIV 500-500-40 MG-UNT-MCG Chew   Generic drug:  calcium-vitamin D-vitamin K      Take 2 tablets by mouth daily        * Notice:  This list has 2 medication(s) that are the same as other medications prescribed  for you. Read the directions carefully, and ask your doctor or other care provider to review them with you.

## 2018-07-23 NOTE — PROGRESS NOTES
SUBJECTIVE:   Idalmis Abdul is a 72 year old female who presents to clinic today for the following health issues:      Chief Complaint   Patient presents with     Results     Patient is here for low sodium and was told to come in and see a doctor       Patient has a history of hypertension.she had routine labs done and her sodium was found to be low.     She was told to come in and have a recheck of her sodium before further refills. Patient reports no symptoms.       Problem list and histories reviewed & adjusted, as indicated.  Additional history: as documented    Patient Active Problem List   Diagnosis     Hypertension goal BP (blood pressure) < 140/90     GIST (gastrointestinal stromal tumor), malignant (H)     Eyelid edema     History of lung cancer     Health Care Home     NSTEMI (non-ST elevated myocardial infarction) (H)     ASCVD (arteriosclerotic cardiovascular disease)     Postsurgical aortocoronary bypass status     S/P CABG x 4     Dyspnea     GERD (gastroesophageal reflux disease)     Hyperlipidemia LDL goal <160     Hyperthyroidism     Thyroid eye disease     Eyelid retraction or lag     Thyrotoxic exophthalmos     Graves' disease     History of tobacco abuse     History of non-ST elevation myocardial infarction (NSTEMI)     Chronic back pain     PVD (posterior vitreous detachment)     Age-related osteoporosis without current pathological fracture     Spinal stenosis of lumbosacral region     DDD (degenerative disc disease), lumbar     Past Surgical History:   Procedure Laterality Date     BYPASS GRAFT ARTERY CORONARY  6/14/2012    Procedure: BYPASS GRAFT ARTERY CORONARY;  Median Sternotomy Coronary Artery Bypass Graft  x 3        C THORACOSCOPY,DX W BX  fall 2003    benign tissue     CATARACT IOL, RT/LT      LE     CHOLECYSTECTOMY  1963     COLONOSCOPY  4-12-05     CORONARY ARTERY BYPASS      X4     CREATION PERICARDIAL WINDOW  6/15/2012    Procedure: CREATION PERICARDIAL WINDOW;  Mediastinal  Exploration, Control of Bleeding;  Surgeon: Dwaine Griffin MD;  Location: [facility] OR     EYE SURGERY  2014    left cataract removal     GI SURGERY  2003 and 2007    GIST tumor removal     GYN SURGERY      tubal ligation     HYSTERECTOMY VAGINAL, COLPORRHAPHY ANTERIOR, POSTERIOR, COMBINED N/A 10/17/2017    Procedure: COMBINED HYSTERECTOMY VAGINAL, COLPORRHAPHY ANTERIOR, POSTERIOR;  Total Vaginal Hysterectomy and Posterior Repair,Sacrospinous Vault Suspension;  Surgeon: Ruth Farooq MD;  Location: [facility]     PHACOEMULSIFICATION WITH STANDARD INTRAOCULAR LENS IMPLANT  3/24/2014    Procedure: PHACOEMULSIFICATION WITH STANDARD INTRAOCULAR LENS IMPLANT;  Left Kelman Phacoemulsification with Intraocular Lens Implant;  Surgeon: Magnus Mkceon MD;  Location: [facility] OR     RECESSION RESECTION WITH ADJUSTABLE SUTURE BILATERAL Bilateral 7/14/2016    Procedure: RECESSION RESECTION WITH ADJUSTABLE SUTURE BILATERAL;  Surgeon: Erma Ordaz MD;  Location: [facility] OR     SURGICAL HISTORY OF -   1963    cholecystectomy     SURGICAL HISTORY OF -   1970's    Tubal Ligation      SURGICAL HISTORY OF -   08/03    Explor. Lap, Excision of Small Bowel Tumor      SURGICAL HISTORY OF -   4/2010    lung cancer removed right lung       Social History   Substance Use Topics     Smoking status: Former Smoker     Packs/day: 0.50     Years: 49.00     Types: Cigarettes     Quit date: 4/19/2010     Smokeless tobacco: Never Used     Alcohol use No     Family History   Problem Relation Age of Onset     HEART DISEASE Mother      Osteoperosis Mother      Respiratory Mother      Emphezyma     Hypertension Mother      HEART DISEASE Father      Alcohol/Drug Father      Hypertension Father      Hypertension Maternal Grandmother      Hypertension Brother      Hypertension Sister      Arthritis Sister      Hypertension Son      Cancer Son      rectal         Current Outpatient Prescriptions   Medication Sig Dispense Refill     amLODIPine  (NORVASC) 5 MG tablet TAKE ONE TABLET BY MOUTH EVERY DAY 30 tablet 9     aspirin EC 81 MG EC tablet Take 1 tablet by mouth daily. 30 tablet 2     Blood Pressure Monitoring (ADULT BLOOD PRESSURE CUFF LG) KIT 1 Device 2 times daily 1 kit 1     calcium-vitamin D-vitamin K (VIACTIV) 500-500-40 MG-UNT-MCG CHEW Take 2 tablets by mouth daily       hydrochlorothiazide (HYDRODIURIL) 25 MG tablet TAKE ONE TABLET BY MOUTH EVERY DAY 90 tablet 3     imatinib (GLEEVEC) 400 MG tablet Take 1 tablet (400 mg) by mouth daily Take with a meal and a large glass of water. 30 tablet 2     lisinopril (PRINIVIL/ZESTRIL) 40 MG tablet TAKE ONE TABLET BY MOUTH EVERY DAY 90 tablet 1     LORazepam (ATIVAN) 0.5 MG tablet Take 1 tablet (0.5 mg) by mouth At Bedtime 90 tablet 0     melatonin 5 MG tablet Take 5 mg by mouth nightly as needed for sleep       methimazole (TAPAZOLE) 5 MG tablet Take 0.5 tablets (2.5 mg) by mouth daily 45 tablet 0     metoprolol (LOPRESSOR) 100 MG tablet TAKE ONE TABLET BY MOUTH TWICE A  tablet 3     order for DME Walker 1 Device 0     oxyCODONE IR (ROXICODONE) 5 MG tablet Take 1-2 tablets (5-10 mg) by mouth every 3 hours as needed for pain or other (Moderate to Severe) (Patient taking differently: Take 2.5 mg by mouth daily ) 180 tablet 0     polyethylene glycol (MIRALAX/GLYCOLAX) powder Take 17 g by mouth daily       rosuvastatin (CRESTOR) 5 MG tablet TAKE ONE TABLET BY MOUTH EVERY DAY 90 tablet 2     rosuvastatin (CRESTOR) 5 MG tablet Take 1 tablet (5 mg) by mouth every other day 45 tablet 3     traMADol (ULTRAM) 50 MG tablet Take 50 mg by mouth 3 times daily       Allergies   Allergen Reactions     Atorvastatin Cramps     BP Readings from Last 3 Encounters:   07/23/18 138/86   05/22/18 104/59   04/13/18 115/68    Wt Readings from Last 3 Encounters:   07/23/18 155 lb (70.3 kg)   04/13/18 157 lb 6.4 oz (71.4 kg)   04/02/18 158 lb 6.4 oz (71.8 kg)                  Labs reviewed in EPIC    Reviewed and updated as  "needed this visit by clinical staff  Allergies       Reviewed and updated as needed this visit by Provider         ROS:  Constitutional, HEENT, cardiovascular, pulmonary, gi and gu systems are negative, except as otherwise noted.    OBJECTIVE:     /86 (BP Location: Left arm, Cuff Size: Adult Regular)  Pulse 71  Temp 98.9  F (37.2  C) (Tympanic)  Ht 5' 3\" (1.6 m)  Wt 155 lb (70.3 kg)  LMP 01/01/1992  SpO2 99%  BMI 27.46 kg/m2  Body mass index is 27.46 kg/(m^2).  GENERAL: healthy, alert and no distress  EYES: Eyes grossly normal to inspection, PERRL and conjunctivae and sclerae normal  HENT: ear canals and TM's normal, nose and mouth without ulcers or lesions  NECK: no adenopathy, no asymmetry, masses, or scars and thyroid normal to palpation  RESP: lungs clear to auscultation - no rales, rhonchi or wheezes  CV: regular rate and rhythm, normal S1 S2, no S3 or S4, no murmur, click or rub, no peripheral edema and peripheral pulses strong  MS: no gross musculoskeletal defects noted, no edema    Diagnostic Test Results:  Pending     ASSESSMENT/PLAN:   1. Hyponatremia  Patient will be notified of results   - Basic metabolic panel    FUTURE APPOINTMENTS:       - Follow-up visit as needed  Patient Instructions         Thank you for choosing [facility].  You may be receiving a survey in the mail from [employer] regarding your visit today.  Please take a few minutes to complete and return the survey to let us know how we are doing.      If you have questions or concerns, please contact us via [employer] or you can contact your care team at [phone].    Our Clinic hours are:  Monday 6:40 am  to 7:00 pm  Tuesday -Friday 6:40 am to 5:00 pm    The [place] outpatient lab hours are:  Monday - Friday 6:10 am to 4:45 pm  Saturdays 7:00 am to 11:00 am  Appointments are required, call [phone]    If you have clinical questions after hours or would like to schedule an appointment,  call the clinic at " [phone].  Hyponatremia  Hyponatremia means low sodium levels in the blood. This condition most often occurs after prolonged vomiting or diarrhea, which causes your body to lose too much water and sodium. It can also result from drinking excess amounts of water or the use of diuretics (water pills). Rarely, it can be associated with disorders of your endocrine system, as side effects of illicit drug use (ecstasy), as a complication of some cancers especially small cell lung cancer, or as a complication of renal and liver disease or heart failure.  Mild hyponatremia causes no symptoms. It is only discovered with a blood test. As sodium levels in the blood decreases, symptoms begin to appear. This includes weakness, confusion, muscle cramping and seizures.  Home care    Reduce your daily water intake until the problem is corrected.    If you have been taking diuretics, you may be asked to stop taking them for a short time.    If you are having symptoms of weakness or confusion, do not drive or operate dangerous machinery until symptoms resolve.    If your sodium levels are too low to be managed at home with the above recommendations, you will be asked to go to the hospital to have your sodium replaced through your vein.  Follow-up care  Follow up with your healthcare provider for a repeat blood test within the next week, or as advised.  When to seek medical advice  Call your healthcare provider if any of the following occur:    Increasing weakness    Dizziness    Irregular heartbeat, extra beats or very fast heart rate    Increasing confusion    Fainting or loss of consciousness    Seizure  Date Last Reviewed: 7/1/2017 2000-2017 [employer]. [address] [place] [address], [place] [address]. All rights reserved. This information is not intended as a substitute for professional medical care. Always follow your healthcare professional's instructions.            Michael Green MD  [facility]  [place]

## 2018-07-23 NOTE — PATIENT INSTRUCTIONS
Thank you for choosing [facility].  You may be receiving a survey in the mail from Sadi Snyder regarding your visit today.  Please take a few minutes to complete and return the survey to let us know how we are doing.      If you have questions or concerns, please contact us via [employer] or you can contact your care team at [phone].    Our Clinic hours are:  Monday 6:40 am  to 7:00 pm  Tuesday -Friday 6:40 am to 5:00 pm    The [place] outpatient lab hours are:  Monday - Friday 6:10 am to 4:45 pm  Saturdays 7:00 am to 11:00 am  Appointments are required, call [phone]    If you have clinical questions after hours or would like to schedule an appointment,  call the clinic at [phone].  Hyponatremia  Hyponatremia means low sodium levels in the blood. This condition most often occurs after prolonged vomiting or diarrhea, which causes your body to lose too much water and sodium. It can also result from drinking excess amounts of water or the use of diuretics (water pills). Rarely, it can be associated with disorders of your endocrine system, as side effects of illicit drug use (ecstasy), as a complication of some cancers especially small cell lung cancer, or as a complication of renal and liver disease or heart failure.  Mild hyponatremia causes no symptoms. It is only discovered with a blood test. As sodium levels in the blood decreases, symptoms begin to appear. This includes weakness, confusion, muscle cramping and seizures.  Home care    Reduce your daily water intake until the problem is corrected.    If you have been taking diuretics, you may be asked to stop taking them for a short time.    If you are having symptoms of weakness or confusion, do not drive or operate dangerous machinery until symptoms resolve.    If your sodium levels are too low to be managed at home with the above recommendations, you will be asked to go to the hospital to have your sodium replaced through your  vein.  Follow-up care  Follow up with your healthcare provider for a repeat blood test within the next week, or as advised.  When to seek medical advice  Call your healthcare provider if any of the following occur:    Increasing weakness    Dizziness    Irregular heartbeat, extra beats or very fast heart rate    Increasing confusion    Fainting or loss of consciousness    Seizure  Date Last Reviewed: 7/1/2017 2000-2017 [employer]. [address] [place], [place] [address]. All rights reserved. This information is not intended as a substitute for professional medical care. Always follow your healthcare professional's instructions.

## 2018-07-25 NOTE — PROGRESS NOTES
Please inform patient that test result showed low sodium. It is about the same as her last check. Recommend hydrating as her kidneys function was also a bit off     Thank you.     Michael Green M.D.

## 2018-08-15 DIAGNOSIS — I10 ESSENTIAL HYPERTENSION WITH GOAL BLOOD PRESSURE LESS THAN 140/90: ICD-10-CM

## 2018-08-15 NOTE — TELEPHONE ENCOUNTER
"Requested Prescriptions   Pending Prescriptions Disp Refills     metoprolol tartrate (LOPRESSOR) 100 MG tablet [Pharmacy Med Name: METOPROLOL TARTRATE 100MG TABS]  Last Written Prescription Date:  8/18/17  Last Fill Quantity: 180,  # refills: 3   Last office visit: 7/23/2018 with prescribing provider:  Norma   Future Office Visit:   Next 5 appointments (look out 90 days)     Aug 20, 2018  2:30 PM CDT   Return Visit with Tootie Herman MD   [facility] ([facility])    [address] [place] [id number]   [phone]                  180 tablet 3     Sig: TAKE ONE TABLET BY MOUTH TWICE A DAY    Beta-Blockers Protocol Passed    8/15/2018  2:11 PM       Passed - Blood pressure under 140/90 in past 12 months    BP Readings from Last 3 Encounters:   07/23/18 138/86   05/22/18 104/59   04/13/18 115/68                Passed - Patient is age 6 or older       Passed - Recent (12 mo) or future (30 days) visit within the authorizing provider's specialty    Patient had office visit in the last 12 months or has a visit in the next 30 days with authorizing provider or within the authorizing provider's specialty.  See \"Patient Info\" tab in inbasket, or \"Choose Columns\" in Meds & Orders section of the refill encounter.              "

## 2018-08-16 RX ORDER — METOPROLOL TARTRATE 100 MG
TABLET ORAL
Qty: 180 TABLET | Refills: 2 | Status: SHIPPED | OUTPATIENT
Start: 2018-08-16 | End: 2019-05-15

## 2018-08-16 NOTE — TELEPHONE ENCOUNTER
Prescription approved per [facility] Refill Protocol.  Nadine NAILS RN

## 2018-08-20 ENCOUNTER — OFFICE VISIT (OUTPATIENT)
Dept: CARDIOLOGY | Facility: CLINIC | Age: 73
End: 2018-08-20
Attending: INTERNAL MEDICINE
Payer: COMMERCIAL

## 2018-08-20 VITALS
SYSTOLIC BLOOD PRESSURE: 124 MMHG | WEIGHT: 157.2 LBS | OXYGEN SATURATION: 97 % | BODY MASS INDEX: 27.85 KG/M2 | HEART RATE: 81 BPM | DIASTOLIC BLOOD PRESSURE: 84 MMHG

## 2018-08-20 DIAGNOSIS — Z95.1 POSTSURGICAL AORTOCORONARY BYPASS STATUS: ICD-10-CM

## 2018-08-20 PROCEDURE — 99214 OFFICE O/P EST MOD 30 MIN: CPT | Performed by: INTERNAL MEDICINE

## 2018-08-20 NOTE — LETTER
2018    Darlene Sultana MD  [address] [facility] [address]    RE: Idalmis Abdul       Dear Colleague,    I had the pleasure of seeing Idalmis Abdul in the [facility].    HPI and Plan:      Ms. Idalmis Abdul was seen in followup at [facility] in [place].  At 72 years, she is followed for coronary artery disease and prior coronary bypass surgery. I had previously seen her , Atif, who  about nine years ago from colon cancer.      Ms. Abdul has a history of coronary artery disease and prior coronary artery bypass graft surgery.  In , she underwent bypass surgery at [facility] after presenting with chest discomfort to the [facility] ED and being transferred to [facility].  She had evidence of multivessel disease on angiography.      A LIMA was placed to the LAD, a vein graft was placed to the circumflex marginal branch, a vein graft was placed to the ramus branch and a vein graft was placed to the right PDA.  Postoperatively, she experienced a pericardial hematoma and tamponade and that was treated with a pericardial window.  She had postoperative atrial fibrillation and was on anticoagulation, but she has not had any evidence of a recurrence.  On anticoagulation, she experienced an upper GI bleed and underwent clipping of a duodenal ulcer.      She is now doing well.  She suffered from Graves' disease and experienced the eye complications.  She has undergone bilateral strabismus surgery which was a 2-day procedure.  Her diplopia has now resolved.  She has previously discussed upper eyelid surgery with for impaired vision.  She has undergone cataract surgery.      She has not had any chest, neck, arm or back discomfort.  Her lipids are under excellent control.  Her blood pressure is normal. She continues on good risk factor intervention including low dose aspirin, beta blockade, ACE inhibition and statin therapy.  She is also on  amlodipine for additional hypertensive therapy.    She denies any chest, neck, arm or back discomfort or angina.  She has dyspnea with exertion which is not new.  It does not stop her from activities.  She smoked for a number of years and has presumed it is the result of tobacco.  She has not had bronchodilator therapy. Chest CT screening (for lung cancer) has demonstrated emphysematous changes.     PHYSICAL EXAMINATION:     GENERAL:  This is a woman in no apparent distress.   Blood pressure 155/87, pulse 81, weight 71.3 kg (157 lb 3.2 oz), last menstrual period 01/01/1992, SpO2 97 %, not currently breastfeeding.  On recheck, BP was 124/84 mmHg.HR is regular, and respiratory rate 14-18 per minute.   CHEST:  Clear to auscultation.   CARDIAC:  On cardiac auscultation, there was an S1 and S2 without extra sounds or murmur.  Rhythm was regular.      ASSESSMENT:  Ms. Abdul has a history of coronary artery disease and prior coronary artery bypass graft surgery.  She has normal left ventricular systolic performance.  She is asymptomatic and on excellent risk factor intervention.     Given her history of dyspnea, I suggested a referral to Pulmonary Medicine for possible institution of bronchodilator therapy and gave her that information.  I also recommended an echocardiogram.     I have recommended a return at 1 year unless the echo is abnormal or she has earlier symptoms. I would plan to repeat a stress study after next year's visit.          Medications Discontinued During This Encounter   Medication Reason     rosuvastatin (CRESTOR) 5 MG tablet          Encounter Diagnosis   Name Primary?     Postsurgical aortocoronary bypass status        CURRENT MEDICATIONS:  Current Outpatient Prescriptions   Medication Sig Dispense Refill     amLODIPine (NORVASC) 5 MG tablet TAKE ONE TABLET BY MOUTH EVERY DAY 30 tablet 9     aspirin EC 81 MG EC tablet Take 1 tablet by mouth daily. 30 tablet 2     Blood Pressure Monitoring (ADULT  BLOOD PRESSURE CUFF LG) KIT 1 Device 2 times daily 1 kit 1     calcium-vitamin D-vitamin K (VIACTIV) 500-500-40 MG-UNT-MCG CHEW Take 2 tablets by mouth daily       hydrochlorothiazide (HYDRODIURIL) 25 MG tablet TAKE ONE TABLET BY MOUTH EVERY DAY 90 tablet 3     lisinopril (PRINIVIL/ZESTRIL) 40 MG tablet TAKE ONE TABLET BY MOUTH EVERY DAY 90 tablet 1     LORazepam (ATIVAN) 0.5 MG tablet Take 1 tablet (0.5 mg) by mouth At Bedtime 90 tablet 0     melatonin 5 MG tablet Take 5 mg by mouth nightly as needed for sleep       methimazole (TAPAZOLE) 5 MG tablet Take 0.5 tablets (2.5 mg) by mouth daily 45 tablet 0     metoprolol tartrate (LOPRESSOR) 100 MG tablet TAKE ONE TABLET BY MOUTH TWICE A  tablet 2     order for DME Walker 1 Device 0     oxyCODONE IR (ROXICODONE) 5 MG tablet Take 1-2 tablets (5-10 mg) by mouth every 3 hours as needed for pain or other (Moderate to Severe) (Patient taking differently: Take 2.5 mg by mouth daily ) 180 tablet 0     polyethylene glycol (MIRALAX/GLYCOLAX) powder Take 17 g by mouth daily       rosuvastatin (CRESTOR) 5 MG tablet TAKE ONE TABLET BY MOUTH EVERY DAY 90 tablet 2     traMADol (ULTRAM) 50 MG tablet Take 50 mg by mouth 3 times daily       imatinib (GLEEVEC) 400 MG tablet Take 1 tablet (400 mg) by mouth daily Take with a meal and a large glass of water. 30 tablet 2     [DISCONTINUED] rosuvastatin (CRESTOR) 5 MG tablet Take 1 tablet (5 mg) by mouth every other day 45 tablet 3       ALLERGIES     Allergies   Allergen Reactions     Atorvastatin Cramps       PAST MEDICAL HISTORY:  Past Medical History:   Diagnosis Date     ASCVD (arteriosclerotic cardiovascular disease) 6/14/2012     Benign neoplasm of duodenum, jejunum, and ileum 2003, 2007    Gastrointestinal Stromal Tumor, seen at [facility], Dr. Schmitz, GI oncology-Gleevec treatment.  Surgical removal in fall 2004-no recurrence as of 3/05.     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     Other chronic pain     back     PONV (postoperative nausea and vomiting)      Postsurgical aortocoronary bypass status 7/4/2012     S/P CABG x 4 8/5/2012     Strabismus      Tobacco use disorder     Quit     Unspecified essential hypertension        PAST SURGICAL HISTORY:  Past Surgical History:   Procedure Laterality Date     BYPASS GRAFT ARTERY CORONARY  6/14/2012    Procedure: BYPASS GRAFT ARTERY CORONARY;  Median Sternotomy Coronary Artery Bypass Graft  x 3        C THORACOSCOPY,DX W BX  fall 2003    benign tissue     CATARACT IOL, RT/LT      LE     CHOLECYSTECTOMY  1963     COLONOSCOPY  4-12-05     CORONARY ARTERY BYPASS      X4     CREATION PERICARDIAL WINDOW  6/15/2012    Procedure: CREATION PERICARDIAL WINDOW;  Mediastinal Exploration, Control of Bleeding;  Surgeon: Dwaine Griffin MD;  Location: [facility] OR     EYE SURGERY  2014    left cataract removal     GI SURGERY  2003 and 2007    GIST tumor removal     GYN SURGERY      tubal ligation     HYSTERECTOMY VAGINAL,  COLPORRHAPHY ANTERIOR, POSTERIOR, COMBINED N/A 10/17/2017    Procedure: COMBINED HYSTERECTOMY VAGINAL, COLPORRHAPHY ANTERIOR, POSTERIOR;  Total Vaginal Hysterectomy and Posterior Repair,Sacrospinous Vault Suspension;  Surgeon: Ruth Farooq MD;  Location: [facility] OR     PHACOEMULSIFICATION WITH STANDARD INTRAOCULAR LENS IMPLANT  3/24/2014    Procedure: PHACOEMULSIFICATION WITH STANDARD INTRAOCULAR LENS IMPLANT;  Left Kelman Phacoemulsification with Intraocular Lens Implant;  Surgeon: Magnus Mckeon MD;  Location: [facility] OR     RECESSION RESECTION WITH ADJUSTABLE SUTURE BILATERAL Bilateral 7/14/2016    Procedure: RECESSION RESECTION WITH ADJUSTABLE SUTURE BILATERAL;  Surgeon: Erma Ordaz MD;  Location: [facility] OR     SURGICAL HISTORY OF -   1963    cholecystectomy     SURGICAL HISTORY OF -   1970's    Tubal Ligation      SURGICAL HISTORY OF -   08/03    Explor. Lap, Excision of Small Bowel Tumor      SURGICAL HISTORY OF -   4/2010    lung cancer removed right lung       FAMILY HISTORY:  Family History   Problem Relation Age of Onset     HEART DISEASE Mother      Osteoperosis Mother      Respiratory Mother      Emphezyma     Hypertension Mother      HEART DISEASE Father      Alcohol/Drug Father      Hypertension Father      Hypertension Maternal Grandmother      Hypertension Brother      Hypertension Sister      Arthritis Sister      Hypertension Son      Cancer Son      rectal       SOCIAL HISTORY:  Social History     Social History     Marital status:      Spouse name: N/A     Number of children: N/A     Years of education: N/A     Social History Main Topics     Smoking status: Former Smoker     Packs/day: 0.50     Years: 49.00     Types: Cigarettes     Quit date: 4/19/2010     Smokeless tobacco: Never Used     Alcohol use No     Drug use: No     Sexual activity: Not Currently     Partners: Male      Comment:      Other Topics Concern      Service No     Blood Transfusions No      Caffeine Concern Yes     3 cups     Occupational Exposure No     Hobby Hazards No     Sleep Concern No     Stress Concern No     son  and much happiness in her life, big burden lifted,      Weight Concern No     Special Diet No     Back Care Yes     lower back herniated disc 1970's      Exercise No     Bike Helmet No     Seat Belt Yes     Self-Exams Yes     Parent/Sibling W/ Cabg, Mi Or Angioplasty Before 65f 55m? 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  Son (Rk, with wife Марина and grandson) lives next door -- quadriplegic and high functioning, cannot provide physical assistance/support.       Review of Systems:  Skin:  Negative       Eyes:  Positive for visual blurring;double vision;glasses Graves Disease  ENT:  Negative      Respiratory:  Positive for shortness of breath;cough     Cardiovascular:  Negative for;chest pain;palpitations;edema;syncope or near-syncope;fatigue;lightheadedness;dizziness      Gastroenterology: Positive for constipation GERD, dry heaves  Genitourinary:  Positive for urinary frequency;urgency    Musculoskeletal:  Positive for back pain    Neurologic:  Positive for headaches    Psychiatric:  Negative      Heme/Lymph/Imm:  Negative      Endocrine:  Positive for thyroid disorder Graves disease    Physical Exam:  Vitals: /87  Pulse 81  Wt 71.3 kg (157 lb 3.2 oz)  LMP 01/01/1992  SpO2 97%  BMI 27.85 kg/m2    Constitutional:  cooperative, alert and oriented, well developed, well nourished, in no acute distress        Skin:  warm and dry to the touch          Head:  normocephalic        Eyes:  sclera white        Lymph:      ENT:           Neck:           Respiratory:  normal breath sounds, clear to auscultation, normal A-P diameter, normal symmetry, normal respiratory excursion, no use of accessory muscles         Cardiac: regular rhythm;no S3 or S4       grade 1;early systolic  murmur                                                 GI:           Extremities and Muscular Skeletal:  no deformities, clubbing, cyanosis, erythema observed;no edema              Neurological:  no gross motor deficits;affect appropriate        Psych:  Alert and Oriented x 3;affect appropriate, oriented to time, person and place            Thank you for allowing me to participate in the care of your patient.    Sincerely,     Tootie Herman MD     [facility]

## 2018-08-20 NOTE — PATIENT INSTRUCTIONS
"[facility]  [facility]~[address]~[place], [place]~[address]  Thank you for your [facility] visit today. If you have questions regarding your visit, please contact your cardiology RN's, Binta Read or Capri Peñaloza, at [phone]. Your provider has recommended the following:  Medication Changes:  None.  Recommendations:  Follow up with pulmonary.  Echo.  Follow-up:  See Dr. Herman for cardiology follow up in one year.    To schedule a future appointment, we kindly ask that you call cardiology scheduling at [phone] three months prior to requested revisit date.  [facility] cardiology clinic is staffed with \"Advance Practice Providers\". These are our cardiology Physician Assistants and Nurse Practitioners. Please call cardiology scheduling if you feel you need clinical evaluation with them at any time for any cardiac reason.                 Reminder:  For your safety, we ask that you bring in your current medication(s) or an updated list of your medications with you to EACH office visit. Include the medication name, dose of pill on bottle and how you are taking it. Include over-the-counter medications or supplements. Your provider will review this at each visit and plan your care based on your current information.     "

## 2018-08-20 NOTE — MR AVS SNAPSHOT
"              After Visit Summary   8/20/2018    Idalmis Abdul    MRN: [id number]           Patient Information     Date Of Birth          1945        Visit Information        Provider Department      8/20/2018 2:30 PM Tootie Herman MD [facility]        Today's Diagnoses     Postsurgical aortocoronary bypass status          Care Instructions    [facility]~[address]~[place], [place]~[address]  Thank you for your  Heart Care visit today. If you have questions regarding your visit, please contact your cardiology RN's, Binta Read or Capri Peñaloza, at [phone]. Your provider has recommended the following:  Medication Changes:  None.  Recommendations:  Follow up with pulmonary.  Echo.  Follow-up:  See Dr. Herman for cardiology follow up in one year.    To schedule a future appointment, we kindly ask that you call cardiology scheduling at [phone] three months prior to requested revisit date.  [facility] cardiology clinic is staffed with \"Advance Practice Providers\". These are our cardiology Physician Assistants and Nurse Practitioners. Please call cardiology scheduling if you feel you need clinical evaluation with them at any time for any cardiac reason.                 Reminder:  For your safety, we ask that you bring in your current medication(s) or an updated list of your medications with you to EACH office visit. Include the medication name, dose of pill on bottle and how you are taking it. Include over-the-counter medications or supplements. Your provider will review this at each visit and plan your care based on your current information.             Follow-ups after your visit        Additional Services     Follow-Up with Cardiologist                 Your next 10 appointments already scheduled     Aug 28, 2018 10:00 AM CDT   (Arrive by 9:45 AM)   RETURN ENDOCRINE with TIERRA Chaney " MD Arash   [facility] ([facility])    [address]  3rd Floor  [facility] [address] [phone]            Oct 05, 2018 10:00 AM CDT   LAB with [facility]   [facility]    [address] [address]   [phone]           Please do not eat 10-12 hours before your appointment if you are coming in fasting for labs on lipids, cholesterol, or glucose (sugar). This does not apply to pregnant women. Water, hot tea and black coffee (with nothing added) are okay. Do not drink other fluids, diet soda or chew gum.            Oct 05, 2018 10:30 AM CDT   CT CHEST ABDOMEN PELVIS W/O & W CONTRAST with [facility]   [facility] CT ([facility])    [address] [address]   [phone]           Please bring any scans or X-rays taken at other hospitals, if similar tests were done. Also bring a list of your medicines, including vitamins, minerals and over-the-counter drugs. It is safest to leave personal items at home.  Be sure to tell your doctor:   If you have any allergies.   If there s any chance you are pregnant.   If you are breastfeeding.  How to prepare:   Do not eat or drink for 2 hours before your exam. If you need to take medicine, you may take it with small sips of water. (We may ask you to take liquid medicine as well.)   Please wear loose clothing, such as a sweat suit or jogging clothes. Avoid snaps, zippers and other metal. We may ask you to undress and put on a hospital gown.  Please arrive 30 minutes early for your CT. Once in the department you might be asked to drink water 15-20 minutes prior to your exam.  If indicated you may be asked to drink an oral contrast in advance of your CT.  If this is the case, the imaging team will let you know or be in contact with you prior to your appointment  Patients over 70 or patients with diabetes or kidney problems:   If you  haven t had a blood test (creatinine test) within the last 30 days, the Cardiologist/Radiologist may require you to get this test prior to your exam.  If you have diabetes:   Continue to take your metformin medication on the day of your exam  If you have any questions, please call the Imaging Department where you will have your exam.            Oct 08, 2018  9:15 AM CDT   (Arrive by 9:00 AM)   Return Visit with Blayne Schmitz MD   [facility] ([facility])    [address] [place] [address]  [address]  [facility] [address] [phone]              Future tests that were ordered for you today     Open Future Orders        Priority Expected Expires Ordered    Follow-Up with Cardiologist Routine 8/19/2019 8/20/2019 8/20/2018    Echocardiogram Routine 9/19/2018 8/20/2019 8/20/2018            Who to contact     If you have questions or need follow up information about today's clinic visit or your schedule please contact [facility] directly at [phone].  Normal or non-critical lab and imaging results will be communicated to you by MyChart, letter or phone within 4 business days after the clinic has received the results. If you do not hear from us within 7 days, please contact the clinic through MyChart or phone. If you have a critical or abnormal lab result, we will notify you by phone as soon as possible.  Submit refill requests through [employer] or call your pharmacy and they will forward the refill request to us. Please allow 3 business days for your refill to be completed.          Additional Information About Your Visit        Care EveryWhere ID     This is your Care EveryWhere ID. This could be used by other organizations to access your [place] medical records  [id number]        Your Vitals Were     Pulse Last Period Pulse Oximetry BMI (Body Mass Index)          81 01/01/1992 97% 27.85 kg/m2         Blood Pressure from Last  3 Encounters:   08/20/18 155/87   07/23/18 138/86   05/22/18 104/59    Weight from Last 3 Encounters:   08/20/18 71.3 kg (157 lb 3.2 oz)   07/23/18 70.3 kg (155 lb)   04/13/18 71.4 kg (157 lb 6.4 oz)              We Performed the Following     Follow-Up with Cardiologist          Today's Medication Changes          These changes are accurate as of 8/20/18  3:29 PM.  If you have any questions, ask your nurse or doctor.               These medicines have changed or have updated prescriptions.        Dose/Directions    oxyCODONE IR 5 MG tablet   Commonly known as:  ROXICODONE   This may have changed:    - how much to take  - when to take this   Used for:  Chronic low back pain, unspecified back pain laterality, with sciatica presence unspecified        Dose:  5-10 mg   Take 1-2 tablets (5-10 mg) by mouth every 3 hours as needed for pain or other (Moderate to Severe)   Quantity:  180 tablet   Refills:  0       rosuvastatin 5 MG tablet   Commonly known as:  CRESTOR   This may have changed:  Another medication with the same name was removed. Continue taking this medication, and follow the directions you see here.   Used for:  Hyperlipidemia LDL goal <160   Changed by:  Tootie Herman MD        TAKE ONE TABLET BY MOUTH EVERY DAY   Quantity:  90 tablet   Refills:  2                Primary Care Provider Office Phone # Fax #    Darlene Daniela Sultana [phone] [address] [address]        Equal Access to Services     ARNOLD EWING AH: Hadii cornelia burgoso Sonemesio, waaxda luqadaha, qaybta kaalmada matty, waxay madina muse. So [facility] [phone].    ATENCIÓN: Si habla ban, tiene a cassidy disposición servicios gratuitos de asistencia lingüística. Llame al [phone].    We comply with applicable federal civil rights laws and [place] laws. We do not discriminate on the basis of race, color, national origin, age, disability, sex, sexual orientation, or gender  identity.            Thank you!     Thank you for choosing [facility]  for your care. Our goal is always to provide you with excellent care. Hearing back from our patients is one way we can continue to improve our services. Please take a few minutes to complete the written survey that you may receive in the mail after your visit with us. Thank you!             Your Updated Medication List - Protect others around you: Learn how to safely use, store and throw away your medicines at www.disposemymeds.org.          This list is accurate as of 8/20/18  3:29 PM.  Always use your most recent med list.                   Brand Name Dispense Instructions for use Diagnosis    Adult Blood Pressure Cuff Lg Kit     1 kit    1 Device 2 times daily    HTN, goal below 140/90       amLODIPine 5 MG tablet    NORVASC    30 tablet    TAKE ONE TABLET BY MOUTH EVERY DAY    Essential hypertension       aspirin 81 MG EC tablet     30 tablet    Take 1 tablet by mouth daily.    NSTEMI (non-ST elevated myocardial infarction) (H), ACS (acute coronary syndrome) (H)       hydrochlorothiazide 25 MG tablet    HYDRODIURIL    90 tablet    TAKE ONE TABLET BY MOUTH EVERY DAY    Essential hypertension       imatinib 400 MG tablet    GLEEVEC    30 tablet    Take 1 tablet (400 mg) by mouth daily Take with a meal and a large glass of water.    Malignant gastrointestinal stromal tumor, unspecified site (H)       lisinopril 40 MG tablet    PRINIVIL/ZESTRIL    90 tablet    TAKE ONE TABLET BY MOUTH EVERY DAY    Essential hypertension with goal blood pressure less than 140/90       LORazepam 0.5 MG tablet    ATIVAN    90 tablet    Take 1 tablet (0.5 mg) by mouth At Bedtime    Malignant gastrointestinal stromal tumor, unspecified site (H)       melatonin 5 MG tablet      Take 5 mg by mouth nightly as needed for sleep        methimazole 5 MG tablet    TAPAZOLE    45 tablet    Take 0.5 tablets (2.5 mg) by mouth daily     Hyperthyroidism       metoprolol tartrate 100 MG tablet    LOPRESSOR    180 tablet    TAKE ONE TABLET BY MOUTH TWICE A DAY    Essential hypertension with goal blood pressure less than 140/90       order for DME     1 Device    Walker    Post hysterectomy menopause       oxyCODONE IR 5 MG tablet    ROXICODONE    180 tablet    Take 1-2 tablets (5-10 mg) by mouth every 3 hours as needed for pain or other (Moderate to Severe)    Chronic low back pain, unspecified back pain laterality, with sciatica presence unspecified       polyethylene glycol powder    MIRALAX/GLYCOLAX     Take 17 g by mouth daily        rosuvastatin 5 MG tablet    CRESTOR    90 tablet    TAKE ONE TABLET BY MOUTH EVERY DAY    Hyperlipidemia LDL goal <160       traMADol 50 MG tablet    ULTRAM     Take 50 mg by mouth 3 times daily        VIACTIV 500-500-40 MG-UNT-MCG Chew   Generic drug:  calcium-vitamin D-vitamin K      Take 2 tablets by mouth daily

## 2018-08-20 NOTE — PROGRESS NOTES
HPI and Plan:      Ms. Idalmis Abdul was seen in followup at [facility] in [place].  At 72 years, she is followed for coronary artery disease and prior coronary bypass surgery. I had previously seen her , Atif, who  about nine years ago from colon cancer.      Ms. Abdul has a history of coronary artery disease and prior coronary artery bypass graft surgery.  In , she underwent bypass surgery at [facility] after presenting with chest discomfort to the [facility] ED and being transferred to [facility].  She had evidence of multivessel disease on angiography.      A LIMA was placed to the LAD, a vein graft was placed to the circumflex marginal branch, a vein graft was placed to the ramus branch and a vein graft was placed to the right PDA.  Postoperatively, she experienced a pericardial hematoma and tamponade and that was treated with a pericardial window.  She had postoperative atrial fibrillation and was on anticoagulation, but she has not had any evidence of a recurrence.  On anticoagulation, she experienced an upper GI bleed and underwent clipping of a duodenal ulcer.      She is now doing well.  She suffered from Graves' disease and experienced the eye complications.  She has undergone bilateral strabismus surgery which was a 2-day procedure.  Her diplopia has now resolved.  She has previously discussed upper eyelid surgery with for impaired vision.  She has undergone cataract surgery.      She has not had any chest, neck, arm or back discomfort.  Her lipids are under excellent control.  Her blood pressure is normal. She continues on good risk factor intervention including low dose aspirin, beta blockade, ACE inhibition and statin therapy.  She is also on amlodipine for additional hypertensive therapy.    She denies any chest, neck, arm or back discomfort or angina.  She has dyspnea with exertion which is not new.  It does not stop her from activities.  She smoked for a  number of years and has presumed it is the result of tobacco.  She has not had bronchodilator therapy. Chest CT screening (for lung cancer) has demonstrated emphysematous changes.     PHYSICAL EXAMINATION:     GENERAL:  This is a woman in no apparent distress.   Blood pressure 155/87, pulse 81, weight 71.3 kg (157 lb 3.2 oz), last menstrual period 01/01/1992, SpO2 97 %, not currently breastfeeding.  On recheck, BP was 124/84 mmHg.HR is regular, and respiratory rate 14-18 per minute.   CHEST:  Clear to auscultation.   CARDIAC:  On cardiac auscultation, there was an S1 and S2 without extra sounds or murmur.  Rhythm was regular.      ASSESSMENT:  Ms. Abdul has a history of coronary artery disease and prior coronary artery bypass graft surgery.  She has normal left ventricular systolic performance.  She is asymptomatic and on excellent risk factor intervention.     Given her history of dyspnea, I suggested a referral to Pulmonary Medicine for possible institution of bronchodilator therapy and gave her that information.  I also recommended an echocardiogram.     I have recommended a return at 1 year unless the echo is abnormal or she has earlier symptoms. I would plan to repeat a stress study after next year's visit.          Medications Discontinued During This Encounter   Medication Reason     rosuvastatin (CRESTOR) 5 MG tablet          Encounter Diagnosis   Name Primary?     Postsurgical aortocoronary bypass status        CURRENT MEDICATIONS:  Current Outpatient Prescriptions   Medication Sig Dispense Refill     amLODIPine (NORVASC) 5 MG tablet TAKE ONE TABLET BY MOUTH EVERY DAY 30 tablet 9     aspirin EC 81 MG EC tablet Take 1 tablet by mouth daily. 30 tablet 2     Blood Pressure Monitoring (ADULT BLOOD PRESSURE CUFF LG) KIT 1 Device 2 times daily 1 kit 1     calcium-vitamin D-vitamin K (VIACTIV) 500-500-40 MG-UNT-MCG CHEW Take 2 tablets by mouth daily       hydrochlorothiazide (HYDRODIURIL) 25 MG tablet TAKE ONE  TABLET BY MOUTH EVERY DAY 90 tablet 3     lisinopril (PRINIVIL/ZESTRIL) 40 MG tablet TAKE ONE TABLET BY MOUTH EVERY DAY 90 tablet 1     LORazepam (ATIVAN) 0.5 MG tablet Take 1 tablet (0.5 mg) by mouth At Bedtime 90 tablet 0     melatonin 5 MG tablet Take 5 mg by mouth nightly as needed for sleep       methimazole (TAPAZOLE) 5 MG tablet Take 0.5 tablets (2.5 mg) by mouth daily 45 tablet 0     metoprolol tartrate (LOPRESSOR) 100 MG tablet TAKE ONE TABLET BY MOUTH TWICE A  tablet 2     order for DME Walker 1 Device 0     oxyCODONE IR (ROXICODONE) 5 MG tablet Take 1-2 tablets (5-10 mg) by mouth every 3 hours as needed for pain or other (Moderate to Severe) (Patient taking differently: Take 2.5 mg by mouth daily ) 180 tablet 0     polyethylene glycol (MIRALAX/GLYCOLAX) powder Take 17 g by mouth daily       rosuvastatin (CRESTOR) 5 MG tablet TAKE ONE TABLET BY MOUTH EVERY DAY 90 tablet 2     traMADol (ULTRAM) 50 MG tablet Take 50 mg by mouth 3 times daily       imatinib (GLEEVEC) 400 MG tablet Take 1 tablet (400 mg) by mouth daily Take with a meal and a large glass of water. 30 tablet 2     [DISCONTINUED] rosuvastatin (CRESTOR) 5 MG tablet Take 1 tablet (5 mg) by mouth every other day 45 tablet 3       ALLERGIES     Allergies   Allergen Reactions     Atorvastatin Cramps       PAST MEDICAL HISTORY:  Past Medical History:   Diagnosis Date     ASCVD (arteriosclerotic cardiovascular disease) 6/14/2012     Benign neoplasm of duodenum, jejunum, and ileum 2003, 2007    Gastrointestinal Stromal Tumor, seen at [facility], Dr. Schmitz, GI oncology-Gleevec treatment.  Surgical removal in fall 2004-no recurrence as of 3/05.     CA - lung cancer 4/2010    [facility], treated surgically     choledochal cyst 1963    CHOLEDOCHAL CYST, mild dilatation of biliary system     Coronary artery disease      DDD (degenerative disc disease), lumbar 3/22/2018     Dislocated finger, left middle PIP 7/26/2013     Dyspnea 8/27/2013     Eyelid edema  12/15/2011    side effect of gastric cancer medication      GERD (gastroesophageal reflux disease) 10/8/2013     GIST (gastrointestinal stromal tumor), malignant (H) 5/10/2011     Graves disease      Grief reaction 5/11/2009     History of lung cancer 12/15/2011    S/p resection of right lung.  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     Other chronic pain     back     PONV (postoperative nausea and vomiting)      Postsurgical aortocoronary bypass status 7/4/2012     S/P CABG x 4 8/5/2012     Strabismus      Tobacco use disorder     Quit     Unspecified essential hypertension        PAST SURGICAL HISTORY:  Past Surgical History:   Procedure Laterality Date     BYPASS GRAFT ARTERY CORONARY  6/14/2012    Procedure: BYPASS GRAFT ARTERY CORONARY;  Median Sternotomy Coronary Artery Bypass Graft  x 3        C THORACOSCOPY,DX W BX  fall 2003    benign tissue     CATARACT IOL, RT/LT      LE     CHOLECYSTECTOMY  1963     COLONOSCOPY  4-12-05     CORONARY ARTERY BYPASS      X4     CREATION PERICARDIAL WINDOW  6/15/2012    Procedure: CREATION PERICARDIAL WINDOW;  Mediastinal Exploration, Control of Bleeding;  Surgeon: Dwaine Griffin MD;  Location: [facility] OR     EYE SURGERY  2014    left cataract removal     GI SURGERY  2003 and 2007    GIST tumor removal     GYN SURGERY      tubal ligation     HYSTERECTOMY VAGINAL, COLPORRHAPHY ANTERIOR, POSTERIOR, COMBINED N/A 10/17/2017    Procedure: COMBINED HYSTERECTOMY VAGINAL, COLPORRHAPHY ANTERIOR, POSTERIOR;  Total Vaginal Hysterectomy and Posterior Repair,Sacrospinous Vault Suspension;  Surgeon:  Ruth Farooq MD;  Location: [facility]     PHACOEMULSIFICATION WITH STANDARD INTRAOCULAR LENS IMPLANT  3/24/2014    Procedure: PHACOEMULSIFICATION WITH STANDARD INTRAOCULAR LENS IMPLANT;  Left Kelman Phacoemulsification with Intraocular Lens Implant;  Surgeon: Magnus Mckeon MD;  Location: [facility]     RECESSION RESECTION WITH ADJUSTABLE SUTURE BILATERAL Bilateral 7/14/2016    Procedure: RECESSION RESECTION WITH ADJUSTABLE SUTURE BILATERAL;  Surgeon: Erma Ordaz MD;  Location: [facility] OR     SURGICAL HISTORY OF -   1963    cholecystectomy     SURGICAL HISTORY OF -   1970's    Tubal Ligation      SURGICAL HISTORY OF -   08/03    Explor. Lap, Excision of Small Bowel Tumor      SURGICAL HISTORY OF -   4/2010    lung cancer removed right lung       FAMILY HISTORY:  Family History   Problem Relation Age of Onset     HEART DISEASE Mother      Osteoperosis Mother      Respiratory Mother      Emphezyma     Hypertension Mother      HEART DISEASE Father      Alcohol/Drug Father      Hypertension Father      Hypertension Maternal Grandmother      Hypertension Brother      Hypertension Sister      Arthritis Sister      Hypertension Son      Cancer Son      rectal       SOCIAL HISTORY:  Social History     Social History     Marital status:      Spouse name: N/A     Number of children: N/A     Years of education: N/A     Social History Main Topics     Smoking status: Former Smoker     Packs/day: 0.50     Years: 49.00     Types: Cigarettes     Quit date: 4/19/2010     Smokeless tobacco: Never Used     Alcohol use No     Drug use: No     Sexual activity: Not Currently     Partners: Male      Comment:      Other Topics Concern      Service No     Blood Transfusions No     Caffeine Concern Yes     3 cups     Occupational Exposure No     Hobby Hazards No     Sleep Concern No     Stress Concern No     son  and much happiness in her life, big burden lifted,      Weight Concern No     Special  Diet No     Back Care Yes     lower back herniated disc 1970's      Exercise No     Bike Helmet No     Seat Belt Yes     Self-Exams Yes     Parent/Sibling W/ Cabg, Mi Or Angioplasty Before 65f 55m? 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  Son (Rk, with wife Марина and grandson) lives next door -- quadriplegic and high functioning, cannot provide physical assistance/support.       Review of Systems:  Skin:  Negative       Eyes:  Positive for visual blurring;double vision;glasses Graves Disease  ENT:  Negative      Respiratory:  Positive for shortness of breath;cough     Cardiovascular:  Negative for;chest pain;palpitations;edema;syncope or near-syncope;fatigue;lightheadedness;dizziness      Gastroenterology: Positive for constipation GERD, dry heaves  Genitourinary:  Positive for urinary frequency;urgency    Musculoskeletal:  Positive for back pain    Neurologic:  Positive for headaches    Psychiatric:  Negative      Heme/Lymph/Imm:  Negative      Endocrine:  Positive for thyroid disorder Graves disease    Physical Exam:  Vitals: /87  Pulse 81  Wt 71.3 kg (157 lb 3.2 oz)  LMP 01/01/1992  SpO2 97%  BMI 27.85 kg/m2    Constitutional:  cooperative, alert and oriented, well developed, well nourished, in no acute distress        Skin:  warm and dry to the touch          Head:  normocephalic        Eyes:  sclera white        Lymph:      ENT:           Neck:           Respiratory:  normal breath sounds, clear to auscultation, normal A-P diameter, normal symmetry, normal respiratory excursion, no use of accessory muscles         Cardiac: regular rhythm;no S3 or S4       grade 1;early systolic murmur                                                 GI:           Extremities and Muscular Skeletal:  no deformities, clubbing, cyanosis, erythema observed;no edema              Neurological:  no gross motor deficits;affect  appropriate        Psych:  Alert and Oriented x 3;affect appropriate, oriented to time, person and place          CC  Tootie Herman MD  [address]  [place] [id number]

## 2018-08-21 DIAGNOSIS — E05.90 HYPERTHYROIDISM: ICD-10-CM

## 2018-08-21 RX ORDER — METHIMAZOLE 5 MG/1
TABLET ORAL
Qty: 90 TABLET | Refills: 3 | OUTPATIENT
Start: 2018-08-21

## 2018-08-22 ENCOUNTER — RADIANT APPOINTMENT (OUTPATIENT)
Dept: GENERAL RADIOLOGY | Facility: CLINIC | Age: 73
End: 2018-08-22
Attending: ORTHOPAEDIC SURGERY
Payer: COMMERCIAL

## 2018-08-22 DIAGNOSIS — M43.26 FUSION OF SPINE, LUMBAR REGION: ICD-10-CM

## 2018-08-22 PROCEDURE — 72110 X-RAY EXAM L-2 SPINE 4/>VWS: CPT | Mod: FY

## 2018-08-28 ENCOUNTER — OFFICE VISIT (OUTPATIENT)
Dept: ENDOCRINOLOGY | Facility: CLINIC | Age: 73
End: 2018-08-28
Payer: COMMERCIAL

## 2018-08-28 VITALS
WEIGHT: 153.6 LBS | HEART RATE: 89 BPM | BODY MASS INDEX: 27.21 KG/M2 | HEIGHT: 63 IN | SYSTOLIC BLOOD PRESSURE: 124 MMHG | DIASTOLIC BLOOD PRESSURE: 74 MMHG

## 2018-08-28 DIAGNOSIS — E05.90 HYPERTHYROIDISM: ICD-10-CM

## 2018-08-28 DIAGNOSIS — M81.0 AGE-RELATED OSTEOPOROSIS WITHOUT CURRENT PATHOLOGICAL FRACTURE: ICD-10-CM

## 2018-08-28 DIAGNOSIS — E05.00 GRAVES' DISEASE: Primary | ICD-10-CM

## 2018-08-28 RX ORDER — METHIMAZOLE 5 MG/1
2.5 TABLET ORAL DAILY
Qty: 45 TABLET | Refills: 6 | Status: SHIPPED | OUTPATIENT
Start: 2018-08-28 | End: 2019-11-12

## 2018-08-28 ASSESSMENT — PAIN SCALES - GENERAL: PAINLEVEL: NO PAIN (0)

## 2018-08-28 NOTE — MR AVS SNAPSHOT
After Visit Summary   8/28/2018    Idalmis Abdul    MRN: [id number]           Patient Information     Date Of Birth          1945        Visit Information        Provider Department      8/28/2018 10:00 AM TIERRA Antoine MD [facility]        Today's Diagnoses     Graves' disease    -  1    Hyperthyroidism           Follow-ups after your visit        Follow-up notes from your care team     Return in about 6 months (around 2/28/2019).      Your next 10 appointments already scheduled     Oct 05, 2018 10:00 AM CDT   LAB with [facility] ([place] [facility])    [address] [facility] [address]   [phone]           Please do not eat 10-12 hours before your appointment if you are coming in fasting for labs on lipids, cholesterol, or glucose (sugar). This does not apply to pregnant women. Water, hot tea and black coffee (with nothing added) are okay. Do not drink other fluids, diet soda or chew gum.            Oct 05, 2018 10:30 AM CDT   CT CHEST ABDOMEN PELVIS W/O & W CONTRAST with [facility]   [facility] CT ([facility])    [address] [facility] [address]   [phone]           Please bring any scans or X-rays taken at other hospitals, if similar tests were done. Also bring a list of your medicines, including vitamins, minerals and over-the-counter drugs. It is safest to leave personal items at home.  Be sure to tell your doctor:   If you have any allergies.   If there s any chance you are pregnant.   If you are breastfeeding.  How to prepare:   Do not eat or drink for 2 hours before your exam. If you need to take medicine, you may take it with small sips of water. (We may ask you to take liquid medicine as well.)   Please wear loose clothing, such as a sweat suit or jogging clothes. Avoid snaps, zippers and other metal. We may ask you to undress and put on a hospital gown.  Please arrive 30 minutes  early for your CT. Once in the department you might be asked to drink water 15-20 minutes prior to your exam.  If indicated you may be asked to drink an oral contrast in advance of your CT.  If this is the case, the imaging team will let you know or be in contact with you prior to your appointment  Patients over 70 or patients with diabetes or kidney problems:   If you haven t had a blood test (creatinine test) within the last 30 days, the Cardiologist/Radiologist may require you to get this test prior to your exam.  If you have diabetes:   Continue to take your metformin medication on the day of your exam  If you have any questions, please call the Imaging Department where you will have your exam.            Oct 08, 2018  9:15 AM CDT   (Arrive by 9:00 AM)   Return Visit with Blayne Schmitz MD   [facility] ([facility])    [address] [place] [address]  [address]  [facility] [id number] [phone]            Feb 19, 2019 10:00 AM CST   (Arrive by 9:45 AM)   RETURN ENDOCRINE with TIERRA Antoine MD   [facility] ([facility])    [address] [place] [address]  3rd Floor  [facility] [id number] [phone]              Future tests that were ordered for you today     Open Future Orders        Priority Expected Expires Ordered    25 Hydroxyvitamin D2 and D3 Routine  8/28/2019 8/28/2018    Dexa hip/pelvis/spine Routine  3/26/2019 8/28/2018    Dexa Wrist/Heel Routine  3/26/2019 8/28/2018    TSH Routine 12/28/2018 3/28/2019 8/28/2018    T4 free Routine 12/28/2018 3/28/2019 8/28/2018    T3 total Routine 12/28/2018 3/28/2019 8/28/2018            Who to contact     Please call your clinic at [phone] to:    Ask questions about your health    Make or cancel appointments    Discuss your medicines    Learn about your test results    Speak to your doctor            Additional Information About Your Visit        Care EveryWhere ID      "This is your Care EveryWhere ID. This could be used by other organizations to access your [facility] medical records  [id number]        Your Vitals Were     Pulse Height Last Period BMI (Body Mass Index)          89 1.6 m (5' 3\") 01/01/1992 27.21 kg/m2         Blood Pressure from Last 3 Encounters:   08/28/18 124/74   08/20/18 124/84   07/23/18 138/86    Weight from Last 3 Encounters:   08/28/18 69.7 kg (153 lb 9.6 oz)   08/20/18 71.3 kg (157 lb 3.2 oz)   07/23/18 70.3 kg (155 lb)                 Today's Medication Changes          These changes are accurate as of 8/28/18 10:57 AM.  If you have any questions, ask your nurse or doctor.               These medicines have changed or have updated prescriptions.        Dose/Directions    oxyCODONE IR 5 MG tablet   Commonly known as:  ROXICODONE   This may have changed:    - how much to take  - when to take this   Used for:  Chronic low back pain, unspecified back pain laterality, with sciatica presence unspecified        Dose:  5-10 mg   Take 1-2 tablets (5-10 mg) by mouth every 3 hours as needed for pain or other (Moderate to Severe)   Quantity:  180 tablet   Refills:  0            Where to get your medicines      These medications were sent to [facility] [place] - [address]  [address] [facility] [address]     Phone:  [phone]     methimazole 5 MG tablet                Primary Care Provider Office Phone # Fax #    Darleneyaritza Sultana [phone] [address] [facility] [address]        Equal Access to Services     [facility] GILDARDO AH: Hadgretel Maloney, walillyda lujudahadaha, qaybta kaalmada matty, patricio muse. So [facility] [phone].    ATENCIÓN: Si habla español, tiene a cassidy disposición servicios gratuitos de asistencia lingüística. Llame al [phone].    We comply with applicable federal civil rights laws and [place] laws. We do not " discriminate on the basis of race, color, national origin, age, disability, sex, sexual orientation, or gender identity.            Thank you!     Thank you for choosing [facility]  for your care. Our goal is always to provide you with excellent care. Hearing back from our patients is one way we can continue to improve our services. Please take a few minutes to complete the written survey that you may receive in the mail after your visit with us. Thank you!             Your Updated Medication List - Protect others around you: Learn how to safely use, store and throw away your medicines at www.disposemymeds.org.          This list is accurate as of 8/28/18 10:57 AM.  Always use your most recent med list.                   Brand Name Dispense Instructions for use Diagnosis    Adult Blood Pressure Cuff Lg Kit     1 kit    1 Device 2 times daily    HTN, goal below 140/90       amLODIPine 5 MG tablet    NORVASC    30 tablet    TAKE ONE TABLET BY MOUTH EVERY DAY    Essential hypertension       aspirin 81 MG EC tablet     30 tablet    Take 1 tablet by mouth daily.    NSTEMI (non-ST elevated myocardial infarction) (H), ACS (acute coronary syndrome) (H)       hydrochlorothiazide 25 MG tablet    HYDRODIURIL    90 tablet    TAKE ONE TABLET BY MOUTH EVERY DAY    Essential hypertension       imatinib 400 MG tablet    GLEEVEC    30 tablet    Take 1 tablet (400 mg) by mouth daily Take with a meal and a large glass of water.    Malignant gastrointestinal stromal tumor, unspecified site (H)       lisinopril 40 MG tablet    PRINIVIL/ZESTRIL    90 tablet    TAKE ONE TABLET BY MOUTH EVERY DAY    Essential hypertension with goal blood pressure less than 140/90       LORazepam 0.5 MG tablet    ATIVAN    90 tablet    Take 1 tablet (0.5 mg) by mouth At Bedtime    Malignant gastrointestinal stromal tumor, unspecified site (H)       melatonin 5 MG tablet      Take 5 mg by mouth nightly as needed for sleep        methimazole 5 MG  tablet    TAPAZOLE    45 tablet    Take 0.5 tablets (2.5 mg) by mouth daily    Hyperthyroidism       metoprolol tartrate 100 MG tablet    LOPRESSOR    180 tablet    TAKE ONE TABLET BY MOUTH TWICE A DAY    Essential hypertension with goal blood pressure less than 140/90       order for DME     1 Device    Walker    Post hysterectomy menopause       oxyCODONE IR 5 MG tablet    ROXICODONE    180 tablet    Take 1-2 tablets (5-10 mg) by mouth every 3 hours as needed for pain or other (Moderate to Severe)    Chronic low back pain, unspecified back pain laterality, with sciatica presence unspecified       polyethylene glycol powder    MIRALAX/GLYCOLAX     Take 17 g by mouth daily        rosuvastatin 5 MG tablet    CRESTOR    90 tablet    TAKE ONE TABLET BY MOUTH EVERY DAY    Hyperlipidemia LDL goal <160       traMADol 50 MG tablet    ULTRAM     Take 50 mg by mouth 3 times daily        VIACTIV 500-500-40 MG-UNT-MCG Chew   Generic drug:  calcium-vitamin D-vitamin K      Take 2 tablets by mouth daily

## 2018-08-28 NOTE — LETTER
8/28/2018       RE: Idalmis Abdul  [address]  [facility] [address]     Dear Colleague,    Thank you for referring your patient, Idalmis Abdul, to the [facility] ENDOCRINOLOGY at [facility]. Please see a copy of my visit note below.    Endocrinology and diabetes outpatient clinic  Name: Idalmis Abdul  HPI:  Ms. Abdul is a very pleasant to 72-year-old female patient with thyroid dysfunction diagnosed in Feb 2014.  Mrs. 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   Thyroid dysfunction: Briefly, Ms. Abdul reported thyroid function studies were abnormal since August 2013. The patient was initially seen by Dr. Rama Coburn (February 2014).  Hyperthyroidism was confirmed, and a thyroid uptake and scan prior to starting therapy showed an homogenous uptake of 19%. I have initially titrated her methimazole up, and more recently titrated it down due to hypothyroidism.  She is currently on methimazole 2.5 mg daily.  She has been on this same dose since February 14, 2018.  She feels well and has no symptoms suggestive of hypo-or hyperthyroidism.   She had eye surgery in 7/2016, and that has helped with her diplopia.  She denies palpitations, tremors or changes in her bowel movements. She has chronic constipation, likely related to chronic oxycodone use (for back pain). She takes senna docusate to help with her constipation.   I have previously reviewed the patient's medical records, that showed that Ms. Abdul actually had a suppressed TSH and a positive TSI in June 2012.  She reports she was at the hospital at that time due to heart issues (atrial fibrillation).  It seems the patient was seen by the endocrinology consult team at that time, but she didn't have any follow-up as an outpatient.   Osteoporosis: A DEXA scan done in 2014 showed a negative T score of 2.8.  Gastrointestinal stromal tumor:  Ms. Abdul previous  medical history includes a gastrointestinal stromal tumor diagnosed in 2003. The tumor was surgically removed  She was on Gleevec for 1 year, then off for 3 years, and had a recurrence in 2007, with 3 new tumors.  She has continuously been on Gleevec since then, and the plans are for her to be on it long term, as her tumor has been stable while on it.  She continues to follow with Dr. Schmitz in Oncology, and has a CT armenta with contrast every 6 months.   Lung cancer diagnosed in 2010.  She is a former smoker, quitting in 2010.  Coronary artery disease: Ms. Abdul had quadruple bypass in 2012. She has HTN, and is on lisinopril, metoprolol, and Lasix, as per her cardiologist.  She is also on Rosuvastatin.  Perforated bowel: She also had a perforated bowel mid 2014 that was managed conservatively, and lost renal function during that hospital admission, likely related to antibiotic use.  Her GFR has somewhat recovered and is now around 45 mL/min  Mrs. Abdul underwent a total vaginal hysterectomy, A & P repair and Sacrospinous vault suspension,  total vaginal hysterectomy, for uterovaginal prolapse, in 10/17/17. She is followed by Dr. Farooq in OB/Gyn.   She has also had a L4-L5 fusion with laminectomy in April 2018.  This was a 6 hours long surgery and she is doing well.    PMH/PSH:  Past Medical History:   Diagnosis Date     ASCVD (arteriosclerotic cardiovascular disease) 6/14/2012     Benign neoplasm of duodenum, jejunum, and ileum 2003, 2007    Gastrointestinal Stromal Tumor, seen at [facility], Dr. Schmitz, GI oncology-Gleevec treatment.  Surgical removal in fall 2004-no recurrence as of 3/05.     CA - lung cancer 4/2010    [facility], treated surgically     choledochal cyst 1963    CHOLEDOCHAL CYST, mild dilatation of biliary system     Coronary artery disease      DDD (degenerative disc disease), lumbar 3/22/2018     Dislocated finger, left middle PIP 7/26/2013     Dyspnea 8/27/2013     Eyelid edema 12/15/2011    side  effect of gastric cancer medication      GERD (gastroesophageal reflux disease) 10/8/2013     GIST (gastrointestinal stromal tumor), malignant (H) 5/10/2011     Graves disease      Grief reaction 5/11/2009     History of lung cancer 12/15/2011    S/p resection of right lung.  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     Other chronic pain     back     PONV (postoperative nausea and vomiting)      Postsurgical aortocoronary bypass status 7/4/2012     S/P CABG x 4 8/5/2012     Strabismus      Tobacco use disorder     Quit     Unspecified essential hypertension      Past Surgical History:   Procedure Laterality Date     BYPASS GRAFT ARTERY CORONARY  6/14/2012    Procedure: BYPASS GRAFT ARTERY CORONARY;  Median Sternotomy Coronary Artery Bypass Graft  x 3        C THORACOSCOPY,DX W BX  fall 2003    benign tissue     CATARACT IOL, RT/LT      LE     CHOLECYSTECTOMY  1963     COLONOSCOPY  4-12-05     CORONARY ARTERY BYPASS      X4     CREATION PERICARDIAL WINDOW  6/15/2012    Procedure: CREATION PERICARDIAL WINDOW;  Mediastinal Exploration, Control of Bleeding;  Surgeon: Dwaine Griffin MD;  Location:  OR     EYE SURGERY  2014    left cataract removal     GI SURGERY  2003 and 2007    GIST tumor removal     GYN SURGERY      tubal ligation     HYSTERECTOMY VAGINAL, COLPORRHAPHY ANTERIOR, POSTERIOR, COMBINED N/A 10/17/2017    Procedure: COMBINED HYSTERECTOMY VAGINAL, COLPORRHAPHY ANTERIOR, POSTERIOR;  Total Vaginal Hysterectomy and Posterior Repair,Sacrospinous Vault Suspension;  Surgeon: Ruth Farooq MD;  Location: [facility] OR      PHACOEMULSIFICATION WITH STANDARD INTRAOCULAR LENS IMPLANT  3/24/2014    Procedure: PHACOEMULSIFICATION WITH STANDARD INTRAOCULAR LENS IMPLANT;  Left Kelman Phacoemulsification with Intraocular Lens Implant;  Surgeon: Magnus Mckeon MD;  Location: [facility] OR     RECESSION RESECTION WITH ADJUSTABLE SUTURE BILATERAL Bilateral 2016    Procedure: RECESSION RESECTION WITH ADJUSTABLE SUTURE BILATERAL;  Surgeon: Erma Ordaz MD;  Location: [facility] OR     SURGICAL HISTORY OF -       cholecystectomy     SURGICAL HISTORY OF -       Tubal Ligation      SURGICAL HISTORY OF -       Explor. Lap, Excision of Small Bowel Tumor      SURGICAL HISTORY OF -   2010    lung cancer removed right lung     Family Hx:  Family History   Problem Relation Age of Onset     HEART DISEASE Mother      Osteoperosis Mother      Respiratory Mother      Emphezyma     Hypertension Mother      HEART DISEASE Father      Alcohol/Drug Father      Hypertension Father      Hypertension Maternal Grandmother      Hypertension Brother      Hypertension Sister      Arthritis Sister      Hypertension Son      Cancer Son      rectal   Father:  at age 58, heat attack, alcoholism  Mother:  at age 84, heart attack, emphysema  2 siblings: Sister has arthritis and has had multiple surgeries due to that, also has HTN.  Brother also has HTN  Thyroid disease: No         DM2: No         Autoimmune: there is no family history of DM1, SLE, RA, or Vitiligo     Social Hx:  Social History     Social History     Marital status:      Spouse name: N/A     Number of children: N/A     Years of education: N/A     Occupational History     Not on file.     Social History Main Topics     Smoking status: Former Smoker     Packs/day: 0.50     Years: 49.00     Types: Cigarettes     Quit date: 2010     Smokeless tobacco: Never Used     Alcohol use No     Drug use: No     Sexual activity: Not Currently     Partners: Male       "Comment:      Other Topics Concern      Service No     Blood Transfusions No     Caffeine Concern Yes     3 cups     Occupational Exposure No     Hobby Hazards No     Sleep Concern No     Stress Concern No     son  and much happiness in her life, big burden lifted,      Weight Concern No     Special Diet No     Back Care Yes     lower back herniated disc 1970's      Exercise No     Bike Helmet No     Seat Belt Yes     Self-Exams Yes     Parent/Sibling W/ Cabg, Mi Or Angioplasty Before 65f 55m? 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  Son (Rk, with wife Марина and grandson) lives next door -- quadriplegic and high functioning, cannot provide physical assistance/support.          MEDICATIONS:  Current Outpatient Prescriptions   Medication     amLODIPine (NORVASC) 5 MG tablet     aspirin EC 81 MG EC tablet     Blood Pressure Monitoring (ADULT BLOOD PRESSURE CUFF LG) KIT     calcium-vitamin D-vitamin K (VIACTIV) 500-500-40 MG-UNT-MCG CHEW     hydrochlorothiazide (HYDRODIURIL) 25 MG tablet     imatinib (GLEEVEC) 400 MG tablet     lisinopril (PRINIVIL/ZESTRIL) 40 MG tablet     LORazepam (ATIVAN) 0.5 MG tablet     melatonin 5 MG tablet     methimazole (TAPAZOLE) 5 MG tablet     metoprolol tartrate (LOPRESSOR) 100 MG tablet     order for DME     oxyCODONE IR (ROXICODONE) 5 MG tablet     polyethylene glycol (MIRALAX/GLYCOLAX) powder     rosuvastatin (CRESTOR) 5 MG tablet     traMADol (ULTRAM) 50 MG tablet     No current facility-administered medications for this visit.        ROS   ROS: 11 point ROS neg other than the symptoms noted above in the HPI.    Physical Exam   VS: /74  Pulse 89  Ht 1.6 m (5' 3\")  Wt 69.7 kg (153 lb 9.6 oz)  LMP 01/01/1992  BMI 27.21 kg/m2  GENERAL: NAD, well groomed, smiling. Voice is normal without hoarseness.   HEENT: OP clear, no exophthalmos, no proptosis, EOMI, " no lig lag, no retraction, no scleral icterus.    THYROID: Thyroid is palpable, about 1.5X normal size.  A nodule is palpable on the lower aspect of the right thyroid lobe, this is similar to previous evaluation.   RESPIRATORY: Normal respiratory effort, normal breath sounds.   CARDIOVASCULAR: No peripheral edema, no murmurs  EXTREMITIES: no edema, no rashes, no myxedema  NEUROLOGY: CN grossly intact, again + tremor out of the outstretched hands (more significant on the right hand than on the left, worse with palms facing up). No dysmetria on finger-nose-finger, heel-sheen exam normal.    MSK: grossly intact  SKIN: no rashes, no lesions, no ulcers, skin warm and dry.   PSYCH: Intact judgment and insight. A&OX3 with a cordial affect.      LABS:  TFTs:  ENDO THYROID LABS-[facility] Latest Ref Rng & Units 6/20/2018   TSH 0.40 - 4.00 mU/L 0.78   T4 FREE 0.76 - 1.46 ng/dL 1.13   FREE T3 2.3 - 4.2 pg/mL    TRIIODOTHYRONINE(T3) 60 - 181 ng/dL 95   THYR PEROXIDASE PAPO <35 IU/mL    THYROID STIM IMMUNOG <=1.3 TSI index 4.4 (H)     The results above are on methimazole 2.5 mg per day        Recent Labs   Lab Test  12/29/17   1350  08/29/17   1030  07/07/17   0903   08/14/14   1343  08/07/14   1404   T4  1.15  1.28  1.19   < >  0.40*  0.39*   FT3   --    --    --    --   2.2*  1.7*   T3  118  98  93   < >   --    --    TSH  1.41  1.54  1.27   < >  115.57*  96.24*    < > = values in this interval not displayed.     Thyroid stim immunoglob: 4.8 on 8/29/17    This is on Methimazole 5mg (M, W, F) and 2.5mg (Tu, Thur, Sa, Sun)        ENDO THYROID LABS-[facility] Latest Ref Rng & Units 7/7/2017 11/8/2016   TSH 0.40 - 4.00 mU/L 1.27 2.17   T4 FREE 0.76 - 1.46 ng/dL 1.19 1.14   FREE T3 2.3 - 4.2 pg/mL     TRIIODOTHYRONINE(T3) 60 - 181 ng/dL 93 109     ENDO THYROID LABS-[facility] Latest Ref Rng 6/27/2016   TSH 0.40 - 4.00 mU/L 2.20   T4 FREE 0.76 - 1.46 ng/dL 1.38   FREE T3 2.3 - 4.2 pg/mL    TRIIODOTHYRONINE(T3) 60 - 181 ng/dL 93   THYR PEROXIDASE PAPO <35  IU/mL    THYROID STIM IMMUNOG  3.2 (H)       ENDO THYROID LABS-[facility] Latest Ref Rng 3/3/2016 12/21/2015   TSH 0.40 - 4.00 mU/L 1.94 2.35   T4 FREE 0.76 - 1.46 ng/dL 1.07 1.07   FREE T3 2.3 - 4.2 pg/mL     TRIIODOTHYRONINE(T3) 60 - 181 ng/dL 112    THYR PEROXIDASE PAPO <35 IU/mL     THYROID STIM IMMUNOG  1.6 (H)      ENDO THYROID LABS-[facility] Latest Ref Rng 10/20/2015   TSH 0.40 - 4.00 mU/L 2.42   T4 FREE 0.76 - 1.46 ng/dL 1.16   FREE T3 2.3 - 4.2 pg/mL    TRIIODOTHYRONINE(T3) 60 - 181 ng/dL 103   THYR PEROXIDASE PAPO <35 IU/mL    THYROID STIM IMMUNOG  1.8 (H)     [facility] 6/30/2015   TSH 0.40 - 4.00 mU/L 9.27 (H)   T4 FREE 0.76 - 1.46 ng/dL 0.92   FREE T3 2.3 - 4.2 pg/mL    TRIIODOTHYRONINE(T3) 60 - 181 ng/dL 129   This on Methimazole 10 mg/day      [facility] Lat[facility] 3/30/2015   TSH 0.40 - 4.00 mU/L 1.62   T4 FREE 0.76 - 1.46 ng/dL 0.89   FREE T3 2.3 - 4.2 pg/mL    TRIIODOTHYRONINE(T3) 60 - 181 ng/dL 98     [facility] Latest [facility] 1/9/2015   TSH 0.40 - 4.00 mU/L 0.02 (L)   T4 FREE 0.76 - 1.46 ng/dL 1.87 (H)   FREE T3 2.3 - 4.2 pg/mL    TRIIODOTHYRONINE(T3) 60 - 181 ng/dL 156     [facility] Lat[facility] 10/16/2014 9/10/2014   TSH 0.40 - 4.00 mU/L 0.02 (L) 0.26 (L)   T4 FREE 0.76 - 1.46 ng/dL 1.59 (H) 1.32   FREE T3 2.3 - 4.2 pg/mL     TRIIODOTHYRONINE(T3) 60 - 181 ng/dL 155 137     ENDO THYROID LABS-[facility] Latest Ref Rng 8/14/2014 8/7/2014 6/24/2014   TSH 0.40 - 4.00 mU/L 115.57 (H) 96.24 (H)    T4 FREE 0.76 - 1.46 ng/dL 0.40 (L) 0.39 (L)    FREE T3 2.3 - 4.2 pg/mL 2.2 (L) 1.7 (L)    THYROID STIM IMMUNOG    7.4 (H)     ENDO THYROID LABS-[facility] Latest Ref Rng 5/23/2014   TSH 0.40 - 4.00 mU/L 0.05 (L)   T4 FREE 0.76 - 1.46 ng/dL 0.96     !THYROID Latest Ref Rng 4/25/2014 3/31/2014 3/14/2014   TSH 0.4 - 5.0 mU/L 0.07 (L) 0.03 (L) 0.03 (L)   T4 FREE 0.70 - 1.85 ng/dL 2.03 (H) 2.82 (H) 3.02 (H)     TSI:  Component    Latest Ref Rng 2/25/2014   Thyroid Stim Immunog      4.6 (H)     CBC:  !HEMATOLOGY Latest Ref Rng 11/12/2013   WBC  4.9   RBC  3.55   HGB 11.7 - 15.7 gm/dL 9.8 (A)   HCT  29.8   MCV  84   MCH  27.6   MCHC  32.9   RDW  15.4     LFTs:  !COMPREHENSIVE Latest Ref Rng 11/12/2013 11/12/2013   CALCIUM 8.5 - 10.4 mg/dL 9.1 9.1   ALBUMIN  3.4 3.4   PROTEIN, TOTAL  6.1 6.1   AST  26 26   ALT  20 20   ALKPHOS  106 106   BILIRUBIN TOTAL  0.3 0.3     US Thyroid:  EXAMINATION: US THYROID, 1/9/2018 9:30 AM      COMPARISON: 2/5/2014, 6/18/2012, 11/9/2007.     HISTORY: Hyperthyroidism     Technique: Grayscale and color ultrasound imaging of the thyroid was performed.     Findings:    Thyroid parenchyma: Several thyroid nodules described below, the thyroid parenchyma is otherwise homogeneous and unremarkable.  The right lobe of the thyroid measures: 2.1 x 2.2 x 5.7 cm   The thyroid isthmus measures: 0.3 cm   The left lobe of the thyroid measures: 1.6 x 1.6 x 3.8 cm      Right lobe:  Nodule 1: mid  Nodule measurement: 0.5 x 0.3 x 0.6 cm  Echogenicity: Hypoechoic  Consistency: Cystic/spongiform  Calcifications: no  Hypervascular: no  Interval growth (>20%): no     Nodule 2: Inferior  Nodule measurement: 1.5 x 1.8 x 2.1 cm  Echogenicity: Heterogeneous, predominantly isoechoic  Consistency: Mostly solid  Calcifications: no  Hypervascular: yes  Interval growth (>20%): Yes (previously 2.0 x 1.3 x 1.1 cm).    Isthmus:   No nodules identified.     Left Lobe:   Nodule 1: Superior  Nodule measurement: 0.5 x 0.3 x 0.7 cm  Echogenicity: Heterogeneous, predominantly hypoechoic  Consistency: spongiform  Calcifications: no  Hypervascular: Minimal internal vascularity  Interval growth (>20%): NA     Nodule 2: Mid  Nodule measurement: 0.3 x 0.2 x 0.4 cm  Echogenicity: Heterogeneous, predominantly hypoechoic  Consistency: spongiform  Calcifications: no  Hypervascular: no  Interval growth (>20%): NA     Nodule 3: Mid to inferior posteriorly  Nodule measurement: 0.5 x 0.3 x 1.0 cm  Echogenicity:  Hypoechoic  Consistency: Cystic/spongiform  Calcifications: no  Hypervascular: no  Interval growth (>20%): NA            Impression:  Several thyroid nodules as described above, the largest of which is exophytic along the posterior and inferior right thyroid lobe and measures up to 2.1 cm. This appears increased compared to 2014 and not significantly changed from 7/7/2017 CT given differences in technique. Consider further evaluation with fine-needle aspiration.           I have personally reviewed the examination and initial interpretation and I agree with the findings.     YARELY ROLLE MD         US THYROID 2/5/2014 10:06 AM    HISTORY: Hyperthyroid.    COMPARISON: None.    FINDINGS: The right lobe of the thyroid gland measures 5.6 x 1.4 x 1.5 cm. The left lobe of the thyroid gland measures 3.5 x 1.6 x 1.5 cm. The isthmus measures 0.2 cm.  There is a somewhat ill-defined hypoechoic nodule in the mid to upper pole of the right lobe of the thyroid gland that measures 0.4 x 0.4 x 0.3 cm. This has calcifications within it. There is a predominantly solid nodule at the posterior margin of the lower pole of the right lobe of the thyroid gland that measures 2.0 x 1.3 x 1.1 cm. This nodule shows no significant change in size in retrospect from prior chest CTs dating back to 9/11/2012. This stability is supportive evidence for a benign entity.    Uptake and Scan: 2/28/2014  The uptake at 24 hours was measured at 19 %. The normal range at 24 hours is from 10 to 30%. Uptake on right: 12 %, Uptake on Left: 6.8 %.    Total computer estimated weight of the gland: 44.8 kg, Right gland weight: 29.9 kg, Left gland weight: 14.9 kg.    Images of the gland demonstrates normal appearance of the right and left lobes. No additional findings. No autonomous nodules were identified.    Copath Report 02/07/2018 10:54 AM       Patient Name: PIEDAD FAN   MR#: [id number]   Specimen #: [id number]   Collected: 2/7/2018   Received:  2/7/2018   Reported: 2/8/2018 10:13   Ordering Phy(s): EVELYN NEGRETE     For improved result formatting, select 'View Enhanced Report Format' under  Linked Documents section.     SPECIMEN/STAIN PROCESS:   FNA-thyroid, Right Inferior (1.5x1.8x2.1 cm)        Pap-Cyto x 3, Diff Quick Stain-cyto x 3     ----------------------------------------------------------------     CYTOLOGIC INTERPRETATION:     Thyroid, Right Inferior, Ultrasound-Guided Fine Needle Aspiration:   Benign   Consistent with a benign nodule (includes adenomatoid nodule, colloid nodule, etc.)   Specimen Adequacy: Satisfactory for evaluation.     The [place] implied risk of malignancy and recommended clinical management:   Benign has a 0-3% risk of malignancy, recommended management is clinical follow-up     I have personally reviewed all specimens and/or slides, including the listed special stains, and used them   with my medical judgement to determine or confirm the final diagnosis.     Electronically signed out by:   Akhil Dubon M.D., [facility]     Processed and screened at [facility]     CLINICAL HISTORY:   The patient is a 72-year-old female with a history of thyroid dysfunction (diagnosed 2/2014) and lung cancer (diagnosed 2010). She now has a 1.5 x 1.8 x 2.1 cm right inferior thyroid nodule.     GROSS:   FNA-thyroid, Right Inferior (1.5 x 1.8 x 2.1 cm): Received are 3 fixed slides, processed for Pap stain, and 3 air dried slides, processed for Diff Quik stain. Afirma tube held.     INTRAOPERATIVE CONSULTATION:   FNA Performance: Fine needle aspiration was not performed by [facility],  Pathology staff.   Immediate Adequacy: On site specimen adequacy evaluation was performed by Dr. BHAVANI Epperson MD via telepathology.     Onsite adequacy/interpretation:   Pass A1 adequate. Pass A2 put directly into Afirma tube. Pass A3-A4  adequate.     MICROSCOPIC:   Microscopic examination  performed.     Malachi Fang MD, Cytopathology Fellow          Akhil Dubon MD            All pertinent notes, labs, and images personally reviewed by me.        Assessment and Plan:  Ms.Suzanne TIERRA Abdul is a very pleasant 72 year old female here for the management of thyroid dysfunction.     1. Graves' Disease vs. Gleevec induced Hyperthyroidism.  The positive TSI and eye disease suggest this is Graves' disease.  Thyroid dysfunction (usually hypothyroidism) but also hyperthyroidism have been reported with Gleevec.  Ms. Abdul is clinically euthyroid. She is currently on methimazole 2.5 mg and she is clinically and biochemically euthyroid. As TSI remains elevated (4.4), it is unlikely she will be able to completely discontinue methimazole and remain euthyroid. We discussed options to trial weaning off methimazole to 2.5mg / zero mg alternating days and re-check TFTs in 6 weeks, continue current dose, or consider radioiodine therapy.  She will continue current methimazole dose for now.    2. Thyroid Nodule.  Ms. Abdul has thyroid nodules. US re-demonstrated nodules, one of which was concerning for growth.  We perform an US-guided FNA biopsy which resulted benign.     3. 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  Eye symptoms significantly improved after surgery and she no longer has diplopia. She continues presenting lacrimation, and she is followed by ophthalmology.  If she is to receive radioiodine to treat her hyperthyroidism, we will discuss use of steroids with opthalmology.     4.  Osteoporosis: Mrs. Abdul has a negative T-score of 2.8.  The patient had multiple risk factors for osteoporosis, including  race, cancer, smoking (former), reduced physical activity, and history of hyperthyroidism.  Her vitamin D levels were low in the past.  We will check vitamin D levels today, and optimize vitamin D replacement prior to starting a bisphosphonate (Fosamax).  I will also repeat  the DEXA scan prior to start anti-resorptive bone therapy as her bone density may have improved now that she has been euthyroid for the past 3 years.      Orders Placed This Encounter   Procedures     Dexa hip/pelvis/spine     Dexa Wrist/Heel     TSH     T4 free     T3 total     25 Hydroxyvitamin D2 and D3     I will contact the patient with the test results and recommendations.  Follow up in 6 months    Zenia Antoine MD PhD    Division of Endocrinology and Diabetes

## 2018-08-28 NOTE — NURSING NOTE
Chief Complaint   Patient presents with     RECHECK     return hypothyroid     Rocio Rodriguez CMA

## 2018-08-28 NOTE — PROGRESS NOTES
Endocrinology and diabetes outpatient clinic  Name: Idalmis Abdul  HPI:  Ms. Abdul is a very pleasant to 72-year-old female patient with thyroid dysfunction diagnosed in Feb 2014.  Mrs. 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   Thyroid dysfunction: Briefly, Ms. Abdul reported thyroid function studies were abnormal since August 2013. The patient was initially seen by Dr. Rama Coburn (February 2014).  Hyperthyroidism was confirmed, and a thyroid uptake and scan prior to starting therapy showed an homogenous uptake of 19%. I have initially titrated her methimazole up, and more recently titrated it down due to hypothyroidism.  She is currently on methimazole 2.5 mg daily.  She has been on this same dose since February 14, 2018.  She feels well and has no symptoms suggestive of hypo-or hyperthyroidism.   She had eye surgery in 7/2016, and that has helped with her diplopia.  She denies palpitations, tremors or changes in her bowel movements. She has chronic constipation, likely related to chronic oxycodone use (for back pain). She takes senna docusate to help with her constipation.   I have previously reviewed the patient's medical records, that showed that Ms. Abdul actually had a suppressed TSH and a positive TSI in June 2012.  She reports she was at the hospital at that time due to heart issues (atrial fibrillation).  It seems the patient was seen by the endocrinology consult team at that time, but she didn't have any follow-up as an outpatient.   Osteoporosis: A DEXA scan done in 2014 showed a negative T score of 2.8.  Gastrointestinal stromal tumor:  Ms. Abdul previous medical history includes a gastrointestinal stromal tumor diagnosed in 2003. The tumor was surgically removed  She was on Gleevec for 1 year, then off for 3 years, and had a recurrence in 2007, with 3 new tumors.  She has continuously been on Gleevec since then, and the plans are for  her to be on it long term, as her tumor has been stable while on it.  She continues to follow with Dr. Schmitz in Oncology, and has a CT armenta with contrast every 6 months.   Lung cancer diagnosed in 2010.  She is a former smoker, quitting in 2010.  Coronary artery disease: Ms. Abdul had quadruple bypass in 2012. She has HTN, and is on lisinopril, metoprolol, and Lasix, as per her cardiologist.  She is also on Rosuvastatin.  Perforated bowel: She also had a perforated bowel mid 2014 that was managed conservatively, and lost renal function during that hospital admission, likely related to antibiotic use.  Her GFR has somewhat recovered and is now around 45 mL/min  Mrs. Abdul underwent a total vaginal hysterectomy, A & P repair and Sacrospinous vault suspension,  total vaginal hysterectomy, for uterovaginal prolapse, in 10/17/17. She is followed by Dr. Farooq in OB/Gyn.   She has also had a L4-L5 fusion with laminectomy in April 2018.  This was a 6 hours long surgery and she is doing well.    PMH/PSH:  Past Medical History:   Diagnosis Date     ASCVD (arteriosclerotic cardiovascular disease) 6/14/2012     Benign neoplasm of duodenum, jejunum, and ileum 2003, 2007    Gastrointestinal Stromal Tumor, seen at [facility], Dr. Schmitz, GI oncology-Gleevec treatment.  Surgical removal in fall 2004-no recurrence as of 3/05.     CA - lung cancer 4/2010    [facility], treated surgically     choledochal cyst 1963    CHOLEDOCHAL CYST, mild dilatation of biliary system     Coronary artery disease      DDD (degenerative disc disease), lumbar 3/22/2018     Dislocated finger, left middle PIP 7/26/2013     Dyspnea 8/27/2013     Eyelid edema 12/15/2011    side effect of gastric cancer medication      GERD (gastroesophageal reflux disease) 10/8/2013     GIST (gastrointestinal stromal tumor), malignant (H) 5/10/2011     Graves disease      Grief reaction 5/11/2009     History of lung cancer 12/15/2011    S/p resection of right lung.  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-s/p  resection 2004     Other chronic pain     back     PONV (postoperative nausea and vomiting)      Postsurgical aortocoronary bypass status 7/4/2012     S/P CABG x 4 8/5/2012     Strabismus      Tobacco use disorder     Quit     Unspecified essential hypertension      Past Surgical History:   Procedure Laterality Date     BYPASS GRAFT ARTERY CORONARY  6/14/2012    Procedure: BYPASS GRAFT ARTERY CORONARY;  Median Sternotomy Coronary Artery Bypass Graft  x 3        C THORACOSCOPY,DX W BX  fall 2003    benign tissue     CATARACT IOL, RT/LT      LE     CHOLECYSTECTOMY  1963     COLONOSCOPY  4-12-05     CORONARY ARTERY BYPASS      X4     CREATION PERICARDIAL WINDOW  6/15/2012    Procedure: CREATION PERICARDIAL WINDOW;  Mediastinal Exploration, Control of Bleeding;  Surgeon: Dwaine Griffin MD;  Location: [facility] OR     EYE SURGERY  2014    left cataract removal     GI SURGERY  2003 and 2007    GIST tumor removal     GYN SURGERY      tubal ligation     HYSTERECTOMY VAGINAL, COLPORRHAPHY ANTERIOR, POSTERIOR, COMBINED N/A 10/17/2017    Procedure: COMBINED HYSTERECTOMY VAGINAL, COLPORRHAPHY ANTERIOR, POSTERIOR;  Total Vaginal Hysterectomy and Posterior Repair,Sacrospinous Vault Suspension;  Surgeon: Ruth Farooq MD;  Location: [facility] OR     PHACOEMULSIFICATION WITH STANDARD INTRAOCULAR LENS IMPLANT  3/24/2014    Procedure: PHACOEMULSIFICATION WITH STANDARD INTRAOCULAR LENS IMPLANT;  Left Kelman Phacoemulsification with Intraocular Lens Implant;  Surgeon: Magnus Mckeon MD;  Location: [facility] OR     RECESSION RESECTION  WITH ADJUSTABLE SUTURE BILATERAL Bilateral 2016    Procedure: RECESSION RESECTION WITH ADJUSTABLE SUTURE BILATERAL;  Surgeon: Erma Ordaz MD;  Location: [facility] OR     SURGICAL HISTORY OF -       cholecystectomy     SURGICAL HISTORY OF -       Tubal Ligation      SURGICAL HISTORY OF -       Explor. Lap, Excision of Small Bowel Tumor      SURGICAL HISTORY OF -   2010    lung cancer removed right lung     Family Hx:  Family History   Problem Relation Age of Onset     HEART DISEASE Mother      Osteoperosis Mother      Respiratory Mother      Emphezyma     Hypertension Mother      HEART DISEASE Father      Alcohol/Drug Father      Hypertension Father      Hypertension Maternal Grandmother      Hypertension Brother      Hypertension Sister      Arthritis Sister      Hypertension Son      Cancer Son      rectal   Father:  at age 58, heat attack, alcoholism  Mother:  at age 84, heart attack, emphysema  2 siblings: Sister has arthritis and has had multiple surgeries due to that, also has HTN.  Brother also has HTN  Thyroid disease: No         DM2: No         Autoimmune: there is no family history of DM1, SLE, RA, or Vitiligo     Social Hx:  Social History     Social History     Marital status:      Spouse name: N/A     Number of children: N/A     Years of education: N/A     Occupational History     Not on file.     Social History Main Topics     Smoking status: Former Smoker     Packs/day: 0.50     Years: 49.00     Types: Cigarettes     Quit date: 2010     Smokeless tobacco: Never Used     Alcohol use No     Drug use: No     Sexual activity: Not Currently     Partners: Male      Comment:      Other Topics Concern      Service No     Blood Transfusions No     Caffeine Concern Yes     3 cups     Occupational Exposure No     Hobby Hazards No     Sleep Concern No     Stress Concern No     son  and much happiness in her life, big burden lifted,       "Weight Concern No     Special Diet No     Back Care Yes     lower back herniated disc 1970's      Exercise No     Bike Helmet No     Seat Belt Yes     Self-Exams Yes     Parent/Sibling W/ Cabg, Mi Or Angioplasty Before 65f 55m? 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  Son (Rk, with wife Марина and grandson) lives next door -- quadriplegic and high functioning, cannot provide physical assistance/support.          MEDICATIONS:  Current Outpatient Prescriptions   Medication     amLODIPine (NORVASC) 5 MG tablet     aspirin EC 81 MG EC tablet     Blood Pressure Monitoring (ADULT BLOOD PRESSURE CUFF LG) KIT     calcium-vitamin D-vitamin K (VIACTIV) 500-500-40 MG-UNT-MCG CHEW     hydrochlorothiazide (HYDRODIURIL) 25 MG tablet     imatinib (GLEEVEC) 400 MG tablet     lisinopril (PRINIVIL/ZESTRIL) 40 MG tablet     LORazepam (ATIVAN) 0.5 MG tablet     melatonin 5 MG tablet     methimazole (TAPAZOLE) 5 MG tablet     metoprolol tartrate (LOPRESSOR) 100 MG tablet     order for DME     oxyCODONE IR (ROXICODONE) 5 MG tablet     polyethylene glycol (MIRALAX/GLYCOLAX) powder     rosuvastatin (CRESTOR) 5 MG tablet     traMADol (ULTRAM) 50 MG tablet     No current facility-administered medications for this visit.        ROS   ROS: 11 point ROS neg other than the symptoms noted above in the HPI.    Physical Exam   VS: /74  Pulse 89  Ht 1.6 m (5' 3\")  Wt 69.7 kg (153 lb 9.6 oz)  LMP 01/01/1992  BMI 27.21 kg/m2  GENERAL: NAD, well groomed, smiling. Voice is normal without hoarseness.   HEENT: OP clear, no exophthalmos, no proptosis, EOMI, no lig lag, no retraction, no scleral icterus.    THYROID: Thyroid is palpable, about 1.5X normal size.  A nodule is palpable on the lower aspect of the right thyroid lobe, this is similar to previous evaluation.   RESPIRATORY: Normal respiratory effort, normal breath sounds.   CARDIOVASCULAR: " No peripheral edema, no murmurs  EXTREMITIES: no edema, no rashes, no myxedema  NEUROLOGY: CN grossly intact, again + tremor out of the outstretched hands (more significant on the right hand than on the left, worse with palms facing up). No dysmetria on finger-nose-finger, heel-sheen exam normal.    MSK: grossly intact  SKIN: no rashes, no lesions, no ulcers, skin warm and dry.   PSYCH: Intact judgment and insight. A&OX3 with a cordial affect.      LABS:  TFTs:  ENDO THYROID LABS-[facility] Latest Ref Rng & Units 6/20/2018   TSH 0.40 - 4.00 mU/L 0.78   T4 FREE 0.76 - 1.46 ng/dL 1.13   FREE T3 2.3 - 4.2 pg/mL    TRIIODOTHYRONINE(T3) 60 - 181 ng/dL 95   THYR PEROXIDASE PAPO <35 IU/mL    THYROID STIM IMMUNOG <=1.3 TSI index 4.4 (H)     The results above are on methimazole 2.5 mg per day        Recent Labs   Lab Test  12/29/17   1350  08/29/17   1030  07/07/17   0903   08/14/14   1343  08/07/14   1404   T4  1.15  1.28  1.19   < >  0.40*  0.39*   FT3   --    --    --    --   2.2*  1.7*   T3  118  98  93   < >   --    --    TSH  1.41  1.54  1.27   < >  115.57*  96.24*    < > = values in this interval not displayed.     Thyroid stim immunoglob: 4.8 on 8/29/17    This is on Methimazole 5mg (M, W, F) and 2.5mg (Tu, Thur, Sa, Sun)        ENDO THYROID LABS-[facility] Latest Ref Rng & Units 7/7/2017 11/8/2016   TSH 0.40 - 4.00 mU/L 1.27 2.17   T4 FREE 0.76 - 1.46 ng/dL 1.19 1.14   FREE T3 2.3 - 4.2 pg/mL     TRIIODOTHYRONINE(T3) 60 - 181 ng/dL 93 109     ENDO THYROID LABS-[facility] Latest Ref Rng 6/27/2016   TSH 0.40 - 4.00 mU/L 2.20   T4 FREE 0.76 - 1.46 ng/dL 1.38   FREE T3 2.3 - 4.2 pg/mL    TRIIODOTHYRONINE(T3) 60 - 181 ng/dL 93   THYR PEROXIDASE PAPO <35 IU/mL    THYROID STIM IMMUNOG  3.2 (H)       ENDO THYROID LABS-[facility] Latest Ref Rng 3/3/2016 12/21/2015   TSH 0.40 - 4.00 mU/L 1.94 2.35   T4 FREE 0.76 - 1.46 ng/dL 1.07 1.07   FREE T3 2.3 - 4.2 pg/mL     TRIIODOTHYRONINE(T3) 60 - 181 ng/dL 112    THYR PEROXIDASE PAPO <35 IU/mL     THYROID STIM  IMMUNOG  1.6 (H)      [facility]   TSH 0.40 - 4.00 mU/L 2.42   T4 FREE 0.76 - 1.46 ng/dL 1.16   FREE T3 2.3 - 4.2 pg/mL    TRIIODOTHYRONINE(T3) 60 - 181 ng/dL 103   THYR PEROXIDASE PAPO <35 IU/mL    THYROID STIM IMMUNOG  1.8 (H)     [facility] 6/30/2015   TSH 0.40 - 4.00 mU/L 9.27 (H)   T4 FREE 0.76 - 1.46 ng/dL 0.92   FREE T3 2.3 - 4.2 pg/mL    TRIIODOTHYRONINE(T3) 60 - 181 ng/dL 129   This on Methimazole 10 mg/day      [facility] 3/30/2015   TSH 0.40 - 4.00 mU/L 1.62   T4 FREE 0.76 - 1.46 ng/dL 0.89   FREE T3 2.3 - 4.2 pg/mL    TRIIODOTHYRONINE(T3) 60 - 181 ng/dL 98     [facility] 1/9/2015   TSH 0.40 - 4.00 mU/L 0.02 (L)   T4 FREE 0.76 - 1.46 ng/dL 1.87 (H)   FREE T3 2.3 - 4.2 pg/mL    TRIIODOTHYRONINE(T3) 60 - 181 ng/dL 156     ENDO THYROID [facility] Latest Ref Rng 10/16/2014 9/10/2014   TSH 0.40 - 4.00 mU/L 0.02 (L) 0.26 (L)   T4 FREE 0.76 - 1.46 ng/dL 1.59 (H) 1.32   FREE T3 2.3 - 4.2 pg/mL     TRIIODOTHYRONINE(T3) 60 - 181 ng/dL 155 137     [facility] 8/14/2014 8/7/2014 6/24/2014   TSH 0.40 - 4.00 mU/L 115.57 (H) 96.24 (H)    T4 FREE 0.76 - 1.46 ng/dL 0.40 (L) 0.39 (L)    FREE T3 2.3 - 4.2 pg/mL 2.2 (L) 1.7 (L)    THYROID STIM IMMUNOG    7.4 (H)     ENDO THYROID LABS-[facility] Latest Ref Rng 5/23/2014   TSH 0.40 - 4.00 mU/L 0.05 (L)   T4 FREE 0.76 - 1.46 ng/dL 0.96     !THYROID Latest Ref Rng 4/25/2014 3/31/2014 3/14/2014   TSH 0.4 - 5.0 mU/L 0.07 (L) 0.03 (L) 0.03 (L)   T4 FREE 0.70 - 1.85 ng/dL 2.03 (H) 2.82 (H) 3.02 (H)     TSI:  Component    Latest Ref Rng 2/25/2014   Thyroid Stim Immunog     4.6 (H)     CBC:  !HEMATOLOGY Latest Ref Rng 11/12/2013   WBC  4.9   RBC  3.55   HGB 11.7 - 15.7 gm/dL 9.8 (A)   HCT  29.8   MCV  84   MCH  27.6   MCHC  32.9   RDW  15.4     LFTs:  !COMPREHENSIVE Latest Ref Rng 11/12/2013 11/12/2013   CALCIUM 8.5 - 10.4 mg/dL 9.1 9.1   ALBUMIN  3.4 3.4    PROTEIN, TOTAL  6.1 6.1   AST  26 26   ALT  20 20   ALKPHOS  106 106   BILIRUBIN TOTAL  0.3 0.3     US Thyroid:  EXAMINATION: US THYROID, 1/9/2018 9:30 AM      COMPARISON: 2/5/2014, 6/18/2012, 11/9/2007.     HISTORY: Hyperthyroidism     Technique: Grayscale and color ultrasound imaging of the thyroid was performed.     Findings:    Thyroid parenchyma: Several thyroid nodules described below, the thyroid parenchyma is otherwise homogeneous and unremarkable.  The right lobe of the thyroid measures: 2.1 x 2.2 x 5.7 cm   The thyroid isthmus measures: 0.3 cm   The left lobe of the thyroid measures: 1.6 x 1.6 x 3.8 cm      Right lobe:  Nodule 1: mid  Nodule measurement: 0.5 x 0.3 x 0.6 cm  Echogenicity: Hypoechoic  Consistency: Cystic/spongiform  Calcifications: no  Hypervascular: no  Interval growth (>20%): no     Nodule 2: Inferior  Nodule measurement: 1.5 x 1.8 x 2.1 cm  Echogenicity: Heterogeneous, predominantly isoechoic  Consistency: Mostly solid  Calcifications: no  Hypervascular: yes  Interval growth (>20%): Yes (previously 2.0 x 1.3 x 1.1 cm).    Isthmus:   No nodules identified.     Left Lobe:   Nodule 1: Superior  Nodule measurement: 0.5 x 0.3 x 0.7 cm  Echogenicity: Heterogeneous, predominantly hypoechoic  Consistency: spongiform  Calcifications: no  Hypervascular: Minimal internal vascularity  Interval growth (>20%): NA     Nodule 2: Mid  Nodule measurement: 0.3 x 0.2 x 0.4 cm  Echogenicity: Heterogeneous, predominantly hypoechoic  Consistency: spongiform  Calcifications: no  Hypervascular: no  Interval growth (>20%): NA     Nodule 3: Mid to inferior posteriorly  Nodule measurement: 0.5 x 0.3 x 1.0 cm  Echogenicity: Hypoechoic  Consistency: Cystic/spongiform  Calcifications: no  Hypervascular: no  Interval growth (>20%): NA            Impression:  Several thyroid nodules as described above, the largest of which is exophytic along the posterior and inferior right thyroid lobe and measures up to 2.1 cm. This  appears increased compared to 2014 and not significantly changed from 7/7/2017 CT given differences in technique. Consider further evaluation with fine-needle aspiration.           I have personally reviewed the examination and initial interpretation and I agree with the findings.     YARELY ROLLE MD         US THYROID 2/5/2014 10:06 AM    HISTORY: Hyperthyroid.    COMPARISON: None.    FINDINGS: The right lobe of the thyroid gland measures 5.6 x 1.4 x 1.5 cm. The left lobe of the thyroid gland measures 3.5 x 1.6 x 1.5 cm. The isthmus measures 0.2 cm.  There is a somewhat ill-defined hypoechoic nodule in the mid to upper pole of the right lobe of the thyroid gland that measures 0.4 x 0.4 x 0.3 cm. This has calcifications within it. There is a predominantly solid nodule at the posterior margin of the lower pole of the right lobe of the thyroid gland that measures 2.0 x 1.3 x 1.1 cm. This nodule shows no significant change in size in retrospect from prior chest CTs dating back to 9/11/2012. This stability is supportive evidence for a benign entity.    Uptake and Scan: 2/28/2014  The uptake at 24 hours was measured at 19 %. The normal range at 24 hours is from 10 to 30%. Uptake on right: 12 %, Uptake on Left: 6.8 %.    Total computer estimated weight of the gland: 44.8 kg, Right gland weight: 29.9 kg, Left gland weight: 14.9 kg.    Images of the gland demonstrates normal appearance of the right and left lobes. No additional findings. No autonomous nodules were identified.    Copath Report 02/07/2018 10:54 AM       Patient Name: PIEDAD FAN   MR#: [id number]   Specimen #: [id number]   Collected: 2/7/2018   Received: 2/7/2018   Reported: 2/8/2018 10:13   Ordering Phy(s): EVELYN NEGRETE     For improved result formatting, select 'View Enhanced Report Format' under  Linked Documents section.     SPECIMEN/STAIN PROCESS:   FNA-thyroid, Right Inferior (1.5x1.8x2.1 cm)        Pap-Cyto x 3, Diff Quick  Stain-cyto x 3     ----------------------------------------------------------------     CYTOLOGIC INTERPRETATION:     Thyroid, Right Inferior, Ultrasound-Guided Fine Needle Aspiration:   Benign   Consistent with a benign nodule (includes adenomatoid nodule, colloid nodule, etc.)   Specimen Adequacy: Satisfactory for evaluation.     The [place] implied risk of malignancy and recommended clinical management:   Benign has a 0-3% risk of malignancy, recommended management is clinical follow-up     I have personally reviewed all specimens and/or slides, including the listed special stains, and used them   with my medical judgement to determine or confirm the final diagnosis.     Electronically signed out by:   Akhil Dubon M.D., [facility]     Processed and screened at [facility]     CLINICAL HISTORY:   The patient is a 72-year-old female with a history of thyroid dysfunction (diagnosed 2/2014) and lung cancer (diagnosed 2010). She now has a 1.5 x 1.8 x 2.1 cm right inferior thyroid nodule.     GROSS:   FNA-thyroid, Right Inferior (1.5 x 1.8 x 2.1 cm): Received are 3 fixed slides, processed for Pap stain, and 3 air dried slides, processed for Diff Quik stain. Afirma tube held.     INTRAOPERATIVE CONSULTATION:   FNA Performance: Fine needle aspiration was not performed by [facility],  Pathology staff.   Immediate Adequacy: On site specimen adequacy evaluation was performed by Dr. BHAVANI Epperson MD via telepathology.     Onsite adequacy/interpretation:   Pass A1 adequate. Pass A2 put directly into Afirma tube. Pass A3-A4  adequate.     MICROSCOPIC:   Microscopic examination performed.     Malachi Fang MD, Cytopathology Fellow          Akhil Dubon MD            All pertinent notes, labs, and images personally reviewed by me.        Assessment and Plan:  Ms.Suzanne TIERRA Abdul is a very pleasant 72 year old female here for the management of thyroid dysfunction.     1.  Graves' Disease vs. Gleevec induced Hyperthyroidism.  The positive TSI and eye disease suggest this is Graves' disease.  Thyroid dysfunction (usually hypothyroidism) but also hyperthyroidism have been reported with Gleevec.  Ms. Abdul is clinically euthyroid. She is currently on methimazole 2.5 mg and she is clinically and biochemically euthyroid. As TSI remains elevated (4.4), it is unlikely she will be able to completely discontinue methimazole and remain euthyroid. We discussed options to trial weaning off methimazole to 2.5mg / zero mg alternating days and re-check TFTs in 6 weeks, continue current dose, or consider radioiodine therapy.  She will continue current methimazole dose for now.    2. Thyroid Nodule.  Ms. Abdul has thyroid nodules. US re-demonstrated nodules, one of which was concerning for growth.  We perform an US-guided FNA biopsy which resulted benign.     3. 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  Eye symptoms significantly improved after surgery and she no longer has diplopia. She continues presenting lacrimation, and she is followed by ophthalmology.  If she is to receive radioiodine to treat her hyperthyroidism, we will discuss use of steroids with opthalmology.     4.  Osteoporosis: Mrs. Abdul has a negative T-score of 2.8.  The patient had multiple risk factors for osteoporosis, including  race, cancer, smoking (former), reduced physical activity, and history of hyperthyroidism.  Her vitamin D levels were low in the past.  We will check vitamin D levels today, and optimize vitamin D replacement prior to starting a bisphosphonate (Fosamax).  I will also repeat the DEXA scan prior to start anti-resorptive bone therapy as her bone density may have improved now that she has been euthyroid for the past 3 years.      Orders Placed This Encounter   Procedures     Dexa hip/pelvis/spine     Dexa Wrist/Heel     TSH     T4 free     T3 total     25 Hydroxyvitamin D2  and D3     I will contact the patient with the test results and recommendations.  Follow up in 6 months    Zenia Antoine MD PhD    Division of Endocrinology and Diabetes

## 2018-08-31 DIAGNOSIS — C49.A0 MALIGNANT GASTROINTESTINAL STROMAL TUMOR, UNSPECIFIED SITE (H): Primary | ICD-10-CM

## 2018-08-31 RX ORDER — IMATINIB MESYLATE 400 MG/1
400 TABLET, FILM COATED ORAL DAILY
Qty: 30 TABLET | Refills: 2 | Status: SHIPPED | OUTPATIENT
Start: 2018-08-31 | End: 2019-01-10

## 2018-10-05 ENCOUNTER — HOSPITAL ENCOUNTER (OUTPATIENT)
Dept: CT IMAGING | Facility: CLINIC | Age: 73
Discharge: HOME | End: 2018-10-05
Attending: INTERNAL MEDICINE | Admitting: INTERNAL MEDICINE
Payer: MEDICARE

## 2018-10-05 DIAGNOSIS — C49.A0 MALIGNANT GASTROINTESTINAL STROMAL TUMOR, UNSPECIFIED SITE (H): ICD-10-CM

## 2018-10-05 DIAGNOSIS — E05.00 GRAVES' DISEASE: ICD-10-CM

## 2018-10-05 DIAGNOSIS — E05.90 HYPERTHYROIDISM: ICD-10-CM

## 2018-10-05 DIAGNOSIS — Z85.118 HISTORY OF LUNG CANCER: ICD-10-CM

## 2018-10-05 LAB
CREAT BLD-MCNC: 1.2 MG/DL (ref 0.52–1.04)
GFR SERPL CREATININE-BSD FRML MDRD: 44 ML/MIN/1.7M2

## 2018-10-05 PROCEDURE — 25000128 H RX IP 250 OP 636: Performed by: RADIOLOGY

## 2018-10-05 PROCEDURE — 82565 ASSAY OF CREATININE: CPT

## 2018-10-05 PROCEDURE — 71260 CT THORAX DX C+: CPT

## 2018-10-05 PROCEDURE — 36415 COLL VENOUS BLD VENIPUNCTURE: CPT | Performed by: INTERNAL MEDICINE

## 2018-10-05 PROCEDURE — 25000125 ZZHC RX 250: Performed by: RADIOLOGY

## 2018-10-05 PROCEDURE — 82306 VITAMIN D 25 HYDROXY: CPT | Performed by: INTERNAL MEDICINE

## 2018-10-05 RX ORDER — IOPAMIDOL 755 MG/ML
75 INJECTION, SOLUTION INTRAVASCULAR ONCE
Status: DISCONTINUED | OUTPATIENT
Start: 2018-10-05 | End: 2018-10-05

## 2018-10-05 RX ORDER — IOPAMIDOL 755 MG/ML
75 INJECTION, SOLUTION INTRAVASCULAR ONCE
Status: COMPLETED | OUTPATIENT
Start: 2018-10-05 | End: 2018-10-05

## 2018-10-05 RX ADMIN — IOPAMIDOL 75 ML: 755 INJECTION, SOLUTION INTRAVENOUS at 10:54

## 2018-10-05 RX ADMIN — SODIUM CHLORIDE 59 ML: 9 INJECTION, SOLUTION INTRAVENOUS at 10:55

## 2018-10-08 ENCOUNTER — ONCOLOGY VISIT (OUTPATIENT)
Dept: ONCOLOGY | Facility: CLINIC | Age: 73
End: 2018-10-08
Attending: INTERNAL MEDICINE
Payer: COMMERCIAL

## 2018-10-08 VITALS
BODY MASS INDEX: 27.89 KG/M2 | RESPIRATION RATE: 18 BRPM | WEIGHT: 157.41 LBS | SYSTOLIC BLOOD PRESSURE: 122 MMHG | DIASTOLIC BLOOD PRESSURE: 73 MMHG | TEMPERATURE: 97.6 F | HEART RATE: 78 BPM | HEIGHT: 63 IN | OXYGEN SATURATION: 95 %

## 2018-10-08 DIAGNOSIS — C49.A0 MALIGNANT GASTROINTESTINAL STROMAL TUMOR, UNSPECIFIED SITE (H): Primary | ICD-10-CM

## 2018-10-08 DIAGNOSIS — Z85.118 HISTORY OF LUNG CANCER: ICD-10-CM

## 2018-10-08 LAB
DEPRECATED CALCIDIOL+CALCIFEROL SERPL-MC: <28 UG/L (ref 20–75)
VITAMIN D2 SERPL-MCNC: <5 UG/L
VITAMIN D3 SERPL-MCNC: 23 UG/L

## 2018-10-08 PROCEDURE — G0463 HOSPITAL OUTPT CLINIC VISIT: HCPCS | Mod: ZF

## 2018-10-08 PROCEDURE — 99214 OFFICE O/P EST MOD 30 MIN: CPT | Mod: ZP | Performed by: INTERNAL MEDICINE

## 2018-10-08 RX ORDER — LORAZEPAM 0.5 MG/1
0.5 TABLET ORAL AT BEDTIME
Qty: 90 TABLET | Refills: 1 | Status: SHIPPED | OUTPATIENT
Start: 2018-10-08 | End: 2019-03-26

## 2018-10-08 ASSESSMENT — PAIN SCALES - GENERAL: PAINLEVEL: NO PAIN (0)

## 2018-10-08 NOTE — NURSING NOTE
"Oncology Rooming Note    October 8, 2018 9:27 AM   Idalmis Abdul is a 72 year old female who presents for:    Chief Complaint   Patient presents with     Oncology Clinic Visit     Return visit related to GIST     Initial Vitals: /73 (BP Location: Left arm, Patient Position: Sitting, Cuff Size: Adult Regular)  Pulse 78  Temp 97.6  F (36.4  C) (Tympanic)  Resp 18  Ht 1.6 m (5' 2.99\")  Wt 71.4 kg (157 lb 6.5 oz)  LMP 01/01/1992  SpO2 95%  BMI 27.89 kg/m2 Estimated body mass index is 27.89 kg/(m^2) as calculated from the following:    Height as of this encounter: 1.6 m (5' 2.99\").    Weight as of this encounter: 71.4 kg (157 lb 6.5 oz). Body surface area is 1.78 meters squared.  No Pain (0) Comment: Data Unavailable   Patient's last menstrual period was 01/01/1992.  Allergies reviewed: Yes  Medications reviewed: Yes    Medications: MEDICATION REFILLS NEEDED TODAY. Provider was notified.  Pharmacy name entered into [facility]:    [place], [place] - [address] [facility] AT [facility] [place], [place] - [address]    Clinical concerns: Refill needed today. Provider was notified.    10 minutes for nursing intake (face to face time)     Jannette Elizabeth LPN            "

## 2018-10-08 NOTE — PROGRESS NOTES
Idalmis Abdul is here today in follow-up of metastatic gastrointestinal stromal tumor.    She was originally diagnosed 15 years ago and then recurred shortly after completing 5 years of adjuvant Gleevec.  She has been back on Gleevec with excellent control for the last 7 or 8 years.  She is here today for planned surveillance for disease progression.  Since I saw her last she has had back surgery for her degenerative disease which relieved her radicular pain but left her with some numbness in her foot.  She said a little bit of problems with chronic ankle edema since then that are slowly getting better.  Otherwise she feels like her overall health is about the same as always.  Her thyroid disease has been well controlled but her endocrinologist is concerned about her osteoporosis.  She has had no recent active coronary symptoms.  Her chronic eyelid edema is stable.  The remainder of her 10 point review of systems is otherwise negative.    On physical exam Ms. Abdul appears well.  Her vital signs are unremarkable.  She has no scleral icterus.  There is mild conjunctival injection and marked periorbital edema.  She has no adenopathy palpable in her neck or supra Viet spaces.  Her lungs are clear to auscultation dullness to percussion.  Her heart rate and rhythm are regular without audible murmur gallop.  Her jugular venous pressure is normal.  Her abdomen is soft and nontender without palpable mass, organomegaly or ascites.  She has 1+ pitting edema to the mid shin bilaterally.  She has no cyanosis or clubbing of her digits.  Her speech is fluent and her cranial nerves are grossly intact.    I reviewed with her her CT scan which shows no evidence of recurrence of her lung cancer, or evidence of progression of her chest.  Her lab work is still pending.    Assessment/plan: Metastatic gastrointestinal stromal tumor currently with complete remission on ongoing therapy with Gleevec.  We will continue on with the same  dose and schedule and see her back again in about 6 months for another response assessment.

## 2018-10-08 NOTE — MR AVS SNAPSHOT
After Visit Summary   10/8/2018    Idalmis Abdul    MRN: [id number]           Patient Information     Date Of Birth          1945        Visit Information        Provider Department      10/8/2018 9:15 AM Blayne Schmitz MD [facility]        Today's Diagnoses     Malignant gastrointestinal stromal tumor, unspecified site (H)    -  1    History of lung cancer           Follow-ups after your visit        Follow-up notes from your care team     Return in about 6 months (around 4/8/2019) for MD visit with CT and labs.      Your next 10 appointments already scheduled     Feb 19, 2019 10:00 AM CST   (Arrive by 9:45 AM)   RETURN ENDOCRINE with TIERRA Antoine MD   [facility] ([facility] [place])    [address] [place] [id number] [phone]            Apr 05, 2019 10:15 AM CDT   LAB with [facility] ([facility])    [address] [address]   [phone]           Please do not eat 10-12 hours before your appointment if you are coming in fasting for labs on lipids, cholesterol, or glucose (sugar). This does not apply to pregnant women. Water, hot tea and black coffee (with nothing added) are okay. Do not drink other fluids, diet soda or chew gum.            Apr 05, 2019 10:30 AM CDT   CT CHEST ABDOMEN PELVIS W/O & W CONTRAST with [facility]   [facility] CT ([facility])    [address] [address]   [phone]           How do I prepare for my exam? (Food and drink instructions) To prepare: Do not eat or drink for 2 hours before your exam. If you need to take medicine, you may take it with small sips of water. (We may ask you to take liquid medicine as well.)  How do I prepare for my exam? (Other instructions) Please arrive 30 minutes early for your CT.  Once in the department you might be asked to drink  water 15-20 minutes prior to your exam.  If indicated you may be asked to drink an oral contrast in advance of your CT.  If this is the case, the imaging team will let you know or be in contact with you prior to your appointment  Patients over 70 or patients with diabetes or kidney problems: If you haven t had a blood test (creatinine test) within the last 30 days, the Cardiologist/Radiologist may require you to get this test prior to your exam.  If you have diabetes:  Continue to take your metformin medication on the day of your exam  What should I wear: Please wear loose clothing, such as a sweat suit or jogging clothes. Avoid snaps, zippers and other metal. We may ask you to undress and put on a hospital gown.  How long does the exam take: Most scans take less than 20 minutes.  What should I bring: Please bring any scans or X-rays taken at other hospitals, if similar tests were done. Also bring a list of your medicines, including vitamins, minerals and over-the-counter drugs. It is safest to leave personal items at home.  Do I need a : No  is needed.  What do I need to tell my doctor? Be sure to tell your doctor: * If you have any allergies. * If there s any chance you are pregnant. * If you are breastfeeding.  What should I do after the exam: No restrictions, You may resume normal activities.  What is this test: A CT (computed tomography) scan is a series of pictures that allows us to look inside your body. The scanner creates images of the body in cross sections, much like slices of bread. This helps us see any problems more clearly. You may receive contrast (X-ray dye) before or during your scan. You will be asked to drink the contrast.  Who should I call with questions: If you have any questions, please call the Imaging Department where you will have your exam. Directions, parking instructions, and other information is available on our website, [email or web address].            Apr 08, 2019  9:15  "AM CDT   (Arrive by 9:00 AM)   Return Visit with Blayne Schmitz MD   [facility] [place] ([facility])    [address]  [address]  [facility] [address] [phone]              Who to contact     If you have questions or need follow up information about today's clinic visit or your schedule please contact [facility] directly at [phone].  Normal or non-critical lab and imaging results will be communicated to you by [employer], letter or phone within 4 business days after the clinic has received the results. If you do not hear from us within 7 days, please contact the clinic through [employer] or phone. If you have a critical or abnormal lab result, we will notify you by phone as soon as possible.  Submit refill requests through [employer] or call your pharmacy and they will forward the refill request to us. Please allow 3 business days for your refill to be completed.          Additional Information About Your Visit        VideoLens Information     Video[employer]ens lets you send messages to your doctor, view your test results, renew your prescriptions, schedule appointments and more. To sign up, go to [email or web address] . Click on \"Log in\" on the left side of the screen, which will take you to the Welcome page. Then click on \"Sign up Now\" on the right side of the page.     You will be asked to enter the access code listed below, as well as some personal information. Please follow the directions to create your username and password.     Your access code is: [email or web address]  Expires: 2018  6:30 AM     Your access code will  in 90 days. If you need help or a new code, please call your [place] clinic or [phone].        Care EveryWhere ID     This is your Care EveryWhere ID. This could be used by other organizations to access your [place] medical records  [id number]        Your Vitals Were     Pulse Temperature Respirations Height Last " "Period Pulse Oximetry    78 97.6  F (36.4  C) (Tympanic) 18 1.6 m (5' 2.99\") 01/01/1992 95%    BMI (Body Mass Index)                   27.89 kg/m2            Blood Pressure from Last 3 Encounters:   10/08/18 122/73   08/28/18 124/74   08/20/18 124/84    Weight from Last 3 Encounters:   10/08/18 71.4 kg (157 lb 6.5 oz)   08/28/18 69.7 kg (153 lb 9.6 oz)   08/20/18 71.3 kg (157 lb 3.2 oz)                 Today's Medication Changes          These changes are accurate as of 10/8/18 11:59 PM.  If you have any questions, ask your nurse or doctor.               These medicines have changed or have updated prescriptions.        Dose/Directions    oxyCODONE IR 5 MG tablet   Commonly known as:  ROXICODONE   This may have changed:    - how much to take  - when to take this   Used for:  Chronic low back pain, unspecified back pain laterality, with sciatica presence unspecified        Dose:  5-10 mg   Take 1-2 tablets (5-10 mg) by mouth every 3 hours as needed for pain or other (Moderate to Severe)   Quantity:  180 tablet   Refills:  0            Where to get your medicines      Some of these will need a paper prescription and others can be bought over the counter.  Ask your nurse if you have questions.     Bring a paper prescription for each of these medications     LORazepam 0.5 MG tablet                Primary Care Provider Office Phone # Fax #    Darlene Daniela Sultana [phone] [address] [address]        Equal Access to Services     [facility] AH: Hadii cornelia hunter Sonemesio, waaxda luqadaha, qaybta kaalpatricio wild idimei muse. So [facility] [phone].    ATENCIÓN: Si habla español, tiene a cassidy disposición servicios gratuitos de asistencia lingüística. Llame al [phone].    We comply with applicable federal civil rights laws and [place] laws. We do not discriminate on the basis of race, color, national origin, age, disability, sex, sexual " orientation, or gender identity.            Thank you!     Thank you for choosing [facility]  for your care. Our goal is always to provide you with excellent care. Hearing back from our patients is one way we can continue to improve our services. Please take a few minutes to complete the written survey that you may receive in the mail after your visit with us. Thank you!             Your Updated Medication List - Protect others around you: Learn how to safely use, store and throw away your medicines at www.disposemymeds.org.          This list is accurate as of 10/8/18 11:59 PM.  Always use your most recent med list.                   Brand Name Dispense Instructions for use Diagnosis    Adult Blood Pressure Cuff Lg Kit     1 kit    1 Device 2 times daily    HTN, goal below 140/90       amLODIPine 5 MG tablet    NORVASC    30 tablet    TAKE ONE TABLET BY MOUTH EVERY DAY    Essential hypertension       aspirin 81 MG EC tablet     30 tablet    Take 1 tablet by mouth daily.    NSTEMI (non-ST elevated myocardial infarction) (H), ACS (acute coronary syndrome) (H)       hydrochlorothiazide 25 MG tablet    HYDRODIURIL    90 tablet    TAKE ONE TABLET BY MOUTH EVERY DAY    Essential hypertension       imatinib 400 MG tablet    GLEEVEC    30 tablet    Take 1 tablet (400 mg) by mouth daily Take with a meal and a large glass of water.    Malignant gastrointestinal stromal tumor, unspecified site (H)       lisinopril 40 MG tablet    PRINIVIL/ZESTRIL    90 tablet    TAKE ONE TABLET BY MOUTH EVERY DAY    Essential hypertension with goal blood pressure less than 140/90       LORazepam 0.5 MG tablet    ATIVAN    90 tablet    Take 1 tablet (0.5 mg) by mouth At Bedtime    Malignant gastrointestinal stromal tumor, unspecified site (H)       melatonin 5 MG tablet      Take 5 mg by mouth nightly as needed for sleep        methimazole 5 MG tablet    TAPAZOLE    45 tablet    Take 0.5 tablets (2.5 mg) by mouth daily     Hyperthyroidism       metoprolol tartrate 100 MG tablet    LOPRESSOR    180 tablet    TAKE ONE TABLET BY MOUTH TWICE A DAY    Essential hypertension with goal blood pressure less than 140/90       order for DME     1 Device    Walker    Post hysterectomy menopause       oxyCODONE IR 5 MG tablet    ROXICODONE    180 tablet    Take 1-2 tablets (5-10 mg) by mouth every 3 hours as needed for pain or other (Moderate to Severe)    Chronic low back pain, unspecified back pain laterality, with sciatica presence unspecified       polyethylene glycol powder    MIRALAX/GLYCOLAX     Take 17 g by mouth daily        rosuvastatin 5 MG tablet    CRESTOR    90 tablet    TAKE ONE TABLET BY MOUTH EVERY DAY    Hyperlipidemia LDL goal <160       traMADol 50 MG tablet    ULTRAM     Take 50 mg by mouth 3 times daily        VIACTIV 500-500-40 MG-UNT-MCG Chew   Generic drug:  calcium-vitamin D-vitamin K      Take 2 tablets by mouth daily

## 2018-10-08 NOTE — LETTER
10/8/2018      RE: Idalmis Abdul  [address]  [facility] [address]       Idalmis Abdul is here today in follow-up of metastatic gastrointestinal stromal tumor.    She was originally diagnosed 15 years ago and then recurred shortly after completing 5 years of adjuvant Gleevec.  She has been back on Gleevec with excellent control for the last 7 or 8 years.  She is here today for planned surveillance for disease progression.  Since I saw her last she has had back surgery for her degenerative disease which relieved her radicular pain but left her with some numbness in her foot.  She said a little bit of problems with chronic ankle edema since then that are slowly getting better.  Otherwise she feels like her overall health is about the same as always.  Her thyroid disease has been well controlled but her endocrinologist is concerned about her osteoporosis.  She has had no recent active coronary symptoms.  Her chronic eyelid edema is stable.  The remainder of her 10 point review of systems is otherwise negative.    On physical exam Ms. Abdul appears well.  Her vital signs are unremarkable.  She has no scleral icterus.  There is mild conjunctival injection and marked periorbital edema.  She has no adenopathy palpable in her neck or supra Viet spaces.  Her lungs are clear to auscultation dullness to percussion.  Her heart rate and rhythm are regular without audible murmur gallop.  Her jugular venous pressure is normal.  Her abdomen is soft and nontender without palpable mass, organomegaly or ascites.  She has 1+ pitting edema to the mid shin bilaterally.  She has no cyanosis or clubbing of her digits.  Her speech is fluent and her cranial nerves are grossly intact.    I reviewed with her her CT scan which shows no evidence of recurrence of her lung cancer, or evidence of progression of her chest.  Her lab work is still pending.    Assessment/plan: Metastatic gastrointestinal stromal tumor currently with  complete remission on ongoing therapy with Gleevec.  We will continue on with the same dose and schedule and see her back again in about 6 months for another response assessment.    Blayne Schmitz MD

## 2018-10-12 DIAGNOSIS — Z85.118 HISTORY OF LUNG CANCER: ICD-10-CM

## 2018-10-12 DIAGNOSIS — C49.A0 MALIGNANT GASTROINTESTINAL STROMAL TUMOR, UNSPECIFIED SITE (H): ICD-10-CM

## 2018-10-12 LAB
ALBUMIN SERPL-MCNC: 3.7 G/DL (ref 3.4–5)
ALP SERPL-CCNC: 73 U/L (ref 40–150)
ALT SERPL W P-5'-P-CCNC: 26 U/L (ref 0–50)
ANION GAP SERPL CALCULATED.3IONS-SCNC: 8 MMOL/L (ref 3–14)
AST SERPL W P-5'-P-CCNC: 32 U/L (ref 0–45)
BASOPHILS # BLD AUTO: 0 10E9/L (ref 0–0.2)
BASOPHILS NFR BLD AUTO: 0.7 %
BILIRUB SERPL-MCNC: 0.3 MG/DL (ref 0.2–1.3)
BUN SERPL-MCNC: 17 MG/DL (ref 7–30)
CALCIUM SERPL-MCNC: 8.1 MG/DL (ref 8.5–10.1)
CHLORIDE SERPL-SCNC: 96 MMOL/L (ref 94–109)
CO2 SERPL-SCNC: 27 MMOL/L (ref 20–32)
CREAT SERPL-MCNC: 1.16 MG/DL (ref 0.52–1.04)
DIFFERENTIAL METHOD BLD: ABNORMAL
EOSINOPHIL # BLD AUTO: 0.2 10E9/L (ref 0–0.7)
EOSINOPHIL NFR BLD AUTO: 3.7 %
ERYTHROCYTE [DISTWIDTH] IN BLOOD BY AUTOMATED COUNT: 14.8 % (ref 10–15)
GFR SERPL CREATININE-BSD FRML MDRD: 46 ML/MIN/1.7M2
GLUCOSE SERPL-MCNC: 107 MG/DL (ref 70–99)
HCT VFR BLD AUTO: 31.2 % (ref 35–47)
HGB BLD-MCNC: 10.4 G/DL (ref 11.7–15.7)
LYMPHOCYTES # BLD AUTO: 1.3 10E9/L (ref 0.8–5.3)
LYMPHOCYTES NFR BLD AUTO: 23.1 %
MCH RBC QN AUTO: 28.9 PG (ref 26.5–33)
MCHC RBC AUTO-ENTMCNC: 33.3 G/DL (ref 31.5–36.5)
MCV RBC AUTO: 87 FL (ref 78–100)
MONOCYTES # BLD AUTO: 1.1 10E9/L (ref 0–1.3)
MONOCYTES NFR BLD AUTO: 19.6 %
NEUTROPHILS # BLD AUTO: 2.9 10E9/L (ref 1.6–8.3)
NEUTROPHILS NFR BLD AUTO: 52.9 %
PHOSPHATE SERPL-MCNC: 3.3 MG/DL (ref 2.5–4.5)
PLATELET # BLD AUTO: 203 10E9/L (ref 150–450)
POTASSIUM SERPL-SCNC: 4.3 MMOL/L (ref 3.4–5.3)
PROT SERPL-MCNC: 7 G/DL (ref 6.8–8.8)
RBC # BLD AUTO: 3.6 10E12/L (ref 3.8–5.2)
SODIUM SERPL-SCNC: 131 MMOL/L (ref 133–144)
WBC # BLD AUTO: 5.4 10E9/L (ref 4–11)

## 2018-10-12 PROCEDURE — 80053 COMPREHEN METABOLIC PANEL: CPT | Performed by: INTERNAL MEDICINE

## 2018-10-12 PROCEDURE — 85025 COMPLETE CBC W/AUTO DIFF WBC: CPT | Performed by: INTERNAL MEDICINE

## 2018-10-12 PROCEDURE — 36415 COLL VENOUS BLD VENIPUNCTURE: CPT | Performed by: INTERNAL MEDICINE

## 2018-10-12 PROCEDURE — 84100 ASSAY OF PHOSPHORUS: CPT | Performed by: INTERNAL MEDICINE

## 2018-10-12 NOTE — MR AVS SNAPSHOT
After Visit Summary   4/13/2018    Idalmis Abdul    MRN: [id number]           Patient Information     Date Of Birth          1945        Visit Information        Provider Department      4/13/2018 11:20 AM Darlene Sultana MD [facility]        Today's Diagnoses     Iron deficiency anemia, unspecified iron deficiency anemia type    -  1       Follow-ups after your visit        Your next 10 appointments already scheduled     May 08, 2018 10:00 AM CDT   (Arrive by 9:45 AM)   RETURN ENDOCRINE with TIERRA Antoine MD   [facility] Endocr[facility]ology ([facility])    [address]  [address] [facility] [address]   [phone]            Oct 05, 2018 10:00 AM CDT   LAB with [facility] ([facility])    [address] [address]   [phone]           Please do not eat 10-12 hours before your appointment if you are coming in fasting for labs on lipids, cholesterol, or glucose (sugar). This does not apply to pregnant women. Water, hot tea and black coffee (with nothing added) are okay. Do not drink other fluids, diet soda or chew gum.            Oct 05, 2018 10:30 AM CDT   (Arrive by 10:15 AM)   CT CHEST ABDOMEN PELVIS W/O & W CONTRAST with [facility]   [facility] ([facility])    [address] [address]   [phone]           Please bring any scans or X-rays taken at other hospitals, if similar tests were done. Also bring a list of your medicines, including vitamins, minerals and over-the-counter drugs. It is safest to leave personal items at home.  Be sure to tell your doctor:   If you have any allergies.   If there s any chance you are pregnant.   If you are breastfeeding.  You may have contrast for this exam. To prepare:   Do not eat or drink for 2 hours before your exam. If you need to take medicine, you may take it with small sips of water.  (We may ask you to take liquid medicine as well.)   The day before your exam, drink extra fluids at least six 8-ounce glasses (unless your doctor tells you to restrict your fluids).   You will be given instructions on how to drink the contrast.  Patients over 70 or patients with diabetes or kidney problems:   If you haven t had a blood test (creatinine test) within the last 30 days, the Cardiologist/Radiologist may require you to get this test prior to your exam.  If you have diabetes:   Continue to take your metformin medication on the day of your exam  Please wear loose clothing, such as a sweat suit or jogging clothes. Avoid snaps, zippers and other metal. We may ask you to undress and put on a hospital gown.  If you have any questions, please call the Imaging Department where you will have your exam.            Oct 08, 2018  9:15 AM CDT   (Arrive by 9:00 AM)   Return Visit with Blayne Schmitz MD   [facility] ([facility] [place])    [address] [place] [address]  [address] [place] [address] [phone]              Who to contact     If you have questions or need follow up information about today's clinic visit or your schedule please contact [facility] directly at [phone].  Normal or non-critical lab and imaging results will be communicated to you by MyChart, letter or phone within 4 business days after the clinic has received the results. If you do not hear from us within 7 days, please contact the clinic through MyChart or phone. If you have a critical or abnormal lab result, we will notify you by phone as soon as possible.  Submit refill requests through RNDOMN or call your pharmacy and they will forward the refill request to us. Please allow 3 business days for your refill to be completed.          Additional Information About Your Visit        RNDOMN Information     RNDOMN lets you send messages to your doctor, view your test results,  "renew your prescriptions, schedule appointments and more. To sign up, go to [email or web address] . Click on \"Log in\" on the left side of the screen, which will take you to the Welcome page. Then click on \"Sign up Now\" on the right side of the page.     You will be asked to enter the access code listed below, as well as some personal information. Please follow the directions to create your username and password.     Your access code is: [email or web address]  Expires: 2018  6:30 AM     Your access code will  in 90 days. If you need help or a new code, please call your [facility] clinic or [phone].        Care EveryWhere ID     This is your Care EveryWhere ID. This could be used by other organizations to access your [facility] medical records  [id number]        Your Vitals Were     Pulse Temperature Height Last Period BMI (Body Mass Index)       79 99.2  F (37.3  C) (Tympanic) 5' 2.99\" (1.6 m) 1992 27.89 kg/m2        Blood Pressure from Last 3 Encounters:   18 115/68   18 136/78   18 136/75    Weight from Last 3 Encounters:   18 157 lb 6.4 oz (71.4 kg)   18 158 lb 6.4 oz (71.8 kg)   18 157 lb 12.8 oz (71.6 kg)              We Performed the Following     CBC with platelets differential          Today's Medication Changes          These changes are accurate as of 18 11:48 AM.  If you have any questions, ask your nurse or doctor.               Start taking these medicines.        Dose/Directions    ferrous sulfate 325 (65 Fe) MG tablet   Commonly known as:  IRON   Used for:  Iron deficiency anemia, unspecified iron deficiency anemia type   Started by:  Darlene Sultana MD        Dose:  325 mg   Take 1 tablet (325 mg) by mouth daily (with breakfast)   Quantity:  30 tablet   Refills:  2            Where to get your medicines      These medications were sent to [facility] [place], [place] - [address]  [address], " [facility] [address]     Phone:  [phone]     ferrous sulfate 325 (65 Fe) MG tablet                Primary Care Provider Office Phone # Fax #    Darlene Daniela Sultana [phone] [address] [facility] [address]        Equal Access to Services     KIKE EWING : Hadii aad ku hadasho Soomaali, waaxda luqadaha, qaybta kaalmada adeegyada, patricio floresgregoriaparis muse. So [facility] [phone].    ATENCIÓN: Si habla español, tiene a cassidy disposición servicios gratuitos de asistencia lingüística. Llame al [phone].    We comply with applicable federal civil rights laws and [place] laws. We do not discriminate on the basis of race, color, national origin, age, disability, sex, sexual orientation, or gender identity.            Thank you!     Thank you for choosing [facility]  for your care. Our goal is always to provide you with excellent care. Hearing back from our patients is one way we can continue to improve our services. Please take a few minutes to complete the written survey that you may receive in the mail after your visit with us. Thank you!             Your Updated Medication List - Protect others around you: Learn how to safely use, store and throw away your medicines at www.disposemymeds.org.          This list is accurate as of 4/13/18 11:48 AM.  Always use your most recent med list.                   Brand Name Dispense Instructions for use Diagnosis    Adult Blood Pressure Cuff Lg Kit     1 kit    1 Device 2 times daily    HTN, goal below 140/90       amLODIPine 5 MG tablet    NORVASC    30 tablet    TAKE ONE TABLET BY MOUTH EVERY DAY    Essential hypertension       aspirin 81 MG EC tablet     30 tablet    Take 1 tablet by mouth daily.    NSTEMI (non-ST elevated myocardial infarction) (H), ACS (acute coronary syndrome) (H)       ferrous sulfate 325 (65 Fe) MG tablet    IRON    30 tablet    Take 1 tablet (325 mg) by mouth daily (with breakfast)    Iron  deficiency anemia, unspecified iron deficiency anemia type       hydrochlorothiazide 25 MG tablet    HYDRODIURIL    90 tablet    Take 1 tablet (25 mg) by mouth daily    Essential hypertension       imatinib 400 MG tablet    GLEEVEC    30 tablet    Take 1 tablet (400 mg) by mouth daily Take with a meal and a large glass of water.    Malignant gastrointestinal stromal tumor, unspecified site (H)       lisinopril 40 MG tablet    PRINIVIL/ZESTRIL    90 tablet    TAKE ONE TABLET BY MOUTH EVERY DAY    Essential hypertension with goal blood pressure less than 140/90       LORazepam 1 MG tablet    ATIVAN    30 tablet    Take 1 tablet (1 mg) by mouth At Bedtime    Malignant gastrointestinal stromal tumor, unspecified site (H)       methimazole 5 MG tablet    TAPAZOLE    90 tablet    Take 1 tablet (5 mg) by mouth daily ALTERNATE 5 MG AND 2.5 MG (1/2 TABLET) EVERY DAY    Hyperthyroidism       methocarbamol 750 MG tablet    ROBAXIN     Take 750 mg by mouth        metoprolol tartrate 100 MG tablet    LOPRESSOR    180 tablet    TAKE ONE TABLET BY MOUTH TWICE A DAY    Essential hypertension with goal blood pressure less than 140/90       ondansetron 4 MG ODT tab    ZOFRAN-ODT    4 tablet    Take 1-2 tablets (4-8 mg) by mouth every 8 hours as needed for nausea Dissolve ON the tongue.    Postoperative state       order for DME     1 Device    Walker    Post hysterectomy menopause       oxyCODONE IR 5 MG tablet    ROXICODONE    180 tablet    Take 1-2 tablets (5-10 mg) by mouth every 3 hours as needed for pain or other (Moderate to Severe)    Chronic low back pain, unspecified back pain laterality, with sciatica presence unspecified       polyethylene glycol powder    MIRALAX/GLYCOLAX     Take 17 g by mouth daily        rosuvastatin 5 MG tablet    CRESTOR    45 tablet    Take 1 tablet (5 mg) by mouth every other day    Hyperlipidemia LDL goal <160          no known mental health issues.

## 2018-10-31 ENCOUNTER — TELEPHONE (OUTPATIENT)
Dept: ENDOCRINOLOGY | Facility: CLINIC | Age: 73
End: 2018-10-31

## 2018-10-31 NOTE — TELEPHONE ENCOUNTER
Pt is not going to go thru with her dexiscan because she was informed her medicare is not going to cover it, please let pt know if there are any other options

## 2018-10-31 NOTE — TELEPHONE ENCOUNTER
Called patient and informed her of Medicare coverage of her scan she is happy about this information and will get the scan scheduled.

## 2018-11-08 ENCOUNTER — TELEPHONE (OUTPATIENT)
Dept: FAMILY MEDICINE | Facility: CLINIC | Age: 73
End: 2018-11-08

## 2018-11-08 NOTE — TELEPHONE ENCOUNTER
Panel Management Review      Summary:    Patient is due/failing the following:   FIT and MAMMOGRAM    Action needed:   Patient needs referral/order    Type of outreach:    Sent [employer] message. and Sent letter.    Questions for provider review:    None                                                                                                                                    Sarah MENDEZ CMA       Chart routed to None .

## 2018-11-08 NOTE — LETTER
November 8, 2018      Idalmis Abdul  [address]   [facility] [address]        Dear Idalmis,         At [facility] we care about your health and are committed to providing quality patient care, which includes staying current on preventative cancer screenings.  You can increase your chances of finding and treating cancers through regular screenings.      Our records show that you are due for the following screening(s):        Fit Test for Colon Cancer -  in clinic   Recommended every year for everyone age 50 and older  Please take a moment to stop by the clinic to  a take home colon/rectal cancer test.       Mammogram for Breast Cancer - [phone] to schedule  Recommended every 1-2 years for women age 50 and older  Mammograms help detect breast cancer, which is the most common cancer among women in the [place].  You may need to start having mammograms earlier and more often if you have had breast cancer, breast problems, or a family history of breast cancer.       If you have a My-Chart Account, you also can schedule this appointment through there.    If you have already had one or all of the above screening tests at another facility, please call us so that we may update your chart.      Your partners in health,      Quality Committee   [facility]

## 2018-11-27 DIAGNOSIS — I10 ESSENTIAL HYPERTENSION: ICD-10-CM

## 2018-11-27 NOTE — TELEPHONE ENCOUNTER
"Requested Prescriptions   Pending Prescriptions Disp Refills     amLODIPine (NORVASC) 5 MG tablet [Pharmacy Med Name: AMLODIPINE BESYLATE 5MG TABS] 30 tablet 9    Last Written Prescription Date:  1/30/18  Last Fill Quantity: 30,  # refills: 9   Last office visit: 7/23/2018 with prescribing provider:  Norma   Future Office Visit:     Sig: TAKE ONE TABLET BY MOUTH EVERY DAY    Calcium Channel Blockers Protocol  Failed    11/27/2018  9:45 AM       Failed - Normal serum creatinine on file in past 12 months    Recent Labs   Lab Test  10/12/18   1059  10/05/18   1053   CR  1.16*   --    CREAT   --   1.2*            Passed - Blood pressure under 140/90 in past 12 months    BP Readings from Last 3 Encounters:   10/08/18 122/73   08/28/18 124/74   08/20/18 124/84                Passed - Recent (12 mo) or future (30 days) visit within the authorizing provider's specialty    Patient had office visit in the last 12 months or has a visit in the next 30 days with authorizing provider or within the authorizing provider's specialty.  See \"Patient Info\" tab in inbasket, or \"Choose Columns\" in Meds & Orders section of the refill encounter.             Passed - Patient is age 18 or older       Passed - No active pregnancy on record       Passed - No positive pregnancy test in past 12 months          "

## 2018-11-27 NOTE — TELEPHONE ENCOUNTER
Routing refill request to provider for review/approval because:  Labs out of range:  Cr  Creatinine   Date Value Ref Range Status   10/12/2018 1.16 (H) 0.52 - 1.04 mg/dL Edelmira NAILS RN

## 2018-11-29 RX ORDER — AMLODIPINE BESYLATE 5 MG/1
TABLET ORAL
Qty: 30 TABLET | Refills: 9 | Status: SHIPPED | OUTPATIENT
Start: 2018-11-29 | End: 2019-09-24

## 2018-11-30 DIAGNOSIS — C49.A0 MALIGNANT GASTROINTESTINAL STROMAL TUMOR, UNSPECIFIED SITE (H): Primary | ICD-10-CM

## 2018-11-30 RX ORDER — IMATINIB MESYLATE 400 MG/1
400 TABLET, FILM COATED ORAL DAILY
Qty: 30 TABLET | Refills: 2 | Status: SHIPPED | OUTPATIENT
Start: 2018-11-30 | End: 2019-09-12

## 2018-12-07 ENCOUNTER — OFFICE VISIT (OUTPATIENT)
Dept: FAMILY MEDICINE | Facility: CLINIC | Age: 73
End: 2018-12-07
Payer: COMMERCIAL

## 2018-12-07 VITALS
BODY MASS INDEX: 27.75 KG/M2 | OXYGEN SATURATION: 99 % | TEMPERATURE: 98.3 F | SYSTOLIC BLOOD PRESSURE: 124 MMHG | HEART RATE: 73 BPM | HEIGHT: 63 IN | WEIGHT: 156.6 LBS | DIASTOLIC BLOOD PRESSURE: 70 MMHG

## 2018-12-07 DIAGNOSIS — H61.23 BILATERAL IMPACTED CERUMEN: Primary | ICD-10-CM

## 2018-12-07 DIAGNOSIS — F11.90 CHRONIC NARCOTIC USE: ICD-10-CM

## 2018-12-07 DIAGNOSIS — Z12.31 ENCOUNTER FOR SCREENING MAMMOGRAM FOR BREAST CANCER: ICD-10-CM

## 2018-12-07 DIAGNOSIS — M81.0 AGE-RELATED OSTEOPOROSIS WITHOUT CURRENT PATHOLOGICAL FRACTURE: ICD-10-CM

## 2018-12-07 DIAGNOSIS — Z12.11 SPECIAL SCREENING FOR MALIGNANT NEOPLASMS, COLON: ICD-10-CM

## 2018-12-07 DIAGNOSIS — E05.00 GRAVES' DISEASE: ICD-10-CM

## 2018-12-07 LAB
AMPHETAMINES UR QL: NOT DETECTED NG/ML
BARBITURATES UR QL SCN: NOT DETECTED NG/ML
BENZODIAZ UR QL SCN: ABNORMAL NG/ML
BUPRENORPHINE UR QL: NOT DETECTED NG/ML
CANNABINOIDS UR QL: NOT DETECTED NG/ML
COCAINE UR QL SCN: NOT DETECTED NG/ML
D-METHAMPHET UR QL: NOT DETECTED NG/ML
METHADONE UR QL SCN: NOT DETECTED NG/ML
OPIATES UR QL SCN: NOT DETECTED NG/ML
OXYCODONE UR QL SCN: NOT DETECTED NG/ML
PCP UR QL SCN: NOT DETECTED NG/ML
PROPOXYPH UR QL: NOT DETECTED NG/ML
T3 SERPL-MCNC: 99 NG/DL (ref 60–181)
T4 FREE SERPL-MCNC: 1.19 NG/DL (ref 0.76–1.46)
TRICYCLICS UR QL SCN: NOT DETECTED NG/ML
TSH SERPL DL<=0.005 MIU/L-ACNC: 1.02 MU/L (ref 0.4–4)

## 2018-12-07 PROCEDURE — 84443 ASSAY THYROID STIM HORMONE: CPT | Performed by: INTERNAL MEDICINE

## 2018-12-07 PROCEDURE — 99212 OFFICE O/P EST SF 10 MIN: CPT | Mod: 25 | Performed by: FAMILY MEDICINE

## 2018-12-07 PROCEDURE — 36415 COLL VENOUS BLD VENIPUNCTURE: CPT | Performed by: INTERNAL MEDICINE

## 2018-12-07 PROCEDURE — 69209 REMOVE IMPACTED EAR WAX UNI: CPT | Performed by: FAMILY MEDICINE

## 2018-12-07 PROCEDURE — 80306 DRUG TEST PRSMV INSTRMNT: CPT | Performed by: FAMILY MEDICINE

## 2018-12-07 PROCEDURE — 84480 ASSAY TRIIODOTHYRONINE (T3): CPT | Performed by: INTERNAL MEDICINE

## 2018-12-07 PROCEDURE — 84439 ASSAY OF FREE THYROXINE: CPT | Performed by: INTERNAL MEDICINE

## 2018-12-07 ASSESSMENT — ANXIETY QUESTIONNAIRES
2. NOT BEING ABLE TO STOP OR CONTROL WORRYING: NOT AT ALL
IF YOU CHECKED OFF ANY PROBLEMS ON THIS QUESTIONNAIRE, HOW DIFFICULT HAVE THESE PROBLEMS MADE IT FOR YOU TO DO YOUR WORK, TAKE CARE OF THINGS AT HOME, OR GET ALONG WITH OTHER PEOPLE: NOT DIFFICULT AT ALL
6. BECOMING EASILY ANNOYED OR IRRITABLE: NOT AT ALL
1. FEELING NERVOUS, ANXIOUS, OR ON EDGE: NOT AT ALL
GAD7 TOTAL SCORE: 1
5. BEING SO RESTLESS THAT IT IS HARD TO SIT STILL: NOT AT ALL
3. WORRYING TOO MUCH ABOUT DIFFERENT THINGS: SEVERAL DAYS
7. FEELING AFRAID AS IF SOMETHING AWFUL MIGHT HAPPEN: NOT AT ALL

## 2018-12-07 ASSESSMENT — PATIENT HEALTH QUESTIONNAIRE - PHQ9
5. POOR APPETITE OR OVEREATING: NOT AT ALL
SUM OF ALL RESPONSES TO PHQ QUESTIONS 1-9: 1

## 2018-12-07 NOTE — LETTER
December 7, 2018      Idalmis Abdul     [facility] [address]        Dear ,    We are writing to inform you of your test results.    Your test results fall within the expected range(s).    Resulted Orders   Drug Abuse Screen Panel 13, Urine (Pain Care Package)   Result Value Ref Range    Cannabinoids (52-qtr-2-carboxy-9-THC) Not Detected NDET^Not Detected ng/mL      Comment:      Cutoff for a negative cannabinoid is 50 ng/mL or less.    Phencyclidine (Phencyclidine) Not Detected NDET^Not Detected ng/mL      Comment:      Cutoff for a negative PCP is 25 ng/mL or less.    Cocaine (Benzoylecgonine) Not Detected NDET^Not Detected ng/mL      Comment:      Cutoff for a negative cocaine is 150 ng/ml or less.    Methamphetamine (d-Methamphetamine) Not Detected NDET^Not Detected ng/mL      Comment:      Cutoff for a negative methamphetamine is 500 ng/ml or less.    Opiates (Morphine) Not Detected NDET^Not Detected ng/mL      Comment:      Cutoff for a negative opiate is 100 ng/ml or less.    Amphetamine (d-Amphetamine) Not Detected NDET^Not Detected ng/mL      Comment:      Cutoff for a negative amphetamine is 500 ng/mL or less.    Benzodiazepines (Nordiazepam) Detected, Abnormal Result (A) NDET^Not Detected ng/mL      Comment:      Cutoff for a positive benzodiazepines is greater than 150 ng/ml.  This is an unconfirmed screening result to be used for medical purposes only.   Order [id number] for confirmation or individual confirmation tests to [facility] [employer].      Tricyclic Antidepressants (Desipramine) Not Detected NDET^Not Detected ng/mL      Comment:      Cutoff for a negative tricyclic antidepressant is 300 ng/ml or less.    Methadone (Methadone) Not Detected NDET^Not Detected ng/mL      Comment:      Cutoff for a negative methadone is 200 ng/ml or less.    Barbiturates (Butalbital) Not Detected NDET^Not Detected ng/mL      Comment:      Cutoff for a negative barbituate is 200 ng/ml or less.     Oxycodone (Oxycodone) Not Detected NDET^Not Detected ng/mL      Comment:      Cutoff for a negative Oxycodone is 100 ng/mL or less.    Propoxyphene (Norpropoxyphene) Not Detected NDET^Not Detected ng/mL      Comment:      Cutoff for a negative propoxyphene is 300 ng/ml or less    Buprenorphine (Buprenorphine) Not Detected NDET^Not Detected ng/mL      Comment:      Cutoff for a negative buprenorphine is 10 ng/ml or less       If you have any questions or concerns, please call the clinic at the number listed above.       Sincerely,        Darlene Sultana MD

## 2018-12-07 NOTE — MR AVS SNAPSHOT
After Visit Summary   12/7/2018    Idalmis Abdul    MRN: [id number]           Patient Information     Date Of Birth          1945        Visit Information        Provider Department      12/7/2018 11:20 AM Darlene Sultana MD [facility]        Today's Diagnoses     Bilateral impacted cerumen    -  1    Chronic narcotic use        Special screening for malignant neoplasms, colon        Encounter for screening mammogram for breast cancer        Age-related osteoporosis without current pathological fracture           Follow-ups after your visit        Your next 10 appointments already scheduled     Dec 17, 2018 11:00 AM CST   (Arrive by 10:45 AM)   DX HIP/PELVIS/SPINE with WYDX1   [facility] ([facility])    [address] [address]   [phone]           How do I prepare for my exam? (Food and drink instructions) No Food and Drink Restrictions.  How do I prepare for my exam? (Other instructions) Please do not take any of the following 24 hours prior to the day of your exam: vitamins, calcium tablets, antacids.  What should I wear: If possible, please wear clothes without metal (snaps, zippers). A sweat suit works well.  How long does the exam take: The exam takes about 20 minutes.  What should I bring: Bring a list of your current medicines to your exam (including vitamins, minerals and over-the-counter drugs).  Do I need a :  No  is needed.  What should I do after the exam: No restrictions, You may resume normal activities.  How do I prepare for my exam? (Food and drink instructions) A DEXA scan is a bone-density scan. It uses a low level of radiation to check the strength of your bones. As you lie on a padded table, a machine will take X-rays. We most often scan the hips and lower spine.  Who should I call with questions: If you have any questions, please call the Imaging Department where you will have your exam.  Directions, parking instructions, and other information is available on our website, [email or web address].            Feb 19, 2019 10:00 AM CST   (Arrive by 9:45 AM)   RETURN ENDOCRINE with TIERRA Antoine MD   [facility] ([facility] [place])    [address] [address]   [phone]            Apr 05, 2019 10:15 AM CDT   LAB with [facility] ([facility])    [address] [address]   [phone]           Please do not eat 10-12 hours before your appointment if you are coming in fasting for labs on lipids, cholesterol, or glucose (sugar). This does not apply to pregnant women. Water, hot tea and black coffee (with nothing added) are okay. Do not drink other fluids, diet soda or chew gum.            Apr 05, 2019 10:30 AM CDT   CT CHEST ABDOMEN PELVIS W/O & W CONTRAST with WYCT1   [facility] CT ([facility])    [address] [address]   [phone]           How do I prepare for my exam? (Food and drink instructions) To prepare: Do not eat or drink for 2 hours before your exam. If you need to take medicine, you may take it with small sips of water. (We may ask you to take liquid medicine as well.)  How do I prepare for my exam? (Other instructions) Please arrive 30 minutes early for your CT.  Once in the department you might be asked to drink water 15-20 minutes prior to your exam.  If indicated you may be asked to drink an oral contrast in advance of your CT.  If this is the case, the imaging team will let you know or be in contact with you prior to your appointment  Patients over 70 or patients with diabetes or kidney problems: If you haven t had a blood test (creatinine test) within the last 30 days, the Cardiologist/Radiologist may require you to get this test prior to your exam.  If you have diabetes:  Continue to take your metformin  medication on the day of your exam  What should I wear: Please wear loose clothing, such as a sweat suit or jogging clothes. Avoid snaps, zippers and other metal. We may ask you to undress and put on a hospital gown.  How long does the exam take: Most scans take less than 20 minutes.  What should I bring: Please bring any scans or X-rays taken at other hospitals, if similar tests were done. Also bring a list of your medicines, including vitamins, minerals and over-the-counter drugs. It is safest to leave personal items at home.  Do I need a : No  is needed.  What do I need to tell my doctor? Be sure to tell your doctor: * If you have any allergies. * If there s any chance you are pregnant. * If you are breastfeeding.  What should I do after the exam: No restrictions, You may resume normal activities.  What is this test: A CT (computed tomography) scan is a series of pictures that allows us to look inside your body. The scanner creates images of the body in cross sections, much like slices of bread. This helps us see any problems more clearly. You may receive contrast (X-ray dye) before or during your scan. You will be asked to drink the contrast.  Who should I call with questions: If you have any questions, please call the Imaging Department where you will have your exam. Directions, parking instructions, and other information is available on our website, [email or web address].            Apr 08, 2019  9:15 AM CDT   (Arrive by 9:00 AM)   Return Visit with Blayne Schmitz MD   [facility] ([facility])    [address] [place] [address]  [address]  [facility] [address] [phone]              Future tests that were ordered for you today     Open Future Orders        Priority Expected Expires Ordered    MA Screening Digital Bilateral Routine  12/7/2019 12/7/2018    DX Hip/Pelvis/Spine Routine  12/7/2019 12/7/2018    Fecal colorectal cancer screen FIT Routine  "12/28/2018 3/1/2019 12/7/2018            Who to contact     If you have questions or need follow up information about today's clinic visit or your schedule please contact [facility] directly at [phone].  Normal or non-critical lab and imaging results will be communicated to you by MyChart, letter or phone within 4 business days after the clinic has received the results. If you do not hear from us within 7 days, please contact the clinic through MyChart or phone. If you have a critical or abnormal lab result, we will notify you by phone as soon as possible.  Submit refill requests through [employer] or call your pharmacy and they will forward the refill request to us. Please allow 3 business days for your refill to be completed.          Additional Information About Your Visit        Care EveryWhere ID     This is your Care EveryWhere ID. This could be used by other organizations to access your [facility] medical records  [id number]        Your Vitals Were     Pulse Temperature Height Last Period Pulse Oximetry BMI (Body Mass Index)    73 98.3  F (36.8  C) (Tympanic) 5' 2.99\" (1.6 m) 01/01/1992 99% 27.75 kg/m2       Blood Pressure from Last 3 Encounters:   12/07/18 124/70   10/08/18 122/73   08/28/18 124/74    Weight from Last 3 Encounters:   12/07/18 156 lb 9.6 oz (71 kg)   10/08/18 157 lb 6.5 oz (71.4 kg)   08/28/18 153 lb 9.6 oz (69.7 kg)              We Performed the Following     Drug Abuse Screen Panel 13, Urine (Pain Care Package)     REMOVE IMPACTED CERUMEN          Today's Medication Changes          These changes are accurate as of 12/7/18 12:02 PM.  If you have any questions, ask your nurse or doctor.               Stop taking these medicines if you haven't already. Please contact your care team if you have questions.     oxyCODONE 5 MG tablet   Commonly known as:  ROXICODONE   Stopped by:  Darlene Sultana MD           traMADol 50 MG tablet   Commonly known as:  ULTRAM   Stopped by:  " Kayy, Darlene Suarez MD                    Primary Care Provider Office Phone # Fax #    Darlene Sultana [phone] [address] [facility] [address]        Equal Access to Services     KIKE EWING : Hadii cornelia ku hadhernandezo Soomaali, waaxda luqadaha, qaybta kaalmada adeegyada, waxjackson greenwoodin larissan adetierra navas laElanabrad muse. So [facility] [phone].    ATENCIÓN: Si habla español, tiene a cassidy disposición servicios gratuitos de asistencia lingüística. Llame al [phone].    We comply with applicable federal civil rights laws and [place] laws. We do not discriminate on the basis of race, color, national origin, age, disability, sex, sexual orientation, or gender identity.            Thank you!     Thank you for choosing [facility]  for your care. Our goal is always to provide you with excellent care. Hearing back from our patients is one way we can continue to improve our services. Please take a few minutes to complete the written survey that you may receive in the mail after your visit with us. Thank you!             Your Updated Medication List - Protect others around you: Learn how to safely use, store and throw away your medicines at www.disposemymeds.org.          This list is accurate as of 12/7/18 12:02 PM.  Always use your most recent med list.                   Brand Name Dispense Instructions for use Diagnosis    Adult Blood Pressure Cuff Lg Kit     1 kit    1 Device 2 times daily    HTN, goal below 140/90       amLODIPine 5 MG tablet    NORVASC    30 tablet    TAKE ONE TABLET BY MOUTH EVERY DAY    Essential hypertension       aspirin 81 MG EC tablet    ASA    30 tablet    Take 1 tablet by mouth daily.    NSTEMI (non-ST elevated myocardial infarction) (H), ACS (acute coronary syndrome) (H)       hydrochlorothiazide 25 MG tablet    HYDRODIURIL    90 tablet    TAKE ONE TABLET BY MOUTH EVERY DAY    Essential hypertension       imatinib 400 MG tablet    GLEEVEC    30 tablet     Take 1 tablet (400 mg) by mouth daily Take with a meal and a large glass of water.    Malignant gastrointestinal stromal tumor, unspecified site (H)       lisinopril 40 MG tablet    PRINIVIL/ZESTRIL    90 tablet    TAKE ONE TABLET BY MOUTH EVERY DAY    Essential hypertension with goal blood pressure less than 140/90       LORazepam 0.5 MG tablet    ATIVAN    90 tablet    Take 1 tablet (0.5 mg) by mouth At Bedtime    Malignant gastrointestinal stromal tumor, unspecified site (H)       melatonin 5 MG tablet      Take 5 mg by mouth nightly as needed for sleep        methimazole 5 MG tablet    TAPAZOLE    45 tablet    Take 0.5 tablets (2.5 mg) by mouth daily    Hyperthyroidism       metoprolol tartrate 100 MG tablet    LOPRESSOR    180 tablet    TAKE ONE TABLET BY MOUTH TWICE A DAY    Essential hypertension with goal blood pressure less than 140/90       order for DME     1 Device    Walker    Post hysterectomy menopause       polyethylene glycol powder    MIRALAX/GLYCOLAX     Take 17 g by mouth daily        rosuvastatin 5 MG tablet    CRESTOR    90 tablet    TAKE ONE TABLET BY MOUTH EVERY DAY    Hyperlipidemia LDL goal <160       VIACTIV 500-500-40 MG-UNT-MCG Chew   Generic drug:  calcium-vitamin D-vitamin K      Take 2 tablets by mouth daily

## 2018-12-07 NOTE — PROGRESS NOTES
SUBJECTIVE:                                                    Idalmis Abdul is 73 year old female   Chief Complaint   Patient presents with     Ent Problem     Symptoms for 2-3 weeks. Continual hissing noise in left ear, louder when turning head to left or right side. States no recent sinus/ nasal congetion, no ear pain. States that she was diagnosed with TMJ in the past.          Problem list and histories reviewed & adjusted, as indicated.  Additional history: due for cancer screening, drug screen because was on tramadol and norco, now only ativan at night for sleep    Patient Active Problem List   Diagnosis     Hypertension goal BP (blood pressure) < 140/90     GIST (gastrointestinal stromal tumor), malignant (H)     Eyelid edema     History of lung cancer     Health Care Home     NSTEMI (non-ST elevated myocardial infarction) (H)     ASCVD (arteriosclerotic cardiovascular disease)     Postsurgical aortocoronary bypass status     S/P CABG x 4     Dyspnea     GERD (gastroesophageal reflux disease)     Hyperlipidemia LDL goal <160     Hyperthyroidism     Thyroid eye disease     Eyelid retraction or lag     Thyrotoxic exophthalmos     Graves' disease     History of tobacco abuse     History of non-ST elevation myocardial infarction (NSTEMI)     Chronic back pain     PVD (posterior vitreous detachment)     Age-related osteoporosis without current pathological fracture     Spinal stenosis of lumbosacral region     DDD (degenerative disc disease), lumbar     Chronic narcotic use     Past Surgical History:   Procedure Laterality Date     BYPASS GRAFT ARTERY CORONARY  6/14/2012    Procedure: BYPASS GRAFT ARTERY CORONARY;  Median Sternotomy Coronary Artery Bypass Graft  x 3        C THORACOSCOPY,DX W BX  fall 2003    benign tissue     CATARACT IOL, RT/LT      LE     CHOLECYSTECTOMY  1963     COLONOSCOPY  4-12-05     CORONARY ARTERY BYPASS      X4     CREATION PERICARDIAL WINDOW  6/15/2012    Procedure: CREATION  PERICARDIAL WINDOW;  Mediastinal Exploration, Control of Bleeding;  Surgeon: Dwaine Griffin MD;  Location: [facility] OR     EYE SURGERY  2014    left cataract removal     GI SURGERY  2003 and 2007    GIST tumor removal     GYN SURGERY      tubal ligation     HYSTERECTOMY VAGINAL, COLPORRHAPHY ANTERIOR, POSTERIOR, COMBINED N/A 10/17/2017    Procedure: COMBINED HYSTERECTOMY VAGINAL, COLPORRHAPHY ANTERIOR, POSTERIOR;  Total Vaginal Hysterectomy and Posterior Repair,Sacrospinous Vault Suspension;  Surgeon: Ruth Farooq MD;  Location: [facility]     PHACOEMULSIFICATION WITH STANDARD INTRAOCULAR LENS IMPLANT  3/24/2014    Procedure: PHACOEMULSIFICATION WITH STANDARD INTRAOCULAR LENS IMPLANT;  Left Kelman Phacoemulsification with Intraocular Lens Implant;  Surgeon: Magnus Mckeon MD;  Location: [facility] OR     RECESSION RESECTION WITH ADJUSTABLE SUTURE BILATERAL Bilateral 7/14/2016    Procedure: RECESSION RESECTION WITH ADJUSTABLE SUTURE BILATERAL;  Surgeon: Erma Ordaz MD;  Location: [facility] OR     SURGICAL HISTORY OF -   1963    cholecystectomy     SURGICAL HISTORY OF -   1970's    Tubal Ligation      SURGICAL HISTORY OF -   08/03    Explor. Lap, Excision of Small Bowel Tumor      SURGICAL HISTORY OF -   4/2010    lung cancer removed right lung       Social History   Substance Use Topics     Smoking status: Former Smoker     Packs/day: 0.50     Years: 49.00     Types: Cigarettes     Quit date: 4/19/2010     Smokeless tobacco: Never Used     Alcohol use No     Family History   Problem Relation Age of Onset     Heart Disease Mother      Osteoporosis Mother      Respiratory Mother      Emphezyma     Hypertension Mother      Heart Disease Father      Alcohol/Drug Father      Hypertension Father      Hypertension Maternal Grandmother      Hypertension Brother      Hypertension Sister      Arthritis Sister      Hypertension Son      Cancer Son      rectal         Current Outpatient Prescriptions   Medication  Sig Dispense Refill     aspirin EC 81 MG EC tablet Take 1 tablet by mouth daily. 30 tablet 2     Blood Pressure Monitoring (ADULT BLOOD PRESSURE CUFF LG) KIT 1 Device 2 times daily 1 kit 1     calcium-vitamin D-vitamin K (VIACTIV) 500-500-40 MG-UNT-MCG CHEW Take 2 tablets by mouth daily       hydrochlorothiazide (HYDRODIURIL) 25 MG tablet TAKE ONE TABLET BY MOUTH EVERY DAY 90 tablet 3     imatinib (GLEEVEC) 400 MG tablet Take 1 tablet (400 mg) by mouth daily Take with a meal and a large glass of water. 30 tablet 2     lisinopril (PRINIVIL/ZESTRIL) 40 MG tablet TAKE ONE TABLET BY MOUTH EVERY DAY 90 tablet 1     LORazepam (ATIVAN) 0.5 MG tablet Take 1 tablet (0.5 mg) by mouth At Bedtime 90 tablet 1     metoprolol tartrate (LOPRESSOR) 100 MG tablet TAKE ONE TABLET BY MOUTH TWICE A  tablet 2     polyethylene glycol (MIRALAX/GLYCOLAX) powder Take 17 g by mouth daily       rosuvastatin (CRESTOR) 5 MG tablet TAKE ONE TABLET BY MOUTH EVERY DAY 90 tablet 2     amLODIPine (NORVASC) 5 MG tablet TAKE ONE TABLET BY MOUTH EVERY DAY 30 tablet 9     melatonin 5 MG tablet Take 5 mg by mouth nightly as needed for sleep       methimazole (TAPAZOLE) 5 MG tablet Take 0.5 tablets (2.5 mg) by mouth daily 45 tablet 6     order for DME Walker 1 Device 0     Allergies   Allergen Reactions     Atorvastatin Cramps and Unknown     cramps     Recent Labs   Lab Test  10/12/18   1059  10/05/18   1053  07/23/18   1141  06/22/18   0840  06/20/18   1544  02/14/18   1451  01/12/18   0902   10/17/17   0755   09/20/16   1022   07/17/15   0916   06/19/12   1550   06/15/12   0850   A1C   --    --    --    --    --    --    --    --   5.5   --    --    --    --    --   5.4   --   Duplicate request   LDL   --    --    --   46   --    --    --    --    --    --   38   --   31   < >   --    --    --    HDL   --    --    --   48*   --    --    --    --    --    --   53   --   46*   < >   --    --    --    TRIG   --    --    --   82   --    --    --     "--    --    --   79   --   72   < >   --    --    --    ALT  26   --    --   23   --    --   24   --    --    < >   --    < >  23   < >   --    < >   --    CR  1.16*   --   1.08*  1.04   --    --   1.20*   --   1.15*   < >  1.00   < >  1.06*   < >   --    < >   --    GFRESTIMATED  46*  44*  50*  52*   --    --   44*   --   46*   < >  55*   < >  51*   < >   --    < >   --    GFRESTBLACK  55*  53*  60*  63   --    --   53*   --   56*   < >  66   < >  62   < >   --    < >   --    POTASSIUM  4.3   --   3.9  3.9   --    --   4.2   --   3.6   < >  4.2   < >  4.0   < >   --    < >  2.8*   TSH   --    --    --    --   0.78  0.79   --    < >   --    < >   --    < >   --    < >   --    --    --     < > = values in this interval not displayed.      BP Readings from Last 3 Encounters:   12/07/18 124/70   10/08/18 122/73   08/28/18 124/74    Wt Readings from Last 3 Encounters:   12/07/18 156 lb 9.6 oz (71 kg)   10/08/18 157 lb 6.5 oz (71.4 kg)   08/28/18 153 lb 9.6 oz (69.7 kg)         ROS:  Constitutional, HEENT, cardiovascular, pulmonary, gi and gu systems are negative, except as otherwise noted.    OBJECTIVE:                                                    /70  Pulse 73  Temp 98.3  F (36.8  C) (Tympanic)  Ht 5' 2.99\" (1.6 m)  Wt 156 lb 9.6 oz (71 kg)  LMP 01/01/1992  SpO2 99%  BMI 27.75 kg/m2  GENERAL APPEARANCE ADULT: Alert, no acute distress  HENT: ear canal:moderate cerumen, cerumen removed by irrigation, cerumen removed with manual debridement, ear symptoms resolved after cerumen removal  Diagnostic Test Results:  none      ASSESSMENT/PLAN:                                                    1. Bilateral impacted cerumen   Cerumenosis is noted.  Wax is removed by syringing with warm sterile water and manual debridement with blue Bionix plastic cerumen curette. left ear was treated and large amount of cerumen was removed from the left ear canal.  Slight discomfort at end, no bleeding or trauma noted to ear " canals or TMs on inspection with otoscope. Treatment initiated by CMA and completed by myself. .    - REMOVE IMPACTED CERUMEN    2. Chronic narcotic use  - Drug Abuse Screen Panel 13, Urine (Pain Care Package)    3. Special screening for malignant neoplasms, colon  - Fecal colorectal cancer screen FIT; Future    4. Encounter for screening mammogram for breast cancer  - MA Screening Digital Bilateral; Future    5. Age-related osteoporosis without current pathological fracture  - DX Hip/Pelvis/Spine; Future    Darlene Sultana MD  [facility]

## 2018-12-07 NOTE — PROGRESS NOTES
Urine drug screen shows the benzodiazepine that she is taking, no alarms.  Please notify.        Thank you. CHELLY STUART MD

## 2018-12-07 NOTE — NURSING NOTE
"Initial /70  Pulse 73  Temp 98.3  F (36.8  C) (Tympanic)  Ht 5' 2.99\" (1.6 m)  Wt 156 lb 9.6 oz (71 kg)  LMP 01/01/1992  SpO2 99%  BMI 27.75 kg/m2 Estimated body mass index is 27.75 kg/(m^2) as calculated from the following:    Height as of this encounter: 5' 2.99\" (1.6 m).    Weight as of this encounter: 156 lb 9.6 oz (71 kg). .      "

## 2018-12-08 ASSESSMENT — ANXIETY QUESTIONNAIRES: GAD7 TOTAL SCORE: 1

## 2018-12-12 PROCEDURE — 82274 ASSAY TEST FOR BLOOD FECAL: CPT | Performed by: FAMILY MEDICINE

## 2018-12-14 DIAGNOSIS — Z12.11 SPECIAL SCREENING FOR MALIGNANT NEOPLASMS, COLON: ICD-10-CM

## 2018-12-14 NOTE — LETTER
[facility]  [address]  [facility] [address]  Phone: [phone]      12/21/2018     Idalmis Abdul  [address]   [facility] [address]      Dear Idalmis:    Thank you for allowing me to participate in your care. Your recent test results were reviewed and listed below.      Your take home stool test came back negative. Repeat yearly or get colonoscopy every 10 years to screen for colon cancer. If further questions or problems notify me.   Results for orders placed or performed in visit on 12/14/18   Fecal colorectal cancer screen FIT   Result Value Ref Range    Occult Blood Scn FIT Negative NEG^Negative     *Note: Due to a large number of results and/or encounters for the requested time period, some results have not been displayed. A complete set of results can be found in Results Review.     Thank you for choosing [facility].     If you have any further questions or concerns, please do not hesitate to contact us.      Sincerely,    Dr. Sultana/Nel Lorenzana MA

## 2018-12-17 ENCOUNTER — HOSPITAL ENCOUNTER (OUTPATIENT)
Dept: BONE DENSITY | Facility: CLINIC | Age: 73
Discharge: HOME | End: 2018-12-17
Attending: INTERNAL MEDICINE | Admitting: INTERNAL MEDICINE
Payer: MEDICARE

## 2018-12-17 DIAGNOSIS — M81.0 AGE-RELATED OSTEOPOROSIS WITHOUT CURRENT PATHOLOGICAL FRACTURE: ICD-10-CM

## 2018-12-17 DIAGNOSIS — E05.00 GRAVES' DISEASE: ICD-10-CM

## 2018-12-17 DIAGNOSIS — E05.90 HYPERTHYROIDISM: ICD-10-CM

## 2018-12-17 PROCEDURE — 77080 DXA BONE DENSITY AXIAL: CPT

## 2018-12-17 NOTE — LETTER
Patient:  Idalmis Abdul  :   1945  MRN:     [id number]        Ms.Suzanne TIERRA Abdul   [address] [place] [address]        2018    Dear ,    We are writing to inform you of your test results.      Resulted Orders   Dexa hip/pelvis/spine    Narrative    DX HIP/PELVIS/SPINE 2018 11:30 AM    HISTORY: Postmenopausal. Interval low back surgery with hardware.    TECHNIQUE: The left radius and bilateral hips  are scanned with DXA  technique performed using a [employer] scanner.  DXA results are  reported according to T-score.  The T-score is the standard deviation  from the peak bone mass in a normal young adult patient.  A T-score of  -1.0 to -2.5 correlates with osteopenia.  A T-score of less than -2.5  correlates with osteoporosis.    In accordance with the ISCD (International Society of Clinical  Densitometry) the lowest BMD between the total hip and femoral neck is  reported.     COMPARISON: 2014.      Impression    IMPRESSION:  1. The T-score of the left radius (33%) is -3.5 which correlates with  osteoporosis.    2. The T-score of the right total hip on today's study is -2.6 which  correlates with osteoporosis. This T-score has improved compared to  prior study where it was -2.8.    3.  The T-score of the left femoral neck on today's study is -3.0  which correlates with osteoporosis. This T-score has worsened compared  to the prior study where it was -2.8.    4.  The bone mineral density of the worst hip is 0.622 g/cm2.  This  has worsened compared to the prior study where it was 0.647 g/cm2.    MEGHANN WILKINSON MD       [facility] [place], [place] BONE DENSITOMETRY    The results show osteoporosis, that may be getting a bit worse over time.  I suggest we start the medication we discussed, Fosamax, but first we nai to improve your vitamin D levels.  Please make sure to take at list 2,000 international unit(s) of vitamin D3 daily.  This is especially important during the  winter months.  As soon as vitamin D levels are better we can start Fosamax, to help protecting your bones.  It was a pleasure to see you at your recent visit.  Please let me know if you have any immediate questions or concerns.  Sincerely,    Zenia Antoine MD PhD    Division of Endocrinology and Diabetes

## 2018-12-18 LAB — HEMOCCULT STL QL IA: NEGATIVE

## 2018-12-20 ENCOUNTER — TELEPHONE (OUTPATIENT)
Dept: FAMILY MEDICINE | Facility: CLINIC | Age: 73
End: 2018-12-20

## 2018-12-21 NOTE — RESULT ENCOUNTER NOTE
FIT test neg for blood. Repeat yearly or get colonoscopy every 10 years to screen for colon cancer. If further questions or problems notify me. CHELLY STUART MD

## 2019-01-08 NOTE — PROGRESS NOTES
SUBJECTIVE:                                                    Idalmis Abdul is 73 year old female   Chief Complaint   Patient presents with     Medication Request     Patient is requesting a script for Baclofen for back pain. She reports she started taking left over medication that she had at home from a previous prescription and she would like to continue taking it for back pain.      Medication Request     Patient reports her breathing has been worsening with activity. States she feels out of breath. She would like to discuss starting an inhaler.      Back Pain Follow Up      Description:   Location of pain:  Low back pain   Character of pain: dull ache  Pain radiation: Does not radiate  Since last visit, pain is:  worsened  New numbness or weakness in legs, not attributed to pain:  no     Intensity: severe, having a hard time with standing for long periods of time     History:   Pain interferes with job: Pt does not work. It does interfere with daily activities.   Therapies tried without relief: Unable to take ibuprofen due to the Gleevic medication   Therapies tried with relief: Baclofen     Medication Request: Patient reports her breathing has been worsening with activity. States she feels out of breath. She would like to discuss starting an inhaler.       Kellee Hernandez CMA              Accompanying Signs & Symptoms:  Risk of Fracture:  None  Risk of Cauda Equina:  None  Risk of Infection:  None  Risk of Cancer:  None        Amount of exercise or physical activity: None    Problems taking medications regularly: No    Medication side effects: none    Diet: regular (no restrictions)        Problem list and histories reviewed & adjusted, as indicated.  Additional history: as documented    Patient Active Problem List   Diagnosis     Hypertension goal BP (blood pressure) < 140/90     GIST (gastrointestinal stromal tumor), malignant (H)     Eyelid edema     History of lung cancer     Health Care Home      NSTEMI (non-ST elevated myocardial infarction) (H)     ASCVD (arteriosclerotic cardiovascular disease)     Postsurgical aortocoronary bypass status     S/P CABG x 4     Dyspnea     GERD (gastroesophageal reflux disease)     Hyperlipidemia LDL goal <160     Hyperthyroidism     Thyroid eye disease     Eyelid retraction or lag     Thyrotoxic exophthalmos     Graves' disease     History of tobacco abuse     History of non-ST elevation myocardial infarction (NSTEMI)     Chronic back pain     PVD (posterior vitreous detachment)     Age-related osteoporosis without current pathological fracture     Spinal stenosis of lumbosacral region     DDD (degenerative disc disease), lumbar     Past Surgical History:   Procedure Laterality Date     BYPASS GRAFT ARTERY CORONARY  6/14/2012    Procedure: BYPASS GRAFT ARTERY CORONARY;  Median Sternotomy Coronary Artery Bypass Graft  x 3        C THORACOSCOPY,DX W BX  fall 2003    benign tissue     CATARACT IOL, RT/LT      LE     CHOLECYSTECTOMY  1963     COLONOSCOPY  4-12-05     CORONARY ARTERY BYPASS      X4     CREATION PERICARDIAL WINDOW  6/15/2012    Procedure: CREATION PERICARDIAL WINDOW;  Mediastinal Exploration, Control of Bleeding;  Surgeon: Dwaine Griffin MD;  Location:  OR     EYE SURGERY  2014    left cataract removal     GI SURGERY  2003 and 2007    GIST tumor removal     GYN SURGERY      tubal ligation     HYSTERECTOMY VAGINAL, COLPORRHAPHY ANTERIOR, POSTERIOR, COMBINED N/A 10/17/2017    Procedure: COMBINED HYSTERECTOMY VAGINAL, COLPORRHAPHY ANTERIOR, POSTERIOR;  Total Vaginal Hysterectomy and Posterior Repair,Sacrospinous Vault Suspension;  Surgeon: Ruth Farooq MD;  Location: [facility]     PHACOEMULSIFICATION WITH STANDARD INTRAOCULAR LENS IMPLANT  3/24/2014    Procedure: PHACOEMULSIFICATION WITH STANDARD INTRAOCULAR LENS IMPLANT;  Left Kelman Phacoemulsification with Intraocular Lens Implant;  Surgeon: Magnus Mckeon MD;  Location: [facility] OR      RECESSION RESECTION WITH ADJUSTABLE SUTURE BILATERAL Bilateral 2016    Procedure: RECESSION RESECTION WITH ADJUSTABLE SUTURE BILATERAL;  Surgeon: Erma Ordaz MD;  Location: [facility]     SURGICAL HISTORY OF -       cholecystectomy     SURGICAL HISTORY OF -       Tubal Ligation      SURGICAL HISTORY OF -       Explor. Lap, Excision of Small Bowel Tumor      SURGICAL HISTORY OF -   2010    lung cancer removed right lung       Social History     Tobacco Use     Smoking status: Former Smoker     Packs/day: 0.50     Years: 49.00     Pack years: 24.50     Types: Cigarettes     Last attempt to quit: 2010     Years since quittin.7     Smokeless tobacco: Never Used   Substance Use Topics     Alcohol use: No     Family History   Problem Relation Age of Onset     Heart Disease Mother      Osteoporosis Mother      Respiratory Mother         Emphezyma     Hypertension Mother      Heart Disease Father      Alcohol/Drug Father      Hypertension Father      Hypertension Maternal Grandmother      Hypertension Brother      Hypertension Sister      Arthritis Sister      Hypertension Son      Cancer Son         rectal         Current Outpatient Medications   Medication Sig Dispense Refill     amLODIPine (NORVASC) 5 MG tablet TAKE ONE TABLET BY MOUTH EVERY DAY 30 tablet 9     aspirin EC 81 MG EC tablet Take 1 tablet by mouth daily. 30 tablet 2     calcium-vitamin D-vitamin K (VIACTIV) 500-500-40 MG-UNT-MCG CHEW Take 2 tablets by mouth daily       cyclobenzaprine (FLEXERIL) 10 MG tablet Take 1 tablet (10 mg) by mouth 3 times daily as needed for muscle spasms 60 tablet 1     fluticasone-salmeterol (ADVAIR DISKUS) 250-50 MCG/DOSE inhaler Inhale 1 puff into the lungs every 12 hours 1 Inhaler 12     hydrochlorothiazide (HYDRODIURIL) 25 MG tablet TAKE ONE TABLET BY MOUTH EVERY DAY 90 tablet 3     imatinib (GLEEVEC) 400 MG tablet Take 1 tablet (400 mg) by mouth daily Take with a meal and a large glass of  water. 30 tablet 2     lisinopril (PRINIVIL/ZESTRIL) 40 MG tablet TAKE ONE TABLET BY MOUTH EVERY DAY 90 tablet 1     LORazepam (ATIVAN) 0.5 MG tablet Take 1 tablet (0.5 mg) by mouth At Bedtime 90 tablet 1     melatonin 5 MG tablet Take 5 mg by mouth nightly as needed for sleep       methimazole (TAPAZOLE) 5 MG tablet Take 0.5 tablets (2.5 mg) by mouth daily 45 tablet 6     metoprolol tartrate (LOPRESSOR) 100 MG tablet TAKE ONE TABLET BY MOUTH TWICE A  tablet 2     order for DME Walker 1 Device 0     polyethylene glycol (MIRALAX/GLYCOLAX) powder Take 17 g by mouth daily       rosuvastatin (CRESTOR) 5 MG tablet TAKE ONE TABLET BY MOUTH EVERY DAY 90 tablet 2     Blood Pressure Monitoring (ADULT BLOOD PRESSURE CUFF LG) KIT 1 Device 2 times daily 1 kit 1     Allergies   Allergen Reactions     Atorvastatin Cramps and Unknown     cramps     Recent Labs   Lab Test 12/07/18  1229 10/12/18  1059 10/05/18  1053 07/23/18  1141 06/22/18  0840 06/20/18  1544  01/12/18  0902  10/17/17  0755  09/20/16  1022  07/17/15  0916  06/19/12  1550  06/15/12  0850   A1C  --   --   --   --   --   --   --   --   --  5.5  --   --   --   --   --  5.4  --  Duplicate request   LDL  --   --   --   --  46  --   --   --   --   --   --  38  --  31   < >  --   --   --    HDL  --   --   --   --  48*  --   --   --   --   --   --  53  --  46*   < >  --   --   --    TRIG  --   --   --   --  82  --   --   --   --   --   --  79  --  72   < >  --   --   --    ALT  --  26  --   --  23  --   --  24  --   --    < >  --    < > 23   < >  --    < >  --    CR  --  1.16*  --  1.08* 1.04  --   --  1.20*  --  1.15*   < > 1.00   < > 1.06*   < >  --    < >  --    GFRESTIMATED  --  46* 44* 50* 52*  --   --  44*  --  46*   < > 55*   < > 51*   < >  --    < >  --    GFRESTBLACK  --  55* 53* 60* 63  --   --  53*  --  56*   < > 66   < > 62   < >  --    < >  --    POTASSIUM  --  4.3  --  3.9 3.9  --   --  4.2  --  3.6   < > 4.2   < > 4.0   < >  --    < > 2.8*   TSH 1.02   "--   --   --   --  0.78   < >  --    < >  --    < >  --    < >  --    < >  --   --   --     < > = values in this interval not displayed.      BP Readings from Last 3 Encounters:   01/10/19 152/78   12/07/18 124/70   10/08/18 122/73    Wt Readings from Last 3 Encounters:   01/10/19 73.2 kg (161 lb 6.4 oz)   12/07/18 71 kg (156 lb 9.6 oz)   10/08/18 71.4 kg (157 lb 6.5 oz)         ROS:  Constitutional, HEENT, cardiovascular, pulmonary, gi and gu systems are negative, except as otherwise noted.    OBJECTIVE:                                                    /78 (BP Location: Right arm, Patient Position: Chair, Cuff Size: Adult Regular)   Pulse 78   Temp 98.1  F (36.7  C) (Tympanic)   Resp 16   Ht 1.6 m (5' 3\")   Wt 73.2 kg (161 lb 6.4 oz)   LMP 01/01/1992   SpO2 98%   BMI 28.59 kg/m    GENERAL APPEARANCE ADULT: Alert, no acute distress  HENT: Ears and TMs normal, oral mucosa and posterior oropharynx normal, eye lid edema  RESP: diminished breath sounds bilaterally, no respiratory distress.  duoneb and no change, dropped sats to 91% with walking after neb  CV: normal rate, regular rhythm, no murmur or gallop  Diagnostic Test Results:  none      ASSESSMENT/PLAN:                                                    1. Chronic midline low back pain without sciatica  Is off narcotics, had back surgery but still hurts, sharp pains have resolved  - cyclobenzaprine (FLEXERIL) 10 MG tablet; Take 1 tablet (10 mg) by mouth 3 times daily as needed for muscle spasms  Dispense: 60 tablet; Refill: 1    2. Dyspnea on exertion  History of tobacco use and lung cancer.  If no better in advair will see pulmonology  - ipratropium - albuterol 0.5 mg/2.5 mg/3 mL (DUONEB) neb solution 3 mL; Take 3 mLs by nebulization once  - INHALATION/NEBULIZER TREATMENT, INITIAL  - fluticasone-salmeterol (ADVAIR DISKUS) 250-50 MCG/DOSE inhaler; Inhale 1 puff into the lungs every 12 hours  Dispense: 1 Inhaler; Refill: 12    Darlene Sultana, " MD  [facility]

## 2019-01-10 ENCOUNTER — OFFICE VISIT (OUTPATIENT)
Dept: FAMILY MEDICINE | Facility: CLINIC | Age: 74
End: 2019-01-10
Payer: MEDICARE

## 2019-01-10 VITALS
OXYGEN SATURATION: 98 % | TEMPERATURE: 98.1 F | SYSTOLIC BLOOD PRESSURE: 152 MMHG | DIASTOLIC BLOOD PRESSURE: 78 MMHG | BODY MASS INDEX: 28.6 KG/M2 | HEIGHT: 63 IN | HEART RATE: 78 BPM | RESPIRATION RATE: 16 BRPM | WEIGHT: 161.4 LBS

## 2019-01-10 DIAGNOSIS — M54.50 CHRONIC MIDLINE LOW BACK PAIN WITHOUT SCIATICA: Primary | ICD-10-CM

## 2019-01-10 DIAGNOSIS — R06.09 DYSPNEA ON EXERTION: ICD-10-CM

## 2019-01-10 DIAGNOSIS — G89.29 CHRONIC MIDLINE LOW BACK PAIN WITHOUT SCIATICA: Primary | ICD-10-CM

## 2019-01-10 PROBLEM — F11.90 CHRONIC NARCOTIC USE: Status: RESOLVED | Noted: 2018-12-07 | Resolved: 2019-01-10

## 2019-01-10 PROCEDURE — 99214 OFFICE O/P EST MOD 30 MIN: CPT | Mod: 25 | Performed by: FAMILY MEDICINE

## 2019-01-10 PROCEDURE — 94640 AIRWAY INHALATION TREATMENT: CPT | Performed by: FAMILY MEDICINE

## 2019-01-10 RX ORDER — CYCLOBENZAPRINE HCL 10 MG
10 TABLET ORAL 3 TIMES DAILY PRN
Qty: 60 TABLET | Refills: 1 | Status: SHIPPED | OUTPATIENT
Start: 2019-01-10 | End: 2019-02-04

## 2019-01-10 RX ORDER — IPRATROPIUM BROMIDE AND ALBUTEROL SULFATE 2.5; .5 MG/3ML; MG/3ML
3 SOLUTION RESPIRATORY (INHALATION) ONCE
Status: COMPLETED | OUTPATIENT
Start: 2019-01-10 | End: 2019-01-10

## 2019-01-10 RX ADMIN — IPRATROPIUM BROMIDE AND ALBUTEROL SULFATE 3 ML: 2.5; .5 SOLUTION RESPIRATORY (INHALATION) at 12:55

## 2019-01-10 ASSESSMENT — MIFFLIN-ST. JEOR: SCORE: 1201.24

## 2019-01-16 DIAGNOSIS — I10 ESSENTIAL HYPERTENSION WITH GOAL BLOOD PRESSURE LESS THAN 140/90: ICD-10-CM

## 2019-01-17 RX ORDER — LISINOPRIL 40 MG/1
TABLET ORAL
Qty: 90 TABLET | Refills: 1 | Status: SHIPPED | OUTPATIENT
Start: 2019-01-17 | End: 2019-07-16

## 2019-01-17 NOTE — TELEPHONE ENCOUNTER
"Requested Prescriptions   Pending Prescriptions Disp Refills     lisinopril (PRINIVIL/ZESTRIL) 40 MG tablet [Pharmacy Med Name: LISINOPRIL 40MG TABS]  Last Written Prescription Date:  6/22/2018  Last Fill Quantity: 90,  # refills: 1   Last office visit: 1/10/2019 with prescribing provider:   Kayy  Future Office Visit:     90 tablet 1     Sig: TAKE ONE TABLET BY MOUTH EVERY DAY    ACE Inhibitors (Including Combos) Protocol Failed - 1/16/2019  4:23 PM       Failed - Blood pressure under 140/90 in past 12 months    BP Readings from Last 3 Encounters:   01/10/19 152/78   12/07/18 124/70   10/08/18 122/73                Failed - Normal serum creatinine on file in past 12 months    Recent Labs   Lab Test 10/12/18  1059 10/05/18  1053   CR 1.16*  --    CREAT  --  1.2*            Passed - Recent (12 mo) or future (30 days) visit within the authorizing provider's specialty    Patient had office visit in the last 12 months or has a visit in the next 30 days with authorizing provider or within the authorizing provider's specialty.  See \"Patient Info\" tab in inbasket, or \"Choose Columns\" in Meds & Orders section of the refill encounter.             Passed - Medication is active on med list       Passed - Patient is age 18 or older       Passed - No active pregnancy on record       Passed - Normal serum potassium on file in past 12 months    Recent Labs   Lab Test 10/12/18  1059   POTASSIUM 4.3            Passed - No positive pregnancy test within past 12 months          "

## 2019-01-21 ENCOUNTER — HOSPITAL ENCOUNTER (OUTPATIENT)
Dept: MAMMOGRAPHY | Facility: CLINIC | Age: 74
Discharge: HOME | End: 2019-01-21
Attending: FAMILY MEDICINE | Admitting: FAMILY MEDICINE
Payer: MEDICARE

## 2019-01-21 DIAGNOSIS — Z12.31 ENCOUNTER FOR SCREENING MAMMOGRAM FOR BREAST CANCER: ICD-10-CM

## 2019-01-21 PROCEDURE — 77067 SCR MAMMO BI INCL CAD: CPT

## 2019-01-25 ENCOUNTER — TELEPHONE (OUTPATIENT)
Dept: FAMILY MEDICINE | Facility: CLINIC | Age: 74
End: 2019-01-25

## 2019-01-25 NOTE — TELEPHONE ENCOUNTER
Pt stated the following medication:  advair Diskis inhalor  And Cyclobenvaprine need PA  To be filled    [facility]

## 2019-01-31 ENCOUNTER — TELEPHONE (OUTPATIENT)
Dept: FAMILY MEDICINE | Facility: CLINIC | Age: 74
End: 2019-01-31

## 2019-01-31 DIAGNOSIS — G89.29 CHRONIC MIDLINE LOW BACK PAIN WITHOUT SCIATICA: ICD-10-CM

## 2019-01-31 DIAGNOSIS — M54.50 CHRONIC MIDLINE LOW BACK PAIN WITHOUT SCIATICA: ICD-10-CM

## 2019-01-31 NOTE — TELEPHONE ENCOUNTER
PA Initiation    Medication: ADVAIR DISKUS  Insurance Company: [employer] - Phone [phone] Fax [phone]  Pharmacy Filling the Rx: [facility] [place], [place] - [address]  Filling Pharmacy Phone: [phone]  Filling Pharmacy Fax:    Start Date: 1/31/2019    Central Prior Authorization Team   Phone: [phone]

## 2019-01-31 NOTE — TELEPHONE ENCOUNTER
PA Initiation    Medication: cyclobenzaprine (FLEXERIL) 10 MG tablet  Insurance Company: [employer] - Phone [phone] Fax [phone]  Pharmacy Filling the Rx: [facility] [place], [place] - [address]  Filling Pharmacy Phone: [phone]  Filling Pharmacy Fax:    Start Date: 1/31/2019    Central Prior Authorization Team   Phone: [phone]

## 2019-02-01 NOTE — TELEPHONE ENCOUNTER
Prior Authorization Approval    Authorization Effective Date: 1/1/2019  Authorization Expiration Date: 1/31/2022  Medication: cyclobenzaprine (FLEXERIL) 10 MG tablet  Approved Dose/Quantity: 60  Reference #: [id number]   Insurance Company: [employer] - Phone [phone] Fax [phone]  Which Pharmacy is filling the prescription (Not needed for infusion/clinic administered): [facility] [place], [place] - [address]  Pharmacy Notified: Yes pt already picked up and going forward can now get more than 2 week supplies  Patient Notified: Yes

## 2019-02-01 NOTE — TELEPHONE ENCOUNTER
Prior Authorization Not Needed per Insurance    Medication: ADVAIR DISKUS  Insurance Company: [employer] - Phone [phone] Fax [phone]  Pharmacy Filling the Rx: [facility] [place] [address]  Pharmacy Notified: Yes- patient already picked this up, went through insurance.  Patient Notified: Yes

## 2019-02-04 NOTE — TELEPHONE ENCOUNTER
Patient's PA for Flexeril was approved  Pharmacy reports that patient takes 3 tablets a day for muscle spasms and patient would like a prescription written to reflect her taking 3 tablets a day, 90 a month, so she is not going to the pharmacy every 2 weeks    Please advise    Routing to provider.    Anat MOREL Rn

## 2019-02-04 NOTE — TELEPHONE ENCOUNTER
Approved, but only for small supply, pt just picked up 12, but takes 3 per day, I called pharmacy and they only can dispense 18 more, can she get a new rx with more refills since these will only be lasting her a few days at a time? Or is there something else she can do to avoid picking up every few days, please advise. [phone]

## 2019-02-05 RX ORDER — CYCLOBENZAPRINE HCL 10 MG
10 TABLET ORAL 3 TIMES DAILY PRN
Qty: 90 TABLET | Refills: 3 | Status: SHIPPED | OUTPATIENT
Start: 2019-02-05 | End: 2019-05-23

## 2019-02-05 NOTE — TELEPHONE ENCOUNTER
Pt notified that Dr Sultana has written a new prescription for 3 tabs of cyclobenzaprine, 90 count, and 3 refills, 2/5/19.    Routed to PA team.  Does this need to be re-submitted due to new prescription?    Thank you.    Shakila Motley RN

## 2019-02-06 ENCOUNTER — TELEPHONE (OUTPATIENT)
Dept: FAMILY MEDICINE | Facility: CLINIC | Age: 74
End: 2019-02-06

## 2019-02-06 NOTE — TELEPHONE ENCOUNTER
Reason for Call:  Other prescription    Detailed comments: the Rx for flexeril is giving pt dry mouth and making it hard to talk even. Please advise    Phone Number Patient can be reached at: Home number on file [phone] (home)    Best Time: any    Can we leave a detailed message on this number? YES    Call taken on 2/6/2019 at 3:35 PM by Bharati Leslie

## 2019-02-06 NOTE — TELEPHONE ENCOUNTER
PA did not detail the quantity covered.  Will await processing at pharmacy and will resubmit new PA if needed for additional quantities.

## 2019-02-06 NOTE — TELEPHONE ENCOUNTER
This was prescribed for low back pain on 1/10/19.  Has pt had these symptoms all along or is this new?    Left non-detailed message for patient to return a call to the clinic RN.       Routed to Dr Sultana.  Please advise.    DIONNE Motley RN

## 2019-02-15 NOTE — PROGRESS NOTES
SUBJECTIVE:                                                    Idalmis Abdul is 73 year old female   No chief complaint on file.        Problem list and histories reviewed & adjusted, as indicated.  Additional history: as documented    Patient Active Problem List   Diagnosis     Hypertension goal BP (blood pressure) < 140/90     GIST (gastrointestinal stromal tumor), malignant (H)     Eyelid edema     History of lung cancer     Health Care Home     NSTEMI (non-ST elevated myocardial infarction) (H)     ASCVD (arteriosclerotic cardiovascular disease)     Postsurgical aortocoronary bypass status     S/P CABG x 4     Dyspnea     GERD (gastroesophageal reflux disease)     Hyperlipidemia LDL goal <160     Hyperthyroidism     Thyroid eye disease     Eyelid retraction or lag     Thyrotoxic exophthalmos     Graves' disease     History of tobacco abuse     History of non-ST elevation myocardial infarction (NSTEMI)     Chronic back pain     PVD (posterior vitreous detachment)     Age-related osteoporosis without current pathological fracture     Spinal stenosis of lumbosacral region     DDD (degenerative disc disease), lumbar     Past Surgical History:   Procedure Laterality Date     BYPASS GRAFT ARTERY CORONARY  6/14/2012    Procedure: BYPASS GRAFT ARTERY CORONARY;  Median Sternotomy Coronary Artery Bypass Graft  x 3        C THORACOSCOPY,DX W BX  fall 2003    benign tissue     CATARACT IOL, RT/LT      LE     CHOLECYSTECTOMY  1963     COLONOSCOPY  4-12-05     CORONARY ARTERY BYPASS      X4     CREATION PERICARDIAL WINDOW  6/15/2012    Procedure: CREATION PERICARDIAL WINDOW;  Mediastinal Exploration, Control of Bleeding;  Surgeon: Dwaine Griffin MD;  Location: [facility]     EYE SURGERY  2014    left cataract removal     GI SURGERY  2003 and 2007    GIST tumor removal     GYN SURGERY      tubal ligation     HYSTERECTOMY VAGINAL, COLPORRHAPHY ANTERIOR, POSTERIOR, COMBINED N/A 10/17/2017    Procedure: COMBINED  HYSTERECTOMY VAGINAL, COLPORRHAPHY ANTERIOR, POSTERIOR;  Total Vaginal Hysterectomy and Posterior Repair,Sacrospinous Vault Suspension;  Surgeon: Ruth Farooq MD;  Location: [facility]     PHACOEMULSIFICATION WITH STANDARD INTRAOCULAR LENS IMPLANT  3/24/2014    Procedure: PHACOEMULSIFICATION WITH STANDARD INTRAOCULAR LENS IMPLANT;  Left Kelman Phacoemulsification with Intraocular Lens Implant;  Surgeon: Magnus Mckeon MD;  Location: [facility]     RECESSION RESECTION WITH ADJUSTABLE SUTURE BILATERAL Bilateral 2016    Procedure: RECESSION RESECTION WITH ADJUSTABLE SUTURE BILATERAL;  Surgeon: Erma Ordaz MD;  Location: [facility]     SURGICAL HISTORY OF -       cholecystectomy     SURGICAL HISTORY OF -       Tubal Ligation      SURGICAL HISTORY OF -       Explor. Lap, Excision of Small Bowel Tumor      SURGICAL HISTORY OF -   2010    lung cancer removed right lung       Social History     Tobacco Use     Smoking status: Former Smoker     Packs/day: 0.50     Years: 49.00     Pack years: 24.50     Types: Cigarettes     Last attempt to quit: 2010     Years since quittin.8     Smokeless tobacco: Never Used   Substance Use Topics     Alcohol use: No     Family History   Problem Relation Age of Onset     Heart Disease Mother      Osteoporosis Mother      Respiratory Mother         Emphezyma     Hypertension Mother      Heart Disease Father      Alcohol/Drug Father      Hypertension Father      Hypertension Maternal Grandmother      Hypertension Brother      Hypertension Sister      Arthritis Sister      Hypertension Son      Cancer Son         rectal         Current Outpatient Medications   Medication Sig Dispense Refill     amLODIPine (NORVASC) 5 MG tablet TAKE ONE TABLET BY MOUTH EVERY DAY 30 tablet 9     aspirin EC 81 MG EC tablet Take 1 tablet by mouth daily. 30 tablet 2     Blood Pressure Monitoring (ADULT BLOOD PRESSURE CUFF LG) KIT 1 Device 2 times daily 1 kit 1      calcium-vitamin D-vitamin K (VIACTIV) 500-500-40 MG-UNT-MCG CHEW Take 2 tablets by mouth daily       cyclobenzaprine (FLEXERIL) 10 MG tablet Take 1 tablet (10 mg) by mouth 3 times daily as needed for muscle spasms 90 tablet 3     fluticasone-salmeterol (ADVAIR DISKUS) 250-50 MCG/DOSE inhaler Inhale 1 puff into the lungs every 12 hours 1 Inhaler 12     hydrochlorothiazide (HYDRODIURIL) 25 MG tablet TAKE ONE TABLET BY MOUTH EVERY DAY 90 tablet 3     imatinib (GLEEVEC) 400 MG tablet Take 1 tablet (400 mg) by mouth daily Take with a meal and a large glass of water. 30 tablet 2     lisinopril (PRINIVIL/ZESTRIL) 40 MG tablet TAKE ONE TABLET BY MOUTH EVERY DAY 90 tablet 1     LORazepam (ATIVAN) 0.5 MG tablet Take 1 tablet (0.5 mg) by mouth At Bedtime 90 tablet 1     melatonin 5 MG tablet Take 5 mg by mouth nightly as needed for sleep       methimazole (TAPAZOLE) 5 MG tablet Take 0.5 tablets (2.5 mg) by mouth daily 45 tablet 6     metoprolol tartrate (LOPRESSOR) 100 MG tablet TAKE ONE TABLET BY MOUTH TWICE A  tablet 2     order for DME Walker 1 Device 0     polyethylene glycol (MIRALAX/GLYCOLAX) powder Take 17 g by mouth daily       rosuvastatin (CRESTOR) 5 MG tablet TAKE ONE TABLET BY MOUTH EVERY DAY 90 tablet 2     Allergies   Allergen Reactions     Atorvastatin Cramps and Unknown     cramps     Recent Labs   Lab Test 12/07/18  1229 10/12/18  1059 10/05/18  1053 07/23/18  1141 06/22/18  0840 06/20/18  1544  01/12/18  0902  10/17/17  0755  09/20/16  1022  07/17/15  0916  06/19/12  1550  06/15/12  0850   A1C  --   --   --   --   --   --   --   --   --  5.5  --   --   --   --   --  5.4  --  Duplicate request   LDL  --   --   --   --  46  --   --   --   --   --   --  38  --  31   < >  --   --   --    HDL  --   --   --   --  48*  --   --   --   --   --   --  53  --  46*   < >  --   --   --    TRIG  --   --   --   --  82  --   --   --   --   --   --  79  --  72   < >  --   --   --    ALT  --  26  --   --  23  --   --  24  " --   --    < >  --    < > 23   < >  --    < >  --    CR  --  1.16*  --  1.08* 1.04  --   --  1.20*  --  1.15*   < > 1.00   < > 1.06*   < >  --    < >  --    GFRESTIMATED  --  46* 44* 50* 52*  --   --  44*  --  46*   < > 55*   < > 51*   < >  --    < >  --    GFRESTBLACK  --  55* 53* 60* 63  --   --  53*  --  56*   < > 66   < > 62   < >  --    < >  --    POTASSIUM  --  4.3  --  3.9 3.9  --   --  4.2  --  3.6   < > 4.2   < > 4.0   < >  --    < > 2.8*   TSH 1.02  --   --   --   --  0.78   < >  --    < >  --    < >  --    < >  --    < >  --   --   --     < > = values in this interval not displayed.      BP Readings from Last 3 Encounters:   01/10/19 152/78   12/07/18 124/70   10/08/18 122/73    Wt Readings from Last 3 Encounters:   01/10/19 73.2 kg (161 lb 6.4 oz)   12/07/18 71 kg (156 lb 9.6 oz)   10/08/18 71.4 kg (157 lb 6.5 oz)         ROS:  Constitutional, HEENT, cardiovascular, pulmonary, gi and gu systems are negative, except as otherwise noted.    OBJECTIVE:                                                    LMP 01/01/1992   {OTAL:013665}  {Diagnostic Test Results:861297::\"Diagnostic Test Results:\",\"none \"}     ASSESSMENT/PLAN:                                                    {Diag Picklist:375006}    Darlene Sultana MD  [facility]    "

## 2019-02-18 ENCOUNTER — OFFICE VISIT (OUTPATIENT)
Dept: FAMILY MEDICINE | Facility: CLINIC | Age: 74
End: 2019-02-18
Payer: MEDICARE

## 2019-02-18 VITALS
DIASTOLIC BLOOD PRESSURE: 70 MMHG | OXYGEN SATURATION: 95 % | RESPIRATION RATE: 16 BRPM | WEIGHT: 157 LBS | SYSTOLIC BLOOD PRESSURE: 120 MMHG | BODY MASS INDEX: 27.82 KG/M2 | TEMPERATURE: 97.8 F | HEART RATE: 85 BPM | HEIGHT: 63 IN

## 2019-02-18 DIAGNOSIS — G89.29 CHRONIC RIGHT-SIDED LOW BACK PAIN WITHOUT SCIATICA: Primary | ICD-10-CM

## 2019-02-18 DIAGNOSIS — C49.A0 MALIGNANT GASTROINTESTINAL STROMAL TUMOR, UNSPECIFIED SITE (H): ICD-10-CM

## 2019-02-18 DIAGNOSIS — M54.50 CHRONIC RIGHT-SIDED LOW BACK PAIN WITHOUT SCIATICA: Primary | ICD-10-CM

## 2019-02-18 PROCEDURE — 99214 OFFICE O/P EST MOD 30 MIN: CPT | Performed by: FAMILY MEDICINE

## 2019-02-18 RX ORDER — NAPROXEN SODIUM 550 MG/1
550 TABLET ORAL 2 TIMES DAILY WITH MEALS
Qty: 60 TABLET | Refills: 3 | Status: SHIPPED | OUTPATIENT
Start: 2019-02-18 | End: 2019-07-12

## 2019-02-18 ASSESSMENT — MIFFLIN-ST. JEOR: SCORE: 1181.28

## 2019-02-18 NOTE — PROGRESS NOTES
SUBJECTIVE:                                                    Idalmis Abdul is 73 year old female   Chief Complaint   Patient presents with     Medication Problem     Patient is here to discuss medications and sida effects she is having.         Problem list and histories reviewed & adjusted, as indicated.  Additional history: flexeril is giving her very dry mouth.  Treating low back pain at surgery site, hardware spot she thinks just off to right of lumbar spine.  Narcotic pain meds don't help.  Cannot take tylenol because on Gleevec for stomach cancer  Is going to [facility] to stay with son for a month..    Patient Active Problem List   Diagnosis     Hypertension goal BP (blood pressure) < 140/90     GIST (gastrointestinal stromal tumor), malignant (H)     Eyelid edema     History of lung cancer     Health Care Home     NSTEMI (non-ST elevated myocardial infarction) (H)     ASCVD (arteriosclerotic cardiovascular disease)     Postsurgical aortocoronary bypass status     S/P CABG x 4     Dyspnea     GERD (gastroesophageal reflux disease)     Hyperlipidemia LDL goal <160     Hyperthyroidism     Thyroid eye disease     Eyelid retraction or lag     Thyrotoxic exophthalmos     Graves' disease     History of tobacco abuse     History of non-ST elevation myocardial infarction (NSTEMI)     Chronic back pain     PVD (posterior vitreous detachment)     Age-related osteoporosis without current pathological fracture     Spinal stenosis of lumbosacral region     DDD (degenerative disc disease), lumbar     Past Surgical History:   Procedure Laterality Date     BYPASS GRAFT ARTERY CORONARY  6/14/2012    Procedure: BYPASS GRAFT ARTERY CORONARY;  Median Sternotomy Coronary Artery Bypass Graft  x 3        C THORACOSCOPY,DX W BX  fall 2003    benign tissue     CATARACT IOL, RT/LT      LE     CHOLECYSTECTOMY  1963     COLONOSCOPY  4-12-05     CORONARY ARTERY BYPASS      X4     CREATION PERICARDIAL WINDOW  6/15/2012     Procedure: CREATION PERICARDIAL WINDOW;  Mediastinal Exploration, Control of Bleeding;  Surgeon: Dwaine Griffin MD;  Location: [facility] OR     EYE SURGERY      left cataract removal     GI SURGERY   and     GIST tumor removal     GYN SURGERY      tubal ligation     HYSTERECTOMY VAGINAL, COLPORRHAPHY ANTERIOR, POSTERIOR, COMBINED N/A 10/17/2017    Procedure: COMBINED HYSTERECTOMY VAGINAL, COLPORRHAPHY ANTERIOR, POSTERIOR;  Total Vaginal Hysterectomy and Posterior Repair,Sacrospinous Vault Suspension;  Surgeon: Ruth Farooq MD;  Location: [facility]     PHACOEMULSIFICATION WITH STANDARD INTRAOCULAR LENS IMPLANT  3/24/2014    Procedure: PHACOEMULSIFICATION WITH STANDARD INTRAOCULAR LENS IMPLANT;  Left Kelman Phacoemulsification with Intraocular Lens Implant;  Surgeon: Magnus Mckeon MD;  Location: [facility] OR     RECESSION RESECTION WITH ADJUSTABLE SUTURE BILATERAL Bilateral 2016    Procedure: RECESSION RESECTION WITH ADJUSTABLE SUTURE BILATERAL;  Surgeon: Erma Ordaz MD;  Location: [facility]     SURGICAL HISTORY OF -       cholecystectomy     SURGICAL HISTORY OF -       Tubal Ligation      SURGICAL HISTORY OF -       Explor. Lap, Excision of Small Bowel Tumor      SURGICAL HISTORY OF -   2010    lung cancer removed right lung       Social History     Tobacco Use     Smoking status: Former Smoker     Packs/day: 0.50     Years: 49.00     Pack years: 24.50     Types: Cigarettes     Last attempt to quit: 2010     Years since quittin.8     Smokeless tobacco: Never Used   Substance Use Topics     Alcohol use: No     Family History   Problem Relation Age of Onset     Heart Disease Mother      Osteoporosis Mother      Respiratory Mother         Emphezyma     Hypertension Mother      Heart Disease Father      Alcohol/Drug Father      Hypertension Father      Hypertension Maternal Grandmother      Hypertension Brother      Hypertension Sister      Arthritis Sister       Hypertension Son      Cancer Son         rectal         Current Outpatient Medications   Medication Sig Dispense Refill     amLODIPine (NORVASC) 5 MG tablet TAKE ONE TABLET BY MOUTH EVERY DAY 30 tablet 9     aspirin EC 81 MG EC tablet Take 1 tablet by mouth daily. 30 tablet 2     Blood Pressure Monitoring (ADULT BLOOD PRESSURE CUFF LG) KIT 1 Device 2 times daily 1 kit 1     calcium-vitamin D-vitamin K (VIACTIV) 500-500-40 MG-UNT-MCG CHEW Take 2 tablets by mouth daily       fluticasone-salmeterol (ADVAIR DISKUS) 250-50 MCG/DOSE inhaler Inhale 1 puff into the lungs every 12 hours 1 Inhaler 12     hydrochlorothiazide (HYDRODIURIL) 25 MG tablet TAKE ONE TABLET BY MOUTH EVERY DAY 90 tablet 3     imatinib (GLEEVEC) 400 MG tablet Take 1 tablet (400 mg) by mouth daily Take with a meal and a large glass of water. 30 tablet 2     lisinopril (PRINIVIL/ZESTRIL) 40 MG tablet TAKE ONE TABLET BY MOUTH EVERY DAY 90 tablet 1     LORazepam (ATIVAN) 0.5 MG tablet Take 1 tablet (0.5 mg) by mouth At Bedtime 90 tablet 1     melatonin 5 MG tablet Take 5 mg by mouth nightly as needed for sleep       methimazole (TAPAZOLE) 5 MG tablet Take 0.5 tablets (2.5 mg) by mouth daily 45 tablet 6     metoprolol tartrate (LOPRESSOR) 100 MG tablet TAKE ONE TABLET BY MOUTH TWICE A  tablet 2     naproxen sodium (ANAPROX) 550 MG tablet Take 1 tablet (550 mg) by mouth 2 times daily (with meals) 60 tablet 3     polyethylene glycol (MIRALAX/GLYCOLAX) powder Take 17 g by mouth daily       rosuvastatin (CRESTOR) 5 MG tablet TAKE ONE TABLET BY MOUTH EVERY DAY 90 tablet 2     cyclobenzaprine (FLEXERIL) 10 MG tablet Take 1 tablet (10 mg) by mouth 3 times daily as needed for muscle spasms (Patient not taking: Reported on 2/18/2019) 90 tablet 3     order for DME Walker 1 Device 0     Allergies   Allergen Reactions     Atorvastatin Cramps and Unknown     cramps     Recent Labs   Lab Test 12/07/18  1229 10/12/18  1059 10/05/18  1053 07/23/18  1141  "06/22/18  0840 06/20/18  1544  01/12/18  0902  10/17/17  0755  09/20/16  1022  07/17/15  0916  06/19/12  1550  06/15/12  0850   A1C  --   --   --   --   --   --   --   --   --  5.5  --   --   --   --   --  5.4  --  Duplicate request   LDL  --   --   --   --  46  --   --   --   --   --   --  38  --  31   < >  --   --   --    HDL  --   --   --   --  48*  --   --   --   --   --   --  53  --  46*   < >  --   --   --    TRIG  --   --   --   --  82  --   --   --   --   --   --  79  --  72   < >  --   --   --    ALT  --  26  --   --  23  --   --  24  --   --    < >  --    < > 23   < >  --    < >  --    CR  --  1.16*  --  1.08* 1.04  --   --  1.20*  --  1.15*   < > 1.00   < > 1.06*   < >  --    < >  --    GFRESTIMATED  --  46* 44* 50* 52*  --   --  44*  --  46*   < > 55*   < > 51*   < >  --    < >  --    GFRESTBLACK  --  55* 53* 60* 63  --   --  53*  --  56*   < > 66   < > 62   < >  --    < >  --    POTASSIUM  --  4.3  --  3.9 3.9  --   --  4.2  --  3.6   < > 4.2   < > 4.0   < >  --    < > 2.8*   TSH 1.02  --   --   --   --  0.78   < >  --    < >  --    < >  --    < >  --    < >  --   --   --     < > = values in this interval not displayed.      BP Readings from Last 3 Encounters:   02/18/19 120/70   01/10/19 152/78   12/07/18 124/70    Wt Readings from Last 3 Encounters:   02/18/19 71.2 kg (157 lb)   01/10/19 73.2 kg (161 lb 6.4 oz)   12/07/18 71 kg (156 lb 9.6 oz)         ROS:  Constitutional, HEENT, cardiovascular, pulmonary, gi and gu systems are negative, except as otherwise noted.    OBJECTIVE:                                                    /70 (BP Location: Left arm, Patient Position: Chair, Cuff Size: Adult Regular)   Pulse 85   Temp 97.8  F (36.6  C) (Tympanic)   Resp 16   Ht 1.6 m (5' 3\")   Wt 71.2 kg (157 lb)   LMP 01/01/1992   SpO2 95%   BMI 27.81 kg/m    GENERAL APPEARANCE ADULT: Alert, no acute distress  PSYCH: mentation appears normal., affect and mood normal  Diagnostic Test Results:  none  "     ASSESSMENT/PLAN:                                                    1. Chronic right-sided low back pain without sciatica  Pain at site of surgical hardware, placed about 10 months ago.  - naproxen sodium (ANAPROX) 550 MG tablet; Take 1 tablet (550 mg) by mouth 2 times daily (with meals)  Dispense: 60 tablet; Refill: 3    Darlene Sultana MD  [facility]

## 2019-02-18 NOTE — NURSING NOTE
"Initial /70 (BP Location: Left arm, Patient Position: Chair, Cuff Size: Adult Regular)   Pulse 85   Temp 97.8  F (36.6  C) (Tympanic)   Resp 16   Ht 1.6 m (5' 3\")   Wt 71.2 kg (157 lb)   LMP 01/01/1992   SpO2 95%   BMI 27.81 kg/m   Estimated body mass index is 27.81 kg/m  as calculated from the following:    Height as of this encounter: 1.6 m (5' 3\").    Weight as of this encounter: 71.2 kg (157 lb). .    Yael Mckeon, [facility]    "

## 2019-02-19 ENCOUNTER — OFFICE VISIT (OUTPATIENT)
Dept: ENDOCRINOLOGY | Facility: CLINIC | Age: 74
End: 2019-02-19
Payer: MEDICARE

## 2019-02-19 VITALS
BODY MASS INDEX: 28 KG/M2 | HEART RATE: 87 BPM | WEIGHT: 158 LBS | SYSTOLIC BLOOD PRESSURE: 92 MMHG | HEIGHT: 63 IN | DIASTOLIC BLOOD PRESSURE: 60 MMHG

## 2019-02-19 DIAGNOSIS — M81.0 AGE-RELATED OSTEOPOROSIS WITHOUT CURRENT PATHOLOGICAL FRACTURE: Primary | ICD-10-CM

## 2019-02-19 RX ORDER — ALENDRONATE SODIUM 70 MG/1
70 TABLET ORAL
Qty: 12 TABLET | Refills: 3 | Status: SHIPPED | OUTPATIENT
Start: 2019-02-19 | End: 2020-06-18

## 2019-02-19 ASSESSMENT — MIFFLIN-ST. JEOR: SCORE: 1185.81

## 2019-02-19 ASSESSMENT — PAIN SCALES - GENERAL: PAINLEVEL: NO PAIN (0)

## 2019-02-19 NOTE — PROGRESS NOTES
Endocrinology and diabetes outpatient clinic  Name: Idalmis Abdul  HPI:  Ms. Abdul is a very pleasant to 73-year-old female patient with thyroid dysfunction diagnosed in Feb 2014.  Mrs. 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   Thyroid dysfunction: Briefly, Ms. Abdul reported thyroid function studies were abnormal since August 2013. The patient was initially seen by Dr. Rama Coburn (February 2014).  Hyperthyroidism was confirmed, and a thyroid uptake and scan prior to starting therapy showed an homogenous uptake of 19%.  This has been managed with methimazole.  She is currently on methimazole 2.5 mg daily.  She has been on this same dose since February 14, 2018.  She feels well and has no symptoms suggestive of hypo-or hyperthyroidism. She denies palpitations, tremors or changes in her bowel movements. She has chronic constipation, likely related to chronic oxycodone use (for back pain). She takes senna docusate to help with her constipation. She had eye surgery in 7/2016, and that has helped with her diplopia.   I have previously reviewed the patient's medical records, that showed that Ms. Abdul actually had a suppressed TSH and a positive TSI in June 2012.  She reports she was at the hospital at that time due to heart issues (atrial fibrillation).  It seems the patient was seen by the endocrinology consult team at that time, but she didn't have any follow-up as an outpatient.   Osteoporosis: A DEXA scan done in 2014 showed a negative T score of 2.8.  More recent DEXA scan in December 2018 showed lowest T score - 3.5, on patient's wrist.  Gastrointestinal stromal tumor:  Ms. Abdul previous medical history includes a gastrointestinal stromal tumor diagnosed in 2003. The tumor was surgically removed  She was on Gleevec for 1 year, then off for 3 years, and had a recurrence in 2007, with 3 new tumors.  She has continuously been on Gleevec since  then, and the plans are for her to be on it long term, as her tumor has been stable while on it.  She continues to follow with Dr. Schmitz in Oncology, and has a CT armenta with contrast every 6 months.   Lung cancer diagnosed in 2010.  She is a former smoker, quitting in 2010.  Perforated bowel: She also had a perforated bowel mid 2014 that was managed conservatively, and lost renal function during that hospital admission, likely related to antibiotic use.  Her GFR has somewhat recovered and is now around 45 mL/min  Coronary artery disease: Ms. Abdul had quadruple bypass in 2012. She has HTN, and is on multiple blood pressure medications.  She is also on Rosuvastatin.  Mrs. Abdul underwent a total vaginal hysterectomy, A & P repair and Sacrospinous vault suspension,  total vaginal hysterectomy, for uterovaginal prolapse, in 10/17/17. She is followed by Dr. Farooq in OB/Gyn.   She has also had a L4-L5 fusion with laminectomy in April 2018.  This was a 6 hours long surgery and she is doing well.  She is feeling very well today and she is very happy she is going to be visiting with her son in [place] next week.  PMH/PSH:  Past Medical History:   Diagnosis Date     ASCVD (arteriosclerotic cardiovascular disease) 6/14/2012     Benign neoplasm of duodenum, jejunum, and ileum 2003, 2007    Gastrointestinal Stromal Tumor, seen at [facility], Dr. Schmitz, GI oncology-Gleevec treatment.  Surgical removal in fall 2004-no recurrence as of 3/05.     CA - lung cancer 4/2010    [facility], treated surgically     choledochal cyst 1963    CHOLEDOCHAL CYST, mild dilatation of biliary system     Coronary artery disease      DDD (degenerative disc disease), lumbar 3/22/2018     Dislocated finger, left middle PIP 7/26/2013     Dyspnea 8/27/2013     Eyelid edema 12/15/2011    side effect of gastric cancer medication      GERD (gastroesophageal reflux disease) 10/8/2013     GIST (gastrointestinal stromal tumor), malignant (H) 5/10/2011      Graves disease      Grief reaction 5/11/2009     History of lung cancer 12/15/2011    S/p resection of right lung.  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     Other chronic pain     back     PONV (postoperative nausea and vomiting)      Postsurgical aortocoronary bypass status 7/4/2012     S/P CABG x 4 8/5/2012     Strabismus      Tobacco use disorder     Quit     Unspecified essential hypertension      Past Surgical History:   Procedure Laterality Date     BYPASS GRAFT ARTERY CORONARY  6/14/2012    Procedure: BYPASS GRAFT ARTERY CORONARY;  Median Sternotomy Coronary Artery Bypass Graft  x 3        C THORACOSCOPY,DX W BX  fall 2003    benign tissue     CATARACT IOL, RT/LT      LE     CHOLECYSTECTOMY  1963     COLONOSCOPY  4-12-05     CORONARY ARTERY BYPASS      X4     CREATION PERICARDIAL WINDOW  6/15/2012    Procedure: CREATION PERICARDIAL WINDOW;  Mediastinal Exploration, Control of Bleeding;  Surgeon: Dwaine Griffin MD;  Location: [facility] OR     EYE SURGERY  2014    left cataract removal     GI SURGERY  2003 and 2007    GIST tumor removal     GYN SURGERY      tubal ligation     HYSTERECTOMY VAGINAL, COLPORRHAPHY ANTERIOR, POSTERIOR, COMBINED N/A 10/17/2017    Procedure: COMBINED HYSTERECTOMY VAGINAL, COLPORRHAPHY ANTERIOR, POSTERIOR;  Total Vaginal Hysterectomy and Posterior Repair,Sacrospinous Vault Suspension;  Surgeon: Ruth Farooq MD;  Location: [facility] OR     PHACOEMULSIFICATION WITH STANDARD INTRAOCULAR LENS IMPLANT  3/24/2014    Procedure: PHACOEMULSIFICATION WITH STANDARD INTRAOCULAR LENS IMPLANT;  Left Belkys  Phacoemulsification with Intraocular Lens Implant;  Surgeon: Magnus Mckeon MD;  Location: [facility] OR     RECESSION RESECTION WITH ADJUSTABLE SUTURE BILATERAL Bilateral 2016    Procedure: RECESSION RESECTION WITH ADJUSTABLE SUTURE BILATERAL;  Surgeon: Erma Ordaz MD;  Location: [facility] OR     SURGICAL HISTORY OF -       cholecystectomy     SURGICAL HISTORY OF -       Tubal Ligation      SURGICAL HISTORY OF -       Explor. Lap, Excision of Small Bowel Tumor      SURGICAL HISTORY OF -   2010    lung cancer removed right lung     Family Hx:  Family History   Problem Relation Age of Onset     Heart Disease Mother      Osteoporosis Mother      Respiratory Mother         Emphezyma     Hypertension Mother      Heart Disease Father      Alcohol/Drug Father      Hypertension Father      Hypertension Maternal Grandmother      Hypertension Brother      Hypertension Sister      Arthritis Sister      Hypertension Son      Cancer Son         rectal   Father:  at age 58, heat attack, alcoholism  Mother:  at age 84, heart attack, emphysema  2 siblings: Sister has arthritis and has had multiple surgeries due to that, also has HTN.  Brother also has HTN  Thyroid disease: No         DM2: No         Autoimmune: there is no family history of DM1, SLE, RA, or Vitiligo     Social Hx:  Social History     Socioeconomic History     Marital status:      Spouse name: Not on file     Number of children: Not on file     Years of education: Not on file     Highest education level: Not on file   Social Needs     Financial resource strain: Not on file     Food insecurity - worry: Not on file     Food insecurity - inability: Not on file     Transportation needs - medical: Not on file     Transportation needs - non-medical: Not on file   Occupational History     Not on file   Tobacco Use     Smoking status: Former Smoker     Packs/day: 0.50     Years: 49.00     Pack years: 24.50     Types:  Cigarettes     Last attempt to quit: 2010     Years since quittin.8     Smokeless tobacco: Never Used   Substance and Sexual Activity     Alcohol use: No     Drug use: No     Sexual activity: Not Currently     Partners: Male     Comment:    Other Topics Concern      Service No     Blood Transfusions No     Caffeine Concern Yes     Comment: 3 cups     Occupational Exposure No     Hobby Hazards No     Sleep Concern No     Stress Concern No     Comment: son  and much happiness in her life, big burden lifted,      Weight Concern No     Special Diet No     Back Care Yes     Comment: lower back herniated disc       Exercise No     Bike Helmet No     Seat Belt Yes     Self-Exams Yes     Parent/sibling w/ CABG, MI or angioplasty before 65F 55M? 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   in .  Son (Rk, with wife Марина and grandson) lives next door -- quadriplegic and high functioning, cannot provide physical assistance/support.          MEDICATIONS:  Current Outpatient Medications   Medication     amLODIPine (NORVASC) 5 MG tablet     aspirin EC 81 MG EC tablet     Blood Pressure Monitoring (ADULT BLOOD PRESSURE CUFF LG) KIT     calcium-vitamin D-vitamin K (VIACTIV) 500-500-40 MG-UNT-MCG CHEW     fluticasone-salmeterol (ADVAIR DISKUS) 250-50 MCG/DOSE inhaler     hydrochlorothiazide (HYDRODIURIL) 25 MG tablet     imatinib (GLEEVEC) 400 MG tablet     lisinopril (PRINIVIL/ZESTRIL) 40 MG tablet     LORazepam (ATIVAN) 0.5 MG tablet     melatonin 5 MG tablet     methimazole (TAPAZOLE) 5 MG tablet     metoprolol tartrate (LOPRESSOR) 100 MG tablet     naproxen sodium (ANAPROX) 550 MG tablet     order for DME     polyethylene glycol (MIRALAX/GLYCOLAX) powder     rosuvastatin (CRESTOR) 5 MG tablet     cyclobenzaprine (FLEXERIL) 10 MG tablet     No current facility-administered medications for this visit.   "      ROS   ROS: 11 point ROS neg other than the symptoms noted above in the HPI.    Physical Exam   VS: BP 92/60   Pulse 87   Ht 1.6 m (5' 3\")   Wt 71.7 kg (158 lb)   LMP 01/01/1992   BMI 27.99 kg/m    GENERAL: NAD, well groomed, smiling. Voice is normal without hoarseness.   HEENT: OP clear, no exophthalmos, no proptosis, EOMI, no lig lag, no retraction, no scleral icterus.    THYROID: Thyroid is palpable, about 1.5X normal size.  A nodule is palpable on the lower aspect of the right thyroid lobe, this is similar to previous evaluation.   RESPIRATORY: Normal respiratory effort, normal breath sounds.   CARDIOVASCULAR: No peripheral edema, no murmurs  EXTREMITIES: no edema, no rashes, no myxedema  NEUROLOGY: CN grossly intact, again + tremor out of the outstretched hands (more significant on the right hand than on the left, worse with palms facing up). No dysmetria on finger-nose-finger, heel-sheen exam normal.    MSK: grossly intact  SKIN: no rashes, no lesions, no ulcers, skin warm and dry.   PSYCH: Intact judgment and insight. A&OX3 with a cordial affect.      LABS:  TFTs:  ENDO THYROID LABS-[facility] Latest Ref Rng & Units 12/7/2018   TSH 0.40 - 4.00 mU/L 1.02   T4 FREE 0.76 - 1.46 ng/dL 1.19   FREE T3 2.3 - 4.2 pg/mL    TRIIODOTHYRONINE(T3) 60 - 181 ng/dL 99   THYR PEROXIDASE PAPO <35 IU/mL        ENDO THYROID LABS-[facility] Latest Ref Rng & Units 6/20/2018   TSH 0.40 - 4.00 mU/L 0.78   T4 FREE 0.76 - 1.46 ng/dL 1.13   FREE T3 2.3 - 4.2 pg/mL    TRIIODOTHYRONINE(T3) 60 - 181 ng/dL 95   THYR PEROXIDASE PAPO <35 IU/mL    THYROID STIM IMMUNOG <=1.3 TSI index 4.4 (H)     The results above are on methimazole 2.5 mg per day    ENDO CALCIUM LABS-[facility] Latest Ref Rng & Units 10/12/2018 10/5/2018   CALCIUM 8.5 - 10.1 mg/dL 8.1 (L)    CALCIUM IONIZED 4.4 - 5.2 mg/dL     CALCIUM IONIZED WHOLE BLOOD 4.4 - 5.2 mg/dL     PHOSPHOROUS 2.5 - 4.5 mg/dL 3.3    MAGNESIUM mg/dL     ALBUMIN 3.4 - 5.0 g/dL 3.7    BUN 7 - 30 mg/dL 17  "   CREATININE 0.52 - 1.04 mg/dL 1.16 (H)    CREATININE 0.52 - 1.04 mg/dL  1.2 (H)   ALKPHOS 40 - 150 U/L 73    25 OH VIT D2 ug/L  <5   25 OH VIT D3 ug/L  23   25 OH VIT D TOTAL 20 - 75 ug/L  <28   PROTEIN, TOTAL 6.8 - 8.8 g/dL 7.0        US Thyroid:  EXAMINATION: US THYROID, 1/9/2018 9:30 AM      COMPARISON: 2/5/2014, 6/18/2012, 11/9/2007.     HISTORY: Hyperthyroidism     Technique: Grayscale and color ultrasound imaging of the thyroid was performed.     Findings:    Thyroid parenchyma: Several thyroid nodules described below, the thyroid parenchyma is otherwise homogeneous and unremarkable.  The right lobe of the thyroid measures: 2.1 x 2.2 x 5.7 cm   The thyroid isthmus measures: 0.3 cm   The left lobe of the thyroid measures: 1.6 x 1.6 x 3.8 cm      Right lobe:  Nodule 1: mid  Nodule measurement: 0.5 x 0.3 x 0.6 cm  Echogenicity: Hypoechoic  Consistency: Cystic/spongiform  Calcifications: no  Hypervascular: no  Interval growth (>20%): no     Nodule 2: Inferior  Nodule measurement: 1.5 x 1.8 x 2.1 cm  Echogenicity: Heterogeneous, predominantly isoechoic  Consistency: Mostly solid  Calcifications: no  Hypervascular: yes  Interval growth (>20%): Yes (previously 2.0 x 1.3 x 1.1 cm).    Isthmus:   No nodules identified.     Left Lobe:   Nodule 1: Superior  Nodule measurement: 0.5 x 0.3 x 0.7 cm  Echogenicity: Heterogeneous, predominantly hypoechoic  Consistency: spongiform  Calcifications: no  Hypervascular: Minimal internal vascularity  Interval growth (>20%): NA     Nodule 2: Mid  Nodule measurement: 0.3 x 0.2 x 0.4 cm  Echogenicity: Heterogeneous, predominantly hypoechoic  Consistency: spongiform  Calcifications: no  Hypervascular: no  Interval growth (>20%): NA     Nodule 3: Mid to inferior posteriorly  Nodule measurement: 0.5 x 0.3 x 1.0 cm  Echogenicity: Hypoechoic  Consistency: Cystic/spongiform  Calcifications: no  Hypervascular: no  Interval growth (>20%): NA     Impression:  Several thyroid nodules as  described above, the largest of which is exophytic along the posterior and inferior right thyroid lobe and measures up to 2.1 cm. This appears increased compared to 2014 and not significantly changed from 7/7/2017 CT given differences in technique. Consider further evaluation with fine-needle aspiration.         US THYROID 2/5/2014 10:06 AM    HISTORY: Hyperthyroid.    COMPARISON: None.    FINDINGS: The right lobe of the thyroid gland measures 5.6 x 1.4 x 1.5 cm. The left lobe of the thyroid gland measures 3.5 x 1.6 x 1.5 cm. The isthmus measures 0.2 cm.  There is a somewhat ill-defined hypoechoic nodule in the mid to upper pole of the right lobe of the thyroid gland that measures 0.4 x 0.4 x 0.3 cm. This has calcifications within it. There is a predominantly solid nodule at the posterior margin of the lower pole of the right lobe of the thyroid gland that measures 2.0 x 1.3 x 1.1 cm. This nodule shows no significant change in size in retrospect from prior chest CTs dating back to 9/11/2012. This stability is supportive evidence for a benign entity.    Uptake and Scan: 2/28/2014  The uptake at 24 hours was measured at 19 %. The normal range at 24 hours is from 10 to 30%. Uptake on right: 12 %, Uptake on Left: 6.8 %.    Total computer estimated weight of the gland: 44.8 kg, Right gland weight: 29.9 kg, Left gland weight: 14.9 kg.    Images of the gland demonstrates normal appearance of the right and left lobes. No additional findings. No autonomous nodules were identified.    Copath Report 02/07/2018 10:54 AM       Patient Name: PIEDAD FAN   MR#: [id number]   Specimen #: [id number]   Collected: 2/7/2018   Received: 2/7/2018   Reported: 2/8/2018 10:13   Ordering Phy(s): EVELYN NEGRETE     For improved result formatting, select 'View Enhanced Report Format' under  Linked Documents section.     SPECIMEN/STAIN PROCESS:   FNA-thyroid, Right Inferior (1.5x1.8x2.1 cm)        Pap-Cyto x 3, Diff Quick  Stain-cyto x 3     ----------------------------------------------------------------     CYTOLOGIC INTERPRETATION:     Thyroid, Right Inferior, Ultrasound-Guided Fine Needle Aspiration:   Benign   Consistent with a benign nodule (includes adenomatoid nodule, colloid nodule, etc.)   Specimen Adequacy: Satisfactory for evaluation.     The [place] implied risk of malignancy and recommended clinical management:   Benign has a 0-3% risk of malignancy, recommended management is clinical follow-up     I have personally reviewed all specimens and/or slides, including the listed special stains, and used them   with my medical judgement to determine or confirm the final diagnosis.     Electronically signed out by:   Akhil Dubon M.D., [facility]     Processed and screened at [facility]     CLINICAL HISTORY:   The patient is a 72-year-old female with a history of thyroid dysfunction (diagnosed 2/2014) and lung cancer (diagnosed 2010). She now has a 1.5 x 1.8 x 2.1 cm right inferior thyroid nodule.     GROSS:   FNA-thyroid, Right Inferior (1.5 x 1.8 x 2.1 cm): Received are 3 fixed slides, processed for Pap stain, and 3 air dried slides, processed for Diff Quik stain. Afirma tube held.     INTRAOPERATIVE CONSULTATION:   FNA Performance: Fine needle aspiration was not performed by [facility],  Pathology staff.   Immediate Adequacy: On site specimen adequacy evaluation was performed by Dr. BHAVANI Epperson MD via telepathology.     Onsite adequacy/interpretation:   Pass A1 adequate. Pass A2 put directly into Afirma tube. Pass A3-A4  adequate.     MICROSCOPIC:   Microscopic examination performed.     Malachi Fang MD, Cytopathology Fellow          Akhil Dubon MD        All pertinent notes, labs, and images personally reviewed by me.        Assessment and Plan:  Ms.Suzanne TIERRA Abdul is a very pleasant 73 year old female here for the management of thyroid dysfunction.     1.  Graves' Disease vs. Gleevec induced Hyperthyroidism.  The positive TSI and eye disease suggest this is Graves' disease.  Thyroid dysfunction (usually hypothyroidism) but also hyperthyroidism have been reported with Gleevec.  Ms. Abdul is clinically euthyroid. She is currently on methimazole 2.5 mg and she is clinically and euthyroid. As TSI remains elevated (4.4 in 6/2018), it is unlikely she will be able to completely discontinue methimazole and remain euthyroid. She will continue current methimazole dose for now.    2. Thyroid Nodule.  Ms. Abdul has thyroid nodules. US re-demonstrated nodules, one of which was concerning for growth.  We perform an US-guided FNA biopsy which resulted benign.  We will plan for follow-up US in 6-12 months.    3. 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  Eye symptoms significantly improved after surgery and she no longer has diplopia. She continues presenting lacrimation, and she is followed by ophthalmology.  If she is to receive radioiodine to treat her hyperthyroidism, we will discuss use of steroids with opthalmology.     4.  Osteoporosis: Mrs. Abdul has a negative T-score of 3.5.  The patient had multiple risk factors for osteoporosis, including  race, cancer, smoking (former), reduced physical activity, and history of hyperthyroidism.  Her vitamin D levels were low in the past.  This might be in part related to absorption issues.  The patient will increase her vitamin D replacement and we will restart start Fosamax. Instructions on Fosamax use and side effects - particularly esophageal adverse events - are carefully reviewed with her. This drug must be taken upon arising for the day on an empty stomach, with a large 6-8 ounce glass of water; she must remain NPO in the upright position for at least 30 minutes afterwards and until after the first food of the day. If esophageal irritation is noted, she will stop the drug and call my  office.      No orders of the defined types were placed in this encounter.    I will contact the patient with the test results and recommendations.  Follow up in 6 months    Zenia Antoine MD PhD    Division of Endocrinology and Diabetes

## 2019-02-19 NOTE — LETTER
2/19/2019       RE: Idalmis Abdul  [address]  [facility] [address]     Dear Colleague,    Thank you for referring your patient, Idalmis Abdul, to the [facility] ENDOCRINOLOGY at [facility]. Please see a copy of my visit note below.        AgEndocrinology and diabetes outpatient clinic  Name: Idalmis Abdul  HPI:  Ms. Abdul is a very pleasant to 73-year-old female patient with thyroid dysfunction diagnosed in Feb 2014.  Mrs. 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   Thyroid dysfunction: Briefly, Ms. Abdul reported thyroid function studies were abnormal since August 2013. The patient was initially seen by Dr. Rama Coburn (February 2014).  Hyperthyroidism was confirmed, and a thyroid uptake and scan prior to starting therapy showed an homogenous uptake of 19%.  This has been managed with methimazole.  She is currently on methimazole 2.5 mg daily.  She has been on this same dose since February 14, 2018.  She feels well and has no symptoms suggestive of hypo-or hyperthyroidism. She denies palpitations, tremors or changes in her bowel movements. She has chronic constipation, likely related to chronic oxycodone use (for back pain). She takes senna docusate to help with her constipation. She had eye surgery in 7/2016, and that has helped with her diplopia.   I have previously reviewed the patient's medical records, that showed that Ms. Abdul actually had a suppressed TSH and a positive TSI in June 2012.  She reports she was at the hospital at that time due to heart issues (atrial fibrillation).  It seems the patient was seen by the endocrinology consult team at that time, but she didn't have any follow-up as an outpatient.   Osteoporosis: A DEXA scan done in 2014 showed a negative T score of 2.8.  More recent DEXA scan in December 2018 showed lowest T score - 3.5, on patient's wrist.  Gastrointestinal stromal  tumor:  Ms. Abdul previous medical history includes a gastrointestinal stromal tumor diagnosed in 2003. The tumor was surgically removed  She was on Gleevec for 1 year, then off for 3 years, and had a recurrence in 2007, with 3 new tumors.  She has continuously been on Gleevec since then, and the plans are for her to be on it long term, as her tumor has been stable while on it.  She continues to follow with Dr. Schmitz in Oncology, and has a CT armenta with contrast every 6 months.   Lung cancer diagnosed in 2010.  She is a former smoker, quitting in 2010.  Perforated bowel: She also had a perforated bowel mid 2014 that was managed conservatively, and lost renal function during that hospital admission, likely related to antibiotic use.  Her GFR has somewhat recovered and is now around 45 mL/min  Coronary artery disease: Ms. Abdul had quadruple bypass in 2012. She has HTN, and is on multiple blood pressure medications.  She is also on Rosuvastatin.  Mrs. Abdul underwent a total vaginal hysterectomy, A & P repair and Sacrospinous vault suspension,  total vaginal hysterectomy, for uterovaginal prolapse, in 10/17/17. She is followed by Dr. Farooq in OB/Gyn.   She has also had a L4-L5 fusion with laminectomy in April 2018.  This was a 6 hours long surgery and she is doing well.  She is feeling very well today and she is very happy she is going to be visiting with her son in [place] next week.  PMH/PSH:  Past Medical History:   Diagnosis Date     ASCVD (arteriosclerotic cardiovascular disease) 6/14/2012     Benign neoplasm of duodenum, jejunum, and ileum 2003, 2007    Gastrointestinal Stromal Tumor, seen at [facility], Dr. Schmitz, GI oncology-Gleevec treatment.  Surgical removal in fall 2004-no recurrence as of 3/05.     CA - lung cancer 4/2010    [facility], treated surgically     choledochal cyst 1963    CHOLEDOCHAL CYST, mild dilatation of biliary system     Coronary artery disease      DDD (degenerative disc disease),  lumbar 3/22/2018     Dislocated finger, left middle PIP 7/26/2013     Dyspnea 8/27/2013     Eyelid edema 12/15/2011    side effect of gastric cancer medication      GERD (gastroesophageal reflux disease) 10/8/2013     GIST (gastrointestinal stromal tumor), malignant (H) 5/10/2011     Graves disease      Grief reaction 5/11/2009     History of lung cancer 12/15/2011    S/p resection of right lung.  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     Other chronic pain     back     PONV (postoperative nausea and vomiting)      Postsurgical aortocoronary bypass status 7/4/2012     S/P CABG x 4 8/5/2012     Strabismus      Tobacco use disorder     Quit     Unspecified essential hypertension      Past Surgical History:   Procedure Laterality Date     BYPASS GRAFT ARTERY CORONARY  6/14/2012    Procedure: BYPASS GRAFT ARTERY CORONARY;  Median Sternotomy Coronary Artery Bypass Graft  x 3        C THORACOSCOPY,DX W BX  fall 2003    benign tissue     CATARACT IOL, RT/LT      LE     CHOLECYSTECTOMY  1963     COLONOSCOPY  4-12-05     CORONARY ARTERY BYPASS      X4     CREATION PERICARDIAL WINDOW  6/15/2012    Procedure: CREATION PERICARDIAL WINDOW;  Mediastinal Exploration, Control of Bleeding;  Surgeon: Dwaine Griffin MD;  Location: [facility] OR     EYE SURGERY  2014    left cataract removal     GI SURGERY  2003 and 2007    GIST tumor removal     GYN SURGERY      tubal ligation     HYSTERECTOMY VAGINAL, COLPORRHAPHY ANTERIOR, POSTERIOR, COMBINED N/A 10/17/2017    Procedure: COMBINED HYSTERECTOMY VAGINAL, COLPORRHAPHY ANTERIOR, POSTERIOR;  Total Vaginal  Hysterectomy and Posterior Repair,Sacrospinous Vault Suspension;  Surgeon: Ruth Farooq MD;  Location: [facility] OR     PHACOEMULSIFICATION WITH STANDARD INTRAOCULAR LENS IMPLANT  3/24/2014    Procedure: PHACOEMULSIFICATION WITH STANDARD INTRAOCULAR LENS IMPLANT;  Left Kelman Phacoemulsification with Intraocular Lens Implant;  Surgeon: Magnus Mckeon MD;  Location: [facility] OR     RECESSION RESECTION WITH ADJUSTABLE SUTURE BILATERAL Bilateral 2016    Procedure: RECESSION RESECTION WITH ADJUSTABLE SUTURE BILATERAL;  Surgeon: Erma Ordaz MD;  Location: [facility] OR     SURGICAL HISTORY OF -       cholecystectomy     SURGICAL HISTORY OF -       Tubal Ligation      SURGICAL HISTORY OF -       Explor. Lap, Excision of Small Bowel Tumor      SURGICAL HISTORY OF -   2010    lung cancer removed right lung     Family Hx:  Family History   Problem Relation Age of Onset     Heart Disease Mother      Osteoporosis Mother      Respiratory Mother         Emphezyma     Hypertension Mother      Heart Disease Father      Alcohol/Drug Father      Hypertension Father      Hypertension Maternal Grandmother      Hypertension Brother      Hypertension Sister      Arthritis Sister      Hypertension Son      Cancer Son         rectal   Father:  at age 58, heat attack, alcoholism  Mother:  at age 84, heart attack, emphysema  2 siblings: Sister has arthritis and has had multiple surgeries due to that, also has HTN.  Brother also has HTN  Thyroid disease: No         DM2: No         Autoimmune: there is no family history of DM1, SLE, RA, or Vitiligo     Social Hx:  Social History     Socioeconomic History     Marital status:      Spouse name: Not on file     Number of children: Not on file     Years of education: Not on file     Highest education level: Not on file   Social Needs     Financial resource strain: Not on file     Food insecurity - worry: Not on file     Food insecurity -  inability: Not on file     Transportation needs - medical: Not on file     Transportation needs - non-medical: Not on file   Occupational History     Not on file   Tobacco Use     Smoking status: Former Smoker     Packs/day: 0.50     Years: 49.00     Pack years: 24.50     Types: Cigarettes     Last attempt to quit: 2010     Years since quittin.8     Smokeless tobacco: Never Used   Substance and Sexual Activity     Alcohol use: No     Drug use: No     Sexual activity: Not Currently     Partners: Male     Comment:    Other Topics Concern      Service No     Blood Transfusions No     Caffeine Concern Yes     Comment: 3 cups     Occupational Exposure No     Hobby Hazards No     Sleep Concern No     Stress Concern No     Comment: son  and much happiness in her life, big burden lifted,      Weight Concern No     Special Diet No     Back Care Yes     Comment: lower back herniated disc       Exercise No     Bike Helmet No     Seat Belt Yes     Self-Exams Yes     Parent/sibling w/ CABG, MI or angioplasty before 65F 55M? 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   in .  Son (Rk, with wife Марина and grandson) lives next door -- quadriplegic and high functioning, cannot provide physical assistance/support.          MEDICATIONS:  Current Outpatient Medications   Medication     amLODIPine (NORVASC) 5 MG tablet     aspirin EC 81 MG EC tablet     Blood Pressure Monitoring (ADULT BLOOD PRESSURE CUFF LG) KIT     calcium-vitamin D-vitamin K (VIACTIV) 500-500-40 MG-UNT-MCG CHEW     fluticasone-salmeterol (ADVAIR DISKUS) 250-50 MCG/DOSE inhaler     hydrochlorothiazide (HYDRODIURIL) 25 MG tablet     imatinib (GLEEVEC) 400 MG tablet     lisinopril (PRINIVIL/ZESTRIL) 40 MG tablet     LORazepam (ATIVAN) 0.5 MG tablet     melatonin 5 MG tablet     methimazole (TAPAZOLE) 5 MG tablet     metoprolol tartrate  "(LOPRESSOR) 100 MG tablet     naproxen sodium (ANAPROX) 550 MG tablet     order for DME     polyethylene glycol (MIRALAX/GLYCOLAX) powder     rosuvastatin (CRESTOR) 5 MG tablet     cyclobenzaprine (FLEXERIL) 10 MG tablet     No current facility-administered medications for this visit.        ROS   ROS: 11 point ROS neg other than the symptoms noted above in the HPI.    Physical Exam   VS: BP 92/60   Pulse 87   Ht 1.6 m (5' 3\")   Wt 71.7 kg (158 lb)   LMP 01/01/1992   BMI 27.99 kg/m     GENERAL: NAD, well groomed, smiling. Voice is normal without hoarseness.   HEENT: OP clear, no exophthalmos, no proptosis, EOMI, no lig lag, no retraction, no scleral icterus.    THYROID: Thyroid is palpable, about 1.5X normal size.  A nodule is palpable on the lower aspect of the right thyroid lobe, this is similar to previous evaluation.   RESPIRATORY: Normal respiratory effort, normal breath sounds.   CARDIOVASCULAR: No peripheral edema, no murmurs  EXTREMITIES: no edema, no rashes, no myxedema  NEUROLOGY: CN grossly intact, again + tremor out of the outstretched hands (more significant on the right hand than on the left, worse with palms facing up). No dysmetria on finger-nose-finger, heel-sheen exam normal.    MSK: grossly intact  SKIN: no rashes, no lesions, no ulcers, skin warm and dry.   PSYCH: Intact judgment and insight. A&OX3 with a cordial affect.      LABS:  TFTs:  ENDO THYROID LABS-[facility] Latest Ref Rng & Units 12/7/2018   TSH 0.40 - 4.00 mU/L 1.02   T4 FREE 0.76 - 1.46 ng/dL 1.19   FREE T3 2.3 - 4.2 pg/mL    TRIIODOTHYRONINE(T3) 60 - 181 ng/dL 99   THYR PEROXIDASE PAPO <35 IU/mL        ENDO THYROID LABS-[facility] Latest Ref Rng & Units 6/20/2018   TSH 0.40 - 4.00 mU/L 0.78   T4 FREE 0.76 - 1.46 ng/dL 1.13   FREE T3 2.3 - 4.2 pg/mL    TRIIODOTHYRONINE(T3) 60 - 181 ng/dL 95   THYR PEROXIDASE PAPO <35 IU/mL    THYROID STIM IMMUNOG <=1.3 TSI index 4.4 (H)     The results above are on methimazole 2.5 mg per day    ENDO CALCIUM " LABS-P Latest Ref Rng & Units 10/12/2018 10/5/2018   CALCIUM 8.5 - 10.1 mg/dL 8.1 (L)    CALCIUM IONIZED 4.4 - 5.2 mg/dL     CALCIUM IONIZED WHOLE BLOOD 4.4 - 5.2 mg/dL     PHOSPHOROUS 2.5 - 4.5 mg/dL 3.3    MAGNESIUM mg/dL     ALBUMIN 3.4 - 5.0 g/dL 3.7    BUN 7 - 30 mg/dL 17    CREATININE 0.52 - 1.04 mg/dL 1.16 (H)    CREATININE 0.52 - 1.04 mg/dL  1.2 (H)   ALKPHOS 40 - 150 U/L 73    25 OH VIT D2 ug/L  <5   25 OH VIT D3 ug/L  23   25 OH VIT D TOTAL 20 - 75 ug/L  <28   PROTEIN, TOTAL 6.8 - 8.8 g/dL 7.0        US Thyroid:  EXAMINATION: US THYROID, 1/9/2018 9:30 AM      COMPARISON: 2/5/2014, 6/18/2012, 11/9/2007.     HISTORY: Hyperthyroidism     Technique: Grayscale and color ultrasound imaging of the thyroid was performed.     Findings:    Thyroid parenchyma: Several thyroid nodules described below, the thyroid parenchyma is otherwise homogeneous and unremarkable.  The right lobe of the thyroid measures: 2.1 x 2.2 x 5.7 cm   The thyroid isthmus measures: 0.3 cm   The left lobe of the thyroid measures: 1.6 x 1.6 x 3.8 cm      Right lobe:  Nodule 1: mid  Nodule measurement: 0.5 x 0.3 x 0.6 cm  Echogenicity: Hypoechoic  Consistency: Cystic/spongiform  Calcifications: no  Hypervascular: no  Interval growth (>20%): no     Nodule 2: Inferior  Nodule measurement: 1.5 x 1.8 x 2.1 cm  Echogenicity: Heterogeneous, predominantly isoechoic  Consistency: Mostly solid  Calcifications: no  Hypervascular: yes  Interval growth (>20%): Yes (previously 2.0 x 1.3 x 1.1 cm).    Isthmus:   No nodules identified.     Left Lobe:   Nodule 1: Superior  Nodule measurement: 0.5 x 0.3 x 0.7 cm  Echogenicity: Heterogeneous, predominantly hypoechoic  Consistency: spongiform  Calcifications: no  Hypervascular: Minimal internal vascularity  Interval growth (>20%): NA     Nodule 2: Mid  Nodule measurement: 0.3 x 0.2 x 0.4 cm  Echogenicity: Heterogeneous, predominantly hypoechoic  Consistency: spongiform  Calcifications: no  Hypervascular:  no  Interval growth (>20%): NA     Nodule 3: Mid to inferior posteriorly  Nodule measurement: 0.5 x 0.3 x 1.0 cm  Echogenicity: Hypoechoic  Consistency: Cystic/spongiform  Calcifications: no  Hypervascular: no  Interval growth (>20%): NA     Impression:  Several thyroid nodules as described above, the largest of which is exophytic along the posterior and inferior right thyroid lobe and measures up to 2.1 cm. This appears increased compared to 2014 and not significantly changed from 7/7/2017 CT given differences in technique. Consider further evaluation with fine-needle aspiration.         US THYROID 2/5/2014 10:06 AM    HISTORY: Hyperthyroid.    COMPARISON: None.    FINDINGS: The right lobe of the thyroid gland measures 5.6 x 1.4 x 1.5 cm. The left lobe of the thyroid gland measures 3.5 x 1.6 x 1.5 cm. The isthmus measures 0.2 cm.  There is a somewhat ill-defined hypoechoic nodule in the mid to upper pole of the right lobe of the thyroid gland that measures 0.4 x 0.4 x 0.3 cm. This has calcifications within it. There is a predominantly solid nodule at the posterior margin of the lower pole of the right lobe of the thyroid gland that measures 2.0 x 1.3 x 1.1 cm. This nodule shows no significant change in size in retrospect from prior chest CTs dating back to 9/11/2012. This stability is supportive evidence for a benign entity.    Uptake and Scan: 2/28/2014  The uptake at 24 hours was measured at 19 %. The normal range at 24 hours is from 10 to 30%. Uptake on right: 12 %, Uptake on Left: 6.8 %.    Total computer estimated weight of the gland: 44.8 kg, Right gland weight: 29.9 kg, Left gland weight: 14.9 kg.    Images of the gland demonstrates normal appearance of the right and left lobes. No additional findings. No autonomous nodules were identified.    Copath Report 02/07/2018 10:54 AM       Patient Name: PIDEAD FAN   MR#: [id number]   Specimen #: [id number]   Collected: 2/7/2018   Received: 2/7/2018    Reported: 2/8/2018 10:13   Ordering Phy(s): EVELYN NEGRETE     For improved result formatting, select 'View Enhanced Report Format' under  Linked Documents section.     SPECIMEN/STAIN PROCESS:   FNA-thyroid, Right Inferior (1.5x1.8x2.1 cm)        Pap-Cyto x 3, Diff Quick Stain-cyto x 3     ----------------------------------------------------------------     CYTOLOGIC INTERPRETATION:     Thyroid, Right Inferior, Ultrasound-Guided Fine Needle Aspiration:   Benign   Consistent with a benign nodule (includes adenomatoid nodule, colloid nodule, etc.)   Specimen Adequacy: Satisfactory for evaluation.     The [place] implied risk of malignancy and recommended clinical management:   Benign has a 0-3% risk of malignancy, recommended management is clinical follow-up     I have personally reviewed all specimens and/or slides, including the listed special stains, and used them   with my medical judgement to determine or confirm the final diagnosis.     Electronically signed out by:   Akhil Dubon M.D., [facility]     Processed and screened at [facility]     CLINICAL HISTORY:   The patient is a 72-year-old female with a history of thyroid dysfunction (diagnosed 2/2014) and lung cancer (diagnosed 2010). She now has a 1.5 x 1.8 x 2.1 cm right inferior thyroid nodule.     GROSS:   FNA-thyroid, Right Inferior (1.5 x 1.8 x 2.1 cm): Received are 3 fixed slides, processed for Pap stain, and 3 air dried slides, processed for Diff Quik stain. Afirma tube held.     INTRAOPERATIVE CONSULTATION:   FNA Performance: Fine needle aspiration was not performed by [facility],  Pathology staff.   Immediate Adequacy: On site specimen adequacy evaluation was performed by Dr. BHAVANI Epperson MD via telepathology.     Onsite adequacy/interpretation:   Pass A1 adequate. Pass A2 put directly into Afirma tube. Pass A3-A4  adequate.     MICROSCOPIC:   Microscopic examination  performed.     Malachi Fang MD, Cytopathology Fellow          Akhil Dubon MD        All pertinent notes, labs, and images personally reviewed by me.        Assessment and Plan:  Ms.Suzanne TIERRA Abdul is a very pleasant 73 year old female here for the management of thyroid dysfunction.     1. Graves' Disease vs. Gleevec induced Hyperthyroidism.  The positive TSI and eye disease suggest this is Graves' disease.  Thyroid dysfunction (usually hypothyroidism) but also hyperthyroidism have been reported with Gleevec.  Ms. Abdul is clinically euthyroid. She is currently on methimazole 2.5 mg and she is clinically and euthyroid. As TSI remains elevated (4.4 in 6/2018), it is unlikely she will be able to completely discontinue methimazole and remain euthyroid. She will continue current methimazole dose for now.    2. Thyroid Nodule.  Ms. Abdul has thyroid nodules. US re-demonstrated nodules, one of which was concerning for growth.  We perform an US-guided FNA biopsy which resulted benign.  We will plan for follow-up US in 6-12 months.    3. 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  Eye symptoms significantly improved after surgery and she no longer has diplopia. She continues presenting lacrimation, and she is followed by ophthalmology.  If she is to receive radioiodine to treat her hyperthyroidism, we will discuss use of steroids with opthalmology.     4.  Osteoporosis: Mrs. Abdul has a negative T-score of 3.5.  The patient had multiple risk factors for osteoporosis, including  race, cancer, smoking (former), reduced physical activity, and history of hyperthyroidism.  Her vitamin D levels were low in the past.  This might be in part related to absorption issues.  The patient will increase her vitamin D replacement and we will restart start Fosamax. Instructions on Fosamax use and side effects - particularly esophageal adverse events - are carefully reviewed with her. This drug must be  taken upon arising for the day on an empty stomach, with a large 6-8 ounce glass of water; she must remain NPO in the upright position for at least 30 minutes afterwards and until after the first food of the day. If esophageal irritation is noted, she will stop the drug and call my office.      No orders of the defined types were placed in this encounter.    I will contact the patient with the test results and recommendations.  Follow up in 6 months    Zenia Antoine MD PhD    Division of Endocrinology and Diabetes

## 2019-02-21 DIAGNOSIS — C49.A0 GIST (GASTROINTESTINAL STROMAL TUMOR), MALIGNANT (H): Primary | ICD-10-CM

## 2019-02-21 RX ORDER — IMATINIB MESYLATE 400 MG/1
400 TABLET, FILM COATED ORAL DAILY
Qty: 30 TABLET | Refills: 2 | Status: SHIPPED | OUTPATIENT
Start: 2019-02-21 | End: 2019-05-23

## 2019-02-21 RX ORDER — IMATINIB MESYLATE 400 MG/1
400 TABLET, FILM COATED ORAL DAILY
Qty: 30 TABLET | Refills: 2 | Status: CANCELLED | OUTPATIENT
Start: 2019-02-21

## 2019-03-26 DIAGNOSIS — C49.A0 MALIGNANT GASTROINTESTINAL STROMAL TUMOR, UNSPECIFIED SITE (H): ICD-10-CM

## 2019-03-26 RX ORDER — LORAZEPAM 0.5 MG/1
0.5 TABLET ORAL AT BEDTIME
Qty: 90 TABLET | Refills: 1
Start: 2019-03-26 | End: 2019-09-27

## 2019-03-26 NOTE — TELEPHONE ENCOUNTER
Refill request received via fax for Lorazepam 0.5mg tablets. Writer discussed with Dr. Schmitz. Per Dr. Schmitz, OK to refill #[phone] with 1 refill. Writer called directly to pharmacist as requested.     Geraldine Armendariz RN   [facility]

## 2019-04-05 ENCOUNTER — HOSPITAL ENCOUNTER (OUTPATIENT)
Dept: CT IMAGING | Facility: CLINIC | Age: 74
Discharge: HOME | End: 2019-04-05
Attending: INTERNAL MEDICINE | Admitting: INTERNAL MEDICINE
Payer: MEDICARE

## 2019-04-05 DIAGNOSIS — C49.A0 MALIGNANT GASTROINTESTINAL STROMAL TUMOR, UNSPECIFIED SITE (H): ICD-10-CM

## 2019-04-05 DIAGNOSIS — Z85.118 HISTORY OF LUNG CANCER: ICD-10-CM

## 2019-04-05 DIAGNOSIS — C49.A0 GIST (GASTROINTESTINAL STROMAL TUMOR), MALIGNANT (H): ICD-10-CM

## 2019-04-05 LAB
ALBUMIN SERPL-MCNC: 3.6 G/DL (ref 3.4–5)
ALP SERPL-CCNC: 62 U/L (ref 40–150)
ALT SERPL W P-5'-P-CCNC: 26 U/L (ref 0–50)
ANION GAP SERPL CALCULATED.3IONS-SCNC: 6 MMOL/L (ref 3–14)
AST SERPL W P-5'-P-CCNC: 35 U/L (ref 0–45)
BASOPHILS # BLD AUTO: 0 10E9/L (ref 0–0.2)
BASOPHILS NFR BLD AUTO: 0.7 %
BILIRUB SERPL-MCNC: 0.4 MG/DL (ref 0.2–1.3)
BUN SERPL-MCNC: 22 MG/DL (ref 7–30)
CALCIUM SERPL-MCNC: 8.7 MG/DL (ref 8.5–10.1)
CHLORIDE SERPL-SCNC: 103 MMOL/L (ref 94–109)
CO2 SERPL-SCNC: 28 MMOL/L (ref 20–32)
CREAT BLD-MCNC: 1.2 MG/DL (ref 0.52–1.04)
CREAT SERPL-MCNC: 1.15 MG/DL (ref 0.52–1.04)
DIFFERENTIAL METHOD BLD: ABNORMAL
EOSINOPHIL # BLD AUTO: 0.1 10E9/L (ref 0–0.7)
EOSINOPHIL NFR BLD AUTO: 2.5 %
ERYTHROCYTE [DISTWIDTH] IN BLOOD BY AUTOMATED COUNT: 15.3 % (ref 10–15)
GFR SERPL CREATININE-BSD FRML MDRD: 44 ML/MIN/{1.73_M2}
GFR SERPL CREATININE-BSD FRML MDRD: 47 ML/MIN/{1.73_M2}
GLUCOSE SERPL-MCNC: 105 MG/DL (ref 70–99)
HCT VFR BLD AUTO: 31.4 % (ref 35–47)
HGB BLD-MCNC: 10.3 G/DL (ref 11.7–15.7)
LYMPHOCYTES # BLD AUTO: 1.1 10E9/L (ref 0.8–5.3)
LYMPHOCYTES NFR BLD AUTO: 20.5 %
MCH RBC QN AUTO: 29.9 PG (ref 26.5–33)
MCHC RBC AUTO-ENTMCNC: 32.8 G/DL (ref 31.5–36.5)
MCV RBC AUTO: 91 FL (ref 78–100)
MONOCYTES # BLD AUTO: 1 10E9/L (ref 0–1.3)
MONOCYTES NFR BLD AUTO: 18.5 %
NEUTROPHILS # BLD AUTO: 3.2 10E9/L (ref 1.6–8.3)
NEUTROPHILS NFR BLD AUTO: 57.8 %
PHOSPHATE SERPL-MCNC: 3.4 MG/DL (ref 2.5–4.5)
PLATELET # BLD AUTO: 195 10E9/L (ref 150–450)
POTASSIUM SERPL-SCNC: 3.9 MMOL/L (ref 3.4–5.3)
PROT SERPL-MCNC: 6.2 G/DL (ref 6.8–8.8)
RBC # BLD AUTO: 3.45 10E12/L (ref 3.8–5.2)
SODIUM SERPL-SCNC: 137 MMOL/L (ref 133–144)
WBC # BLD AUTO: 5.5 10E9/L (ref 4–11)

## 2019-04-05 PROCEDURE — 80053 COMPREHEN METABOLIC PANEL: CPT | Performed by: INTERNAL MEDICINE

## 2019-04-05 PROCEDURE — 82565 ASSAY OF CREATININE: CPT | Mod: 91

## 2019-04-05 PROCEDURE — 84100 ASSAY OF PHOSPHORUS: CPT | Performed by: INTERNAL MEDICINE

## 2019-04-05 PROCEDURE — 85025 COMPLETE CBC W/AUTO DIFF WBC: CPT | Performed by: INTERNAL MEDICINE

## 2019-04-05 PROCEDURE — 74177 CT ABD & PELVIS W/CONTRAST: CPT

## 2019-04-05 PROCEDURE — 71260 CT THORAX DX C+: CPT

## 2019-04-05 PROCEDURE — 25000125 ZZHC RX 250: Performed by: RADIOLOGY

## 2019-04-05 PROCEDURE — 25000128 H RX IP 250 OP 636: Performed by: RADIOLOGY

## 2019-04-05 PROCEDURE — 36415 COLL VENOUS BLD VENIPUNCTURE: CPT | Performed by: INTERNAL MEDICINE

## 2019-04-05 RX ORDER — IOPAMIDOL 755 MG/ML
77 INJECTION, SOLUTION INTRAVASCULAR ONCE
Status: COMPLETED | OUTPATIENT
Start: 2019-04-05 | End: 2019-04-05

## 2019-04-05 RX ADMIN — SODIUM CHLORIDE 59 ML: 9 INJECTION, SOLUTION INTRAVENOUS at 10:45

## 2019-04-05 RX ADMIN — IOPAMIDOL 77 ML: 755 INJECTION, SOLUTION INTRAVENOUS at 10:45

## 2019-04-29 ENCOUNTER — ONCOLOGY VISIT (OUTPATIENT)
Dept: ONCOLOGY | Facility: CLINIC | Age: 74
End: 2019-04-29
Attending: INTERNAL MEDICINE
Payer: MEDICARE

## 2019-04-29 VITALS
HEART RATE: 74 BPM | OXYGEN SATURATION: 94 % | DIASTOLIC BLOOD PRESSURE: 72 MMHG | BODY MASS INDEX: 27.82 KG/M2 | RESPIRATION RATE: 16 BRPM | HEIGHT: 63 IN | WEIGHT: 157 LBS | TEMPERATURE: 97.4 F | SYSTOLIC BLOOD PRESSURE: 124 MMHG

## 2019-04-29 DIAGNOSIS — Z85.118 HISTORY OF LUNG CANCER: ICD-10-CM

## 2019-04-29 DIAGNOSIS — C49.A0 MALIGNANT GASTROINTESTINAL STROMAL TUMOR, UNSPECIFIED SITE (H): Primary | ICD-10-CM

## 2019-04-29 PROCEDURE — G0463 HOSPITAL OUTPT CLINIC VISIT: HCPCS | Mod: ZF

## 2019-04-29 PROCEDURE — 99214 OFFICE O/P EST MOD 30 MIN: CPT | Mod: ZP | Performed by: INTERNAL MEDICINE

## 2019-04-29 ASSESSMENT — PAIN SCALES - GENERAL: PAINLEVEL: NO PAIN (0)

## 2019-04-29 ASSESSMENT — MIFFLIN-ST. JEOR: SCORE: 1181.28

## 2019-04-29 NOTE — PROGRESS NOTES
Idalmis Abdul is here in follow-up of metastatic gastrointestinal stromal tumor and lung cancer.    She had a GIST about 15 years ago, had a resection and after completing her adjuvant Gleevec had a recurrence. She has been on treatment with Gleevec with a complete response for many years now.  Along the way she has also had a stage I lung cancer which was resected.  Since her last visit she tells me that she is been feeling about the same.  She has an excellent appetite.  She has being bothered more by her right eye and has follow-up scheduled with an ophthalmologist.  She has regular ongoing follow-up of her thyroid function with her last TSH checked in December and has had a biopsy of a thyroid nodule which was benign.  Her 10 point complete review of systems is notable for some ongoing and probably worsening exertional dyspnea.  That mildly limits her activity.  She has been working with her primary care doctor and ways to help manage her chronic lung disease.  She has had minimal to no lower extremity edema.  She said no chest pain or palpitations and she does not awake at night short of breath.  The remainder of a 10 point review of systems is otherwise negative.    Physical exam Ms. Abdul appears well with unremarkable vital signs.  She has marked periorbital edema with excess skin in her eyelids.  She has some conjunctival injection on the right.  She has no visible lesion in the oropharynx no palpable adenopathy in her neck or supraclavicular spaces.  I could not palpate her thyroid nodule.  Her lungs are clear to auscultation without dullness to percussion.  Her heart sounds are distant without audible murmur gallop her jugular venous pressure is normal to mildly elevated without pathologic hepatojugular reflux.  Her abdomen is soft and nontender without palpable mass or hepatosplenomegaly.  She has no peripheral edema and no tenderness in her calves or thighs.  She has slight clubbing of her digits  without cyanosis.  Her speech is fluent and her cranial nerves are grossly intact.  Her gait and station are unremarkable.    I reviewed with patient her CT scan which shows no evidence of recurrence of her gist.  She has no new lung nodules.  Her thyroid nodule is slightly larger.  Her lab work is notable for stable stage III renal dysfunction, and unremarkable liver enzymes and blood counts.    Assessment/plan:  1.  History of lung cancer with a long history of smoking and chronic obstructive lung disease.  At present she has no evidence of recurrence.  2.  Recurrent gastrointestinal stromal tumor she remains free of evidence of disease on her Gleevec which she is tolerating well except for the periorbital edema.  She has follow-up with ophthalmology to discuss pursuing surgical therapy for that.  We will continue on with the same dose and schedule of Gleevec.  We will reevaluate her disease status in another 6 months.  3.  Chronic thyroid disease she will continue to follow-up with her other physicians for management of this.  I think the slight increase in her thyroid nodule seen on the CT scan is not clinically significant but asked her to follow-up with her thyroid doctor.

## 2019-04-29 NOTE — NURSING NOTE
"Oncology Rooming Note    April 29, 2019 9:23 AM   Idalmis Abdul is a 73 year old female who presents for:    Chief Complaint   Patient presents with     Oncology Clinic Visit     GIST     Initial Vitals: /72   Pulse 74   Temp 97.4  F (36.3  C) (Oral)   Resp 16   Ht 1.6 m (5' 3\")   Wt 71.2 kg (157 lb)   LMP 01/01/1992   SpO2 94%   BMI 27.81 kg/m   Estimated body mass index is 27.81 kg/m  as calculated from the following:    Height as of this encounter: 1.6 m (5' 3\").    Weight as of this encounter: 71.2 kg (157 lb). Body surface area is 1.78 meters squared.  No Pain (0) Comment: Data Unavailable   Patient's last menstrual period was 01/01/1992.  Allergies reviewed: Yes  Medications reviewed: Yes    Medications: Medication refills not needed today.  Pharmacy name entered into [facility]:    [facility], [place] - [address] AT [facility] [place], [place] - [address]    Clinical concerns: No New Concerns    LYN Costa    "

## 2019-05-15 DIAGNOSIS — I10 ESSENTIAL HYPERTENSION WITH GOAL BLOOD PRESSURE LESS THAN 140/90: ICD-10-CM

## 2019-05-15 DIAGNOSIS — I10 ESSENTIAL HYPERTENSION: ICD-10-CM

## 2019-05-15 NOTE — TELEPHONE ENCOUNTER
"Requested Prescriptions   Pending Prescriptions Disp Refills     metoprolol tartrate (LOPRESSOR) 100 MG tablet [Pharmacy Med Name: METOPROLOL TARTRATE 100MG TABS] 180 tablet 2     Sig: TAKE ONE TABLET BY MOUTH TWICE A DAY       Beta-Blockers Protocol Passed - 5/15/2019  9:13 AM        Passed - Blood pressure under 140/90 in past 12 months     BP Readings from Last 3 Encounters:   04/29/19 124/72   02/19/19 92/60   02/18/19 120/70                 Passed - Patient is age 6 or older        Passed - Recent (12 mo) or future (30 days) visit within the authorizing provider's specialty     Patient had office visit in the last 12 months or has a visit in the next 30 days with authorizing provider or within the authorizing provider's specialty.  See \"Patient Info\" tab in inbasket, or \"Choose Columns\" in Meds & Orders section of the refill encounter.              Passed - Medication is active on med list        Last Written Prescription Date:  8/16/18  Last Fill Quantity: 180,  # refills: 2   Last office visit: 2/18/2019 with prescribing provider:  Kayy   Future Office Visit:   Next 5 appointments (look out 90 days)    Jul 30, 2019 11:00 AM CDT  Return Visit with Anu Kramer PA-C  [facility] ([facility]) [address] [place] [id number] [phone]           "

## 2019-05-15 NOTE — TELEPHONE ENCOUNTER
"Requested Prescriptions   Pending Prescriptions Disp Refills     hydrochlorothiazide (HYDRODIURIL) 25 MG tablet [Pharmacy Med Name: HYDROCHLOROTHIAZIDE 25MG TABS] 90 tablet 3     Sig: TAKE ONE TABLET BY MOUTH EVERY DAY       Diuretics (Including Combos) Protocol Failed - 5/15/2019  9:13 AM        Failed - Normal serum creatinine on file in past 12 months     Recent Labs   Lab Test 04/05/19  0958   CR 1.15*              Passed - Blood pressure under 140/90 in past 12 months     BP Readings from Last 3 Encounters:   04/29/19 124/72   02/19/19 92/60   02/18/19 120/70                 Passed - Recent (12 mo) or future (30 days) visit within the authorizing provider's specialty     Patient had office visit in the last 12 months or has a visit in the next 30 days with authorizing provider or within the authorizing provider's specialty.  See \"Patient Info\" tab in inbasket, or \"Choose Columns\" in Meds & Orders section of the refill encounter.              Passed - Medication is active on med list        Passed - Patient is age 18 or older        Passed - No active pregancy on record        Passed - Normal serum potassium on file in past 12 months     Recent Labs   Lab Test 04/05/19  0958   POTASSIUM 3.9                    Passed - Normal serum sodium on file in past 12 months     Recent Labs   Lab Test 04/05/19  0958                 Passed - No positive pregnancy test in past 12 months        Last Written Prescription Date:  5/16/18  Last Fill Quantity: 90,  # refills: 3   Last office visit: 2/18/2019 with prescribing provider:  Kayy   Future Office Visit:   Next 5 appointments (look out 90 days)    Jul 30, 2019 11:00 AM CDT  Return Visit with Anu Kramer PA-C  [facility] ([facility]) [address] [place] [id number] [phone]           "

## 2019-05-16 RX ORDER — METOPROLOL TARTRATE 100 MG
TABLET ORAL
Qty: 180 TABLET | Refills: 2 | Status: SHIPPED | OUTPATIENT
Start: 2019-05-16 | End: 2020-02-07

## 2019-05-16 RX ORDER — HYDROCHLOROTHIAZIDE 25 MG/1
TABLET ORAL
Qty: 90 TABLET | Refills: 3 | Status: SHIPPED | OUTPATIENT
Start: 2019-05-16 | End: 2019-05-23

## 2019-05-21 NOTE — PROGRESS NOTES
SUBJECTIVE:                                                    Idalmis Abdul is 73 year old female   Chief Complaint   Patient presents with     Ent Problem     Symptoms intermittent for multiple months, consistant for the last few weeks. Is wondering if this is due to TMJ. States right ear pain/pressure. Tender to touch. Has tried tylenol to little effect.     Medication Request     Requesting an alternative to naproxen, states it is $ 200          Problem list and histories reviewed & adjusted, as indicated.  Additional history: as documented    Patient Active Problem List   Diagnosis     Hypertension goal BP (blood pressure) < 140/90     GIST (gastrointestinal stromal tumor), malignant (H)     Eyelid edema     History of lung cancer     Health Care Home     NSTEMI (non-ST elevated myocardial infarction) (H)     ASCVD (arteriosclerotic cardiovascular disease)     Postsurgical aortocoronary bypass status     S/P CABG x 4     Dyspnea     GERD (gastroesophageal reflux disease)     Hyperlipidemia LDL goal <160     Hyperthyroidism     Thyroid eye disease     Eyelid retraction or lag     Thyrotoxic exophthalmos     Graves' disease     History of tobacco abuse     History of non-ST elevation myocardial infarction (NSTEMI)     Chronic back pain     PVD (posterior vitreous detachment)     Age-related osteoporosis without current pathological fracture     Spinal stenosis of lumbosacral region     DDD (degenerative disc disease), lumbar     Past Surgical History:   Procedure Laterality Date     BYPASS GRAFT ARTERY CORONARY  6/14/2012    Procedure: BYPASS GRAFT ARTERY CORONARY;  Median Sternotomy Coronary Artery Bypass Graft  x 3        C THORACOSCOPY,DX W BX  fall 2003    benign tissue     CATARACT IOL, RT/LT      LE     CHOLECYSTECTOMY  1963     COLONOSCOPY  4-12-05     CORONARY ARTERY BYPASS      X4     CREATION PERICARDIAL WINDOW  6/15/2012    Procedure: CREATION PERICARDIAL WINDOW;  Mediastinal Exploration, Control  of Bleeding;  Surgeon: Dwaine Griffin MD;  Location: [facility] OR     EYE SURGERY      left cataract removal     GI SURGERY   and     GIST tumor removal     GYN SURGERY      tubal ligation     HYSTERECTOMY VAGINAL, COLPORRHAPHY ANTERIOR, POSTERIOR, COMBINED N/A 10/17/2017    Procedure: COMBINED HYSTERECTOMY VAGINAL, COLPORRHAPHY ANTERIOR, POSTERIOR;  Total Vaginal Hysterectomy and Posterior Repair,Sacrospinous Vault Suspension;  Surgeon: Ruth Farooq MD;  Location: [facility] OR     PHACOEMULSIFICATION WITH STANDARD INTRAOCULAR LENS IMPLANT  3/24/2014    Procedure: PHACOEMULSIFICATION WITH STANDARD INTRAOCULAR LENS IMPLANT;  Left Kelman Phacoemulsification with Intraocular Lens Implant;  Surgeon: Magnus Mckeon MD;  Location: [facility] OR     RECESSION RESECTION WITH ADJUSTABLE SUTURE BILATERAL Bilateral 2016    Procedure: RECESSION RESECTION WITH ADJUSTABLE SUTURE BILATERAL;  Surgeon: Erma Ordaz MD;  Location: [facility] OR     SURGICAL HISTORY OF -       cholecystectomy     SURGICAL HISTORY OF -       Tubal Ligation      SURGICAL HISTORY OF -       Explor. Lap, Excision of Small Bowel Tumor      SURGICAL HISTORY OF -   2010    lung cancer removed right lung       Social History     Tobacco Use     Smoking status: Former Smoker     Packs/day: 0.50     Years: 49.00     Pack years: 24.50     Types: Cigarettes     Last attempt to quit: 2010     Years since quittin.0     Smokeless tobacco: Never Used   Substance Use Topics     Alcohol use: No     Family History   Problem Relation Age of Onset     Heart Disease Mother      Osteoporosis Mother      Respiratory Mother         Emphezyma     Hypertension Mother      Heart Disease Father      Alcohol/Drug Father      Hypertension Father      Hypertension Maternal Grandmother      Hypertension Brother      Hypertension Sister      Arthritis Sister      Hypertension Son      Cancer Son         rectal         Current  Outpatient Medications   Medication Sig Dispense Refill     alendronate (FOSAMAX) 70 MG tablet Take 1 tablet (70 mg) by mouth every 7 days Take 60 minutes before am meal with 8 oz. water. Remain upright for 30 minutes. 12 tablet 3     amLODIPine (NORVASC) 5 MG tablet TAKE ONE TABLET BY MOUTH EVERY DAY 30 tablet 9     aspirin EC 81 MG EC tablet Take 1 tablet by mouth daily. 30 tablet 2     calcium-vitamin D-vitamin K (VIACTIV) 500-500-40 MG-UNT-MCG CHEW Take 2 tablets by mouth daily       hydrochlorothiazide (HYDRODIURIL) 25 MG tablet TAKE ONE TABLET BY MOUTH EVERY DAY 90 tablet 3     imatinib (GLEEVEC) 400 MG tablet Take 1 tablet (400 mg) by mouth daily Take with a meal and a large glass of water. 30 tablet 2     lisinopril (PRINIVIL/ZESTRIL) 40 MG tablet TAKE ONE TABLET BY MOUTH EVERY DAY 90 tablet 1     LORazepam (ATIVAN) 0.5 MG tablet Take 1 tablet (0.5 mg) by mouth At Bedtime 90 tablet 1     melatonin 5 MG tablet Take 5 mg by mouth nightly as needed for sleep       methimazole (TAPAZOLE) 5 MG tablet Take 0.5 tablets (2.5 mg) by mouth daily 45 tablet 6     metoprolol tartrate (LOPRESSOR) 100 MG tablet TAKE ONE TABLET BY MOUTH TWICE A  tablet 2     polyethylene glycol (MIRALAX/GLYCOLAX) powder Take 17 g by mouth daily       rosuvastatin (CRESTOR) 5 MG tablet TAKE ONE TABLET BY MOUTH EVERY DAY 90 tablet 2     Blood Pressure Monitoring (ADULT BLOOD PRESSURE CUFF LG) KIT 1 Device 2 times daily 1 kit 1     naproxen sodium (ANAPROX) 550 MG tablet Take 1 tablet (550 mg) by mouth 2 times daily (with meals) (Patient not taking: Reported on 5/23/2019) 60 tablet 3     order for DME Walker 1 Device 0     Allergies   Allergen Reactions     Atorvastatin Cramps and Unknown     cramps  Other reaction(s): *Unknown  cramps     Recent Labs   Lab Test 04/05/19  1048 04/05/19  0958 12/07/18  1229 10/12/18  1059  06/22/18  0840 06/20/18  1544  10/17/17  0755  09/20/16  1022  07/17/15  0916  06/19/12  1550  06/15/12  0850   A1C   "--   --   --   --   --   --   --   --  5.5  --   --   --   --   --  5.4  --  Duplicate request   LDL  --   --   --   --   --  46  --   --   --   --  38  --  31   < >  --   --   --    HDL  --   --   --   --   --  48*  --   --   --   --  53  --  46*   < >  --   --   --    TRIG  --   --   --   --   --  82  --   --   --   --  79  --  72   < >  --   --   --    ALT  --  26  --  26  --  23  --    < >  --    < >  --    < > 23   < >  --    < >  --    CR  --  1.15*  --  1.16*   < > 1.04  --    < > 1.15*   < > 1.00   < > 1.06*   < >  --    < >  --    GFRESTIMATED 44* 47*  --  46*   < > 52*  --    < > 46*   < > 55*   < > 51*   < >  --    < >  --    GFRESTBLACK 53* 54*  --  55*   < > 63  --    < > 56*   < > 66   < > 62   < >  --    < >  --    POTASSIUM  --  3.9  --  4.3   < > 3.9  --    < > 3.6   < > 4.2   < > 4.0   < >  --    < > 2.8*   TSH  --   --  1.02  --   --   --  0.78   < >  --    < >  --    < >  --    < >  --   --   --     < > = values in this interval not displayed.      BP Readings from Last 3 Encounters:   05/23/19 126/72   04/29/19 124/72   02/19/19 92/60    Wt Readings from Last 3 Encounters:   05/23/19 70.7 kg (155 lb 12.8 oz)   04/29/19 71.2 kg (157 lb)   02/19/19 71.7 kg (158 lb)         ROS:  Constitutional, HEENT, cardiovascular, pulmonary, gi and gu systems are negative, except as otherwise noted.    OBJECTIVE:                                                    /72   Pulse 82   Temp 98.7  F (37.1  C) (Tympanic)   Resp 12   Ht 1.568 m (5' 1.75\")   Wt 70.7 kg (155 lb 12.8 oz)   LMP 01/01/1992   SpO2 98%   BMI 28.73 kg/m    GENERAL APPEARANCE ADULT: Alert, no acute distress  HENT: right TM normal, not visualized secondary to cerumen, left TM normal  RESP: lungs clear to auscultation   CV: normal rate, regular rhythm, no murmur or gallop  Diagnostic Test Results:  none      ASSESSMENT/PLAN:                                                    1. Impacted cerumen of right ear   Cerumenosis is noted.  " Wax is removed by syringing with warm sterile water and manual debridement with blue Bionix plastic cerumen curette. right ear was treated and  large amount of cerumen was removed from the right ear canal.  Slight discomfort at end, no bleeding or trauma noted to ear canals or TMs on inspection with otoscope. Treatment initiated by CMA and completed by myself. .    - HC REMOVAL IMPACTED CERUMEN IRRIGATION/LVG UNILAT    2. CKD (chronic kidney disease) stage 3, GFR 30-59 ml/min (H)  Need to be mindful of NSAIDS, will change to aleve and stop relafen as too expensive    3. Essential hypertension  At goal, will stop hydrochlorothiazide as may be causing dry sensation and probably don't need it.    4. Dyspnea on exertion  Not using inhalers don't seem to help much and do not like the feeling she gets using them.  - COPD ACTION PLAN    Darlene Sultana MD  [facility]

## 2019-05-23 ENCOUNTER — OFFICE VISIT (OUTPATIENT)
Dept: FAMILY MEDICINE | Facility: CLINIC | Age: 74
End: 2019-05-23
Payer: MEDICARE

## 2019-05-23 VITALS
HEART RATE: 82 BPM | BODY MASS INDEX: 28.67 KG/M2 | SYSTOLIC BLOOD PRESSURE: 126 MMHG | OXYGEN SATURATION: 98 % | TEMPERATURE: 98.7 F | HEIGHT: 62 IN | RESPIRATION RATE: 12 BRPM | DIASTOLIC BLOOD PRESSURE: 72 MMHG | WEIGHT: 155.8 LBS

## 2019-05-23 DIAGNOSIS — I10 ESSENTIAL HYPERTENSION: ICD-10-CM

## 2019-05-23 DIAGNOSIS — R06.09 DYSPNEA ON EXERTION: ICD-10-CM

## 2019-05-23 DIAGNOSIS — N18.30 CKD (CHRONIC KIDNEY DISEASE) STAGE 3, GFR 30-59 ML/MIN (H): ICD-10-CM

## 2019-05-23 DIAGNOSIS — H61.21 IMPACTED CERUMEN OF RIGHT EAR: Primary | ICD-10-CM

## 2019-05-23 PROCEDURE — 69210 REMOVE IMPACTED EAR WAX UNI: CPT | Mod: RT | Performed by: FAMILY MEDICINE

## 2019-05-23 PROCEDURE — 99213 OFFICE O/P EST LOW 20 MIN: CPT | Mod: 25 | Performed by: FAMILY MEDICINE

## 2019-05-23 ASSESSMENT — PAIN SCALES - GENERAL: PAINLEVEL: MILD PAIN (2)

## 2019-05-23 ASSESSMENT — MIFFLIN-ST. JEOR: SCORE: 1155.98

## 2019-05-23 NOTE — NURSING NOTE
"Initial /72   Pulse 82   Temp 98.7  F (37.1  C) (Tympanic)   Resp 12   Ht 1.568 m (5' 1.75\")   Wt 70.7 kg (155 lb 12.8 oz)   LMP 01/01/1992   SpO2 98%   BMI 28.73 kg/m   Estimated body mass index is 28.73 kg/m  as calculated from the following:    Height as of this encounter: 1.568 m (5' 1.75\").    Weight as of this encounter: 70.7 kg (155 lb 12.8 oz). .      "

## 2019-05-29 DIAGNOSIS — C49.A0 MALIGNANT GASTROINTESTINAL STROMAL TUMOR, UNSPECIFIED SITE (H): Primary | ICD-10-CM

## 2019-05-29 RX ORDER — IMATINIB MESYLATE 400 MG/1
400 TABLET, FILM COATED ORAL DAILY
Qty: 30 TABLET | Refills: 2 | Status: SHIPPED | OUTPATIENT
Start: 2019-05-29 | End: 2019-07-03

## 2019-05-31 ENCOUNTER — PRE VISIT (OUTPATIENT)
Dept: OPHTHALMOLOGY | Facility: CLINIC | Age: 74
End: 2019-05-31

## 2019-05-31 NOTE — TELEPHONE ENCOUNTER
FUTURE VISIT INFORMATION      FUTURE VISIT INFORMATION:    Date: 6/17/19    Time: 9:30am    Location: [facility]  REFERRAL INFORMATION:    Referring provider:  Dr. Mckeon    Referring providers clinic:  [facility]    Reason for visit/diagnosis  Unable to close eyelid all of the way    RECORDS REQUESTED FROM:       Clinic name Comments Records Status Imaging Status   [facility] Request for records sent 5/31- received and sent to scanning 5/31     [facility]  OV/referral 4/11/14- 3/29/17  Procedure Left Kelman Phacoemulsification with Intraocular Lens Implant 3/24/14- Darlene Jaquez EPIC

## 2019-06-17 ENCOUNTER — OFFICE VISIT (OUTPATIENT)
Dept: OPHTHALMOLOGY | Facility: CLINIC | Age: 74
End: 2019-06-17
Payer: MEDICARE

## 2019-06-17 ENCOUNTER — TELEPHONE (OUTPATIENT)
Dept: OPHTHALMOLOGY | Facility: CLINIC | Age: 74
End: 2019-06-17

## 2019-06-17 VITALS — WEIGHT: 155 LBS | BODY MASS INDEX: 28.52 KG/M2 | HEIGHT: 62 IN

## 2019-06-17 DIAGNOSIS — H02.834 DERMATOCHALASIS OF BOTH UPPER EYELIDS: ICD-10-CM

## 2019-06-17 DIAGNOSIS — H57.89 THYROID EYE DISEASE: Primary | ICD-10-CM

## 2019-06-17 DIAGNOSIS — E07.9 THYROID EYE DISEASE: Primary | ICD-10-CM

## 2019-06-17 DIAGNOSIS — H02.831 DERMATOCHALASIS OF BOTH UPPER EYELIDS: ICD-10-CM

## 2019-06-17 DIAGNOSIS — H02.539 EYELID RETRACTION, UNSPECIFIED LATERALITY: ICD-10-CM

## 2019-06-17 ASSESSMENT — VISUAL ACUITY
OS_CC: 20/25
OD_CC+: +
OD_PH_CC: 20/40
OD_PH_CC+: -2
CORRECTION_TYPE: GLASSES
METHOD: SNELLEN - LINEAR
OD_CC: 20/70
OS_CC+: -2+

## 2019-06-17 ASSESSMENT — CONF VISUAL FIELD
OS_NORMAL: 1
METHOD: COUNTING FINGERS
OD_NORMAL: 1

## 2019-06-17 ASSESSMENT — TONOMETRY
OD_IOP_MMHG: 10
IOP_METHOD: ICARE
OS_IOP_MMHG: 11

## 2019-06-17 ASSESSMENT — MIFFLIN-ST. JEOR: SCORE: 1156.33

## 2019-06-17 NOTE — PATIENT INSTRUCTIONS
BLEPHAROPLASTY    Your eyes are often the first thing people notice about you and are an important aspect of your overall appearance. As we age, the tone and shape of our eyelids can loosen and sag. Heredity and sun exposure also contribute to this process. This excess, puffy or lax skin can make you appear more tired or appear older. Eyelid surgery or blepharoplasty (pronounced  cuww-w-my-plasty ) can give the eyes a more youthful look by removing excess skin, bulging fat, and lax muscle from the upper or lower eyelids. If the sagging upper eyelid skin obstructs peripheral vision, blepharoplasty can eliminate the obstruction and expand the visual field.     Upper Blepharoplasty     For the upper eyelids, excess skin and fat are removed through an incision hidden in the natural eyelid crease. If the lid is droopy (ptosis), the muscle that raises the upper eyelid can be tightened. The incision is then closed with fine sutures.     Lower Blepharoplasty     Fat in the lower eyelids can be removed or repositioned through an incision hidden on the inner surface of the eyelid.  If there is excessive skin in the lower lid, the skin can be removed through incision is made just below the lashes. Fat can be removed or repositioned through this incision, and the excess skin removed. The incision is then closed with fine sutures.     Upper and Lower Blepharoplasty     Upper and lower blepharoplasty can be performed together and also can be combined with other procedures such as eyebrow or forehead lift, midface lift, face lift, neck lift, or laser skin resurfacing.  The procedures are typically performed as an outpatient procedure and typically take 45 min to 1.5 hours to perform.  Most patients can return to normal activities within 1-2 weeks. Makeup may be worn to camouflage any bruising after one week.       Who Should Perform A Blepharoplasty?     When choosing a surgeon to perform blepharoplasty, look for a cosmetic and  reconstructive surgeon who specializes in the eyelids, orbit, and tear drain system. Dr. Brasher s membership in the American Society of Ophthalmic Plastic and Reconstructive Surgery (ASOPRS) indicates he is not only a board certified ophthalmologist who knows the anatomy and structure of the eyelids and orbit, but also has had extensive training in ophthalmic plastic reconstructive and cosmetic surgery.

## 2019-06-17 NOTE — PROGRESS NOTES
Chief Complaints and History of Present Illnesses   Patient presents with      Asked by Dr. Mckeon to Consult For     Lid issues       Assessment    Idalmis Abdul is a 73 year old female with the following diagnoses:   1. Thyroid eye disease    2. Dermatochalasis of both upper eyelids    3. Eyelid retraction, unspecified laterality         FUNCTIONAL COMPLAINTS RELATED TO DROOPY EYELIDS/BROWS:  Idalmis Abdul describes upper lids interfering with superior visual field and interfering with activities of daily living including reading, driving and watching television.     Pt has noted right eye dryness and discomfort x 1yr or so, and getting worse gradually  - +Graves dz s/p strabismus surgery in 2016 (no decompression or lid surgery)  - Thyroid levels stable  - Last TSI 2/2018 3.5  - +Gleevec for recurrent GIST    - +Aspirin    EXAM:   Dominant eye Left    MRD1: Right eye 1mm   Left eye 1mm  Dermatochalasis with excess skin touching eyelashes, and overriding eyelashes     VISUAL FIELD:  Right eye untaped: 0 degrees Right eye taped: 42 degrees  Left eye untaped:   20 degrees Left eye taped: 52 degrees    Right eye visual field improves by: 42 degrees  Left eye visual field improves by: 32 degrees      PLAN:  Check TSI, if wnl then consider surgery-  Bilateral Upper Eyelid Blepharoplasty (s+BFPSS)   Bilateral Upper Eyelid Retraction Repair (levator recession)      Omer Duarte MD, JONATHAN  Oculofacial Plastics and Orbit Surgery Fellow       Attending Physician Attestation:  Complete documentation of historical and exam elements from today's encounter can be found in the full encounter summary report (not reduplicated in this progress note).  I personally obtained the chief complaint(s) and history of present illness.  I confirmed and edited as necessary the review of systems, past medical/surgical history, family history, social history, and examination findings as documented by others; and I examined the  patient myself.  I personally reviewed the relevant tests, images, and reports as documented above.  I formulated and edited as necessary the assessment and plan and discussed the findings and management plan with the patient and family. I personally reviewed the ophthalmic test(s) associated with this encounter, agree with the interpretation(s) as documented by the resident/fellow, and have edited the corresponding report(s) as necessary.   -Vasile Brasher MD    Today with Idalmis Abdul, I reviewed the indications, risks, benefits, and alternatives of the proposed surgical procedure including, but not limited to, failure obtain the desired result  and need for additional surgery, bleeding, infection, loss of vision, loss of the eye, and the remote possibility of permanent damage to any organ system or death with the use of anesthesia.  I provided multiple opportunities for the questions, answered all questions to the best of my ability, and confirmed that my answers and my discussion were understood.     - Vasile Brasher MD 10:07 AM 6/17/2019

## 2019-06-17 NOTE — LETTER
2019         RE:  :  MRN: Idalmis Abdul  1945  [id number]     Dear Magnus,    Thank you for asking me to see your patient, Idalmis Abdul, for an oculoplastic   consultation.  My assessment and plan are below.  For further details, please see my attached clinic note.        Chief Complaints and History of Present Illnesses   Patient presents with     Consult For     Lid issues       Assessment    Idalmis Abdul is a 73 year old female with the following diagnoses:   1. Thyroid eye disease    2. Dermatochalasis of both upper eyelids    3. Eyelid retraction, unspecified laterality         PLAN:  Check TSI, if wnl then consider surgery-  Bilateral Upper Eyelid Blepharoplasty (s+BFPSS)   Bilateral Upper Eyelid Retraction Repair (levator recession)        Again, thank you for allowing me to participate in the care of your patient.      Sincerely,    Vasile Brasher MD  Department of Ophthalmology and Visual Neurosciences  [facility]    CC: Darlene Sultana MD  [address] [facility] [address]  VIA In Basket     Nate Mccray MD  VIA Facsimile: [phone]     Blayne Schmitz MD  [address]  [facility] [address]  VIA In Basket     JAIME Lobo CNP  [address]  [facility] [address]  VIA In Basket     TIERRA Antoine MD  [address]  [facility] [address]  VIA In Basket     Magnus Mckeon MD  [facility]  [address] [address]  VIA In Basket     Erma Ordaz MD  [address]  [facility] [address]  VIA In Basket     Yenifer Driver RN  VIA In Basket

## 2019-06-17 NOTE — TELEPHONE ENCOUNTER
Spoke with patient to schedule surgery with Dr. Vasile Brasher.    Surgery was scheduled on 07/17 at [facility]  Patient will have H&P at [facility]    Post-Op visit was scheduled on 07/29  Patient is aware a / is needed day of surgery.   Surgery packet was mailed, patient has my direct contact information for any further questions.

## 2019-06-17 NOTE — NURSING NOTE
Chief Complaints and History of Present Illnesses   Patient presents with     Consult For     Lid issues     Chief Complaint(s) and History of Present Illness(es)     Consult For     Laterality: right eye    Onset: gradual    Onset: years ago    Quality: blurred vision    Severity: moderate    Frequency: constantly    Timing: throughout the day    Context: driving    Course: gradually worsening    Associated symptoms: eye pain    Treatments tried: artificial tears and ointment    Response to treatment: mild improvement    Pain scale: 2/10    Comments: Lid issues              Comments     Swelling of lids from cancer med gleevec, which she has been on since 2007. Right eye pain for years but getting worse. Right lid not closing properly. Pain is making it difficult to drive. Some blurred vision  And FBS right eye. Using ATs a lot for the dryness helps for a short time only. Use Refresh PRN OD at bedtime.    Michelle Greene COT 9:08 AM June 17, 2019

## 2019-06-26 DIAGNOSIS — H57.89 THYROID EYE DISEASE: ICD-10-CM

## 2019-06-26 DIAGNOSIS — E07.9 THYROID EYE DISEASE: ICD-10-CM

## 2019-06-26 DIAGNOSIS — H02.831 DERMATOCHALASIS OF BOTH UPPER EYELIDS: ICD-10-CM

## 2019-06-26 DIAGNOSIS — H02.539 EYELID RETRACTION, UNSPECIFIED LATERALITY: ICD-10-CM

## 2019-06-26 DIAGNOSIS — H02.834 DERMATOCHALASIS OF BOTH UPPER EYELIDS: ICD-10-CM

## 2019-06-26 PROCEDURE — 36415 COLL VENOUS BLD VENIPUNCTURE: CPT | Performed by: OPHTHALMOLOGY

## 2019-06-26 PROCEDURE — 84445 ASSAY OF TSI GLOBULIN: CPT | Mod: 90 | Performed by: OPHTHALMOLOGY

## 2019-07-02 LAB — TSI SER-ACNC: 3.2 TSI INDEX

## 2019-07-03 ENCOUNTER — OFFICE VISIT (OUTPATIENT)
Dept: FAMILY MEDICINE | Facility: CLINIC | Age: 74
End: 2019-07-03
Payer: MEDICARE

## 2019-07-03 VITALS
RESPIRATION RATE: 18 BRPM | OXYGEN SATURATION: 98 % | DIASTOLIC BLOOD PRESSURE: 80 MMHG | SYSTOLIC BLOOD PRESSURE: 144 MMHG | TEMPERATURE: 98.4 F | WEIGHT: 155 LBS | HEIGHT: 62 IN | BODY MASS INDEX: 28.52 KG/M2 | HEART RATE: 78 BPM

## 2019-07-03 DIAGNOSIS — H92.01 OTALGIA, RIGHT: Primary | ICD-10-CM

## 2019-07-03 PROCEDURE — 99213 OFFICE O/P EST LOW 20 MIN: CPT | Performed by: FAMILY MEDICINE

## 2019-07-03 ASSESSMENT — PAIN SCALES - GENERAL: PAINLEVEL: SEVERE PAIN (6)

## 2019-07-03 ASSESSMENT — MIFFLIN-ST. JEOR: SCORE: 1156.33

## 2019-07-03 NOTE — PATIENT INSTRUCTIONS
Our Clinic hours are:  Mondays    7:20 am - 7 pm  Tues -  Fri  7:20 am - 5 pm    Clinic Phone: [phone]    The clinic lab opens at 7:30 am Mon - Fri and appointments are required.    [facility]. [phone]  Monday  8 am - 7pm  Tues - Fri 8 am - 5:30 pm

## 2019-07-03 NOTE — PROGRESS NOTES
"Subjective     Idalmis Abdul is a 73 year old female who presents to clinic today for the following health issues:    HPI   Acute Illness   Acute illness concerns: Right ear pain. Previously seen and have wax removed which helped for a short period.   Onset: on and off for a months    Fever: no    Chills/Sweats: no    Headache (location?): no    Sinus Pressure:YES- teeth pain    Conjunctivitis:  no    Ear Pain: YES: right    Rhinorrhea: no     Congestion: no     Sore Throat: no      Cough: no    Wheeze: no    Decreased Appetite: no     Nausea: no     Vomiting: no     Diarrhea:  no     Dysuria/Freq.: no     Fatigue/Achiness: no     Sick/Strep Exposure: no      Therapies Tried and outcome: tylenol     Had wax removed 6 weeks ago and it helped temporarily.  Has for years had intermittent pain in the right ear, below the ear and in the helix/cartilage.  Was told at one time it might be TMJ.  Will sometimes get pain into her jaw as well.  Tylenol does help this pain that she gets every other day or so.  She is afraid of what will happen before her surgery, she was under the impression she couldn't take tylenol 10 days before her surgery. We discussed that this is usually NSAIDS/ASA that cannot be taken before surgery, tylenol should not affect clotting.     Tobacco  Allergies  Meds  Problems  Med Hx  Surg Hx  Fam Hx         Review of Systems   ROS COMP: Constitutional, HEENT, cardiovascular, pulmonary, gi and gu systems are negative, except as otherwise noted.      Objective    /80   Pulse 78   Temp 98.4  F (36.9  C) (Tympanic)   Resp 18   Ht 1.575 m (5' 2\")   Wt 70.3 kg (155 lb)   LMP 01/01/1992   SpO2 98%   BMI 28.35 kg/m    Body mass index is 28.35 kg/m .  Physical Exam   GENERAL: healthy, alert and no distress  HENT: normal cephalic/atraumatic, ear canals and TM's normal and no redness of the helix, no masses palpated in the neck.  No click of the TMJ.             Assessment & Plan       " "ICD-10-CM    1. Otalgia, right H92.01 OTOLARYNGOLOGY REFERRAL     ?polychondritis - though no overt findings of this today.  Could be TMJ, though symptoms not classic.   ENT for further evaluation given the chronicity of the pain     BMI:   Estimated body mass index is 28.35 kg/m  as calculated from the following:    Height as of this encounter: 1.575 m (5' 2\").    Weight as of this encounter: 70.3 kg (155 lb).               No follow-ups on file.    Ale Ordaz MD  [facility]      "

## 2019-07-09 NOTE — PROGRESS NOTES
[facility] [place]  [address] [address]  [phone]  Dept: [phone]    PRE-OP EVALUATION:  Today's date: 2019    Idalmis Abdul (: 1945) presents for pre-operative evaluation assessment as requested by Dr. Vasile fernandes.  She requires evaluation and anesthesia risk assessment prior to undergoing surgery/procedure for treatment of Bilateral ptosis .    Proposed Surgery/ Procedure:   Bilateral Upper Eyelid Blepharoplasty     Bilateral Upper Eyelid Retraction Repair       Date of Surgery/ Procedure: 19  Time of Surgery/ Procedure: 1:45 pm  Hospital/Surgical Facility: [facility], fax [phone]  Primary Physician: Darlene Sultana  Type of Anesthesia Anticipated: Local with MAC    Patient has a Health Care Directive or Living Will:  NO    1. NO - Do you have a history of heart attack, stroke, stent, bypass or surgery on an artery in the head, neck, heart or legs?  2. NO - Do you ever have any pain or discomfort in your chest?  3. NO - Do you have a history of  Heart Failure?  4. NO - Are you troubled by shortness of breath when: walking on the level, up a slight hill or at night?  5. NO - Do you currently have a cold, bronchitis or other respiratory infection?  6. YES - DO YOU HAVE A COUGH, SHORTNESS OF BREATH OR WHEEZING? With exertion, at baseline  7. NO - Do you sometimes get pains in the calves of your legs when you walk?  8. NO - Do you or anyone in your family have previous history of blood clots?  9. NO - Do you or does anyone in your family have a serious bleeding problem such as prolonged bleeding following surgeries or cuts?  10. NO - Have you ever had problems with anemia or been told to take iron pills?  11. YES - HAVE YOU HAD ANY ABNORMAL BLOOD LOSS SUCH AS BLACK, TARRY OR BLOODY STOOLS, OR ABNORMAL VAGINAL BLEEDING?  hospitalized after heart surgery  12. YES - HAVE YOU EVER HAD A BLOOD TRANSFUSION? 10 in   13. NO -  Have you or any of your relatives ever had problems with anesthesia?  14. NO - Do you have sleep apnea, excessive snoring or daytime drowsiness?  15. NO - Do you have any prosthetic heart valves?  16. NO - Do you have prosthetic joints?  17. NO - Is there any chance that you may be pregnant?      HPI:     HPI related to upcoming procedure: Idalmis Abdul is 73 year old white female with chronic kidney disease stage 3, spinal stenosis, osteoporosis, graves disease, MI 2014, GIST cancer and lung cancer, lid lag and ptosis who is here to get clearance to have general anesthesia.        See problem list for active medical problems.  Problems all longstanding and stable, except as noted/documented.  See ROS for pertinent symptoms related to these conditions.      MEDICAL HISTORY:     Patient Active Problem List    Diagnosis Date Noted     CKD (chronic kidney disease) stage 3, GFR 30-59 ml/min (H) 05/23/2019     Priority: Medium     Spinal stenosis of lumbosacral region 03/22/2018     Priority: Medium     DDD (degenerative disc disease), lumbar 03/22/2018     Priority: Medium     Age-related osteoporosis without current pathological fracture 12/16/2014     Priority: Medium     PVD (posterior vitreous detachment) 11/17/2014     Priority: Medium     History of tobacco abuse 09/02/2014     Priority: Medium     QUIT in 2010       History of non-ST elevation myocardial infarction (NSTEMI) 09/02/2014     Priority: Medium     Chronic back pain 09/02/2014     Priority: Medium     Patient is followed by KANW[facility] Patel and [facility]   Med: 5mg oxycodone, 1-2 tabs q8 hours prn  Pain diagnosis: chronic back pain  Maximum use per month: #180 (but generally lasts about 90 days)  Expected duration: lifetime  Narcotic agreement on file:  YES - contract signed   Clinic visit recommended: Q 3 month         Graves' disease 08/19/2014     Priority: Medium     Thyroid eye disease 04/28/2014     Priority: Medium     Eyelid  retraction or lag 04/28/2014     Priority: Medium     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 Provider to review       Hyperthyroidism 02/04/2014     Priority: Medium     Hyperlipidemia LDL goal <160 12/17/2013     Priority: Medium     GERD (gastroesophageal reflux disease) 10/08/2013     Priority: Medium     Dyspnea 08/27/2013     Priority: Medium     S/P CABG x 4 08/05/2012     Priority: Medium     Postsurgical aortocoronary bypass status 07/04/2012     Priority: Medium     ASCVD (arteriosclerotic cardiovascular disease) 06/14/2012     Priority: Medium     NSTEMI (non-ST elevated myocardial infarction) (H) 06/12/2012     Priority: Medium     Eyelid edema 12/15/2011     Priority: Medium     side effect of gastric cancer medication       History of lung cancer 12/15/2011     Priority: Medium     S/p resection of right lung.  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 Schmitz, GI oncology-Gleevec treatment.  Surgical removal in fall 2004-no recurrence as of 3/05.     CA - lung cancer 4/2010    [facility], treated surgically     choledochal cyst 1963    CHOLEDOCHAL CYST, mild dilatation of biliary system     CKD (chronic kidney disease) stage 3, GFR 30-59 ml/min (H) 5/23/2019     Coronary artery disease      DDD (degenerative disc disease), lumbar 3/22/2018     Dislocated finger, left middle PIP 7/26/2013     Dyspnea 8/27/2013     Eyelid edema 12/15/2011     side effect of gastric cancer medication      GERD (gastroesophageal reflux disease) 10/8/2013     GIST (gastrointestinal stromal tumor), malignant (H) 5/10/2011     Graves disease      Grief reaction 5/11/2009     History of lung cancer 12/15/2011    S/p resection of right lung.  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     Other chronic pain     back     PONV (postoperative nausea and vomiting)      Postsurgical aortocoronary bypass status 7/4/2012     S/P CABG x 4 8/5/2012     Strabismus      Tobacco use disorder     Quit     Unspecified essential hypertension      Past Surgical History:   Procedure Laterality Date     BYPASS GRAFT ARTERY CORONARY  6/14/2012    Procedure: BYPASS GRAFT ARTERY CORONARY;  Median Sternotomy Coronary Artery Bypass Graft  x 3        C THORACOSCOPY,DX W BX  fall 2003    benign tissue     CATARACT IOL, RT/LT      LE     CHOLECYSTECTOMY  1963     COLONOSCOPY  4-12-05     CORONARY ARTERY BYPASS      X4     CREATION PERICARDIAL WINDOW  6/15/2012    Procedure: CREATION PERICARDIAL WINDOW;  Mediastinal Exploration, Control of Bleeding;  Surgeon: Dwaine Griffin MD;  Location:  OR     EYE SURGERY  2014    left cataract removal     GI SURGERY  2003 and 2007    GIST tumor removal     GYN SURGERY      tubal ligation     HYSTERECTOMY VAGINAL, COLPORRHAPHY ANTERIOR, POSTERIOR, COMBINED N/A 10/17/2017    Procedure: COMBINED HYSTERECTOMY VAGINAL, COLPORRHAPHY ANTERIOR, POSTERIOR;  Total Vaginal Hysterectomy and Posterior Repair,Sacrospinous Vault Suspension;  Surgeon: Ruth Farooq MD;  Location:  [facility] OR     PHACOEMULSIFICATION WITH STANDARD INTRAOCULAR LENS IMPLANT  3/24/2014    Procedure: PHACOEMULSIFICATION WITH STANDARD INTRAOCULAR LENS IMPLANT;  Left Kelman Phacoemulsification with Intraocular Lens Implant;  Surgeon: Magnus Mckeon MD;  Location: [facility] OR     RECESSION RESECTION WITH ADJUSTABLE SUTURE BILATERAL Bilateral 7/14/2016    Procedure: RECESSION RESECTION WITH ADJUSTABLE SUTURE BILATERAL;  Surgeon: Erma Ordaz MD;  Location: [facility] OR     SURGICAL HISTORY OF -   1963    cholecystectomy     SURGICAL HISTORY OF -   1970's    Tubal Ligation      SURGICAL HISTORY OF -   08/03    Explor. Lap, Excision of Small Bowel Tumor      SURGICAL HISTORY OF -   4/2010    lung cancer removed right lung     Current Outpatient Medications   Medication Sig Dispense Refill     amLODIPine (NORVASC) 5 MG tablet TAKE ONE TABLET BY MOUTH EVERY DAY 30 tablet 9     calcium-vitamin D-vitamin K (VIACTIV) 500-500-40 MG-UNT-MCG CHEW Take 2 tablets by mouth daily       imatinib (GLEEVEC) 400 MG tablet Take 1 tablet (400 mg) by mouth daily Take with a meal and a large glass of water. 30 tablet 2     lisinopril (PRINIVIL/ZESTRIL) 40 MG tablet TAKE ONE TABLET BY MOUTH EVERY DAY 90 tablet 1     LORazepam (ATIVAN) 0.5 MG tablet Take 1 tablet (0.5 mg) by mouth At Bedtime 90 tablet 1     melatonin 5 MG tablet Take 5 mg by mouth nightly as needed for sleep       methimazole (TAPAZOLE) 5 MG tablet Take 0.5 tablets (2.5 mg) by mouth daily 45 tablet 6     metoprolol tartrate (LOPRESSOR) 100 MG tablet TAKE ONE TABLET BY MOUTH TWICE A  tablet 2     naproxen sodium (ANAPROX) 550 MG tablet Take 1 tablet (550 mg) by mouth 2 times daily (with meals) 60 tablet 3     order for DME Walker 1 Device 0     polyethylene glycol (MIRALAX/GLYCOLAX) powder Take 17 g by mouth daily       rosuvastatin (CRESTOR) 5 MG tablet TAKE ONE TABLET BY MOUTH EVERY DAY 90 tablet 2     alendronate (FOSAMAX) 70 MG tablet Take 1 tablet (70 mg) by mouth  "every 7 days Take 60 minutes before am meal with 8 oz. water. Remain upright for 30 minutes. (Patient not taking: Reported on 7/3/2019) 12 tablet 3     aspirin EC 81 MG EC tablet Take 1 tablet by mouth daily. (Patient not taking: Reported on 2019) 30 tablet 2     Blood Pressure Monitoring (ADULT BLOOD PRESSURE CUFF LG) KIT 1 Device 2 times daily 1 kit 1     OTC products: None, except as noted above    Allergies   Allergen Reactions     Atorvastatin Cramps and Unknown     cramps  Other reaction(s): *Unknown  cramps      Latex Allergy: NO    Social History     Tobacco Use     Smoking status: Former Smoker     Packs/day: 0.50     Years: 49.00     Pack years: 24.50     Types: Cigarettes     Last attempt to quit: 2010     Years since quittin.2     Smokeless tobacco: Never Used   Substance Use Topics     Alcohol use: No     History   Drug Use No       REVIEW OF SYSTEMS:   Constitutional, HEENT, cardiovascular, pulmonary, gi and gu systems are negative, except as otherwise noted.    EXAM:   /68   Pulse 71   Temp 98.3  F (36.8  C) (Tympanic)   Resp 12   Ht 1.721 m (5' 7.75\")   Wt 70.7 kg (155 lb 12.8 oz)   LMP 1992   SpO2 97%   BMI 23.86 kg/m      GENERAL APPEARANCE: healthy, alert and no distress     EYES: EOMI, PERRL     HENT: ear canals and TM's normal and nose and mouth without ulcers or lesions     NECK: no adenopathy, no asymmetry, masses, or scars and thyroid normal to palpation     RESP: lungs clear to auscultation - no rales, rhonchi or wheezes     CV: regular rates and rhythm, normal S1 S2, no S3 or S4 and no murmur, click or rub     ABDOMEN:  soft, nontender, no HSM or masses and bowel sounds normal     MS: extremities normal- no gross deformities noted, no evidence of inflammation in joints, FROM in all extremities.     SKIN: no suspicious lesions or rashes     NEURO: Normal strength and tone, sensory exam grossly normal, mentation intact and speech normal     PSYCH: mentation " appears normal. and affect normal/bright     LYMPHATICS: No cervical adenopathy    DIAGNOSTICS:   No labs or EKG required for low risk surgery (cataract, skin procedure, breast biopsy, etc)    Recent Labs   Lab Test 04/05/19  0958 10/12/18  1059  10/17/17  0755  07/12/12  0653  07/09/12  1629  06/19/12  1550   HGB 10.3* 10.4*   < > 10.8*   < > 11.0*   < > 11.6*   < >  --     203   < > 211   < > 189   < >  --    < >  --    INR  --   --   --   --   --  1.01  --  1.00   < >  --     131*   < > 135   < > 131*   < > 134   < >  --    POTASSIUM 3.9 4.3   < > 3.6   < > 3.4   < > 3.6   < >  --    CR 1.15* 1.16*   < > 1.15*   < > 0.94   < > 1.23*   < >  --    A1C  --   --   --  5.5  --   --   --   --   --  5.4    < > = values in this interval not displayed.        IMPRESSION:   Reason for surgery/procedure:   1. Preop general physical exam  2. Eyelid retraction or lag   cleared for general anesthesia    3. CVD (cardiovascular disease)  - Lipid panel reflex to direct LDL Fasting      The proposed surgical procedure is considered LOW risk.    REVISED CARDIAC RISK INDEX  The patient has the following serious cardiovascular risks for perioperative complications such as (MI, PE, VFib and 3  AV Block):  Coronary Artery Disease (MI, positive stress test, angina, Qs on EKG)  INTERPRETATION: The ASCVD Risk score ([place] DC Jr., et al., 2013) failed to calculate for the following reasons:    The patient has a prior MI or stroke diagnosis      The patient has the following additional risks for perioperative complications:  No identified additional risks      ICD-10-CM    1. Preop general physical exam Z01.818    2. Eyelid retraction or lag H02.539    3. CVD (cardiovascular disease) I25.10 Lipid panel reflex to direct LDL Fasting       RECOMMENDATIONS:     --Consult hospital rounder / IM to assist post-op medical management    --Patient is to take all scheduled medications on the day of surgery EXCEPT for modifications listed  below.    APPROVAL GIVEN to proceed with proposed procedure, without further diagnostic evaluation       Signed Electronically by: Darlene Sultana MD    Copy of this evaluation report is provided to requesting physician.    [place] Preop Guidelines    Revised Cardiac Risk Index

## 2019-07-11 ENCOUNTER — OFFICE VISIT (OUTPATIENT)
Dept: FAMILY MEDICINE | Facility: CLINIC | Age: 74
End: 2019-07-11
Payer: MEDICARE

## 2019-07-11 VITALS
WEIGHT: 155.8 LBS | HEART RATE: 71 BPM | HEIGHT: 68 IN | BODY MASS INDEX: 23.61 KG/M2 | SYSTOLIC BLOOD PRESSURE: 136 MMHG | DIASTOLIC BLOOD PRESSURE: 68 MMHG | OXYGEN SATURATION: 97 % | RESPIRATION RATE: 12 BRPM | TEMPERATURE: 98.3 F

## 2019-07-11 DIAGNOSIS — Z01.818 PREOP GENERAL PHYSICAL EXAM: Primary | ICD-10-CM

## 2019-07-11 DIAGNOSIS — H02.539 EYELID RETRACTION OR LAG: ICD-10-CM

## 2019-07-11 DIAGNOSIS — I25.10 CVD (CARDIOVASCULAR DISEASE): ICD-10-CM

## 2019-07-11 PROCEDURE — 99214 OFFICE O/P EST MOD 30 MIN: CPT | Performed by: FAMILY MEDICINE

## 2019-07-11 ASSESSMENT — MIFFLIN-ST. JEOR: SCORE: 1251.23

## 2019-07-11 NOTE — NURSING NOTE
"Initial /68   Pulse 71   Temp 98.3  F (36.8  C) (Tympanic)   Resp 12   Ht 1.721 m (5' 7.75\")   Wt 70.7 kg (155 lb 12.8 oz)   LMP 01/01/1992   SpO2 97%   BMI 23.86 kg/m   Estimated body mass index is 23.86 kg/m  as calculated from the following:    Height as of this encounter: 1.721 m (5' 7.75\").    Weight as of this encounter: 70.7 kg (155 lb 12.8 oz). .      "

## 2019-07-16 ENCOUNTER — ANESTHESIA EVENT (OUTPATIENT)
Dept: SURGERY | Facility: AMBULATORY SURGERY CENTER | Age: 74
End: 2019-07-16

## 2019-07-16 DIAGNOSIS — I10 ESSENTIAL HYPERTENSION WITH GOAL BLOOD PRESSURE LESS THAN 140/90: ICD-10-CM

## 2019-07-16 NOTE — TELEPHONE ENCOUNTER
"Requested Prescriptions   Pending Prescriptions Disp Refills     lisinopril (PRINIVIL/ZESTRIL) 40 MG tablet [Pharmacy Med Name: LISINOPRIL 40MG TABS] 90 tablet 1     Sig: TAKE ONE TABLET BY MOUTH DAILY   Last Written Prescription Date:  1/17/19  Last Fill Quantity: 90 tab,  # refills: 1   Last office visit: 7/11/19 previous visit found with prescribing provider:  Darlene Sultana     Future Office Visit:   Next 5 appointments (look out 90 days)    Jul 30, 2019 11:00 AM CDT  Return Visit with Anu Kramer PA-C  [facility] [facility] [address] [phone]             ACE Inhibitors (Including Combos) Protocol Failed - 7/16/2019 10:39 AM        Failed - Normal serum creatinine on file in past 12 months     Recent Labs   Lab Test 04/05/19  1048 04/05/19  0958   CR  --  1.15*   CREAT 1.2*  --              Passed - Blood pressure under 140/90 in past 12 months     BP Readings from Last 3 Encounters:   07/11/19 136/68   07/03/19 144/80   05/23/19 126/72                 Passed - Recent (12 mo) or future (30 days) visit within the authorizing provider's specialty     Patient had office visit in the last 12 months or has a visit in the next 30 days with authorizing provider or within the authorizing provider's specialty.  See \"Patient Info\" tab in inbasket, or \"Choose Columns\" in Meds & Orders section of the refill encounter.              Passed - Medication is active on med list        Passed - Patient is age 18 or older        Passed - No active pregnancy on record        Passed - Normal serum potassium on file in past 12 months     Recent Labs   Lab Test 04/05/19  0958   POTASSIUM 3.9             Passed - No positive pregnancy test within past 12 months          "

## 2019-07-17 ENCOUNTER — ANESTHESIA (OUTPATIENT)
Dept: SURGERY | Facility: AMBULATORY SURGERY CENTER | Age: 74
End: 2019-07-17

## 2019-07-17 ENCOUNTER — HOSPITAL ENCOUNTER (OUTPATIENT)
Facility: AMBULATORY SURGERY CENTER | Age: 74
End: 2019-07-17
Attending: OPHTHALMOLOGY
Payer: MEDICARE

## 2019-07-17 VITALS
BODY MASS INDEX: 27.46 KG/M2 | RESPIRATION RATE: 16 BRPM | HEIGHT: 63 IN | WEIGHT: 155 LBS | HEART RATE: 84 BPM | SYSTOLIC BLOOD PRESSURE: 136 MMHG | DIASTOLIC BLOOD PRESSURE: 81 MMHG | OXYGEN SATURATION: 93 % | TEMPERATURE: 98.2 F

## 2019-07-17 DIAGNOSIS — Z98.890 POSTOPERATIVE STATE: Primary | ICD-10-CM

## 2019-07-17 RX ORDER — MEPERIDINE HYDROCHLORIDE 25 MG/ML
12.5 INJECTION INTRAMUSCULAR; INTRAVENOUS; SUBCUTANEOUS
Status: DISCONTINUED | OUTPATIENT
Start: 2019-07-17 | End: 2019-07-18 | Stop reason: HOSPADM

## 2019-07-17 RX ORDER — HYDROCODONE BITARTRATE AND ACETAMINOPHEN 5; 325 MG/1; MG/1
1-2 TABLET ORAL EVERY 4 HOURS PRN
Qty: 10 TABLET | Refills: 0 | Status: SHIPPED | OUTPATIENT
Start: 2019-07-17 | End: 2019-09-12

## 2019-07-17 RX ORDER — HYDROMORPHONE HYDROCHLORIDE 1 MG/ML
.3-.5 INJECTION, SOLUTION INTRAMUSCULAR; INTRAVENOUS; SUBCUTANEOUS EVERY 10 MIN PRN
Status: DISCONTINUED | OUTPATIENT
Start: 2019-07-17 | End: 2019-07-18 | Stop reason: HOSPADM

## 2019-07-17 RX ORDER — SODIUM CHLORIDE, SODIUM LACTATE, POTASSIUM CHLORIDE, CALCIUM CHLORIDE 600; 310; 30; 20 MG/100ML; MG/100ML; MG/100ML; MG/100ML
500 INJECTION, SOLUTION INTRAVENOUS CONTINUOUS
Status: DISCONTINUED | OUTPATIENT
Start: 2019-07-17 | End: 2019-07-17 | Stop reason: HOSPADM

## 2019-07-17 RX ORDER — ERYTHROMYCIN 5 MG/G
OINTMENT OPHTHALMIC PRN
Status: DISCONTINUED | OUTPATIENT
Start: 2019-07-17 | End: 2019-07-17 | Stop reason: HOSPADM

## 2019-07-17 RX ORDER — NALOXONE HYDROCHLORIDE 0.4 MG/ML
.1-.4 INJECTION, SOLUTION INTRAMUSCULAR; INTRAVENOUS; SUBCUTANEOUS
Status: DISCONTINUED | OUTPATIENT
Start: 2019-07-17 | End: 2019-07-18 | Stop reason: HOSPADM

## 2019-07-17 RX ORDER — PROPOFOL 10 MG/ML
INJECTION, EMULSION INTRAVENOUS PRN
Status: DISCONTINUED | OUTPATIENT
Start: 2019-07-17 | End: 2019-07-17

## 2019-07-17 RX ORDER — LIDOCAINE 40 MG/G
CREAM TOPICAL
Status: DISCONTINUED | OUTPATIENT
Start: 2019-07-17 | End: 2019-07-17 | Stop reason: HOSPADM

## 2019-07-17 RX ORDER — FENTANYL CITRATE 50 UG/ML
INJECTION, SOLUTION INTRAMUSCULAR; INTRAVENOUS PRN
Status: DISCONTINUED | OUTPATIENT
Start: 2019-07-17 | End: 2019-07-17

## 2019-07-17 RX ORDER — OXYCODONE HYDROCHLORIDE 5 MG/1
5 TABLET ORAL EVERY 4 HOURS PRN
Status: DISCONTINUED | OUTPATIENT
Start: 2019-07-17 | End: 2019-07-18 | Stop reason: HOSPADM

## 2019-07-17 RX ORDER — TETRACAINE HYDROCHLORIDE 5 MG/ML
SOLUTION OPHTHALMIC PRN
Status: DISCONTINUED | OUTPATIENT
Start: 2019-07-17 | End: 2019-07-17 | Stop reason: HOSPADM

## 2019-07-17 RX ORDER — ONDANSETRON 4 MG/1
4 TABLET, ORALLY DISINTEGRATING ORAL EVERY 30 MIN PRN
Status: DISCONTINUED | OUTPATIENT
Start: 2019-07-17 | End: 2019-07-18 | Stop reason: HOSPADM

## 2019-07-17 RX ORDER — SODIUM CHLORIDE, SODIUM LACTATE, POTASSIUM CHLORIDE, CALCIUM CHLORIDE 600; 310; 30; 20 MG/100ML; MG/100ML; MG/100ML; MG/100ML
INJECTION, SOLUTION INTRAVENOUS CONTINUOUS
Status: DISCONTINUED | OUTPATIENT
Start: 2019-07-17 | End: 2019-07-18 | Stop reason: HOSPADM

## 2019-07-17 RX ORDER — PROPOFOL 10 MG/ML
INJECTION, EMULSION INTRAVENOUS CONTINUOUS PRN
Status: DISCONTINUED | OUTPATIENT
Start: 2019-07-17 | End: 2019-07-17

## 2019-07-17 RX ORDER — ERYTHROMYCIN 5 MG/G
OINTMENT OPHTHALMIC
Qty: 3.5 G | Refills: 0 | Status: SHIPPED | OUTPATIENT
Start: 2019-07-17 | End: 2020-06-18

## 2019-07-17 RX ORDER — ONDANSETRON 2 MG/ML
4 INJECTION INTRAMUSCULAR; INTRAVENOUS EVERY 30 MIN PRN
Status: DISCONTINUED | OUTPATIENT
Start: 2019-07-17 | End: 2019-07-18 | Stop reason: HOSPADM

## 2019-07-17 RX ORDER — FENTANYL CITRATE 50 UG/ML
25-50 INJECTION, SOLUTION INTRAMUSCULAR; INTRAVENOUS
Status: DISCONTINUED | OUTPATIENT
Start: 2019-07-17 | End: 2019-07-17 | Stop reason: HOSPADM

## 2019-07-17 RX ORDER — ONDANSETRON 2 MG/ML
INJECTION INTRAMUSCULAR; INTRAVENOUS PRN
Status: DISCONTINUED | OUTPATIENT
Start: 2019-07-17 | End: 2019-07-17

## 2019-07-17 RX ORDER — ACETAMINOPHEN 325 MG/1
975 TABLET ORAL ONCE
Status: COMPLETED | OUTPATIENT
Start: 2019-07-17 | End: 2019-07-17

## 2019-07-17 RX ADMIN — ONDANSETRON 4 MG: 2 INJECTION INTRAMUSCULAR; INTRAVENOUS at 14:20

## 2019-07-17 RX ADMIN — FENTANYL CITRATE 25 MCG: 50 INJECTION, SOLUTION INTRAMUSCULAR; INTRAVENOUS at 14:18

## 2019-07-17 RX ADMIN — ACETAMINOPHEN 975 MG: 325 TABLET ORAL at 12:48

## 2019-07-17 RX ADMIN — SODIUM CHLORIDE, SODIUM LACTATE, POTASSIUM CHLORIDE, CALCIUM CHLORIDE 500 ML: 600; 310; 30; 20 INJECTION, SOLUTION INTRAVENOUS at 12:50

## 2019-07-17 RX ADMIN — PROPOFOL 30 MCG/KG/MIN: 10 INJECTION, EMULSION INTRAVENOUS at 14:20

## 2019-07-17 RX ADMIN — PROPOFOL 30 MG: 10 INJECTION, EMULSION INTRAVENOUS at 14:18

## 2019-07-17 ASSESSMENT — MIFFLIN-ST. JEOR: SCORE: 1164.27

## 2019-07-17 NOTE — ANESTHESIA POSTPROCEDURE EVALUATION
Anesthesia POST Procedure Evaluation    Patient: Idalmis Abdul   MRN:     [id number] Gender:   female   Age:    73 year old :      1945        Preoperative Diagnosis: Lid Retraction   Procedure(s):  Bilateral Upper Eyelid Blepharoplasty  Bilateral Upper Eyelid Retraction Repair   Postop Comments: No value filed.       Anesthesia Type:  MAC  No value filed.    Reportable Event: NO     PAIN: Uncomplicated   Sign Out status: Comfortable, Well controlled pain     PONV: No PONV   Sign Out status:  No Nausea or Vomiting     Neuro/Psych: Uneventful perioperative course   Sign Out Status: Preoperative baseline; Age appropriate mentation     Airway/Resp.: Uneventful perioperative course   Sign Out Status: Non labored breathing, age appropriate RR; Resp. Status within EXPECTED Parameters     CV: Uneventful perioperative course   Sign Out status: Appropriate BP and perfusion indices; Appropriate HR/Rhythm     Disposition:   Sign Out in:  Phase II  Disposition:  Home  Recovery Course: Uneventful  Follow-Up: Not required           Last Anesthesia Record Vitals:  CRNA VITALS  2019 1429 - 2019 1524      2019             NIBP:  129/67    NIBP Mean:  102          Last PACU Vitals:  Vitals Value Taken Time   /73 2019  3:03 PM   Temp 36.8  C (98.2  F) 2019  3:03 PM   Pulse 77 2019  3:03 PM   Resp 16 2019  3:03 PM   SpO2 92 % 2019  3:03 PM   Temp src     NIBP 129/67 2019  3:01 PM   Pulse 79 2019  2:59 PM   SpO2 92 % 2019  2:59 PM   Resp     Temp     Ht Rate 79 2019  2:59 PM   Temp 2           Electronically Signed By: Etienne Gordon MD, 2019, 3:24 PM

## 2019-07-17 NOTE — BRIEF OP NOTE
[facility] Brief Operative Note    Pre-operative diagnosis: Lid Retraction   Post-operative diagnosis Same   Procedure: Procedure(s):  Bilateral Upper Eyelid Blepharoplasty  Bilateral Upper Eyelid Retraction Repair   Surgeon(s): Surgeon(s) and Role:     * Vasile Brasher MD - Primary     * Niall Hussein MD - Resident - Assisting     * Codi Reyna MD - Fellow - Assisting   Estimated blood loss: <2cc    Specimens: None   Findings: Please see full operative report       Codi Reyna MD  Oculoplastic Surgery Fellow

## 2019-07-17 NOTE — TELEPHONE ENCOUNTER
Routing refill request to provider for review/approval because:  Labs out of range:    Creatinine   Date Value Ref Range Status   04/05/2019 1.15 (H) 0.52 - 1.04 mg/dL Final     LOV 07/11/19 with Dr Sultana.      Delicia LEACH, BSN, RN

## 2019-07-17 NOTE — ANESTHESIA PREPROCEDURE EVALUATION
Anesthesia Pre-Procedure Evaluation    Patient: Idalmis Abdul   MRN:     [id number] Gender:   female   Age:    73 year old :      1945        Preoperative Diagnosis: Lid Retraction   Procedure(s):  Bilateral Upper Eyelid Blepharoplasty  Bilateral Upper Eyelid Retraction Repair     Past Medical History:   Diagnosis Date     ASCVD (arteriosclerotic cardiovascular disease) 2012     Benign neoplasm of duodenum, jejunum, and ileum ,     Gastrointestinal Stromal Tumor, seen at [facility], Dr. Schmitz, GI oncology-Gleevec treatment.  Surgical removal in 2004-no recurrence as of 3/05.     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     Other chronic pain     back     PONV (postoperative nausea and vomiting)      Postsurgical aortocoronary bypass status 2012     S/P CABG x 4 2012     Strabismus      Tobacco use disorder     Quit     Unspecified essential  hypertension       Past Surgical History:   Procedure Laterality Date     BYPASS GRAFT ARTERY CORONARY  6/14/2012    Procedure: BYPASS GRAFT ARTERY CORONARY;  Median Sternotomy Coronary Artery Bypass Graft  x 3        C THORACOSCOPY,DX W BX  fall 2003    benign tissue     CATARACT IOL, RT/LT      LE     CHOLECYSTECTOMY  1963     COLONOSCOPY  4-12-05     CORONARY ARTERY BYPASS      X4     CREATION PERICARDIAL WINDOW  6/15/2012    Procedure: CREATION PERICARDIAL WINDOW;  Mediastinal Exploration, Control of Bleeding;  Surgeon: Dwaine Griffin MD;  Location: [facility]     EYE SURGERY  2014    left cataract removal     GI SURGERY  2003 and 2007    GIST tumor removal     GYN SURGERY      tubal ligation     HYSTERECTOMY VAGINAL, COLPORRHAPHY ANTERIOR, POSTERIOR, COMBINED N/A 10/17/2017    Procedure: COMBINED HYSTERECTOMY VAGINAL, COLPORRHAPHY ANTERIOR, POSTERIOR;  Total Vaginal Hysterectomy and Posterior Repair,Sacrospinous Vault Suspension;  Surgeon: Ruth Farooq MD;  Location: [facility] OR     PHACOEMULSIFICATION WITH STANDARD INTRAOCULAR LENS IMPLANT  3/24/2014    Procedure: PHACOEMULSIFICATION WITH STANDARD INTRAOCULAR LENS IMPLANT;  Left Kelman Phacoemulsification with Intraocular Lens Implant;  Surgeon: Magnus Mckeon MD;  Location: [facility] OR     RECESSION RESECTION WITH ADJUSTABLE SUTURE BILATERAL Bilateral 7/14/2016    Procedure: RECESSION RESECTION WITH ADJUSTABLE SUTURE BILATERAL;  Surgeon: Erma Ordaz MD;  Location: [facility] OR     SURGICAL HISTORY OF -   1963    cholecystectomy     SURGICAL HISTORY OF -   1970's    Tubal Ligation      SURGICAL HISTORY OF -   08/03    Explor. Lap, Excision of Small Bowel Tumor      SURGICAL HISTORY OF -   4/2010    lung cancer removed right lung          Anesthesia Evaluation     . Pt has had prior anesthetic.     History of anesthetic complications   - PONV        ROS/MED HX    ENT/Pulmonary:       Neurologic:       Cardiovascular:     (+) Dyslipidemia,  "hypertension--CAD, --. : . . . :. .       METS/Exercise Tolerance:     Hematologic:         Musculoskeletal:         GI/Hepatic:     (+) GERD       Renal/Genitourinary:     (+) chronic renal disease, type: CRI,       Endo:     (+) thyroid problem .      Psychiatric:         Infectious Disease:         Malignancy:         Other:                         PHYSICAL EXAM:   Mental Status/Neuro: A/A/O   Airway: Facies: Feasible  Mallampati: II   Respiratory: Auscultation: CTAB      CV: Rhythm: Regular   Comments:                    Lab Results   Component Value Date    WBC 5.5 04/05/2019    HGB 10.3 (L) 04/05/2019    HCT 31.4 (L) 04/05/2019     04/05/2019    CRP 39.4 (H) 07/13/2012    SED 17 05/08/2017     04/05/2019    POTASSIUM 3.9 04/05/2019    CHLORIDE 103 04/05/2019    CO2 28 04/05/2019    BUN 22 04/05/2019    CR 1.15 (H) 04/05/2019     (H) 04/05/2019    NAEEM 8.7 04/05/2019    PHOS 3.4 04/05/2019    MAG 1.9 11/12/2013    MAG 1.9 11/12/2013    ALBUMIN 3.6 04/05/2019    PROTTOTAL 6.2 (L) 04/05/2019    ALT 26 04/05/2019    AST 35 04/05/2019    ALKPHOS 62 04/05/2019    BILITOTAL 0.4 04/05/2019    LIPASE 89 07/28/2014    PTT 33 07/01/2012    INR 1.01 07/12/2012    FIBR 514 (H) 07/05/2012    TSH 1.02 12/07/2018    T4 1.19 12/07/2018    T3 99 12/07/2018       Preop Vitals  BP Readings from Last 3 Encounters:   07/17/19 152/86   07/11/19 136/68   07/03/19 144/80    Pulse Readings from Last 3 Encounters:   07/17/19 74   07/11/19 71   07/03/19 78      Resp Readings from Last 3 Encounters:   07/17/19 12   07/11/19 12   07/03/19 18    SpO2 Readings from Last 3 Encounters:   07/17/19 96%   07/11/19 97%   07/03/19 98%      Temp Readings from Last 1 Encounters:   07/17/19 36.5  C (97.7  F) (Oral)    Ht Readings from Last 1 Encounters:   07/17/19 1.588 m (5' 2.5\")      Wt Readings from Last 1 Encounters:   07/17/19 70.3 kg (155 lb)    Estimated body mass index is 27.9 kg/m  as calculated from the following:    " "Height as of this encounter: 1.588 m (5' 2.5\").    Weight as of this encounter: 70.3 kg (155 lb).     LDA:            Assessment:   ASA SCORE: 3    NPO Status: > 6 hours since completed Solid Foods   Documentation: H&P complete; Preop Testing complete; Consents complete   Proceeding: Proceed without further delay  Tobacco Use:  NO Active use of Tobacco/UNKNOWN Tobacco use status     Plan:   Anes. Type:  MAC      Induction:  Not applicable   Airway: Native Airway   Access/Monitoring: PIV   Maintenance: N/a   Emergence: N/a   Logistics: Same Day Surgery     Postop Pain/Sedation Strategy:  Standard-Options: Opioids PRN     PONV Management:  Adult Risk Factors: Female, H/o PONV or Motion Sickness, Non-Smoker, Postop Opioids  Prevention: Ondansetron     CONSENT: Direct conversation   Plan and risks discussed with: Patient   Blood Products: Consent Deferred (Minimal Blood Loss)                         Etienne Gordon MD  "

## 2019-07-17 NOTE — OP NOTE
PREOPERATIVE DIAGNOSIS: Retraction and dermatochalasis, bilateral upper lid.   POSTOPERATIVE DIAGNOSIS:  Retraction and dermatochalasis,bilateral upper lid.   PROCEDURE:Bilateral upper eyelid recession and blepharoplasty  SURGEON: Vasile Brasher MD   ASSISTANT:Codi Reyna MD and Niall Hussein MD   ANESTHESIA: Monitored with local infiltration of a 1% lidocaine with epinephrine [id number]. .   COMPLICATIONS: None.   ESTIMATED BLOOD LOSS: Less than 5 mL.   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 She also had heavy dermatochalasis resting on her lashes.  After the risks, benefits and alternatives to the proposed procedure were explained, informed consent was obtained.   DESCRIPTION OF PROCEDURE: Idalmis Abdul  was brought to the operating room and placed supine on the operating table. Intravenous sedation was given. The bilateral upper lid crease and excess upper eyelid skin was marked with a marking pen and infiltrated with local anesthetic. The area was prepped and draped in the typical sterile ophthalmic fashion. Attention was directed to the right  side. The lid crease and excess upper lid tissue incision was made with a 15 blade. The skin was excised with the high temperature cautery.  Dissection was carried down to the orbicularis with high temperature cautery. The orbital septum was opened horizontally. The levator aponeurosis was identified and dissected from the superior tarsal border and the underlying Herring's muscleto bring the lid into a normal height and contour. The patient was asked to open the eye and the recession adjusted for height and contour.  The skin was closed with with running 6-0 plain gut sutures.  Ophthalmic ointment was applied to the incision. Attention was directed to the left side where the same procedure was performed. The patient tolerated the procedure well and left the operating  room in stable condition.     MARIBETH RAJPUT MD

## 2019-07-17 NOTE — DISCHARGE INSTRUCTIONS
Post-operative Instructions    Ophthalmic Plastic and Reconstructive Surgery  Vasile Brasher M.D.  Ilsa Friedman M.D.  Codi Reyna M.D.    All instructions apply to the operated eye(s) or eyelid(s)      What to expect after surgery:    There will be some swelling, bruising, and likely a black eye (even into the lower eyelids and cheeks). Also expect crusting and discharge from the eye and/or incisions.     A small amount of surface bleeding is normal for the first 48 hours after surgery.    You may notice some bloody tears for the first few days after surgery. This is normal.    Your eye(s) and eyelid(s) may be painful and tender. This is normal after surgery. Use the pain medication as prescribed. If your pain does not improve despite the medication, contact the office.    Wound care and personal care:    If a patch or bandage has been placed, please leave this in place until seen in clinic. Prevent the bandage from getting wet.     Apply ice compresses 15 minutes on 15 minutes off while awake for the first 2 days after surgery, then switch to warm compresses 4 times a day until seen by your physician.     For warm packs you can place a cup of dry uncooked rice in a clean cotton sock. Place sock in microwave 30 seconds to one minute. Next place the warm sock into a plastic bag and wrap the bag with clean warm wet washcloth and place over operated eye.      You may shower or wash your hair the day after surgery. Do not bathe or go swimming for 1 week to prevent contamination of your wounds.    Do not apply make-up to the eyes or eyelids for 2 weeks after surgery.      Activity restrictions and driving:    Avoid heavy lifting, bending, exercise or strenuous activity for 1 week after surgery.    You may resume other activities and return to work as tolerated.    You may not resume driving until have you stopped using narcotic pain medications(such as Norco, Percocet, Tylenol  #3).    Medications:    Restart all your regular home medications and eye drops today. If you take Plavix or Aspirin on a regular basis, wait for 3 days after your surgery before restarting these in order to decrease the risk of bleeding complications.    Avoid aspirin and aspirin-like medications (Motrin, Aleve, Ibuprofen, [place] etc) for 5 days to reduce the risk of bleeding. You may take Tylenol (acetaminophen) for pain.    In addition to your home medications, take the following post-operative medications as prescribed by your physician:    Apply antibiotic ointment (erythromycin) to all sutures three times a day, and into the operated eye(s) at night.     Take 1 to 2 pain pills (norco or tylenol 3 as prescribed) as needed for pain up to every 4 hours.    The pain pills may make you drowsy. You must not drive a car, operate heavy machinery or drink alcohol while taking them.    The pain pills may cause constipation and nausea. Take them with some food to prevent a stomach upset. If you continue to experience nausea, call your physician.      WARNING: All the prescription pain medications listed above contain Tylenol (acetaminophen). You must not take more than 4,000 mg of acetaminophen per 24-hour period. This is equivalent to 6 tablets of Darvocet, 8 tablets of Vicodin, or 12 tablets of Norco, Percocet or Tylenol #3. If you take other over-the-counter medications containing acetaminophen, you must take the amount of acetaminophen into account and reduce the number of prescribed pain pills accordingly.    Contact information and follow-up:    Return to the Eye Clinic for a follow-up appointment with your physician as  scheduled. If no appointment has been scheduled, call [phone] for an  appointment with Dr. Brasher within 1 to 2 weeks from your date of surgery.      For severe pain, bleeding, or loss of vision, call the Eye Clinic at [phone].    After hours or on weekends and holidays, call  [phone] and ask to speak with the ophthalmologist on call.      [facility]  Home Care Following Anesthesia  For 24 hours after surgery:  1. Get plenty of rest.  A responsible adult must stay with you for at least 24 hours after you leave the surgery center.  2. Do not drive or use heavy equipment.  If you have weakness or tingling, don't drive or use heavy equipment until this feeling goes away.   3. Do not drink alcohol.   4. Avoid strenuous or risky activities.  Ask for help when climbing stairs.  5. You may feel lightheaded.  IF so, sit for a few minutes before standing.  Have someone help you get up.   6. If you have nausea (feel sick to your stomach): Drink only clear liquids such as apple juice, ginger ale, broth or 7-Up.  Rest may also help.  Be sure to drink enough fluids.  Move to a regular diet as you feel able.   7. You may have a slight fever.  Call the doctor if your fever is over 100 F (37.7 C) (taken under the tongue) or lasts longer than 24 hours.  8. You may have a dry mouth, a sore throat, muscle aches or trouble sleeping. These should go away after 24 hours.  9. Do not make important or legal decisions.        Tips for taking pain medications  To get the best pain relief possible, remember these points:    Take pain medications as directed, before pain becomes severe.    Pain medication can upset your stomach: taking it with food may help.    Constipation is a common side effect of pain medication. Drink plenty of  fluids.    Eat foods high in fiber. Take a stool softener if recommended by your doctor or pharmacist.    Do not drink alcohol, drive or operate machinery while taking pain medications.    Ask about other ways to control pain, such as with heat, ice or relaxation.    Tylenol/Acetaminophen Consumption  To help encourage the safe use of acetaminophen, the makers of TYLENOL  have lowered the maximum daily dose for single-ingredient Extra Strength  TYLENOL  (acetaminophen) products sold in the [place] from 8 pills per day (4,000 mg) to 6 pills per day (3,000 mg). The dosing interval has also changed from 2 pills every 4-6 hours to 2 pills every 6 hours.    If you feel your pain relief is insufficient, you may take Tylenol/Acetaminophen in addition to your narcotic pain medication.     Be careful not to exceed 3,000 mg of Tylenol/Acetaminophen in a 24 hour period from all sources.    If you are taking extra strength Tylenol/acetaminophen (500 mg), the maximum dose is 6 tablets in 24 hours.    If you are taking regular strength acetaminophen (325 mg), the maximum dose is 9 tablets in 24 hours.    Call a doctor for any of the followin. Signs of infection (fever, growing tenderness at the surgery site, a large amount of drainage or bleeding, severe pain, foul-smelling drainage, redness, swelling).  2. It has been over 8 to 10 hours since surgery and you are still not able to urinate (pass water).  3. Headache for over 24 hours.  Your doctor is:  Dr. Vasile Brasher, Ophthalmology: [phone]                  Or dial [phone] and ask for the resident on call for:  Ophthalmology  For emergency care, call the:  [facility]:  [phone] (TTY for hearing impaired: [phone])

## 2019-07-17 NOTE — ANESTHESIA CARE TRANSFER NOTE
Patient: Idalmis Abdul    Procedure(s):  Bilateral Upper Eyelid Blepharoplasty  Bilateral Upper Eyelid Retraction Repair    Diagnosis: Lid Retraction  Diagnosis Additional Information: No value filed.    Anesthesia Type:   No value filed.     Note:  Airway :Room Air  Patient transferred to:Phase II  Comments:   Transferred to:Phase II      Patient vital signs: stable    Airway: none    Monitors and alarms on; PIV intact and patent; in recliner; awake; report given, VSS      Odalys Bojorquez CRNA, APRN    7/17/2019 3:05 PM Handoff Report: Identifed the Patient, Identified the Reponsible Provider, Reviewed the pertinent medical history, Discussed the surgical course, Reviewed Intra-OP anesthesia mangement and issues during anesthesia, Set expectations for post-procedure period and Allowed opportunity for questions and acknowledgement of understanding      Vitals: (Last set prior to Anesthesia Care Transfer)    CRNA VITALS  7/17/2019 1429 - 7/17/2019 1504      7/17/2019             NIBP:  129/67    NIBP Mean:  102                Electronically Signed By: JAIME Horton CRNA  July 17, 2019  3:04 PM

## 2019-07-18 ENCOUNTER — TELEPHONE (OUTPATIENT)
Dept: OPHTHALMOLOGY | Facility: CLINIC | Age: 74
End: 2019-07-18

## 2019-07-18 RX ORDER — LISINOPRIL 40 MG/1
TABLET ORAL
Qty: 90 TABLET | Refills: 3 | Status: SHIPPED | OUTPATIENT
Start: 2019-07-18 | End: 2020-06-18

## 2019-07-18 NOTE — TELEPHONE ENCOUNTER
Telephone call to Idalmis Abdul    Doing well with no pain, good vision, and no bleeding. All questions were answered, she is doing well, and postoperative care was reviewed.  A postop appointment has been scheduled.    Codi Reyna MD  Oculoplastic Surgery Fellow

## 2019-07-22 NOTE — ADDENDUM NOTE
Addendum  created 07/22/19 0654 by Odalys Bojorquez APRN CRNA    Intraprocedure Meds edited, Orders acknowledged in Narrator

## 2019-07-29 ENCOUNTER — OFFICE VISIT (OUTPATIENT)
Dept: OPHTHALMOLOGY | Facility: CLINIC | Age: 74
End: 2019-07-29
Payer: MEDICARE

## 2019-07-29 DIAGNOSIS — Z98.890 POSTOPERATIVE EYE STATE: Primary | ICD-10-CM

## 2019-07-29 ASSESSMENT — VISUAL ACUITY
METHOD: SNELLEN - LINEAR
OS_CC+: +1
OS_PH_CC: 20/60
CORRECTION_TYPE: GLASSES
OS_CC: 20/25
OS_PH_CC+: -1
OD_CC: 20/80
OD_CC+: -1

## 2019-07-29 ASSESSMENT — CONF VISUAL FIELD
OD_NORMAL: 1
OS_NORMAL: 1
METHOD: COUNTING FINGERS

## 2019-07-29 ASSESSMENT — TONOMETRY
IOP_METHOD: ICARE
OS_IOP_MMHG: 11
OD_IOP_MMHG: 14

## 2019-07-29 NOTE — PROGRESS NOTES
"Chief Complaints and History of Present Illnesses   Patient presents with     Post Op (Ophthalmology) Both Eyes     Chief Complaint(s) and History of Present Illness(es)     Post Op (Ophthalmology) Both Eyes     In both eyes.  This started 10 days ago.  Charactertized as  blurred   vision.  Severity is mild.  Occurring constantly.  Since onset it is   stable.  Associated symptoms include eye pain.  Negative for dryness and   tearing.  Treatments tried include artificial tears.  Response to   treatment was mild improvement.  Pain was noted as 8/10.              Comments     Bilateral Upper Eyelid Blepharoplasty and Bilateral Upper Eyelid   Retraction Repair F/U from 7/17/2019.  Pt reports pain in RE for 3 years.    Pt reports pain can get up to 8/10.  Pt reports no pain in lids BE.  Upper   lids feel \"different and numb\".  Pt has no other concerns.    Messi Serna July 29, 2019 9:04 AM             Assessment & Plan     Idalmis Abdul is a 73 year old female with the following diagnoses:   1. Postoperative eye state       Warm soaks  Ointment to suture line twice a day    Return to clinic 6 weeks  Schedule appt with Dr. Mckeon for posterior capsular opacity (PCO) and refraction          Attending Physician Attestation:  Complete documentation of historical and exam elements from today's encounter can be found in the full encounter summary report (not reduplicated in this progress note).  I personally obtained the chief complaint(s) and history of present illness.  I confirmed and edited as necessary the review of systems, past medical/surgical history, family history, social history, and examination findings as documented by others; and I examined the patient myself.  I personally reviewed the relevant tests, images, and reports as documented above.  I formulated and edited as necessary the assessment and plan and discussed the findings and management plan with the patient and family. I personally reviewed the " ophthalmic test(s) associated with this encounter, agree with the interpretation(s) as documented by the resident/fellow, and have edited the corresponding report(s) as necessary.   -Vasile Brasher MD  9:45 AM 7/29/2019

## 2019-07-29 NOTE — NURSING NOTE
"Chief Complaints and History of Present Illnesses   Patient presents with     Post Op (Ophthalmology) Both Eyes     Chief Complaint(s) and History of Present Illness(es)     Post Op (Ophthalmology) Both Eyes     Laterality: both eyes    Onset: 10 days ago    Quality: blurred vision    Severity: mild    Frequency: constantly    Course: stable    Associated symptoms: eye pain.  Negative for dryness and tearing    Treatments tried: artificial tears    Response to treatment: mild improvement    Pain scale: 8/10              Comments     Bilateral Upper Eyelid Blepharoplasty and Bilateral Upper Eyelid Retraction Repair F/U from 7/17/2019.  Pt reports pain in RE for 3 years.  Pt reports pain can get up to 8/10.  Pt reports no pain in lids BE.  Upper lids feel \"different and numb\".  Pt has no other concerns.    Messi Serna July 29, 2019 9:04 AM                  "

## 2019-08-06 DIAGNOSIS — I25.10 CAD (CORONARY ARTERY DISEASE): ICD-10-CM

## 2019-08-06 DIAGNOSIS — I25.10 ASCVD (ARTERIOSCLEROTIC CARDIOVASCULAR DISEASE): Primary | ICD-10-CM

## 2019-08-06 DIAGNOSIS — Z95.1 S/P CABG X 4: ICD-10-CM

## 2019-08-13 ENCOUNTER — TELEPHONE (OUTPATIENT)
Dept: OPHTHALMOLOGY | Facility: CLINIC | Age: 74
End: 2019-08-13

## 2019-08-13 NOTE — TELEPHONE ENCOUNTER
July 17th [id number] repair bilateral  Last visit 7-29-19  Right eye pain followed couple days after last visit  Pain that shoots to various of right eye-- can't open eye  Last few hours and sometimes for 1/2 hour.  No new swelling-- on medication Gleevec which causes some swelling around eye    Vision not worse or improved  No redness  No photophobia    Pt seen by local eye doctor and contact lens place on eye for scratch on cornea-- last weds.  Lens removed next day  Pt using artificial tears     Scheduled appt this Friday with Dr. Brasher at [facility]  Pt aware of date/time/location  Note to oculoplastics fellow for review  Jonathon Quispe RN 4:31 PM 08/13/19                 [facility] Center    Phone Message    May a detailed message be left on voicemail: yes    Reason for Call: Symptoms or Concerns     If patient has red-flag symptoms, warm transfer to triage line    Current symptom or concern: Pt is 2 weeks POST OP with Dr Brasher and is continuing to have a lot of pain.  Pt would like a call back to to discuss symptoms and possibly talk about getting in to see Dr Brasher earlier than her scheduled appt.     Symptoms have been present for:  2+ week(s)    Has patient previously been seen for this? No    By Vasile Brasher MD    Date: 7/29/19    Are there any new or worsening symptoms? Yes: Pain      Action Taken: Message routed to:  [facility] ([facility]): eye clinic

## 2019-08-16 ENCOUNTER — TELEPHONE (OUTPATIENT)
Dept: OPHTHALMOLOGY | Facility: CLINIC | Age: 74
End: 2019-08-16

## 2019-08-16 ENCOUNTER — OFFICE VISIT (OUTPATIENT)
Dept: OPHTHALMOLOGY | Facility: CLINIC | Age: 74
End: 2019-08-16
Payer: MEDICARE

## 2019-08-16 DIAGNOSIS — H02.003 ENTROPION OF RIGHT EYELID: Primary | ICD-10-CM

## 2019-08-16 DIAGNOSIS — H02.539 EYELID RETRACTION, UNSPECIFIED LATERALITY: ICD-10-CM

## 2019-08-16 ASSESSMENT — VISUAL ACUITY
OD_CC+: +
METHOD: SNELLEN - LINEAR
OS_CC: 20/20
OD_CC: 20/60
OS_CC+: -2
CORRECTION_TYPE: GLASSES

## 2019-08-16 ASSESSMENT — TONOMETRY
OS_IOP_MMHG: 13
IOP_METHOD: ICARE
OD_IOP_MMHG: 15

## 2019-08-16 NOTE — NURSING NOTE
Chief Complaints and History of Present Illnesses   Patient presents with     Post Op (Ophthalmology) Both Eyes     Bilateral Upper Eyelid Blepharoplasty Bilateral, Bilateral Upper Eyelid Retraction Repair 7/17/19.           Chief Complaint(s) and History of Present Illness(es)     Post Op (Ophthalmology) Both Eyes     Laterality: right eye    Onset: sudden    Onset: 2 weeks ago    Quality: blurred vision    Severity: moderate    Frequency: constantly    Timing: throughout the day    Course: gradually worsening    Associated symptoms: eye pain and tearing    Treatments tried: artificial tears    Pain scale: 4/10    Comments: Bilateral Upper Eyelid Blepharoplasty Bilateral, Bilateral Upper Eyelid Retraction Repair 7/17/19.                    Comments     S/p Bilateral Upper Eyelid Blepharoplasty Bilateral, Bilateral Upper Eyelid Retraction Repair 7/17/19. Right eye pain with tearing for last 2 weeks. Has been ongoing for years but happening every day now. Vision is also blurred right eye. Using Refresh and PM Refresh    Michelle Greene COT 9:46 AM August 16, 2019

## 2019-08-16 NOTE — PROGRESS NOTES
Chief Complaints and History of Present Illnesses   Patient presents with     Post Op (Ophthalmology) Both Eyes     Bilateral Upper Eyelid Blepharoplasty Bilateral, Bilateral Upper Eyelid Retraction Repair 7/17/19.           Chief Complaint(s) and History of Present Illness(es)     Post Op (Ophthalmology) Both Eyes     In right eye.  Onset was sudden.  This started 2 weeks ago.    Charactertized as  blurred vision.  Severity is moderate.  Occurring   constantly.  It is worse throughout the day.  Since onset it is gradually   worsening.  Associated symptoms include eye pain and tearing.  Treatments   tried include artificial tears.  Pain was noted as 4/10. Additional   comments: Bilateral Upper Eyelid Blepharoplasty Bilateral, Bilateral Upper   Eyelid Retraction Repair 7/17/19.       Comments     S/p Bilateral Upper Eyelid Blepharoplasty Bilateral, Bilateral Upper   Eyelid Retraction Repair 7/17/19. Right eye pain with tearing for last 2   weeks. Has been ongoing for years but happening every day now. Vision is   also blurred right eye. Using Refresh and PM Refresh    Michelle Greene COT 9:46 AM August 16, 2019                 Assessment & Plan     Idalmis Abdul is a 73 year old female with the following diagnoses:   1. Entropion of right eyelid    2. Eyelid retraction, unspecified laterality         +periorbital edema due to Gleevec  +Thyroid eye disease     3 year history of right eye pain  Last week had a bandage lens placed with some relief   Still with daily pain right eye         PLAN:  Right upper lid entropion repair (skin + levator recess + lash rotation)              Attending Physician Attestation:  Complete documentation of historical and exam elements from today's encounter can be found in the full encounter summary report (not reduplicated in this progress note).  I personally obtained the chief complaint(s) and history of present illness.  I confirmed and edited as necessary the review of systems,  past medical/surgical history, family history, social history, and examination findings as documented by others; and I examined the patient myself.  I personally reviewed the relevant tests, images, and reports as documented above.  I formulated and edited as necessary the assessment and plan and discussed the findings and management plan with the patient and family. I personally reviewed the ophthalmic test(s) associated with this encounter, agree with the interpretation(s) as documented by the resident/fellow, and have edited the corresponding report(s) as necessary.   -Vasile Brasher MD  10:29 AM 8/16/2019    Today with Idalmis Abdul  and her son, I reviewed the indications, risks, benefits, and alternatives of the proposed surgical procedure including, but not limited to, failure obtain the desired result  and need for additional surgery, bleeding, infection, loss of vision, loss of the eye, and the remote possibility of permanent damage to any organ system or death with the use of anesthesia.  I provided multiple opportunities for the questions, answered all questions to the best of my ability, and confirmed that my answers and my discussion were understood.   - Vasile Brasher MD 10:34 AM 8/16/2019

## 2019-08-16 NOTE — PATIENT INSTRUCTIONS
ENTROPION    Entropion is a condition in which the eyelid is rolled inward toward the eye. It can occur as a result of advancing age and weakening of certain eyelid muscles. Entropion may also occur as a result of trauma, scarring, or previous surgeries. Entropion may also occur in children.     A turned in eyelid rubs against the eye, making it red, irritated, painful, and sensitive to light and wind. If it is not treated the condition can lead to excessive tearing, mucous discharge and scratching or scarring of the cornea. A chronically turned in eyelid can result in acute sensitivity to light and may lead to eye infections, corneal abrasions, or corneal ulcers. If entropion exists, it is important to have a doctor repair the condition before permanent damage to the eye occurs.     Surgical Repair     There are a number of surgical techniques for successfully treating entropion. The most common surgical treatment involves tightening of the eyelid and its attachments to restore the lid to its normal position. The surgery to repair entropion is usually performed as an outpatient procedure under local anesthesia. Patients recovery quickly using an antibiotic ointment for about one week after surgery. Most patients experience immediate resolution of the problem following surgery.     Non-Incisional Repair     A non-incisional entropion repair, using sutures, may be performed as an in-office procedure under local anesthesia. This procedure requires several strategically placed sutures that jolene the eyelid. This procedure is an excellent treatment for patients who are not suitable for surgery, or until more definitive surgery can be performed.     Who Should Perform The Surgery?     When choosing a surgeon to perform entropion surgery, look for a cosmetic and reconstructive surgeon who specializes in the eyelids, orbit, and tear drain system. Dr. Brasher's membership in the American Society of Ophthalmic Plastic and  Reconstructive Surgery (ASOPRS) indicates he or she is not only a board certified ophthalmologist who knows the anatomy and structure of the eyelids and orbit, but also has had extensive training in ophthalmic plastic reconstructive and cosmetic surgery.

## 2019-08-16 NOTE — TELEPHONE ENCOUNTER
Met with patient to schedule surgery with Dr. Vasile Brasher.    Surgery was scheduled on 08/21 at [facility]  Patient will have H&P at: Dr. Brasher will update DOS  Post-Op care appointment was scheduled on 09/09  Patient is aware a / is needed day of surgery.   Patient received surgery packet has my direct contact information for any further questions.

## 2019-08-19 DIAGNOSIS — E04.1 THYROID NODULE: ICD-10-CM

## 2019-08-19 DIAGNOSIS — E05.00 GRAVES' DISEASE: Primary | ICD-10-CM

## 2019-08-19 DIAGNOSIS — M81.0 AGE-RELATED OSTEOPOROSIS WITHOUT CURRENT PATHOLOGICAL FRACTURE: ICD-10-CM

## 2019-08-20 ENCOUNTER — ANESTHESIA EVENT (OUTPATIENT)
Dept: SURGERY | Facility: AMBULATORY SURGERY CENTER | Age: 74
End: 2019-08-20

## 2019-08-21 ENCOUNTER — ANESTHESIA (OUTPATIENT)
Dept: SURGERY | Facility: AMBULATORY SURGERY CENTER | Age: 74
End: 2019-08-21

## 2019-08-21 ENCOUNTER — HOSPITAL ENCOUNTER (OUTPATIENT)
Facility: AMBULATORY SURGERY CENTER | Age: 74
End: 2019-08-21
Attending: OPHTHALMOLOGY
Payer: MEDICARE

## 2019-08-21 VITALS
RESPIRATION RATE: 16 BRPM | TEMPERATURE: 97.3 F | BODY MASS INDEX: 27.46 KG/M2 | SYSTOLIC BLOOD PRESSURE: 129 MMHG | DIASTOLIC BLOOD PRESSURE: 62 MMHG | HEIGHT: 63 IN | HEART RATE: 77 BPM | WEIGHT: 155 LBS | OXYGEN SATURATION: 96 %

## 2019-08-21 DIAGNOSIS — Z98.890 POSTOPERATIVE EYE STATE: Primary | ICD-10-CM

## 2019-08-21 RX ORDER — NALOXONE HYDROCHLORIDE 0.4 MG/ML
.1-.4 INJECTION, SOLUTION INTRAMUSCULAR; INTRAVENOUS; SUBCUTANEOUS
Status: DISCONTINUED | OUTPATIENT
Start: 2019-08-21 | End: 2019-08-22 | Stop reason: HOSPADM

## 2019-08-21 RX ORDER — PROPOFOL 10 MG/ML
INJECTION, EMULSION INTRAVENOUS CONTINUOUS PRN
Status: DISCONTINUED | OUTPATIENT
Start: 2019-08-21 | End: 2019-08-21

## 2019-08-21 RX ORDER — PROPOFOL 10 MG/ML
INJECTION, EMULSION INTRAVENOUS PRN
Status: DISCONTINUED | OUTPATIENT
Start: 2019-08-21 | End: 2019-08-21

## 2019-08-21 RX ORDER — SODIUM CHLORIDE 9 MG/ML
INJECTION, SOLUTION INTRAVENOUS CONTINUOUS
Status: DISCONTINUED | OUTPATIENT
Start: 2019-08-21 | End: 2019-08-21 | Stop reason: HOSPADM

## 2019-08-21 RX ORDER — ONDANSETRON 2 MG/ML
4 INJECTION INTRAMUSCULAR; INTRAVENOUS EVERY 30 MIN PRN
Status: DISCONTINUED | OUTPATIENT
Start: 2019-08-21 | End: 2019-08-22 | Stop reason: HOSPADM

## 2019-08-21 RX ORDER — ERYTHROMYCIN 5 MG/G
0.5 OINTMENT OPHTHALMIC 3 TIMES DAILY
Qty: 3.5 G | Refills: 0 | Status: SHIPPED | OUTPATIENT
Start: 2019-08-21 | End: 2019-09-12

## 2019-08-21 RX ORDER — LIDOCAINE 40 MG/G
CREAM TOPICAL
Status: DISCONTINUED | OUTPATIENT
Start: 2019-08-21 | End: 2019-08-21 | Stop reason: HOSPADM

## 2019-08-21 RX ORDER — OXYCODONE HYDROCHLORIDE 5 MG/1
5 TABLET ORAL EVERY 4 HOURS PRN
Status: DISCONTINUED | OUTPATIENT
Start: 2019-08-21 | End: 2019-08-22 | Stop reason: HOSPADM

## 2019-08-21 RX ORDER — SODIUM CHLORIDE, SODIUM LACTATE, POTASSIUM CHLORIDE, CALCIUM CHLORIDE 600; 310; 30; 20 MG/100ML; MG/100ML; MG/100ML; MG/100ML
INJECTION, SOLUTION INTRAVENOUS CONTINUOUS
Status: DISCONTINUED | OUTPATIENT
Start: 2019-08-21 | End: 2019-08-22 | Stop reason: HOSPADM

## 2019-08-21 RX ORDER — ERYTHROMYCIN 5 MG/G
OINTMENT OPHTHALMIC PRN
Status: DISCONTINUED | OUTPATIENT
Start: 2019-08-21 | End: 2019-08-21 | Stop reason: HOSPADM

## 2019-08-21 RX ORDER — MEPERIDINE HYDROCHLORIDE 25 MG/ML
12.5 INJECTION INTRAMUSCULAR; INTRAVENOUS; SUBCUTANEOUS
Status: DISCONTINUED | OUTPATIENT
Start: 2019-08-21 | End: 2019-08-22 | Stop reason: HOSPADM

## 2019-08-21 RX ORDER — LIDOCAINE HYDROCHLORIDE AND EPINEPHRINE 10; 10 MG/ML; UG/ML
INJECTION, SOLUTION INFILTRATION; PERINEURAL PRN
Status: DISCONTINUED | OUTPATIENT
Start: 2019-08-21 | End: 2019-08-21 | Stop reason: HOSPADM

## 2019-08-21 RX ORDER — TETRACAINE HYDROCHLORIDE 5 MG/ML
SOLUTION OPHTHALMIC PRN
Status: DISCONTINUED | OUTPATIENT
Start: 2019-08-21 | End: 2019-08-21 | Stop reason: HOSPADM

## 2019-08-21 RX ORDER — LIDOCAINE HYDROCHLORIDE 20 MG/ML
INJECTION, SOLUTION INFILTRATION; PERINEURAL PRN
Status: DISCONTINUED | OUTPATIENT
Start: 2019-08-21 | End: 2019-08-21

## 2019-08-21 RX ORDER — FENTANYL CITRATE 50 UG/ML
25-50 INJECTION, SOLUTION INTRAMUSCULAR; INTRAVENOUS EVERY 5 MIN PRN
Status: DISCONTINUED | OUTPATIENT
Start: 2019-08-21 | End: 2019-08-21 | Stop reason: HOSPADM

## 2019-08-21 RX ORDER — HYDROCODONE BITARTRATE AND ACETAMINOPHEN 5; 325 MG/1; MG/1
1 TABLET ORAL EVERY 4 HOURS PRN
Qty: 10 TABLET | Refills: 0 | Status: SHIPPED | OUTPATIENT
Start: 2019-08-21 | End: 2019-09-12

## 2019-08-21 RX ORDER — ONDANSETRON 4 MG/1
4 TABLET, ORALLY DISINTEGRATING ORAL EVERY 30 MIN PRN
Status: DISCONTINUED | OUTPATIENT
Start: 2019-08-21 | End: 2019-08-22 | Stop reason: HOSPADM

## 2019-08-21 RX ORDER — FENTANYL CITRATE 50 UG/ML
INJECTION, SOLUTION INTRAMUSCULAR; INTRAVENOUS PRN
Status: DISCONTINUED | OUTPATIENT
Start: 2019-08-21 | End: 2019-08-21

## 2019-08-21 RX ORDER — ACETAMINOPHEN 325 MG/1
975 TABLET ORAL ONCE
Status: COMPLETED | OUTPATIENT
Start: 2019-08-21 | End: 2019-08-21

## 2019-08-21 RX ADMIN — OXYCODONE HYDROCHLORIDE 5 MG: 5 TABLET ORAL at 09:52

## 2019-08-21 RX ADMIN — LIDOCAINE HYDROCHLORIDE 60 MG: 20 INJECTION, SOLUTION INFILTRATION; PERINEURAL at 08:49

## 2019-08-21 RX ADMIN — SODIUM CHLORIDE: 9 INJECTION, SOLUTION INTRAVENOUS at 08:43

## 2019-08-21 RX ADMIN — PROPOFOL 75 MCG/KG/MIN: 10 INJECTION, EMULSION INTRAVENOUS at 08:49

## 2019-08-21 RX ADMIN — ACETAMINOPHEN 975 MG: 325 TABLET ORAL at 07:35

## 2019-08-21 RX ADMIN — PROPOFOL 50 MG: 10 INJECTION, EMULSION INTRAVENOUS at 08:51

## 2019-08-21 RX ADMIN — FENTANYL CITRATE 25 MCG: 50 INJECTION, SOLUTION INTRAMUSCULAR; INTRAVENOUS at 08:49

## 2019-08-21 ASSESSMENT — MIFFLIN-ST. JEOR: SCORE: 1164.27

## 2019-08-21 NOTE — ANESTHESIA PREPROCEDURE EVALUATION
Anesthesia Pre-Procedure Evaluation    Patient: Idalmis Abdul   MRN:     [id number] Gender:   female   Age:    73 year old :      1945        Preoperative Diagnosis: Lid Retraction   Procedure(s):  Bilateral Upper Eyelid Blepharoplasty  Bilateral Upper Eyelid Retraction Repair     Past Medical History:   Diagnosis Date     ASCVD (arteriosclerotic cardiovascular disease) 2012     Benign neoplasm of duodenum, jejunum, and ileum ,     Gastrointestinal Stromal Tumor, seen at [facility], Dr. Schmitz, GI oncology-Gleevec treatment.  Surgical removal in 2004-no recurrence as of 3/05.     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     Other chronic pain     back     PONV (postoperative nausea and vomiting)      Postsurgical aortocoronary bypass status 2012     S/P CABG x 4 2012     Strabismus      Tobacco use disorder     Quit     Unspecified essential  hypertension       Past Surgical History:   Procedure Laterality Date     BLEPHAROPLASTY BILATERAL Bilateral 7/17/2019    Procedure: Bilateral Upper Eyelid Blepharoplasty;  Surgeon: Vasile Brasher MD;  Location: [facility] OR     BYPASS GRAFT ARTERY CORONARY  6/14/2012    Procedure: BYPASS GRAFT ARTERY CORONARY;  Median Sternotomy Coronary Artery Bypass Graft  x 3        C THORACOSCOPY,DX W BX  fall 2003    benign tissue     CATARACT IOL, RT/LT      LE     CHOLECYSTECTOMY  1963     COLONOSCOPY  4-12-05     CORONARY ARTERY BYPASS      X4     CREATION PERICARDIAL WINDOW  6/15/2012    Procedure: CREATION PERICARDIAL WINDOW;  Mediastinal Exploration, Control of Bleeding;  Surgeon: Dwaine Griffin MD;  [facility]: [facility] OR     EYE SURGERY  2014    left cataract removal     GI SURGERY  2003 and 2007    GIST tumor removal     GYN SURGERY      tubal ligation     HYSTERECTOMY VAGINAL, COLPORRHAPHY ANTERIOR, POSTERIOR, COMBINED N/A 10/17/2017    Procedure: COMBINED HYSTERECTOMY VAGINAL, COLPORRHAPHY ANTERIOR, POSTERIOR;  Total Vaginal Hysterectomy and Posterior Repair,Sacrospinous Vault Suspension;  Surgeon: Ruth Farooq MD;  Location: [facility]     PHACOEMULSIFICATION WITH STANDARD INTRAOCULAR LENS IMPLANT  3/24/2014    Procedure: PHACOEMULSIFICATION WITH STANDARD INTRAOCULAR LENS IMPLANT;  Left Kelman Phacoemulsification with Intraocular Lens Implant;  Surgeon: Magnus Mckeon MD;  Location: [facility] OR     RECESSION RESECTION WITH ADJUSTABLE SUTURE BILATERAL Bilateral 7/14/2016    Procedure: RECESSION RESECTION WITH ADJUSTABLE SUTURE BILATERAL;  Surgeon: Erma Ordaz MD;  Location: [facility] OR     REPAIR RETRACTION LID BILATERAL Bilateral 7/17/2019    Procedure: Bilateral Upper Eyelid Retraction Repair;  Surgeon: Vasile Brasher MD;  Location: [facility] OR     SURGICAL HISTORY OF -   1963    cholecystectomy     SURGICAL HISTORY OF -   1970's    Tubal Ligation      SURGICAL HISTORY OF -   08/03    Cherelle. Veerna,  Excision of Small Bowel Tumor      SURGICAL HISTORY OF -   4/2010    lung cancer removed right lung          Anesthesia Evaluation     . Pt has had prior anesthetic.     History of anesthetic complications   - PONV        ROS/MED HX    ENT/Pulmonary:       Neurologic:       Cardiovascular:     (+) Dyslipidemia, hypertension--CAD, --. : . . . :. .       METS/Exercise Tolerance:     Hematologic:         Musculoskeletal:         GI/Hepatic:     (+) GERD       Renal/Genitourinary:     (+) chronic renal disease, type: CRI,       Endo:     (+) thyroid problem .      Psychiatric:         Infectious Disease:         Malignancy:         Other:                         PHYSICAL EXAM:   Mental Status/Neuro: A/A/O   Airway: Facies: Feasible  Mallampati: II   Respiratory: Auscultation: CTAB      CV: Rhythm: Regular   Comments:                      Lab Results   Component Value Date    WBC 5.5 04/05/2019    HGB 10.3 (L) 04/05/2019    HCT 31.4 (L) 04/05/2019     04/05/2019    CRP 39.4 (H) 07/13/2012    SED 17 05/08/2017     04/05/2019    POTASSIUM 3.9 04/05/2019    CHLORIDE 103 04/05/2019    CO2 28 04/05/2019    BUN 22 04/05/2019    CR 1.15 (H) 04/05/2019     (H) 04/05/2019    NAEEM 8.7 04/05/2019    PHOS 3.4 04/05/2019    MAG 1.9 11/12/2013    MAG 1.9 11/12/2013    ALBUMIN 3.6 04/05/2019    PROTTOTAL 6.2 (L) 04/05/2019    ALT 26 04/05/2019    AST 35 04/05/2019    ALKPHOS 62 04/05/2019    BILITOTAL 0.4 04/05/2019    LIPASE 89 07/28/2014    PTT 33 07/01/2012    INR 1.01 07/12/2012    FIBR 514 (H) 07/05/2012    TSH 1.02 12/07/2018    T4 1.19 12/07/2018    T3 99 12/07/2018       Preop Vitals  BP Readings from Last 3 Encounters:   08/21/19 (!) 146/98   07/17/19 136/81   07/11/19 136/68    Pulse Readings from Last 3 Encounters:   08/21/19 68   07/17/19 84   07/11/19 71      Resp Readings from Last 3 Encounters:   08/21/19 16   07/17/19 16   07/11/19 12    SpO2 Readings from Last 3 Encounters:   08/21/19 97%   07/17/19  "93%   07/11/19 97%      Temp Readings from Last 1 Encounters:   08/21/19 36.6  C (97.8  F) (Oral)    Ht Readings from Last 1 Encounters:   08/21/19 1.588 m (5' 2.5\")      Wt Readings from Last 1 Encounters:   08/21/19 70.3 kg (155 lb)    Estimated body mass index is 27.9 kg/m  as calculated from the following:    Height as of an earlier encounter on 8/21/19: 1.588 m (5' 2.5\").    Weight as of an earlier encounter on 8/21/19: 70.3 kg (155 lb).     LDA:            Assessment: Elective due to   ASA SCORE: 3    H&P: History and physical reviewed and following examination; no interval change.        - H&P complete; Preop Testing complete; Consents complete  Smoking Status:  Non-Smoker/Unknown   NPO Status: NPO Appropriate     Plan:   Anes. Type:  MAC (MAC)   Pre-Medication: None   Induction:  N/a   Airway: Native Airway   Access/Monitoring: PIV   Maintenance: N/a     Postop Plan:   Postop Pain: None  Postop Sedation/Airway: Not planned  Disposition: Outpatient     PONV Management:   Adult Risk Factors: Female, H/o PONV or Motion Sickness, Non-Smoker   Prevention: Ondansetron, Dexamethasone     CONSENT: Direct conversation   Plan and risks discussed with: Patient   Blood Products: Consent Deferred (Minimal Blood Loss)                             Conner Slade MD, MD  "

## 2019-08-21 NOTE — OP NOTE
PREOPERATIVE DIAGNOSIS: Retraction and entropion, right upper lid.   POSTOPERATIVE DIAGNOSIS:  Retractrion,right upper lid.   PROCEDURE:Right  upper eyelid entropion repair  SURGEON: Maribeth Brasher MD   ASSISTANT:Codi Reyna MD and Niall Hussein MD   ANESTHESIA: Monitored with local infiltration of 1% lidocaine with epinephrine [id number].   COMPLICATIONS: None.   ESTIMATED BLOOD LOSS: Less than 5 mL.   HISTORY: Idalmis Abdul  presented with right upper lid retraction and entropion with exposure keratopathy. After the risks, benefits and alternatives to the proposed procedure were explained, informed consent was obtained.   DESCRIPTION OF PROCEDURE: Idalmis Abdul  was brought to the operating room and placed supine on the operating table. Intravenous sedation was given. The right upper lid crease was marked with a marking pen and infiltrated with local anesthetic. The area was prepped and draped in the typical sterile ophthalmic fashion. Attention was directed to the right  side. A lid crease incision was made with a 15 blade and dissection carried down to the orbicularis with high temperature cautery. The orbital septum was opened horizontally. The levator aponeurosis was identified and dissected from the superior tarsal border and the underlying Herring's muscleto bring the lid into a normal height and contour. The patient was asked to open the eye and the recession adjusted for height and contour. The inferior edge of orbicularis was secured to the superior tarsus with 6-0 Vicryl sutures to rotate the lashes away from the eye.  The skin was closed with with interrupted 6-0 Vicryl and running 6-0 plain gut sutures.  Ophthalmic ointment was applied to the incision. The patient tolerated the procedure well and left the operating room in stable condition.     MARIBETH BRASHER MD

## 2019-08-21 NOTE — ANESTHESIA CARE TRANSFER NOTE
Patient: Idalmis Abdul    Procedure(s):  Right Upper Lid Entropion Repair    Diagnosis: Entropion  Diagnosis Additional Information: No value filed.    Anesthesia Type:   MAC     Note:  Airway :Room Air  Patient transferred to:Phase II  Handoff Report: Identifed the Patient, Identified the Reponsible Provider, Reviewed the pertinent medical history, Discussed the surgical course, Reviewed Intra-OP anesthesia mangement and issues during anesthesia, Set expectations for post-procedure period and Allowed opportunity for questions and acknowledgement of understanding      Vitals: (Last set prior to Anesthesia Care Transfer)    CRNA VITALS  8/21/2019 0847 - 8/21/2019 0927      8/21/2019             Pulse:  68    Ht Rate:  68    SpO2:  94 %    Resp Rate (set):  10                Electronically Signed By: JAIME Khan CRNA  August 21, 2019  9:27 AM

## 2019-08-21 NOTE — BRIEF OP NOTE
[facility] Brief Operative Note    Pre-operative diagnosis: Entropion   Post-operative diagnosis Same   Procedure: Procedure(s):  Right Upper Lid Entropion Repair   Surgeon(s): Surgeon(s) and Role:     * Vasile Brasher MD - Primary     * Codi Reyna MD - Fellow Assisting   Estimated blood loss: 2cc    Specimens: None   Findings: Right upper lid entropion     Codi Reyna MD  Oculoplastic Surgery Fellow

## 2019-08-21 NOTE — ANESTHESIA POSTPROCEDURE EVALUATION
Anesthesia POST Procedure Evaluation    Patient: Idalmis Abdul   MRN:     [id number] Gender:   female   Age:    73 year old :      1945        Preoperative Diagnosis: Entropion   Procedure(s):  Right Upper Lid Entropion Repair   Postop Comments: No value filed.       Anesthesia Type:  MAC  MAC    Reportable Event: NO     PAIN: Uncomplicated   Sign Out status: Comfortable, Well controlled pain     PONV: No PONV   Sign Out status:  No Nausea or Vomiting     Neuro/Psych: Uneventful perioperative course   Sign Out Status: Preoperative baseline; Age appropriate mentation     Airway/Resp.: Uneventful perioperative course   Sign Out Status: Non labored breathing, age appropriate RR; Resp. Status within EXPECTED Parameters     CV: Uneventful perioperative course   Sign Out status: Appropriate BP and perfusion indices; Appropriate HR/Rhythm     Disposition:   Sign Out in:  PACU  Disposition:  Phase II; Home  Recovery Course: Uneventful  Follow-Up: Not required           Last Anesthesia Record Vitals:  CRNA VITALS  2019 0847 - 2019 0947      2019             Pulse:  68    Ht Rate:  68    SpO2:  94 %    Resp Rate (set):  10          Last PACU Vitals:  Vitals Value Taken Time   BP     Temp     Pulse     Resp     SpO2     Temp src Available 2019  9:15 AM   NIBP 120/72 2019  9:15 AM   Pulse 68 2019  9:16 AM   SpO2 94 % 2019  9:16 AM   Resp     Temp     Ht Rate 68 2019  9:16 AM   Temp 2           Electronically Signed By: Conner Slade MD, MD, 2019, 10:49 AM

## 2019-08-21 NOTE — DISCHARGE INSTRUCTIONS
975 mg tylenol taken at 07:35 am. Next dose ok at 1:30 pm, then follow dosing on bottle.     Post-operative Instructions    Ophthalmic Plastic and Reconstructive Surgery  Vasile Brasher M.D.  Codi Reyna M.D.    All instructions apply to the operated eye(s) or eyelid(s)      What to expect after surgery:    There will be some swelling, bruising, and likely a black eye (even into the lower eyelids and cheeks). Also expect crusting and discharge from the eye and/or incisions.     A small amount of surface bleeding is normal for the first 48 hours after surgery.    You may notice some bloody tears for the first few days after surgery. This is normal.    Your eye(s) and eyelid(s) may be painful and tender. This is normal after surgery. Use the pain medication as prescribed. If your pain does not improve despite the medication, contact the office.    Wound care and personal care:    If a patch or bandage has been placed, please leave this in place until seen in clinic. Prevent the bandage from getting wet.     Apply ice compresses 15 minutes on 15 minutes off while awake for the first 2 days after surgery, then switch to warm compresses 4 times a day until seen by your physician.     For warm packs you can place a cup of dry uncooked rice in a clean cotton sock. Place sock in microwave 30 seconds to one minute. Next place the warm sock into a plastic bag and wrap the bag with clean warm wet washcloth and place over operated eye.      You may shower or wash your hair the day after surgery. Do not bathe or go swimming for 1 week to prevent contamination of your wounds.    Do not apply make-up to the eyes or eyelids for 2 weeks after surgery.      Activity restrictions and driving:    Avoid heavy lifting, bending, exercise or strenuous activity for 1 week after surgery.    You may resume other activities and return to work as tolerated.    You may not resume driving until have you stopped using narcotic pain  medications(such as Norco, Percocet, Tylenol #3).    Medications:    Restart all your regular home medications and eye drops today. If you take Plavix or Aspirin on a regular basis, wait for 3 days after your surgery before restarting these in order to decrease the risk of bleeding complications.    Avoid aspirin and aspirin-like medications (Motrin, Aleve, Ibuprofen, Erica-Marion etc) for 5 days to reduce the risk of bleeding. You may take Tylenol (acetaminophen) for pain.    In addition to your home medications, take the following post-operative medications as prescribed by your physician:    Apply antibiotic ointment (erythromycin) to all sutures three times a day, and into the operated eye(s) at night.     Take 1 to 2 pain pills (norco or tylenol 3 as prescribed) as needed for pain up to every 4 hours.    The pain pills may make you drowsy. You must not drive a car, operate heavy machinery or drink alcohol while taking them.    The pain pills may cause constipation and nausea. Take them with some food to prevent a stomach upset. If you continue to experience nausea, call your physician.      WARNING: All the prescription pain medications listed above contain Tylenol (acetaminophen). You must not take more than 4,000 mg of acetaminophen per 24-hour period. This is equivalent to 6 tablets of Darvocet, 8 tablets of Vicodin, or 12 tablets of Norco, Percocet or Tylenol #3. If you take other over-the-counter medications containing acetaminophen, you must take the amount of acetaminophen into account and reduce the number of prescribed pain pills accordingly.    Contact information and follow-up:    Return to the Eye Clinic for a follow-up appointment with your physician as  scheduled. If no appointment has been scheduled, call [phone] for an  appointment with Dr. Brasher within 1 to 2 weeks from your date of surgery.      For severe pain, bleeding, or loss of vision, call the Eye Clinic at [phone].    After  hours or on weekends and holidays, call [phone] and ask to speak with the ophthalmologist on call.      [facility]  Home Care Following Anesthesia  For 24 hours after surgery:  1. Get plenty of rest.  A responsible adult must stay with you for at least 24 hours after you leave the surgery center.  2. Do not drive or use heavy equipment.  If you have weakness or tingling, don't drive or use heavy equipment until this feeling goes away.   3. Do not drink alcohol.   4. Avoid strenuous or risky activities.  Ask for help when climbing stairs.  5. You may feel lightheaded.  IF so, sit for a few minutes before standing.  Have someone help you get up.   6. If you have nausea (feel sick to your stomach): Drink only clear liquids such as apple juice, ginger ale, broth or 7-Up.  Rest may also help.  Be sure to drink enough fluids.  Move to a regular diet as you feel able.   7. You may have a slight fever.  Call the doctor if your fever is over 100 F (37.7 C) (taken under the tongue) or lasts longer than 24 hours.  8. You may have a dry mouth, a sore throat, muscle aches or trouble sleeping. These should go away after 24 hours.  9. Do not make important or legal decisions.      Tips for taking pain medications  To get the best pain relief possible, remember these points:    Take pain medications as directed, before pain becomes severe.    Pain medication can upset your stomach: taking it with food may help.    Constipation is a common side effect of pain medication. Drink plenty of  fluids.    Eat foods high in fiber. Take a stool softener if recommended by your doctor or pharmacist.    Do not drink alcohol, drive or operate machinery while taking pain medications.    Ask about other ways to control pain, such as with heat, ice or relaxation.    Tylenol/Acetaminophen Consumption  To help encourage the safe use of acetaminophen, the makers of TYLENOL  have lowered the maximum daily dose  for single-ingredient Extra Strength TYLENOL  (acetaminophen) products sold in the [place] from 8 pills per day (4,000 mg) to 6 pills per day (3,000 mg). The dosing interval has also changed from 2 pills every 4-6 hours to 2 pills every 6 hours.    If you feel your pain relief is insufficient, you may take Tylenol/Acetaminophen in addition to your narcotic pain medication.     Be careful not to exceed 3,000 mg of Tylenol/Acetaminophen in a 24 hour period from all sources.    If you are taking extra strength Tylenol/acetaminophen (500 mg), the maximum dose is 6 tablets in 24 hours.    If you are taking regular strength acetaminophen (325 mg), the maximum dose is 9 tablets in 24 hours.    Call a doctor for any of the followin. Signs of infection (fever, growing tenderness at the surgery site, a large amount of drainage or bleeding, severe pain, foul-smelling drainage, redness, swelling).  2. It has been over 8 to 10 hours since surgery and you are still not able to urinate (pass water).  3. Headache for over 24 hours.    Your doctor is:  Dr. Vasile Brasher, Ophthalmology: [phone]            Or dial [phone] and ask for the resident on call for:  Ophthalmology  For emergency care, call the:  [facility]:  [phone] (TTY for hearing impaired: [phone])

## 2019-08-22 ENCOUNTER — TELEPHONE (OUTPATIENT)
Dept: OPHTHALMOLOGY | Facility: CLINIC | Age: 74
End: 2019-08-22

## 2019-08-30 DIAGNOSIS — C49.A0 MALIGNANT GASTROINTESTINAL STROMAL TUMOR, UNSPECIFIED SITE (H): ICD-10-CM

## 2019-08-30 DIAGNOSIS — C49.A0 GIST (GASTROINTESTINAL STROMAL TUMOR), MALIGNANT (H): ICD-10-CM

## 2019-08-30 DIAGNOSIS — C49.A0 MALIGNANT GASTROINTESTINAL STROMAL TUMOR, UNSPECIFIED SITE (H): Primary | ICD-10-CM

## 2019-08-30 RX ORDER — IMATINIB MESYLATE 400 MG/1
400 TABLET, FILM COATED ORAL DAILY
Qty: 30 TABLET | Refills: 11 | Status: SHIPPED | OUTPATIENT
Start: 2019-08-30 | End: 2021-03-19

## 2019-09-04 ENCOUNTER — ALLIED HEALTH/NURSE VISIT (OUTPATIENT)
Dept: PHARMACY | Facility: CLINIC | Age: 74
End: 2019-09-04
Payer: COMMERCIAL

## 2019-09-04 ENCOUNTER — TELEPHONE (OUTPATIENT)
Dept: FAMILY MEDICINE | Facility: CLINIC | Age: 74
End: 2019-09-04

## 2019-09-04 DIAGNOSIS — E05.00 GRAVES' DISEASE: ICD-10-CM

## 2019-09-04 DIAGNOSIS — M81.0 AGE-RELATED OSTEOPOROSIS WITHOUT CURRENT PATHOLOGICAL FRACTURE: Primary | ICD-10-CM

## 2019-09-04 DIAGNOSIS — K59.00 CONSTIPATION, UNSPECIFIED CONSTIPATION TYPE: ICD-10-CM

## 2019-09-04 DIAGNOSIS — F41.9 ANXIETY: ICD-10-CM

## 2019-09-04 DIAGNOSIS — C49.A0 MALIGNANT GASTROINTESTINAL STROMAL TUMOR, UNSPECIFIED SITE (H): ICD-10-CM

## 2019-09-04 DIAGNOSIS — I25.10 ASCVD (ARTERIOSCLEROTIC CARDIOVASCULAR DISEASE): ICD-10-CM

## 2019-09-04 DIAGNOSIS — G47.00 INSOMNIA, UNSPECIFIED TYPE: ICD-10-CM

## 2019-09-04 DIAGNOSIS — I10 HYPERTENSION GOAL BP (BLOOD PRESSURE) < 140/90: ICD-10-CM

## 2019-09-04 DIAGNOSIS — E78.5 HYPERLIPIDEMIA LDL GOAL <160: ICD-10-CM

## 2019-09-04 DIAGNOSIS — H02.539 EYELID RETRACTION OR LAG: ICD-10-CM

## 2019-09-04 PROCEDURE — 99607 MTMS BY PHARM ADDL 15 MIN: CPT | Performed by: PHARMACIST

## 2019-09-04 PROCEDURE — 99605 MTMS BY PHARM NP 15 MIN: CPT | Performed by: PHARMACIST

## 2019-09-04 NOTE — TELEPHONE ENCOUNTER
Reason for Call:  OTC Medication Question     Do you use a [facility]?  Name of the pharmacy and phone number for the current request:  [facility] [phone]    Name of the medication requested: CBD oil    Other request: Can she take CBD oil with your medications.     Can we leave a detailed message on this number? YES    Phone number patient can be reached at: Cell number on file:    Telephone Information:   Mobile [phone]       Best Time: Any    Call taken on 9/4/2019 at 10:57 AM by Regine Vega

## 2019-09-04 NOTE — PROGRESS NOTES
"SUBJECTIVE/OBJECTIVE:                                                    Idalmis Abdul is a 73 year old female referred to [facility] by her insurance company, [employer]. Idalmis declined an MTM appointment, but requested the [facility] pharmacist complete a chart review and discuss it with her provider.      Per Epic Chart:   Allergies   Allergen Reactions     Atorvastatin Cramps and Unknown     cramps  Other reaction(s): *Unknown  cramps     Tobacco: History of tobacco dependence - quit 2010   Alcohol: not currently using    PMH: Reviewed in Epic    Osteoporosis: taking alendronate 70 mg once weekly, also taking calcium - vitamin D 500 mg /500 units two tablets daily. Last Dexa Scan 12/17/2018. Followed by endocrinology. Vitamin D lab from 10/5/2018 was wnls.     Hypertension/CAD: History of MI and received a bypass in 2012. Taking amlodipine 5 mg daily, aspirin 81 mg daily, hydrochlorothiazide 25 mg daily, lisinopril 40 mg daily, metoprolol tartrate 100 mg twice daily. Followed by cardiology. Blood pressure:  8/21/2019 129/62, 7/17/2019 136/81.     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 Followed by oncology.    Anxiety/insomnia: lorazepam 0.5 mg at bedtime and melatonin 5 mg at bedtime as needed.    S/p right upper eyelid entropion repair: on 8/21/2019. Per ophthalmology note on 9/9/2019 patient is to: \"Continue antibiotic ointment or bland lubricating ointment (eg vaseline or aquaphor) to the incision site BID\".      Grave's Disease: taking methimazole 2.5 mg daily. Followed by endocrinology.    TSH   Date Value Ref Range Status   12/07/2018 1.02 0.40 - 4.00 mU/L Final     Constipation: Miralax daily.     Hyperlipidemia: Current therapy includes rosuvastatin 5 mg daily.     Recent Labs   Lab Test 06/22/18  0840 09/20/16  1022 07/17/15  0916 03/14/14  0929   CHOL 110 107 91 118   HDL 48* 53 46* 38*   LDL 46 38 31 59   TRIG 82 79 72 105   CHOLHDLRATIO  --   --  2.0 3.0       ASSESSMENT:                                      "                  Current medications were reviewed with her PCP. Medicare Part D topics discussed:Medications reviewed-no issues identified or changes needed    Osteoporosis: stable    Hypertension/CAD: stable    GIST: stable    Anxiety/insomnia: stable    S/p right upper eyelid entropion repair: eye pain improved since surgery.    Grave's Disease: stable    Constipation: stable     Hyperlipidemia: stable    PLAN:                                                      The following items were discussed with Idalmis's primary care provider: These considerations have not been communicated with the patient.    1. Continue current drug therapy.    The patient is due for the following Health Maintenance items:  Cholesterol labs - future lab order in Epic.  Influenza vaccine - last given 9/11/2018    I spent 30 minutes completing the chart review and discussing the findings with the primary care provider  (an extra 15 minutes was spent creating the Medication Action Plan).    Jessy Osman, PharmD  Medication Therapy Management

## 2019-09-04 NOTE — Clinical Note
Dr. Sultana - I did not find any drug therapy problems for Idalmis when I did her chart review per patient's request. Medicare requires that I send you this Medication Action Plan.  Sincerely,  Jessy Osman, PharmD  Medication Therapy Management

## 2019-09-04 NOTE — TELEPHONE ENCOUNTER
Dr. Sultana,    Patient asking if ok to use CBD oil with medications.      Delicia LEACH, BSN, RN

## 2019-09-04 NOTE — LETTER
"     [facility] MTM     Date: 2019    Idalmis Abdul  [address]   [facility] [address]    Dear Dr. Sultana,    Thank you for talking with me on 2019 about your patient's health and medications. Medicare s MTM (Medication Therapy Management) program helps you understand your medications and use them safely.      This letter includes an action plan (Medication Action Plan) and medication list (Personal Medication List). The action plan has steps you should take to help you get the best results from your medications. The medication list will help you keep track of your medications and how to use them the right way.       Have your action plan and medication list with you when you talk with your doctors, pharmacists, and other healthcare providers in your care team.     Ask your doctors, pharmacists, and other healthcare providers to update the action plan and medication list at every visit.     Take your medication list with you if you go to the hospital or emergency room.     Give a copy of the action plan and medication list to your family or caregivers.     If you want to talk about this letter or any of the papers with it, please call   [phone].   We look forward to working with you, your doctors, and other healthcare providers to help you stay healthy.     Sincerely,    Jessy Osman [facility]      Enclosed: Medication Action Plan and Personal Medication List    MEDICATION ACTION PLAN FOR Idalmis Abdul,  1945     This action plan will help you get the best results from your medications if you:   1. Read \"What we talked about.\"   2. Take the steps listed in the \"What I need to do\" boxes.   3. Fill in \"What I did and when I did it.\"   4. Fill in \"My follow-up plan\" and \"Questions I want to ask.\"     Have this action plan with you when you talk with your doctors, pharmacists, and other healthcare providers in your care team. Share this with your family or " caregivers too.  DATE PREPARED: 2019  What we talked about: What my medicines are for, how to know if my medicines are working, made sure my medicines are safe for me and reviewed how to take my medicines.                                                    What I need to do: Take my medicines every day     What I did and when I did it:                                              My follow-up plan:                 Questions I want to ask:              If you have any questions about your action plan, call Jessy Osman [facility], Phone: [phone], Monday-Friday 9-4:30pm.           MEDICATION LIST FOR Idalmis Abdul,  1945     This medication list was made for you after we talked. We also used information from your doctor's chart.      Use blank rows to add new medications. Then fill in the dates you started using them.    Cross out medications when you no longer use them. Then write the date and why you stopped using them.    Ask your doctors, pharmacists, and other healthcare providers to update this list at every visit. Keep this list up-to-date with:       Prescription medications    Over the counter drugs     Herbals    Vitamins    Minerals      If you go to the hospital or emergency room, take this list with you. Share this with your family or caregivers too.     DATE PREPARED: 2019  Allergies or side effects: Atorvastatin        Medication:  ALENDRONATE SODIUM 70 MG PO TABS      How I use it:  Take 1 tablet (70 mg) by mouth every 7 days Take 60 minutes before am meal with 8 oz. water. Remain upright for 30 minutes.      Why I use it: Age-related osteoporosis without current pathological fracture    Prescriber:  TIERRA Antoine MD      Date I started using it:       Date I stopped using it:         Why I stopped using it:            Medication:  AMLODIPINE BESYLATE 5 MG PO TABS      How I use it:  TAKE ONE TABLET BY MOUTH EVERY DAY      Why I use it: Essential hypertension     Prescriber:  Darlene Sultana MD      Date I started using it:       Date I stopped using it:         Why I stopped using it:            Medication:  ASPIRIN 81 MG PO TBEC      How I use it:  Take 1 tablet by mouth daily.      Why I use it: NSTEMI (non-ST elevated myocardial infarction) (H); ACS (acute coronary syndrome) (H)    Prescriber:  Emily Gavin APRN CNP      Date I started using it:       Date I stopped using it:         Why I stopped using it:            Medication:  CALCIUM-VITAMIN D-VITAMIN K 500-500-40 MG-UNT-MCG PO CHEW      How I use it:  Take 2 tablets by mouth daily      Why I use it:  supplement    Prescriber:  Patient Reported      Date I started using it:       Date I stopped using it:         Why I stopped using it:            Medication:  ERYTHROMYCIN 5 MG/GM OP OINT      How I use it:  Apply small amount to incision sites three times daily for 7 days, then apply to inner lower lid of operative eye(s) at bedtime for 7 days, as directed per physician instructions.      Why I use it: Postoperative state    Prescriber:  Codi Reyna MD      Date I started using it:       Date I stopped using it:         Why I stopped using it:            Medication:  HYDROCHLOROTHIAZIDE 25 MG PO TABS      How I use it:  Take 1 tablet (25 mg) by mouth daily      Why I use it: Essential hypertension    Prescriber:  Anu Kramre PA-C      Date I started using it:       Date I stopped using it:         Why I stopped using it:            Medication:  IMATINIB MESYLATE 400 MG PO TABS      How I use it:  Take 1 tablet (400 mg) by mouth daily Take with a meal and a large glass of water.      Why I use it: Malignant gastrointestinal stromal tumor, unspecified site (H)    Prescriber:  Blayne Schmitz MD      Date I started using it:       Date I stopped using it:         Why I stopped using it:            Medication:  LISINOPRIL 40 MG PO TABS      How I use it:  TAKE ONE  TABLET BY MOUTH DAILY      Why I use it: Essential hypertension with goal blood pressure less than 140/90    Prescriber:  Darlene Sultana MD      Date I started using it:       Date I stopped using it:         Why I stopped using it:            Medication:  LORAZEPAM 0.5 MG PO TABS      How I use it:  Take 1 tablet (0.5 mg) by mouth At Bedtime      Why I use it: Malignant gastrointestinal stromal tumor, unspecified site (H)    Prescriber:  Blayne Schmitz MD      Date I started using it:       Date I stopped using it:         Why I stopped using it:            Medication:  MELATONIN 5 MG PO TABS      How I use it:  Take 5 mg by mouth nightly as needed for sleep      Why I use it:  insomnia    Prescriber:  Patient Reported      Date I started using it:       Date I stopped using it:         Why I stopped using it:            Medication:  METHIMAZOLE 5 MG PO TABS      How I use it:  Take 0.5 tablets (2.5 mg) by mouth daily      Why I use it: Hyperthyroidism    Prescriber:  TIERRA Antoine MD      Date I started using it:       Date I stopped using it:         Why I stopped using it:            Medication:  METOPROLOL TARTRATE 100 MG PO TABS      How I use it:  TAKE ONE TABLET BY MOUTH TWICE A DAY      Why I use it: Essential hypertension with goal blood pressure less than 140/90    Prescriber:  Darlene Sultana MD      Date I started using it:       Date I stopped using it:         Why I stopped using it:            Medication:  POLYETHYLENE GLYCOL 3350 PO POWD      How I use it:  Take 17 g by mouth daily      Why I use it:  constipation    Prescriber:  Patient Reported      Date I started using it:       Date I stopped using it:         Why I stopped using it:            Medication:  ROSUVASTATIN CALCIUM 5 MG PO TABS      How I use it:  TAKE ONE TABLET BY MOUTH EVERY DAY      Why I use it: Hyperlipidemia LDL goal <160    Prescriber:  Darlene Sultana MD      Date I started using it:        Date I stopped using it:         Why I stopped using it:            Medication:         How I use it:         Why I use it:      Prescriber:         Date I started using it:       Date I stopped using it:         Why I stopped using it:            Medication:         How I use it:         Why I use it:      Prescriber:         Date I started using it:       Date I stopped using it:         Why I stopped using it:            Medication:         How I use it:         Why I use it:      Prescriber:         Date I started using it:       Date I stopped using it:         Why I stopped using it:              Other Information:     If you have any questions about your action plan, call [phone]    According to the Paperwork Reduction Act of 1995, no persons are required to respond to a collection of information unless it displays a valid OMB control number. The valid OMB number for this information collection is [id number]. The time required to complete this information collection is estimated to average 40 minutes per response, including the time to review instructions, searching existing data resources, gather the data needed, and complete and review the information collection. If you have any comments concerning the accuracy of the time estimate(s) or suggestions for improving this form, please write to: CMS, Attn: DIANA Reports Clearance Officer, [address] [place] [phone].

## 2019-09-09 ENCOUNTER — OFFICE VISIT (OUTPATIENT)
Dept: OPHTHALMOLOGY | Facility: CLINIC | Age: 74
End: 2019-09-09
Payer: MEDICARE

## 2019-09-09 DIAGNOSIS — Z98.890 POSTOPERATIVE EYE STATE: Primary | ICD-10-CM

## 2019-09-09 ASSESSMENT — TONOMETRY
OD_IOP_MMHG: 14
OS_IOP_MMHG: 13
IOP_METHOD: ICARE

## 2019-09-09 ASSESSMENT — VISUAL ACUITY
OS_CC: 20/30
METHOD: SNELLEN - LINEAR
OD_CC+: -1
OD_CC: 20/40

## 2019-09-09 NOTE — PROGRESS NOTES
Chief Complaints and History of Present Illnesses   Patient presents with     Post Op (Ophthalmology) Right Eye     Chief Complaint(s) and History of Present Illness(es)     Post Op (Ophthalmology) Right Eye     In right eye.  Occurring constantly.  Since onset it is stable.    Treatments tried include no treatments.  Pain was noted as 0/10.       Comments     S/p Right  upper eyelid entropion repair on 8/21/2019. Pt states no   longer having any pain in the right eye. Pt happy. Done with drops and   ointment.    Alana Lovell, COT COT 11:19 AM September 9, 2019      -Right eye irritation is much improved  -Using Refresh at bedtime both eyes  -Very happy with result           Assessment & Plan     Idalmis Abdul is a 73 year old female with the following diagnoses:   1. Postoperative eye state         Idalmis Abdul is 2.5 weeks status post right upper eyelid retraction and entropion repair  The incision(s) is healing well.  The lid(s)  is  in excellent position.    I have recommended:  * Continue antibiotic ointment or bland lubricating ointment (eg vaseline or aquaphor) to the incision site BID.  *Massage along the incision BID.  * Warm soaks QID until all edema and ecchymoses resolve  * Return to clinic in 6-8 weeks           Codi Reyna MD  Oculoplastics Fellow  Attending Physician Attestation:  Complete documentation of historical and exam elements from today's encounter can be found in the full encounter summary report (not reduplicated in this progress note).  I personally obtained the chief complaint(s) and history of present illness.  I confirmed and edited as necessary the review of systems, past medical/surgical history, family history, social history, and examination findings as documented by others; and I examined the patient myself.  I personally reviewed the relevant tests, images, and reports as documented above.  I formulated and edited as necessary the assessment and plan and discussed the  findings and management plan with the patient and family. I personally reviewed the ophthalmic test(s) associated with this encounter, agree with the interpretation(s) as documented by the resident/fellow, and have edited the corresponding report(s) as necessary.   -Vasile Brasher MD

## 2019-09-09 NOTE — NURSING NOTE
Chief Complaints and History of Present Illnesses   Patient presents with     Post Op (Ophthalmology) Right Eye     Chief Complaint(s) and History of Present Illness(es)     Post Op (Ophthalmology) Right Eye     Laterality: right eye    Frequency: constantly    Course: stable    Treatments tried: no treatments    Pain scale: 0/10              Comments     S/p Right  upper eyelid entropion repair on 8/21/2019. Pt states no longer having any pain in the right eye. Pt happy. Done with drops and ointment.    Alana Lovell, BRANDON COT 11:19 AM September 9, 2019

## 2019-09-10 ENCOUNTER — DOCUMENTATION ONLY (OUTPATIENT)
Dept: CARE COORDINATION | Facility: CLINIC | Age: 74
End: 2019-09-10

## 2019-09-10 ENCOUNTER — HOSPITAL ENCOUNTER (OUTPATIENT)
Dept: CARDIOLOGY | Facility: CLINIC | Age: 74
Discharge: HOME | End: 2019-09-10
Attending: INTERNAL MEDICINE | Admitting: INTERNAL MEDICINE
Payer: MEDICARE

## 2019-09-10 DIAGNOSIS — Z95.1 POSTSURGICAL AORTOCORONARY BYPASS STATUS: ICD-10-CM

## 2019-09-10 PROCEDURE — 93306 TTE W/DOPPLER COMPLETE: CPT

## 2019-09-10 PROCEDURE — 93306 TTE W/DOPPLER COMPLETE: CPT | Mod: 26 | Performed by: INTERNAL MEDICINE

## 2019-09-10 NOTE — RESULT ENCOUNTER NOTE
Results noted: EF 55-60%; RV mildly dilated; no significant valve disease; no significant change per reader. To be discussed at OV with Anu GODDARD on 9/12/19

## 2019-09-12 ENCOUNTER — OFFICE VISIT (OUTPATIENT)
Dept: CARDIOLOGY | Facility: CLINIC | Age: 74
End: 2019-09-12
Payer: MEDICARE

## 2019-09-12 VITALS
BODY MASS INDEX: 27.93 KG/M2 | SYSTOLIC BLOOD PRESSURE: 160 MMHG | OXYGEN SATURATION: 96 % | WEIGHT: 155.2 LBS | DIASTOLIC BLOOD PRESSURE: 78 MMHG | HEART RATE: 75 BPM

## 2019-09-12 DIAGNOSIS — Z95.1 POSTSURGICAL AORTOCORONARY BYPASS STATUS: ICD-10-CM

## 2019-09-12 DIAGNOSIS — I10 ESSENTIAL HYPERTENSION: Primary | ICD-10-CM

## 2019-09-12 PROCEDURE — 99213 OFFICE O/P EST LOW 20 MIN: CPT | Performed by: PHYSICIAN ASSISTANT

## 2019-09-12 RX ORDER — HYDROCHLOROTHIAZIDE 25 MG/1
25 TABLET ORAL DAILY
Qty: 90 TABLET | Refills: 3 | Status: SHIPPED | OUTPATIENT
Start: 2019-09-12 | End: 2020-06-29

## 2019-09-12 NOTE — PATIENT INSTRUCTIONS
"[facility] HEART CARE  [facility]~[address]~[place], [place]~[address]  Thank you for your  Heart Care visit today. If you have questions regarding your visit, please contact your cardiology RN's, Binta Read or Capri Peñaloza, at [phone]. Your provider has recommended the following:  Medication Changes:  Restart the hydrochlorothiazide 25mg once a day.  Recommendations:  BP goal is 120-130/60-70 if it is consistently higher than this please see us or Dr Sultana to get your BP under better control  Follow-up:  See Dr Herman for cardiology follow up at [facility]: 1 year    To schedule a future appointment, we kindly ask that you call cardiology scheduling at [phone] three months prior to requested revisit date.  [facility] cardiology clinic is staffed with \"Advance Practice Providers\". These are our cardiology Physician Assistants and Nurse Practitioners.   Please call cardiology scheduling if you feel you need clinical evaluation with them at any time for any cardiac reason.   Reminder:  For your safety, we ask that you bring in your current medication(s) or an updated list of your medications with you to EACH office visit. Include the medication name, dose of pill on bottle and how you are taking it. Include over-the-counter medications or supplements. Your provider will review this at each visit and plan your care based on your current information.   ~~~~~~~~~~~~~~~~~~~~~~~~~~~~~~~~~~~~~~~  \"[facility]\" campus telephone numbers for reference:  Cardiology Scheduling~[phone]  Diagnostic Imaging Scheduling~[phone]  Lab Scheduling~[phone]  Anticoagulation Clinic~[phone]  Cardiac Rehabilitation~[phone]  CORE Clinic RN's~[phone] (at [facility])  Cardiology Clinic RN's~[phone] (Capri Peñaloza, RN & Binta Read, RN)  ~~~~~~~~~~~~~~~~~~~~~~~~~~~~~~~~~~~~~~~~      "

## 2019-09-12 NOTE — PROGRESS NOTES
Service Date: 09/12/2019      HISTORY OF PRESENTING COMPLAINT:  Ms. Abdul is a pleasant 73-year-old female who presents to the office today for an annual followup.      Her cardiovascular history includes coronary artery disease, status post coronary artery bypass surgery in 2012 with a LIMA to the LAD and reverse saphenous vein grafting to the OM, ramus and RPDA.  Her postoperative course was complicated by tamponade that required a pericardial window along with postoperative atrial fibrillation.  She was started on anticoagulation at that time, but unfortunately had a severe upper GI bleed.  Fortunately, she has not had any known recurrent atrial fibrillation since her postoperative period.        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  She presents to the office today for an annual followup.      Overall, the patient states she has been doing well from a cardiac standpoint.  She has longstanding history of mild dyspnea on exertion which she states has been stable and has not been progressive.  She was put on inhaler through her primary care provider's office within the last year, but did not feel that this helped her symptoms much.  She does state that she was taken off of hydrochlorothiazide some time within the last 6 months.  Since that time, she thinks her blood pressure may have been a bit more elevated than it was previously.  She was uncertain why she was taken off of medication, but I was able to find a note from a visit with Dr. Sultana in May, which mentions that there was some concern that she was having dry mouth and that she may not need the additional antihypertensive therapy.  Otherwise, she denies any side effects from her current medication regimen.  She really does not have any cardiac questions or concerns at this time.      CURRENT CARDIAC MEDICATIONS:   1.   Amlodipine 5 mg daily.   2.  Aspirin 81 mg daily.   3.  Lisinopril 40 mg daily.   4.  Metoprolol tartrate 100 mg b.i.d.   5.  Crestor 5 mg daily.      The remainder of her medications, allergies and review of systems were reviewed and as are documented separately.      PHYSICAL EXAMINATION:   GENERAL:  The patient is a pleasant 73-year-old female who appears her stated age.  She is in no apparent distress.   VITAL SIGNS:  Her blood pressure is 160/78, pulse is 75.  Weight is 155 pounds.   PULMONARY:  Breathing is nonlabored.  Lungs are clear to auscultation.   CARDIAC:  Reveals a regular rate and rhythm.  I do not appreciate any significant murmur.   EXTREMITIES:  Lower extremities show no evidence of edema.   NEUROLOGIC:  Alert and oriented.      She recently had a followup echocardiogram shows preserved LV systolic function, EF of 55%-60%.  The RV was not well visualized but showed some possible mild dilatation along with borderline systolic function.  No significant valvular heart disease was noted.  The reader felt that overall compared to 2013, there had been no significant change.        Ms. Abdul is a pleasant 73-year-old female with a history of coronary artery disease with prior coronary artery bypass surgery as described above with postoperative complications including pericardial tamponade requiring pericardial window and postoperative atrial fibrillation for which she has not had any known recurrence.  Fortunately, she overall has done well from a cardiac symptom standpoint.  Her recent echocardiogram did not show any significant change from 2013.  She has stable dyspnea on exertion which may be related to her prior 50-pack-year history of tobacco use or her prior lung resection.        Her blood pressure is quite elevated today.  Previously, it had been better controlled.  Her hydrochlorothiazide was discontinued earlier this year.  I discussed restarting with the patient and she was completely  agreeable.  It looks like her labs were stable on this previously.  She will also be having repeat labs through her oncologist and endocrinologist in the near future; therefore, I did not order any repeat labs.  I have asked her to watch her blood pressure at home and if her readings are consistently greater than 130/80, to contact our office or Dr. Sultana's office and we could consider increasing her amlodipine.        Otherwise, again she seems to be doing well from a cardiac standpoint.  We will plan to see her back in the Cardiology Clinic in approximately 1 year.  Of course, I encouraged her to contact us sooner with any questions or concerns.      Thank you for allowing us to participate in the care of this pleasant patient.         PATRICK SCHRADER PA-C             D: 2019   T: 2019   MT: YESICA      Name:     PIEDAD FAN   MRN:      [id number]        Account:      [id number]   :      1945           Service Date: 2019      Document: [id number]

## 2019-09-12 NOTE — PROGRESS NOTES
Please see separate dictation for HPI, PHYSICAL EXAM AND IMPRESSION/PLAN.    CURRENT MEDICATIONS:  Current Outpatient Medications   Medication Sig Dispense Refill     alendronate (FOSAMAX) 70 MG tablet Take 1 tablet (70 mg) by mouth every 7 days Take 60 minutes before am meal with 8 oz. water. Remain upright for 30 minutes. 12 tablet 3     amLODIPine (NORVASC) 5 MG tablet TAKE ONE TABLET BY MOUTH EVERY DAY 30 tablet 9     aspirin EC 81 MG EC tablet Take 1 tablet by mouth daily. 30 tablet 2     calcium-vitamin D-vitamin K (VIACTIV) 500-500-40 MG-UNT-MCG CHEW Take 2 tablets by mouth daily       erythromycin (ROMYCIN) 5 MG/GM ophthalmic ointment Apply small amount to incision sites three times daily for 7 days, then apply to inner lower lid of operative eye(s) at bedtime for 7 days, as directed per physician instructions. 3.5 g 0     imatinib (GLEEVEC) 400 MG tablet Take 1 tablet (400 mg) by mouth daily Take with a meal and a large glass of water. 30 tablet 11     lisinopril (PRINIVIL/ZESTRIL) 40 MG tablet TAKE ONE TABLET BY MOUTH DAILY 90 tablet 3     LORazepam (ATIVAN) 0.5 MG tablet Take 1 tablet (0.5 mg) by mouth At Bedtime 90 tablet 1     melatonin 5 MG tablet Take 5 mg by mouth nightly as needed for sleep       methimazole (TAPAZOLE) 5 MG tablet Take 0.5 tablets (2.5 mg) by mouth daily 45 tablet 6     metoprolol tartrate (LOPRESSOR) 100 MG tablet TAKE ONE TABLET BY MOUTH TWICE A  tablet 2     polyethylene glycol (MIRALAX/GLYCOLAX) powder Take 17 g by mouth daily       rosuvastatin (CRESTOR) 5 MG tablet TAKE ONE TABLET BY MOUTH EVERY DAY 90 tablet 2     Blood Pressure Monitoring (ADULT BLOOD PRESSURE CUFF LG) KIT 1 Device 2 times daily (Patient not taking: Reported on 9/12/2019) 1 kit 1     order for MELINA Walker (Patient not taking: Reported on 9/12/2019) 1 Device 0       ALLERGIES:     Allergies   Allergen Reactions     Atorvastatin Cramps and Unknown     cramps         PAST MEDICAL HISTORY:  Past Medical  History:   Diagnosis Date     ASCVD (arteriosclerotic cardiovascular disease) 6/14/2012     Benign neoplasm of duodenum, jejunum, and ileum 2003, 2007    Gastrointestinal Stromal Tumor, seen at [facility], Dr. Schmitz, GI oncology-Gleevec treatment.  Surgical removal in fall 2004-no recurrence as of 3/05.     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     Other chronic pain     back     PONV (postoperative nausea and vomiting)      Postsurgical aortocoronary bypass status 7/4/2012     S/P CABG x 4 8/5/2012     Strabismus      Tobacco use disorder     Quit     Unspecified essential hypertension        PAST SURGICAL HISTORY:  Past Surgical History:   Procedure Laterality Date     BLEPHAROPLASTY BILATERAL Bilateral 7/17/2019    Procedure: Bilateral Upper Eyelid Blepharoplasty;  Surgeon: Vasile Brasher MD;  Location: [facility] OR     BYPASS GRAFT ARTERY CORONARY  6/14/2012    Procedure:  BYPASS GRAFT ARTERY CORONARY;  Median Sternotomy Coronary Artery Bypass Graft  x 3        C THORACOSCOPY,DX W BX  fall 2003    benign tissue     CATARACT IOL, RT/LT      LE     CHOLECYSTECTOMY  1963     COLONOSCOPY  4-12-05     CORONARY ARTERY BYPASS      X4     CREATION PERICARDIAL WINDOW  6/15/2012    Procedure: CREATION PERICARDIAL WINDOW;  Mediastinal Exploration, Control of Bleeding;  Surgeon: Dwaine Griffin MD;  [facility]     EYE SURGERY  2014    left cataract removal     GI SURGERY  2003 and 2007    GIST tumor removal     GYN SURGERY      tubal ligation     HYSTERECTOMY VAGINAL, COLPORRHAPHY ANTERIOR, POSTERIOR, COMBINED N/A 10/17/2017    Procedure: COMBINED HYSTERECTOMY VAGINAL, COLPORRHAPHY ANTERIOR, POSTERIOR;  Total Vaginal Hysterectomy and Posterior Repair,Sacrospinous Vault Suspension;  Surgeon: Ruth Farooq MD;  Location: [facility]     PHACOEMULSIFICATION WITH STANDARD INTRAOCULAR LENS IMPLANT  3/24/2014    Procedure: PHACOEMULSIFICATION WITH STANDARD INTRAOCULAR LENS IMPLANT;  Left Kelman Phacoemulsification with Intraocular Lens Implant;  Surgeon: Magnus Mckeon MD;  Location: [facility] OR     RECESSION RESECTION WITH ADJUSTABLE SUTURE BILATERAL Bilateral 7/14/2016    Procedure: RECESSION RESECTION WITH ADJUSTABLE SUTURE BILATERAL;  Surgeon: Erma Ordaz MD;  Location: [facility] OR     REPAIR ENTROPION Right 8/21/2019    Procedure: Right Upper Lid Entropion Repair;  Surgeon: Vasile Brasher MD;  [facility]     REPAIR RETRACTION LID BILATERAL Bilateral 7/17/2019    Procedure: Bilateral Upper Eyelid Retraction Repair;  Surgeon: Vasile Brasher MD;  Location: [facility] OR     SURGICAL HISTORY OF -   1963    cholecystectomy     SURGICAL HISTORY OF -   1970's    Tubal Ligation      SURGICAL HISTORY OF -   08/03    Explor. Lap, Excision of Small Bowel Tumor      SURGICAL HISTORY OF -   4/2010    lung cancer removed right lung       SOCIAL HISTORY:  Social History      Socioeconomic History     Marital status:      Spouse name: None     Number of children: None     Years of education: None     Highest education level: None   Occupational History     None   Social Needs     Financial resource strain: None     Food insecurity:     Worry: None     Inability: None     Transportation needs:     Medical: None     Non-medical: None   Tobacco Use     Smoking status: Former Smoker     Packs/day: 0.50     Years: 49.00     Pack years: 24.50     Types: Cigarettes     Last attempt to quit: 2010     Years since quittin.4     Smokeless tobacco: Never Used   Substance and Sexual Activity     Alcohol use: No     Drug use: No     Sexual activity: Not Currently     Partners: Male     Comment:    Lifestyle     Physical activity:     Days per week: None     Minutes per session: None     Stress: None   Relationships     Social connections:     Talks on phone: None     Gets together: None     Attends Jain service: None     Active member of club or organization: None     Attends meetings of clubs or organizations: None     Relationship status: None     Intimate partner violence:     Fear of current or ex partner: None     Emotionally abused: None     Physically abused: None     Forced sexual activity: None   Other Topics Concern      Service No     Blood Transfusions No     Caffeine Concern Yes     Comment: 3 cups     Occupational Exposure No     Hobby Hazards No     Sleep Concern No     Stress Concern No     Comment: son  and much happiness in her life, big burden lifted,      Weight Concern No     Special Diet No     Back Care Yes     Comment: lower back herniated disc       Exercise No     Bike Helmet No     Seat Belt Yes     Self-Exams Yes     Parent/sibling w/ CABG, MI or angioplasty before 65F 55M? 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   in .  Son (Rk  with wife Марина and grandson) lives next door -- quadriplegic and high functioning, cannot provide physical assistance/support.       FAMILY HISTORY:  Family History   Problem Relation Age of Onset     Heart Disease Mother      Osteoporosis Mother      Respiratory Mother         Emphezyma     Hypertension Mother      Heart Disease Father      Alcohol/Drug Father      Hypertension Father      Hypertension Maternal Grandmother      Hypertension Brother      Hypertension Sister      Arthritis Sister      Hypertension Son      Cancer Son         rectal       Review of Systems:  Skin:  Negative       Eyes:  Positive for visual blurring;double vision;glasses    ENT:  Negative      Respiratory:  Positive for shortness of breath;cough     Cardiovascular:    Positive for;fatigue    Gastroenterology: Positive for constipation    Genitourinary:  Positive for urinary frequency;urgency    Musculoskeletal:  Positive for back pain    Neurologic:  Negative      Psychiatric:  Negative      Heme/Lymph/Imm:  Negative      Endocrine:  Positive for thyroid disorder       Reviewed. Remainder of the note dictated.    Anu Kramer PA-C

## 2019-09-12 NOTE — LETTER
9/12/2019      Darlene Sultana MD  [address] [facility] [address]      RE: Idalmis TIERRA Abdul       Dear Colleague,    I had the pleasure of seeing Idalmis Abdul in the [facility].    Service Date: 09/12/2019      HISTORY OF PRESENTING COMPLAINT:  Ms. Abdul is a pleasant 73-year-old female who presents to the office today for an annual followup.      Her cardiovascular history includes coronary artery disease, status post coronary artery bypass surgery in 2012 with a LIMA to the LAD and reverse saphenous vein grafting to the OM, ramus and RPDA.  Her postoperative course was complicated by tamponade that required a pericardial window along with postoperative atrial fibrillation.  She was started on anticoagulation at that time, but unfortunately had a severe upper GI bleed.  Fortunately, she has not had any known recurrent atrial fibrillation since her postoperative period.        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  She presents to the office today for an annual followup.      Overall, the patient states she has been doing well from a cardiac standpoint.  She has longstanding history of mild dyspnea on exertion which she states has been stable and has not been progressive.  She was put on inhaler through her primary care provider's office within the last year, but did not feel that this helped her symptoms much.  She does state that she was taken off of hydrochlorothiazide some time within the last 6 months.  Since that time, she thinks her blood pressure may have been a bit more elevated than it was previously.  She was uncertain why she was taken off of medication, but I was able to find a note from a visit with Dr. Sulatna in May, which mentions that there was some concern that she was having dry mouth and that she may not need the  additional antihypertensive therapy.  Otherwise, she denies any side effects from her current medication regimen.  She really does not have any cardiac questions or concerns at this time.      CURRENT CARDIAC MEDICATIONS:   1.  Amlodipine 5 mg daily.   2.  Aspirin 81 mg daily.   3.  Lisinopril 40 mg daily.   4.  Metoprolol tartrate 100 mg b.i.d.   5.  Crestor 5 mg daily.      The remainder of her medications, allergies and review of systems were reviewed and as are documented separately.      PHYSICAL EXAMINATION:   GENERAL:  The patient is a pleasant 73-year-old female who appears her stated age.  She is in no apparent distress.   VITAL SIGNS:  Her blood pressure is 160/78, pulse is 75.  Weight is 155 pounds.   PULMONARY:  Breathing is nonlabored.  Lungs are clear to auscultation.   CARDIAC:  Reveals a regular rate and rhythm.  I do not appreciate any significant murmur.   EXTREMITIES:  Lower extremities show no evidence of edema.   NEUROLOGIC:  Alert and oriented.      She recently had a followup echocardiogram shows preserved LV systolic function, EF of 55%-60%.  The RV was not well visualized but showed some possible mild dilatation along with borderline systolic function.  No significant valvular heart disease was noted.  The reader felt that overall compared to 2013, there had been no significant change.        Ms. Abdul is a pleasant 73-year-old female with a history of coronary artery disease with prior coronary artery bypass surgery as described above with postoperative complications including pericardial tamponade requiring pericardial window and postoperative atrial fibrillation for which she has not had any known recurrence.  Fortunately, she overall has done well from a cardiac symptom standpoint.  Her recent echocardiogram did not show any significant change from 2013.  She has stable dyspnea on exertion which may be related to her prior 50-pack-year history of tobacco use or her prior lung  resection.        Her blood pressure is quite elevated today.  Previously, it had been better controlled.  Her hydrochlorothiazide was discontinued earlier this year.  I discussed restarting with the patient and she was completely agreeable.  It looks like her labs were stable on this previously.  She will also be having repeat labs through her oncologist and endocrinologist in the near future; therefore, I did not order any repeat labs.  I have asked her to watch her blood pressure at home and if her readings are consistently greater than 130/80, to contact our office or Dr. Sultana's office and we could consider increasing her amlodipine.        Otherwise, again she seems to be doing well from a cardiac standpoint.  We will plan to see her back in the Cardiology Clinic in approximately 1 year.  Of course, I encouraged her to contact us sooner with any questions or concerns.      Thank you for allowing us to participate in the care of this pleasant patient.         PATRICK SCHRADER PA-C             D: 2019   T: 2019   MT: YESICA      Name:     PIEDAD FAN   MRN:      [id number]        Account:      [id number]   :      1945           Service Date: 2019      Document: [id number]         Outpatient Encounter Medications as of 2019   Medication Sig Dispense Refill     alendronate (FOSAMAX) 70 MG tablet Take 1 tablet (70 mg) by mouth every 7 days Take 60 minutes before am meal with 8 oz. water. Remain upright for 30 minutes. 12 tablet 3     amLODIPine (NORVASC) 5 MG tablet TAKE ONE TABLET BY MOUTH EVERY DAY 30 tablet 9     aspirin EC 81 MG EC tablet Take 1 tablet by mouth daily. 30 tablet 2     calcium-vitamin D-vitamin K (VIACTIV) 500-500-40 MG-UNT-MCG CHEW Take 2 tablets by mouth daily       erythromycin (ROMYCIN) 5 MG/GM ophthalmic ointment Apply small amount to incision sites three times daily for 7 days, then apply to inner lower lid of operative eye(s) at bedtime for 7  days, as directed per physician instructions. 3.5 g 0     hydrochlorothiazide (HYDRODIURIL) 25 MG tablet Take 1 tablet (25 mg) by mouth daily 90 tablet 3     imatinib (GLEEVEC) 400 MG tablet Take 1 tablet (400 mg) by mouth daily Take with a meal and a large glass of water. 30 tablet 11     lisinopril (PRINIVIL/ZESTRIL) 40 MG tablet TAKE ONE TABLET BY MOUTH DAILY 90 tablet 3     LORazepam (ATIVAN) 0.5 MG tablet Take 1 tablet (0.5 mg) by mouth At Bedtime 90 tablet 1     melatonin 5 MG tablet Take 5 mg by mouth nightly as needed for sleep       methimazole (TAPAZOLE) 5 MG tablet Take 0.5 tablets (2.5 mg) by mouth daily 45 tablet 6     metoprolol tartrate (LOPRESSOR) 100 MG tablet TAKE ONE TABLET BY MOUTH TWICE A  tablet 2     polyethylene glycol (MIRALAX/GLYCOLAX) powder Take 17 g by mouth daily       rosuvastatin (CRESTOR) 5 MG tablet TAKE ONE TABLET BY MOUTH EVERY DAY 90 tablet 2     Blood Pressure Monitoring (ADULT BLOOD PRESSURE CUFF LG) KIT 1 Device 2 times daily (Patient not taking: Reported on 9/12/2019) 1 kit 1     order for DME Walker (Patient not taking: Reported on 9/12/2019) 1 Device 0     [DISCONTINUED] erythromycin (ROMYCIN) 5 MG/GM ophthalmic ointment Apply 0.5 inches to eye 3 times daily for 7 days Apply small amount to incision sites, as directed per physician instructions. 3.5 g 0     [DISCONTINUED] HYDROcodone-acetaminophen (NORCO) 5-325 MG tablet Take 1 tablet by mouth every 4 hours as needed for moderate to severe pain 10 tablet 0     [DISCONTINUED] HYDROcodone-acetaminophen (NORCO) 5-325 MG tablet Take 1-2 tablets by mouth every 4 hours as needed for moderate to severe pain 10 tablet 0     [DISCONTINUED] imatinib (GLEEVEC) 400 MG tablet Take 1 tablet (400 mg) by mouth daily Take with a meal and a large glass of water. 30 tablet 2     No facility-administered encounter medications on file as of 9/12/2019.        Again, thank you for allowing me to participate in the care of your patient.       Sincerely,    Anu Kramer PA-C     [facility]

## 2019-09-12 NOTE — LETTER
9/12/2019    Darlene Sultana MD  [address] [facility], [place] [address]    RE: Idalmis Abdul       Dear Colleague,    I had the pleasure of seeing Idalmis Abdul in the [facility].    Please see separate dictation for HPI, PHYSICAL EXAM AND IMPRESSION/PLAN.    CURRENT MEDICATIONS:  Current Outpatient Medications   Medication Sig Dispense Refill     alendronate (FOSAMAX) 70 MG tablet Take 1 tablet (70 mg) by mouth every 7 days Take 60 minutes before am meal with 8 oz. water. Remain upright for 30 minutes. 12 tablet 3     amLODIPine (NORVASC) 5 MG tablet TAKE ONE TABLET BY MOUTH EVERY DAY 30 tablet 9     aspirin EC 81 MG EC tablet Take 1 tablet by mouth daily. 30 tablet 2     calcium-vitamin D-vitamin K (VIACTIV) 500-500-40 MG-UNT-MCG CHEW Take 2 tablets by mouth daily       erythromycin (ROMYCIN) 5 MG/GM ophthalmic ointment Apply small amount to incision sites three times daily for 7 days, then apply to inner lower lid of operative eye(s) at bedtime for 7 days, as directed per physician instructions. 3.5 g 0     imatinib (GLEEVEC) 400 MG tablet Take 1 tablet (400 mg) by mouth daily Take with a meal and a large glass of water. 30 tablet 11     lisinopril (PRINIVIL/ZESTRIL) 40 MG tablet TAKE ONE TABLET BY MOUTH DAILY 90 tablet 3     LORazepam (ATIVAN) 0.5 MG tablet Take 1 tablet (0.5 mg) by mouth At Bedtime 90 tablet 1     melatonin 5 MG tablet Take 5 mg by mouth nightly as needed for sleep       methimazole (TAPAZOLE) 5 MG tablet Take 0.5 tablets (2.5 mg) by mouth daily 45 tablet 6     metoprolol tartrate (LOPRESSOR) 100 MG tablet TAKE ONE TABLET BY MOUTH TWICE A  tablet 2     polyethylene glycol (MIRALAX/GLYCOLAX) powder Take 17 g by mouth daily       rosuvastatin (CRESTOR) 5 MG tablet TAKE ONE TABLET BY MOUTH EVERY DAY 90 tablet 2     Blood Pressure Monitoring (ADULT BLOOD PRESSURE CUFF LG) KIT 1 Device 2 times daily (Patient not taking: Reported on 9/12/2019) 1 kit 1      order for MELINA Gomez (Patient not taking: Reported on 9/12/2019) 1 Device 0       ALLERGIES:     Allergies   Allergen Reactions     Atorvastatin Cramps and Unknown     cramps         PAST MEDICAL HISTORY:  Past Medical History:   Diagnosis Date     ASCVD (arteriosclerotic cardiovascular disease) 6/14/2012     Benign neoplasm of duodenum, jejunum, and ileum 2003, 2007    Gastrointestinal Stromal Tumor, seen at [facility], Dr. Schmitz, GI oncology-Gleevec treatment.  Surgical removal in fall 2004-no recurrence as of 3/05.     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     Other chronic pain     back     PONV (postoperative nausea and vomiting)      Postsurgical aortocoronary bypass status 7/4/2012     S/P CABG x 4 8/5/2012     Strabismus      Tobacco use disorder     Quit     Unspecified essential hypertension        PAST SURGICAL HISTORY:  Past Surgical History:   Procedure  Laterality Date     BLEPHAROPLASTY BILATERAL Bilateral 7/17/2019    Procedure: Bilateral Upper Eyelid Blepharoplasty;  Surgeon: Vasile Brasher MD;  [facility]     BYPASS GRAFT ARTERY CORONARY  6/14/2012    Procedure: BYPASS GRAFT ARTERY CORONARY;  Median Sternotomy Coronary Artery Bypass Graft  x 3        C THORACOSCOPY,DX W BX  fall 2003    benign tissue     CATARACT IOL, RT/LT      LE     CHOLECYSTECTOMY  1963     COLONOSCOPY  4-12-05     CORONARY ARTERY BYPASS      X4     CREATION PERICARDIAL WINDOW  6/15/2012    Procedure: CREATION PERICARDIAL WINDOW;  Mediastinal Exploration, Control of Bleeding;  Surgeon: Dwaine Griffin MD;  [facility]     EYE SURGERY  2014    left cataract removal     GI SURGERY  2003 and 2007    GIST tumor removal     GYN SURGERY      tubal ligation     HYSTERECTOMY VAGINAL, COLPORRHAPHY ANTERIOR, POSTERIOR, COMBINED N/A 10/17/2017    Procedure: COMBINED HYSTERECTOMY VAGINAL, COLPORRHAPHY ANTERIOR, POSTERIOR;  Total Vaginal Hysterectomy and Posterior Repair,Sacrospinous Vault Suspension;  Surgeon: Ruth Farooq MD;  [facility]     PHACOEMULSIFICATION WITH STANDARD INTRAOCULAR LENS IMPLANT  3/24/2014    Procedure: PHACOEMULSIFICATION WITH STANDARD INTRAOCULAR LENS IMPLANT;  Left Kelman Phacoemulsification with Intraocular Lens Implant;  Surgeon: Magnus Mckeon MD;  Location: [facility]     RECESSION RESECTION WITH ADJUSTABLE SUTURE BILATERAL Bilateral 7/14/2016    Procedure: RECESSION RESECTION WITH ADJUSTABLE SUTURE BILATERAL;  Surgeon: Erma Ordaz MD;  [facility]     REPAIR ENTROPION Right 8/21/2019    Procedure: Right Upper Lid Entropion Repair;  Surgeon: Vasile Brasher MD;  [facility]     REPAIR RETRACTION LID BILATERAL Bilateral 7/17/2019    Procedure: Bilateral Upper Eyelid Retraction Repair;  Surgeon: Vsaile Brasher MD;  [facility]     SURGICAL HISTORY OF -   1963    cholecystectomy     SURGICAL HISTORY OF -        Tubal Ligation      SURGICAL HISTORY OF -       Explor. Lap, Excision of Small Bowel Tumor      SURGICAL HISTORY OF 2010    lung cancer removed right lung       SOCIAL HISTORY:  Social History     Socioeconomic History     Marital status:      Spouse name: None     Number of children: None     Years of education: None     Highest education level: None   Occupational History     None   Social Needs     Financial resource strain: None     Food insecurity:     Worry: None     Inability: None     Transportation needs:     Medical: None     Non-medical: None   Tobacco Use     Smoking status: Former Smoker     Packs/day: 0.50     Years: 49.00     Pack years: 24.50     Types: Cigarettes     Last attempt to quit: 2010     Years since quittin.4     Smokeless tobacco: Never Used   Substance and Sexual Activity     Alcohol use: No     Drug use: No     Sexual activity: Not Currently     Partners: Male     Comment:    Lifestyle     Physical activity:     Days per week: None     Minutes per session: None     Stress: None   Relationships     Social connections:     Talks on phone: None     Gets together: None     Attends Confucianism service: None     Active member of club or organization: None     Attends meetings of clubs or organizations: None     Relationship status: None     Intimate partner violence:     Fear of current or ex partner: None     Emotionally abused: None     Physically abused: None     Forced sexual activity: None   Other Topics Concern      Service No     Blood Transfusions No     Caffeine Concern Yes     Comment: 3 cups     Occupational Exposure No     Hobby Hazards No     Sleep Concern No     Stress Concern No     Comment: son  and much happiness in her life, big burden lifted,      Weight Concern No     Special Diet No     Back Care Yes     Comment: lower back herniated disc       Exercise No     Bike Helmet No     Seat Belt Yes     Self-Exams  Yes     Parent/sibling w/ CABG, MI or angioplasty before 65F 55M? 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  Son (Rk, with wife Марина and grandson) lives next door -- quadriplegic and high functioning, cannot provide physical assistance/support.       FAMILY HISTORY:  Family History   Problem Relation Age of Onset     Heart Disease Mother      Osteoporosis Mother      Respiratory Mother         Emphezyma     Hypertension Mother      Heart Disease Father      Alcohol/Drug Father      Hypertension Father      Hypertension Maternal Grandmother      Hypertension Brother      Hypertension Sister      Arthritis Sister      Hypertension Son      Cancer Son         rectal       Review of Systems:  Skin:  Negative       Eyes:  Positive for visual blurring;double vision;glasses    ENT:  Negative      Respiratory:  Positive for shortness of breath;cough     Cardiovascular:    Positive for;fatigue    Gastroenterology: Positive for constipation    Genitourinary:  Positive for urinary frequency;urgency    Musculoskeletal:  Positive for back pain    Neurologic:  Negative      Psychiatric:  Negative      Heme/Lymph/Imm:  Negative      Endocrine:  Positive for thyroid disorder       Reviewed. Remainder of the note dictated.    Anu Kramer PA-C        Service Date: 09/12/2019      HISTORY OF PRESENTING COMPLAINT:  Ms. Abdul is a pleasant 73-year-old female who presents to the office today for an annual followup.      Her cardiovascular history includes coronary artery disease, status post coronary artery bypass surgery in 2012 with a LIMA to the LAD and reverse saphenous vein grafting to the OM, ramus and RPDA.  Her postoperative course was complicated by tamponade that required a pericardial window along with postoperative atrial fibrillation.  She was started on anticoagulation at that time, but unfortunately had a severe  upper GI bleed.  Fortunately, she has not had any known recurrent atrial fibrillation since her postoperative period.        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  She presents to the office today for an annual followup.      Overall, the patient states she has been doing well from a cardiac standpoint.  She has longstanding history of mild dyspnea on exertion which she states has been stable and has not been progressive.  She was put on inhaler through her primary care provider's office within the last year, but did not feel that this helped her symptoms much.  She does state that she was taken off of hydrochlorothiazide some time within the last 6 months.  Since that time, she thinks her blood pressure may have been a bit more elevated than it was previously.  She was uncertain why she was taken off of medication, but I was able to find a note from a visit with Dr. Sultana in May, which mentions that there was some concern that she was having dry mouth and that she may not need the additional antihypertensive therapy.  Otherwise, she denies any side effects from her current medication regimen.  She really does not have any cardiac questions or concerns at this time.      CURRENT CARDIAC MEDICATIONS:   1.  Amlodipine 5 mg daily.   2.  Aspirin 81 mg daily.   3.  Lisinopril 40 mg daily.   4.  Metoprolol tartrate 100 mg b.i.d.   5.  Crestor 5 mg daily.      The remainder of her medications, allergies and review of systems were reviewed and as are documented separately.      PHYSICAL EXAMINATION:   GENERAL:  The patient is a pleasant 73-year-old female who appears her stated age.  She is in no apparent distress.   VITAL SIGNS:  Her blood pressure is 160/78, pulse is 75.  Weight is 155 pounds.   PULMONARY:  Breathing is nonlabored.  Lungs are clear to auscultation.   CARDIAC:  Reveals a regular  rate and rhythm.  I do not appreciate any significant murmur.   EXTREMITIES:  Lower extremities show no evidence of edema.   NEUROLOGIC:  Alert and oriented.      She recently had a followup echocardiogram shows preserved LV systolic function, EF of 55%-60%.  The RV was not well visualized but showed some possible mild dilatation along with borderline systolic function.  No significant valvular heart disease was noted.  The reader felt that overall compared to 2013, there had been no significant change.        Ms. Abdul is a pleasant 73-year-old female with a history of coronary artery disease with prior coronary artery bypass surgery as described above with postoperative complications including pericardial tamponade requiring pericardial window and postoperative atrial fibrillation for which she has not had any known recurrence.  Fortunately, she overall has done well from a cardiac symptom standpoint.  Her recent echocardiogram did not show any significant change from 2013.  She has stable dyspnea on exertion which may be related to her prior 50-pack-year history of tobacco use or her prior lung resection.        Her blood pressure is quite elevated today.  Previously, it had been better controlled.  Her hydrochlorothiazide was discontinued earlier this year.  I discussed restarting with the patient and she was completely agreeable.  It looks like her labs were stable on this previously.  She will also be having repeat labs through her oncologist and endocrinologist in the near future; therefore, I did not order any repeat labs.  I have asked her to watch her blood pressure at home and if her readings are consistently greater than 130/80, to contact our office or Dr. Sultana's office and we could consider increasing her amlodipine.        Otherwise, again she seems to be doing well from a cardiac standpoint.  We will plan to see her back in the Cardiology Clinic in approximately 1 year.  Of course, I encouraged  her to contact us sooner with any questions or concerns.      Thank you for allowing us to participate in the care of this pleasant patient.         ANU SCHRADER PA-C             D: 2019   T: 2019   MT: YESICA      Name:     PIEDAD FAN   MRN:      [id number]        Account:      [id number]   :      1945           Service Date: 2019      Document: [id number]       Thank you for allowing me to participate in the care of your patient.      Sincerely,     Anu Schrader PA-C     [facility]    cc:   Tootie Herman MD  [address]  [place] [id number]

## 2019-09-20 NOTE — PROGRESS NOTES
SUBJECTIVE:                                                    Idalmis Abdul is 73 year old female   Chief Complaint   Patient presents with     Back Pain     Symptoms for a few months, states no injury/incident. States that she had back surgery 1.5 years ago. States low back pain, states numbness on left upper leg. Increased pain with standing and bending over. Has tried heating pad to little effect.     Has lumbar xray imaging obtained 8/22/19, MRI from 12/12/17      Problem list and histories reviewed & adjusted, as indicated.  Additional history: 4 screws put in lumbar spine L4-5 about 1 1/2 years ago.  Getting radiation to left, before it was to the right.  Does not want to go back to Dr. Taylor the spine surgeon, would not answer her son's questions.  Considering a cortisone shot.      Patient Active Problem List   Diagnosis     Hypertension goal BP (blood pressure) < 140/90     GIST (gastrointestinal stromal tumor), malignant (H)     Eyelid edema     History of lung cancer     Health Care Home     NSTEMI (non-ST elevated myocardial infarction) (H)     ASCVD (arteriosclerotic cardiovascular disease)     Postsurgical aortocoronary bypass status     S/P CABG x 4     Dyspnea     GERD (gastroesophageal reflux disease)     Hyperlipidemia LDL goal <160     Hyperthyroidism     Thyroid eye disease     Eyelid retraction or lag     Thyrotoxic exophthalmos     Graves' disease     History of tobacco abuse     History of non-ST elevation myocardial infarction (NSTEMI)     Chronic back pain     PVD (posterior vitreous detachment)     Age-related osteoporosis without current pathological fracture     Spinal stenosis of lumbosacral region     DDD (degenerative disc disease), lumbar     CKD (chronic kidney disease) stage 3, GFR 30-59 ml/min (H)     Past Surgical History:   Procedure Laterality Date     BLEPHAROPLASTY BILATERAL Bilateral 7/17/2019    Procedure: Bilateral Upper Eyelid Blepharoplasty;  Surgeon: Anshu  MD Vasile;  [facility]     BYPASS GRAFT ARTERY CORONARY  6/14/2012    Procedure: BYPASS GRAFT ARTERY CORONARY;  Median Sternotomy Coronary Artery Bypass Graft  x 3        C THORACOSCOPY,DX W BX  fall 2003    benign tissue     CATARACT IOL, RT/LT      LE     CHOLECYSTECTOMY  1963     COLONOSCOPY  4-12-05     CORONARY ARTERY BYPASS      X4     CREATION PERICARDIAL WINDOW  6/15/2012    Procedure: CREATION PERICARDIAL WINDOW;  Mediastinal Exploration, Control of Bleeding;  Surgeon: Dwaine Griffin MD;  [facility]     EYE SURGERY  2014    left cataract removal     GI SURGERY  2003 and 2007    GIST tumor removal     GYN SURGERY      tubal ligation     HYSTERECTOMY VAGINAL, COLPORRHAPHY ANTERIOR, POSTERIOR, COMBINED N/A 10/17/2017    Procedure: COMBINED HYSTERECTOMY VAGINAL, COLPORRHAPHY ANTERIOR, POSTERIOR;  Total Vaginal Hysterectomy and Posterior Repair,Sacrospinous Vault Suspension;  Surgeon: Ruth Farooq MD;  Location: [facility]     PHACOEMULSIFICATION WITH STANDARD INTRAOCULAR LENS IMPLANT  3/24/2014    Procedure: PHACOEMULSIFICATION WITH STANDARD INTRAOCULAR LENS IMPLANT;  Left Kelman Phacoemulsification with Intraocular Lens Implant;  Surgeon: Magnus Mckeon MD;  Location: [facility]     RECESSION RESECTION WITH ADJUSTABLE SUTURE BILATERAL Bilateral 7/14/2016    Procedure: RECESSION RESECTION WITH ADJUSTABLE SUTURE BILATERAL;  Surgeon: Erma Ordaz MD;  [facility]     REPAIR ENTROPION Right 8/21/2019    Procedure: Right Upper Lid Entropion Repair;  Surgeon: Vasile Brasher MD;  [facility]     REPAIR RETRACTION LID BILATERAL Bilateral 7/17/2019    Procedure: Bilateral Upper Eyelid Retraction Repair;  Surgeon: Vasile Brasher MD;  [facility]     SURGICAL HISTORY OF -   1963    cholecystectomy     SURGICAL HISTORY OF -   1970's    Tubal Ligation      SURGICAL HISTORY OF -   08/03    Explor. Lap, Excision of Small Bowel Tumor      SURGICAL HISTORY OF -   4/2010     lung cancer removed right lung       Social History     Tobacco Use     Smoking status: Former Smoker     Packs/day: 0.50     Years: 49.00     Pack years: 24.50     Types: Cigarettes     Last attempt to quit: 2010     Years since quittin.4     Smokeless tobacco: Never Used   Substance Use Topics     Alcohol use: No     Family History   Problem Relation Age of Onset     Heart Disease Mother      Osteoporosis Mother      Respiratory Mother         Emphezyma     Hypertension Mother      Heart Disease Father      Alcohol/Drug Father      Hypertension Father      Hypertension Maternal Grandmother      Hypertension Brother      Hypertension Sister      Arthritis Sister      Hypertension Son      Cancer Son         rectal         Current Outpatient Medications   Medication Sig Dispense Refill     alendronate (FOSAMAX) 70 MG tablet Take 1 tablet (70 mg) by mouth every 7 days Take 60 minutes before am meal with 8 oz. water. Remain upright for 30 minutes. 12 tablet 3     amLODIPine (NORVASC) 5 MG tablet TAKE ONE TABLET BY MOUTH EVERY DAY 30 tablet 9     aspirin EC 81 MG EC tablet Take 1 tablet by mouth daily. 30 tablet 2     Blood Pressure Monitoring (ADULT BLOOD PRESSURE CUFF LG) KIT 1 Device 2 times daily (Patient not taking: Reported on 2019) 1 kit 1     calcium-vitamin D-vitamin K (VIACTIV) 500-500-40 MG-UNT-MCG CHEW Take 2 tablets by mouth daily       erythromycin (ROMYCIN) 5 MG/GM ophthalmic ointment Apply small amount to incision sites three times daily for 7 days, then apply to inner lower lid of operative eye(s) at bedtime for 7 days, as directed per physician instructions. 3.5 g 0     hydrochlorothiazide (HYDRODIURIL) 25 MG tablet Take 1 tablet (25 mg) by mouth daily 90 tablet 3     imatinib (GLEEVEC) 400 MG tablet Take 1 tablet (400 mg) by mouth daily Take with a meal and a large glass of water. 30 tablet 11     lisinopril (PRINIVIL/ZESTRIL) 40 MG tablet TAKE ONE TABLET BY MOUTH DAILY 90 tablet 3      LORazepam (ATIVAN) 0.5 MG tablet Take 1 tablet (0.5 mg) by mouth At Bedtime 90 tablet 1     melatonin 5 MG tablet Take 5 mg by mouth nightly as needed for sleep       methimazole (TAPAZOLE) 5 MG tablet Take 0.5 tablets (2.5 mg) by mouth daily 45 tablet 6     metoprolol tartrate (LOPRESSOR) 100 MG tablet TAKE ONE TABLET BY MOUTH TWICE A  tablet 2     order for DME Walker (Patient not taking: Reported on 9/12/2019) 1 Device 0     polyethylene glycol (MIRALAX/GLYCOLAX) powder Take 17 g by mouth daily       rosuvastatin (CRESTOR) 5 MG tablet TAKE ONE TABLET BY MOUTH EVERY DAY 90 tablet 2     Allergies   Allergen Reactions     Atorvastatin Cramps and Unknown     cramps       Recent Labs   Lab Test 04/05/19  1048 04/05/19  0958 12/07/18  1229 10/12/18  1059  06/22/18  0840 06/20/18  1544  10/17/17  0755  09/20/16  1022  07/17/15  0916  06/19/12  1550  06/15/12  0850   A1C  --   --   --   --   --   --   --   --  5.5  --   --   --   --   --  5.4  --  Duplicate request   LDL  --   --   --   --   --  46  --   --   --   --  38  --  31   < >  --   --   --    HDL  --   --   --   --   --  48*  --   --   --   --  53  --  46*   < >  --   --   --    TRIG  --   --   --   --   --  82  --   --   --   --  79  --  72   < >  --   --   --    ALT  --  26  --  26  --  23  --    < >  --    < >  --    < > 23   < >  --    < >  --    CR  --  1.15*  --  1.16*   < > 1.04  --    < > 1.15*   < > 1.00   < > 1.06*   < >  --    < >  --    GFRESTIMATED 44* 47*  --  46*   < > 52*  --    < > 46*   < > 55*   < > 51*   < >  --    < >  --    GFRESTBLACK 53* 54*  --  55*   < > 63  --    < > 56*   < > 66   < > 62   < >  --    < >  --    POTASSIUM  --  3.9  --  4.3   < > 3.9  --    < > 3.6   < > 4.2   < > 4.0   < >  --    < > 2.8*   TSH  --   --  1.02  --   --   --  0.78   < >  --    < >  --    < >  --    < >  --   --   --     < > = values in this interval not displayed.      BP Readings from Last 3 Encounters:   09/12/19 (!) 160/78   08/21/19 129/62    07/17/19 136/81    Wt Readings from Last 3 Encounters:   09/12/19 70.4 kg (155 lb 3.2 oz)   08/21/19 70.3 kg (155 lb)   07/17/19 70.3 kg (155 lb)         ROS:  Constitutional, HEENT, cardiovascular, pulmonary, gi and gu systems are negative, except as otherwise noted.    OBJECTIVE:                                                    LMP 01/01/1992   GENERAL APPEARANCE ADULT: Alert, no acute distress, thin  MS: back exam: kyphosis, scolosis, general movements in the exam room are impaired due to pain  SKIN: no suspicious lesions or rashes  NEURO: Alert, oriented, speech and mentation normal  PSYCH: mentation appears normal., affect and mood normal  Diagnostic Test Results:  Xray lumbar spine shows 4 screws in L4- L5 vertebrae, intact, severe scoliosis     ASSESSMENT/PLAN:                                                    1. Disorder of intervertebral disc of lumbar spine  Surgery 1-2 years ago, having pain in left low back.  Will have see spine surgeon here.  - ORTHO  REFERRAL  - MR Lumbar Spine w/o Contrast; Future  - XR Lumbar Spine 2/3 Views; Future    Darlene Sultana MD  [facility]

## 2019-09-23 ENCOUNTER — OFFICE VISIT (OUTPATIENT)
Dept: FAMILY MEDICINE | Facility: CLINIC | Age: 74
End: 2019-09-23
Payer: MEDICARE

## 2019-09-23 ENCOUNTER — ANCILLARY PROCEDURE (OUTPATIENT)
Dept: GENERAL RADIOLOGY | Facility: CLINIC | Age: 74
End: 2019-09-23
Attending: FAMILY MEDICINE
Payer: MEDICARE

## 2019-09-23 VITALS
WEIGHT: 153.2 LBS | HEIGHT: 63 IN | TEMPERATURE: 98.6 F | RESPIRATION RATE: 12 BRPM | SYSTOLIC BLOOD PRESSURE: 134 MMHG | DIASTOLIC BLOOD PRESSURE: 68 MMHG | BODY MASS INDEX: 27.14 KG/M2 | HEART RATE: 70 BPM | OXYGEN SATURATION: 99 %

## 2019-09-23 DIAGNOSIS — M51.9 DISORDER OF INTERVERTEBRAL DISC OF LUMBAR SPINE: ICD-10-CM

## 2019-09-23 DIAGNOSIS — M51.9 DISORDER OF INTERVERTEBRAL DISC OF LUMBAR SPINE: Primary | ICD-10-CM

## 2019-09-23 PROCEDURE — 72100 X-RAY EXAM L-S SPINE 2/3 VWS: CPT

## 2019-09-23 PROCEDURE — 99214 OFFICE O/P EST MOD 30 MIN: CPT | Performed by: FAMILY MEDICINE

## 2019-09-23 ASSESSMENT — PAIN SCALES - GENERAL: PAINLEVEL: MODERATE PAIN (5)

## 2019-09-23 ASSESSMENT — MIFFLIN-ST. JEOR: SCORE: 1156.1

## 2019-09-23 NOTE — NURSING NOTE
"Initial /68   Pulse 70   Temp 98.6  F (37  C) (Tympanic)   Resp 12   Ht 1.588 m (5' 2.5\")   Wt 69.5 kg (153 lb 3.2 oz)   LMP 01/01/1992   SpO2 99%   BMI 27.57 kg/m   Estimated body mass index is 27.57 kg/m  as calculated from the following:    Height as of this encounter: 1.588 m (5' 2.5\").    Weight as of this encounter: 69.5 kg (153 lb 3.2 oz). .      "

## 2019-09-24 DIAGNOSIS — I10 ESSENTIAL HYPERTENSION: ICD-10-CM

## 2019-09-24 NOTE — TELEPHONE ENCOUNTER
"Requested Prescriptions   Pending Prescriptions Disp Refills     amLODIPine (NORVASC) 5 MG tablet [Pharmacy Med Name: AMLODIPINE BESYLATE 5MG TABS] 30 tablet 9     Sig: TAKE ONE TABLET BY MOUTH EVERY DAY       Calcium Channel Blockers Protocol  Failed - 9/24/2019 11:25 AM        Failed - Normal serum creatinine on file in past 12 months     Recent Labs   Lab Test 04/05/19  1048 04/05/19  0958   CR  --  1.15*   CREAT 1.2*  --              Passed - Blood pressure under 140/90 in past 12 months     BP Readings from Last 3 Encounters:   09/23/19 134/68   09/12/19 (!) 160/78   08/21/19 129/62                 Passed - Recent (12 mo) or future (30 days) visit within the authorizing provider's specialty     Patient had office visit in the last 12 months or has a visit in the next 30 days with authorizing provider or within the authorizing provider's specialty.  See \"Patient Info\" tab in inbasket, or \"Choose Columns\" in Meds & Orders section of the refill encounter.              Passed - Medication is active on med list        Passed - Patient is age 18 or older        Passed - No active pregnancy on record        Passed - No positive pregnancy test in past 12 months        Last Written Prescription Date:  11/29/2018  Last Fill Quantity: 30,  # refills: 9   Last office visit: 9/23/2019 with prescribing provider:  Kayy    Future Office Visit:      "

## 2019-09-24 NOTE — RESULT ENCOUNTER NOTE
Reviewed in clinic but here is the radiology report, not normal.  Please notify.        Thank you. CHELLY STUART MD    IMPRESSION: Posterior spine fusion hardware from L4 down to L5  consisting of bilateral pedicle screws with interconnecting rods again  noted. Severe sigmoid curvature of the lumbar spine; left convex  curvature centered at T12 and right convex curvature centered at L3  again noted. There is no evidence for recent fracture of the lumbar  spine. The visualized bones are osteopenic. There is degenerative  endplate spurring at the L1-L2 and L4-L5 levels.

## 2019-09-25 NOTE — TELEPHONE ENCOUNTER
S:  Refill request for amlodipine 5mg tab daily    B:  LOV 9/23/19  Amlodipine 5mg tab daily last written 11/29/18 for 10 month supply    A:  [place] [facility] as recent creatinines are out of normal range   Recent Labs   Lab Test 04/05/19  1048 04/05/19  0958   CR  --  1.15*   CREAT 1.2*  --         R:  Routed to provider:  Can patient have medication refill as pended?    Joey Zapata RN

## 2019-09-26 ENCOUNTER — TELEPHONE (OUTPATIENT)
Dept: FAMILY MEDICINE | Facility: CLINIC | Age: 74
End: 2019-09-26

## 2019-09-26 ENCOUNTER — HOSPITAL ENCOUNTER (OUTPATIENT)
Dept: MRI IMAGING | Facility: CLINIC | Age: 74
Discharge: HOME | End: 2019-09-26
Attending: FAMILY MEDICINE | Admitting: FAMILY MEDICINE
Payer: MEDICARE

## 2019-09-26 DIAGNOSIS — M51.9 DISORDER OF INTERVERTEBRAL DISC OF LUMBAR SPINE: ICD-10-CM

## 2019-09-26 LAB
CREAT BLD-MCNC: 1.2 MG/DL (ref 0.52–1.04)
GFR SERPL CREATININE-BSD FRML MDRD: 44 ML/MIN/{1.73_M2}

## 2019-09-26 PROCEDURE — 72158 MRI LUMBAR SPINE W/O & W/DYE: CPT

## 2019-09-26 PROCEDURE — 82565 ASSAY OF CREATININE: CPT

## 2019-09-26 PROCEDURE — A9585 GADOBUTROL INJECTION: HCPCS | Performed by: FAMILY MEDICINE

## 2019-09-26 PROCEDURE — 25500064 ZZH RX 255 OP 636: Performed by: FAMILY MEDICINE

## 2019-09-26 RX ORDER — GADOBUTROL 604.72 MG/ML
7 INJECTION INTRAVENOUS ONCE
Status: COMPLETED | OUTPATIENT
Start: 2019-09-26 | End: 2019-09-26

## 2019-09-26 RX ORDER — AMLODIPINE BESYLATE 5 MG/1
TABLET ORAL
Qty: 30 TABLET | Refills: 9 | Status: SHIPPED | OUTPATIENT
Start: 2019-09-26 | End: 2020-01-07

## 2019-09-26 RX ADMIN — GADOBUTROL 7 ML: 604.72 INJECTION INTRAVENOUS at 15:39

## 2019-09-26 NOTE — TELEPHONE ENCOUNTER
Radiologist Dr. Yovany Kohler called to have Dr. Sultana look at Idalmis's MRI results as he has suspicious concerns in the T-12 and L-1 areas as he knows she has a history of cancer. He is recommending she have an MRI of the thoracic with and without contrast due to his findings. You can call him tomorrow morning after 7:00AM with questions as he did want to speak with Dr. Sultana.     Emiliana Jain RN

## 2019-09-27 ENCOUNTER — TELEPHONE (OUTPATIENT)
Dept: ONCOLOGY | Facility: CLINIC | Age: 74
End: 2019-09-27

## 2019-09-27 DIAGNOSIS — C49.A0 MALIGNANT GASTROINTESTINAL STROMAL TUMOR, UNSPECIFIED SITE (H): ICD-10-CM

## 2019-09-27 LAB — RAD FLAG-ADDENDUM: ABNORMAL

## 2019-09-27 RX ORDER — LORAZEPAM 0.5 MG/1
0.5 TABLET ORAL AT BEDTIME
Qty: 90 TABLET | Refills: 1
Start: 2019-09-27 | End: 2020-06-18

## 2019-09-27 NOTE — TELEPHONE ENCOUNTER
Patient called and read the result note, patient seemed to be worried, patient says she is getting medical information faxed to Dr. Moraes's office and plans to schedule early next week.    Will route this to PCP for review and to call patient as patient is concerned.    MARÍA Espana

## 2019-09-27 NOTE — TELEPHONE ENCOUNTER
She will be seeing spine surgeon Dr. Moraes and will let him decide on further imaging.  Please verify she has an appointment.

## 2019-09-27 NOTE — RESULT ENCOUNTER NOTE
MRI is abnormal and may have cancer present, not definite.  She has referral to spine doctor.  Please make sure she has an appointment and notify.        Thank you. CHELLY STUART MD

## 2019-09-27 NOTE — TELEPHONE ENCOUNTER
----- Message from Blayne Schmitz MD sent at 9/27/2019  9:17 AM CDT -----  Regarding: RE: lorazepam refill  ok  ----- Message -----  From: Jaida Bush RN  Sent: 9/26/2019  12:35 PM CDT  To: Blayne Schmitz MD  Subject: FW: lorazepam refill                             Ok to refill?  ----- Message -----  From: Jaida Bush RN  Sent: 9/20/2019   8:49 AM CDT  To: Blayne Schmitz MD  Subject: lorazepam refill                                 Pt requesting refill of lorazepam 0.5, last filled on 6/28/19 from script written 3/26/19 for #90 ad 1 refill. Last visit was 4/29 with follow-up 10/28. If ok to fill will call in to  [facility].    Thank You,    Zenobia Bush  [facility] Triage RN  [phone]

## 2019-10-10 ENCOUNTER — TELEPHONE (OUTPATIENT)
Dept: FAMILY MEDICINE | Facility: CLINIC | Age: 74
End: 2019-10-10

## 2019-10-10 DIAGNOSIS — M54.6 CHRONIC THORACIC BACK PAIN, UNSPECIFIED BACK PAIN LATERALITY: ICD-10-CM

## 2019-10-10 DIAGNOSIS — G89.29 CHRONIC THORACIC BACK PAIN, UNSPECIFIED BACK PAIN LATERALITY: ICD-10-CM

## 2019-10-10 DIAGNOSIS — M41.9 SCOLIOSIS OF THORACOLUMBAR SPINE, UNSPECIFIED SCOLIOSIS TYPE: Primary | ICD-10-CM

## 2019-10-10 NOTE — TELEPHONE ENCOUNTER
Per result note from 9/26/19 spine CT from Dr Sultana:    Notes recorded by Darlene Sultana MD on 9/27/2019 at 12:42 PM CDT  MRI is abnormal and may have cancer present, not definite.  She has referral to spine doctor.  Please make sure she has an appointment and notify.  Thank you.   DARLENE SULTANA MD      However, since then, we received the message below asking for a referral to a spine surgeon at the .    Shakila Motley RN

## 2019-10-10 NOTE — TELEPHONE ENCOUNTER
Reason for call:  Symptom   Symptom or request: patient reports she heard from spinal surgeon--dr motta at [facility], who wants to refer her to the  of [place], but was told to call PCP for referral. Dr Motta feels the  specialist is a better fit for her. She is requesting/ needs referral to  a spinal surgery at the  .    Additional comments:     Best Time:  any    Can we leave a detailed message on this number?  YES     Brittany TRUONG  Station

## 2019-10-10 NOTE — TELEPHONE ENCOUNTER
Not sure how to make that referral, not an option in Epic that I can see, put in referral below, might work.

## 2019-10-13 ENCOUNTER — HOSPITAL ENCOUNTER (EMERGENCY)
Facility: CLINIC | Age: 74
Discharge: HOME | End: 2019-10-13
Attending: NURSE PRACTITIONER | Admitting: NURSE PRACTITIONER
Payer: MEDICARE

## 2019-10-13 VITALS — DIASTOLIC BLOOD PRESSURE: 79 MMHG | TEMPERATURE: 97.6 F | OXYGEN SATURATION: 94 % | SYSTOLIC BLOOD PRESSURE: 147 MMHG

## 2019-10-13 DIAGNOSIS — H69.91 DYSFUNCTION OF RIGHT EUSTACHIAN TUBE: ICD-10-CM

## 2019-10-13 PROCEDURE — 99214 OFFICE O/P EST MOD 30 MIN: CPT | Mod: Z6 | Performed by: NURSE PRACTITIONER

## 2019-10-13 PROCEDURE — G0463 HOSPITAL OUTPT CLINIC VISIT: HCPCS | Performed by: NURSE PRACTITIONER

## 2019-10-13 ASSESSMENT — ENCOUNTER SYMPTOMS
LIGHT-HEADEDNESS: 0
CHILLS: 0
COUGH: 0
SHORTNESS OF BREATH: 0
SORE THROAT: 0
HEADACHES: 0
FEVER: 0
FATIGUE: 0
DIZZINESS: 0

## 2019-10-13 NOTE — ED AVS SNAPSHOT
[facility] Emergency Department  [address] [address]  Phone:  [phone]  Fax:  [phone]                                    Idalmis Abdul   MRN: [id number]    Department:  [facility] Emergency Department   Date of Visit:  10/13/2019           After Visit Summary Signature Page    I have received my discharge instructions, and my questions have been answered. I have discussed any challenges I see with this plan with the nurse or doctor.    ..........................................................................................................................................  Patient/Patient Representative Signature      ..........................................................................................................................................  Patient Representative Print Name and Relationship to Patient    ..................................................               ................................................  Date                                   Time    ..........................................................................................................................................  Reviewed by Signature/Title    ...................................................              ..............................................  Date                                               Time          22EPIC Rev 08/18

## 2019-10-13 NOTE — ED PROVIDER NOTES
"  History     Chief Complaint   Patient presents with     Otalgia     right ear     HPI  Idalmis Abdul is a 73 year old female who presents to urgent care for evaluation of right ear pain. Patient reports problems with her right ear \"for years\" on and off. Intermittent pain for a few days now and feels pressure int he right ear when she swallows. States she saw an ENT a couple years ago and told she had TMJ.  Denies fevers. Denies congestion or cough.    Allergies:  Allergies   Allergen Reactions     Atorvastatin Cramps and Unknown     cramps         Problem List:    Patient Active Problem List    Diagnosis Date Noted     CKD (chronic kidney disease) stage 3, GFR 30-59 ml/min (H) 05/23/2019     Priority: Medium     Spinal stenosis of lumbosacral region 03/22/2018     Priority: Medium     DDD (degenerative disc disease), lumbar 03/22/2018     Priority: Medium     Age-related osteoporosis without current pathological fracture 12/16/2014     Priority: Medium     PVD (posterior vitreous detachment) 11/17/2014     Priority: Medium     History of tobacco abuse 09/02/2014     Priority: Medium     QUIT in 2010       History of non-ST elevation myocardial infarction (NSTEMI) 09/02/2014     Priority: Medium     Chronic back pain 09/02/2014     Priority: Medium     Patient is followed by KANWAL Patel and 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 Provider to review       Hyperthyroidism 02/04/2014     " Priority: Medium     Hyperlipidemia LDL goal <160 12/17/2013     Priority: Medium     GERD (gastroesophageal reflux disease) 10/08/2013     Priority: Medium     Dyspnea 08/27/2013     Priority: Medium     S/P CABG x 4 08/05/2012     Priority: Medium     Postsurgical aortocoronary bypass status 07/04/2012     Priority: Medium     ASCVD (arteriosclerotic cardiovascular disease) 06/14/2012     Priority: Medium     NSTEMI (non-ST elevated myocardial infarction) (H) 06/12/2012     Priority: Medium     Eyelid edema 12/15/2011     Priority: Medium     side effect of gastric cancer medication       History of lung cancer 12/15/2011     Priority: Medium     S/p resection of right lung.  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     Priority: Medium     resected 2003, recurred 2007, resected, and now on adjuvant gleevec       Hypertension goal BP (blood pressure) < 140/90 03/24/2005     Priority: Medium        Past Medical History:    Past Medical History:   Diagnosis Date     ASCVD (arteriosclerotic cardiovascular disease) 6/14/2012     Benign neoplasm of duodenum, jejunum, and ileum 2003, 2007     CA - lung cancer 4/2010     choledochal cyst 1963     CKD (chronic kidney disease) stage 3, GFR 30-59 ml/min (H) 5/23/2019     Coronary artery disease      DDD (degenerative disc disease), lumbar 3/22/2018     Dislocated finger, left middle PIP 7/26/2013     Dyspnea 8/27/2013     Eyelid edema 12/15/2011     GERD (gastroesophageal reflux disease) 10/8/2013     GIST (gastrointestinal stromal tumor), malignant (H) 5/10/2011     Graves disease      Grief reaction 5/11/2009     History of lung cancer 12/15/2011     Hyperlipidaemia      Hyperthyroidism 2/4/2014     Hypokalemia 8/5/2012     LBP (low back pain) 7/26/2013     Leiomyoma of uterus, unspecified      NSTEMI (non-ST elevated myocardial infarction) (H) 6/12/2012      NSTEMI (non-ST elevated myocardial infarction) (H)      osteopenia      Other benign neoplasm of connective and other soft tissue of thorax 2003     Other chronic pain      PONV (postoperative nausea and vomiting)      Postsurgical aortocoronary bypass status 7/4/2012     S/P CABG x 4 8/5/2012     Strabismus      Tobacco use disorder      Unspecified essential hypertension        Past Surgical History:    Past Surgical History:   Procedure Laterality Date     BLEPHAROPLASTY BILATERAL Bilateral 7/17/2019    Procedure: Bilateral Upper Eyelid Blepharoplasty;  Surgeon: Vasile Brasher MD;  Location: [facility] OR     BYPASS GRAFT ARTERY CORONARY  6/14/2012    Procedure: BYPASS GRAFT ARTERY CORONARY;  Median Sternotomy Coronary Artery Bypass Graft  x 3        C THORACOSCOPY,DX W BX  fall 2003    benign tissue     CATARACT IOL, RT/LT      LE     CHOLECYSTECTOMY  1963     COLONOSCOPY  4-12-05     CORONARY ARTERY BYPASS      X4     CREATION PERICARDIAL WINDOW  6/15/2012    Procedure: CREATION PERICARDIAL WINDOW;  Mediastinal Exploration, Control of Bleeding;  Surgeon: Dwaine Griffin MD;  Location: [facility] OR     EYE SURGERY  2014    left cataract removal     GI SURGERY  2003 and 2007    GIST tumor removal     GYN SURGERY      tubal ligation     HYSTERECTOMY VAGINAL, COLPORRHAPHY ANTERIOR, POSTERIOR, COMBINED N/A 10/17/2017    Procedure: COMBINED HYSTERECTOMY VAGINAL, COLPORRHAPHY ANTERIOR, POSTERIOR;  Total Vaginal Hysterectomy and Posterior Repair,Sacrospinous Vault Suspension;  Surgeon: Ruth Farooq MD;  Location: [facility] OR     PHACOEMULSIFICATION WITH STANDARD INTRAOCULAR LENS IMPLANT  3/24/2014    Procedure: PHACOEMULSIFICATION WITH STANDARD INTRAOCULAR LENS IMPLANT;  Left Kelman Phacoemulsification with Intraocular Lens Implant;  Surgeon: Magnus Mckeon MD;  Location: [facility] OR     RECESSION RESECTION WITH ADJUSTABLE SUTURE BILATERAL Bilateral 7/14/2016    Procedure: RECESSION RESECTION WITH ADJUSTABLE SUTURE  BILATERAL;  Surgeon: Erma Ordaz MD;  Location: [facility] OR     REPAIR ENTROPION Right 2019    Procedure: Right Upper Lid Entropion Repair;  Surgeon: Vasile Brasher MD;  [facility]: [facility]     REPAIR RETRACTION LID BILATERAL Bilateral 2019    Procedure: Bilateral Upper Eyelid Retraction Repair;  Surgeon: Vasile Brasher MD;  Location: [facility] OR     SURGICAL HISTORY OF -       cholecystectomy     SURGICAL HISTORY OF -       Tubal Ligation      SURGICAL HISTORY OF -       Explor. Lap, Excision of Small Bowel Tumor      SURGICAL HISTORY OF -   2010    lung cancer removed right lung       Family History:    Family History   Problem Relation Age of Onset     Heart Disease Mother      Osteoporosis Mother      Respiratory Mother         Emphezyma     Hypertension Mother      Heart Disease Father      Alcohol/Drug Father      Hypertension Father      Hypertension Maternal Grandmother      Hypertension Brother      Hypertension Sister      Arthritis Sister      Hypertension Son      Cancer Son         rectal       Social History:  Marital Status:   [5]  Social History     Tobacco Use     Smoking status: Former Smoker     Packs/day: 0.50     Years: 49.00     Pack years: 24.50     Types: Cigarettes     Last attempt to quit: 2010     Years since quittin.4     Smokeless tobacco: Never Used   Substance Use Topics     Alcohol use: No     Drug use: No        Medications:    alendronate (FOSAMAX) 70 MG tablet  amLODIPine (NORVASC) 5 MG tablet  aspirin EC 81 MG EC tablet  Blood Pressure Monitoring (ADULT BLOOD PRESSURE CUFF LG) KIT  calcium-vitamin D-vitamin K (VIACTIV) 500-500-40 MG-UNT-MCG CHEW  erythromycin (ROMYCIN) 5 MG/GM ophthalmic ointment  hydrochlorothiazide (HYDRODIURIL) 25 MG tablet  imatinib (GLEEVEC) 400 MG tablet  lisinopril (PRINIVIL/ZESTRIL) 40 MG tablet  LORazepam (ATIVAN) 0.5 MG tablet  melatonin 5 MG tablet  methimazole (TAPAZOLE) 5 MG tablet  metoprolol tartrate  (LOPRESSOR) 100 MG tablet  order for DME  polyethylene glycol (MIRALAX/GLYCOLAX) powder  rosuvastatin (CRESTOR) 5 MG tablet          Review of Systems   Constitutional: Negative for chills, fatigue and fever.   HENT: Positive for ear pain. Negative for congestion, dental problem and sore throat.    Respiratory: Negative for cough and shortness of breath.    Neurological: Negative for dizziness, light-headedness and headaches.       Physical Exam   BP: (!) 147/79  Heart Rate: 95  Temp: 97.6  F (36.4  C)  SpO2: 94 %      Physical Exam    GENERAL APPEARANCE: alert and oriented. NAD.   EYES: conjunctiva clear  HENT: bilateral ear canals clear, intact, and without inflammation. Right TM middle ear effusion, no erythema. Left TM normal. Nose normal.  Oropharynx without ulcers, erythema or lesions  NECK: supple, nontender, no lymphadenopathy  RESP: lungs clear to auscultation - no rales, rhonchi or wheezes  CV: regular rates and rhythm, normal S1 S2, no murmur noted    ED Course        Procedures          No results found. However, due to the size of the patient record, not all encounters were searched. Please check Results Review for a complete set of results.    Medications - No data to display    Assessments & Plan (with Medical Decision Making)   Dysfunctional eustachian tube- right.  We discussed nasal steroid spray and allergy medications. Pt states she is very sensitive to medications.  Pt was hoping to get an antibiotic. No acute infection on exam to warrant antibiotics.     Make appointment for follow-up with [phone].  Return for fevers, increased congestion, facial swelling, or any new symptoms of concern.  I have reviewed the nursing notes.    I have reviewed the findings, diagnosis, plan and need for follow up with the patient.      Discharge Medication List as of 10/13/2019  4:22 PM          Final diagnoses:   Dysfunction of right eustachian tube       10/13/2019   [facility]  DEPARTMENT     Femi, Alexandra Chaudhari, JAIME CNP  10/13/19 0413

## 2019-10-13 NOTE — DISCHARGE INSTRUCTIONS
Make appointment for follow-up with [phone].  Return for fevers, increased congestion, facial swelling, or any new symptoms of concern.

## 2019-10-16 NOTE — TELEPHONE ENCOUNTER
RECORDS RECEIVED FROM: Spine tumor, per pt. Records received, per Koby Schedule with spine   DATE RECEIVED: Oct 22, 2019    NOTES STATUS DETAILS   OFFICE NOTE from referring provider Internal 9/23/19 Dr. Sultana   OFFICE NOTE from other specialist N/A    DISCHARGE SUMMARY from hospital N/A    DISCHARGE REPORT from the ER N/A    OPERATIVE REPORT N/A    MEDICATION LIST Internal    IMPLANT RECORD/STICKER N/A    LABS     CBC/DIFF Internal 8/30/19   CULTURES N/A    INJECTIONS DONE IN RADIOLOGY N/A    MRI Internal 9/26/19   CT SCAN N/A    XRAYS (IMAGES & REPORTS) Internal 9/23/19, 2018   TUMOR     PATHOLOGY  Slides & report N/A

## 2019-10-22 ENCOUNTER — PRE VISIT (OUTPATIENT)
Dept: ORTHOPEDICS | Facility: CLINIC | Age: 74
End: 2019-10-22

## 2019-10-22 ENCOUNTER — ANCILLARY PROCEDURE (OUTPATIENT)
Dept: GENERAL RADIOLOGY | Facility: CLINIC | Age: 74
End: 2019-10-22
Attending: ORTHOPAEDIC SURGERY
Payer: MEDICARE

## 2019-10-22 ENCOUNTER — OFFICE VISIT (OUTPATIENT)
Dept: ORTHOPEDICS | Facility: CLINIC | Age: 74
End: 2019-10-22
Payer: MEDICARE

## 2019-10-22 VITALS — WEIGHT: 153 LBS | HEIGHT: 63 IN | BODY MASS INDEX: 27.11 KG/M2

## 2019-10-22 DIAGNOSIS — M41.50 DEGENERATIVE SCOLIOSIS IN ADULT PATIENT: ICD-10-CM

## 2019-10-22 DIAGNOSIS — M54.50 LUMBAR PAIN: Primary | ICD-10-CM

## 2019-10-22 RX ORDER — TIZANIDINE 2 MG/1
2 TABLET ORAL 3 TIMES DAILY
Qty: 30 TABLET | Refills: 1 | Status: SHIPPED | OUTPATIENT
Start: 2019-10-22 | End: 2020-06-18

## 2019-10-22 ASSESSMENT — ENCOUNTER SYMPTOMS
MYALGIAS: 0
JOINT SWELLING: 0
MUSCLE CRAMPS: 1
BACK PAIN: 1
HOARSE VOICE: 0
ARTHRALGIAS: 0
SORE THROAT: 0
NECK PAIN: 0
MUSCLE WEAKNESS: 1
NECK MASS: 0
STIFFNESS: 0
SMELL DISTURBANCE: 0
SINUS CONGESTION: 0
SINUS PAIN: 0
TROUBLE SWALLOWING: 0
TASTE DISTURBANCE: 0

## 2019-10-22 ASSESSMENT — MIFFLIN-ST. JEOR: SCORE: 1155.19

## 2019-10-22 NOTE — NURSING NOTE
"Reason For Visit:   Chief Complaint   Patient presents with     Consult     spine tumor        Primary MD: Darlene Sultana  Ref. MD: Self     ?  No  Occupation retired .  Currently working? No.  Work status?  Retired.  Date of injury: April 2018  Type of injury: chronic .  Date of surgery: April 2018  Type of surgery: Fusion L4-5 laminectomy .  Smoker: No  Request smoking cessation information: No    Ht 1.588 m (5' 2.5\")   Wt 69.4 kg (153 lb)   LMP 01/01/1992   BMI 27.54 kg/m      Pain Assessment  Patient Currently in Pain: Yes    Oswestry (JO ANN) Questionnaire    OSWESTRY DISABILITY INDEX 10/22/2019   Count 8   Sum 15   Oswestry Score (%) 37.5   Some recent data might be hidden            Neck Disability Index (NDI) Questionnaire    No flowsheet data found.                Promis 10 Assessment    PROMIS 10 10/22/2019   In general, would you say your health is: Good   In general, would you say your quality of life is: Good   In general, how would you rate your physical health? Good   In general, how would you rate your mental health, including your mood and your ability to think? Good   In general, how would you rate your satisfaction with your social activities and relationships? Good   In general, please rate how well you carry out your usual social activities and roles Good   To what extent are you able to carry out your everyday physical activities such as walking, climbing stairs, carrying groceries, or moving a chair? Moderately   How often have you been bothered by emotional problems such as feeling anxious, depressed or irritable? Rarely   How would you rate your fatigue on average? Mild   How would you rate your pain on average?   0 = No Pain  to  10 = Worst Imaginable Pain 5   In general, would you say your health is: 3   In general, would you say your quality of life is: 3   In general, how would you rate your physical health? 3   In general, how would you rate your mental health, including " your mood and your ability to think? 3   In general, how would you rate your satisfaction with your social activities and relationships? 3   In general, please rate how well you carry out your usual social activities and roles. (This includes activities at home, at work and in your community, and responsibilities as a parent, child, spouse, employee, friend, etc.) 3   To what extent are you able to carry out your everyday physical activities such as walking, climbing stairs, carrying groceries, or moving a chair? 3   In the past 7 days, how often have you been bothered by emotional problems such as feeling anxious, depressed, or irritable? 2   In the past 7 days, how would you rate your fatigue on average? 2   In the past 7 days, how would you rate your pain on average, where 0 means no pain, and 10 means worst imaginable pain? 5   Global Mental Health Score 13   Global Physical Health Score 13   PROMIS TOTAL - SUBSCORES 26   Some recent data might be hidden                Koby Avila ATC

## 2019-10-22 NOTE — PROGRESS NOTES
Spine Surgery Consultation    REFERRING PHYSICIAN: Referred Self   PRIMARY CARE PHYSICIAN: Darlene Sultana           Chief Complaint:   Consult (spine tumor )    History of Present Illness:  Symptom Profile Including: location of symptoms, onset, severity, exacerbating/alleviating factors, previous treatments:        Idalmis Abdul is a 73 year old female with PMH of GIST, lung cancer, and prior L4-L5 fusion on April 5, 2018 presenting with lumbar back pain and bilateral leg sensory changes.  The patient reports that she had her spine surgery for right leg radicular symptoms.  She states that following surgery the symptoms improved.  Slowly over the following year, however, she developed axial back pain in the lumbar region.  She also endorses pins-and-needles sensation on the dorsum of her right foot and lateral left leg but states that her axial back pain is significantly more troublesome than these symptoms.  She explains that her symptoms are particularly disabling.  She has difficulty with standing for prolonged periods and walking for prolonged periods.  She states that she finds greatest relief when she sits in a reclining chair at home.  Straight-backed chairs do not provide relief.  She has been taking Tylenol for her pain which helps some.  She has not tried physical therapy or injections since her spine surgery.  She has not seen her spine surgeon regarding the symptoms.  She would like a different opinion.  She reports that someone told her that her lumbar spine MRI was concerning for cancer.          Past Medical History:     Past Medical History:   Diagnosis Date     ASCVD (arteriosclerotic cardiovascular disease) 6/14/2012     Benign neoplasm of duodenum, jejunum, and ileum 2003, 2007    Gastrointestinal Stromal Tumor, seen at [facility], Dr. Schmitz, GI oncology-Gleevec treatment.  Surgical removal in fall 2004-no recurrence as of 3/05.     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     Other chronic pain     back     PONV (postoperative nausea and vomiting)      Postsurgical aortocoronary bypass status 7/4/2012     S/P CABG x 4 8/5/2012     Strabismus      Tobacco use disorder     Quit     Unspecified essential hypertension           Past Surgical History:     Past Surgical History:   Procedure Laterality Date     BLEPHAROPLASTY BILATERAL Bilateral 7/17/2019    Procedure: Bilateral Upper Eyelid Blepharoplasty;  Surgeon: Vasile Brasher MD;  Location: [facility]     BYPASS GRAFT ARTERY CORONARY  6/14/2012    Procedure: BYPASS GRAFT ARTERY CORONARY;  Median Sternotomy Coronary Artery Bypass Graft  x 3        C THORACOSCOPY,DX W BX  fall 2003    benign tissue     CATARACT IOL, RT/LT      LE     CHOLECYSTECTOMY  1963     COLONOSCOPY  4-12-05     CORONARY ARTERY BYPASS      X4     CREATION PERICARDIAL WINDOW  6/15/2012    Procedure: CREATION PERICARDIAL WINDOW;  Mediastinal  Exploration, Control of Bleeding;  Surgeon: Dwaine Griffin MD;  Location: [facility]     EYE SURGERY  2014    left cataract removal     GI SURGERY   and     GIST tumor removal     GYN SURGERY      tubal ligation     HYSTERECTOMY VAGINAL, COLPORRHAPHY ANTERIOR, POSTERIOR, COMBINED N/A 10/17/2017    Procedure: COMBINED HYSTERECTOMY VAGINAL, COLPORRHAPHY ANTERIOR, POSTERIOR;  Total Vaginal Hysterectomy and Posterior Repair,Sacrospinous Vault Suspension;  Surgeon: Ruth Farooq MD;  Location: [facility]     PHACOEMULSIFICATION WITH STANDARD INTRAOCULAR LENS IMPLANT  3/24/2014    Procedure: PHACOEMULSIFICATION WITH STANDARD INTRAOCULAR LENS IMPLANT;  Left Kelman Phacoemulsification with Intraocular Lens Implant;  Surgeon: Magnus Mckeon MD;  Location: [facility] OR     RECESSION RESECTION WITH ADJUSTABLE SUTURE BILATERAL Bilateral 2016    Procedure: RECESSION RESECTION WITH ADJUSTABLE SUTURE BILATERAL;  Surgeon: Erma Ordaz MD;  Location: [facility]     REPAIR ENTROPION Right 2019    Procedure: Right Upper Lid Entropion Repair;  Surgeon: Vasile Brasher MD;  [facility]     REPAIR RETRACTION LID BILATERAL Bilateral 2019    Procedure: Bilateral Upper Eyelid Retraction Repair;  Surgeon: Vasile Brasher MD;  Location: [facility]     SURGICAL HISTORY OF -       cholecystectomy     SURGICAL HISTORY OF -       Tubal Ligation      SURGICAL HISTORY OF -       Explor. Lap, Excision of Small Bowel Tumor      SURGICAL HISTORY OF -   2010    lung cancer removed right lung          Social History:     Social History     Tobacco Use     Smoking status: Former Smoker     Packs/day: 0.50     Years: 49.00     Pack years: 24.50     Types: Cigarettes     Last attempt to quit: 2010     Years since quittin.5     Smokeless tobacco: Never Used   Substance Use Topics     Alcohol use: No          Family History:     Family History   Problem Relation Age of Onset     Heart  "Disease Mother      Osteoporosis Mother      Respiratory Mother         Emphezyma     Hypertension Mother      Heart Disease Father      Alcohol/Drug Father      Hypertension Father      Hypertension Maternal Grandmother      Hypertension Brother      Hypertension Sister      Arthritis Sister      Hypertension Son      Cancer Son         rectal          Allergies:     Allergies   Allergen Reactions     Atorvastatin Cramps and Unknown     cramps            Medications:     Current Outpatient Medications   Medication     alendronate (FOSAMAX) 70 MG tablet     amLODIPine (NORVASC) 5 MG tablet     aspirin EC 81 MG EC tablet     Blood Pressure Monitoring (ADULT BLOOD PRESSURE CUFF LG) KIT     calcium-vitamin D-vitamin K (VIACTIV) 500-500-40 MG-UNT-MCG CHEW     erythromycin (ROMYCIN) 5 MG/GM ophthalmic ointment     hydrochlorothiazide (HYDRODIURIL) 25 MG tablet     imatinib (GLEEVEC) 400 MG tablet     lisinopril (PRINIVIL/ZESTRIL) 40 MG tablet     LORazepam (ATIVAN) 0.5 MG tablet     melatonin 5 MG tablet     methimazole (TAPAZOLE) 5 MG tablet     metoprolol tartrate (LOPRESSOR) 100 MG tablet     order for DME     polyethylene glycol (MIRALAX/GLYCOLAX) powder     rosuvastatin (CRESTOR) 5 MG tablet     No current facility-administered medications for this visit.           Review of Systems:     A 10 point ROS was performed and reviewed. Specific responses to these questions are noted at the end of the document.         Physical Exam:   Vitals: Ht 1.588 m (5' 2.5\")   Wt 69.4 kg (153 lb)   LMP 01/01/1992   BMI 27.54 kg/m    Constitutional: awake, alert, cooperative, no apparent distress, appears stated age.    Eyes: The sclera are white.  Ears, Nose, Throat: The trachea is midline.  Psychiatric: The patient has a normal affect.  Respiratory: breathing non-labored  Cardiovascular: The extremities are warm and perfused.  Skin: no obvious rashes or lesions.  Musculoskeletal, Neurologic, and Spine:      Lumbar " Spine:    Appearance - No gross stepoffs or deformities    Motor -     L2-3: Hip flexion 5/5 R and 4/5 L strength          L3/4:  Knee extension R 5/5 and L 4/5 strength         L4/5:  Foot dorsiflexion R 5/5 L 5/5 and       EHL dorsiflexion R 4/5 L 4/5 strength         S1:  Plantarflexion/Peroneal Muscles  R 5/5 and L 5/5 strength    Sensation: intact to light touch L3-S1 distribution BLE      Neurologic:      REFLEXES Left Right   Patella 1+ 1+   Ankle jerk 1+ 1+   Babinski No upgoing great toe No upgoing great toe   Clonus 0 beats 0 beats     Hip Exam:  No pain with hip log roll and no tenderness over the greater trochanters.    Alignment:  Patient stands with a positive standing sagittal balance.         Imaging:   We ordered and independently reviewed new radiographs at this clinic visit. The results were discussed with the patient.  Findings include:    Radiographs of the lumbar spine obtained today were ordered and reviewed.  These demonstrate prior L4-L5 posterior decompression and posterior instrumented spinal fusion.  There is significant degenerative scoliosis of the lumbar spine.    MRI of the lumbar spine obtained 9/26/2019 was reviewed.  These demonstrate prior L4-L5 posterior decompression and posterior instrumented spinal fusion.  Radiology notes diffuse marrow signal abnormality involving the T12 and L1 vertebral bodies most likely related to severe Modic type I changes.  Small ovoid enhancing lesions subjacent to the superior endplates of T12 and L1 are also noted.           Assessment and Plan:   Assessment:  Idalmis Abdul is a 73 year old female with PMH of GIST, lung cancer, and prior L4-L5 fusion on April 5, 2018 presenting with severe degenerative scoliosis of the lumbar spine and MRI findings of the T12 and L1 vertebral bodies of unknown significance.     Plan:  We discussed options at this point.  Given the patient's history, examination, and imaging, it is likely that the new axial back  pain she is describing is caused by the severe degenerative scoliosis of the lumbar spine.  She endorses that her initial right-sided radicular symptoms improved surgery and that this pain developed slowly over time.  She has tried Tylenol but has not pursued any other nonoperative management.  We discussed nonoperative management including the use of medications, therapy, and injections.  I recommend that the patient try a low-dose muscle relaxant and physical therapy.  She states that physical therapy has worked for her in the past and she would like to pursue this option.  We did discuss that after the exhaustion of nonoperative management, operative management would be considered.  We discussed that given the severity of her degenerative scoliosis, she would require an extensive surgery that would include the fusion of multiple levels.  This surgery would be of much greater magnitude than her first spine surgery.  The patient would like to avoid surgery and reiterates her interest in pursuing nonoperative management.    There are positive MRI findings of the T12 and L1 vertebral bodies of unknown significance.  Although the patient has a history of GIST and lung cancer, the pain she is describing does not correlate with these levels.  The patient would benefit from repeat imaging of the lumbar spine and radiology does recommend reevaluation of the T12 and L1 vertebral bodies.  Therefore when the patient follows up in 2 months, we will obtain repeat MRIs of the thoracic and lumbar spine.    - Start tizanidine  - Physical therapy referral  - 2 month follow-up with repeat MRI of the thoracic and lumbar spine   -Standing scoliosis films next visit.    Respectfully,  Osvaldo Serna MD  Spine Surgery  [facility]    Attending MD (Dr. Osvaldo Serna) :  I reviewed and verified the history and physical exam of the patient and discussed the patient's management with the other clinical providers  involved in this patient's care including any involved residents or physicians assistants. I reviewed the above note and agree with the documented findings and plan of care, which were communicated to the patient.      Osvaldo Serna MD    Answers for HPI/ROS submitted by the patient on 10/22/2019   General Symptoms: No  Skin Symptoms: No  HENT Symptoms: Yes  EYE SYMPTOMS: No  HEART SYMPTOMS: No  LUNG SYMPTOMS: No  INTESTINAL SYMPTOMS: No  URINARY SYMPTOMS: No  GYNECOLOGIC SYMPTOMS: No  BREAST SYMPTOMS: No  SKELETAL SYMPTOMS: Yes  BLOOD SYMPTOMS: No  NERVOUS SYSTEM SYMPTOMS: No  MENTAL HEALTH SYMPTOMS: No  Ear pain: Yes  Ear discharge: No  Hearing loss: No  Tinnitus: Yes  Nosebleeds: No  Congestion: No  Sinus pain: No  Trouble swallowing: No   Voice hoarseness: No  Mouth sores: No  Sore throat: No  Tooth pain: No  Gum tenderness: No  Bleeding gums: No  Change in taste: No  Change in sense of smell: No  Dry mouth: No  Hearing aid used: No  Neck lump: No  Back pain: Yes  Muscle aches: No  Neck pain: No  Swollen joints: No  Joint pain: No  Bone pain: No  Muscle cramps: Yes  Muscle weakness: Yes  Joint stiffness: No  Bone fracture: No

## 2019-10-22 NOTE — LETTER
10/22/2019       RE: Idalmis Abdul  [address]  [facility] [address]     Dear Colleague,    Thank you for referring your patient, Idalmis Abdul, to the [facility] at [facility]. Please see a copy of my visit note below.    Spine Surgery Consultation    REFERRING PHYSICIAN: Referred Self   PRIMARY CARE PHYSICIAN: Darlene Sultana           Chief Complaint:   Consult (spine tumor )    History of Present Illness:  Symptom Profile Including: location of symptoms, onset, severity, exacerbating/alleviating factors, previous treatments:        Idalmis Abdul is a 73 year old female with PMH of GIST, lung cancer, and prior L4-L5 fusion on April 5, 2018 presenting with lumbar back pain and bilateral leg sensory changes.  The patient reports that she had her spine surgery for right leg radicular symptoms.  She states that following surgery the symptoms improved.  Slowly over the following year, however, she developed axial back pain in the lumbar region.  She also endorses pins-and-needles sensation on the dorsum of her right foot and lateral left leg but states that her axial back pain is significantly more troublesome than these symptoms.  She explains that her symptoms are particularly disabling.  She has difficulty with standing for prolonged periods and walking for prolonged periods.  She states that she finds greatest relief when she sits in a reclining chair at home.  Straight-backed chairs do not provide relief.  She has been taking Tylenol for her pain which helps some.  She has not tried physical therapy or injections since her spine surgery.  She has not seen her spine surgeon regarding the symptoms.  She would like a different opinion.  She reports that someone told her that her lumbar spine MRI was concerning for cancer.          Past Medical History:     Past Medical History:   Diagnosis Date     ASCVD (arteriosclerotic cardiovascular  disease) 6/14/2012     Benign neoplasm of duodenum, jejunum, and ileum 2003, 2007    Gastrointestinal Stromal Tumor, seen at [facility], Dr. Schmitz, GI oncology-Gleevec treatment.  Surgical removal in fall 2004-no recurrence as of 3/05.     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     Other chronic pain     back     PONV (postoperative nausea and vomiting)      Postsurgical aortocoronary bypass status 7/4/2012     S/P CABG x 4 8/5/2012     Strabismus      Tobacco use disorder     Quit     Unspecified essential hypertension           Past Surgical History:     Past Surgical History:   Procedure Laterality Date     BLEPHAROPLASTY BILATERAL Bilateral 7/17/2019    Procedure: Bilateral Upper Eyelid Blepharoplasty;  Surgeon: Vasile Brasher MD;  [facility]: [facility]     BYPASS GRAFT ARTERY CORONARY  6/14/2012    Procedure: BYPASS GRAFT ARTERY CORONARY;  Median Sternotomy Coronary Artery  Bypass Graft  x 3        C THORACOSCOPY,DX W BX  fall 2003    benign tissue     CATARACT IOL, RT/LT      LE     CHOLECYSTECTOMY  1963     COLONOSCOPY  4-12-05     CORONARY ARTERY BYPASS      X4     CREATION PERICARDIAL WINDOW  6/15/2012    Procedure: CREATION PERICARDIAL WINDOW;  Mediastinal Exploration, Control of Bleeding;  Surgeon: Dwaine Griffin MD;  [facility]     EYE SURGERY  2014    left cataract removal     GI SURGERY  2003 and 2007    GIST tumor removal     GYN SURGERY      tubal ligation     HYSTERECTOMY VAGINAL, COLPORRHAPHY ANTERIOR, POSTERIOR, COMBINED N/A 10/17/2017    Procedure: COMBINED HYSTERECTOMY VAGINAL, COLPORRHAPHY ANTERIOR, POSTERIOR;  Total Vaginal Hysterectomy and Posterior Repair,Sacrospinous Vault Suspension;  Surgeon: Ruth Farooq MD;  Location: [facility]     PHACOEMULSIFICATION WITH STANDARD INTRAOCULAR LENS IMPLANT  3/24/2014    Procedure: PHACOEMULSIFICATION WITH STANDARD INTRAOCULAR LENS IMPLANT;  Left Kelman Phacoemulsification with Intraocular Lens Implant;  Surgeon: Magnus Mckeon MD;  Location: [facility] OR     RECESSION RESECTION WITH ADJUSTABLE SUTURE BILATERAL Bilateral 7/14/2016    Procedure: RECESSION RESECTION WITH ADJUSTABLE SUTURE BILATERAL;  Surgeon: Erma Ordaz MD;  Location: [facility] OR     REPAIR ENTROPION Right 8/21/2019    Procedure: Right Upper Lid Entropion Repair;  Surgeon: Vasile Brasher MD;  [facility]     REPAIR RETRACTION LID BILATERAL Bilateral 7/17/2019    Procedure: Bilateral Upper Eyelid Retraction Repair;  Surgeon: Vasile Brasher MD;  Location: [facility]     SURGICAL HISTORY OF -   1963    cholecystectomy     SURGICAL HISTORY OF -   1970's    Tubal Ligation      SURGICAL HISTORY OF -   08/03    Explor. Lap, Excision of Small Bowel Tumor      SURGICAL HISTORY OF -   4/2010    lung cancer removed right lung          Social History:     Social History     Tobacco Use     Smoking status: Former Smoker     Packs/day: 0.50      "Years: 49.00     Pack years: 24.50     Types: Cigarettes     Last attempt to quit: 2010     Years since quittin.5     Smokeless tobacco: Never Used   Substance Use Topics     Alcohol use: No          Family History:     Family History   Problem Relation Age of Onset     Heart Disease Mother      Osteoporosis Mother      Respiratory Mother         Emphezyma     Hypertension Mother      Heart Disease Father      Alcohol/Drug Father      Hypertension Father      Hypertension Maternal Grandmother      Hypertension Brother      Hypertension Sister      Arthritis Sister      Hypertension Son      Cancer Son         rectal          Allergies:     Allergies   Allergen Reactions     Atorvastatin Cramps and Unknown     cramps            Medications:     Current Outpatient Medications   Medication     alendronate (FOSAMAX) 70 MG tablet     amLODIPine (NORVASC) 5 MG tablet     aspirin EC 81 MG EC tablet     Blood Pressure Monitoring (ADULT BLOOD PRESSURE CUFF LG) KIT     calcium-vitamin D-vitamin K (VIACTIV) 500-500-40 MG-UNT-MCG CHEW     erythromycin (ROMYCIN) 5 MG/GM ophthalmic ointment     hydrochlorothiazide (HYDRODIURIL) 25 MG tablet     imatinib (GLEEVEC) 400 MG tablet     lisinopril (PRINIVIL/ZESTRIL) 40 MG tablet     LORazepam (ATIVAN) 0.5 MG tablet     melatonin 5 MG tablet     methimazole (TAPAZOLE) 5 MG tablet     metoprolol tartrate (LOPRESSOR) 100 MG tablet     order for DME     polyethylene glycol (MIRALAX/GLYCOLAX) powder     rosuvastatin (CRESTOR) 5 MG tablet     No current facility-administered medications for this visit.           Review of Systems:     A 10 point ROS was performed and reviewed. Specific responses to these questions are noted at the end of the document.         Physical Exam:   Vitals: Ht 1.588 m (5' 2.5\")   Wt 69.4 kg (153 lb)   LMP 1992   BMI 27.54 kg/m     Constitutional: awake, alert, cooperative, no apparent distress, appears stated age.    Eyes: The sclera are " white.  Ears, Nose, Throat: The trachea is midline.  Psychiatric: The patient has a normal affect.  Respiratory: breathing non-labored  Cardiovascular: The extremities are warm and perfused.  Skin: no obvious rashes or lesions.  Musculoskeletal, Neurologic, and Spine:      Lumbar Spine:    Appearance - No gross stepoffs or deformities    Motor -     L2-3: Hip flexion 5/5 R and 4/5 L strength          L3/4:  Knee extension R 5/5 and L 4/5 strength         L4/5:  Foot dorsiflexion R 5/5 L 5/5 and       EHL dorsiflexion R 4/5 L 4/5 strength         S1:  Plantarflexion/Peroneal Muscles  R 5/5 and L 5/5 strength    Sensation: intact to light touch L3-S1 distribution BLE      Neurologic:      REFLEXES Left Right   Patella 1+ 1+   Ankle jerk 1+ 1+   Babinski No upgoing great toe No upgoing great toe   Clonus 0 beats 0 beats     Hip Exam:  No pain with hip log roll and no tenderness over the greater trochanters.    Alignment:  Patient stands with a positive standing sagittal balance.         Imaging:   We ordered and independently reviewed new radiographs at this clinic visit. The results were discussed with the patient.  Findings include:    Radiographs of the lumbar spine obtained today were ordered and reviewed.  These demonstrate prior L4-L5 posterior decompression and posterior instrumented spinal fusion.  There is significant degenerative scoliosis of the lumbar spine.    MRI of the lumbar spine obtained 9/26/2019 was reviewed.  These demonstrate prior L4-L5 posterior decompression and posterior instrumented spinal fusion.  Radiology notes diffuse marrow signal abnormality involving the T12 and L1 vertebral bodies most likely related to severe Modic type I changes.  Small ovoid enhancing lesions subjacent to the superior endplates of T12 and L1 are also noted.           Assessment and Plan:   Assessment:  Idalmis Abdul is a 73 year old female with PMH of GIST, lung cancer, and prior L4-L5 fusion on April 5, 2018  presenting with severe degenerative scoliosis of the lumbar spine and MRI findings of the T12 and L1 vertebral bodies of unknown significance.     Plan:  We discussed options at this point.  Given the patient's history, examination, and imaging, it is likely that the new axial back pain she is describing is caused by the severe degenerative scoliosis of the lumbar spine.  She endorses that her initial right-sided radicular symptoms improved surgery and that this pain developed slowly over time.  She has tried Tylenol but has not pursued any other nonoperative management.  We discussed nonoperative management including the use of medications, therapy, and injections.  I recommend that the patient try a low-dose muscle relaxant and physical therapy.  She states that physical therapy has worked for her in the past and she would like to pursue this option.  We did discuss that after the exhaustion of nonoperative management, operative management would be considered.  We discussed that given the severity of her degenerative scoliosis, she would require an extensive surgery that would include the fusion of multiple levels.  This surgery would be of much greater magnitude than her first spine surgery.  The patient would like to avoid surgery and reiterates her interest in pursuing nonoperative management.    There are positive MRI findings of the T12 and L1 vertebral bodies of unknown significance.  Although the patient has a history of GIST and lung cancer, the pain she is describing does not correlate with these levels.  The patient would benefit from repeat imaging of the lumbar spine and radiology does recommend reevaluation of the T12 and L1 vertebral bodies.  Therefore when the patient follows up in 2 months, we will obtain repeat MRIs of the thoracic and lumbar spine.    - Start tizanidine  - Physical therapy referral  - 2 month follow-up with repeat MRI of the thoracic and lumbar spine   -Standing scoliosis films  next visit.    Attending MD (Dr. Osvaldo Serna) :  I reviewed and verified the history and physical exam of the patient and discussed the patient's management with the other clinical providers involved in this patient's care including any involved residents or physicians assistants. I reviewed the above note and agree with the documented findings and plan of care, which were communicated to the patient.      Osvaldo Serna MD

## 2019-10-23 ENCOUNTER — TELEPHONE (OUTPATIENT)
Dept: ORTHOPEDICS | Facility: CLINIC | Age: 74
End: 2019-10-23

## 2019-10-23 NOTE — TELEPHONE ENCOUNTER
[facility] Call Center    Phone Message    May a detailed message be left on voicemail: yes    Reason for Call: Other: Pt was seen by Dr. Serna yesterday and was told she has arthritis in her back but pt wants to know what kind of arthritis. Please call pt asap. Thank you.      Action Taken: Message routed to:  [facility] ([facility]): Orthopedics

## 2019-10-23 NOTE — TELEPHONE ENCOUNTER
RN returned patient's phone call and per Dr. Serna. Patient has osteoarthritis. RN relayed that info to patient. Patient verbalized understanding, no further questions.    Fany Valentine RN

## 2019-10-25 ENCOUNTER — HOSPITAL ENCOUNTER (OUTPATIENT)
Dept: CT IMAGING | Facility: CLINIC | Age: 74
Discharge: HOME | End: 2019-10-25
Attending: INTERNAL MEDICINE | Admitting: INTERNAL MEDICINE
Payer: MEDICARE

## 2019-10-25 DIAGNOSIS — Z85.118 HISTORY OF LUNG CANCER: ICD-10-CM

## 2019-10-25 DIAGNOSIS — C49.A0 MALIGNANT GASTROINTESTINAL STROMAL TUMOR, UNSPECIFIED SITE (H): ICD-10-CM

## 2019-10-25 LAB
ALBUMIN SERPL-MCNC: 3.5 G/DL (ref 3.4–5)
ALP SERPL-CCNC: 68 U/L (ref 40–150)
ALT SERPL W P-5'-P-CCNC: 23 U/L (ref 0–50)
ANION GAP SERPL CALCULATED.3IONS-SCNC: 9 MMOL/L (ref 3–14)
AST SERPL W P-5'-P-CCNC: 28 U/L (ref 0–45)
BASOPHILS # BLD AUTO: 0.1 10E9/L (ref 0–0.2)
BASOPHILS NFR BLD AUTO: 1 %
BILIRUB SERPL-MCNC: 0.4 MG/DL (ref 0.2–1.3)
BUN SERPL-MCNC: 17 MG/DL (ref 7–30)
CALCIUM SERPL-MCNC: 8.6 MG/DL (ref 8.5–10.1)
CHLORIDE SERPL-SCNC: 94 MMOL/L (ref 94–109)
CO2 SERPL-SCNC: 27 MMOL/L (ref 20–32)
CREAT SERPL-MCNC: 0.97 MG/DL (ref 0.52–1.04)
DIFFERENTIAL METHOD BLD: ABNORMAL
EOSINOPHIL # BLD AUTO: 0.2 10E9/L (ref 0–0.7)
EOSINOPHIL NFR BLD AUTO: 3 %
ERYTHROCYTE [DISTWIDTH] IN BLOOD BY AUTOMATED COUNT: 14.1 % (ref 10–15)
GFR SERPL CREATININE-BSD FRML MDRD: 57 ML/MIN/{1.73_M2}
GLUCOSE SERPL-MCNC: 103 MG/DL (ref 70–99)
HCT VFR BLD AUTO: 33.6 % (ref 35–47)
HGB BLD-MCNC: 11.2 G/DL (ref 11.7–15.7)
LYMPHOCYTES # BLD AUTO: 1.3 10E9/L (ref 0.8–5.3)
LYMPHOCYTES NFR BLD AUTO: 23 %
MCH RBC QN AUTO: 29.2 PG (ref 26.5–33)
MCHC RBC AUTO-ENTMCNC: 33.3 G/DL (ref 31.5–36.5)
MCV RBC AUTO: 88 FL (ref 78–100)
MONOCYTES # BLD AUTO: 1 10E9/L (ref 0–1.3)
MONOCYTES NFR BLD AUTO: 17 %
NEUTROPHILS # BLD AUTO: 3.1 10E9/L (ref 1.6–8.3)
NEUTROPHILS NFR BLD AUTO: 56 %
PLATELET # BLD AUTO: 257 10E9/L (ref 150–450)
PLATELET # BLD EST: ABNORMAL 10*3/UL
POTASSIUM SERPL-SCNC: 3.7 MMOL/L (ref 3.4–5.3)
PROT SERPL-MCNC: 6.9 G/DL (ref 6.8–8.8)
RBC # BLD AUTO: 3.83 10E12/L (ref 3.8–5.2)
RBC MORPH BLD: NORMAL
SODIUM SERPL-SCNC: 130 MMOL/L (ref 133–144)
WBC # BLD AUTO: 5.6 10E9/L (ref 4–11)

## 2019-10-25 PROCEDURE — 71260 CT THORAX DX C+: CPT

## 2019-10-25 PROCEDURE — 36415 COLL VENOUS BLD VENIPUNCTURE: CPT | Performed by: INTERNAL MEDICINE

## 2019-10-25 PROCEDURE — 80053 COMPREHEN METABOLIC PANEL: CPT | Performed by: INTERNAL MEDICINE

## 2019-10-25 PROCEDURE — 25000128 H RX IP 250 OP 636: Performed by: RADIOLOGY

## 2019-10-25 PROCEDURE — 25000125 ZZHC RX 250: Performed by: RADIOLOGY

## 2019-10-25 PROCEDURE — 85025 COMPLETE CBC W/AUTO DIFF WBC: CPT | Performed by: INTERNAL MEDICINE

## 2019-10-25 PROCEDURE — 74177 CT ABD & PELVIS W/CONTRAST: CPT

## 2019-10-25 RX ORDER — IOPAMIDOL 755 MG/ML
75 INJECTION, SOLUTION INTRAVASCULAR ONCE
Status: COMPLETED | OUTPATIENT
Start: 2019-10-25 | End: 2019-10-25

## 2019-10-25 RX ADMIN — IOPAMIDOL 75 ML: 755 INJECTION, SOLUTION INTRAVENOUS at 10:24

## 2019-10-25 RX ADMIN — SODIUM CHLORIDE 59 ML: 9 INJECTION, SOLUTION INTRAVENOUS at 10:24

## 2019-10-28 ENCOUNTER — ONCOLOGY VISIT (OUTPATIENT)
Dept: ONCOLOGY | Facility: CLINIC | Age: 74
End: 2019-10-28
Attending: INTERNAL MEDICINE
Payer: MEDICARE

## 2019-10-28 VITALS
HEART RATE: 73 BPM | BODY MASS INDEX: 27.34 KG/M2 | SYSTOLIC BLOOD PRESSURE: 109 MMHG | HEIGHT: 63 IN | OXYGEN SATURATION: 96 % | RESPIRATION RATE: 16 BRPM | DIASTOLIC BLOOD PRESSURE: 72 MMHG | WEIGHT: 154.3 LBS

## 2019-10-28 DIAGNOSIS — Z85.118 HISTORY OF LUNG CANCER: ICD-10-CM

## 2019-10-28 DIAGNOSIS — C49.A0 MALIGNANT GASTROINTESTINAL STROMAL TUMOR, UNSPECIFIED SITE (H): Primary | ICD-10-CM

## 2019-10-28 PROCEDURE — 99214 OFFICE O/P EST MOD 30 MIN: CPT | Mod: ZP | Performed by: INTERNAL MEDICINE

## 2019-10-28 PROCEDURE — G0463 HOSPITAL OUTPT CLINIC VISIT: HCPCS | Mod: ZF

## 2019-10-28 ASSESSMENT — PAIN SCALES - GENERAL: PAINLEVEL: NO PAIN (0)

## 2019-10-28 ASSESSMENT — MIFFLIN-ST. JEOR: SCORE: 1161.09

## 2019-10-28 NOTE — PROGRESS NOTES
Idalmis Abdul is here today in follow-up of gastrointestinal stromal tumor and lung cancer.    Her original diagnosis was more than 15 years ago with successful resection but recurrence of her GIST after she had completed her adjuvant Gleevec.  She is now been on Gleevec for more than a decade with excellent control of her disease.  Along the way she had a stage I lung cancer of which she is apparently cured.  At present she reports she is doing reasonably well.  She is happy having had surgery on her eyelids which is helped her seeing.  She has ongoing problems with her back and was somewhat alarmed when a recent MR scan was read as showing degenerative changes cannot rule out malignancy.  Her appetite is good.  Her energy level is reasonably good.  She has not had any active problems with her heart lately but does note that she gets winded easily and can just barely make it up a flight of steps without stopping to catch her breath.  That she does not feel represents much of a change in her overall condition.  She is having little or no problems with peripheral edema.  She has no orthopnea.  Remainder of her 10 point review of systems is otherwise negative.    On physical exam Ms. Abdul appears quite well with unremarkable vital signs.  She has no icterus and modest conjunctival injection.  She has her usual significant periorbital edema although it indeed does look much better since she had surgical correction.  She has no visible lesions in the oropharynx.  No adenopathy is palpable in her neck or supra Viet spaces her thyroid is not palpable.  Her lungs are clear to auscultation with dullness to percussion.  Her heart rate and rhythm are regular without audible murmur gallop.  Her jugular venous pressure appears normal without pathologic hepatojugular reflux.  Her abdomen is soft and nontender without palpable mass organomegaly.  She has no peripheral edema and no tenderness in her calves or thighs.  Her  speech is fluent and her cranial nerves are grossly intact.    I reviewed with her her CT scan and recent spine MR and lab work.  She has unremarkable electrolytes, renal function, liver enzymes and the expected mild anemia.  On her CT scan she has no evidence of her GIST.  She has extensive degenerative changes in her spine but nothing that would look like clearly malignant disease to me.    Assessment/plan:  1.  Gist with an ongoing CR to Gleevec.  Given that she had previously recurred when she was off Gleevec we plan to leave her on Gleevec indefinitely.  Other than her periorbital edema and very mild edema she does seem to be have any ongoing toxicity related to that.  2.  History of lung cancer.  She has no evidence of recurrence of this and she is far enough out that we can consider curative at disease.    We will see her back in another 6 months with a repeat CT scan and lab work.

## 2019-10-28 NOTE — NURSING NOTE
"Oncology Rooming Note    October 28, 2019 9:09 AM   Idalmis Abdul is a 73 year old female who presents for:    Chief Complaint   Patient presents with     Oncology Clinic Visit     Return; GIST     Initial Vitals: /72   Pulse 73   Resp 16   Ht 1.588 m (5' 2.5\")   Wt 70 kg (154 lb 4.8 oz)   LMP 01/01/1992   SpO2 96%   BMI 27.77 kg/m   Estimated body mass index is 27.77 kg/m  as calculated from the following:    Height as of this encounter: 1.588 m (5' 2.5\").    Weight as of this encounter: 70 kg (154 lb 4.8 oz). Body surface area is 1.76 meters squared.  No Pain (0) Comment: Data Unavailable   Patient's last menstrual period was 01/01/1992.  Allergies reviewed: Yes  Medications reviewed: Yes    Medications: Medication refills not needed today.  Pharmacy name entered into [facility]:    [facility], [place] - [address] AT [facility] [place], [place] - [address]    Clinical concerns: No new concerns.       Analia Gillis CMA              "

## 2019-10-28 NOTE — LETTER
10/28/2019      RE: Idalmis Abdul  [address]  [facility] [address]       Idalmis Abdul is here today in follow-up of gastrointestinal stromal tumor and lung cancer.    Her original diagnosis was more than 15 years ago with successful resection but recurrence of her GIST after she had completed her adjuvant Gleevec.  She is now been on Gleevec for more than a decade with excellent control of her disease.  Along the way she had a stage I lung cancer of which she is apparently cured.  At present she reports she is doing reasonably well.  She is happy having had surgery on her eyelids which is helped her seeing.  She has ongoing problems with her back and was somewhat alarmed when a recent MR scan was read as showing degenerative changes cannot rule out malignancy.  Her appetite is good.  Her energy level is reasonably good.  She has not had any active problems with her heart lately but does note that she gets winded easily and can just barely make it up a flight of steps without stopping to catch her breath.  That she does not feel represents much of a change in her overall condition.  She is having little or no problems with peripheral edema.  She has no orthopnea.  Remainder of her 10 point review of systems is otherwise negative.    On physical exam Ms. Abdul appears quite well with unremarkable vital signs.  She has no icterus and modest conjunctival injection.  She has her usual significant periorbital edema although it indeed does look much better since she had surgical correction.  She has no visible lesions in the oropharynx.  No adenopathy is palpable in her neck or supra Viet spaces her thyroid is not palpable.  Her lungs are clear to auscultation with dullness to percussion.  Her heart rate and rhythm are regular without audible murmur gallop.  Her jugular venous pressure appears normal without pathologic hepatojugular reflux.  Her abdomen is soft and nontender without palpable mass  organomegaly.  She has no peripheral edema and no tenderness in her calves or thighs.  Her speech is fluent and her cranial nerves are grossly intact.    I reviewed with her her CT scan and recent spine MR and lab work.  She has unremarkable electrolytes, renal function, liver enzymes and the expected mild anemia.  On her CT scan she has no evidence of her GIST.  She has extensive degenerative changes in her spine but nothing that would look like clearly malignant disease to me.    Assessment/plan:  1.  Gist with an ongoing CR to Gleevec.  Given that she had previously recurred when she was off Gleevec we plan to leave her on Gleevec indefinitely.  Other than her periorbital edema and very mild edema she does seem to be have any ongoing toxicity related to that.  2.  History of lung cancer.  She has no evidence of recurrence of this and she is far enough out that we can consider curative at disease.    We will see her back in another 6 months with a repeat CT scan and lab work.    Blayne Schmitz MD

## 2019-10-30 ENCOUNTER — HOSPITAL ENCOUNTER (OUTPATIENT)
Dept: PHYSICAL THERAPY | Facility: CLINIC | Age: 74
Setting detail: THERAPIES SERIES
End: 2019-10-30
Attending: ORTHOPAEDIC SURGERY
Payer: MEDICARE

## 2019-10-30 PROCEDURE — 97140 MANUAL THERAPY 1/> REGIONS: CPT | Mod: GP | Performed by: PHYSICAL THERAPIST

## 2019-10-30 PROCEDURE — 97161 PT EVAL LOW COMPLEX 20 MIN: CPT | Mod: GP | Performed by: PHYSICAL THERAPIST

## 2019-10-30 PROCEDURE — 97110 THERAPEUTIC EXERCISES: CPT | Mod: GP | Performed by: PHYSICAL THERAPIST

## 2019-10-30 NOTE — PROGRESS NOTES
[facility]          OUTPATIENT PHYSICAL THERAPY ORTHOPEDIC EVALUATION  PLAN OF TREATMENT FOR OUTPATIENT REHABILITATION  (COMPLETE FOR INITIAL CLAIMS ONLY)  Patient's Last Name, First Name, M.I.  YOB: 1945  Idalmis Abdul  TIERRA    Provider s Name:  [facility]   Medical Record No.  [id number]   Start of Care Date:  10/30/19   Onset Date:  10/22/19(Date of Order)   Type:     _X__PT   ___OT   ___SLP Medical Diagnosis:  Lumbar pain     PT Diagnosis:  Low back pain with radicular involvement   Visits from SOC:  1      _________________________________________________________________________________  Plan of Treatment/Functional Goals:  manual therapy, neuromuscular re-education, ROM, strengthening, stretching     Cryotherapy, Hot packs     Goals  Goal Identifier: HEP  Goal Description: Pt will be independent in HEP in order to achieve long term treatment goals.  Target Date: 11/30/19    Goal Identifier: Making the bed  Goal Description: Pt will be able to make the bed with a minimal increase in back pain 2-3/10.  Target Date: 12/25/19    Goal Identifier: Standing  Goal Description: Pt will be able to stand for 30 minutes in order to make a meal without having to sit due to back pain.  Target Date: 12/25/19    Goal Identifier: Ambulation  Goal Description: Pt will be able to ambulate for 1/2 mile with use of her walker for exercise.  Target Date: 12/25/19      Therapy Frequency:  1 time/week  Predicted Duration of Therapy Intervention:  8 weeks    Ta Villalta PT                 I CERTIFY THE NEED FOR THESE SERVICES FURNISHED UNDER        THIS PLAN OF TREATMENT AND WHILE UNDER MY CARE     (Physician co-signature of this document indicates review and certification of the therapy plan).                       Certification Date From:  10/30/19   Certification Date To:  12/25/19    Referring Provider:  Osvaldo Serna MD    Initial Assessment        See Epic  Evaluation Start of Care Date: 10/30/19

## 2019-10-30 NOTE — PROGRESS NOTES
Idalmis Abdul  1945 Physical Therapy Initial Evaluation  10/30/19 1000   General Information   Type of Visit Initial OP Ortho PT Evaluation   Start of Care Date 10/30/19   Referring Physician Osvaldo Serna MD   Patient/Family Goals Statement Stand without back pain   Orders Evaluate and Treat   Date of Order 10/22/19   Certification Required? Yes   Medical Diagnosis Lumbar pain   Surgical/Medical history reviewed Yes   Precautions/Limitations no known precautions/limitations   Body Part(s)   Body Part(s) Lumbar Spine/SI   Presentation and Etiology   Pertinent history of current problem (include personal factors and/or comorbidities that impact the POC) Had a spinal fusion April 2018. After that no longer having shooting pains into legs, but still had pain in low back. Occasionally gets pain in both hips, primarily on right side. Standing for long periods will increase pain (cooking, dishes). Carrying things will also increase low back pain. No injury following surgery. Did not do most of the exercises following surgery because they were painful to do. Sitting and using a heating pad will help with pain. Front half of right foot is numb as is a patch on lateral thigh. / Comorbidities - Dyspnea, Gastrointestinal stromal tumor malignant (H), Hyperthyroidism, HTN, Non-ST elevated myocardial infarction, Osteoperosis, Chronic kidney disease stage 3, History of tobacco abuse, History of lung cancer, S/P CABG x4, Spinal stenosis   Impairments A. Pain;B. Decreased WB tolerance;H. Impaired gait;K. Numbness   Functional Limitations perform activities of daily living;perform desired leisure / sports activities   Symptom Location Lumbar spine and B LEs   How/Where did it occur From insidious onset   Onset date of current episode/exacerbation 10/22/19  (Date of Order)   Chronicity Chronic   Pain rating (0-10 point scale) Best (/10);Worst (/10)   Best (/10) 3   Worst (/10) 8   Pain quality A. Sharp;C. Aching;E.  Shooting   Frequency of pain/symptoms C. With activity   Pain/symptoms exacerbated by B. Walking;C. Lifting;D. Carrying;H. Overhead reach;I. Bending;K. Home tasks;J. ADL   Pain/symptoms eased by A. Sitting;C. Rest;G. Heat   Progression of symptoms since onset: Worsened   Current / Previous Interventions   Diagnostic Tests: MRI   MRI Results Results   MRI results IMPRESSION: 1. Postsurgical changes status post L4-L5 posterior decompression and instrumented posterior spinal fusion. No residual central spinal stenosis at that level. 2. New diffuse marrow signal abnormality involving the T12 and L1 vertebral bodies, most likely related to severe Modic type I degenerative endplate change. There are small ovoid enhancing lesions subjacent to the superior endplates of T12 and L1, which may represent developing acute Schmorl's nodes. However, given the patient's history of malignancy, metastatic disease is not excluded. Recommend follow-up MRI in 4-6 weeks to ensure expected evolution of these findings and exclude metastatic disease. 3. Progression of multilevel degenerative disc disease and facet arthritis of the lumbar spine, as detailed in the body of the report. The greatest degree of spinal stenosis is now seen at the L3-L4 level where there is moderate spinal stenosis, and severe left lateral recess stenosis. Interval progression of now severe left foraminal stenosis at L4-5 and moderate left foraminal stenosis at L3-L4 with moderate to severe right neural foraminal stenosis at T12-L1. Stable moderate left foraminal stenosis at L5-S1. Please see the body of report for additional details.   Current Level of Function   Patient role/employment history F. Retired   Living environment House/townhome   Home/community accessibility 8 steps to basement with rail and 3 steps in house with rail.   Current equipment-Gait/Locomotion None   Fall Risk Screen   Fall screen completed by PT   Have you fallen 2 or more times in the  past year? No   Have you fallen and had an injury in the past year? No   Is patient a fall risk? No   Abuse Screen (yes response referral indicated)   Feels Unsafe at Home or Work/School no   Feels Threatened by Someone no   Does Anyone Try to Keep You From Having Contact with Others or Doing Things Outside Your Home? no   Physical Signs of Abuse Present no   Lumbar Spine/SI Objective Findings   Gait/Locomotion Restricted lumbar rotation. Deliberate gait pattern with reduced stride length   Flexion ROM 14 degrees   Extension ROM 10 degrees   Right Side Bending ROM Mid thigh with pain in right lumbar spine   Left Side Bending ROM Mid thigh with pain in right lumbar spine   Pelvic Screen Negative for SI provocation tests   Hip Flexion (L2) Strength 4-/5 B   Hip Abduction Strength 3+/5 B   Knee Extension (L3) Strength 4/5 B   Ankle Dorsiflexion (L4) Strength 5/5 B   Great Toe Extension (L5) Strength 5/5 B   Ankle Plantar Flexion (S1) Strength 5/5 B   Hamstring Flexibility Modeately restricted B   Piriformis Flexibility Moderately restricted B   SLR Negative B   Slump Test Negative B   Sensation Testing Numbness reported over right lateral thigh and left foot distal half   Patellar Tendon Reflexes  2+ B   Palpation Hypertonicity of lumbar paraspinals on right and Piriformis on right   Planned Therapy Interventions   Planned Therapy Interventions manual therapy;neuromuscular re-education;ROM;strengthening;stretching   Planned Modality Interventions   Planned Modality Interventions Cryotherapy;Hot packs   Clinical Impression   Criteria for Skilled Therapeutic Interventions Met no   PT Diagnosis Low back pain with radicular involvement   Influenced by the following impairments Pain, Strength and flexibility deficits   Functional limitations due to impairments Difficulty standing, bending, ambulating   Clinical Presentation Stable/Uncomplicated   Clinical Presentation Rationale Several comorbidities impacting PT / 1 body  system / Stable   Clinical Decision Making (Complexity) Low complexity   Therapy Frequency 1 time/week   Predicted Duration of Therapy Intervention (days/wks) 8 weeks   Risk & Benefits of therapy have been explained Yes   Patient, Family & other staff in agreement with plan of care Yes   Education Assessment   Preferred Learning Style Reading;Listening;Demonstration;Pictures/video   Barriers to Learning No barriers   ORTHO GOALS   PT Ortho Eval Goals 1;2;3;4   Ortho Goal 1   Goal Identifier HEP   Goal Description Pt will be independent in HEP in order to achieve long term treatment goals.   Target Date 11/30/19   Ortho Goal 2   Goal Identifier Making the bed   Goal Description Pt will be able to make the bed with a minimal increase in back pain 2-3/10.   Target Date 12/25/19   Ortho Goal 3   Goal Identifier Standing   Goal Description Pt will be able to stand for 30 minutes in order to make a meal without having to sit due to back pain.   Target Date 12/25/19   Ortho Goal 4   Goal Identifier Ambulation   Goal Description Pt will be able to ambulate for 1/2 mile with use of her walker for exercise.   Target Date 12/25/19   Total Evaluation Time   PT Eval, Low Complexity Minutes (29643) 22   Therapy Certification   Certification date from 10/30/19   Certification date to 12/25/19   Medical Diagnosis Lumbar pain     Fareed Villalta, PT, DPT

## 2019-11-06 ENCOUNTER — HOSPITAL ENCOUNTER (OUTPATIENT)
Dept: PHYSICAL THERAPY | Facility: CLINIC | Age: 74
Setting detail: THERAPIES SERIES
End: 2019-11-06
Attending: ORTHOPAEDIC SURGERY
Payer: MEDICARE

## 2019-11-06 PROCEDURE — 97140 MANUAL THERAPY 1/> REGIONS: CPT | Mod: GP | Performed by: PHYSICAL THERAPIST

## 2019-11-06 PROCEDURE — 97110 THERAPEUTIC EXERCISES: CPT | Mod: GP | Performed by: PHYSICAL THERAPIST

## 2019-11-10 DIAGNOSIS — E05.90 HYPERTHYROIDISM: ICD-10-CM

## 2019-11-11 RX ORDER — METHIMAZOLE 5 MG/1
TABLET ORAL
Qty: 45 TABLET | Refills: 6 | OUTPATIENT
Start: 2019-11-11

## 2019-11-12 ENCOUNTER — HOSPITAL ENCOUNTER (OUTPATIENT)
Dept: MRI IMAGING | Facility: CLINIC | Age: 74
Discharge: HOME | End: 2019-11-12
Attending: ORTHOPAEDIC SURGERY | Admitting: ORTHOPAEDIC SURGERY
Payer: MEDICARE

## 2019-11-12 ENCOUNTER — HOSPITAL ENCOUNTER (OUTPATIENT)
Dept: MRI IMAGING | Facility: CLINIC | Age: 74
End: 2019-11-12
Attending: ORTHOPAEDIC SURGERY
Payer: MEDICARE

## 2019-11-12 DIAGNOSIS — M54.50 LUMBAR PAIN: ICD-10-CM

## 2019-11-12 DIAGNOSIS — E05.90 HYPERTHYROIDISM: ICD-10-CM

## 2019-11-12 PROCEDURE — 72146 MRI CHEST SPINE W/O DYE: CPT

## 2019-11-12 PROCEDURE — 72148 MRI LUMBAR SPINE W/O DYE: CPT

## 2019-11-12 RX ORDER — METHIMAZOLE 5 MG/1
2.5 TABLET ORAL DAILY
Qty: 45 TABLET | Refills: 6 | Status: SHIPPED | OUTPATIENT
Start: 2019-11-12 | End: 2021-01-26

## 2019-11-12 NOTE — TELEPHONE ENCOUNTER
[facility] Call Center    Phone Message    May a detailed message be left on voicemail: yes    Reason for Call: Medication Refill Request    Has the patient contacted the pharmacy for the refill? Yes   Name of medication being requested:   methimazole (TAPAZOLE) 5 MG tablet 45 tablet      Provider who prescribed the medication: Arash  Pharmacy: [place], [place] - [address] [address]  Date medication is needed: asap  **Pharmacy requested this medication, and it was denied. The clinic told them that it was too soon to be refill, but the last refill was on 8/28/18, and that was only a year Rx. The pharmacy is requesting a Rx again because it has been over a year. Please fax, or call if needing to discuss.**      Action Taken: Message routed to:  [facility] Linda

## 2019-11-12 NOTE — TELEPHONE ENCOUNTER
methimazole (TAPAZOLE) 5 MG tablet      Last Written Prescription Date:  8-28-18  Last Fill Quantity: 45,   # refills: 6  Last Office Visit : 2-19-19  Future Office visit:  12-3-19    Routing refill request to provider for review/approval because:  Not on protocol.      Kathleen M Doege RN

## 2019-11-18 ENCOUNTER — HOSPITAL ENCOUNTER (OUTPATIENT)
Dept: PHYSICAL THERAPY | Facility: CLINIC | Age: 74
Setting detail: THERAPIES SERIES
End: 2019-11-18
Attending: ORTHOPAEDIC SURGERY
Payer: MEDICARE

## 2019-11-18 PROCEDURE — 97110 THERAPEUTIC EXERCISES: CPT | Mod: GP | Performed by: PHYSICAL THERAPIST

## 2019-11-18 PROCEDURE — 97140 MANUAL THERAPY 1/> REGIONS: CPT | Mod: GP | Performed by: PHYSICAL THERAPIST

## 2019-11-25 ENCOUNTER — HOSPITAL ENCOUNTER (OUTPATIENT)
Dept: PHYSICAL THERAPY | Facility: CLINIC | Age: 74
Setting detail: THERAPIES SERIES
End: 2019-11-25
Attending: ORTHOPAEDIC SURGERY
Payer: MEDICARE

## 2019-11-25 DIAGNOSIS — E05.00 GRAVES' DISEASE: ICD-10-CM

## 2019-11-25 DIAGNOSIS — M81.0 AGE-RELATED OSTEOPOROSIS WITHOUT CURRENT PATHOLOGICAL FRACTURE: ICD-10-CM

## 2019-11-25 LAB
ALBUMIN SERPL-MCNC: 3.5 G/DL (ref 3.4–5)
CALCIUM SERPL-MCNC: 8.9 MG/DL (ref 8.5–10.1)
PHOSPHATE SERPL-MCNC: 3.3 MG/DL (ref 2.5–4.5)
T4 FREE SERPL-MCNC: 1.32 NG/DL (ref 0.76–1.46)
TSH SERPL DL<=0.005 MIU/L-ACNC: 0.75 MU/L (ref 0.4–4)

## 2019-11-25 PROCEDURE — 82310 ASSAY OF CALCIUM: CPT | Performed by: INTERNAL MEDICINE

## 2019-11-25 PROCEDURE — 84439 ASSAY OF FREE THYROXINE: CPT | Performed by: INTERNAL MEDICINE

## 2019-11-25 PROCEDURE — 97140 MANUAL THERAPY 1/> REGIONS: CPT | Mod: GP | Performed by: PHYSICAL THERAPIST

## 2019-11-25 PROCEDURE — 84445 ASSAY OF TSI GLOBULIN: CPT | Mod: 90 | Performed by: INTERNAL MEDICINE

## 2019-11-25 PROCEDURE — 84443 ASSAY THYROID STIM HORMONE: CPT | Performed by: INTERNAL MEDICINE

## 2019-11-25 PROCEDURE — 84100 ASSAY OF PHOSPHORUS: CPT | Performed by: INTERNAL MEDICINE

## 2019-11-25 PROCEDURE — 82306 VITAMIN D 25 HYDROXY: CPT | Performed by: INTERNAL MEDICINE

## 2019-11-25 PROCEDURE — 97110 THERAPEUTIC EXERCISES: CPT | Mod: GP | Performed by: PHYSICAL THERAPIST

## 2019-11-25 PROCEDURE — 36415 COLL VENOUS BLD VENIPUNCTURE: CPT | Performed by: INTERNAL MEDICINE

## 2019-11-25 PROCEDURE — 82040 ASSAY OF SERUM ALBUMIN: CPT | Performed by: INTERNAL MEDICINE

## 2019-11-26 LAB
DEPRECATED CALCIDIOL+CALCIFEROL SERPL-MC: <36 UG/L (ref 20–75)
VITAMIN D2 SERPL-MCNC: <5 UG/L
VITAMIN D3 SERPL-MCNC: 31 UG/L

## 2019-12-02 ENCOUNTER — HOSPITAL ENCOUNTER (OUTPATIENT)
Dept: PHYSICAL THERAPY | Facility: CLINIC | Age: 74
Setting detail: THERAPIES SERIES
End: 2019-12-02
Attending: ORTHOPAEDIC SURGERY
Payer: MEDICARE

## 2019-12-02 LAB — TSI SER-ACNC: 4.5 TSI INDEX

## 2019-12-02 PROCEDURE — 97110 THERAPEUTIC EXERCISES: CPT | Mod: GP | Performed by: PHYSICAL THERAPIST

## 2019-12-02 NOTE — PROGRESS NOTES
Idalmis Abdul  1945  Diagnosis - Lumbar pain Physical Progress Note  12/02/19 1300   Signing Clinician's Name / Credentials   Signing clinician's name / credentials Fareed Villalta PT, DPT   Session Number   Session Number 5 (Start of Care Date - 10/30/2019)   Progress Note/Recertification   Progress Note Due Date 11/30/19   Progress Note Completed Date 12/02/19   Recertification Due Date 12/25/19   Adult Goals   PT Ortho Eval Goals 1;2;3;4   Ortho Goal 1   Goal Identifier HEP   Goal Description Pt will be independent in HEP in order to achieve long term treatment goals.   Target Date 11/30/19   Date Met 12/02/19   Ortho Goal 2   Goal Identifier Making the bed   Goal Description Pt will be able to make the bed with a minimal increase in back pain 2-3/10.  (Not met)   Target Date 12/25/19   Ortho Goal 3   Goal Identifier Standing   Goal Description Pt will be able to stand for 30 minutes in order to make a meal without having to sit due to back pain.  (Not met)   Target Date 12/25/19   Ortho Goal 4   Goal Identifier Ambulation   Goal Description Pt will be able to ambulate for 1/2 mile with use of her walker for exercise.  (Not met)   Target Date 12/25/19   Subjective Report   Subjective Report Things have been going pretty well since last visit. Does have trouble going from sitting to standing. 4/10 pain in back right now. 40% improvement since beginning physical therapy.    Objective Measures   Objective Measures Objective Measure 1;Objective Measure 2;Objective Measure 3   Objective Measure 1   Objective Measure Patellar reflexes   Details 1+ B   Objective Measure 2   Objective Measure Strength   Details Hip flexion - 4/5 B / Knee extension - 4/5 B / Ankle DF - 4/5 B / Ankle PF - 5/5 B   Objective Measure 3   Objective Measure Sit to stand   Details Performs independently but has difficulty with first 2 steps before reducing limp and improving heel strike and toe off   Treatment Interventions    Interventions Therapeutic Procedure/Exercise;Manual Therapy   Therapeutic Procedure/exercise   Therapeutic Procedures: strength, endurance, ROM, flexibillity minutes (66069) 26   Skilled Intervention Stretching and strengthening exercise education   Patient Response 2 seated rests required   Treatment Detail Bridges x3 (stopped due to discomfort) / Clamshells x10 B and x10 B with YTB / Standing hip abduction with extension 2x15 B / Mini lunges 2x15 B / Standing marching at counter x15 B with core activation /     Education   Learner Patient   Readiness Acceptance   Method Booklet/handout;Explanation;Demonstration   Response Verbalizes Understanding;Demonstrates Understanding   Plan   Homework Surgery on 4/2018   Home program Mini lunges / Clamshells / Pelvic tilts / Seated HS stretch 2x30 seconds / Mini squats at sink / Side steps at counter   Updates to plan of care Continue 1 time / week   Plan for next session STM / Check Mini lunges / Standing hip abduction with extension / Seated crunches / 4-point / BOSU or balance beam with core activation    Comments   Comments Pt is improving and states that she has seen a 40% improvement since beginning physical therapy. Pt has met 1 of 4 goals currently. Pt would benefit from continued skilled physical therapy in order to improve core strength, reduce pain, and improve functional ability.   Total Session Time   Timed Code Treatment Minutes 26   Total Treatment Time (sum of timed and untimed services) 26   AMBULATORY CLINICS ONLY-MEDICAL AND TREATMENT DIAGNOSIS   PT Diagnosis Low back pain with radicular involvement     Referring Physician - Osvaldo Serna

## 2019-12-10 ENCOUNTER — HOSPITAL ENCOUNTER (OUTPATIENT)
Dept: PHYSICAL THERAPY | Facility: CLINIC | Age: 74
Setting detail: THERAPIES SERIES
End: 2019-12-10
Attending: ORTHOPAEDIC SURGERY
Payer: MEDICARE

## 2019-12-10 PROCEDURE — 97140 MANUAL THERAPY 1/> REGIONS: CPT | Mod: GP | Performed by: PHYSICAL THERAPIST

## 2019-12-10 PROCEDURE — 97110 THERAPEUTIC EXERCISES: CPT | Mod: GP | Performed by: PHYSICAL THERAPIST

## 2019-12-16 ENCOUNTER — HOSPITAL ENCOUNTER (OUTPATIENT)
Dept: PHYSICAL THERAPY | Facility: CLINIC | Age: 74
Setting detail: THERAPIES SERIES
End: 2019-12-16
Attending: ORTHOPAEDIC SURGERY
Payer: MEDICARE

## 2019-12-16 PROCEDURE — 97140 MANUAL THERAPY 1/> REGIONS: CPT | Mod: GP | Performed by: PHYSICAL THERAPIST

## 2019-12-16 PROCEDURE — 97110 THERAPEUTIC EXERCISES: CPT | Mod: GP | Performed by: PHYSICAL THERAPIST

## 2019-12-23 ENCOUNTER — HOSPITAL ENCOUNTER (OUTPATIENT)
Dept: PHYSICAL THERAPY | Facility: CLINIC | Age: 74
Setting detail: THERAPIES SERIES
End: 2019-12-23
Attending: ORTHOPAEDIC SURGERY
Payer: MEDICARE

## 2019-12-23 PROCEDURE — 97140 MANUAL THERAPY 1/> REGIONS: CPT | Mod: GP | Performed by: PHYSICAL THERAPIST

## 2019-12-23 NOTE — PROGRESS NOTES
Idalmis Abdul  1945  Diagnosis - Lumbar pain Physical Therapy Progress Note  12/23/19 1000   Signing Clinician's Name / Credentials   Signing clinician's name / credentials Fareed Villalta PT, DPT   Session Number   Session Number 8 (Start of Care Date - 10/30/2019)   Progress Note/Recertification   Progress Note Due Date 12/25/19   Progress Note Completed Date 12/23/19   Recertification Due Date 12/25/19   Adult Goals   PT Ortho Eval Goals 1;2;3;4   Ortho Goal 1   Goal Identifier HEP   Goal Description Pt will be independent in HEP in order to achieve long term treatment goals.   Target Date 11/30/19   Date Met 12/02/19   Ortho Goal 2   Goal Identifier Making the bed   Goal Description Pt will be able to make the bed with a minimal increase in back pain 2-3/10.  (Not met)   Target Date 12/25/19   Ortho Goal 3   Goal Identifier Standing   Goal Description Pt will be able to stand for 30 minutes in order to make a meal without having to sit due to back pain.  (Not met)   Target Date 12/25/19   Ortho Goal 4   Goal Identifier Ambulation   Goal Description Pt will be able to ambulate for 1/2 mile with use of her walker for exercise.  (Partially met. Has improved.)   Target Date 12/25/19   Subjective Report   Subjective Report No progress since beginning physical therapy. Hip is really bothering her and it slows her down. 4/10 pain for back now and hip pain is 7/10 pain.   Objective Measures   Objective Measures Objective Measure 1;Objective Measure 2;Objective Measure 3   Objective Measure 1   Objective Measure JO ANN   Details 42.22%   Objective Measure 2   Objective Measure Strength   Details Hip flexion - 4-/5 B / Knee extension and flexion 4/5 B / Ankle DF - 4/5 B / Ankler PF - 4/5 B / Great toe extension 4/5 B   Objective Measure 3   Objective Measure Patellar reflexes   Details 2+ on right and absent on left   Treatment Interventions   Interventions Therapeutic Procedure/Exercise;Manual Therapy   Manual  Therapy   Manual Therapy: Mobilization, MFR, MLD, friction massage minutes (26671) 15   Skilled Intervention STM   Patient Response Tone reduction / Difficulty lying on right side   Treatment Detail STM to lumbar paraspinals and piriformis B to reduce tone and pain   Plan   Homework Surgery - 4/2018   Home program Standing marching with core activation / Mini lunges / Clamshells / Pelvic tilts / Seated HS stretch 2x30 seconds / Mini squats at sink / Side steps at counter   Updates to plan of care PT on hold   Plan for next session STM / BOSU or balance beam with core activation    Comments   Comments Pt states that she has not seen progress since beginning physical therapy. Pt has met 1 of 4 goals, but still has difficulty with functional tasks such as ambulation and sit to stand. Pt's pain in left hip was exacerbated when she had an MRI and had to lie prone for an extended period of time. Pt's JO ANN score has increased slightly since pt's first visit. PT will be put on hold at this time until the patient sees the surgeon.    Total Session Time   Timed Code Treatment Minutes 15   Total Treatment Time (sum of timed and untimed services) 15   AMBULATORY CLINICS ONLY-MEDICAL AND TREATMENT DIAGNOSIS   PT Diagnosis Low back pain with radicular involvement     Referring Physician - Osvaldo Serna

## 2019-12-27 DIAGNOSIS — C49.A0 MALIGNANT GASTROINTESTINAL STROMAL TUMOR, UNSPECIFIED SITE (H): ICD-10-CM

## 2019-12-28 NOTE — TELEPHONE ENCOUNTER
Previously filled by oncology, however is used for sleep and would be more appropriate for primary care to manage

## 2019-12-30 ENCOUNTER — TELEPHONE (OUTPATIENT)
Dept: ENDOCRINOLOGY | Facility: CLINIC | Age: 74
End: 2019-12-30

## 2019-12-30 ENCOUNTER — PATIENT OUTREACH (OUTPATIENT)
Dept: ONCOLOGY | Facility: CLINIC | Age: 74
End: 2019-12-30

## 2019-12-30 DIAGNOSIS — E78.5 HYPERLIPIDEMIA LDL GOAL <160: ICD-10-CM

## 2019-12-30 RX ORDER — LORAZEPAM 0.5 MG/1
0.5 TABLET ORAL AT BEDTIME
Qty: 90 TABLET | Refills: 1 | OUTPATIENT
Start: 2019-12-30

## 2019-12-30 NOTE — TELEPHONE ENCOUNTER
Covering for Dr. Sultana    Recommend patient follow up with PCP to discuss refill and possibly other sleep aid medications.    JAIME Oseguera CNP

## 2019-12-30 NOTE — TELEPHONE ENCOUNTER
"Requested Prescriptions   Pending Prescriptions Disp Refills     rosuvastatin (CRESTOR) 5 MG tablet [Pharmacy Med Name: ROSUVASTATIN CALCIUM 5MG TABS] 90 tablet 2     Sig: TAKE ONE TABLET BY MOUTH EVERY DAY       Statins Protocol Failed - 12/30/2019  1:01 PM        Failed - LDL on file in past 12 months     Recent Labs   Lab Test 06/22/18  0840   LDL 46             Passed - No abnormal creatine kinase in past 12 months     Recent Labs   Lab Test 07/11/12 2000   CKT 36                Passed - Recent (12 mo) or future (30 days) visit within the authorizing provider's specialty     Patient has had an office visit with the authorizing provider or a provider within the authorizing providers department within the previous 12 mos or has a future within next 30 days. See \"Patient Info\" tab in inbasket, or \"Choose Columns\" in Meds & Orders section of the refill encounter.              Passed - Medication is active on med list        Passed - Patient is age 18 or older        Passed - No active pregnancy on record        Passed - No positive pregnancy test in past 12 months        Last Written Prescription Date:  6/28/2018  Last Fill Quantity: 90,  # refills: 2   Last office visit: 9/23/2019 with prescribing provider:  Kayy    Future Office Visit:   Next 5 appointments (look out 90 days)    Jan 07, 2020 10:20 AM CST  SHORT with Darlene Sultana MD  [facility]  Arrive at: Clinic [facility] [address] [place] [id number]  [phone]           "

## 2019-12-30 NOTE — TELEPHONE ENCOUNTER
Patient is contacted and told of the need to have an office visit to discuss sleeping aides.  We have never prescribed this medication for her and it is a controlled substance.  Appt neftali. Emily SHEIKH RN

## 2019-12-30 NOTE — TELEPHONE ENCOUNTER
[facility] Call Center    Phone Message    May a detailed message be left on voicemail: yes    Reason for Call: Other: Pt missed her appt with Arash on 12/03 and would like to know what her new thyroid numbers and and if there has been any changes to her medication. Pt currently takes methimazole.     Action Taken: Message routed to:  [facility] endo

## 2019-12-31 RX ORDER — ROSUVASTATIN CALCIUM 5 MG/1
TABLET, COATED ORAL
Qty: 30 TABLET | Refills: 0 | Status: SHIPPED | OUTPATIENT
Start: 2019-12-31 | End: 2020-02-13

## 2019-12-31 NOTE — PROGRESS NOTES
"RN Care Coordination Note  Incoming Call: Received voicemail from patient asking if Dr Schmitz would be refilling her Ativan.     RNCC reviewed chart, oncology has asked PCP to take over refilling Ativan as patient is using it for sleep.     Outgoing Call: RNCC placed call to patient. Discussed we are recommending her PCP refill since she is using it for sleep. Patient states she is \"fine with that plan.\" Also states she has been \"thinking about trying to kick the habit for awhile now.\" Reports is has been cutting the Ativan in half or quarters, \"so I think I should be able to stop it.\" Also reports she has started using Melatonin. RNCC discussed with pharmacist and it would be safe for patient to take 10-12mg if needed. Patient was appreciative of call and has follow up appt with her PCP next week.       Yenifer Driver RN, BSN, OCN   RN Care Coordinator   [facility]          "

## 2019-12-31 NOTE — TELEPHONE ENCOUNTER
Routing refill request to provider for review/approval because:  A break in medication  Per [employer] Outside Info, medication was last dispensed 04/02/19 for 3 month supply.     Due for Lipid check.   LOV 09/23/19 with PCP.      MONA ArandaN, RN

## 2020-01-07 ENCOUNTER — OFFICE VISIT (OUTPATIENT)
Dept: FAMILY MEDICINE | Facility: CLINIC | Age: 75
End: 2020-01-07
Payer: COMMERCIAL

## 2020-01-07 VITALS
HEART RATE: 71 BPM | SYSTOLIC BLOOD PRESSURE: 138 MMHG | DIASTOLIC BLOOD PRESSURE: 62 MMHG | WEIGHT: 152 LBS | RESPIRATION RATE: 12 BRPM | OXYGEN SATURATION: 98 % | BODY MASS INDEX: 28.7 KG/M2 | HEIGHT: 61 IN | TEMPERATURE: 97.9 F

## 2020-01-07 DIAGNOSIS — N18.30 CKD (CHRONIC KIDNEY DISEASE) STAGE 3, GFR 30-59 ML/MIN (H): ICD-10-CM

## 2020-01-07 DIAGNOSIS — I10 ESSENTIAL HYPERTENSION: ICD-10-CM

## 2020-01-07 DIAGNOSIS — Z12.11 COLON CANCER SCREENING: Primary | ICD-10-CM

## 2020-01-07 DIAGNOSIS — J41.8 MIXED SIMPLE AND MUCOPURULENT CHRONIC BRONCHITIS (H): ICD-10-CM

## 2020-01-07 PROCEDURE — 99213 OFFICE O/P EST LOW 20 MIN: CPT | Performed by: FAMILY MEDICINE

## 2020-01-07 RX ORDER — AMLODIPINE BESYLATE 5 MG/1
5 TABLET ORAL DAILY
Qty: 90 TABLET | Refills: 3 | Status: SHIPPED | OUTPATIENT
Start: 2020-01-07 | End: 2021-01-26

## 2020-01-07 ASSESSMENT — MIFFLIN-ST. JEOR: SCORE: 1128.46

## 2020-01-07 NOTE — PROGRESS NOTES
SUBJECTIVE:                                                    Idalmis Abdul is 74 year old female   Chief Complaint   Patient presents with     Hypertension     Follow up.     Ear Problem     Ringing on her left ear - Follow up     Recheck Medication     Stop Lorezapam       Pt would like to have 90 day supply on the follow medications below   amLODIPine (NORVASC) 5 MG tablet  LORazepam (ATIVAN) 0.5 MG tablet - After this last refill she would like to stop this medication.  Reviewed med list and used at bedtime for 9 years, all prescribed by Dr. Schmitz.  Has been off for a month and using melatonin which works just fine.    Hypertension Follow-up      Do you check your blood pressure regularly outside of the clinic? No     Are you following a low salt diet? Yes    Are your blood pressures ever more than 140 on the top number (systolic) OR more   than 90 on the bottom number (diastolic), for example 140/90? No      Concern - Ringing on the left ear  Onset: Couple years     Description:   Pt noticed that her left ear is ringing more than usual.     Intensity: moderate    Progression of Symptoms:  same and constant    Accompanying Signs & Symptoms:  No pain    Previous history of similar problem:   na    Precipitating factors:   Worsened by: na    Alleviating factors:  Improved by: na    Therapies Tried and outcome: na    Problem list and histories reviewed & adjusted, as indicated.  Additional history: as documented    Patient Active Problem List   Diagnosis     Hypertension goal BP (blood pressure) < 140/90     GIST (gastrointestinal stromal tumor), malignant (H)     Eyelid edema     History of lung cancer     Health Care Home     NSTEMI (non-ST elevated myocardial infarction) (H)     ASCVD (arteriosclerotic cardiovascular disease)     Postsurgical aortocoronary bypass status     S/P CABG x 4     Dyspnea     GERD (gastroesophageal reflux disease)     Hyperlipidemia LDL goal <160     Hyperthyroidism     Thyroid  eye disease     Eyelid retraction or lag     Thyrotoxic exophthalmos     Graves' disease     History of tobacco abuse     History of non-ST elevation myocardial infarction (NSTEMI)     Chronic back pain     PVD (posterior vitreous detachment)     Age-related osteoporosis without current pathological fracture     Spinal stenosis of lumbosacral region     DDD (degenerative disc disease), lumbar     CKD (chronic kidney disease) stage 3, GFR 30-59 ml/min (H)     Mixed simple and mucopurulent chronic bronchitis (H)     Past Surgical History:   Procedure Laterality Date     BLEPHAROPLASTY BILATERAL Bilateral 7/17/2019    Procedure: Bilateral Upper Eyelid Blepharoplasty;  Surgeon: Vasile Brasher MD;  [facility]     BYPASS GRAFT ARTERY CORONARY  6/14/2012    Procedure: BYPASS GRAFT ARTERY CORONARY;  Median Sternotomy Coronary Artery Bypass Graft  x 3        C THORACOSCOPY,DX W BX  fall 2003    benign tissue     CATARACT IOL, RT/LT      LE     CHOLECYSTECTOMY  1963     COLONOSCOPY  4-12-05     CORONARY ARTERY BYPASS      X4     CREATION PERICARDIAL WINDOW  6/15/2012    Procedure: CREATION PERICARDIAL WINDOW;  Mediastinal Exploration, Control of Bleeding;  Surgeon: Dwaine Griffin MD;  Location:  OR     EYE SURGERY  2014    left cataract removal     GI SURGERY  2003 and 2007    GIST tumor removal     GYN SURGERY      tubal ligation     HYSTERECTOMY VAGINAL, COLPORRHAPHY ANTERIOR, POSTERIOR, COMBINED N/A 10/17/2017    Procedure: COMBINED HYSTERECTOMY VAGINAL, COLPORRHAPHY ANTERIOR, POSTERIOR;  Total Vaginal Hysterectomy and Posterior Repair,Sacrospinous Vault Suspension;  Surgeon: Ruth Farooq MD;  Location: [facility]     PHACOEMULSIFICATION WITH STANDARD INTRAOCULAR LENS IMPLANT  3/24/2014    Procedure: PHACOEMULSIFICATION WITH STANDARD INTRAOCULAR LENS IMPLANT;  Left Kelman Phacoemulsification with Intraocular Lens Implant;  Surgeon: Magnus Mckeon MD;  Location: [facility]     RECESSION RESECTION  WITH ADJUSTABLE SUTURE BILATERAL Bilateral 2016    Procedure: RECESSION RESECTION WITH ADJUSTABLE SUTURE BILATERAL;  Surgeon: Erma Ordaz MD;  [facility]: [facility]     REPAIR ENTROPION Right 2019    Procedure: Right Upper Lid Entropion Repair;  Surgeon: Vasile Brasher MD;  [facility]     REPAIR RETRACTION LID BILATERAL Bilateral 2019    Procedure: Bilateral Upper Eyelid Retraction Repair;  Surgeon: Vasile Brasher MD;  [facility]     SURGICAL HISTORY OF -       cholecystectomy     SURGICAL HISTORY OF -       Tubal Ligation      SURGICAL HISTORY OF -       Explor. Lap, Excision of Small Bowel Tumor      SURGICAL HISTORY OF -   2010    lung cancer removed right lung       Social History     Tobacco Use     Smoking status: Former Smoker     Packs/day: 0.50     Years: 49.00     Pack years: 24.50     Types: Cigarettes     Last attempt to quit: 2010     Years since quittin.7     Smokeless tobacco: Never Used   Substance Use Topics     Alcohol use: No     Family History   Problem Relation Age of Onset     Heart Disease Mother      Osteoporosis Mother      Respiratory Mother         Emphezyma     Hypertension Mother      Heart Disease Father      Alcohol/Drug Father      Hypertension Father      Hypertension Maternal Grandmother      Hypertension Brother      Hypertension Sister      Arthritis Sister      Hypertension Son      Cancer Son         rectal         Current Outpatient Medications   Medication Sig Dispense Refill     alendronate (FOSAMAX) 70 MG tablet Take 1 tablet (70 mg) by mouth every 7 days Take 60 minutes before am meal with 8 oz. water. Remain upright for 30 minutes. 12 tablet 3     amLODIPine (NORVASC) 5 MG tablet TAKE ONE TABLET BY MOUTH EVERY DAY 30 tablet 9     aspirin EC 81 MG EC tablet Take 1 tablet by mouth daily. 30 tablet 2     Blood Pressure Monitoring (ADULT BLOOD PRESSURE CUFF LG) KIT 1 Device 2 times daily 1 kit 1     calcium-vitamin  D-vitamin K (VIACTIV) 500-500-40 MG-UNT-MCG CHEW Take 2 tablets by mouth daily       erythromycin (ROMYCIN) 5 MG/GM ophthalmic ointment Apply small amount to incision sites three times daily for 7 days, then apply to inner lower lid of operative eye(s) at bedtime for 7 days, as directed per physician instructions. 3.5 g 0     hydrochlorothiazide (HYDRODIURIL) 25 MG tablet Take 1 tablet (25 mg) by mouth daily 90 tablet 3     imatinib (GLEEVEC) 400 MG tablet Take 1 tablet (400 mg) by mouth daily Take with a meal and a large glass of water. 30 tablet 11     lisinopril (PRINIVIL/ZESTRIL) 40 MG tablet TAKE ONE TABLET BY MOUTH DAILY 90 tablet 3     LORazepam (ATIVAN) 0.5 MG tablet Take 1 tablet (0.5 mg) by mouth At Bedtime 90 tablet 1     melatonin 5 MG tablet Take 5 mg by mouth nightly as needed for sleep       methimazole (TAPAZOLE) 5 MG tablet Take 0.5 tablets (2.5 mg) by mouth daily 45 tablet 6     metoprolol tartrate (LOPRESSOR) 100 MG tablet TAKE ONE TABLET BY MOUTH TWICE A  tablet 2     order for DME Walker 1 Device 0     polyethylene glycol (MIRALAX/GLYCOLAX) powder Take 17 g by mouth daily       rosuvastatin (CRESTOR) 5 MG tablet TAKE ONE TABLET BY MOUTH EVERY DAY 30 tablet 0     tiZANidine (ZANAFLEX) 2 MG tablet Take 1 tablet (2 mg) by mouth 3 times daily 30 tablet 1     Allergies   Allergen Reactions     Atorvastatin Cramps and Unknown     cramps       Recent Labs   Lab Test 11/25/19  1403 10/25/19  0943 09/26/19  1556  04/05/19  0958 12/07/18  1229 10/12/18  1059  06/22/18  0840  10/17/17  0755  09/20/16  1022  07/17/15  0916  06/19/12  1550  06/15/12  0850   A1C  --   --   --   --   --   --   --   --   --   --  5.5  --   --   --   --   --  5.4  --  Duplicate request   LDL  --   --   --   --   --   --   --   --  46  --   --   --  38  --  31   < >  --   --   --    HDL  --   --   --   --   --   --   --   --  48*  --   --   --  53  --  46*   < >  --   --   --    TRIG  --   --   --   --   --   --   --   --   "82  --   --   --  79  --  72   < >  --   --   --    ALT  --  23  --   --  26  --  26  --  23   < >  --    < >  --    < > 23   < >  --    < >  --    CR  --  0.97  --   --  1.15*  --  1.16*   < > 1.04   < > 1.15*   < > 1.00   < > 1.06*   < >  --    < >  --    GFRESTIMATED  --  57* 44*   < > 47*  --  46*   < > 52*   < > 46*   < > 55*   < > 51*   < >  --    < >  --    GFRESTBLACK  --  66 53*   < > 54*  --  55*   < > 63   < > 56*   < > 66   < > 62   < >  --    < >  --    POTASSIUM  --  3.7  --   --  3.9  --  4.3   < > 3.9   < > 3.6   < > 4.2   < > 4.0   < >  --    < > 2.8*   TSH 0.75  --   --   --   --  1.02  --   --   --    < >  --    < >  --    < >  --    < >  --   --   --     < > = values in this interval not displayed.      BP Readings from Last 3 Encounters:   01/07/20 138/62   10/28/19 109/72   10/13/19 (!) 147/79    Wt Readings from Last 3 Encounters:   01/07/20 68.9 kg (152 lb)   10/28/19 70 kg (154 lb 4.8 oz)   10/22/19 69.4 kg (153 lb)         ROS:  Constitutional, HEENT, cardiovascular, pulmonary, gi and gu systems are negative, except as otherwise noted.    OBJECTIVE:                                                    /62   Pulse 71   Temp 97.9  F (36.6  C) (Tympanic)   Resp 12   Ht 1.56 m (5' 1.42\")   Wt 68.9 kg (152 lb)   LMP 01/01/1992   SpO2 98%   Breastfeeding No   BMI 28.33 kg/m    GENERAL APPEARANCE ADULT: Alert, no acute distress  RESP: lungs clear to auscultation   CV: normal rate, regular rhythm, no murmur or gallop  Diagnostic Test Results:  none      ASSESSMENT/PLAN:                                                    1. Essential hypertension  due for review and refill, taking medication without difficulty  - amLODIPine (NORVASC) 5 MG tablet; Take 1 tablet (5 mg) by mouth daily  Dispense: 90 tablet; Refill: 3    2. Colon cancer screening  - Fecal colorectal cancer screen (FIT); Future    3. Mixed simple and mucopurulent chronic bronchitis (H)  Stable and no flare at this time    4. " CKD (chronic kidney disease) stage 3, GFR 30-59 ml/min (H)  stable      Darlene Sultana MD  [facility]

## 2020-02-04 ENCOUNTER — TELEPHONE (OUTPATIENT)
Dept: ONCOLOGY | Facility: CLINIC | Age: 75
End: 2020-02-04

## 2020-02-04 NOTE — TELEPHONE ENCOUNTER
Medication: GLEEVEC - PA REQUEST  Dosing: TAKE 1 TABLET BY MOUTH ONCE DAILY  NDC (required for Medicaid members):     Insurance: [employer] PART B/D  BIN: [id number]  PCN: [id number]  Grp:   ID: [id number]      Reception called this writer stating that patient was requiring a PA for her Gleevec.     [facility]   Oncology Pharmacy Liaison  [email or web address]   [phone] (phone)   [phone] (fax)

## 2020-02-06 ENCOUNTER — TELEPHONE (OUTPATIENT)
Dept: FAMILY MEDICINE | Facility: CLINIC | Age: 75
End: 2020-02-06

## 2020-02-06 DIAGNOSIS — I10 ESSENTIAL HYPERTENSION WITH GOAL BLOOD PRESSURE LESS THAN 140/90: ICD-10-CM

## 2020-02-06 DIAGNOSIS — E78.5 HYPERLIPIDEMIA LDL GOAL <160: ICD-10-CM

## 2020-02-07 RX ORDER — METOPROLOL TARTRATE 100 MG
TABLET ORAL
Qty: 180 TABLET | Refills: 2 | Status: SHIPPED | OUTPATIENT
Start: 2020-02-07 | End: 2020-11-10

## 2020-02-07 NOTE — TELEPHONE ENCOUNTER
Routing refill request to provider for review/approval because:  Labs not current:  Lipids  Last ordered by Dr. Taylor   Last OV 1/7/2020: Return in about 1 year (around 1/7/2021).     Nadine NAILS RN

## 2020-02-07 NOTE — TELEPHONE ENCOUNTER
Prescription approved per [facility] Refill Protocol.    Nadine NAILS RN

## 2020-02-07 NOTE — TELEPHONE ENCOUNTER
"Requested Prescriptions   Pending Prescriptions Disp Refills     rosuvastatin (CRESTOR) 5 MG tablet [Pharmacy Med Name: ROSUVASTATIN CALCIUM 5MG TABS] 30 tablet 0     Sig: TAKE ONE TABLET BY MOUTH ONCE DAILY       Statins Protocol Failed - 2/6/2020  5:38 PM        Failed - LDL on file in past 12 months     Recent Labs   Lab Test 06/22/18  0840   LDL 46             Passed - No abnormal creatine kinase in past 12 months     Recent Labs   Lab Test 07/11/12 2000   CKT 36                Passed - Recent (12 mo) or future (30 days) visit within the authorizing provider's specialty     Patient has had an office visit with the authorizing provider or a provider within the authorizing providers department within the previous 12 mos or has a future within next 30 days. See \"Patient Info\" tab in inbasket, or \"Choose Columns\" in Meds & Orders section of the refill encounter.              Passed - Medication is active on med list        Passed - Patient is age 18 or older        Passed - No active pregnancy on record        Passed - No positive pregnancy test in past 12 months        Last Written Prescription Date:  12/31/2019  Last Fill Quantity: 30,  # refills: 0   Last office visit: 1/7/2020 with prescribing provider:  Kayy  Future Office Visit:      "

## 2020-02-13 RX ORDER — ROSUVASTATIN CALCIUM 5 MG/1
TABLET, COATED ORAL
Qty: 30 TABLET | Refills: 0 | Status: SHIPPED | OUTPATIENT
Start: 2020-02-13 | End: 2020-03-05

## 2020-03-03 DIAGNOSIS — E78.5 HYPERLIPIDEMIA LDL GOAL <160: ICD-10-CM

## 2020-03-03 NOTE — TELEPHONE ENCOUNTER
"Requested Prescriptions   Pending Prescriptions Disp Refills     rosuvastatin (CRESTOR) 5 MG tablet [Pharmacy Med Name: ROSUVASTATIN CALCIUM 5MG TABS] 30 tablet 0     Sig: TAKE ONE TABLET BY MOUTH ONCE DAILY       Statins Protocol Failed - 3/3/2020  1:48 PM        Failed - LDL on file in past 12 months     Recent Labs   Lab Test 06/22/18  0840   LDL 46             Passed - No abnormal creatine kinase in past 12 months     Recent Labs   Lab Test 07/11/12 2000   CKT 36                Passed - Recent (12 mo) or future (30 days) visit within the authorizing provider's specialty     Patient has had an office visit with the authorizing provider or a provider within the authorizing providers department within the previous 12 mos or has a future within next 30 days. See \"Patient Info\" tab in inbasket, or \"Choose Columns\" in Meds & Orders section of the refill encounter.              Passed - Medication is active on med list        Passed - Patient is age 18 or older        Passed - No active pregnancy on record        Passed - No positive pregnancy test in past 12 months        Last Written Prescription Date:  2/13/20  Last Fill Quantity: 30,  # refills: 0   Last office visit: 1/7/2020 with prescribing provider:  Kayy   Future Office Visit:      "

## 2020-03-03 NOTE — LETTER
[facility]  [address] [address]  Phone: [phone]       March 5, 2020         Idalmis Abdul     [facility] [address]            Dear Idalmis:    We are concerned about your health care.  We recently provided you with medication refills.  Many medications require routine follow-up with your doctor.    Your prescription(s) have been refilled for 30 days so you may have time for the above noted follow-up. Please call to schedule soon so we can assure you have an appointment before your next refills are needed.    Thank you,      Darlene Sultana MD / lisa

## 2020-03-04 NOTE — TELEPHONE ENCOUNTER
Routing refill request to provider for review/approval because:  Labs not current:  FLP - ready.    Jeniffer MOREL RN

## 2020-03-05 RX ORDER — ROSUVASTATIN CALCIUM 5 MG/1
TABLET, COATED ORAL
Qty: 30 TABLET | Refills: 0 | Status: SHIPPED | OUTPATIENT
Start: 2020-03-05 | End: 2020-05-08

## 2020-03-18 ENCOUNTER — DOCUMENTATION ONLY (OUTPATIENT)
Dept: PHYSICAL THERAPY | Facility: CLINIC | Age: 75
End: 2020-03-18

## 2020-03-18 NOTE — PROGRESS NOTES
Idalmis M Franko  1945  Diagnosis - Lumbar pain Physical Therapy Discharge Note  (Unless indicated, information is from last visit on 12/23/19)   Signing Clinician's Name / Credentials   Signing clinician's name / credentials Fareed Villalta, PT, DPT   Session Number   Session Number 8 (Start of Care Date - 10/30/2019)   Progress Note/Recertification   Progress Note Due Date 12/25/19   Progress Note Completed Date 12/23/19   Recertification Due Date 12/25/19   Adult Goals   PT Ortho Eval Goals 1;2;3;4   Ortho Goal 1   Goal Identifier HEP   Goal Description Pt will be independent in HEP in order to achieve long term treatment goals.   Target Date 11/30/19   Date Met 12/02/19   Ortho Goal 2   Goal Identifier Making the bed   Goal Description Pt will be able to make the bed with a minimal increase in back pain 2-3/10.  (Not met)   Target Date 12/25/19   Ortho Goal 3   Goal Identifier Standing   Goal Description Pt will be able to stand for 30 minutes in order to make a meal without having to sit due to back pain.  (Not met)   Target Date 12/25/19   Ortho Goal 4   Goal Identifier Ambulation   Goal Description Pt will be able to ambulate for 1/2 mile with use of her walker for exercise.  (Partially met. Has improved.)   Target Date 12/25/19   Subjective Report   Subjective Report No progress since beginning physical therapy. Hip is really bothering her and it slows her down. 4/10 pain for back now and hip pain is 7/10 pain.   Objective Measures   Objective Measures Objective Measure 1;Objective Measure 2;Objective Measure 3   Objective Measure 1   Objective Measure JO ANN   Details 42.22%   Objective Measure 2   Objective Measure Strength   Details Hip flexion - 4-/5 B / Knee extension and flexion 4/5 B / Ankle DF - 4/5 B / Ankler PF - 4/5 B / Great toe extension 4/5 B   Objective Measure 3   Objective Measure Patellar reflexes   Details 2+ on right and absent on left   Treatment Interventions   Interventions  Therapeutic Procedure/Exercise;Manual Therapy   Manual Therapy   Manual Therapy: Mobilization, MFR, MLD, friction massage minutes (23693) 15   Skilled Intervention STM   Patient Response Tone reduction / Difficulty lying on right side   Treatment Detail STM to lumbar paraspinals and piriformis B to reduce tone and pain   Plan   Homework Surgery - 4/2018   Home program Standing marching with core activation / Mini lunges / Clamshells / Pelvic tilts / Seated HS stretch 2x30 seconds / Mini squats at sink / Side steps at counter   Updates to plan of care  (From 3/18/2020) Discharged   Plan for next session STM / BOSU or balance beam with core activation    Comments   Comments  (From 3/18/2020) At last visit, pt stated that she had not seen progress since beginning physical therapy. Pt had met 1 of 4 goals, but still had difficulty with functional tasks such as ambulation and sit to stand. Pt's pain in left hip was exacerbated when she had an MRI and had to lie prone for an extended period of time. Pt's JO ANN score had increased slightly since pt's first visit. Pt has not returned to PT in over 1 month and will be discharged to [facility] at this time.   Total Session Time   Timed Code Treatment Minutes 15   Total Treatment Time (sum of timed and untimed services) 15   AMBULATORY CLINICS ONLY-MEDICAL AND TREATMENT DIAGNOSIS   PT Diagnosis Low back pain with radicular involvement     Referring Physician - Osvaldo Serna

## 2020-04-22 ENCOUNTER — PATIENT OUTREACH (OUTPATIENT)
Dept: ONCOLOGY | Facility: CLINIC | Age: 75
End: 2020-04-22

## 2020-04-22 NOTE — PROGRESS NOTES
"RN Care Coordination Note  Received the following message from scheduling team:   This pt called to Cancel her appts with you 4/27. She is still in [place] and is unsure when she will be traveling back. She would like a call just to discuss next steps.     Outgoing Call: Placed call to patient to further discuss. States she is going to be returning to [place] \"middle of next week.\" Discussed having her see Dr Schmitz on 5/4/20 with labs and scans prior. We also discussed the appointment will likely be a virtual visit either by video or phone. Patient states she probably does not have technology/ability to do video visit. States she would prefer phone if possible.     Message sent to scheduling team with updated request.        Yenifer Driver RN, BSN, OCN   RN Care Coordinator   [facility]         "

## 2020-05-01 ENCOUNTER — HOSPITAL ENCOUNTER (OUTPATIENT)
Dept: CT IMAGING | Facility: CLINIC | Age: 75
Discharge: HOME | End: 2020-05-01
Attending: INTERNAL MEDICINE | Admitting: INTERNAL MEDICINE
Payer: COMMERCIAL

## 2020-05-01 DIAGNOSIS — C49.A0 MALIGNANT GASTROINTESTINAL STROMAL TUMOR, UNSPECIFIED SITE (H): ICD-10-CM

## 2020-05-01 DIAGNOSIS — Z85.118 HISTORY OF LUNG CANCER: ICD-10-CM

## 2020-05-01 DIAGNOSIS — C49.A0 GIST (GASTROINTESTINAL STROMAL TUMOR), MALIGNANT (H): ICD-10-CM

## 2020-05-01 LAB
ALBUMIN SERPL-MCNC: 3.4 G/DL (ref 3.4–5)
ALP SERPL-CCNC: 63 U/L (ref 40–150)
ALT SERPL W P-5'-P-CCNC: 23 U/L (ref 0–50)
ANION GAP SERPL CALCULATED.3IONS-SCNC: 5 MMOL/L (ref 3–14)
AST SERPL W P-5'-P-CCNC: 29 U/L (ref 0–45)
BASOPHILS # BLD AUTO: 0 10E9/L (ref 0–0.2)
BASOPHILS NFR BLD AUTO: 0.8 %
BILIRUB SERPL-MCNC: 0.4 MG/DL (ref 0.2–1.3)
BUN SERPL-MCNC: 18 MG/DL (ref 7–30)
CALCIUM SERPL-MCNC: 8.5 MG/DL (ref 8.5–10.1)
CHLORIDE SERPL-SCNC: 98 MMOL/L (ref 94–109)
CO2 SERPL-SCNC: 28 MMOL/L (ref 20–32)
CREAT SERPL-MCNC: 1.13 MG/DL (ref 0.52–1.04)
DIFFERENTIAL METHOD BLD: ABNORMAL
EOSINOPHIL # BLD AUTO: 0.1 10E9/L (ref 0–0.7)
EOSINOPHIL NFR BLD AUTO: 2.5 %
ERYTHROCYTE [DISTWIDTH] IN BLOOD BY AUTOMATED COUNT: 14.6 % (ref 10–15)
GFR SERPL CREATININE-BSD FRML MDRD: 47 ML/MIN/{1.73_M2}
GLUCOSE SERPL-MCNC: 98 MG/DL (ref 70–99)
HCT VFR BLD AUTO: 31 % (ref 35–47)
HGB BLD-MCNC: 10.2 G/DL (ref 11.7–15.7)
IMM GRANULOCYTES # BLD: 0 10E9/L (ref 0–0.4)
IMM GRANULOCYTES NFR BLD: 0.2 %
LYMPHOCYTES # BLD AUTO: 0.9 10E9/L (ref 0.8–5.3)
LYMPHOCYTES NFR BLD AUTO: 16.9 %
MCH RBC QN AUTO: 29.5 PG (ref 26.5–33)
MCHC RBC AUTO-ENTMCNC: 32.9 G/DL (ref 31.5–36.5)
MCV RBC AUTO: 90 FL (ref 78–100)
MONOCYTES # BLD AUTO: 1 10E9/L (ref 0–1.3)
MONOCYTES NFR BLD AUTO: 19.4 %
NEUTROPHILS # BLD AUTO: 3.2 10E9/L (ref 1.6–8.3)
NEUTROPHILS NFR BLD AUTO: 60.2 %
NRBC # BLD AUTO: 0 10*3/UL
NRBC BLD AUTO-RTO: 0 /100
PLATELET # BLD AUTO: 202 10E9/L (ref 150–450)
POTASSIUM SERPL-SCNC: 4 MMOL/L (ref 3.4–5.3)
PROT SERPL-MCNC: 6.4 G/DL (ref 6.8–8.8)
RBC # BLD AUTO: 3.46 10E12/L (ref 3.8–5.2)
SODIUM SERPL-SCNC: 131 MMOL/L (ref 133–144)
WBC # BLD AUTO: 5.3 10E9/L (ref 4–11)

## 2020-05-01 PROCEDURE — 25000128 H RX IP 250 OP 636: Performed by: RADIOLOGY

## 2020-05-01 PROCEDURE — 71260 CT THORAX DX C+: CPT

## 2020-05-01 PROCEDURE — 25000125 ZZHC RX 250: Performed by: RADIOLOGY

## 2020-05-01 PROCEDURE — 85025 COMPLETE CBC W/AUTO DIFF WBC: CPT | Performed by: INTERNAL MEDICINE

## 2020-05-01 PROCEDURE — 80053 COMPREHEN METABOLIC PANEL: CPT | Performed by: INTERNAL MEDICINE

## 2020-05-01 PROCEDURE — 36415 COLL VENOUS BLD VENIPUNCTURE: CPT | Performed by: INTERNAL MEDICINE

## 2020-05-01 RX ORDER — IOPAMIDOL 755 MG/ML
75 INJECTION, SOLUTION INTRAVASCULAR ONCE
Status: COMPLETED | OUTPATIENT
Start: 2020-05-01 | End: 2020-05-01

## 2020-05-01 RX ADMIN — IOPAMIDOL 75 ML: 755 INJECTION, SOLUTION INTRAVENOUS at 10:46

## 2020-05-01 RX ADMIN — SODIUM CHLORIDE 59 ML: 9 INJECTION, SOLUTION INTRAVENOUS at 10:45

## 2020-05-04 ENCOUNTER — VIRTUAL VISIT (OUTPATIENT)
Dept: ONCOLOGY | Facility: CLINIC | Age: 75
End: 2020-05-04
Attending: INTERNAL MEDICINE
Payer: COMMERCIAL

## 2020-05-04 DIAGNOSIS — C49.A0 MALIGNANT GASTROINTESTINAL STROMAL TUMOR, UNSPECIFIED SITE (H): Primary | ICD-10-CM

## 2020-05-04 PROCEDURE — 99214 OFFICE O/P EST MOD 30 MIN: CPT | Mod: 95 | Performed by: INTERNAL MEDICINE

## 2020-05-04 NOTE — LETTER
"5/4/2020      RE: Idalmis Abdul  [address]  [facility] [address]       Idalmis Abdul is a 74 year old female who is being evaluated via a billable telephone visit.      The patient has been notified of following:     \"This telephone visit will be conducted via a call between you and your physician/provider. We have found that certain health care needs can be provided without the need for a physical exam.  This service lets us provide the care you need with a short phone conversation.  If a prescription is necessary we can send it directly to your pharmacy.  If lab work is needed we can place an order for that and you can then stop by our lab to have the test done at a later time.    Telephone visits are billed at different rates depending on your insurance coverage. During this emergency period, for some insurers they may be billed the same as an in-person visit.  Please reach out to your insurance provider with any questions.    If during the course of the call the physician/provider feels a telephone visit is not appropriate, you will not be charged for this service.\"    Patient has given verbal consent for Telephone visit?  Yes    What phone number would you like to be contacted at? [phone].    How would you like to obtain your AVS? Mail a copy    Phone call duration: 30 minutes    I have reviewed and updated the patient's allergies and medication list.    Concerns: No new concerns.   Refills: None needed.     Preethi Herrera [facility]      Provider notes:    Idalmis Abdul is here today in follow-up of gastrointestinal stromal tumor and lung cancer.     Her original diagnosis was more than 15 years ago with successful resection but recurrence of her GIST after she had completed her adjuvant Gleevec.  She is now been on Gleevec for more than a decade with excellent control of her disease.  Along the way she had a stage I lung cancer of which she is apparently cured.  At present she reports she is " doing reasonably well.  She is happy having had surgery on her eyelids which is helped her seeing.  She has ongoing problems with her back, thought related to degenerative disease, which are unchanged. Her appetite is good.  Her energy level is reasonably good.  She has not had any active problems with her heart lately but does note that she gets winded easily and can just barely make it up a flight of steps without stopping to catch her breath.  That she does not feel represents much of a change in her overall condition. Her primary care has tried inhalers for her which she doesn't think has helped much.  She is having little or no problems with peripheral edema.  She has no orthopnea.  Remainder of her 10 point review of systems is otherwise negative.      Ms. Abdul sounds quite well with a cheerful affect and no apparent respiratory distress.     I reviewed with her her CT scan and lab work.  She has unremarkable electrolytes, renal function, liver enzymes and the expected mild anemia.  On her CT scan she has no evidence of her GIST.      Assessment/plan:  1.  Gist with an ongoing CR to Gleevec.  Given that she had previously recurred when she was off Gleevec we plan to leave her on Gleevec indefinitely.  Other than her periorbital edema and very mild edema she does seem to be have any ongoing toxicity related to that.  2.  History of lung cancer.  She has no evidence of recurrence of this and she is far enough out that we can consider curative at disease.     We will see her back in another 6 months with a repeat CT scan and lab work.    Cody Schmitz MD

## 2020-05-04 NOTE — PROGRESS NOTES
"Idalmis Abdul is a 74 year old female who is being evaluated via a billable telephone visit.      The patient has been notified of following:     \"This telephone visit will be conducted via a call between you and your physician/provider. We have found that certain health care needs can be provided without the need for a physical exam.  This service lets us provide the care you need with a short phone conversation.  If a prescription is necessary we can send it directly to your pharmacy.  If lab work is needed we can place an order for that and you can then stop by our lab to have the test done at a later time.    Telephone visits are billed at different rates depending on your insurance coverage. During this emergency period, for some insurers they may be billed the same as an in-person visit.  Please reach out to your insurance provider with any questions.    If during the course of the call the physician/provider feels a telephone visit is not appropriate, you will not be charged for this service.\"    Patient has given verbal consent for Telephone visit?  Yes    What phone number would you like to be contacted at? [phone].    How would you like to obtain your AVS? Mail a copy    Phone call duration: 30 minutes    I have reviewed and updated the patient's allergies and medication list.    Concerns: No new concerns.   Refills: None needed.     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     Her original diagnosis was more than 15 years ago with successful resection but recurrence of her GIST after she had completed her adjuvant Gleevec.  She is now been on Gleevec for more than a decade with excellent control of her disease.  Along the way she had a stage I lung cancer of which she is apparently cured.  At present she reports she is doing reasonably well.  She is happy having had surgery on her eyelids which is " helped her seeing.  She has ongoing problems with her back, thought related to degenerative disease, which are unchanged. Her appetite is good.  Her energy level is reasonably good.  She has not had any active problems with her heart lately but does note that she gets winded easily and can just barely make it up a flight of steps without stopping to catch her breath.  That she does not feel represents much of a change in her overall condition. Her primary care has tried inhalers for her which she doesn't think has helped much.  She is having little or no problems with peripheral edema.  She has no orthopnea.  Remainder of her 10 point review of systems is otherwise negative.      Ms. Abdul sounds quite well with a cheerful affect and no apparent respiratory distress.     I reviewed with her her CT scan and lab work.  She has unremarkable electrolytes, renal function, liver enzymes and the expected mild anemia.  On her CT scan she has no evidence of her GIST.      Assessment/plan:  1.  Gist with an ongoing CR to Gleevec.  Given that she had previously recurred when she was off Gleevec we plan to leave her on Gleevec indefinitely.  Other than her periorbital edema and very mild edema she does seem to be have any ongoing toxicity related to that.  2.  History of lung cancer.  She has no evidence of recurrence of this and she is far enough out that we can consider curative at disease.     We will see her back in another 6 months with a repeat CT scan and lab work.    Cody Schmitz MD

## 2020-05-08 ENCOUNTER — TELEPHONE (OUTPATIENT)
Dept: ONCOLOGY | Facility: CLINIC | Age: 75
End: 2020-05-08

## 2020-05-08 NOTE — ORAL ONC MGMT
Oral Chemotherapy Monitoring Program    Idalmis called to request imatinib refill. She need refill by Monday. She fills at [facility] in [place] (site 60). She transferred the medication to a pharmacy in [place] and stated the [facility] pharmacist at site 60 said [facility] pharmacy in [place] would not transfer it back to the [facility]. Provider paged to sign next treatment plan.      We discussed filling imatinib at [facility]. She has been filling imatinib for years at [place]. She was not aware of [facility] as an option and would consider pick-up at retail option.    Thank you for the opportunity to participate in the care of the above patient,  Federico Pedersen, PharmD  Hematology/Oncology Clinical Pharmacist  [facility]  [facility]  [phone]    Addendum:  Dr. Schmitz denied refill since patient has refills. Set up refill request for [facility] to transfer imatinib from [facility] pharmacy in [place]. [facility] transferred imatinib rx. Placed call to patient and informed her that Dr. Schmitz denied refill since she had refills and [facility] was able to transfer imatinib today from [facility] pharmacy in [place]. Stated we can set up Pick-up-at-Retail at her local [facility] retail [facility] or home. She would like to fill with [facility] now and have Pick-up-at-Retail. She is out of medication today and took last dose this weekend. Informed patient [facility] would schedule a rush delivery as soon as possible.     Federico Pedersen, PharmD  5/11/2020

## 2020-06-17 ENCOUNTER — TELEPHONE (OUTPATIENT)
Dept: FAMILY MEDICINE | Facility: CLINIC | Age: 75
End: 2020-06-17

## 2020-06-17 NOTE — TELEPHONE ENCOUNTER
Reason for Call:  Medication or medication refill:    Do you use a [facility]?  Name of the pharmacy and phone number for the current request:  [facility] [phone]    Name of the medication requested: Lisinopril - Pt wants to know if she needs an appt for refills next mo?  Please call patient and advise.      Other request:     Can we leave a detailed message on this number? YES    Phone number patient can be reached at: Cell number on file:    Telephone Information:   Mobile [phone]       Best Time: any    Call taken on 6/17/2020 at 3:27 PM by Casandra Claudio

## 2020-06-17 NOTE — TELEPHONE ENCOUNTER
Patient is contacted and she is told Dr. Sultana only has virtual visits left. Connected to [employer]. Emily SHEIKH RN

## 2020-06-18 ENCOUNTER — VIRTUAL VISIT (OUTPATIENT)
Dept: FAMILY MEDICINE | Facility: CLINIC | Age: 75
End: 2020-06-18
Payer: COMMERCIAL

## 2020-06-18 DIAGNOSIS — I10 ESSENTIAL HYPERTENSION WITH GOAL BLOOD PRESSURE LESS THAN 140/90: ICD-10-CM

## 2020-06-18 DIAGNOSIS — E78.5 HYPERLIPIDEMIA LDL GOAL <160: ICD-10-CM

## 2020-06-18 PROCEDURE — 99214 OFFICE O/P EST MOD 30 MIN: CPT | Mod: 95 | Performed by: FAMILY MEDICINE

## 2020-06-18 RX ORDER — ROSUVASTATIN CALCIUM 5 MG/1
TABLET, COATED ORAL
Qty: 45 TABLET | Refills: 3 | Status: SHIPPED | OUTPATIENT
Start: 2020-06-18 | End: 2021-05-17

## 2020-06-18 RX ORDER — LISINOPRIL 40 MG/1
TABLET ORAL
Qty: 90 TABLET | Refills: 3 | Status: SHIPPED | OUTPATIENT
Start: 2020-06-18 | End: 2021-05-17

## 2020-06-18 NOTE — PROGRESS NOTES
"  SUBJECTIVE:                                                    Idalmis Abdul is a 74 year old female who is being evaluated via a billable telephone visit.    No chief complaint on file.    Stopped taking osteoarthritis medication, did not care for it.    Hyperlipidemia Follow-Up  Taking Crestor every other day, needs this prescription to reflect this.    Are you regularly taking any medication or supplement to lower your cholesterol?   Yes- .    Are you having muscle aches or other side effects that you think could be caused by your cholesterol lowering medication?  Yes- Occasionally.    Hypertension Follow-up  BP has been running around 120/69-70      Do you check your blood pressure regularly outside of the clinic? Yes     Are you following a low salt diet? Yes    Are your blood pressures ever more than 140 on the top number (systolic) OR more   than 90 on the bottom number (diastolic), for example 140/90? No                      The patient has been notified of following:     \"This telephone visit will be conducted via a call between you and your physician/provider. We have found that certain health care needs can be provided without the need for a physical exam.  This service lets us provide the care you need with a short phone conversation.  If a prescription is necessary we can send it directly to your pharmacy.  If lab work is needed we can place an order for that and you can then stop by our lab to have the test done at a later time.    Telephone visits are billed at different rates depending on your insurance coverage. During this emergency period, for some insurers they may be billed the same as an in-person visit.  Please reach out to your insurance provider with any questions.    If during the course of the call the physician/provider feels a telephone visit is not appropriate, you will not be charged for this service.\"    Patient has given verbal consent for Telephone visit?  Yes    How would you " like to obtain your AVS? Mail a copy        Problem list and histories reviewed & adjusted, as indicated.  Additional history  today her blood pressure was 113/69 and pulse 81.  Asked who I would recommend for new doctor and lives in [place].    Patient Active Problem List   Diagnosis     Hypertension goal BP (blood pressure) < 140/90     GIST (gastrointestinal stromal tumor), malignant (H)     Eyelid edema     History of lung cancer     Health Care Home     NSTEMI (non-ST elevated myocardial infarction) (H)     ASCVD (arteriosclerotic cardiovascular disease)     Postsurgical aortocoronary bypass status     S/P CABG x 4     Dyspnea     GERD (gastroesophageal reflux disease)     Hyperlipidemia LDL goal <160     Hyperthyroidism     Thyroid eye disease     Eyelid retraction or lag     Thyrotoxic exophthalmos     Graves' disease     History of tobacco abuse     History of non-ST elevation myocardial infarction (NSTEMI)     Chronic back pain     PVD (posterior vitreous detachment)     Age-related osteoporosis without current pathological fracture     Spinal stenosis of lumbosacral region     DDD (degenerative disc disease), lumbar     CKD (chronic kidney disease) stage 3, GFR 30-59 ml/min (H)     Mixed simple and mucopurulent chronic bronchitis (H)     Past Surgical History:   Procedure Laterality Date     BLEPHAROPLASTY BILATERAL Bilateral 7/17/2019    Procedure: Bilateral Upper Eyelid Blepharoplasty;  Surgeon: Vasile Brashre MD;  [facility]     BYPASS GRAFT ARTERY CORONARY  6/14/2012    Procedure: BYPASS GRAFT ARTERY CORONARY;  Median Sternotomy Coronary Artery Bypass Graft  x 3        C THORACOSCOPY,DX W BX  fall 2003    benign tissue     CATARACT IOL, RT/LT      LE     CHOLECYSTECTOMY  1963     COLONOSCOPY  4-12-05     CORONARY ARTERY BYPASS      X4     CREATION PERICARDIAL WINDOW  6/15/2012    Procedure: CREATION PERICARDIAL WINDOW;  Mediastinal Exploration, Control of Bleeding;  Surgeon: Dwaine Griffin  MD Summer;  [facility]: [facility] OR     EYE SURGERY  2014    left cataract removal     GI SURGERY  2003 and 2007    GIST tumor removal     GYN SURGERY      tubal ligation     HYSTERECTOMY VAGINAL, COLPORRHAPHY ANTERIOR, POSTERIOR, COMBINED N/A 10/17/2017    Procedure: COMBINED HYSTERECTOMY VAGINAL, COLPORRHAPHY ANTERIOR, POSTERIOR;  Total Vaginal Hysterectomy and Posterior Repair,Sacrospinous Vault Suspension;  Surgeon: Ruth Farooq MD;  Location: [facility]     PHACOEMULSIFICATION WITH STANDARD INTRAOCULAR LENS IMPLANT  3/24/2014    Procedure: PHACOEMULSIFICATION WITH STANDARD INTRAOCULAR LENS IMPLANT;  Left Kelman Phacoemulsification with Intraocular Lens Implant;  Surgeon: Magnus Mckeon MD;  Location: [facility] OR     RECESSION RESECTION WITH ADJUSTABLE SUTURE BILATERAL Bilateral 7/14/2016    Procedure: RECESSION RESECTION WITH ADJUSTABLE SUTURE BILATERAL;  Surgeon: Erma Ordaz MD;  Location: [facility] OR     REPAIR ENTROPION Right 8/21/2019    Procedure: Right Upper Lid Entropion Repair;  Surgeon: Vasile Brasher MD;  [facility]     REPAIR RETRACTION LID BILATERAL Bilateral 7/17/2019    Procedure: Bilateral Upper Eyelid Retraction Repair;  Surgeon: Vasile Brasher MD;  [facility]     SURGICAL HISTORY OF -   1963    cholecystectomy     SURGICAL HISTORY OF -   1970's    Tubal Ligation      SURGICAL HISTORY OF -   08/03    Explor. Lap, Excision of Small Bowel Tumor      SURGICAL HISTORY OF -   4/2010    lung cancer removed right lung       Social History     Tobacco Use     Smoking status: Former Smoker     Packs/day: 0.50     Years: 49.00     Pack years: 24.50     Types: Cigarettes     Last attempt to quit: 4/19/2010     Years since quitting: 10.1     Smokeless tobacco: Never Used   Substance Use Topics     Alcohol use: No     Family History   Problem Relation Age of Onset     Heart Disease Mother      Osteoporosis Mother      Respiratory Mother         Emphezyma     Hypertension Mother       Heart Disease Father      Alcohol/Drug Father      Hypertension Father      Hypertension Maternal Grandmother      Hypertension Brother      Hypertension Sister      Arthritis Sister      Hypertension Son      Cancer Son         rectal         Current Outpatient Medications   Medication Sig Dispense Refill     alendronate (FOSAMAX) 70 MG tablet Take 1 tablet (70 mg) by mouth every 7 days Take 60 minutes before am meal with 8 oz. water. Remain upright for 30 minutes. 12 tablet 3     amLODIPine (NORVASC) 5 MG tablet Take 1 tablet (5 mg) by mouth daily 90 tablet 3     aspirin EC 81 MG EC tablet Take 1 tablet by mouth daily. 30 tablet 2     Blood Pressure Monitoring (ADULT BLOOD PRESSURE CUFF LG) KIT 1 Device 2 times daily 1 kit 1     calcium-vitamin D-vitamin K (VIACTIV) 500-500-40 MG-UNT-MCG CHEW Take 2 tablets by mouth daily       erythromycin (ROMYCIN) 5 MG/GM ophthalmic ointment Apply small amount to incision sites three times daily for 7 days, then apply to inner lower lid of operative eye(s) at bedtime for 7 days, as directed per physician instructions. 3.5 g 0     hydrochlorothiazide (HYDRODIURIL) 25 MG tablet Take 1 tablet (25 mg) by mouth daily 90 tablet 3     imatinib (GLEEVEC) 400 MG tablet Take 1 tablet (400 mg) by mouth daily Take with a meal and a large glass of water. 30 tablet 11     lisinopril (PRINIVIL/ZESTRIL) 40 MG tablet TAKE ONE TABLET BY MOUTH DAILY 90 tablet 3     LORazepam (ATIVAN) 0.5 MG tablet Take 1 tablet (0.5 mg) by mouth At Bedtime (Patient not taking: Reported on 5/4/2020) 90 tablet 1     melatonin 5 MG tablet Take 5 mg by mouth nightly as needed for sleep       methimazole (TAPAZOLE) 5 MG tablet Take 0.5 tablets (2.5 mg) by mouth daily 45 tablet 6     metoprolol tartrate (LOPRESSOR) 100 MG tablet TAKE ONE TABLET BY MOUTH TWICE A  tablet 2     order for DME Walker 1 Device 0     polyethylene glycol (MIRALAX/GLYCOLAX) powder Take 17 g by mouth daily       rosuvastatin (CRESTOR) 5  MG tablet TAKE ONE TABLET BY MOUTH ONCE DAILY 30 tablet 11     tiZANidine (ZANAFLEX) 2 MG tablet Take 1 tablet (2 mg) by mouth 3 times daily 30 tablet 1     Allergies   Allergen Reactions     Atorvastatin Cramps and Unknown     cramps       Recent Labs   Lab Test 05/01/20  0949 11/25/19  1403 10/25/19  0943  04/05/19  0958 12/07/18  1229  06/22/18  0840  10/17/17  0755  09/20/16  1022  07/17/15  0916  06/19/12  1550  06/15/12  0850   A1C  --   --   --   --   --   --   --   --   --  5.5  --   --   --   --   --  5.4  --  Duplicate request   LDL  --   --   --   --   --   --   --  46  --   --   --  38  --  31   < >  --   --   --    HDL  --   --   --   --   --   --   --  48*  --   --   --  53  --  46*   < >  --   --   --    TRIG  --   --   --   --   --   --   --  82  --   --   --  79  --  72   < >  --   --   --    ALT 23  --  23  --  26  --    < > 23   < >  --    < >  --    < > 23   < >  --    < >  --    CR 1.13*  --  0.97  --  1.15*  --    < > 1.04   < > 1.15*   < > 1.00   < > 1.06*   < >  --    < >  --    GFRESTIMATED 47*  --  57*   < > 47*  --    < > 52*   < > 46*   < > 55*   < > 51*   < >  --    < >  --    GFRESTBLACK 55*  --  66   < > 54*  --    < > 63   < > 56*   < > 66   < > 62   < >  --    < >  --    POTASSIUM 4.0  --  3.7  --  3.9  --    < > 3.9   < > 3.6   < > 4.2   < > 4.0   < >  --    < > 2.8*   TSH  --  0.75  --   --   --  1.02  --   --    < >  --    < >  --    < >  --    < >  --   --   --     < > = values in this interval not displayed.      BP Readings from Last 3 Encounters:   01/07/20 138/62   10/28/19 109/72   10/13/19 (!) 147/79    Wt Readings from Last 3 Encounters:   01/07/20 68.9 kg (152 lb)   10/28/19 70 kg (154 lb 4.8 oz)   10/22/19 69.4 kg (153 lb)         ROS:  Constitutional, HEENT, cardiovascular, pulmonary, gi and gu systems are negative, except as otherwise noted.    OBJECTIVE:                                                    LMP 01/01/1992   GENERAL APPEARANCE ADULT: Alert, no acute  distress, phone visit and some breaking up with the connection  Diagnostic Test Results:  none      Phone call duration:  11 minutes      ASSESSMENT/PLAN:                                                    1. Essential hypertension with goal blood pressure less than 140/90  due for review and refill, taking medication without difficulty  - lisinopril (ZESTRIL) 40 MG tablet; TAKE ONE TABLET BY MOUTH DAILY  Dispense: 90 tablet; Refill: 3    2. Hyperlipidemia LDL goal <160  due for review and refill, taking medication without difficulty  - rosuvastatin (CRESTOR) 5 MG tablet; Taking every other day.  Dispense: 45 tablet; Refill: 3    Darlene Sultana MD  [facility]

## 2020-06-29 ENCOUNTER — OFFICE VISIT (OUTPATIENT)
Dept: FAMILY MEDICINE | Facility: CLINIC | Age: 75
End: 2020-06-29
Payer: COMMERCIAL

## 2020-06-29 VITALS
HEIGHT: 62 IN | BODY MASS INDEX: 28.16 KG/M2 | SYSTOLIC BLOOD PRESSURE: 122 MMHG | HEART RATE: 74 BPM | TEMPERATURE: 98.6 F | RESPIRATION RATE: 18 BRPM | DIASTOLIC BLOOD PRESSURE: 66 MMHG | OXYGEN SATURATION: 97 % | WEIGHT: 153 LBS

## 2020-06-29 DIAGNOSIS — G89.29 CHRONIC LOW BACK PAIN, UNSPECIFIED BACK PAIN LATERALITY, UNSPECIFIED WHETHER SCIATICA PRESENT: ICD-10-CM

## 2020-06-29 DIAGNOSIS — E05.90 HYPERTHYROIDISM: ICD-10-CM

## 2020-06-29 DIAGNOSIS — M54.50 CHRONIC LOW BACK PAIN, UNSPECIFIED BACK PAIN LATERALITY, UNSPECIFIED WHETHER SCIATICA PRESENT: ICD-10-CM

## 2020-06-29 DIAGNOSIS — E78.5 HYPERLIPIDEMIA LDL GOAL <70: Primary | ICD-10-CM

## 2020-06-29 DIAGNOSIS — C49.A0 MALIGNANT GASTROINTESTINAL STROMAL TUMOR, UNSPECIFIED SITE (H): ICD-10-CM

## 2020-06-29 DIAGNOSIS — E05.00 GRAVES' DISEASE: ICD-10-CM

## 2020-06-29 DIAGNOSIS — I10 ESSENTIAL HYPERTENSION: ICD-10-CM

## 2020-06-29 PROCEDURE — 99214 OFFICE O/P EST MOD 30 MIN: CPT | Performed by: FAMILY MEDICINE

## 2020-06-29 RX ORDER — HYDROCHLOROTHIAZIDE 25 MG/1
25 TABLET ORAL DAILY
Qty: 90 TABLET | Refills: 3 | Status: SHIPPED | OUTPATIENT
Start: 2020-06-29 | End: 2021-05-17

## 2020-06-29 ASSESSMENT — MIFFLIN-ST. JEOR: SCORE: 1134.31

## 2020-06-29 ASSESSMENT — PAIN SCALES - GENERAL: PAINLEVEL: NO PAIN (0)

## 2020-06-29 NOTE — PATIENT INSTRUCTIONS
Our Clinic hours are:  Mondays    7:20 am - 7 pm  Tues -  Fri  7:20 am - 5 pm    Clinic Phone: [phone]    The clinic lab opens at 7:30 am Mon - Fri and appointments are required.    [facility]. [phone]  Monday  8 am - 7pm  Tues - Fri 8 am - 5:30 pm

## 2020-06-29 NOTE — PROGRESS NOTES
"Subjective     Idalmis Abdul is a 74 year old female who presents to clinic today for the following health issues:    HPI   Chief Complaint   Patient presents with     Establish Care     Patient is transferring care from Dr. Sultana. Mary is here for a meet and greet.      New Patient/Transfer of Care      Here to establish care as current PCP is retiring soon.      Patient history was reviewed and problem list updated.    She sees Dr. Schimtz regularly for h/o GIST x 2.   She has been seeing endocrinology at the [facility] for Graves disease with exophthalmos - she was excited to hear that we have an endocrinologist, Dr. Yang, locally as she has to have her son drive her to the [facility] for appointments as she doesn't drive in the [facility].  Referral given to transfer endo care locally.    Blood pressure well controlled. Due for lipids - order placed and she'll have labs next time she has blood drawn.     Sees cardiology yearly for h/o CAD and CABG x 4.  Currently asymptomatic.      She has chronic back pain and spinal stenosis but is no longer taking the chronic oxycodone she had been on.       H/o lung cancer found on a scan and resected.  H/o smoking, quit in 2010.  She gets a CT chest/abd/pelvis every 6 months.         Social:  goes to [place] for the winter to visit son  has one son locally as well - he's wheelchair bound      Reviewed and updated as needed this visit by Provider         Review of Systems   Constitutional, HEENT, cardiovascular, pulmonary, gi and gu systems are negative, except as otherwise noted.      Objective    /66   Pulse 74   Temp 98.6  F (37  C) (Tympanic)   Resp 18   Ht 1.562 m (5' 1.5\")   Wt 69.4 kg (153 lb)   LMP 01/01/1992   SpO2 97%   Breastfeeding No   BMI 28.44 kg/m    Body mass index is 28.44 kg/m .  Physical Exam   GENERAL: healthy, alert and no distress  NECK: no adenopathy, no asymmetry, masses, or scars and thyroid normal to palpation  RESP: lungs clear to " "auscultation - no rales, rhonchi or wheezes  CV: regular rate and rhythm, normal S1 S2, no S3 or S4, no murmur, click or rub, no peripheral edema and peripheral pulses strong  ABDOMEN: soft, nontender, no hepatosplenomegaly, no masses and bowel sounds normal  MS: no gross musculoskeletal defects noted, no edema    Diagnostic Test Results:  Labs reviewed in Epic        Assessment & Plan     1. Hyperlipidemia LDL goal <70   due for labs  - Lipid panel reflex to direct LDL Non-fasting; Future    2. Malignant gastrointestinal stromal tumor, unspecified site (H)   continue care with Dr. Scmhitz    3. Hyperthyroidism   referred to Dr. Yang   - ENDOCRINOLOGY ADULT REFERRAL    4. Chronic low back pain, unspecified back pain laterality, unspecified whether sciatica present   stable/chronic    5. Essential hypertension  Well controlled, labs done recently and stable.   - hydrochlorothiazide (HYDRODIURIL) 25 MG tablet; Take 1 tablet (25 mg) by mouth daily  Dispense: 90 tablet; Refill: 3    6. Graves' disease   as above  - ENDOCRINOLOGY ADULT REFERRAL     BMI:   Estimated body mass index is 28.44 kg/m  as calculated from the following:    Height as of this encounter: 1.562 m (5' 1.5\").    Weight as of this encounter: 69.4 kg (153 lb).               No follow-ups on file.    Ale Ordaz MD  [facility]      "

## 2020-07-15 DIAGNOSIS — E78.5 HYPERLIPIDEMIA LDL GOAL <70: ICD-10-CM

## 2020-07-15 LAB
CHOLEST SERPL-MCNC: 125 MG/DL
HDLC SERPL-MCNC: 46 MG/DL
LDLC SERPL CALC-MCNC: 50 MG/DL
NONHDLC SERPL-MCNC: 79 MG/DL
TRIGL SERPL-MCNC: 146 MG/DL

## 2020-07-15 PROCEDURE — 36415 COLL VENOUS BLD VENIPUNCTURE: CPT | Performed by: FAMILY MEDICINE

## 2020-07-15 PROCEDURE — 80061 LIPID PANEL: CPT | Performed by: FAMILY MEDICINE

## 2020-07-15 NOTE — LETTER
July 16, 2020      Idalmis [address]  [address]   [facility] [address]        Dear MsRazia,    We are writing to inform you of your test results.    normal/acceptable results.     Resulted Orders   Lipid panel reflex to direct LDL Non-fasting   Result Value Ref Range    Cholesterol 125 <200 mg/dL    Triglycerides 146 <150 mg/dL      Comment:      Fasting specimen    HDL Cholesterol 46 (L) >49 mg/dL    LDL Cholesterol Calculated 50 <100 mg/dL      Comment:      Desirable:       <100 mg/dl    Non HDL Cholesterol 79 <130 mg/dL       If you have any questions or concerns, please call the clinic at the number listed above.       Sincerely,        Ale Ordaz M.D.

## 2020-07-26 ENCOUNTER — HOSPITAL ENCOUNTER (EMERGENCY)
Facility: CLINIC | Age: 75
Discharge: HOME | End: 2020-07-26
Attending: PHYSICIAN ASSISTANT | Admitting: PHYSICIAN ASSISTANT
Payer: COMMERCIAL

## 2020-07-26 ENCOUNTER — APPOINTMENT (OUTPATIENT)
Dept: CT IMAGING | Facility: CLINIC | Age: 75
End: 2020-07-26
Attending: PHYSICIAN ASSISTANT
Payer: COMMERCIAL

## 2020-07-26 VITALS
DIASTOLIC BLOOD PRESSURE: 93 MMHG | TEMPERATURE: 98.5 F | HEART RATE: 81 BPM | RESPIRATION RATE: 18 BRPM | BODY MASS INDEX: 27.98 KG/M2 | HEIGHT: 62 IN | OXYGEN SATURATION: 97 % | SYSTOLIC BLOOD PRESSURE: 121 MMHG

## 2020-07-26 DIAGNOSIS — R82.90 ABNORMAL FINDING ON URINALYSIS: ICD-10-CM

## 2020-07-26 DIAGNOSIS — K64.4 EXTERNAL HEMORRHOIDS: ICD-10-CM

## 2020-07-26 DIAGNOSIS — Z87.19 HISTORY OF CONSTIPATION: ICD-10-CM

## 2020-07-26 DIAGNOSIS — R35.0 URINARY FREQUENCY: ICD-10-CM

## 2020-07-26 DIAGNOSIS — E87.1 HYPONATREMIA: ICD-10-CM

## 2020-07-26 LAB
ALBUMIN SERPL-MCNC: 3.1 G/DL (ref 3.4–5)
ALBUMIN UR-MCNC: NEGATIVE MG/DL
ALP SERPL-CCNC: 53 U/L (ref 40–150)
ALT SERPL W P-5'-P-CCNC: 24 U/L (ref 0–50)
ANION GAP SERPL CALCULATED.3IONS-SCNC: 9 MMOL/L (ref 3–14)
APPEARANCE UR: ABNORMAL
AST SERPL W P-5'-P-CCNC: 30 U/L (ref 0–45)
BACTERIA #/AREA URNS HPF: ABNORMAL /HPF
BASOPHILS # BLD AUTO: 0 10E9/L (ref 0–0.2)
BASOPHILS NFR BLD AUTO: 0.4 %
BILIRUB SERPL-MCNC: 0.3 MG/DL (ref 0.2–1.3)
BILIRUB UR QL STRIP: NEGATIVE
BUN SERPL-MCNC: 16 MG/DL (ref 7–30)
CALCIUM SERPL-MCNC: 8.7 MG/DL (ref 8.5–10.1)
CHLORIDE SERPL-SCNC: 92 MMOL/L (ref 94–109)
CO2 SERPL-SCNC: 27 MMOL/L (ref 20–32)
COLOR UR AUTO: YELLOW
CREAT SERPL-MCNC: 0.92 MG/DL (ref 0.52–1.04)
DIFFERENTIAL METHOD BLD: ABNORMAL
EOSINOPHIL # BLD AUTO: 0 10E9/L (ref 0–0.7)
EOSINOPHIL NFR BLD AUTO: 0.7 %
ERYTHROCYTE [DISTWIDTH] IN BLOOD BY AUTOMATED COUNT: 14 % (ref 10–15)
GFR SERPL CREATININE-BSD FRML MDRD: 61 ML/MIN/{1.73_M2}
GLUCOSE SERPL-MCNC: 114 MG/DL (ref 70–99)
GLUCOSE UR STRIP-MCNC: NEGATIVE MG/DL
HCT VFR BLD AUTO: 28.5 % (ref 35–47)
HGB BLD-MCNC: 9.8 G/DL (ref 11.7–15.7)
HGB UR QL STRIP: NEGATIVE
IMM GRANULOCYTES # BLD: 0 10E9/L (ref 0–0.4)
IMM GRANULOCYTES NFR BLD: 0.2 %
KETONES UR STRIP-MCNC: NEGATIVE MG/DL
LEUKOCYTE ESTERASE UR QL STRIP: NEGATIVE
LIPASE SERPL-CCNC: 136 U/L (ref 73–393)
LYMPHOCYTES # BLD AUTO: 0.5 10E9/L (ref 0.8–5.3)
LYMPHOCYTES NFR BLD AUTO: 10 %
MCH RBC QN AUTO: 29.8 PG (ref 26.5–33)
MCHC RBC AUTO-ENTMCNC: 34.4 G/DL (ref 31.5–36.5)
MCV RBC AUTO: 87 FL (ref 78–100)
MONOCYTES # BLD AUTO: 0.9 10E9/L (ref 0–1.3)
MONOCYTES NFR BLD AUTO: 18.8 %
NEUTROPHILS # BLD AUTO: 3.2 10E9/L (ref 1.6–8.3)
NEUTROPHILS NFR BLD AUTO: 69.9 %
NITRATE UR QL: POSITIVE
NRBC # BLD AUTO: 0 10*3/UL
NRBC BLD AUTO-RTO: 0 /100
PH UR STRIP: 7 PH (ref 5–7)
PLATELET # BLD AUTO: 238 10E9/L (ref 150–450)
POTASSIUM SERPL-SCNC: 3.5 MMOL/L (ref 3.4–5.3)
PROT SERPL-MCNC: 6.8 G/DL (ref 6.8–8.8)
RBC # BLD AUTO: 3.29 10E12/L (ref 3.8–5.2)
RBC #/AREA URNS AUTO: 2 /HPF (ref 0–2)
SODIUM SERPL-SCNC: 128 MMOL/L (ref 133–144)
SOURCE: ABNORMAL
SP GR UR STRIP: 1.01 (ref 1–1.03)
SQUAMOUS #/AREA URNS AUTO: 4 /HPF (ref 0–1)
UROBILINOGEN UR STRIP-MCNC: 0 MG/DL (ref 0–2)
WBC # BLD AUTO: 4.5 10E9/L (ref 4–11)
WBC #/AREA URNS AUTO: 2 /HPF (ref 0–5)

## 2020-07-26 PROCEDURE — 74177 CT ABD & PELVIS W/CONTRAST: CPT

## 2020-07-26 PROCEDURE — 80053 COMPREHEN METABOLIC PANEL: CPT | Performed by: PHYSICIAN ASSISTANT

## 2020-07-26 PROCEDURE — 25000128 H RX IP 250 OP 636: Performed by: PHYSICIAN ASSISTANT

## 2020-07-26 PROCEDURE — 25800030 ZZH RX IP 258 OP 636: Performed by: PHYSICIAN ASSISTANT

## 2020-07-26 PROCEDURE — 99284 EMERGENCY DEPT VISIT MOD MDM: CPT | Mod: Z6 | Performed by: PHYSICIAN ASSISTANT

## 2020-07-26 PROCEDURE — 83690 ASSAY OF LIPASE: CPT | Performed by: PHYSICIAN ASSISTANT

## 2020-07-26 PROCEDURE — 96374 THER/PROPH/DIAG INJ IV PUSH: CPT | Mod: 59 | Performed by: PHYSICIAN ASSISTANT

## 2020-07-26 PROCEDURE — 87086 URINE CULTURE/COLONY COUNT: CPT | Performed by: PHYSICIAN ASSISTANT

## 2020-07-26 PROCEDURE — 96361 HYDRATE IV INFUSION ADD-ON: CPT | Performed by: PHYSICIAN ASSISTANT

## 2020-07-26 PROCEDURE — 85025 COMPLETE CBC W/AUTO DIFF WBC: CPT | Performed by: PHYSICIAN ASSISTANT

## 2020-07-26 PROCEDURE — 25000125 ZZHC RX 250: Performed by: PHYSICIAN ASSISTANT

## 2020-07-26 PROCEDURE — 99285 EMERGENCY DEPT VISIT HI MDM: CPT | Mod: 25 | Performed by: PHYSICIAN ASSISTANT

## 2020-07-26 PROCEDURE — 81001 URINALYSIS AUTO W/SCOPE: CPT | Performed by: PHYSICIAN ASSISTANT

## 2020-07-26 RX ORDER — SODIUM CHLORIDE 9 MG/ML
INJECTION, SOLUTION INTRAVENOUS CONTINUOUS
Status: DISCONTINUED | OUTPATIENT
Start: 2020-07-26 | End: 2020-07-26 | Stop reason: HOSPADM

## 2020-07-26 RX ORDER — CEFDINIR 300 MG/1
300 CAPSULE ORAL 2 TIMES DAILY
Qty: 14 CAPSULE | Refills: 0 | Status: SHIPPED | OUTPATIENT
Start: 2020-07-26 | End: 2020-08-02

## 2020-07-26 RX ORDER — HYDROCORTISONE 25 MG/G
CREAM TOPICAL
Qty: 30 G | Refills: 0 | Status: SHIPPED | OUTPATIENT
Start: 2020-07-26 | End: 2020-08-18

## 2020-07-26 RX ORDER — ONDANSETRON 2 MG/ML
4 INJECTION INTRAMUSCULAR; INTRAVENOUS ONCE
Status: COMPLETED | OUTPATIENT
Start: 2020-07-26 | End: 2020-07-26

## 2020-07-26 RX ORDER — CIPROFLOXACIN 500 MG/1
500 TABLET, FILM COATED ORAL 2 TIMES DAILY
Qty: 10 TABLET | Refills: 0 | Status: SHIPPED | OUTPATIENT
Start: 2020-07-26 | End: 2020-07-26 | Stop reason: ALTCHOICE

## 2020-07-26 RX ORDER — IOPAMIDOL 755 MG/ML
75 INJECTION, SOLUTION INTRAVASCULAR ONCE
Status: COMPLETED | OUTPATIENT
Start: 2020-07-26 | End: 2020-07-26

## 2020-07-26 RX ADMIN — IOPAMIDOL 75 ML: 755 INJECTION, SOLUTION INTRAVENOUS at 10:53

## 2020-07-26 RX ADMIN — SODIUM CHLORIDE 59 ML: 9 INJECTION, SOLUTION INTRAVENOUS at 10:53

## 2020-07-26 RX ADMIN — SODIUM CHLORIDE 500 ML: 9 INJECTION, SOLUTION INTRAVENOUS at 11:30

## 2020-07-26 RX ADMIN — ONDANSETRON 4 MG: 2 INJECTION INTRAMUSCULAR; INTRAVENOUS at 09:20

## 2020-07-26 ASSESSMENT — ENCOUNTER SYMPTOMS
RECTAL PAIN: 0
FLANK PAIN: 0
EYES NEGATIVE: 1
FATIGUE: 0
ACTIVITY CHANGE: 0
CONSTIPATION: 1
PSYCHIATRIC NEGATIVE: 1
ABDOMINAL DISTENTION: 0
MUSCULOSKELETAL NEGATIVE: 1
DIAPHORESIS: 1
CHILLS: 0
NAUSEA: 1
HEMATURIA: 0
BLOOD IN STOOL: 0
ANAL BLEEDING: 0
FEVER: 0
NEUROLOGICAL NEGATIVE: 1
DIARRHEA: 0
APPETITE CHANGE: 0
CARDIOVASCULAR NEGATIVE: 1
FREQUENCY: 1
VOMITING: 0
RESPIRATORY NEGATIVE: 1
DYSURIA: 0

## 2020-07-26 NOTE — RESULT ENCOUNTER NOTE
Emergency Dept/Urgent Care discharge antibiotic (if prescribed): Cefdinir (Omnicef) 300 mg capsule, 1 capsule (300 mg) by mouth 2 times daily for 7 days  Date of Rx (if applicable):  7/26/20  No changes in treatment per Urine culture protocol.

## 2020-07-26 NOTE — ED TRIAGE NOTES
Pt states she has not had a decent bowel movement 'for a couple days'. States she took two home enemas with little results. States she knows she is constipated because she can 'feel it' in her bladder. States she thinks it is hemorids. Denies blood in stool when she is able to go. Able to eat and drink okay but is nauseated.

## 2020-07-26 NOTE — ED AVS SNAPSHOT
[facility] Emergency Department  [address] [address]  Phone:  [phone]  Fax:  [phone]                                    Idalmis Abdul   MRN: [id number]    Department:  [facility] Emergency Department   Date of Visit:  7/26/2020           After Visit Summary Signature Page    I have received my discharge instructions, and my questions have been answered. I have discussed any challenges I see with this plan with the nurse or doctor.    ..........................................................................................................................................  Patient/Patient Representative Signature      ..........................................................................................................................................  Patient Representative Print Name and Relationship to Patient    ..................................................               ................................................  Date                                   Time    ..........................................................................................................................................  Reviewed by Signature/Title    ...................................................              ..............................................  Date                                               Time          22EPIC Rev 08/18

## 2020-07-26 NOTE — ED PROVIDER NOTES
History     Chief Complaint   Patient presents with     Constipation     HPI  Idalmis Abdul is a 74 year old female with history of bronchitis, chronic kidney disease stage III, spinal stenosis, age-related osteoporosis, posterior vitreous detachment, history of tobacco abuse quit in 2010, history of non-ST elevation myocardial infarction, Graves' disease, GERD, status post CABG x4, history of lung cancer, history of gastrointestinal stromal tumor, and hypertension who presents to the Emergency Room today with constipation and hemorrhoids. Patient states she has a history of constipation and hemorrhoids in the past. Patient with constipation for the past 2 days and unable to have a good bowel movement she states. When she has passed some stool she states they are small amounts. She has tried several enemas with the most recent being this morning around 4 AM but did not produce much stool.  Patient has also been taking MiraLAX daily with no relief of her symptoms.  Patient states she feels like she has pressure and needs to use the restroom along with frequent urination and she states when she is sitting on the toilet and straining she has rectal pain around the area of her hemorrhoids and occasionally sweats.  She has been having nausea with the symptoms for the past 2 days.  Patient denies any fevers, chills, headaches, dizziness, confusion, chest pain, heart racing or skipping beats, shortness of breath, abdominal pain, bloody or black tarry stools, painful urination, hematuria, rash, leg swelling.  Patient states that she did not sleep well last night because of the constant pressure near her rectum and the urinary frequency that she was experiencing.    Allergies:  Allergies   Allergen Reactions     Atorvastatin Cramps and Unknown     cramps         Problem List:    Patient Active Problem List    Diagnosis Date Noted     Mixed simple and mucopurulent chronic bronchitis (H) 01/07/2020     Priority: Medium      CKD (chronic kidney disease) stage 3, GFR 30-59 ml/min (H) 05/23/2019     Priority: Medium     Spinal stenosis of lumbosacral region 03/22/2018     Priority: Medium     DDD (degenerative disc disease), lumbar 03/22/2018     Priority: Medium     Age-related osteoporosis without current pathological fracture 12/16/2014     Priority: Medium     PVD (posterior vitreous detachment) 11/17/2014     Priority: Medium     History of tobacco abuse 09/02/2014     Priority: Medium     QUIT in 2010       History of non-ST elevation myocardial infarction (NSTEMI) 09/02/2014     Priority: Medium     Chronic back pain 09/02/2014     Priority: Medium     No longer on oxycodone - had been on this long term in the past         Graves' disease 08/19/2014     Priority: Medium     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 Provider to review       Hyperthyroidism 02/04/2014     Priority: Medium     Seeing Endoncrinology at [facility]       GERD (gastroesophageal reflux disease) 10/08/2013     Priority: Medium     S/P CABG x 4 08/05/2012     Priority: Medium     ASCVD (arteriosclerotic cardiovascular disease) 06/14/2012     Priority: Medium     NSTEMI (non-ST elevated myocardial infarction) (H) 06/12/2012     Priority: Medium     Eyelid edema 12/15/2011     Priority: Medium     side effect of gastric cancer medication       History of lung cancer 12/15/2011     Priority: Medium     S/p resection of right lung.  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 Schmitz       Hypertension goal BP (blood pressure) < 140/90 03/24/2005      Priority: Medium        Past Medical History:    Past Medical History:   Diagnosis Date     ASCVD (arteriosclerotic cardiovascular disease) 6/14/2012     Benign neoplasm of duodenum, jejunum, and ileum 2003, 2007     CA - lung cancer 4/2010     choledochal cyst 1963     CKD (chronic kidney disease) stage 3, GFR 30-59 ml/min (H) 5/23/2019     Coronary artery disease      DDD (degenerative disc disease), lumbar 3/22/2018     Dislocated finger, left middle PIP 7/26/2013     Dyspnea 8/27/2013     Eyelid edema 12/15/2011     GERD (gastroesophageal reflux disease) 10/8/2013     GIST (gastrointestinal stromal tumor), malignant (H) 5/10/2011     Graves disease      Grief reaction 5/11/2009     History of lung cancer 12/15/2011     Hyperlipidaemia      Hyperlipidemia LDL goal <160 12/17/2013     Hyperthyroidism 2/4/2014     Hypokalemia 8/5/2012     LBP (low back pain) 7/26/2013     Leiomyoma of uterus, unspecified      NSTEMI (non-ST elevated myocardial infarction) (H) 6/12/2012     NSTEMI (non-ST elevated myocardial infarction) (H)      osteopenia      Other benign neoplasm of connective and other soft tissue of thorax 2003     Other chronic pain      PONV (postoperative nausea and vomiting)      Postsurgical aortocoronary bypass status 7/4/2012     S/P CABG x 4 8/5/2012     Strabismus      Tobacco use disorder      Unspecified essential hypertension        Past Surgical History:    Past Surgical History:   Procedure Laterality Date     BLEPHAROPLASTY BILATERAL Bilateral 7/17/2019    Procedure: Bilateral Upper Eyelid Blepharoplasty;  Surgeon: Vasile Brasher MD;  Location: [facility] OR     BYPASS GRAFT ARTERY CORONARY  6/14/2012    Procedure: BYPASS GRAFT ARTERY CORONARY;  Median Sternotomy Coronary Artery Bypass Graft  x 3        C THORACOSCOPY,DX W BX  fall 2003    benign tissue     CATARACT IOL, RT/LT      LE     CHOLECYSTECTOMY  1963     COLONOSCOPY  4-12-05     CORONARY ARTERY BYPASS      X4     CREATION PERICARDIAL WINDOW   6/15/2012    Procedure: CREATION PERICARDIAL WINDOW;  Mediastinal Exploration, Control of Bleeding;  Surgeon: Dwaine Griffin MD;  Location: [facility] OR     EYE SURGERY  2014    left cataract removal     GI SURGERY  2003 and 2007    GIST tumor removal     GYN SURGERY      tubal ligation     HYSTERECTOMY VAGINAL, COLPORRHAPHY ANTERIOR, POSTERIOR, COMBINED N/A 10/17/2017    Procedure: COMBINED HYSTERECTOMY VAGINAL, COLPORRHAPHY ANTERIOR, POSTERIOR;  Total Vaginal Hysterectomy and Posterior Repair,Sacrospinous Vault Suspension;  Surgeon: Ruth Farooq MD;  Location: [facility]     PHACOEMULSIFICATION WITH STANDARD INTRAOCULAR LENS IMPLANT  3/24/2014    Procedure: PHACOEMULSIFICATION WITH STANDARD INTRAOCULAR LENS IMPLANT;  Left Kelman Phacoemulsification with Intraocular Lens Implant;  Surgeon: Magnus Mckeon MD;  Location: [facility] OR     RECESSION RESECTION WITH ADJUSTABLE SUTURE BILATERAL Bilateral 7/14/2016    Procedure: RECESSION RESECTION WITH ADJUSTABLE SUTURE BILATERAL;  Surgeon: Erma Ordaz MD;  Location: [facility] OR     REPAIR ENTROPION Right 8/21/2019    Procedure: Right Upper Lid Entropion Repair;  Surgeon: Vasile Brasher MD;  Location: [facility]     REPAIR RETRACTION LID BILATERAL Bilateral 7/17/2019    Procedure: Bilateral Upper Eyelid Retraction Repair;  Surgeon: Vasile Brasher MD;  Location: [facility]     SURGICAL HISTORY OF -   1963    cholecystectomy     SURGICAL HISTORY OF -   1970's    Tubal Ligation      SURGICAL HISTORY OF -   08/03    Explor. Lap, Excision of Small Bowel Tumor      SURGICAL HISTORY OF -   4/2010    lung cancer removed right lung       Family History:    Family History   Problem Relation Age of Onset     Heart Disease Mother      Osteoporosis Mother      Respiratory Mother         Emphezyma     Hypertension Mother      Heart Disease Father      Alcohol/Drug Father      Hypertension Father      Hypertension Maternal Grandmother      Hypertension Brother       Hypertension Sister      Arthritis Sister      Hypertension Son      Cancer Son         rectal       Social History:  Marital Status:   [5]  Social History     Tobacco Use     Smoking status: Former Smoker     Packs/day: 0.50     Years: 49.00     Pack years: 24.50     Types: Cigarettes     Last attempt to quit: 4/19/2010     Years since quitting: 10.2     Smokeless tobacco: Never Used   Substance Use Topics     Alcohol use: No     Drug use: No        Medications:    cefdinir (OMNICEF) 300 MG capsule  hydrocortisone, Perianal, (ANUSOL-HC) 2.5 % cream  amLODIPine (NORVASC) 5 MG tablet  aspirin EC 81 MG EC tablet  Blood Pressure Monitoring (ADULT BLOOD PRESSURE CUFF LG) KIT  calcium-vitamin D-vitamin K (VIACTIV) 500-500-40 MG-UNT-MCG CHEW  hydrochlorothiazide (HYDRODIURIL) 25 MG tablet  imatinib (GLEEVEC) 400 MG tablet  lisinopril (ZESTRIL) 40 MG tablet  melatonin 5 MG tablet  methimazole (TAPAZOLE) 5 MG tablet  metoprolol tartrate (LOPRESSOR) 100 MG tablet  order for DME  polyethylene glycol (MIRALAX/GLYCOLAX) powder  rosuvastatin (CRESTOR) 5 MG tablet          Review of Systems   Constitutional: Positive for diaphoresis (occasionally when trying to have a bowel movement. ). Negative for activity change, appetite change, chills, fatigue and fever.   HENT: Negative.    Eyes: Negative.    Respiratory: Negative.    Cardiovascular: Negative.    Gastrointestinal: Positive for constipation and nausea. Negative for abdominal distention, anal bleeding, blood in stool, diarrhea, rectal pain and vomiting.   Genitourinary: Positive for frequency. Negative for dysuria, flank pain, hematuria, pelvic pain, vaginal bleeding, vaginal discharge and vaginal pain.   Musculoskeletal: Negative.    Skin: Negative.    Neurological: Negative.    Psychiatric/Behavioral: Negative.    All other systems reviewed and are negative.      Physical Exam   BP: (!) 140/101  Pulse: 78  Temp: 98.5  F (36.9  C)  Resp: 18  Height: 157.5 cm (5'  "2\")  SpO2: 98 %      Physical Exam  Vitals signs and nursing note reviewed.   Constitutional:       General: She is not in acute distress.     Appearance: Normal appearance. She is normal weight. She is not ill-appearing or toxic-appearing.   HENT:      Head: Normocephalic and atraumatic.   Eyes:      Extraocular Movements: Extraocular movements intact.      Conjunctiva/sclera: Conjunctivae normal.      Pupils: Pupils are equal, round, and reactive to light.   Cardiovascular:      Rate and Rhythm: Normal rate and regular rhythm.      Heart sounds: Normal heart sounds.   Pulmonary:      Effort: Pulmonary effort is normal.      Breath sounds: Normal breath sounds.   Abdominal:      General: Abdomen is protuberant. Bowel sounds are normal. There is no distension.      Palpations: Abdomen is soft. There is no mass.      Tenderness: There is no abdominal tenderness. There is no right CVA tenderness, left CVA tenderness, guarding or rebound.   Genitourinary:     Rectum: Guaiac result negative. External hemorrhoid present. No mass or tenderness. Normal anal tone.      Comments: No stool noted in the rectal vault, no thrombosed hemorrhoids noted, no mass, or tenderness to rectum rectal exam.  No bloody or black tarry stools noted and guaiac was negative.  Musculoskeletal: Normal range of motion.         General: No swelling or tenderness.      Right lower leg: No edema.      Left lower leg: No edema.   Skin:     General: Skin is warm.      Findings: No erythema or rash.   Neurological:      General: No focal deficit present.      Mental Status: She is alert and oriented to person, place, and time.   Psychiatric:         Mood and Affect: Mood normal.         Behavior: Behavior normal.         Thought Content: Thought content normal.         Judgment: Judgment normal.         ED Course        Procedures              Critical Care time:                 No results found. However, due to the size of the patient record, not all " encounters were searched. Please check Results Review for a complete set of results.    Medications   ondansetron (ZOFRAN) injection 4 mg (4 mg Intravenous Given 7/26/20 0920)   0.9% sodium chloride BOLUS (0 mLs Intravenous Stopped 7/26/20 1239)   iopamidol (ISOVUE-370) solution 75 mL (75 mLs Intravenous Given 7/26/20 1053)   sodium chloride 0.9 % bag 500mL for CT scan flush use (59 mLs Intravenous Given 7/26/20 1053)       Assessments & Plan (with Medical Decision Making)     I have reviewed the nursing notes.    I have reviewed the findings, diagnosis, plan and need for follow up with the patient.    Idalmis Abdul is a 74 year old female with history of bronchitis, chronic kidney disease stage III, spinal stenosis, age-related osteoporosis, posterior vitreous detachment, history of tobacco abuse quit in 2010, history of non-ST elevation myocardial infarction, Graves' disease, GERD, status post CABG x4, history of lung cancer, history of gastrointestinal stromal tumor, and hypertension who presents to the Emergency Room today with constipation and hemorrhoids. Patient states she has a history of constipation and hemorrhoids in the past. Patient with constipation for the past 2 days and unable to have a good bowel movement she states. When she has passed some stool she states they are small amounts. She has tried several enemas with the most recent being this morning around 4 AM but did not produce much stool.  Patient has also been taking MiraLAX daily with no relief of her symptoms.  Patient states she feels like she has pressure and needs to use the restroom along with frequent urination and she states when she is sitting on the toilet and straining she has rectal pain around the area of her hemorrhoids and occasionally sweats.  She has been having nausea with the symptoms for the past 2 days.  Patient denies any fevers, chills, headaches, dizziness, confusion, chest pain, heart racing or skipping beats,  shortness of breath, abdominal pain, bloody or black tarry stools, painful urination, hematuria, rash, leg swelling.  Patient states that she did not sleep well last night because of the constant pressure near her rectum and the urinary frequency that she was experiencing.    See exam findings above. Few external hemorrhoids noted with no thrombosed hemorrhoids or significant tenderness on rectal exam. No stool noted in rectum on exam, along with no masses. CT abdomen and pelvis within normal limits and no signs of bowel obstruction or acute abdomen. UA obtained which shows positive nitrite and moderate bacteria. Urine culture sent and currently pending. Patient lipase within normal limits, CBC with no significant change from previous CBC 2 months ago. Patient with low hemoglobin in past.  CMP with slightly low sodium which patient has had in the past, and slightly elevated glucose at 114, but patient was not fasting. Kidney function within normal limits. Patient sent home with antibiotic for UTI that could be causing patient to feel like she has to have a bowel movement also. anusol prescription for hemorrhoids. I informed her to continue miralax and to follow up with primary care provider for recheck within 1 week if symptoms persist. My guess is that she is straining on the toilet concerned about constipation even though it is really her Urinary symptoms causing the nausea and pressure and frequency sensation. Patient to return to the Emergency Room if abdominal pain occurs, fevers, back pain, vaginal symptoms, rectal pain, or change in symptoms occur. Patient discharge in stable condition happy that she did not have a bowel obstruction.     Discharge Medication List as of 7/26/2020 12:03 PM      START taking these medications    Details   cefdinir (OMNICEF) 300 MG capsule Take 1 capsule (300 mg) by mouth 2 times daily for 7 days, Disp-14 capsule,R-0, E-Prescribe      hydrocortisone, Perianal, (ANUSOL-HC) 2.5 %  cream Apply to external hemorrhoids 1-2 times daily as needed.Disp-30 g,[id number]             Final diagnoses:   External hemorrhoids   Urinary frequency   Abnormal finding on urinalysis   History of constipation   Hyponatremia - patient has history of hyponatremia.       7/26/2020   [facility] EMERGENCY DEPARTMENT

## 2020-07-26 NOTE — DISCHARGE INSTRUCTIONS
Increase fluids, rest    Use medication as directed.  Urine culture sent and currently pending.  Call your primary care provider in 2 to 3 days if urinary symptoms persist and are not improving to double check urine culture results.    Continue MiraLAX as directed to help with keeping stools soft  Prunes, pears, peaches will help with constipation.    Return to the emergency department if fevers occur, bloody or black tarry stools, abdominal pain, vomiting, back pain, chest pain, shortness of breath, or change in symptoms occur.    Follow-up with your primary care doctor in 1 week for recheck of symptoms and possibly recheck labs if needed.

## 2020-07-27 LAB
BACTERIA SPEC CULT: NORMAL
Lab: NORMAL
SPECIMEN SOURCE: NORMAL

## 2020-07-28 ENCOUNTER — TELEPHONE (OUTPATIENT)
Dept: FAMILY MEDICINE | Facility: CLINIC | Age: 75
End: 2020-07-28

## 2020-07-28 ENCOUNTER — ANCILLARY PROCEDURE (OUTPATIENT)
Dept: GENERAL RADIOLOGY | Facility: CLINIC | Age: 75
End: 2020-07-28
Attending: NURSE PRACTITIONER
Payer: COMMERCIAL

## 2020-07-28 ENCOUNTER — OFFICE VISIT (OUTPATIENT)
Dept: FAMILY MEDICINE | Facility: CLINIC | Age: 75
End: 2020-07-28
Payer: COMMERCIAL

## 2020-07-28 VITALS
SYSTOLIC BLOOD PRESSURE: 124 MMHG | RESPIRATION RATE: 20 BRPM | TEMPERATURE: 99.5 F | HEART RATE: 76 BPM | DIASTOLIC BLOOD PRESSURE: 70 MMHG | OXYGEN SATURATION: 98 % | HEIGHT: 62 IN | WEIGHT: 153 LBS | BODY MASS INDEX: 28.16 KG/M2

## 2020-07-28 DIAGNOSIS — L03.90 CELLULITIS, UNSPECIFIED CELLULITIS SITE: ICD-10-CM

## 2020-07-28 DIAGNOSIS — M79.645 PAIN OF FINGER OF LEFT HAND: Primary | ICD-10-CM

## 2020-07-28 DIAGNOSIS — M79.645 PAIN OF FINGER OF LEFT HAND: ICD-10-CM

## 2020-07-28 LAB — URATE SERPL-MCNC: 4.3 MG/DL (ref 2.6–6)

## 2020-07-28 PROCEDURE — 99214 OFFICE O/P EST MOD 30 MIN: CPT | Performed by: NURSE PRACTITIONER

## 2020-07-28 PROCEDURE — 84550 ASSAY OF BLOOD/URIC ACID: CPT | Performed by: NURSE PRACTITIONER

## 2020-07-28 PROCEDURE — 36415 COLL VENOUS BLD VENIPUNCTURE: CPT | Performed by: NURSE PRACTITIONER

## 2020-07-28 PROCEDURE — 73140 X-RAY EXAM OF FINGER(S): CPT | Mod: LT

## 2020-07-28 RX ORDER — CLINDAMYCIN HCL 300 MG
300 CAPSULE ORAL 4 TIMES DAILY
Qty: 28 CAPSULE | Refills: 0 | Status: SHIPPED | OUTPATIENT
Start: 2020-07-28 | End: 2020-08-04

## 2020-07-28 ASSESSMENT — PAIN SCALES - GENERAL: PAINLEVEL: EXTREME PAIN (8)

## 2020-07-28 ASSESSMENT — MIFFLIN-ST. JEOR: SCORE: 1142.25

## 2020-07-28 NOTE — TELEPHONE ENCOUNTER
Reason for call:  Patient reporting a symptom    Symptom or request: Pt's left hand has been swollen, warm to the touch and red x 2 days.  Please call patient and advise.      Duration (how long have symptoms been present): 2 days    Have you been treated for this before? No    Additional comments:     Phone Number patient can be reached at:  Cell number on file:    Telephone Information:   Mobile [phone]       Best Time:  any    Can we leave a detailed message on this number:  YES    Call taken on 7/28/2020 at 7:43 AM by Casandra Claudio

## 2020-07-28 NOTE — RESULT ENCOUNTER NOTE
Final urine culture report is NEGATIVE per [facility] Lab Result protocol.    If NEGATIVE result, no change in treatment, per [facility] Lab Result protocol.

## 2020-07-28 NOTE — PROGRESS NOTES
Subjective     Idalmis Abdul is a 74 year old female who presents to clinic today for the following health issues:    HPI       Pt is here for a left hand swelling and red. Started yesterday and worse today. She is being treated for a UTI as well.    UC was actually negative but she reports feeling better. She presented to ED on 7/26 for urinary concerns. See summary from ED visit below.  She now has a sore and swollen pointer finger on left hand. She denies any known injury. It's swollen and skin around it is red. Denies any bite, no open areas in skin.  Denies any other joint pain or swelling. No fever.     See exam findings above. Few external hemorrhoids noted with no thrombosed hemorrhoids or significant tenderness on rectal exam. No stool noted in rectum on exam, along with no masses. CT abdomen and pelvis within normal limits and no signs of bowel obstruction or acute abdomen. UA obtained which shows positive nitrite and moderate bacteria. Urine culture sent and currently pending. Patient lipase within normal limits, CBC with no significant change from previous CBC 2 months ago. Patient with low hemoglobin in past.  CMP with slightly low sodium which patient has had in the past, and slightly elevated glucose at 114, but patient was not fasting. Kidney function within normal limits. Patient sent home with antibiotic for UTI that could be causing patient to feel like she has to have a bowel movement also. anusol prescription for hemorrhoids. I informed her to continue miralax and to follow up with primary care provider for recheck within 1 week if symptoms persist. My guess is that she is straining on the toilet concerned about constipation even though it is really her Urinary symptoms causing the nausea and pressure and frequency sensation. Patient to return to the Emergency Room if abdominal pain occurs, fevers, back pain, vaginal symptoms, rectal pain, or change in symptoms occur. Patient discharge in  stable condition happy that she did not have a bowel obstruction.        Patient Active Problem List   Diagnosis     Hypertension goal BP (blood pressure) < 140/90     GIST (gastrointestinal stromal tumor), malignant (H)     Eyelid edema     History of lung cancer     Health Care Home     NSTEMI (non-ST elevated myocardial infarction) (H)     ASCVD (arteriosclerotic cardiovascular disease)     S/P CABG x 4     GERD (gastroesophageal reflux disease)     Hyperthyroidism     Thyroid eye disease     Eyelid retraction or lag     Thyrotoxic exophthalmos     Graves' disease     History of tobacco abuse     History of non-ST elevation myocardial infarction (NSTEMI)     Chronic back pain     PVD (posterior vitreous detachment)     Age-related osteoporosis without current pathological fracture     Spinal stenosis of lumbosacral region     DDD (degenerative disc disease), lumbar     CKD (chronic kidney disease) stage 3, GFR 30-59 ml/min (H)     Mixed simple and mucopurulent chronic bronchitis (H)     Past Surgical History:   Procedure Laterality Date     BLEPHAROPLASTY BILATERAL Bilateral 7/17/2019    Procedure: Bilateral Upper Eyelid Blepharoplasty;  Surgeon: Vasile Brasher MD;  Location:  OR     BYPASS GRAFT ARTERY CORONARY  6/14/2012    Procedure: BYPASS GRAFT ARTERY CORONARY;  Median Sternotomy Coronary Artery Bypass Graft  x 3        C THORACOSCOPY,DX W BX  fall 2003    benign tissue     CATARACT IOL, RT/LT      LE     CHOLECYSTECTOMY  1963     COLONOSCOPY  4-12-05     CORONARY ARTERY BYPASS      X4     CREATION PERICARDIAL WINDOW  6/15/2012    Procedure: CREATION PERICARDIAL WINDOW;  Mediastinal Exploration, Control of Bleeding;  Surgeon: Dwaine Griffin MD;  Location: [facility] OR     EYE SURGERY  2014    left cataract removal     GI SURGERY  2003 and 2007    GIST tumor removal     GYN SURGERY      tubal ligation     HYSTERECTOMY VAGINAL, COLPORRHAPHY ANTERIOR, POSTERIOR, COMBINED N/A 10/17/2017    Procedure:  COMBINED HYSTERECTOMY VAGINAL, COLPORRHAPHY ANTERIOR, POSTERIOR;  Total Vaginal Hysterectomy and Posterior Repair,Sacrospinous Vault Suspension;  Surgeon: Ruth Farooq MD;  Location: [facility] OR     PHACOEMULSIFICATION WITH STANDARD INTRAOCULAR LENS IMPLANT  3/24/2014    Procedure: PHACOEMULSIFICATION WITH STANDARD INTRAOCULAR LENS IMPLANT;  Left Kelman Phacoemulsification with Intraocular Lens Implant;  Surgeon: Magnus Mckeon MD;  Location: [facility] OR     RECESSION RESECTION WITH ADJUSTABLE SUTURE BILATERAL Bilateral 7/14/2016    Procedure: RECESSION RESECTION WITH ADJUSTABLE SUTURE BILATERAL;  Surgeon: Erma Ordaz MD;  Location: [facility] OR     REPAIR ENTROPION Right 8/21/2019    Procedure: Right Upper Lid Entropion Repair;  Surgeon: Vasile Brasher MD;  [facility]     REPAIR RETRACTION LID BILATERAL Bilateral 7/17/2019    Procedure: Bilateral Upper Eyelid Retraction Repair;  Surgeon: Vasile Brasher MD;  [facility]     SURGICAL HISTORY OF -   1963    cholecystectomy     SURGICAL HISTORY OF -   1970's    Tubal Ligation      SURGICAL HISTORY OF -   08/03    Explor. Lap, Excision of Small Bowel Tumor      SURGICAL HISTORY OF -   4/2010    lung cancer removed right lung       Social History     Tobacco Use     Smoking status: Former Smoker     Packs/day: 0.50     Years: 49.00     Pack years: 24.50     Types: Cigarettes     Last attempt to quit: 4/19/2010     Years since quitting: 10.2     Smokeless tobacco: Never Used   Substance Use Topics     Alcohol use: No     Family History   Problem Relation Age of Onset     Heart Disease Mother      Osteoporosis Mother      Respiratory Mother         Emphezyma     Hypertension Mother      Heart Disease Father      Alcohol/Drug Father      Hypertension Father      Hypertension Maternal Grandmother      Hypertension Brother      Hypertension Sister      Arthritis Sister      Hypertension Son      Cancer Son         rectal         Current Outpatient  Medications   Medication Sig Dispense Refill     amLODIPine (NORVASC) 5 MG tablet Take 1 tablet (5 mg) by mouth daily 90 tablet 3     aspirin EC 81 MG EC tablet Take 1 tablet by mouth daily. 30 tablet 2     Blood Pressure Monitoring (ADULT BLOOD PRESSURE CUFF LG) KIT 1 Device 2 times daily 1 kit 1     calcium-vitamin D-vitamin K (VIACTIV) 500-500-40 MG-UNT-MCG CHEW Take 2 tablets by mouth daily       cefdinir (OMNICEF) 300 MG capsule Take 1 capsule (300 mg) by mouth 2 times daily for 7 days 14 capsule 0     clindamycin (CLEOCIN) 300 MG capsule Take 1 capsule (300 mg) by mouth 4 times daily for 7 days 28 capsule 0     hydrochlorothiazide (HYDRODIURIL) 25 MG tablet Take 1 tablet (25 mg) by mouth daily 90 tablet 3     hydrocortisone, Perianal, (ANUSOL-HC) 2.5 % cream Apply to external hemorrhoids 1-2 times daily as needed. 30 g 0     imatinib (GLEEVEC) 400 MG tablet Take 1 tablet (400 mg) by mouth daily Take with a meal and a large glass of water. 30 tablet 11     lisinopril (ZESTRIL) 40 MG tablet TAKE ONE TABLET BY MOUTH DAILY 90 tablet 3     melatonin 5 MG tablet Take 5 mg by mouth nightly as needed for sleep       methimazole (TAPAZOLE) 5 MG tablet Take 0.5 tablets (2.5 mg) by mouth daily 45 tablet 6     metoprolol tartrate (LOPRESSOR) 100 MG tablet TAKE ONE TABLET BY MOUTH TWICE A  tablet 2     order for DME Walker 1 Device 0     polyethylene glycol (MIRALAX/GLYCOLAX) powder Take 17 g by mouth daily       rosuvastatin (CRESTOR) 5 MG tablet Taking every other day. 45 tablet 3     Allergies   Allergen Reactions     Atorvastatin Cramps and Unknown     cramps       BP Readings from Last 3 Encounters:   07/28/20 124/70   07/26/20 (!) 121/93   06/29/20 122/66    Wt Readings from Last 3 Encounters:   07/28/20 69.4 kg (153 lb)   06/29/20 69.4 kg (153 lb)   01/07/20 68.9 kg (152 lb)                    Reviewed and updated as needed this visit by Provider         Review of Systems   Constitutional, HEENT,  "cardiovascular, pulmonary, gi and gu systems are negative, except as otherwise noted.      Objective    /70 (BP Location: Right arm, Patient Position: Chair, Cuff Size: Adult Regular)   Pulse 76   Temp 99.5  F (37.5  C) (Tympanic)   Resp 20   Ht 1.575 m (5' 2\")   Wt 69.4 kg (153 lb)   LMP 01/01/1992   SpO2 98%   BMI 27.98 kg/m    Body mass index is 27.98 kg/m .  Physical Exam   GENERAL: healthy, alert and no distress  NECK: no adenopathy, no asymmetry  RESP: lungs clear to auscultation - no rales, rhonchi or wheezes  CV: regular rate and rhythm, normal S1 S2, no S3 or S4, no murmur  ABDOMEN: soft, non tender, no CVA tenderness  MS: swelling over left proximal pointer finger joint with mild erythema of skin, no open areas on hand identified, good CMS, somewhat decreased ROM of finger due to pain and swelling      Diagnostic Test Results:  Labs reviewed in Epic  No results found. However, due to the size of the patient record, not all encounters were searched. Please check Results Review for a complete set of results.        Assessment & Plan     1. Pain of finger of left hand    - Uric acid  - XR Finger Left G/E 2 Views; Future    2. Cellulitis, unspecified cellulitis site    - clindamycin (CLEOCIN) 300 MG capsule; Take 1 capsule (300 mg) by mouth 4 times daily for 7 days  Dispense: 28 capsule; Refill: 0   Discussed how to take the medication(s), expected outcomes, potential side effects.    BMI:   Estimated body mass index is 27.98 kg/m  as calculated from the following:    Height as of this encounter: 1.575 m (5' 2\").    Weight as of this encounter: 69.4 kg (153 lb).           See Patient Instructions  Patient Instructions   Will be notified of pending x ray and lab results.  Stop the Cefdinir antibiotic and start the new antibiotic prescribed.  Continue to monitor redness and swelling in finger and seek emergent care if it worsens.        Thank you for choosing [facility].  You may be receiving " an email and/or telephone survey request from [facility] Customer Experience regarding your visit today.  Please take a few minutes to respond to the survey to let us know how we are doing.      If you have questions or concerns, please contact us via [employer] or you can contact your care team at [phone].    Our Clinic hours are:  Monday 6:40 am  to 7:00 pm  Tuesday -Friday 6:40 am to 5:00 pm    The [place] outpatient lab hours are:  Monday - Friday 6:10 am to 4:45 pm  Saturdays 7:00 am to 11:00 am  Appointments are required, call [phone]    If you have clinical questions after hours or would like to schedule an appointment,  call the clinic at [phone].      Return in about 1 week (around 8/4/2020) for or sooner if symptoms persist or worsen.    JAIME [facility]

## 2020-07-28 NOTE — PATIENT INSTRUCTIONS
Will be notified of pending x ray and lab results.  Stop the Cefdinir antibiotic and start the new antibiotic prescribed.  Continue to monitor redness and swelling in finger and seek emergent care if it worsens.        Thank you for choosing [facility].  You may be receiving an email and/or telephone survey request from [facility] Customer Experience regarding your visit today.  Please take a few minutes to respond to the survey to let us know how we are doing.      If you have questions or concerns, please contact us via [facility] or you can contact your care team at [phone].    Our Clinic hours are:  Monday 6:40 am  to 7:00 pm  Tuesday -Friday 6:40 am to 5:00 pm    The [place] outpatient lab hours are:  Monday - Friday 6:10 am to 4:45 pm  Saturdays 7:00 am to 11:00 am  Appointments are required, call [phone]    If you have clinical questions after hours or would like to schedule an appointment,  call the clinic at [phone].

## 2020-07-28 NOTE — TELEPHONE ENCOUNTER
Red,painful,swollen L hand.  Started with index finger and going into hand.  No injury or bug bite.  Scheduled appt this AM with Kristen.    Jazmine

## 2020-07-29 ENCOUNTER — HOSPITAL ENCOUNTER (EMERGENCY)
Facility: CLINIC | Age: 75
Discharge: HOME | End: 2020-07-29
Attending: FAMILY MEDICINE | Admitting: FAMILY MEDICINE
Payer: COMMERCIAL

## 2020-07-29 ENCOUNTER — TELEPHONE (OUTPATIENT)
Dept: FAMILY MEDICINE | Facility: CLINIC | Age: 75
End: 2020-07-29

## 2020-07-29 VITALS
SYSTOLIC BLOOD PRESSURE: 95 MMHG | OXYGEN SATURATION: 95 % | TEMPERATURE: 97.2 F | RESPIRATION RATE: 18 BRPM | HEART RATE: 72 BPM | DIASTOLIC BLOOD PRESSURE: 62 MMHG

## 2020-07-29 DIAGNOSIS — L03.119 CELLULITIS OF HAND: ICD-10-CM

## 2020-07-29 PROCEDURE — 99282 EMERGENCY DEPT VISIT SF MDM: CPT | Performed by: FAMILY MEDICINE

## 2020-07-29 PROCEDURE — 99284 EMERGENCY DEPT VISIT MOD MDM: CPT | Mod: Z6 | Performed by: FAMILY MEDICINE

## 2020-07-29 NOTE — ED PROVIDER NOTES
HPI   The patient is a 74-year-old female presenting with more redness and swelling of her left hand.  She was seen this past weekend and diagnosed with possible bladder infection.  She was given Omnicef and she had been taking that as directed up until yesterday.  Yesterday she was seen with concern for left second finger pain and swelling.  She was diagnosed with hand cellulitis and she was told to discontinue Omnicef and start clindamycin.  She has taken a total of 4 doses of the clindamycin.    Yesterday the pain involving her left second finger was reported as 10/10.  Her pain was worsened with movement.  Today, her pain is rated 2-3/10.  She reports greatly improved symptoms after taking 24 hours of the clindamycin.  However, she does report that her hand is more swollen.  The swelling has extended from the base of her second left finger to include the dorsum of her left hand as well.  No fever.  No other systemic symptoms described.  No new injury.  X-ray yesterday was negative.  Uric acid yesterday was negative or normal.        Allergies:  Allergies   Allergen Reactions     Atorvastatin Cramps and Unknown     cramps       Problem List:    Patient Active Problem List    Diagnosis Date Noted     Mixed simple and mucopurulent chronic bronchitis (H) 01/07/2020     Priority: Medium     CKD (chronic kidney disease) stage 3, GFR 30-59 ml/min (H) 05/23/2019     Priority: Medium     Spinal stenosis of lumbosacral region 03/22/2018     Priority: Medium     DDD (degenerative disc disease), lumbar 03/22/2018     Priority: Medium     Age-related osteoporosis without current pathological fracture 12/16/2014     Priority: Medium     PVD (posterior vitreous detachment) 11/17/2014     Priority: Medium     History of tobacco abuse 09/02/2014     Priority: Medium     QUIT in 2010       History of non-ST elevation myocardial infarction (NSTEMI) 09/02/2014     Priority: Medium     Chronic back pain 09/02/2014     Priority:  Medium     No longer on oxycodone - had been on this long term in the past         Graves' disease 08/19/2014     Priority: Medium     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 Provider to review       Hyperthyroidism 02/04/2014     Priority: Medium     Seeing Endoncrinology at [facility]       GERD (gastroesophageal reflux disease) 10/08/2013     Priority: Medium     S/P CABG x 4 08/05/2012     Priority: Medium     ASCVD (arteriosclerotic cardiovascular disease) 06/14/2012     Priority: Medium     NSTEMI (non-ST elevated myocardial infarction) (H) 06/12/2012     Priority: Medium     Eyelid edema 12/15/2011     Priority: Medium     side effect of gastric cancer medication       History of lung cancer 12/15/2011     Priority: Medium     S/p resection of right lung.  Sees  @ [facility]       Health Care Home 12/15/2011     Priority: Medium                  GIST (gastrointestinal stromal tumor), 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     Priority: Medium     resected 2003, recurred 2007, resected, and now on adjuvant gleevec  CT scan every 6 months  Dr. Schmitz       Hypertension goal BP (blood pressure) < 140/90 03/24/2005     Priority: Medium      Past Medical History:    Past Medical History:   Diagnosis Date     ASCVD (arteriosclerotic cardiovascular disease) 6/14/2012     Benign neoplasm of duodenum, jejunum, and ileum 2003, 2007     CA - lung cancer 4/2010     choledochal cyst 1963     CKD (chronic kidney disease) stage 3, GFR 30-59 ml/min (H) 5/23/2019     Coronary artery disease      DDD (degenerative disc disease), lumbar 3/22/2018     Dislocated finger, left middle PIP 7/26/2013     Dyspnea 8/27/2013     Eyelid edema 12/15/2011     GERD (gastroesophageal reflux disease) 10/8/2013     GIST (gastrointestinal stromal tumor), malignant (H) 5/10/2011      Graves disease      Grief reaction 5/11/2009     History of lung cancer 12/15/2011     Hyperlipidaemia      Hyperlipidemia LDL goal <160 12/17/2013     Hyperthyroidism 2/4/2014     Hypokalemia 8/5/2012     LBP (low back pain) 7/26/2013     Leiomyoma of uterus, unspecified      NSTEMI (non-ST elevated myocardial infarction) (H) 6/12/2012     NSTEMI (non-ST elevated myocardial infarction) (H)      osteopenia      Other benign neoplasm of connective and other soft tissue of thorax 2003     Other chronic pain      PONV (postoperative nausea and vomiting)      Postsurgical aortocoronary bypass status 7/4/2012     S/P CABG x 4 8/5/2012     Strabismus      Tobacco use disorder      Unspecified essential hypertension      Past Surgical History:    Past Surgical History:   Procedure Laterality Date     BLEPHAROPLASTY BILATERAL Bilateral 7/17/2019    Procedure: Bilateral Upper Eyelid Blepharoplasty;  Surgeon: Vasile Brasher MD;  Location:  OR     BYPASS GRAFT ARTERY CORONARY  6/14/2012    Procedure: BYPASS GRAFT ARTERY CORONARY;  Median Sternotomy Coronary Artery Bypass Graft  x 3        C THORACOSCOPY,DX W BX  fall 2003    benign tissue     CATARACT IOL, RT/LT      LE     CHOLECYSTECTOMY  1963     COLONOSCOPY  4-12-05     CORONARY ARTERY BYPASS      X4     CREATION PERICARDIAL WINDOW  6/15/2012    Procedure: CREATION PERICARDIAL WINDOW;  Mediastinal Exploration, Control of Bleeding;  Surgeon: Dwaine Griffin MD;  Location:  OR     EYE SURGERY  2014    left cataract removal     GI SURGERY  2003 and 2007    GIST tumor removal     GYN SURGERY      tubal ligation     HYSTERECTOMY VAGINAL, COLPORRHAPHY ANTERIOR, POSTERIOR, COMBINED N/A 10/17/2017    Procedure: COMBINED HYSTERECTOMY VAGINAL, COLPORRHAPHY ANTERIOR, POSTERIOR;  Total Vaginal Hysterectomy and Posterior Repair,Sacrospinous Vault Suspension;  Surgeon: Ruth Farooq MD;  Location: [facility] OR     PHACOEMULSIFICATION WITH STANDARD INTRAOCULAR LENS  IMPLANT  3/24/2014    Procedure: PHACOEMULSIFICATION WITH STANDARD INTRAOCULAR LENS IMPLANT;  Left Kelman Phacoemulsification with Intraocular Lens Implant;  Surgeon: Magnus Mckeon MD;  Location: [facility] OR     RECESSION RESECTION WITH ADJUSTABLE SUTURE BILATERAL Bilateral 7/14/2016    Procedure: RECESSION RESECTION WITH ADJUSTABLE SUTURE BILATERAL;  Surgeon: Erma Ordaz MD;  Location: [facility] OR     REPAIR ENTROPION Right 8/21/2019    Procedure: Right Upper Lid Entropion Repair;  Surgeon: Vasile Brasher MD;  [facility]     REPAIR RETRACTION LID BILATERAL Bilateral 7/17/2019    Procedure: Bilateral Upper Eyelid Retraction Repair;  Surgeon: Vasile Brasher MD;  [facility]     SURGICAL HISTORY OF -   1963    cholecystectomy     SURGICAL HISTORY OF -   1970's    Tubal Ligation      SURGICAL HISTORY OF -   08/03    Explor. Lap, Excision of Small Bowel Tumor      SURGICAL HISTORY OF -   4/2010    lung cancer removed right lung     Family History:    Family History   Problem Relation Age of Onset     Heart Disease Mother      Osteoporosis Mother      Respiratory Mother         Emphezyma     Hypertension Mother      Heart Disease Father      Alcohol/Drug Father      Hypertension Father      Hypertension Maternal Grandmother      Hypertension Brother      Hypertension Sister      Arthritis Sister      Hypertension Son      Cancer Son         rectal     Social History:  Marital Status:   [5]  Social History     Tobacco Use     Smoking status: Former Smoker     Packs/day: 0.50     Years: 49.00     Pack years: 24.50     Types: Cigarettes     Last attempt to quit: 4/19/2010     Years since quitting: 10.2     Smokeless tobacco: Never Used   Substance Use Topics     Alcohol use: No     Drug use: No      Medications:    amLODIPine (NORVASC) 5 MG tablet  aspirin EC 81 MG EC tablet  Blood Pressure Monitoring (ADULT BLOOD PRESSURE CUFF LG) KIT  calcium-vitamin D-vitamin K (VIACTIV) 500-500-40 MG-UNT-MCG  CHEW  cefdinir (OMNICEF) 300 MG capsule  clindamycin (CLEOCIN) 300 MG capsule  hydrochlorothiazide (HYDRODIURIL) 25 MG tablet  hydrocortisone, Perianal, (ANUSOL-HC) 2.5 % cream  imatinib (GLEEVEC) 400 MG tablet  lisinopril (ZESTRIL) 40 MG tablet  melatonin 5 MG tablet  methimazole (TAPAZOLE) 5 MG tablet  metoprolol tartrate (LOPRESSOR) 100 MG tablet  order for DME  polyethylene glycol (MIRALAX/GLYCOLAX) powder  rosuvastatin (CRESTOR) 5 MG tablet      Review of Systems   All other systems reviewed and are negative.      PE   BP: 95/62  Pulse: 72  Temp: 97.2  F (36.2  C)  Resp: 18  SpO2: 95 %  Physical Exam  Vitals signs reviewed.   Constitutional:       General: She is not in acute distress.     Appearance: She is well-developed.   HENT:      Head: Normocephalic and atraumatic.      Right Ear: External ear normal.      Left Ear: External ear normal.      Nose: Nose normal.      Mouth/Throat:      Mouth: Mucous membranes are moist.      Pharynx: Oropharynx is clear.   Eyes:      Extraocular Movements: Extraocular movements intact.      Conjunctiva/sclera: Conjunctivae normal.      Pupils: Pupils are equal, round, and reactive to light.   Neck:      Musculoskeletal: Normal range of motion.   Cardiovascular:      Rate and Rhythm: Normal rate and regular rhythm.   Pulmonary:      Effort: Pulmonary effort is normal.   Musculoskeletal: Normal range of motion.   Skin:     General: Skin is warm and dry.      Comments: See photograph below.  There is very minimal to no tenderness with palpation.  She has passive range of motion that is normal at the left second MCP.  She is able to extend and flex the finger well though it is limited because of swelling, not pain.  No extension of tenderness or swelling or redness about the wrist.   Neurological:      Mental Status: She is alert and oriented to person, place, and time.   Psychiatric:         Behavior: Behavior normal.               ED COURSE and MDM   1044.  The patient  likely has cellulitis involving her left hand.  Continue with clindamycin as prescribed.  She has had significant improvement of pain while on the medication.  Low concern for gout at this point though it is still certainly possible.  No change in treatment recommended.  Follow up discussed.  Return here for worsening as discussed.    LABS  Labs Ordered and Resulted from Time of ED Arrival Up to the Time of Departure from the ED - No data to display    IMAGING  Images reviewed by me.  Radiology report also reviewed.  No orders to display       Procedures    Medications - No data to display      IMPRESSION       ICD-10-CM    1. Cellulitis of hand  L03.119             Medication List      There are no discharge medications for this visit.                       Magdi Sesay MD  07/29/20 1045

## 2020-07-29 NOTE — ED NOTES
Pt presents to ER with c/o cellulitis L hand, worsening on OP therapy.    Pt noted L hand pain 7/27/20. On 7/28/20 hand/index finger was red so pt was seen in clinic and dx with cellulitis. She has now had 4 doses of Clindamycin.  This a.m. her hand feels better, but the redness and swelling have increased. She is afebrile and otherwise feeling well.  Pt called PCP and was told to report to ER.

## 2020-07-29 NOTE — DISCHARGE INSTRUCTIONS
Return to the Emergency Room if the following occurs:     Severely worsened pain, expanding redness and swelling up the arm, fever >101, or for any concern at anytime.    Or, follow-up with the following provider as we discussed:     Return to your primary doctor as needed.    Medications discussed:    Continue the clindamycin as previously prescribed.    If you received pain-relieving or sedating medication during your time in the ER, avoid alcohol, driving automobiles, or working with machinery.  Also, a responsible adult must stay with you.        Call the Nurse Advice Line at [phone] or [phone] for any concern at anytime.

## 2020-07-29 NOTE — ED AVS SNAPSHOT
[facility]  [address] [address] [address]  Phone:  [phone]  Fax:  [phone]                                    Idalmis Abdul   MRN: [id number]    Department:  [facility]   Date of Visit:  7/29/2020           After Visit Summary Signature Page    I have received my discharge instructions, and my questions have been answered. I have discussed any challenges I see with this plan with the nurse or doctor.    ..........................................................................................................................................  Patient/Patient Representative Signature      ..........................................................................................................................................  Patient Representative Print Name and Relationship to Patient    ..................................................               ................................................  Date                                   Time    ..........................................................................................................................................  Reviewed by Signature/Title    ...................................................              ..............................................  Date                                               Time          22EPIC Rev 08/18

## 2020-08-04 ENCOUNTER — OFFICE VISIT (OUTPATIENT)
Dept: FAMILY MEDICINE | Facility: CLINIC | Age: 75
End: 2020-08-04
Payer: COMMERCIAL

## 2020-08-04 ENCOUNTER — TELEPHONE (OUTPATIENT)
Dept: FAMILY MEDICINE | Facility: CLINIC | Age: 75
End: 2020-08-04

## 2020-08-04 VITALS
RESPIRATION RATE: 16 BRPM | WEIGHT: 153.2 LBS | OXYGEN SATURATION: 95 % | SYSTOLIC BLOOD PRESSURE: 134 MMHG | HEIGHT: 62 IN | HEART RATE: 89 BPM | TEMPERATURE: 98.4 F | BODY MASS INDEX: 28.19 KG/M2 | DIASTOLIC BLOOD PRESSURE: 70 MMHG

## 2020-08-04 DIAGNOSIS — L03.114 CELLULITIS OF LEFT UPPER EXTREMITY: Primary | ICD-10-CM

## 2020-08-04 PROCEDURE — 99213 OFFICE O/P EST LOW 20 MIN: CPT | Performed by: FAMILY MEDICINE

## 2020-08-04 RX ORDER — SULFAMETHOXAZOLE/TRIMETHOPRIM 800-160 MG
1 TABLET ORAL 2 TIMES DAILY
Qty: 20 TABLET | Refills: 0 | Status: SHIPPED | OUTPATIENT
Start: 2020-08-04 | End: 2020-08-14

## 2020-08-04 ASSESSMENT — MIFFLIN-ST. JEOR: SCORE: 1143.16

## 2020-08-04 ASSESSMENT — PAIN SCALES - GENERAL: PAINLEVEL: NO PAIN (0)

## 2020-08-04 NOTE — TELEPHONE ENCOUNTER
Pt finished Clindamycin 7 day course today.  Pt was seen in the ER for this on 7/29/20.  Hand continues to be swollen,painful and warm to touch.  Scheduled appt this afternoon. Joann

## 2020-08-04 NOTE — PROGRESS NOTES
"Subjective     Idalmis Abdul is a 74 year old female who presents to clinic today for the following health issues:    HPI       ED/UC Followup:    Facility:  [facility]  Date of visit: 7/29/2020  Reason for visit: Left hand swelling  Current Status:  Swelling went away and now came back. Sx's of redness and feels warm to the touch. States she took her last dose of    Clindamycin this morning.            Reviewed and updated as needed this visit by Provider         Review of Systems         Objective    /70   Pulse 89   Temp 98.4  F (36.9  C) (Tympanic)   Resp 16   Ht 1.575 m (5' 2\")   Wt 69.5 kg (153 lb 3.2 oz)   LMP 01/01/1992   SpO2 95%   Breastfeeding No   BMI 28.02 kg/m    Body mass index is 28.02 kg/m .  Physical Exam               Further history obtained, clarified or corrected by physician:    Her finger was improved and she says back to normal for 2-1/2 days and then this morning she woke up and noticed some redness and swelling and a very slight discomfort in the same place.  She took her last antibiotic Cleocin dose this morning.    OBJECTIVE:  /70   Pulse 89   Temp 98.4  F (36.9  C) (Tympanic)   Resp 16   Ht 1.575 m (5' 2\")   Wt 69.5 kg (153 lb 3.2 oz)   LMP 01/01/1992   SpO2 95%   Breastfeeding No   BMI 28.02 kg/m    LUNGS: clear to auscultation, normal breath sounds  CV: RRR without murmur  ABD: BS+, soft, nontender, no masses, no hepatosplenomegaly  EXTREMITIES: without joint tenderness, swelling or erythema.  No muscle tenderness or abnormality.  The left index finger at the MCP joint and the long finger MCP joint both show a little bit of swelling and a slight redness and very slight tenderness.  This is not as swollen and as red as previous photograph.  SKIN: No rashes or abnormalities  NEURO:non focal exam    ASSESSMENT:  Cellulitis of left upper extremity    PLAN:    Unclear if this is inflammation, possibly gout recurrence for incomplete " resolution of infection so we will start with antibiotic and she will monitor carefully for worsening or new symptoms.    Orders Placed This Encounter     sulfamethoxazole-trimethoprim (BACTRIM DS) 800-160 MG tablet

## 2020-08-04 NOTE — TELEPHONE ENCOUNTER
Reason for call:  Patient reporting a symptom    Symptom or request: Pt has been seen by HSANTANU Grier and in ER for left hand cellulitis.  She is almost done with antibiotics and the infection is now worse.  Please call patient and advise.      Duration (how long have symptoms been present): ongoing    Have you been treated for this before? Yes    Additional comments:     Phone Number patient can be reached at:  Cell number on file:    Telephone Information:   Mobile [phone]     Best Time:  any    Can we leave a detailed message on this number:  YES    Call taken on 8/4/2020 at 8:32 AM by Casandra Claudio

## 2020-08-18 DIAGNOSIS — K64.4 EXTERNAL HEMORRHOIDS: Primary | ICD-10-CM

## 2020-08-18 RX ORDER — HYDROCORTISONE 25 MG/G
CREAM TOPICAL
Qty: 30 G | Refills: 0 | Status: SHIPPED | OUTPATIENT
Start: 2020-08-18 | End: 2021-06-10

## 2020-08-19 DIAGNOSIS — C49.A0 MALIGNANT GASTROINTESTINAL STROMAL TUMOR, UNSPECIFIED SITE (H): ICD-10-CM

## 2020-08-20 DIAGNOSIS — C49.A0 MALIGNANT GASTROINTESTINAL STROMAL TUMOR, UNSPECIFIED SITE (H): Primary | ICD-10-CM

## 2020-08-20 DIAGNOSIS — C49.A0 GIST (GASTROINTESTINAL STROMAL TUMOR), MALIGNANT (H): ICD-10-CM

## 2020-08-21 DIAGNOSIS — C49.A0 MALIGNANT GASTROINTESTINAL STROMAL TUMOR, UNSPECIFIED SITE (H): Primary | ICD-10-CM

## 2020-08-21 RX ORDER — IMATINIB MESYLATE 400 MG/1
TABLET, FILM COATED ORAL
Refills: 1 | OUTPATIENT
Start: 2020-08-21

## 2020-08-21 RX ORDER — IMATINIB MESYLATE 400 MG/1
400 TABLET, FILM COATED ORAL DAILY
Qty: 30 TABLET | Refills: 11 | Status: SHIPPED | OUTPATIENT
Start: 2020-08-21 | End: 2021-08-13

## 2020-08-24 ENCOUNTER — VIRTUAL VISIT (OUTPATIENT)
Dept: ENDOCRINOLOGY | Facility: CLINIC | Age: 75
End: 2020-08-24
Payer: COMMERCIAL

## 2020-08-24 ENCOUNTER — TELEPHONE (OUTPATIENT)
Dept: FAMILY MEDICINE | Facility: CLINIC | Age: 75
End: 2020-08-24

## 2020-08-24 DIAGNOSIS — E05.00 GRAVES' DISEASE: Primary | ICD-10-CM

## 2020-08-24 DIAGNOSIS — E04.1 THYROID NODULE: ICD-10-CM

## 2020-08-24 DIAGNOSIS — Z12.11 SPECIAL SCREENING FOR MALIGNANT NEOPLASMS, COLON: Primary | ICD-10-CM

## 2020-08-24 PROCEDURE — 99213 OFFICE O/P EST LOW 20 MIN: CPT | Mod: 95 | Performed by: INTERNAL MEDICINE

## 2020-08-24 NOTE — LETTER
"    8/24/2020         RE: Idalmis Abdul  [address]  [facility] [address]        Dear Colleague,    Thank you for referring your patient, Idalmis Abdul, to the [facility]. Please see a copy of my visit note below.    Idalmis Abdul is a 74 year old female who is being evaluated via a billable telephone visit.      The patient has been notified of following:     \"This telephone visit will be conducted via a call between you and your physician/provider. We have found that certain health care needs can be provided without the need for a physical exam.  This service lets us provide the care you need with a short phone conversation.  If a prescription is necessary we can send it directly to your pharmacy.  If lab work is needed we can place an order for that and you can then stop by our lab to have the test done at a later time.    Telephone visits are billed at different rates depending on your insurance coverage. During this emergency period, for some insurers they may be billed the same as an in-person visit.  Please reach out to your insurance provider with any questions.    If during the course of the call the physician/provider feels a telephone visit is not appropriate, you will not be charged for this service.\"    Patient has given verbal consent for Telephone visit?  Yes    What phone number would you like to be contacted at? [phone]  -  How would you like to obtain your AVS? Mail a copy    Phone call duration: 10 minutes    Yeal Moreno CMA    S:   Pt being seen in f/u for Graves'.  Patient was previously seen by Dr Antoine.   \"Thyroid dysfunction: Briefly, Ms. Abdul reported thyroid function studies were abnormal since August 2013. The patient was initially seen by Dr. Rama Coburn (February 2014).  Hyperthyroidism was confirmed, and a thyroid uptake and scan prior to starting therapy showed an homogenous uptake of 19%.  This has been managed with methimazole.  She is " "currently on methimazole 2.5 mg daily.  She has been on this same dose since February 14, 2018.  She feels well and has no symptoms suggestive of hypo-or hyperthyroidism. She denies palpitations, tremors or changes in her bowel movements. She has chronic constipation, likely related to chronic oxycodone use (for back pain). She takes senna docusate to help with her constipation. She had eye surgery in 7/2016, and that has helped with her diplopia.   I have previously reviewed the patient's medical records, that showed that Ms. Abdul actually had a suppressed TSH and a positive TSI in June 2012.  She reports she was at the hospital at that time due to heart issues (atrial fibrillation).  It seems the patient was seen by the endocrinology consult team at that time, but she didn't have any follow-up as an outpatient. \"    US Thyroid:  EXAMINATION: US THYROID, 1/9/2018 9:30 AM      COMPARISON: 2/5/2014, 6/18/2012, 11/9/2007.     HISTORY: Hyperthyroidism     Technique: Grayscale and color ultrasound imaging of the thyroid was performed.     Findings:    Thyroid parenchyma: Several thyroid nodules described below, the thyroid parenchyma is otherwise homogeneous and unremarkable.  The right lobe of the thyroid measures: 2.1 x 2.2 x 5.7 cm   The thyroid isthmus measures: 0.3 cm   The left lobe of the thyroid measures: 1.6 x 1.6 x 3.8 cm      Right lobe:  Nodule 1: mid  Nodule measurement: 0.5 x 0.3 x 0.6 cm  Echogenicity: Hypoechoic  Consistency: Cystic/spongiform  Calcifications: no  Hypervascular: no  Interval growth (>20%): no     Nodule 2: Inferior  Nodule measurement: 1.5 x 1.8 x 2.1 cm  Echogenicity: Heterogeneous, predominantly isoechoic  Consistency: Mostly solid  Calcifications: no  Hypervascular: yes  Interval growth (>20%): Yes (previously 2.0 x 1.3 x 1.1 cm).     Isthmus:   No nodules identified.     Left Lobe:   Nodule 1: Superior  Nodule measurement: 0.5 x 0.3 x 0.7 cm  Echogenicity: Heterogeneous, " predominantly hypoechoic  Consistency: spongiform  Calcifications: no  Hypervascular: Minimal internal vascularity  Interval growth (>20%): NA     Nodule 2: Mid  Nodule measurement: 0.3 x 0.2 x 0.4 cm  Echogenicity: Heterogeneous, predominantly hypoechoic  Consistency: spongiform  Calcifications: no  Hypervascular: no  Interval growth (>20%): NA     Nodule 3: Mid to inferior posteriorly  Nodule measurement: 0.5 x 0.3 x 1.0 cm  Echogenicity: Hypoechoic  Consistency: Cystic/spongiform  Calcifications: no  Hypervascular: no  Interval growth (>20%): NA     Impression:  Several thyroid nodules as described above, the largest of which is exophytic along the posterior and inferior right thyroid lobe and measures up to 2.1 cm. This appears increased compared to 2014 and not significantly changed from 7/7/2017 CT given differences in technique. Consider further evaluation with fine-needle aspiration.          US THYROID 2/5/2014 10:06 AM    HISTORY: Hyperthyroid.    COMPARISON: None.    FINDINGS: The right lobe of the thyroid gland measures 5.6 x 1.4 x 1.5 cm. The left lobe of the thyroid gland measures 3.5 x 1.6 x 1.5 cm. The isthmus measures 0.2 cm.  There is a somewhat ill-defined hypoechoic nodule in the mid to upper pole of the right lobe of the thyroid gland that measures 0.4 x 0.4 x 0.3 cm. This has calcifications within it. There is a predominantly solid nodule at the posterior margin of the lower pole of the right lobe of the thyroid gland that measures 2.0 x 1.3 x 1.1 cm. This nodule shows no significant change in size in retrospect from prior chest CTs dating back to 9/11/2012. This stability is supportive evidence for a benign entity.     Uptake and Scan: 2/28/2014  The uptake at 24 hours was measured at 19 %. The normal range at 24 hours is from 10 to 30%. Uptake on right: 12 %, Uptake on Left: 6.8 %.    Total computer estimated weight of the gland: 44.8 kg, Right gland weight: 29.9 kg, Left gland weight: 14.9  kg.    Images of the gland demonstrates normal appearance of the right and left lobes. No additional findings. No autonomous nodules were identified.     Copath Report 02/07/2018 10:54 AM          Patient Name: PIEDAD FAN   MR#: [id number]   Specimen #: [id number]   Collected: 2/7/2018   Received: 2/7/2018   Reported: 2/8/2018 10:13   Ordering Phy(s): EVELYN NEGRETE     For improved result formatting, select 'View Enhanced Report Format' under  Linked Documents section.     SPECIMEN/STAIN PROCESS:   FNA-thyroid, Right Inferior (1.5x1.8x2.1 cm)        Pap-Cyto x 3, Diff Quick Stain-cyto x 3     ----------------------------------------------------------------     CYTOLOGIC INTERPRETATION:     Thyroid, Right Inferior, Ultrasound-Guided Fine Needle Aspiration:   Benign   Consistent with a benign nodule (includes adenomatoid nodule, colloid nodule, etc.)   Specimen Adequacy: Satisfactory for evaluation.     The [place] implied risk of malignancy and recommended clinical management:   Benign has a 0-3% risk of malignancy, recommended management is clinical follow-up     I have personally reviewed all specimens and/or slides, including the listed special stains, and used them   with my medical judgement to determine or confirm the final diagnosis.        She continues 2.5 mg of methimazole daily.   She is a former smoker. Quit 10 years ago.     She began taking melatonin ~1 year ago for trouble falling asleep.   Takes miralax for chronic constipation.     ROS: 10 point ROS neg other than the symptoms noted above in the HPI.    O:  Gen: In NAD.     A/P:   Graves' - Her uptake and scan from 2014 was normal. History of +TSI from 2012. Repeat TSI in 11/2019 was still positive. She has ELOISA and had strabismus surgery in 2016. We discussed I 131, methimazole, and surgery.   -Check labs.     Thyroid nodules - I have reviewed the natural history of thyroid nodules and thyroid cancer with the patient. FNA of  the right inferior nodule was benign on 2/7/18.   -Schedule thyroid ultrasound.     Due to the COVID 19 pandemic this visit was a telephone/video visit in order to help prevent spread of infection in this high risk patient and the general population. The patient gave verbal consent for the visit today.    Start time 0759  Stop time 0816  Total time 17  This visit would have been billed as [id number] as an E & M code     Federico Yang MD on 8/24/2020 at 8:16 AM              Again, thank you for allowing me to participate in the care of your patient.        Sincerely,        Federico Yang MD

## 2020-08-24 NOTE — PROGRESS NOTES
"Idalmis Abdul is a 74 year old female who is being evaluated via a billable telephone visit.      The patient has been notified of following:     \"This telephone visit will be conducted via a call between you and your physician/provider. We have found that certain health care needs can be provided without the need for a physical exam.  This service lets us provide the care you need with a short phone conversation.  If a prescription is necessary we can send it directly to your pharmacy.  If lab work is needed we can place an order for that and you can then stop by our lab to have the test done at a later time.    Telephone visits are billed at different rates depending on your insurance coverage. During this emergency period, for some insurers they may be billed the same as an in-person visit.  Please reach out to your insurance provider with any questions.    If during the course of the call the physician/provider feels a telephone visit is not appropriate, you will not be charged for this service.\"    Patient has given verbal consent for Telephone visit?  Yes    What phone number would you like to be contacted at? [phone]  -  How would you like to obtain your AVS? Mail a copy    Phone call duration: 10 minutes    Yael Moreno CMA    S:   Pt being seen in f/u for Graves'.  Patient was previously seen by Dr Antoine.   \"Thyroid dysfunction: Briefly, Ms. Abdul reported thyroid function studies were abnormal since August 2013. The patient was initially seen by Dr. Rama Coburn (February 2014).  Hyperthyroidism was confirmed, and a thyroid uptake and scan prior to starting therapy showed an homogenous uptake of 19%.  This has been managed with methimazole.  She is currently on methimazole 2.5 mg daily.  She has been on this same dose since February 14, 2018.  She feels well and has no symptoms suggestive of hypo-or hyperthyroidism. She denies palpitations, tremors or changes in her bowel movements. " "She has chronic constipation, likely related to chronic oxycodone use (for back pain). She takes senna docusate to help with her constipation. She had eye surgery in 7/2016, and that has helped with her diplopia.   I have previously reviewed the patient's medical records, that showed that Ms. Abdul actually had a suppressed TSH and a positive TSI in June 2012.  She reports she was at the hospital at that time due to heart issues (atrial fibrillation).  It seems the patient was seen by the endocrinology consult team at that time, but she didn't have any follow-up as an outpatient. \"    US Thyroid:  EXAMINATION: US THYROID, 1/9/2018 9:30 AM      COMPARISON: 2/5/2014, 6/18/2012, 11/9/2007.     HISTORY: Hyperthyroidism     Technique: Grayscale and color ultrasound imaging of the thyroid was performed.     Findings:    Thyroid parenchyma: Several thyroid nodules described below, the thyroid parenchyma is otherwise homogeneous and unremarkable.  The right lobe of the thyroid measures: 2.1 x 2.2 x 5.7 cm   The thyroid isthmus measures: 0.3 cm   The left lobe of the thyroid measures: 1.6 x 1.6 x 3.8 cm      Right lobe:  Nodule 1: mid  Nodule measurement: 0.5 x 0.3 x 0.6 cm  Echogenicity: Hypoechoic  Consistency: Cystic/spongiform  Calcifications: no  Hypervascular: no  Interval growth (>20%): no     Nodule 2: Inferior  Nodule measurement: 1.5 x 1.8 x 2.1 cm  Echogenicity: Heterogeneous, predominantly isoechoic  Consistency: Mostly solid  Calcifications: no  Hypervascular: yes  Interval growth (>20%): Yes (previously 2.0 x 1.3 x 1.1 cm).     Isthmus:   No nodules identified.     Left Lobe:   Nodule 1: Superior  Nodule measurement: 0.5 x 0.3 x 0.7 cm  Echogenicity: Heterogeneous, predominantly hypoechoic  Consistency: spongiform  Calcifications: no  Hypervascular: Minimal internal vascularity  Interval growth (>20%): NA     Nodule 2: Mid  Nodule measurement: 0.3 x 0.2 x 0.4 cm  Echogenicity: Heterogeneous, predominantly " hypoechoic  Consistency: spongiform  Calcifications: no  Hypervascular: no  Interval growth (>20%): NA     Nodule 3: Mid to inferior posteriorly  Nodule measurement: 0.5 x 0.3 x 1.0 cm  Echogenicity: Hypoechoic  Consistency: Cystic/spongiform  Calcifications: no  Hypervascular: no  Interval growth (>20%): NA     Impression:  Several thyroid nodules as described above, the largest of which is exophytic along the posterior and inferior right thyroid lobe and measures up to 2.1 cm. This appears increased compared to 2014 and not significantly changed from 7/7/2017 CT given differences in technique. Consider further evaluation with fine-needle aspiration.          US THYROID 2/5/2014 10:06 AM    HISTORY: Hyperthyroid.    COMPARISON: None.    FINDINGS: The right lobe of the thyroid gland measures 5.6 x 1.4 x 1.5 cm. The left lobe of the thyroid gland measures 3.5 x 1.6 x 1.5 cm. The isthmus measures 0.2 cm.  There is a somewhat ill-defined hypoechoic nodule in the mid to upper pole of the right lobe of the thyroid gland that measures 0.4 x 0.4 x 0.3 cm. This has calcifications within it. There is a predominantly solid nodule at the posterior margin of the lower pole of the right lobe of the thyroid gland that measures 2.0 x 1.3 x 1.1 cm. This nodule shows no significant change in size in retrospect from prior chest CTs dating back to 9/11/2012. This stability is supportive evidence for a benign entity.     Uptake and Scan: 2/28/2014  The uptake at 24 hours was measured at 19 %. The normal range at 24 hours is from 10 to 30%. Uptake on right: 12 %, Uptake on Left: 6.8 %.    Total computer estimated weight of the gland: 44.8 kg, Right gland weight: 29.9 kg, Left gland weight: 14.9 kg.    Images of the gland demonstrates normal appearance of the right and left lobes. No additional findings. No autonomous nodules were identified.     Copath Report 02/07/2018 10:54 AM          Patient Name: PIEDAD FAN   MR#:  [id number]   Specimen #: [id number]   Collected: 2/7/2018   Received: 2/7/2018   Reported: 2/8/2018 10:13   Ordering Phy(s): EVELYN NEGRETE     For improved result formatting, select 'View Enhanced Report Format' under  Linked Documents section.     SPECIMEN/STAIN PROCESS:   FNA-thyroid, Right Inferior (1.5x1.8x2.1 cm)        Pap-Cyto x 3, Diff Quick Stain-cyto x 3     ----------------------------------------------------------------     CYTOLOGIC INTERPRETATION:     Thyroid, Right Inferior, Ultrasound-Guided Fine Needle Aspiration:   Benign   Consistent with a benign nodule (includes adenomatoid nodule, colloid nodule, etc.)   Specimen Adequacy: Satisfactory for evaluation.     The [place] implied risk of malignancy and recommended clinical management:   Benign has a 0-3% risk of malignancy, recommended management is clinical follow-up     I have personally reviewed all specimens and/or slides, including the listed special stains, and used them   with my medical judgement to determine or confirm the final diagnosis.        She continues 2.5 mg of methimazole daily.   She is a former smoker. Quit 10 years ago.     She began taking melatonin ~1 year ago for trouble falling asleep.   Takes miralax for chronic constipation.     ROS: 10 point ROS neg other than the symptoms noted above in the HPI.    O:  Gen: In NAD.     A/P:   Graves' - Her uptake and scan from 2014 was normal. History of +TSI from 2012. Repeat TSI in 11/2019 was still positive. She has ELOISA and had strabismus surgery in 2016. We discussed I 131, methimazole, and surgery.   -Check labs.     Thyroid nodules - I have reviewed the natural history of thyroid nodules and thyroid cancer with the patient. FNA of the right inferior nodule was benign on 2/7/18.   -Schedule thyroid ultrasound.     Due to the COVID 19 pandemic this visit was a telephone/video visit in order to help prevent spread of infection in this high risk patient and the general  population. The patient gave verbal consent for the visit today.    Start time 0759  Stop time 0816  Total time 17  This visit would have been billed as [id number] as an E & M code     Federico Yang MD on 8/24/2020 at 8:16 AM

## 2020-08-24 NOTE — NURSING NOTE
"Chief Complaint   Patient presents with     New Patient     Thyroid Problem       Initial LMP 01/01/1992  Estimated body mass index is 28.02 kg/m  as calculated from the following:    Height as of 8/4/20: 1.575 m (5' 2\").    Weight as of 8/4/20: 69.5 kg (153 lb 3.2 oz).  BP completed using cuff size: NA (Not Taken)  Medications and allergies reviewed.      Yael BROWN MA    "

## 2020-08-24 NOTE — TELEPHONE ENCOUNTER
Panel Management Review      Patient has the following on her problem list:     Hypertension   Last three blood pressure readings:  BP Readings from Last 3 Encounters:   08/04/20 134/70   07/29/20 95/62   07/28/20 124/70     Blood pressure: Passed    HTN Guidelines:  Less than 140/90      Composite cancer screening  Chart review shows that this patient is due/due soon for the following Colonoscopy/fit  Summary:    Patient is due/failing the following:   BP CHECK, COLONOSCOPY and FIT    Action needed:   Patient needs referral/order: colon/fit    Type of outreach:    Phone, spoke to patient.  agreed to fit    Questions for provider review:    None                                                                                                                                    Amy Zhang MA

## 2020-08-27 DIAGNOSIS — C49.A0 GIST (GASTROINTESTINAL STROMAL TUMOR), MALIGNANT (H): ICD-10-CM

## 2020-08-27 DIAGNOSIS — E05.00 GRAVES' DISEASE: ICD-10-CM

## 2020-08-27 LAB
ALBUMIN SERPL-MCNC: 3.5 G/DL (ref 3.4–5)
ALP SERPL-CCNC: 60 U/L (ref 40–150)
ALT SERPL W P-5'-P-CCNC: 24 U/L (ref 0–50)
ANION GAP SERPL CALCULATED.3IONS-SCNC: 6 MMOL/L (ref 3–14)
AST SERPL W P-5'-P-CCNC: 27 U/L (ref 0–45)
BASOPHILS # BLD AUTO: 0 10E9/L (ref 0–0.2)
BASOPHILS NFR BLD AUTO: 0.4 %
BILIRUB SERPL-MCNC: 0.3 MG/DL (ref 0.2–1.3)
BUN SERPL-MCNC: 19 MG/DL (ref 7–30)
CALCIUM SERPL-MCNC: 8.6 MG/DL (ref 8.5–10.1)
CHLORIDE SERPL-SCNC: 93 MMOL/L (ref 94–109)
CO2 SERPL-SCNC: 26 MMOL/L (ref 20–32)
CREAT SERPL-MCNC: 1.12 MG/DL (ref 0.52–1.04)
DIFFERENTIAL METHOD BLD: ABNORMAL
EOSINOPHIL # BLD AUTO: 0.2 10E9/L (ref 0–0.7)
EOSINOPHIL NFR BLD AUTO: 3.5 %
ERYTHROCYTE [DISTWIDTH] IN BLOOD BY AUTOMATED COUNT: 14.8 % (ref 10–15)
GFR SERPL CREATININE-BSD FRML MDRD: 48 ML/MIN/{1.73_M2}
GLUCOSE SERPL-MCNC: 102 MG/DL (ref 70–99)
HCT VFR BLD AUTO: 27.6 % (ref 35–47)
HGB BLD-MCNC: 9.3 G/DL (ref 11.7–15.7)
LYMPHOCYTES # BLD AUTO: 1 10E9/L (ref 0.8–5.3)
LYMPHOCYTES NFR BLD AUTO: 21.5 %
MCH RBC QN AUTO: 29.6 PG (ref 26.5–33)
MCHC RBC AUTO-ENTMCNC: 33.7 G/DL (ref 31.5–36.5)
MCV RBC AUTO: 88 FL (ref 78–100)
MONOCYTES # BLD AUTO: 0.8 10E9/L (ref 0–1.3)
MONOCYTES NFR BLD AUTO: 18.3 %
NEUTROPHILS # BLD AUTO: 2.6 10E9/L (ref 1.6–8.3)
NEUTROPHILS NFR BLD AUTO: 56.3 %
PLATELET # BLD AUTO: 227 10E9/L (ref 150–450)
POTASSIUM SERPL-SCNC: 3.6 MMOL/L (ref 3.4–5.3)
PROT SERPL-MCNC: 6.8 G/DL (ref 6.8–8.8)
RBC # BLD AUTO: 3.14 10E12/L (ref 3.8–5.2)
SODIUM SERPL-SCNC: 125 MMOL/L (ref 133–144)
T3FREE SERPL-MCNC: 2.5 PG/ML (ref 2.3–4.2)
T4 FREE SERPL-MCNC: 1.25 NG/DL (ref 0.76–1.46)
TSH SERPL DL<=0.005 MIU/L-ACNC: 0.86 MU/L (ref 0.4–4)
WBC # BLD AUTO: 4.6 10E9/L (ref 4–11)

## 2020-08-27 PROCEDURE — 84443 ASSAY THYROID STIM HORMONE: CPT | Performed by: INTERNAL MEDICINE

## 2020-08-27 PROCEDURE — 84481 FREE ASSAY (FT-3): CPT | Performed by: INTERNAL MEDICINE

## 2020-08-27 PROCEDURE — 84439 ASSAY OF FREE THYROXINE: CPT | Performed by: INTERNAL MEDICINE

## 2020-08-27 PROCEDURE — 36415 COLL VENOUS BLD VENIPUNCTURE: CPT | Performed by: INTERNAL MEDICINE

## 2020-08-27 PROCEDURE — 80053 COMPREHEN METABOLIC PANEL: CPT | Performed by: INTERNAL MEDICINE

## 2020-08-27 PROCEDURE — 85025 COMPLETE CBC W/AUTO DIFF WBC: CPT | Performed by: INTERNAL MEDICINE

## 2020-08-27 NOTE — LETTER
August 31, 2020      Idalmis Abdul     [facility] [address]        Dear ,    We are writing to inform you of your test results.    Thyroid labs returned normal.   No change to methimazole dose.   Complete thyroid ultrasound as planned.     Resulted Orders   T3 Free   Result Value Ref Range    Free T3 2.5 2.3 - 4.2 pg/mL   T4 free   Result Value Ref Range    T4 Free 1.25 0.76 - 1.46 ng/dL   TSH   Result Value Ref Range    TSH 0.86 0.40 - 4.00 mU/L       If you have any questions or concerns, please call the clinic at the number listed above.       Sincerely,  Federico Yang MD

## 2020-08-31 ENCOUNTER — HOSPITAL ENCOUNTER (OUTPATIENT)
Dept: ULTRASOUND IMAGING | Facility: CLINIC | Age: 75
Discharge: HOME | End: 2020-08-31
Attending: INTERNAL MEDICINE | Admitting: INTERNAL MEDICINE
Payer: COMMERCIAL

## 2020-08-31 DIAGNOSIS — E04.1 THYROID NODULE: ICD-10-CM

## 2020-08-31 PROCEDURE — 76536 US EXAM OF HEAD AND NECK: CPT

## 2020-09-01 DIAGNOSIS — E05.00 GRAVES' DISEASE: Primary | ICD-10-CM

## 2020-11-06 ENCOUNTER — HOSPITAL ENCOUNTER (OUTPATIENT)
Dept: CT IMAGING | Facility: CLINIC | Age: 75
Discharge: HOME | End: 2020-11-06
Attending: INTERNAL MEDICINE | Admitting: INTERNAL MEDICINE
Payer: COMMERCIAL

## 2020-11-06 DIAGNOSIS — C49.A0 MALIGNANT GASTROINTESTINAL STROMAL TUMOR, UNSPECIFIED SITE (H): ICD-10-CM

## 2020-11-06 LAB
ALBUMIN SERPL-MCNC: 3.5 G/DL (ref 3.4–5)
ALP SERPL-CCNC: 60 U/L (ref 40–150)
ALT SERPL W P-5'-P-CCNC: 21 U/L (ref 0–50)
ANION GAP SERPL CALCULATED.3IONS-SCNC: 7 MMOL/L (ref 3–14)
AST SERPL W P-5'-P-CCNC: 24 U/L (ref 0–45)
BASOPHILS # BLD AUTO: 0.1 10E9/L (ref 0–0.2)
BASOPHILS NFR BLD AUTO: 1 %
BILIRUB SERPL-MCNC: 0.3 MG/DL (ref 0.2–1.3)
BUN SERPL-MCNC: 23 MG/DL (ref 7–30)
CALCIUM SERPL-MCNC: 8.9 MG/DL (ref 8.5–10.1)
CHLORIDE SERPL-SCNC: 99 MMOL/L (ref 94–109)
CO2 SERPL-SCNC: 29 MMOL/L (ref 20–32)
CREAT SERPL-MCNC: 1.24 MG/DL (ref 0.52–1.04)
DIFFERENTIAL METHOD BLD: ABNORMAL
EOSINOPHIL # BLD AUTO: 0.2 10E9/L (ref 0–0.7)
EOSINOPHIL NFR BLD AUTO: 2.9 %
ERYTHROCYTE [DISTWIDTH] IN BLOOD BY AUTOMATED COUNT: 14.7 % (ref 10–15)
GFR SERPL CREATININE-BSD FRML MDRD: 42 ML/MIN/{1.73_M2}
GLUCOSE SERPL-MCNC: 99 MG/DL (ref 70–99)
HCT VFR BLD AUTO: 29.4 % (ref 35–47)
HGB BLD-MCNC: 9.7 G/DL (ref 11.7–15.7)
IMM GRANULOCYTES # BLD: 0 10E9/L (ref 0–0.4)
IMM GRANULOCYTES NFR BLD: 0.2 %
LYMPHOCYTES # BLD AUTO: 1 10E9/L (ref 0.8–5.3)
LYMPHOCYTES NFR BLD AUTO: 18.6 %
MCH RBC QN AUTO: 29.7 PG (ref 26.5–33)
MCHC RBC AUTO-ENTMCNC: 33 G/DL (ref 31.5–36.5)
MCV RBC AUTO: 90 FL (ref 78–100)
MONOCYTES # BLD AUTO: 1 10E9/L (ref 0–1.3)
MONOCYTES NFR BLD AUTO: 19.6 %
NEUTROPHILS # BLD AUTO: 3 10E9/L (ref 1.6–8.3)
NEUTROPHILS NFR BLD AUTO: 57.7 %
NRBC # BLD AUTO: 0 10*3/UL
NRBC BLD AUTO-RTO: 0 /100
PLATELET # BLD AUTO: 199 10E9/L (ref 150–450)
POTASSIUM SERPL-SCNC: 3.7 MMOL/L (ref 3.4–5.3)
PROT SERPL-MCNC: 6.3 G/DL (ref 6.8–8.8)
RBC # BLD AUTO: 3.27 10E12/L (ref 3.8–5.2)
SODIUM SERPL-SCNC: 135 MMOL/L (ref 133–144)
WBC # BLD AUTO: 5.1 10E9/L (ref 4–11)

## 2020-11-06 PROCEDURE — 250N000011 HC RX IP 250 OP 636: Performed by: INTERNAL MEDICINE

## 2020-11-06 PROCEDURE — 74177 CT ABD & PELVIS W/CONTRAST: CPT

## 2020-11-06 PROCEDURE — 250N000009 HC RX 250: Performed by: INTERNAL MEDICINE

## 2020-11-06 PROCEDURE — 80053 COMPREHEN METABOLIC PANEL: CPT | Performed by: INTERNAL MEDICINE

## 2020-11-06 PROCEDURE — 85025 COMPLETE CBC W/AUTO DIFF WBC: CPT | Performed by: INTERNAL MEDICINE

## 2020-11-06 RX ORDER — IOPAMIDOL 755 MG/ML
75 INJECTION, SOLUTION INTRAVASCULAR ONCE
Status: COMPLETED | OUTPATIENT
Start: 2020-11-06 | End: 2020-11-06

## 2020-11-06 RX ADMIN — SODIUM CHLORIDE 59 ML: 9 INJECTION, SOLUTION INTRAVENOUS at 11:03

## 2020-11-06 RX ADMIN — IOPAMIDOL 75 ML: 755 INJECTION, SOLUTION INTRAVENOUS at 11:03

## 2020-11-09 ENCOUNTER — VIRTUAL VISIT (OUTPATIENT)
Dept: ONCOLOGY | Facility: CLINIC | Age: 75
End: 2020-11-09
Attending: INTERNAL MEDICINE
Payer: COMMERCIAL

## 2020-11-09 DIAGNOSIS — C49.A0 MALIGNANT GASTROINTESTINAL STROMAL TUMOR, UNSPECIFIED SITE (H): Primary | ICD-10-CM

## 2020-11-09 DIAGNOSIS — Z85.118 HISTORY OF LUNG CANCER: ICD-10-CM

## 2020-11-09 PROCEDURE — 99214 OFFICE O/P EST MOD 30 MIN: CPT | Mod: 95 | Performed by: INTERNAL MEDICINE

## 2020-11-09 PROCEDURE — 999N001193 HC VIDEO/TELEPHONE VISIT; NO CHARGE

## 2020-11-09 NOTE — LETTER
11/9/2020         RE: Idalmis Abdul  [address]  [facility] [address]        Dear Colleague,    Thank you for referring your patient, Idalmis Abdul, to the [facility]. Please see a copy of my visit note below.    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  Treatment: Gleevec 400 mg daily.      Interval History:   Idalmis Abdul  who is being evaluated via a telephone/video visit due to COVID-19 pandemic.      She reported feeling good in general.  She reports having gradual worsening in her SOB and fatigue especially on exertion.  She denies any chest pain or cough.  Patient is a former smoker and she linked her symptoms to old history of smoking, she tried inhalers by her PCP without that much help.       Patient has noticed some bright red blood per rectum and has history of hemorrhoid.  Her last CBC showed mild anemia with hemoglobin 9.7 and baseline 10-11.  Otherwise she denies any nausea or vomiting or abdominal pain.  Her appetite is good and she gained some weight.    Remainder of her 10 point review of systems is otherwise negative.      Oncology History:   Her original diagnosis was more than 15 years ago with successful resection but recurrence of her GIST after she had completed her adjuvant Gleevec.  She is now been on Gleevec for more than a decade with excellent control of her disease.   - Hx stage I lung cancer s/p resection in 2010       Medications: reviewed.  Current Outpatient Medications   Medication     amLODIPine (NORVASC) 5 MG tablet     aspirin EC 81 MG EC tablet     Blood Pressure Monitoring (ADULT BLOOD PRESSURE CUFF LG) KIT     calcium-vitamin D-vitamin K (VIACTIV) 500-500-40 MG-UNT-MCG CHEW     hydrochlorothiazide (HYDRODIURIL) 25 MG tablet     hydrocortisone, Perianal, (ANUSOL-HC) 2.5 % cream     imatinib  (GLEEVEC) 400 MG tablet     lisinopril (ZESTRIL) 40 MG tablet     melatonin 5 MG tablet     metoprolol tartrate (LOPRESSOR) 100 MG tablet     order for DME     polyethylene glycol (MIRALAX/GLYCOLAX) powder     rosuvastatin (CRESTOR) 5 MG tablet     imatinib (GLEEVEC) 400 MG tablet     methimazole (TAPAZOLE) 5 MG tablet     No current facility-administered medications for this visit.             Physical Exam:   Over the video/phone,   General:  in no acute distress.  Skin: not  jaundice, cyanosis, erythema, or ecchymoses on exposed surfaces.  Respiratory: Breathing comfortably and no audible wheezing.   Neurologic: A&O x 3, speech normal.         Data:   I have personally reviewed the following labs/imaging:  Recent Labs   Lab Test 11/06/20  1006 08/27/20  1543 07/26/20  0919   WBC 5.1 4.6 4.5   RBC 3.27* 3.14* 3.29*   HGB 9.7* 9.3* 9.8*   HCT 29.4* 27.6* 28.5*   MCV 90 88 87   MCH 29.7 29.6 29.8   MCHC 33.0 33.7 34.4   RDW 14.7 14.8 14.0    227 238   NEUTROPHIL 57.7 56.3 69.9     Recent Labs   Lab Test 11/06/20  1006 08/27/20  1543 07/26/20  0919    125* 128*   POTASSIUM 3.7 3.6 3.5   CHLORIDE 99 93* 92*   CO2 29 26 27   ANIONGAP 7 6 9   GLC 99 102* 114*   BUN 23 19 16   CR 1.24* 1.12* 0.92   NAEEM 8.9 8.6 8.7     Recent Labs   Lab Test 11/06/20  1006 08/27/20  1543 07/26/20  0919   PROTTOTAL 6.3* 6.8 6.8   ALBUMIN 3.5 3.5 3.1*   BILITOTAL 0.3 0.3 0.3   ALKPHOS 60 60 53   AST 24 27 30   ALT 21 24 24     Lab Results   Component Value Date    CEA 1.2 12/18/2007       Images:   CT CAP 11/6/2020                                                                   IMPRESSION:  1.  No evidence for recurrent or metastatic disease.  2.  Stable postoperative changes in the right lung. Cholecystectomy.  Hysterectomy.  3.  Large amount of stool throughout the colon.         Assessment and Plan:   Idalmis Abdul is a 74 year old female with Hx of GIST diagnosed years ago.  Her disease responded very well on Gleevec  "which tried to stopped however her disease recurred then we plan to leave her on Gleevec indefinitely.    Current CT showed no evidence of disease.  Patient denies any new symptoms might be related to Gleevec side effects.  She will continue on Gleevec and reassess in 6 months with CT scan and labs.    History of stage I lung cancer status post resection in 2010.   - She has no evidence of recurrence.      Normocytic anemia   -Multifactorial, patient complains of bright red blood per rectum likely related to her hemorrhoid however worsening anemia is suspicious and colon malignancy should be ruled out.  -Patient will contact her primary care physician for anemia work-up (iron study, vitamins level, reticulocytosis and blood smear ... etc ) and she has to call for colonoscopy    Slow progressing exertional shortness of breath and fatigue   -Likely related to her anemia and history of smoking and Hx of CAD s/p 4 vessels CABG  -Treat underlying disease     Mild CKD  -Avoid NSAIDs and keep good hydration    Recommendations:  - She has to call her PCP to work up her anemia (iron study, vitamins level, reticulocytosis and blood smear ... etc )  - Also, she needs colonoscopy   - Cont Gleevec with same dose 400 mg daily.   - RTC in 6 months with TRENT with labs and CT.    My attending (Dr. Schmitz) and I have participated in reviewing patient's history, labs/images and treatment options.       Milton Quispe M.D.   Heme/Onc Fellow  Pager: [phone]    11/09/2020      Attending note: I saw the patient in conjunction with the fellow and agree with the above.      Idalmis Abdul is a 74 year old female who is being evaluated via a billable telephone visit.      The patient has been notified of following:     \"This telephone visit will be conducted via a call between you and your physician/provider. We have found that certain health care needs can be provided without the need for a physical exam.  This service lets us provide " "the care you need with a short phone conversation.  If a prescription is necessary we can send it directly to your pharmacy.  If lab work is needed we can place an order for that and you can then stop by our lab to have the test done at a later time.    Telephone visits are billed at different rates depending on your insurance coverage. During this emergency period, for some insurers they may be billed the same as an in-person visit.  Please reach out to your insurance provider with any questions.    If during the course of the call the physician/provider feels a telephone visit is not appropriate, you will not be charged for this service.\"    Patient has given verbal consent for Telephone visit?  Yes    What phone number would you like to be contacted at? [phone]    How would you like to obtain your AVS? Mail a copy    Vitals - Patient Reported  Weight (Patient Reported): 70.3 kg (155 lb)  Height (Patient Reported): 157.5 cm (5' 2\")  BMI (Based on Pt Reported Ht/Wt): 28.35  Pain Score: No Pain (0)    Yolanda Ovalle CMA November 9, 2020  8:58 AM     Phone call duration: 30 minutes    Again, thank you for allowing me to participate in the care of your patient.        Sincerely,        Blayne Schmitz MD    "

## 2020-11-09 NOTE — PROGRESS NOTES
"Idalmis Abdul is a 74 year old female who is being evaluated via a billable telephone visit.      The patient has been notified of following:     \"This telephone visit will be conducted via a call between you and your physician/provider. We have found that certain health care needs can be provided without the need for a physical exam.  This service lets us provide the care you need with a short phone conversation.  If a prescription is necessary we can send it directly to your pharmacy.  If lab work is needed we can place an order for that and you can then stop by our lab to have the test done at a later time.    Telephone visits are billed at different rates depending on your insurance coverage. During this emergency period, for some insurers they may be billed the same as an in-person visit.  Please reach out to your insurance provider with any questions.    If during the course of the call the physician/provider feels a telephone visit is not appropriate, you will not be charged for this service.\"    Patient has given verbal consent for Telephone visit?  Yes    What phone number would you like to be contacted at? [phone]    How would you like to obtain your AVS? Mail a copy    Vitals - Patient Reported  Weight (Patient Reported): 70.3 kg (155 lb)  Height (Patient Reported): 157.5 cm (5' 2\")  BMI (Based on Pt Reported Ht/Wt): 28.35  Pain Score: No Pain (0)    Yolanda Ovalle CMA November 9, 2020  8:58 AM     Phone call duration: 30 minutes          "

## 2020-11-09 NOTE — PROGRESS NOTES
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  Treatment: Gleevec 400 mg daily.      Interval History:   Idalmis Abdul  who is being evaluated via a telephone/video visit due to COVID-19 pandemic.      She reported feeling good in general.  She reports having gradual worsening in her SOB and fatigue especially on exertion.  She denies any chest pain or cough.  Patient is a former smoker and she linked her symptoms to old history of smoking, she tried inhalers by her PCP without that much help.       Patient has noticed some bright red blood per rectum and has history of hemorrhoid.  Her last CBC showed mild anemia with hemoglobin 9.7 and baseline 10-11.  Otherwise she denies any nausea or vomiting or abdominal pain.  Her appetite is good and she gained some weight.    Remainder of her 10 point review of systems is otherwise negative.      Oncology History:   Her original diagnosis was more than 15 years ago with successful resection but recurrence of her GIST after she had completed her adjuvant Gleevec.  She is now been on Gleevec for more than a decade with excellent control of her disease.   - Hx stage I lung cancer s/p resection in 2010       Medications: reviewed.  Current Outpatient Medications   Medication     amLODIPine (NORVASC) 5 MG tablet     aspirin EC 81 MG EC tablet     Blood Pressure Monitoring (ADULT BLOOD PRESSURE CUFF LG) KIT     calcium-vitamin D-vitamin K (VIACTIV) 500-500-40 MG-UNT-MCG CHEW     hydrochlorothiazide (HYDRODIURIL) 25 MG tablet     hydrocortisone, Perianal, (ANUSOL-HC) 2.5 % cream     imatinib (GLEEVEC) 400 MG tablet     lisinopril (ZESTRIL) 40 MG tablet     melatonin 5 MG tablet     metoprolol tartrate (LOPRESSOR) 100 MG tablet     order for DME     polyethylene glycol (MIRALAX/GLYCOLAX) powder     rosuvastatin (CRESTOR) 5 MG tablet     imatinib  (GLEEVEC) 400 MG tablet     methimazole (TAPAZOLE) 5 MG tablet     No current facility-administered medications for this visit.             Physical Exam:   Over the video/phone,   General:  in no acute distress.  Skin: not  jaundice, cyanosis, erythema, or ecchymoses on exposed surfaces.  Respiratory: Breathing comfortably and no audible wheezing.   Neurologic: A&O x 3, speech normal.         Data:   I have personally reviewed the following labs/imaging:  Recent Labs   Lab Test 11/06/20  1006 08/27/20  1543 07/26/20  0919   WBC 5.1 4.6 4.5   RBC 3.27* 3.14* 3.29*   HGB 9.7* 9.3* 9.8*   HCT 29.4* 27.6* 28.5*   MCV 90 88 87   MCH 29.7 29.6 29.8   MCHC 33.0 33.7 34.4   RDW 14.7 14.8 14.0    227 238   NEUTROPHIL 57.7 56.3 69.9     Recent Labs   Lab Test 11/06/20  1006 08/27/20  1543 07/26/20  0919    125* 128*   POTASSIUM 3.7 3.6 3.5   CHLORIDE 99 93* 92*   CO2 29 26 27   ANIONGAP 7 6 9   GLC 99 102* 114*   BUN 23 19 16   CR 1.24* 1.12* 0.92   NAEEM 8.9 8.6 8.7     Recent Labs   Lab Test 11/06/20  1006 08/27/20  1543 07/26/20  0919   PROTTOTAL 6.3* 6.8 6.8   ALBUMIN 3.5 3.5 3.1*   BILITOTAL 0.3 0.3 0.3   ALKPHOS 60 60 53   AST 24 27 30   ALT 21 24 24     Lab Results   Component Value Date    CEA 1.2 12/18/2007       Images:   CT CAP 11/6/2020                                                                   IMPRESSION:  1.  No evidence for recurrent or metastatic disease.  2.  Stable postoperative changes in the right lung. Cholecystectomy.  Hysterectomy.  3.  Large amount of stool throughout the colon.         Assessment and Plan:   Idalmis Abdul is a 74 year old female with Hx of GIST diagnosed years ago.  Her disease responded very well on Gleevec which tried to stopped however her disease recurred then we plan to leave her on Gleevec indefinitely.    Current CT showed no evidence of disease.  Patient denies any new symptoms might be related to Gleevec side effects.  She will continue on Gleevec and  reassess in 6 months with CT scan and labs.    History of stage I lung cancer status post resection in 2010.   - She has no evidence of recurrence.      Normocytic anemia   -Multifactorial, patient complains of bright red blood per rectum likely related to her hemorrhoid however worsening anemia is suspicious and colon malignancy should be ruled out.  -Patient will contact her primary care physician for anemia work-up (iron study, vitamins level, reticulocytosis and blood smear ... etc ) and she has to call for colonoscopy    Slow progressing exertional shortness of breath and fatigue   -Likely related to her anemia and history of smoking and Hx of CAD s/p 4 vessels CABG  -Treat underlying disease     Mild CKD  -Avoid NSAIDs and keep good hydration    Recommendations:  - She has to call her PCP to work up her anemia (iron study, vitamins level, reticulocytosis and blood smear ... etc )  - Also, she needs colonoscopy   - Cont Gleevec with same dose 400 mg daily.   - RTC in 6 months with [facility] with labs and CT.    My attending (Dr. Schmitz) and I have participated in reviewing patient's history, labs/images and treatment options.       Milton Quispe M.D.   Heme/Onc Fellow  Pager: [phone]    11/09/2020      Attending note: I saw the patient in conjunction with the fellow and agree with the above.

## 2020-11-10 DIAGNOSIS — I10 ESSENTIAL HYPERTENSION WITH GOAL BLOOD PRESSURE LESS THAN 140/90: ICD-10-CM

## 2020-11-10 NOTE — TELEPHONE ENCOUNTER
"Requested Prescriptions   Pending Prescriptions Disp Refills     metoprolol tartrate (LOPRESSOR) 100 MG tablet 180 tablet 2     Sig: Take 1 tablet (100 mg) by mouth 2 times daily       Beta-Blockers Protocol Passed - 11/10/2020  9:10 AM        Passed - Blood pressure under 140/90 in past 12 months     BP Readings from Last 3 Encounters:   08/04/20 134/70   07/29/20 95/62   07/28/20 124/70                 Passed - Patient is age 6 or older        Passed - Recent (12 mo) or future (30 days) visit within the authorizing provider's specialty     Patient has had an office visit with the authorizing provider or a provider within the authorizing providers department within the previous 12 mos or has a future within next 30 days. See \"Patient Info\" tab in inbasket, or \"Choose Columns\" in Meds & Orders section of the refill encounter.              Passed - Medication is active on med list             "

## 2020-11-11 RX ORDER — METOPROLOL TARTRATE 100 MG
100 TABLET ORAL 2 TIMES DAILY
Qty: 180 TABLET | Refills: 0 | Status: SHIPPED | OUTPATIENT
Start: 2020-11-11 | End: 2021-02-05

## 2020-11-11 NOTE — TELEPHONE ENCOUNTER
Prescription approved per [facility] Refill Protocol.  Jeniffer MOREL, MSN, RN

## 2021-01-21 DIAGNOSIS — I10 ESSENTIAL HYPERTENSION: ICD-10-CM

## 2021-01-21 NOTE — TELEPHONE ENCOUNTER
"Requested Prescriptions   Pending Prescriptions Disp Refills     amLODIPine (NORVASC) 5 MG tablet 90 tablet 3     Sig: Take 1 tablet (5 mg) by mouth daily       Calcium Channel Blockers Protocol  Failed - 1/21/2021  4:32 PM        Failed - Recent (12 mo) or future (30 days) visit within the authorizing provider's specialty     Patient has had an office visit with the authorizing provider or a provider within the authorizing providers department within the previous 12 mos or has a future within next 30 days. See \"Patient Info\" tab in inbasket, or \"Choose Columns\" in Meds & Orders section of the refill encounter.              Failed - Normal serum creatinine on file in past 12 months     Recent Labs   Lab Test 11/06/20  1006 09/26/19  1556 09/26/19  1556   CR 1.24*   < >  --    CREAT  --   --  1.2*    < > = values in this interval not displayed.       Ok to refill medication if creatinine is low          Passed - Blood pressure under 140/90 in past 12 months     BP Readings from Last 3 Encounters:   08/04/20 134/70   07/29/20 95/62   07/28/20 124/70                 Passed - Medication is active on med list        Passed - Patient is age 18 or older        Passed - No active pregnancy on record        Passed - No positive pregnancy test in past 12 months             "

## 2021-01-24 DIAGNOSIS — E05.90 HYPERTHYROIDISM: ICD-10-CM

## 2021-01-26 RX ORDER — AMLODIPINE BESYLATE 5 MG/1
5 TABLET ORAL DAILY
Qty: 90 TABLET | Refills: 0 | Status: SHIPPED | OUTPATIENT
Start: 2021-01-26 | End: 2021-03-19

## 2021-01-26 RX ORDER — METHIMAZOLE 5 MG/1
2.5 TABLET ORAL DAILY
Qty: 45 TABLET | Refills: 1 | Status: SHIPPED | OUTPATIENT
Start: 2021-01-26 | End: 2021-07-21

## 2021-01-26 NOTE — TELEPHONE ENCOUNTER
methimazole (TAPAZOLE) 5 MG tablet        Last Written Prescription Date:  11/12/2019  Last Fill Quantity: 45,   # refills: 6  Last Office Visit : 08/24/2020  Future Office visit:  none    Routing refill request to provider for review/approval because: not on protocol.

## 2021-01-26 NOTE — TELEPHONE ENCOUNTER
Prescription approved per [facility] Refill Protocol.  Kamilla Dewitt RN

## 2021-03-19 ENCOUNTER — IMMUNIZATION (OUTPATIENT)
Dept: FAMILY MEDICINE | Facility: CLINIC | Age: 76
End: 2021-03-19
Payer: COMMERCIAL

## 2021-03-19 ENCOUNTER — OFFICE VISIT (OUTPATIENT)
Dept: FAMILY MEDICINE | Facility: CLINIC | Age: 76
End: 2021-03-19
Payer: COMMERCIAL

## 2021-03-19 ENCOUNTER — ANCILLARY PROCEDURE (OUTPATIENT)
Dept: GENERAL RADIOLOGY | Facility: CLINIC | Age: 76
End: 2021-03-19
Attending: FAMILY MEDICINE
Payer: COMMERCIAL

## 2021-03-19 VITALS
HEART RATE: 76 BPM | WEIGHT: 154 LBS | OXYGEN SATURATION: 99 % | DIASTOLIC BLOOD PRESSURE: 66 MMHG | HEIGHT: 62 IN | SYSTOLIC BLOOD PRESSURE: 134 MMHG | TEMPERATURE: 97.8 F | RESPIRATION RATE: 16 BRPM | BODY MASS INDEX: 28.34 KG/M2

## 2021-03-19 DIAGNOSIS — M54.50 LUMBAR BACK PAIN: Primary | ICD-10-CM

## 2021-03-19 DIAGNOSIS — M62.81 PROXIMAL MUSCLE WEAKNESS: ICD-10-CM

## 2021-03-19 DIAGNOSIS — I10 ESSENTIAL HYPERTENSION: ICD-10-CM

## 2021-03-19 DIAGNOSIS — R06.09 DOE (DYSPNEA ON EXERTION): ICD-10-CM

## 2021-03-19 DIAGNOSIS — M53.3 SI (SACROILIAC) JOINT DYSFUNCTION: ICD-10-CM

## 2021-03-19 DIAGNOSIS — J41.8 MIXED SIMPLE AND MUCOPURULENT CHRONIC BRONCHITIS (H): ICD-10-CM

## 2021-03-19 DIAGNOSIS — R29.898 WEAKNESS OF BOTH LOWER EXTREMITIES: ICD-10-CM

## 2021-03-19 LAB
CRP SERPL-MCNC: <2.9 MG/L (ref 0–8)
ERYTHROCYTE [SEDIMENTATION RATE] IN BLOOD BY WESTERGREN METHOD: 41 MM/H (ref 0–30)

## 2021-03-19 PROCEDURE — 86140 C-REACTIVE PROTEIN: CPT | Performed by: FAMILY MEDICINE

## 2021-03-19 PROCEDURE — 0011A PR COVID VAC MODERNA 100 MCG/0.5 ML IM: CPT

## 2021-03-19 PROCEDURE — 91301 PR COVID VAC MODERNA 100 MCG/0.5 ML IM: CPT

## 2021-03-19 PROCEDURE — 36415 COLL VENOUS BLD VENIPUNCTURE: CPT | Performed by: FAMILY MEDICINE

## 2021-03-19 PROCEDURE — 85652 RBC SED RATE AUTOMATED: CPT | Performed by: FAMILY MEDICINE

## 2021-03-19 PROCEDURE — 72100 X-RAY EXAM L-S SPINE 2/3 VWS: CPT | Mod: FY | Performed by: RADIOLOGY

## 2021-03-19 PROCEDURE — 99214 OFFICE O/P EST MOD 30 MIN: CPT | Performed by: FAMILY MEDICINE

## 2021-03-19 RX ORDER — TIOTROPIUM BROMIDE 18 UG/1
18 CAPSULE ORAL; RESPIRATORY (INHALATION) DAILY
Qty: 30 CAPSULE | Refills: 11 | Status: SHIPPED | OUTPATIENT
Start: 2021-03-19 | End: 2022-01-27

## 2021-03-19 RX ORDER — AMLODIPINE BESYLATE 5 MG/1
5 TABLET ORAL DAILY
Qty: 90 TABLET | Refills: 1 | Status: SHIPPED | OUTPATIENT
Start: 2021-03-19 | End: 2021-08-12

## 2021-03-19 ASSESSMENT — PAIN SCALES - GENERAL: PAINLEVEL: MODERATE PAIN (4)

## 2021-03-19 ASSESSMENT — MIFFLIN-ST. JEOR: SCORE: 1141.79

## 2021-03-19 NOTE — NURSING NOTE
Walked patient with O2 sat monitor on her finger. She started with 98%, at minute one 94%, minute 2 she was 92% and needed to stop to catch her breath, minute three was 91% and needed to sit as her legs were getting weak and needed to sit down.    Emiliana Jain RN

## 2021-03-19 NOTE — PATIENT INSTRUCTIONS
Follow up with cardiology    Start spiriva - 1 inhalation daily          Our Clinic hours are:  Mondays    7:20 am - 7 pm  Tues -  Fri  7:20 am - 5 pm    Clinic Phone: [phone]    The clinic lab opens at 7:30 am Mon - Fri and appointments are required.    [facility]  Ph. [phone]  Monday  8 am - 7pm  Tues - Fri 8 am - 5:30 pm

## 2021-03-19 NOTE — PROGRESS NOTES
"    Assessment & Plan     Essential hypertension  Well controlled  - amLODIPine (NORVASC) 5 MG tablet; Take 1 tablet (5 mg) by mouth daily    Lumbar back pain  SI joint pain, significant scoliosis  No new fractures or spondylolisthesis  - XR Lumbar Spine 2/3 Views    SI (sacroiliac) joint dysfunction  As above  Recommend PT for this  - XR Lumbar Spine 2/3 Views  - PHYSICAL THERAPY REFERRAL; Future    ELIZALDE (dyspnea on exertion)  May benefit from full PFTs for further evaluation  Follow up with cardiology - overdue for this and echo  Start Spiriva given her known COPD    Weakness of both lower extremities  R/o PMR - ESR is only 41 today, given age and other co-morbidities,     Mixed simple and mucopurulent chronic bronchitis (H)     - tiotropium (SPIRIVA) 18 MCG inhaled capsule; Inhale 1 capsule (18 mcg) into the lungs daily    Proximal muscle weakness  Doesn't appear to be PMR  - ESR: Erythrocyte sedimentation rate  - CRP, inflammation       BMI:   Estimated body mass index is 28.17 kg/m  as calculated from the following:    Height as of this encounter: 1.575 m (5' 2\").    Weight as of this encounter: 69.9 kg (154 lb).           No follow-ups on file.    Ale Ordaz MD  [facility]   Idalmis is a 75 year old who presents for the following health issues  accompanied by her self:    HPI     Back Pain  Onset/Duration: 3 months ago fell on the ice and landed on her right buttocks.   Description:   Location of pain: low back right  Character of pain: dull ache  Pain radiation: radiates into the right buttocks  New numbness or weakness in legs, not attributed to pain: YES  Intensity: Currently 4/10, At its worst 9/10  Progression of Symptoms: worsening  History:   Specific cause: fall   Pain interferes with job: YES  History of back problems: severe scoliosis, h/o prior back surgery (fusion)  Any previous MRI or X-rays: Yes- at [facility].    Sees a specialist for back pain: " "YES  Alleviating factors:   Improved by: no activity and heat    Precipitating factors:  Worsened by: Lifting, Bending, Standing, Lying Flat, Walking and Coughing  Therapies tried and outcome: heat    Accompanying Signs & Symptoms:  Risk of Fracture: Recent history of trauma or blunt force and Age >64  Risk of Cauda Equina: None  Risk of Infection: None  Risk of Cancer: History of cancer  Risk of Ankylosing Spondylitis: Onset at age <35, male, AND morning back stiffness  no       Patient also c/o feeling weak in her upper and lower extremities, particularly if being too active or walking.  She feels she gets very winded easily.  She denies chest pain/pressure.   She has a long h/o tobacco abuse.  Used advair at one time and didn't feel it helped so she stopped  She has not had PFTs recently    Three minute walk in clinic today (had to stop at three minutes due to fatigue in her legs) with oxygen saturation of 91%  On room air 99% without activity    Review of Systems   Constitutional, HEENT, cardiovascular, pulmonary, gi and gu systems are negative, except as otherwise noted.      Objective    /66   Pulse 76   Temp 97.8  F (36.6  C) (Tympanic)   Resp 16   Ht 1.575 m (5' 2\")   Wt 69.9 kg (154 lb)   LMP 01/01/1992   SpO2 99%   Breastfeeding No   BMI 28.17 kg/m    Body mass index is 28.17 kg/m .  Physical Exam   GENERAL: healthy, alert and no distress  NECK: no adenopathy, no asymmetry, masses, or scars and thyroid normal to palpation  RESP: lungs clear to auscultation - no rales, rhonchi or wheezes  CV: regular rate and rhythm, normal S1 S2, no S3 or S4, no murmur, click or rub, no peripheral edema and peripheral pulses strong  ABDOMEN: soft, nontender, no hepatosplenomegaly, no masses and bowel sounds normal  MS: spine exam shows significant scoliosis, well healed lumbar scar.  Tender to palpation right SI joint  Negative SLR  Normal sensation  Normal DTRs.   Strength is 5/5 in upper and lower " extremities    Results for orders placed or performed in visit on 03/19/21 (from the past 24 hour(s))   XR Lumbar Spine 2/3 Views    Narrative    LUMBAR SPINE TWO - THREE VIEWS 3/19/2021 9:51 AM    INDICATION: Fall three months ago - pain right SI joint; Lumbar back  pain; SI (sacroiliac) joint dysfunction.    COMPARISON: Lumbar spine MRI 11/12/2019, lumbar spine radiograph  10/22/2019, 9/23/2019      Impression    IMPRESSION: 5 lumbar vertebral bodies. Prior posterior spinal fusion  at L4-L5. Hardware appears unchanged. Anterolisthesis of L4 on L5 also  appears similar. Marked convex right curvature centered at L3.  Allowing for this no definite compression deformity on the lateral  view.   ESR: Erythrocyte sedimentation rate   Result Value Ref Range    Sed Rate 41 (H) 0 - 30 mm/h     *Note: Due to a large number of results and/or encounters for the requested time period, some results have not been displayed. A complete set of results can be found in Results Review.

## 2021-03-25 ENCOUNTER — HOSPITAL ENCOUNTER (OUTPATIENT)
Dept: PHYSICAL THERAPY | Facility: CLINIC | Age: 76
Setting detail: THERAPIES SERIES
End: 2021-03-25
Attending: FAMILY MEDICINE
Payer: COMMERCIAL

## 2021-03-25 DIAGNOSIS — M53.3 SI (SACROILIAC) JOINT DYSFUNCTION: ICD-10-CM

## 2021-03-25 PROCEDURE — 97161 PT EVAL LOW COMPLEX 20 MIN: CPT | Mod: GP | Performed by: PHYSICAL THERAPIST

## 2021-03-25 NOTE — PROGRESS NOTES
Idalmis Abdul  1945 Physical Therapy Initial Evaluation  03/25/21 1000   General Information   Type of Visit Initial OP Ortho PT Evaluation   Start of Care Date 03/25/21   Referring Physician Ale Ordaz MD   Patient/Family Goals Statement Get dressed without pain   Orders Evaluate and Treat   Date of Order 03/19/21   Certification Required? Yes   Medical Diagnosis SI (sacroiliac) joint dysfunction   Surgical/Medical history reviewed Yes   Precautions/Limitations no known precautions/limitations   Body Part(s)   Body Part(s) Lumbar Spine/SI   Presentation and Etiology   Pertinent history of current problem (include personal factors and/or comorbidities that impact the POC) Slipped on ice about 3 months ago. Fell on right buttock and is having pain in right buttock. Having trouble standing in one place for 10 minutes before she has to sit down. Making the bed takes a long time. Pain is in low back mostly but does go into right buttock. / Comorbidities - Severe scoliosis, History of fusion (L4-L5), Chronic back pain, Grave's disease, HTN, Osteoperosis, DDD lumbar, Chronic kidney disease stage 3, Tobacco abuse    Impairments A. Pain;B. Decreased WB tolerance;F. Decreased strength and endurance;K. Numbness   Functional Limitations perform activities of daily living;perform desired leisure / sports activities   Symptom Location Right lumbar spine   How/Where did it occur With a fall   Onset date of current episode/exacerbation 12/18/20   Chronicity Chronic   Pain rating (0-10 point scale) Best (/10);Worst (/10)   Best (/10) 4   Worst (/10) 9   Pain quality C. Aching   Frequency of pain/symptoms A. Constant   Pain/symptoms exacerbated by B. Walking;C. Lifting;D. Carrying;I. Bending;J. ADL;K. Home tasks   Pain/symptoms eased by A. Sitting;C. Rest;E. Changing positions;G. Heat   Progression of symptoms since onset: Worsened   Current / Previous Interventions   Diagnostic Tests: X-ray   X-ray Results Results    X-ray results IMPRESSION: 5 lumbar vertebral bodies. Prior posterior spinal fusion at L4-L5. Hardware appears unchanged. Anterolisthesis of L4 on L5 also appears similar. Marked convex right curvature centered at L3. Allowing for this no definite compression deformity on the lateral view.   Prior Level of Function   Functional Level Prior Comment Independent in ADLs   Current Level of Function   Patient role/employment history F. Retired   Living environment House/townhome   Home/community accessibility 3 steps into house with railing   Current equipment-Gait/Locomotion Walker   Fall Risk Screen   Fall screen completed by PT   Have you fallen 2 or more times in the past year? No   Have you fallen and had an injury in the past year? Yes   Timed Up and Go score (seconds) 15   Is patient a fall risk? Yes;Department fall risk interventions implemented   Abuse Screen (yes response referral indicated)   Feels Unsafe at Home or Work/School no   Feels Threatened by Someone no   Does Anyone Try to Keep You From Having Contact with Others or Doing Things Outside Your Home? no   Physical Signs of Abuse Present no   Lumbar Spine/SI Objective Findings   Gait/Locomotion Minimal lumbar rotation. Left lateral shift.   Flexion ROM Fingertips to patella with pain in right low back   Extension ROM 5 degrees   Right Side Bending ROM Fingertips to mid-thigh with pain in right lumbar spine   Left Side Bending ROM Fingertips to mid-thigh with pain in right lumbar spine   Repeated Extension-Standing ROM No increase in discomfort noted   Repeated Flexion-Standing ROM Pain in right low back   Pelvic Screen Negative for SI provocation tests   Hip Screen Negative JOSE   Hip Flexion (L2) Strength 4/5 B   Hip Abduction Strength 4-/5 B   Hip Extension Strength 4-/5 B   Knee Extension (L3) Strength 4/5 B   Ankle Dorsiflexion (L4) Strength 4/5 B   Great Toe Extension (L5) Strength 4/5 B   Ankle Plantar Flexion (S1) Strength 4/5 B   Hamstring  Flexibility Moderately restricted B   SLR Negative B   Slump Test Negative B   Segmental Mobility Restricted L1-L3   Sensation Testing No deficits noted   Patellar Tendon Reflexes  1+ B   Palpation Hypertonicity of right lumbar paraspinals   Planned Therapy Interventions   Planned Therapy Interventions joint mobilization;manual therapy;neuromuscular re-education;ROM;strengthening;stretching   Planned Modality Interventions   Planned Modality Interventions Cryotherapy;Hot packs   Clinical Impression   Criteria for Skilled Therapeutic Interventions Met yes, treatment indicated   PT Diagnosis Right lumbar spine pain   Influenced by the following impairments Pain, Strength and flexibility deficits   Functional limitations due to impairments Difficulty dressing, cooking, standing   Clinical Presentation Stable/Uncomplicated   Clinical Presentation Rationale Several comorbidities impacting PT / 1 body system   Clinical Decision Making (Complexity) Low complexity   Therapy Frequency 1 time/week   Predicted Duration of Therapy Intervention (days/wks) 8 weeks   Risk & Benefits of therapy have been explained Yes   Patient, Family & other staff in agreement with plan of care Yes   Education Assessment   Preferred Learning Style Listening;Reading;Demonstration;Pictures/video   Barriers to Learning No barriers   ORTHO GOALS   PT Ortho Eval Goals 2;1;3;4   Ortho Goal 1   Goal Identifier HEP   Goal Description Pt will be independent in HEP in order to achieve and maintain long term treatment goals.   Target Date 04/25/21   Ortho Goal 2   Goal Identifier Changing sheets   Goal Description Pt will be able to change the sheets on her bed with a minimal increase in pain 1-2/10.   Target Date 05/20/21   Ortho Goal 3   Goal Identifier Dressing   Goal Description Pt will be able to get dressed with a minimal increase in pain 1-2/10.   Target Date 05/20/21   Ortho Goal 4   Goal Identifier Cooking   Goal Description Pt will be able to  stand for 20 minutes in order to cook a meal without having to sit due to pain.   Target Date 05/20/21   Total Evaluation Time   PT Eval, Low Complexity Minutes (56907) 30   Therapy Certification   Certification date from 03/25/21   Certification date to 05/20/21   Medical Diagnosis SI (sacroiliac) joint dysfunction     Fareed Villalta, PT, DPT

## 2021-03-25 NOTE — PROGRESS NOTES
[facility]          OUTPATIENT PHYSICAL THERAPY ORTHOPEDIC EVALUATION  PLAN OF TREATMENT FOR OUTPATIENT REHABILITATION  (COMPLETE FOR INITIAL CLAIMS ONLY)  Patient's Last Name, First Name, M.I.  YOB: 1945  Idalmis Abdul    Provider s Name:  [facility]   Medical Record No.  [id number]   Start of Care Date:  03/25/21   Onset Date:  12/18/20   Type:     _X__PT   ___OT   ___SLP Medical Diagnosis:  SI (sacroiliac) joint dysfunction     PT Diagnosis:  Right lumbar spine pain   Visits from SOC:  1      _________________________________________________________________________________  Plan of Treatment/Functional Goals:  joint mobilization, manual therapy, neuromuscular re-education, ROM, strengthening, stretching     Cryotherapy, Hot packs     Goals  Goal Identifier: HEP  Goal Description: Pt will be independent in HEP in order to achieve and maintain long term treatment goals.  Target Date: 04/25/21    Goal Identifier: Changing sheets  Goal Description: Pt will be able to change the sheets on her bed with a minimal increase in pain 1-2/10.  Target Date: 05/20/21    Goal Identifier: Dressing  Goal Description: Pt will be able to get dressed with a minimal increase in pain 1-2/10.  Target Date: 05/20/21    Goal Identifier: Cooking  Goal Description: Pt will be able to stand for 20 minutes in order to cook a meal without having to sit due to pain.  Target Date: 05/20/21      Therapy Frequency:  1 time/week  Predicted Duration of Therapy Intervention:  8 weeks    Ta Villalta, PT                 I CERTIFY THE NEED FOR THESE SERVICES FURNISHED UNDER        THIS PLAN OF TREATMENT AND WHILE UNDER MY CARE     (Physician co-signature of this document indicates review and certification of the therapy plan).                       Certification Date From:  03/25/21   Certification Date To:  05/20/21    Referring Provider:  Ale Ordaz  MD    Initial Assessment        See Epic Evaluation Start of Care Date: 03/25/21

## 2021-04-08 ENCOUNTER — TELEPHONE (OUTPATIENT)
Dept: ENDOCRINOLOGY | Facility: CLINIC | Age: 76
End: 2021-04-08

## 2021-04-08 NOTE — TELEPHONE ENCOUNTER
I spoke to Idalmis. She said that she is due for her TSH lab. I reviewed Dr Gallegos last dictation with her and then I looked at her future labs and told her that she has a standing order for her thyroid lab (every 6 months) that is not  and since her last lab was last September it is indeed time for her to schedule her lab and then again in 6 months. She will schedule her lab. Tabitha MCKEON Rn

## 2021-04-15 ENCOUNTER — HOSPITAL ENCOUNTER (OUTPATIENT)
Dept: PHYSICAL THERAPY | Facility: CLINIC | Age: 76
Setting detail: THERAPIES SERIES
End: 2021-04-15
Attending: FAMILY MEDICINE
Payer: COMMERCIAL

## 2021-04-15 DIAGNOSIS — E05.00 GRAVES' DISEASE: ICD-10-CM

## 2021-04-15 LAB — TSH SERPL DL<=0.005 MIU/L-ACNC: 0.66 MU/L (ref 0.4–4)

## 2021-04-15 PROCEDURE — 97140 MANUAL THERAPY 1/> REGIONS: CPT | Mod: GP | Performed by: PHYSICAL THERAPIST

## 2021-04-15 PROCEDURE — 84443 ASSAY THYROID STIM HORMONE: CPT | Performed by: INTERNAL MEDICINE

## 2021-04-15 PROCEDURE — 36415 COLL VENOUS BLD VENIPUNCTURE: CPT | Performed by: INTERNAL MEDICINE

## 2021-04-15 PROCEDURE — 97110 THERAPEUTIC EXERCISES: CPT | Mod: GP | Performed by: PHYSICAL THERAPIST

## 2021-04-15 NOTE — LETTER
April 20, 2021      Idalmis Abdul     [facility] [address]        Dear ,    We are writing to inform you of your test results.    TSH normal.   No change to methimazole.   Labs in 6 months and see me after.     Resulted Orders   TSH with free T4 reflex   Result Value Ref Range    TSH 0.66 0.40 - 4.00 mU/L       If you have any questions or concerns, please call the clinic at the number listed above.       Sincerely,      Federico Yang MD

## 2021-04-16 ENCOUNTER — IMMUNIZATION (OUTPATIENT)
Dept: FAMILY MEDICINE | Facility: CLINIC | Age: 76
End: 2021-04-16
Attending: FAMILY MEDICINE
Payer: COMMERCIAL

## 2021-04-16 DIAGNOSIS — E05.00 GRAVES' DISEASE: Primary | ICD-10-CM

## 2021-04-16 PROCEDURE — 0012A PR COVID VAC MODERNA 100 MCG/0.5 ML IM: CPT

## 2021-04-16 PROCEDURE — 91301 PR COVID VAC MODERNA 100 MCG/0.5 ML IM: CPT

## 2021-04-16 NOTE — RESULT ENCOUNTER NOTE
Called patient and left VM, requested call back to the care team. Clinic number provided.     Yael BROWN MA

## 2021-04-22 ENCOUNTER — HOSPITAL ENCOUNTER (OUTPATIENT)
Dept: PHYSICAL THERAPY | Facility: CLINIC | Age: 76
Setting detail: THERAPIES SERIES
End: 2021-04-22
Attending: FAMILY MEDICINE
Payer: COMMERCIAL

## 2021-04-22 PROCEDURE — 97110 THERAPEUTIC EXERCISES: CPT | Mod: GP | Performed by: PHYSICAL THERAPIST

## 2021-04-22 PROCEDURE — 97140 MANUAL THERAPY 1/> REGIONS: CPT | Mod: GP | Performed by: PHYSICAL THERAPIST

## 2021-05-05 ENCOUNTER — HOSPITAL ENCOUNTER (OUTPATIENT)
Dept: PHYSICAL THERAPY | Facility: CLINIC | Age: 76
Setting detail: THERAPIES SERIES
End: 2021-05-05
Attending: FAMILY MEDICINE
Payer: COMMERCIAL

## 2021-05-05 PROCEDURE — 97110 THERAPEUTIC EXERCISES: CPT | Mod: GP | Performed by: PHYSICAL THERAPIST

## 2021-05-05 PROCEDURE — 97140 MANUAL THERAPY 1/> REGIONS: CPT | Mod: GP | Performed by: PHYSICAL THERAPIST

## 2021-05-06 ENCOUNTER — HOSPITAL ENCOUNTER (OUTPATIENT)
Dept: RESPIRATORY THERAPY | Facility: CLINIC | Age: 76
Discharge: HOME | End: 2021-05-06
Attending: INTERNAL MEDICINE | Admitting: INTERNAL MEDICINE
Payer: COMMERCIAL

## 2021-05-06 DIAGNOSIS — R06.09 DOE (DYSPNEA ON EXERTION): ICD-10-CM

## 2021-05-06 DIAGNOSIS — J41.8 MIXED SIMPLE AND MUCOPURULENT CHRONIC BRONCHITIS (H): ICD-10-CM

## 2021-05-06 DIAGNOSIS — C49.A0 MALIGNANT GASTROINTESTINAL STROMAL TUMOR, UNSPECIFIED SITE (H): ICD-10-CM

## 2021-05-06 LAB
ALBUMIN SERPL-MCNC: 3.7 G/DL (ref 3.4–5)
ALP SERPL-CCNC: 57 U/L (ref 40–150)
ALT SERPL W P-5'-P-CCNC: 24 U/L (ref 0–50)
ANION GAP SERPL CALCULATED.3IONS-SCNC: 7 MMOL/L (ref 3–14)
AST SERPL W P-5'-P-CCNC: 26 U/L (ref 0–45)
BASOPHILS # BLD AUTO: 0 10E9/L (ref 0–0.2)
BASOPHILS NFR BLD AUTO: 0.6 %
BILIRUB SERPL-MCNC: 0.3 MG/DL (ref 0.2–1.3)
BUN SERPL-MCNC: 22 MG/DL (ref 7–30)
CALCIUM SERPL-MCNC: 8.7 MG/DL (ref 8.5–10.1)
CHLORIDE SERPL-SCNC: 97 MMOL/L (ref 94–109)
CO2 SERPL-SCNC: 27 MMOL/L (ref 20–32)
CREAT SERPL-MCNC: 1.15 MG/DL (ref 0.52–1.04)
DIFFERENTIAL METHOD BLD: ABNORMAL
EOSINOPHIL # BLD AUTO: 0.3 10E9/L (ref 0–0.7)
EOSINOPHIL NFR BLD AUTO: 3.7 %
ERYTHROCYTE [DISTWIDTH] IN BLOOD BY AUTOMATED COUNT: 14.3 % (ref 10–15)
GFR SERPL CREATININE-BSD FRML MDRD: 46 ML/MIN/{1.73_M2}
GLUCOSE SERPL-MCNC: 118 MG/DL (ref 70–99)
HCT VFR BLD AUTO: 30.8 % (ref 35–47)
HGB BLD-MCNC: 10 G/DL (ref 11.7–15.7)
LYMPHOCYTES # BLD AUTO: 1.9 10E9/L (ref 0.8–5.3)
LYMPHOCYTES NFR BLD AUTO: 25.8 %
MCH RBC QN AUTO: 29.7 PG (ref 26.5–33)
MCHC RBC AUTO-ENTMCNC: 32.5 G/DL (ref 31.5–36.5)
MCV RBC AUTO: 91 FL (ref 78–100)
MONOCYTES # BLD AUTO: 1 10E9/L (ref 0–1.3)
MONOCYTES NFR BLD AUTO: 13.1 %
NEUTROPHILS # BLD AUTO: 4.1 10E9/L (ref 1.6–8.3)
NEUTROPHILS NFR BLD AUTO: 56.8 %
PLATELET # BLD AUTO: 198 10E9/L (ref 150–450)
POTASSIUM SERPL-SCNC: 3.5 MMOL/L (ref 3.4–5.3)
PROT SERPL-MCNC: 7 G/DL (ref 6.8–8.8)
RBC # BLD AUTO: 3.37 10E12/L (ref 3.8–5.2)
SODIUM SERPL-SCNC: 131 MMOL/L (ref 133–144)
WBC # BLD AUTO: 7.3 10E9/L (ref 4–11)

## 2021-05-06 PROCEDURE — 250N000009 HC RX 250: Performed by: FAMILY MEDICINE

## 2021-05-06 PROCEDURE — 94060 EVALUATION OF WHEEZING: CPT | Mod: 26 | Performed by: INTERNAL MEDICINE

## 2021-05-06 PROCEDURE — 94729 DIFFUSING CAPACITY: CPT

## 2021-05-06 PROCEDURE — 36415 COLL VENOUS BLD VENIPUNCTURE: CPT | Performed by: INTERNAL MEDICINE

## 2021-05-06 PROCEDURE — 94060 EVALUATION OF WHEEZING: CPT

## 2021-05-06 PROCEDURE — 94729 DIFFUSING CAPACITY: CPT | Mod: 26 | Performed by: INTERNAL MEDICINE

## 2021-05-06 PROCEDURE — 80053 COMPREHEN METABOLIC PANEL: CPT | Performed by: INTERNAL MEDICINE

## 2021-05-06 PROCEDURE — 94726 PLETHYSMOGRAPHY LUNG VOLUMES: CPT | Mod: 26 | Performed by: INTERNAL MEDICINE

## 2021-05-06 PROCEDURE — 94726 PLETHYSMOGRAPHY LUNG VOLUMES: CPT

## 2021-05-06 PROCEDURE — 85025 COMPLETE CBC W/AUTO DIFF WBC: CPT | Performed by: INTERNAL MEDICINE

## 2021-05-06 RX ORDER — ALBUTEROL SULFATE 0.83 MG/ML
2.5 SOLUTION RESPIRATORY (INHALATION) ONCE
Status: COMPLETED | OUTPATIENT
Start: 2021-05-06 | End: 2021-05-06

## 2021-05-06 RX ADMIN — ALBUTEROL SULFATE 2.5 MG: 2.5 SOLUTION RESPIRATORY (INHALATION) at 13:20

## 2021-05-07 ENCOUNTER — HOSPITAL ENCOUNTER (OUTPATIENT)
Dept: CT IMAGING | Facility: CLINIC | Age: 76
Discharge: HOME | End: 2021-05-07
Attending: INTERNAL MEDICINE | Admitting: INTERNAL MEDICINE
Payer: COMMERCIAL

## 2021-05-07 DIAGNOSIS — C49.A0 MALIGNANT GASTROINTESTINAL STROMAL TUMOR, UNSPECIFIED SITE (H): ICD-10-CM

## 2021-05-07 PROCEDURE — 250N000011 HC RX IP 250 OP 636: Performed by: RADIOLOGY

## 2021-05-07 PROCEDURE — 250N000009 HC RX 250: Performed by: RADIOLOGY

## 2021-05-07 PROCEDURE — 74177 CT ABD & PELVIS W/CONTRAST: CPT

## 2021-05-07 RX ORDER — IOPAMIDOL 755 MG/ML
76 INJECTION, SOLUTION INTRAVASCULAR ONCE
Status: COMPLETED | OUTPATIENT
Start: 2021-05-07 | End: 2021-05-07

## 2021-05-07 RX ADMIN — SODIUM CHLORIDE 59 ML: 9 INJECTION, SOLUTION INTRAVENOUS at 12:00

## 2021-05-07 RX ADMIN — IOPAMIDOL 76 ML: 755 INJECTION, SOLUTION INTRAVENOUS at 12:02

## 2021-05-10 ENCOUNTER — VIRTUAL VISIT (OUTPATIENT)
Dept: ONCOLOGY | Facility: CLINIC | Age: 76
End: 2021-05-10
Attending: INTERNAL MEDICINE
Payer: COMMERCIAL

## 2021-05-10 DIAGNOSIS — C49.A0 MALIGNANT GASTROINTESTINAL STROMAL TUMOR, UNSPECIFIED SITE (H): Primary | ICD-10-CM

## 2021-05-10 PROCEDURE — 99215 OFFICE O/P EST HI 40 MIN: CPT | Mod: 95 | Performed by: NURSE PRACTITIONER

## 2021-05-10 PROCEDURE — 999N001193 HC VIDEO/TELEPHONE VISIT; NO CHARGE

## 2021-05-10 NOTE — LETTER
5/10/2021         RE: Idalmis Abdul  [address]  [facility] [address]        Dear Colleague,    Thank you for referring your patient, Idalmis Abdul, to the [facility]. Please see a copy of my visit note below.    Reason for Visit: seen in f/u of GIST and lung cancer    Oncology HPI:   Idalmis Abdul is a 75 year old woman with a PMH of Grave's disease, hypertention, high cholesterol, CAD s/p CABG. She has a history of resected stage I lung cancer, dx in 2010, treated with VATS wedge resection. Pathology showed bronchoalveolar carcinoma.     She was initially diagnosed with GIST in 09/2003. She was treated with incomplete resection followed by re-resection in 10/2003. She was on adjuvant imatinib as part of a clinical trial from 11/2003 though 11/2004. In 11/2007, she recurred and was treated with re-resection. She has been on Gleevec since that time without recurrence.    Interval history: Idalmis is generally doing well. Has had some progression of shortness of breath and wheezing. Has followed with her PCP and started spiriva. Was told she has COPD.  Denies CP, fevers/chills.  Has not had recent infections.  Completed her COVID vaccinations.  Has had some chronic back pain, no new changes or new bone aches/pains. Bowel/bladder function is wnl. She had not followed through with recommendations for colonoscopy but is not having regular rectal bleeding. No abdominal pain, bloating, N/V, reflux. No headaches, vision changes, focal neurologic changes.    Current Outpatient Medications   Medication Sig Dispense Refill     amLODIPine (NORVASC) 5 MG tablet Take 1 tablet (5 mg) by mouth daily 90 tablet 1     aspirin EC 81 MG EC tablet Take 1 tablet by mouth daily. 30 tablet 2     Blood Pressure Monitoring (ADULT BLOOD PRESSURE CUFF LG) KIT 1 Device 2 times daily 1 kit 1     calcium-vitamin D-vitamin K (VIACTIV) 500-500-40 MG-UNT-MCG CHEW Take 2 tablets by mouth daily        hydrochlorothiazide (HYDRODIURIL) 25 MG tablet Take 1 tablet (25 mg) by mouth daily 90 tablet 3     hydrocortisone, Perianal, (ANUSOL-HC) 2.5 % cream APPLY TOPICALLY TO EXTERNAL HEMORRHOIDS ONE TO TWO TIMES DAILY AS NEEDED 30 g 0     imatinib (GLEEVEC) 400 MG tablet Take 1 tablet (400 mg) by mouth daily Take with a meal and a large glass of water. 30 tablet 11     lisinopril (ZESTRIL) 40 MG tablet TAKE ONE TABLET BY MOUTH DAILY 90 tablet 3     melatonin 5 MG tablet Take 5 mg by mouth nightly as needed for sleep       methimazole (TAPAZOLE) 5 MG tablet Take 0.5 tablets (2.5 mg) by mouth daily 45 tablet 1     metoprolol tartrate (LOPRESSOR) 100 MG tablet TAKE ONE TABLET BY MOUTH TWICE A  tablet 1     order for DME Walker 1 Device 0     polyethylene glycol (MIRALAX/GLYCOLAX) powder Take 17 g by mouth daily       rosuvastatin (CRESTOR) 5 MG tablet Taking every other day. 45 tablet 3     tiotropium (SPIRIVA) 18 MCG inhaled capsule Inhale 1 capsule (18 mcg) into the lungs daily 30 capsule 11          Allergies   Allergen Reactions     Atorvastatin Cramps and Unknown     cramps            Last menstrual period 01/01/1992, not currently breastfeeding.  Wt Readings from Last 4 Encounters:   03/19/21 69.9 kg (154 lb)   08/04/20 69.5 kg (153 lb 3.2 oz)   07/28/20 69.4 kg (153 lb)   06/29/20 69.4 kg (153 lb)     Speech is clear. Alert, pleasant    Labs:   Results for PIEDAD FAN [facility] (MRN [id number]) as of 5/12/2021 14:47   Ref. Range 5/6/2021 13:54   Sodium Latest Ref Range: 133 - 144 mmol/L 131 (L)   Potassium Latest Ref Range: 3.4 - 5.3 mmol/L 3.5   Chloride Latest Ref Range: 94 - 109 mmol/L 97   Carbon Dioxide Latest Ref Range: 20 - 32 mmol/L 27   Urea Nitrogen Latest Ref Range: 7 - 30 mg/dL 22   Creatinine Latest Ref Range: 0.52 - 1.04 mg/dL 1.15 (H)   GFR Estimate Latest Ref Range: >60 mL/min/1.73_m2 46 (L)   GFR Estimate If Black Latest Ref Range: >60 mL/min/1.73_m2 53 (L)   Calcium Latest Ref Range: 8.5 - 10.1  mg/dL 8.7   Anion Gap Latest Ref Range: 3 - 14 mmol/L 7   Albumin Latest Ref Range: 3.4 - 5.0 g/dL 3.7   Protein Total Latest Ref Range: 6.8 - 8.8 g/dL 7.0   Bilirubin Total Latest Ref Range: 0.2 - 1.3 mg/dL 0.3   Alkaline Phosphatase Latest Ref Range: 40 - 150 U/L 57   ALT Latest Ref Range: 0 - 50 U/L 24   AST Latest Ref Range: 0 - 45 U/L 26   Glucose Latest Ref Range: 70 - 99 mg/dL 118 (H)   WBC Latest Ref Range: 4.0 - 11.0 10e9/L 7.3   Hemoglobin Latest Ref Range: 11.7 - 15.7 g/dL 10.0 (L)   Hematocrit Latest Ref Range: 35.0 - 47.0 % 30.8 (L)   Platelet Count Latest Ref Range: 150 - 450 10e9/L 198   RBC Count Latest Ref Range: 3.8 - 5.2 10e12/L 3.37 (L)   MCV Latest Ref Range: 78 - 100 fl 91   MCH Latest Ref Range: 26.5 - 33.0 pg 29.7   MCHC Latest Ref Range: 31.5 - 36.5 g/dL 32.5   RDW Latest Ref Range: 10.0 - 15.0 % 14.3   Diff Method Unknown Automated Method   % Neutrophils Latest Units: % 56.8   % Lymphocytes Latest Units: % 25.8   % Monocytes Latest Units: % 13.1   % Eosinophils Latest Units: % 3.7   % Basophils Latest Units: % 0.6   Absolute Neutrophil Latest Ref Range: 1.6 - 8.3 10e9/L 4.1   Absolute Lymphocytes Latest Ref Range: 0.8 - 5.3 10e9/L 1.9   Absolute Monocytes Latest Ref Range: 0.0 - 1.3 10e9/L 1.0   Absolute Eosinophils Latest Ref Range: 0.0 - 0.7 10e9/L 0.3   Absolute Basophils Latest Ref Range: 0.0 - 0.2 10e9/L 0.0       Imaging: CT CHEST/ABDOMEN/PELVIS W CONTRAST 5/7/2021 12:22 PM     HISTORY: Malignant gastrointestinal stromal tumor. Follow-up.     CONTRAST:  76 ml's isovue.     TECHNIQUE: CT of the chest, abdomen, and pelvis is performed with IV  contrast.     Routine evaluated structures in the chest include the lungs,  mediastinal structures, pleura, and chest wall.     Routine assessed structures in the abdomen include the liver, spleen,  pancreas, adrenal glands, and kidneys. Also assessed are the  retroperitoneum, gastrointestinal tract, and the abdominal wall.     Intrapelvic anatomy  is also assessed.     Radiation dose for this scan is reduced using automated exposure  control, adjustment of the mA and/or kV according to patient size, or  iterative reconstruction technique.     COMPARISON: 11/6/2020.     FINDINGS:     Chest: Centrilobular emphysema is present. Postop change in the right  lung is again seen. A 2.5 cm nodule at the posterior/inferior margin  of the right lobe of the thyroid gland has been stable from several  prior studies, supportive evidence for a benign entity.     Abdomen: Chronic intrahepatic and extrahepatic biliary prominence,  with no obstructing lesion demonstrated, continues to be stable. There  is moderate stool in the colon. There is some prominence to the wall  of the transverse colon. This may relate to nondistention. There is no  pericolonic inflammatory change to support colitis.     Pelvis: A 3.1 cm cystic lesion in the left ovary shows no significant  change dating back to at least 5/1/2020. There is no free pelvic  fluid.                                                                      IMPRESSION:  No evidence of metastatic or recurrent neoplasm.     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 There is no evidence of progression/recurrence. She has no clinical symptoms suggestive of recurrence. Recommended she continue Gleevec and return in 6 months with a CT CAP and labs. She was in agreement with the plan.     Hx of stage 1 bronchoalveolar lung cancer, 2010, s/p VATS wedge resection  -no recurrence      40 minutes spent on the date of the encounter doing chart review, review of test results, interpretation of tests, patient visit and documentation       Idalmis is a 75 year old who is being evaluated via a billable telephone visit.      What phone number would you like to be contacted at? [phone]  How would you like to obtain your AVS? Mail a copy  Phone call  duration: 30 minutes    Agnes Olivo CMA on 5/10/2021 at 10:27 AM        Again, thank you for allowing me to participate in the care of your patient.        Sincerely,        JAIME Bourgeois CNP

## 2021-05-10 NOTE — PROGRESS NOTES
Idalmis is a 75 year old who is being evaluated via a billable telephone visit.      What phone number would you like to be contacted at? [phone]  How would you like to obtain your AVS? Mail a copy  Phone call duration: 30 minutes    Agnes Olivo CMA on 5/10/2021 at 10:27 AM

## 2021-05-10 NOTE — PROGRESS NOTES
Reason for Visit: seen in f/u of GIST and lung cancer    Oncology HPI:   Idalmis Abdul is a 75 year old woman with a PMH of Grave's disease, hypertention, high cholesterol, CAD s/p CABG. She has a history of resected stage I lung cancer, dx in 2010, treated with VATS wedge resection. Pathology showed bronchoalveolar carcinoma.     She was initially diagnosed with GIST in 09/2003. She was treated with incomplete resection followed by re-resection in 10/2003. She was on adjuvant imatinib as part of a clinical trial from 11/2003 though 11/2004. In 11/2007, she recurred and was treated with re-resection. She has been on Gleevec since that time without recurrence.    Interval history: Idalmis is generally doing well. Has had some progression of shortness of breath and wheezing. Has followed with her PCP and started spiriva. Was told she has COPD.  Denies CP, fevers/chills.  Has not had recent infections.  Completed her COVID vaccinations.  Has had some chronic back pain, no new changes or new bone aches/pains. Bowel/bladder function is wnl. She had not followed through with recommendations for colonoscopy but is not having regular rectal bleeding. No abdominal pain, bloating, N/V, reflux. No headaches, vision changes, focal neurologic changes.    Current Outpatient Medications   Medication Sig Dispense Refill     amLODIPine (NORVASC) 5 MG tablet Take 1 tablet (5 mg) by mouth daily 90 tablet 1     aspirin EC 81 MG EC tablet Take 1 tablet by mouth daily. 30 tablet 2     Blood Pressure Monitoring (ADULT BLOOD PRESSURE CUFF LG) KIT 1 Device 2 times daily 1 kit 1     calcium-vitamin D-vitamin K (VIACTIV) 500-500-40 MG-UNT-MCG CHEW Take 2 tablets by mouth daily       hydrochlorothiazide (HYDRODIURIL) 25 MG tablet Take 1 tablet (25 mg) by mouth daily 90 tablet 3     hydrocortisone, Perianal, (ANUSOL-HC) 2.5 % cream APPLY TOPICALLY TO EXTERNAL HEMORRHOIDS ONE TO TWO TIMES DAILY AS NEEDED 30 g 0     imatinib (GLEEVEC) 400 MG  tablet Take 1 tablet (400 mg) by mouth daily Take with a meal and a large glass of water. 30 tablet 11     lisinopril (ZESTRIL) 40 MG tablet TAKE ONE TABLET BY MOUTH DAILY 90 tablet 3     melatonin 5 MG tablet Take 5 mg by mouth nightly as needed for sleep       methimazole (TAPAZOLE) 5 MG tablet Take 0.5 tablets (2.5 mg) by mouth daily 45 tablet 1     metoprolol tartrate (LOPRESSOR) 100 MG tablet TAKE ONE TABLET BY MOUTH TWICE A  tablet 1     order for DME Walker 1 Device 0     polyethylene glycol (MIRALAX/GLYCOLAX) powder Take 17 g by mouth daily       rosuvastatin (CRESTOR) 5 MG tablet Taking every other day. 45 tablet 3     tiotropium (SPIRIVA) 18 MCG inhaled capsule Inhale 1 capsule (18 mcg) into the lungs daily 30 capsule 11          Allergies   Allergen Reactions     Atorvastatin Cramps and Unknown     cramps            Last menstrual period 01/01/1992, not currently breastfeeding.  Wt Readings from Last 4 Encounters:   03/19/21 69.9 kg (154 lb)   08/04/20 69.5 kg (153 lb 3.2 oz)   07/28/20 69.4 kg (153 lb)   06/29/20 69.4 kg (153 lb)     Speech is clear. Alert, pleasant    Labs:   Results for PIEDAD FAN (MRN [id number]) as of 5/12/2021 14:47   Ref. Range 5/6/2021 13:54   Sodium Latest Ref Range: 133 - 144 mmol/L 131 (L)   Potassium Latest Ref Range: 3.4 - 5.3 mmol/L 3.5   Chloride Latest Ref Range: 94 - 109 mmol/L 97   Carbon Dioxide Latest Ref Range: 20 - 32 mmol/L 27   Urea Nitrogen Latest Ref Range: 7 - 30 mg/dL 22   Creatinine Latest Ref Range: 0.52 - 1.04 mg/dL 1.15 (H)   GFR Estimate Latest Ref Range: >60 mL/min/1.73_m2 46 (L)   GFR Estimate If Black Latest Ref Range: >60 mL/min/1.73_m2 53 (L)   Calcium Latest Ref Range: 8.5 - 10.1 mg/dL 8.7   Anion Gap Latest Ref Range: 3 - 14 mmol/L 7   Albumin Latest Ref Range: 3.4 - 5.0 g/dL 3.7   Protein Total Latest Ref Range: 6.8 - 8.8 g/dL 7.0   Bilirubin Total Latest Ref Range: 0.2 - 1.3 mg/dL 0.3   Alkaline Phosphatase Latest Ref Range: 40 -  150 U/L 57   ALT Latest Ref Range: 0 - 50 U/L 24   AST Latest Ref Range: 0 - 45 U/L 26   Glucose Latest Ref Range: 70 - 99 mg/dL 118 (H)   WBC Latest Ref Range: 4.0 - 11.0 10e9/L 7.3   Hemoglobin Latest Ref Range: 11.7 - 15.7 g/dL 10.0 (L)   Hematocrit Latest Ref Range: 35.0 - 47.0 % 30.8 (L)   Platelet Count Latest Ref Range: 150 - 450 10e9/L 198   RBC Count Latest Ref Range: 3.8 - 5.2 10e12/L 3.37 (L)   MCV Latest Ref Range: 78 - 100 fl 91   MCH Latest Ref Range: 26.5 - 33.0 pg 29.7   MCHC Latest Ref Range: 31.5 - 36.5 g/dL 32.5   RDW Latest Ref Range: 10.0 - 15.0 % 14.3   Diff Method Unknown Automated Method   % Neutrophils Latest Units: % 56.8   % Lymphocytes Latest Units: % 25.8   % Monocytes Latest Units: % 13.1   % Eosinophils Latest Units: % 3.7   % Basophils Latest Units: % 0.6   Absolute Neutrophil Latest Ref Range: 1.6 - 8.3 10e9/L 4.1   Absolute Lymphocytes Latest Ref Range: 0.8 - 5.3 10e9/L 1.9   Absolute Monocytes Latest Ref Range: 0.0 - 1.3 10e9/L 1.0   Absolute Eosinophils Latest Ref Range: 0.0 - 0.7 10e9/L 0.3   Absolute Basophils Latest Ref Range: 0.0 - 0.2 10e9/L 0.0       Imaging: CT CHEST/ABDOMEN/PELVIS W CONTRAST 5/7/2021 12:22 PM     HISTORY: Malignant gastrointestinal stromal tumor. Follow-up.     CONTRAST:  76 ml's isovue.     TECHNIQUE: CT of the chest, abdomen, and pelvis is performed with IV  contrast.     Routine evaluated structures in the chest include the lungs,  mediastinal structures, pleura, and chest wall.     Routine assessed structures in the abdomen include the liver, spleen,  pancreas, adrenal glands, and kidneys. Also assessed are the  retroperitoneum, gastrointestinal tract, and the abdominal wall.     Intrapelvic anatomy is also assessed.     Radiation dose for this scan is reduced using automated exposure  control, adjustment of the mA and/or kV according to patient size, or  iterative reconstruction technique.     COMPARISON: 11/6/2020.     FINDINGS:     Chest:  Centrilobular emphysema is present. Postop change in the right  lung is again seen. A 2.5 cm nodule at the posterior/inferior margin  of the right lobe of the thyroid gland has been stable from several  prior studies, supportive evidence for a benign entity.     Abdomen: Chronic intrahepatic and extrahepatic biliary prominence,  with no obstructing lesion demonstrated, continues to be stable. There  is moderate stool in the colon. There is some prominence to the wall  of the transverse colon. This may relate to nondistention. There is no  pericolonic inflammatory change to support colitis.     Pelvis: A 3.1 cm cystic lesion in the left ovary shows no significant  change dating back to at least 5/1/2020. There is no free pelvic  fluid.                                                                      IMPRESSION:  No evidence of metastatic or recurrent neoplasm.     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 There is no evidence of progression/recurrence. She has no clinical symptoms suggestive of recurrence. Recommended she continue Gleevec and return in 6 months with a CT CAP and labs. She was in agreement with the plan.     Hx of stage 1 bronchoalveolar lung cancer, 2010, s/p VATS wedge resection  -no recurrence      40 minutes spent on the date of the encounter doing chart review, review of test results, interpretation of tests, patient visit and documentation

## 2021-05-11 LAB
DLCOCOR-%PRED-PRE: 55 %
DLCOCOR-PRE: 9.96 ML/MIN/MMHG
DLCOUNC-%PRED-PRE: 49 %
DLCOUNC-PRE: 8.73 ML/MIN/MMHG
DLCOUNC-PRED: 17.78 ML/MIN/MMHG
ERV-%PRED-PRE: 192 %
ERV-PRE: 0.86 L
ERV-PRED: 0.45 L
EXPTIME-PRE: 6.47 SEC
FEF2575-%PRED-POST: 52 %
FEF2575-%PRED-PRE: 43 %
FEF2575-POST: 0.84 L/SEC
FEF2575-PRE: 0.7 L/SEC
FEF2575-PRED: 1.6 L/SEC
FEFMAX-%PRED-PRE: 90 %
FEFMAX-PRE: 4.42 L/SEC
FEFMAX-PRED: 4.86 L/SEC
FEV1-%PRED-PRE: 77 %
FEV1-PRE: 1.47 L
FEV1FEV6-PRE: 62 %
FEV1FEV6-PRED: 78 %
FEV1FVC-PRE: 62 %
FEV1FVC-PRED: 78 %
FEV1SVC-PRE: 64 %
FEV1SVC-PRED: 71 %
FIFMAX-PRE: 3.17 L/SEC
FRCPLETH-%PRED-PRE: 102 %
FRCPLETH-PRE: 2.68 L
FRCPLETH-PRED: 2.6 L
FVC-%PRED-PRE: 96 %
FVC-PRE: 2.39 L
FVC-PRED: 2.48 L
GAW-%PRED-PRE: 54 %
GAW-PRE: 0.57 L/S/CMH2O
GAW-PRED: 1.03 L/S/CMH2O
IC-%PRED-PRE: 63 %
IC-PRE: 1.43 L
IC-PRED: 2.23 L
RVPLETH-%PRED-PRE: 87 %
RVPLETH-PRE: 1.8 L
RVPLETH-PRED: 2.07 L
SGAW-%PRED-PRE: 200 %
SGAW-PRE: 0.2 1/CMH2O*S
SGAW-PRED: 0.1 1/CMH2O*S
SRAW-%PRED-PRE: 103 %
SRAW-PRE: 4.94 CMH2O*S
SRAW-PRED: 4.76 CMH2O*S
TLCPLETH-%PRED-PRE: 89 %
TLCPLETH-PRE: 4.11 L
TLCPLETH-PRED: 4.6 L
VA-%PRED-PRE: 84 %
VA-PRE: 3.62 L
VC-%PRED-PRE: 86 %
VC-PRE: 2.31 L
VC-PRED: 2.68 L

## 2021-05-12 ENCOUNTER — OFFICE VISIT (OUTPATIENT)
Dept: CARDIOLOGY | Facility: CLINIC | Age: 76
End: 2021-05-12
Payer: COMMERCIAL

## 2021-05-12 VITALS
WEIGHT: 151 LBS | OXYGEN SATURATION: 97 % | DIASTOLIC BLOOD PRESSURE: 75 MMHG | SYSTOLIC BLOOD PRESSURE: 118 MMHG | HEART RATE: 70 BPM | BODY MASS INDEX: 27.62 KG/M2

## 2021-05-12 DIAGNOSIS — I25.10 CORONARY ARTERY DISEASE INVOLVING NATIVE CORONARY ARTERY OF NATIVE HEART WITHOUT ANGINA PECTORIS: Primary | ICD-10-CM

## 2021-05-12 DIAGNOSIS — I10 ESSENTIAL HYPERTENSION: ICD-10-CM

## 2021-05-12 PROCEDURE — 99214 OFFICE O/P EST MOD 30 MIN: CPT | Performed by: INTERNAL MEDICINE

## 2021-05-12 NOTE — LETTER
2021    Ale Ordaz MD  [address]  [facility] [address]    RE: Idalmis Abdul       Dear Colleague,    I had the pleasure of seeing Idalmis Abdul in the [facility].    HPI and Plan:      Ms. Idalmis Abdul is 75 years and is followed for coronary artery disease and prior coronary bypass surgery. I had previously seen her , Atif, who  12 years ago from colon cancer. I last saw her in 2018 and she noted that Dr. Ordaz suggested she have follow-up.     She is feeling well and denies any chest, neck, arm or back discomfort.  Ms. Abdul has a history of coronary artery disease and prior coronary artery bypass graft surgery.  In , she underwent bypass surgery at [facility].  She had evidence of multivessel disease on angiography.  A LIMA was placed to the LAD, a vein graft was placed to the circumflex marginal branch, a vein graft was placed to the ramus branch and a vein graft was placed to the right PDA.  Postoperatively, she experienced a pericardial hematoma and tamponade and that was treated with a pericardial window.  She had postoperative atrial fibrillation and was on anticoagulation, but she has not had any evidence of a recurrence.  On anticoagulation, she experienced an upper GI bleed and underwent clipping of a duodenal ulcer.      She suffered from Graves' disease and experienced the eye complications.  She has undergone bilateral strabismus surgery and diplopia has resolved.  She has undergone cataract surgery. She is followed by [facility] for a history of gastrointestinal stromal tumor and remains on Gleevec.  She has a remote history of Stage I lung cancer (cured) diagnosed on her surveillance CT scans.  He continues on metoprolol tartrate 100 mg twice daily and lisinopril 40 mg daily as well as rosuvastatin 5 mg daily and low-dose aspirin.     She has not had any  chest, neck, arm or upper back discomfort.  Fell on the ice this winter and has lower back discomfort.  Her lipids are under excellent control.  She was seen by NITZA Brooks in 2019 and was noted to be hypertensive.  Her blood pressure is now normal. She continues on good risk factor intervention including low dose aspirin, beta blockade, ACE inhibition and statin therapy.  She is also on amlodipine for additional hypertensive therapy.  She has dyspnea with exertion which is not new.  It does not stop her from activities.  She smoked for 50 years and has been told that she has COPD; chest CT has demonstrated emphysematous changes.     Exam:     This is a woman in no apparent distress.   On recheck, BP was 118/75 mmHg.HR is regular, and respiratory rate 16-18 per minute.   Chest is clear to auscultation with mild decrease in BS diffusely.   On cardiac auscultation, there was an S1 and S2 without extra sounds and 1/6 early systolic murmur.  Rhythm was regular.      Assessment/Plan:    Ms. Abdul has a history of coronary artery disease and prior coronary artery bypass graft surgery.  She has normal left ventricular systolic performance.  She is asymptomatic and on excellent risk factor intervention.  Her last stress imaging study was 8 to 9 years ago and she has previously declined stress testing as Cardiolite made her ill (she did not want to try Lexiscan).  I recommended a dobutamine stress echocardiogram which she did agree after an explanation.  Toprol should be decreased to 50 mg twice daily on the day prior to the stress study.  I would recommend she have the first appointment of the day and hold her first metoprolol dose until after the test is completed.  Metoprolol should be continued at 100 mg twice daily on the day of her study.    I have recommended a return at 1 year unless the echo is abnormal or she has earlier symptoms.  I have arranged for her to follow-up with Dr. Carvajal.        Susanne  Placed This Encounter   Procedures     Follow-Up with Cardiologist     Dobutamine Stress Echocardiogram       No orders of the defined types were placed in this encounter.      Medications Discontinued During This Encounter   Medication Reason     order for DME          Encounter Diagnoses   Name Primary?     Essential hypertension      Coronary artery disease involving native coronary artery of native heart without angina pectoris Yes       CURRENT MEDICATIONS:  Current Outpatient Medications   Medication Sig Dispense Refill     amLODIPine (NORVASC) 5 MG tablet Take 1 tablet (5 mg) by mouth daily 90 tablet 1     aspirin EC 81 MG EC tablet Take 1 tablet by mouth daily. 30 tablet 2     Blood Pressure Monitoring (ADULT BLOOD PRESSURE CUFF LG) KIT 1 Device 2 times daily 1 kit 1     calcium-vitamin D-vitamin K (VIACTIV) 500-500-40 MG-UNT-MCG CHEW Take 2 tablets by mouth daily       hydrochlorothiazide (HYDRODIURIL) 25 MG tablet Take 1 tablet (25 mg) by mouth daily 90 tablet 3     imatinib (GLEEVEC) 400 MG tablet Take 1 tablet (400 mg) by mouth daily Take with a meal and a large glass of water. 30 tablet 11     lisinopril (ZESTRIL) 40 MG tablet TAKE ONE TABLET BY MOUTH DAILY 90 tablet 3     melatonin 5 MG tablet Take 5 mg by mouth nightly as needed for sleep       methimazole (TAPAZOLE) 5 MG tablet Take 0.5 tablets (2.5 mg) by mouth daily 45 tablet 1     metoprolol tartrate (LOPRESSOR) 100 MG tablet TAKE ONE TABLET BY MOUTH TWICE A  tablet 1     polyethylene glycol (MIRALAX/GLYCOLAX) powder Take 17 g by mouth daily       rosuvastatin (CRESTOR) 5 MG tablet Taking every other day. 45 tablet 3     tiotropium (SPIRIVA) 18 MCG inhaled capsule Inhale 1 capsule (18 mcg) into the lungs daily 30 capsule 11     hydrocortisone, Perianal, (ANUSOL-HC) 2.5 % cream APPLY TOPICALLY TO EXTERNAL HEMORRHOIDS ONE TO TWO TIMES DAILY AS NEEDED (Patient not taking: Reported on 5/12/2021) 30 g 0       ALLERGIES     Allergies   Allergen  Reactions     Atorvastatin Cramps and Unknown     cramps         PAST MEDICAL HISTORY:  Past Medical History:   Diagnosis Date     ASCVD (arteriosclerotic cardiovascular disease) 6/14/2012     Benign neoplasm of duodenum, jejunum, and ileum 2003, 2007    Gastrointestinal Stromal Tumor, seen at [facility], Dr. Schmitz, GI oncology-Gleevec treatment.  Surgical removal in fall 2004-no recurrence as of 3/05.     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  [facility]       Hyperlipidaemia      Hyperlipidemia LDL goal <160 12/17/2013     Hyperthyroidism 2/4/2014     Hypokalemia 8/5/2012     LBP (low back pain) 7/26/2013     Leiomyoma of uterus, unspecified      NSTEMI (non-ST elevated myocardial infarction) (H) 6/12/2012     NSTEMI (non-ST elevated myocardial infarction) (H)      osteopenia      Other benign neoplasm of connective and other soft tissue of thorax 2003    Hamartoma, lung-?right-s/p  resection 2004     Other chronic pain     back     PONV (postoperative nausea and vomiting)      Postsurgical aortocoronary bypass status 7/4/2012     S/P CABG x 4 8/5/2012     Strabismus      Tobacco use disorder     Quit     Unspecified essential hypertension        PAST SURGICAL HISTORY:  Past Surgical History:   Procedure Laterality Date     BLEPHAROPLASTY BILATERAL Bilateral 7/17/2019    Procedure: Bilateral  Upper Eyelid Blepharoplasty;  Surgeon: Vasile Brasher MD;  Location: [facility]     BYPASS GRAFT ARTERY CORONARY  6/14/2012    Procedure: BYPASS GRAFT ARTERY CORONARY;  Median Sternotomy Coronary Artery Bypass Graft  x 3        C THORACOSCOPY,DX W BX  fall 2003    benign tissue     CATARACT IOL, RT/LT      LE     CHOLECYSTECTOMY  1963     COLONOSCOPY  4-12-05     CORONARY ARTERY BYPASS      X4     CREATION PERICARDIAL WINDOW  6/15/2012    Procedure: CREATION PERICARDIAL WINDOW;  Mediastinal Exploration, Control of Bleeding;  Surgeon: Dwaine Griffin MD;  [facility]     EYE SURGERY  2014    left cataract removal     GI SURGERY  2003 and 2007    GIST tumor removal     GYN SURGERY      tubal ligation     HYSTERECTOMY VAGINAL, COLPORRHAPHY ANTERIOR, POSTERIOR, COMBINED N/A 10/17/2017    Procedure: COMBINED HYSTERECTOMY VAGINAL, COLPORRHAPHY ANTERIOR, POSTERIOR;  Total Vaginal Hysterectomy and Posterior Repair,Sacrospinous Vault Suspension;  Surgeon: Ruth Farooq MD;  Location: [facility]     PHACOEMULSIFICATION WITH STANDARD INTRAOCULAR LENS IMPLANT  3/24/2014    Procedure: PHACOEMULSIFICATION WITH STANDARD INTRAOCULAR LENS IMPLANT;  Left Kelman Phacoemulsification with Intraocular Lens Implant;  Surgeon: Magnus Mckeon MD;  Location: [facility] OR     RECESSION RESECTION WITH ADJUSTABLE SUTURE BILATERAL Bilateral 7/14/2016    Procedure: RECESSION RESECTION WITH ADJUSTABLE SUTURE BILATERAL;  Surgeon: Erma Ordaz MD;  Location: [facility] OR     REPAIR ENTROPION Right 8/21/2019    Procedure: Right Upper Lid Entropion Repair;  Surgeon: Vasile Brasher MD;  [facility]     REPAIR RETRACTION LID BILATERAL Bilateral 7/17/2019    Procedure: Bilateral Upper Eyelid Retraction Repair;  Surgeon: Vasile Brasher MD;  [facility] OR     SURGICAL HISTORY OF -   1963    cholecystectomy     SURGICAL HISTORY OF -   1970's    Tubal Ligation      SURGICAL HISTORY OF -   08/03    Explor. Lap, Excision of  Small Bowel Tumor      SURGICAL HISTORY OF -   2010    lung cancer removed right lung       FAMILY HISTORY:  Family History   Problem Relation Age of Onset     Heart Disease Mother      Osteoporosis Mother      Respiratory Mother         Emphezyma     Hypertension Mother      Heart Disease Father      Alcohol/Drug Father      Hypertension Father      Hypertension Maternal Grandmother      Hypertension Brother      Hypertension Sister      Arthritis Sister      Hypertension Son      Cancer Son         rectal       SOCIAL HISTORY:  Social History     Socioeconomic History     Marital status:      Spouse name: None     Number of children: None     Years of education: None     Highest education level: None   Occupational History     None   Social Needs     Financial resource strain: None     Food insecurity     Worry: None     Inability: None     Transportation needs     Medical: None     Non-medical: None   Tobacco Use     Smoking status: Former Smoker     Packs/day: 0.50     Years: 49.00     Pack years: 24.50     Types: Cigarettes     Quit date: 2010     Years since quittin.0     Smokeless tobacco: Never Used   Substance and Sexual Activity     Alcohol use: No     Drug use: No     Sexual activity: Not Currently     Partners: Male     Comment:    Lifestyle     Physical activity     Days per week: None     Minutes per session: None     Stress: None   Relationships     Social connections     Talks on phone: None     Gets together: None     Attends Sabianist service: None     Active member of club or organization: None     Attends meetings of clubs or organizations: None     Relationship status: None     Intimate partner violence     Fear of current or ex partner: None     Emotionally abused: None     Physically abused: None     Forced sexual activity: None   Other Topics Concern      Service No     Blood Transfusions No     Caffeine Concern Yes     Comment: 3 cups     Occupational  Exposure No     Hobby Hazards No     Sleep Concern No     Stress Concern No     Comment: son  and much happiness in her life, big burden lifted,      Weight Concern No     Special Diet No     Back Care Yes     Comment: lower back herniated disc 1970's      Exercise No     Bike Helmet No     Seat Belt Yes     Self-Exams Yes     Parent/sibling w/ CABG, MI or angioplasty before 65F 55M? 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  Son (Rk, with wife Марина and grandson) lives next door -- quadriplegic and high functioning, cannot provide physical assistance/support.       Review of Systems:  Skin:  Negative       Eyes:  Positive for visual blurring;double vision;glasses Graves Disease  ENT:  Negative      Respiratory:  Positive for shortness of breath;cough smoker   Cardiovascular:    Positive for;fatigue    Gastroenterology: Negative      Genitourinary:  Positive for urinary frequency;urgency    Musculoskeletal:  Positive for back pain    Neurologic:  Negative      Psychiatric:  Negative      Heme/Lymph/Imm:  Negative      Endocrine:  Positive for thyroid disorder Graves disease    Physical Exam:  Vitals: /75   Pulse 70   Wt 68.5 kg (151 lb)   LMP 01/01/1992   SpO2 97%   BMI 27.62 kg/m      Constitutional:  cooperative, alert and oriented, well developed, well nourished, in no acute distress        Skin:  warm and dry to the touch          Head:  normocephalic        Eyes:  sclera white        Lymph:      ENT:           Neck:           Respiratory:  normal respiratory excursion    mild diffuse decrease in breath sounds, no wheezes    Cardiac: regular rhythm;normal S1 and S2;no S3 or S4       systolic ejection murmur;grade 1;LUSB                                                 GI:           Extremities and Muscular Skeletal:                 Neurological:  no gross motor deficits;affect appropriate         Psych:  Alert and Oriented x 3;affect appropriate, oriented to time, person and place      Thank you for allowing me to participate in the care of your patient.      Sincerely,     Tootie Herman MD     [facility]    cc:   Anu Kramer PA-C  [facility]  [address] [place] [address]

## 2021-05-12 NOTE — PROGRESS NOTES
HPI and Plan:      Ms. Idalmis Abdul is 75 years and is followed for coronary artery disease and prior coronary bypass surgery. I had previously seen her , Atif, who  12 years ago from colon cancer. I last saw her in 2018 and she noted that Dr. Ordaz suggested she have follow-up.     She is feeling well and denies any chest, neck, arm or back discomfort.  Ms. Abdul has a history of coronary artery disease and prior coronary artery bypass graft surgery.  In , she underwent bypass surgery at [facility].  She had evidence of multivessel disease on angiography.  A LIMA was placed to the LAD, a vein graft was placed to the circumflex marginal branch, a vein graft was placed to the ramus branch and a vein graft was placed to the right PDA.  Postoperatively, she experienced a pericardial hematoma and tamponade and that was treated with a pericardial window.  She had postoperative atrial fibrillation and was on anticoagulation, but she has not had any evidence of a recurrence.  On anticoagulation, she experienced an upper GI bleed and underwent clipping of a duodenal ulcer.      She suffered from Graves' disease and experienced the eye complications.  She has undergone bilateral strabismus surgery and diplopia has resolved.  She has undergone cataract surgery. She is followed by [facility] for a history of gastrointestinal stromal tumor and remains on Gleevec.  She has a remote history of Stage I lung cancer (cured) diagnosed on her surveillance CT scans.  He continues on metoprolol tartrate 100 mg twice daily and lisinopril 40 mg daily as well as rosuvastatin 5 mg daily and low-dose aspirin.     She has not had any chest, neck, arm or upper back discomfort.  Fell on the ice this winter and has lower back discomfort.  Her lipids are under excellent control.  She was seen by NITZA Brooks in 2019 and was noted to be hypertensive.  Her blood pressure is now  normal. She continues on good risk factor intervention including low dose aspirin, beta blockade, ACE inhibition and statin therapy.  She is also on amlodipine for additional hypertensive therapy.  She has dyspnea with exertion which is not new.  It does not stop her from activities.  She smoked for 50 years and has been told that she has COPD; chest CT has demonstrated emphysematous changes.     Exam:     This is a woman in no apparent distress.   On recheck, BP was 118/75 mmHg.HR is regular, and respiratory rate 16-18 per minute.   Chest is clear to auscultation with mild decrease in BS diffusely.   On cardiac auscultation, there was an S1 and S2 without extra sounds and 1/6 early systolic murmur.  Rhythm was regular.      Assessment/Plan:    Ms. Abdul has a history of coronary artery disease and prior coronary artery bypass graft surgery.  She has normal left ventricular systolic performance.  She is asymptomatic and on excellent risk factor intervention.  Her last stress imaging study was 8 to 9 years ago and she has previously declined stress testing as Cardiolite made her ill (she did not want to try Lexiscan).  I recommended a dobutamine stress echocardiogram which she did agree after an explanation.  Toprol should be decreased to 50 mg twice daily on the day prior to the stress study.  I would recommend she have the first appointment of the day and hold her first metoprolol dose until after the test is completed.  Metoprolol should be continued at 100 mg twice daily on the day of her study.    I have recommended a return at 1 year unless the echo is abnormal or she has earlier symptoms.  I have arranged for her to follow-up with Dr. Carvajal.        Orders Placed This Encounter   Procedures     Follow-Up with Cardiologist     Dobutamine Stress Echocardiogram       No orders of the defined types were placed in this encounter.      Medications Discontinued During This Encounter   Medication Reason     order for  DME          Encounter Diagnoses   Name Primary?     Essential hypertension      Coronary artery disease involving native coronary artery of native heart without angina pectoris Yes       CURRENT MEDICATIONS:  Current Outpatient Medications   Medication Sig Dispense Refill     amLODIPine (NORVASC) 5 MG tablet Take 1 tablet (5 mg) by mouth daily 90 tablet 1     aspirin EC 81 MG EC tablet Take 1 tablet by mouth daily. 30 tablet 2     Blood Pressure Monitoring (ADULT BLOOD PRESSURE CUFF LG) KIT 1 Device 2 times daily 1 kit 1     calcium-vitamin D-vitamin K (VIACTIV) 500-500-40 MG-UNT-MCG CHEW Take 2 tablets by mouth daily       hydrochlorothiazide (HYDRODIURIL) 25 MG tablet Take 1 tablet (25 mg) by mouth daily 90 tablet 3     imatinib (GLEEVEC) 400 MG tablet Take 1 tablet (400 mg) by mouth daily Take with a meal and a large glass of water. 30 tablet 11     lisinopril (ZESTRIL) 40 MG tablet TAKE ONE TABLET BY MOUTH DAILY 90 tablet 3     melatonin 5 MG tablet Take 5 mg by mouth nightly as needed for sleep       methimazole (TAPAZOLE) 5 MG tablet Take 0.5 tablets (2.5 mg) by mouth daily 45 tablet 1     metoprolol tartrate (LOPRESSOR) 100 MG tablet TAKE ONE TABLET BY MOUTH TWICE A  tablet 1     polyethylene glycol (MIRALAX/GLYCOLAX) powder Take 17 g by mouth daily       rosuvastatin (CRESTOR) 5 MG tablet Taking every other day. 45 tablet 3     tiotropium (SPIRIVA) 18 MCG inhaled capsule Inhale 1 capsule (18 mcg) into the lungs daily 30 capsule 11     hydrocortisone, Perianal, (ANUSOL-HC) 2.5 % cream APPLY TOPICALLY TO EXTERNAL HEMORRHOIDS ONE TO TWO TIMES DAILY AS NEEDED (Patient not taking: Reported on 5/12/2021) 30 g 0       ALLERGIES     Allergies   Allergen Reactions     Atorvastatin Cramps and Unknown     cramps         PAST MEDICAL HISTORY:  Past Medical History:   Diagnosis Date     ASCVD (arteriosclerotic cardiovascular disease) 6/14/2012     Benign neoplasm of duodenum, jejunum, and ileum 2003, 2007     Gastrointestinal Stromal Tumor, seen at [facility], Dr. Schmitz, GI oncology-Gleevec treatment.  Surgical removal in fall 2004-no recurrence as of 3/05.     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 2004     Other chronic pain     back     PONV (postoperative nausea and vomiting)      Postsurgical aortocoronary bypass status 7/4/2012     S/P CABG x 4 8/5/2012     Strabismus      Tobacco use disorder     Quit     Unspecified essential hypertension        PAST SURGICAL HISTORY:  Past Surgical History:   Procedure Laterality Date     BLEPHAROPLASTY BILATERAL Bilateral 7/17/2019    Procedure: Bilateral Upper Eyelid Blepharoplasty;  Surgeon: Vasile Brasher MD;  [facility]     BYPASS GRAFT ARTERY CORONARY  6/14/2012    Procedure: BYPASS GRAFT ARTERY CORONARY;  Median Sternotomy Coronary Artery Bypass Graft  x 3        C THORACOSCOPY,DX W BX  fall  2003    benign tissue     CATARACT IOL, RT/LT      LE     CHOLECYSTECTOMY  1963     COLONOSCOPY  4-12-05     CORONARY ARTERY BYPASS      X4     CREATION PERICARDIAL WINDOW  6/15/2012    Procedure: CREATION PERICARDIAL WINDOW;  Mediastinal Exploration, Control of Bleeding;  Surgeon: Dwaine Griffin MD;  [facility]     EYE SURGERY  2014    left cataract removal     GI SURGERY  2003 and 2007    GIST tumor removal     GYN SURGERY      tubal ligation     HYSTERECTOMY VAGINAL, COLPORRHAPHY ANTERIOR, POSTERIOR, COMBINED N/A 10/17/2017    Procedure: COMBINED HYSTERECTOMY VAGINAL, COLPORRHAPHY ANTERIOR, POSTERIOR;  Total Vaginal Hysterectomy and Posterior Repair,Sacrospinous Vault Suspension;  Surgeon: Ruth Farooq MD;  Location: [facility]     PHACOEMULSIFICATION WITH STANDARD INTRAOCULAR LENS IMPLANT  3/24/2014    Procedure: PHACOEMULSIFICATION WITH STANDARD INTRAOCULAR LENS IMPLANT;  Left Kelman Phacoemulsification with Intraocular Lens Implant;  Surgeon: Magnus Mckeon MD;  Location: [facility] OR     RECESSION RESECTION WITH ADJUSTABLE SUTURE BILATERAL Bilateral 7/14/2016    Procedure: RECESSION RESECTION WITH ADJUSTABLE SUTURE BILATERAL;  Surgeon: Erma Ordaz MD;  Location: [facility] OR     REPAIR ENTROPION Right 8/21/2019    Procedure: Right Upper Lid Entropion Repair;  Surgeon: Vasile Brasher MD;  [facility]     REPAIR RETRACTION LID BILATERAL Bilateral 7/17/2019    Procedure: Bilateral Upper Eyelid Retraction Repair;  Surgeon: Vasile Brasher MD;  [facility]     SURGICAL HISTORY OF -   1963    cholecystectomy     SURGICAL HISTORY OF -   1970's    Tubal Ligation      SURGICAL HISTORY OF -   08/03    Explor. Lap, Excision of Small Bowel Tumor      SURGICAL HISTORY OF -   4/2010    lung cancer removed right lung       FAMILY HISTORY:  Family History   Problem Relation Age of Onset     Heart Disease Mother      Osteoporosis Mother      Respiratory Mother         Emphezyma      Hypertension Mother      Heart Disease Father      Alcohol/Drug Father      Hypertension Father      Hypertension Maternal Grandmother      Hypertension Brother      Hypertension Sister      Arthritis Sister      Hypertension Son      Cancer Son         rectal       SOCIAL HISTORY:  Social History     Socioeconomic History     Marital status:      Spouse name: None     Number of children: None     Years of education: None     Highest education level: None   Occupational History     None   Social Needs     Financial resource strain: None     Food insecurity     Worry: None     Inability: None     Transportation needs     Medical: None     Non-medical: None   Tobacco Use     Smoking status: Former Smoker     Packs/day: 0.50     Years: 49.00     Pack years: 24.50     Types: Cigarettes     Quit date: 2010     Years since quittin.0     Smokeless tobacco: Never Used   Substance and Sexual Activity     Alcohol use: No     Drug use: No     Sexual activity: Not Currently     Partners: Male     Comment:    Lifestyle     Physical activity     Days per week: None     Minutes per session: None     Stress: None   Relationships     Social connections     Talks on phone: None     Gets together: None     Attends Zoroastrianism service: None     Active member of club or organization: None     Attends meetings of clubs or organizations: None     Relationship status: None     Intimate partner violence     Fear of current or ex partner: None     Emotionally abused: None     Physically abused: None     Forced sexual activity: None   Other Topics Concern      Service No     Blood Transfusions No     Caffeine Concern Yes     Comment: 3 cups     Occupational Exposure No     Hobby Hazards No     Sleep Concern No     Stress Concern No     Comment: son  and much happiness in her life, big burden lifted,      Weight Concern No     Special Diet No     Back Care Yes     Comment: lower back herniated disc        Exercise No     Bike Helmet No     Seat Belt Yes     Self-Exams Yes     Parent/sibling w/ CABG, MI or angioplasty before 65F 55M? 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  Son (Rk, with wife Марина and grandson) lives next door -- quadriplegic and high functioning, cannot provide physical assistance/support.       Review of Systems:  Skin:  Negative       Eyes:  Positive for visual blurring;double vision;glasses Graves Disease  ENT:  Negative      Respiratory:  Positive for shortness of breath;cough smoker   Cardiovascular:    Positive for;fatigue    Gastroenterology: Negative      Genitourinary:  Positive for urinary frequency;urgency    Musculoskeletal:  Positive for back pain    Neurologic:  Negative      Psychiatric:  Negative      Heme/Lymph/Imm:  Negative      Endocrine:  Positive for thyroid disorder Graves disease    Physical Exam:  Vitals: /75   Pulse 70   Wt 68.5 kg (151 lb)   LMP 01/01/1992   SpO2 97%   BMI 27.62 kg/m      Constitutional:  cooperative, alert and oriented, well developed, well nourished, in no acute distress        Skin:  warm and dry to the touch          Head:  normocephalic        Eyes:  sclera white        Lymph:      ENT:           Neck:           Respiratory:  normal respiratory excursion    mild diffuse decrease in breath sounds, no wheezes    Cardiac: regular rhythm;normal S1 and S2;no S3 or S4       systolic ejection murmur;grade 1;LUSB                                                 GI:           Extremities and Muscular Skeletal:                 Neurological:  no gross motor deficits;affect appropriate        Psych:  Alert and Oriented x 3;affect appropriate, oriented to time, person and place

## 2021-05-13 ENCOUNTER — HOSPITAL ENCOUNTER (OUTPATIENT)
Dept: PHYSICAL THERAPY | Facility: CLINIC | Age: 76
Setting detail: THERAPIES SERIES
End: 2021-05-13
Attending: FAMILY MEDICINE
Payer: COMMERCIAL

## 2021-05-13 PROCEDURE — 97140 MANUAL THERAPY 1/> REGIONS: CPT | Mod: GP | Performed by: PHYSICAL THERAPIST

## 2021-05-13 PROCEDURE — 97110 THERAPEUTIC EXERCISES: CPT | Mod: GP | Performed by: PHYSICAL THERAPIST

## 2021-05-17 ENCOUNTER — OFFICE VISIT (OUTPATIENT)
Dept: FAMILY MEDICINE | Facility: CLINIC | Age: 76
End: 2021-05-17
Payer: COMMERCIAL

## 2021-05-17 VITALS
HEIGHT: 62 IN | SYSTOLIC BLOOD PRESSURE: 132 MMHG | HEART RATE: 77 BPM | RESPIRATION RATE: 16 BRPM | TEMPERATURE: 98.7 F | WEIGHT: 152 LBS | BODY MASS INDEX: 27.97 KG/M2 | OXYGEN SATURATION: 98 % | DIASTOLIC BLOOD PRESSURE: 64 MMHG

## 2021-05-17 DIAGNOSIS — C49.A0 MALIGNANT GASTROINTESTINAL STROMAL TUMOR, UNSPECIFIED SITE (H): ICD-10-CM

## 2021-05-17 DIAGNOSIS — E78.5 HYPERLIPIDEMIA LDL GOAL <160: ICD-10-CM

## 2021-05-17 DIAGNOSIS — J43.2 CENTRILOBULAR EMPHYSEMA (H): ICD-10-CM

## 2021-05-17 DIAGNOSIS — I10 ESSENTIAL HYPERTENSION WITH GOAL BLOOD PRESSURE LESS THAN 140/90: Primary | ICD-10-CM

## 2021-05-17 DIAGNOSIS — D64.9 NORMOCYTIC ANEMIA: ICD-10-CM

## 2021-05-17 DIAGNOSIS — I10 ESSENTIAL HYPERTENSION: ICD-10-CM

## 2021-05-17 LAB
BASOPHILS # BLD AUTO: 0 10E9/L (ref 0–0.2)
BASOPHILS NFR BLD AUTO: 0.8 %
CHOLEST SERPL-MCNC: 126 MG/DL
DIFFERENTIAL METHOD BLD: ABNORMAL
EOSINOPHIL # BLD AUTO: 0.3 10E9/L (ref 0–0.7)
EOSINOPHIL NFR BLD AUTO: 4.7 %
ERYTHROCYTE [DISTWIDTH] IN BLOOD BY AUTOMATED COUNT: 14.8 % (ref 10–15)
FERRITIN SERPL-MCNC: 55 NG/ML (ref 8–252)
HCT VFR BLD AUTO: 28.7 % (ref 35–47)
HDLC SERPL-MCNC: 49 MG/DL
HGB BLD-MCNC: 9.5 G/DL (ref 11.7–15.7)
IRON SATN MFR SERPL: 21 % (ref 15–46)
IRON SERPL-MCNC: 71 UG/DL (ref 35–180)
LDLC SERPL CALC-MCNC: 60 MG/DL
LYMPHOCYTES # BLD AUTO: 1 10E9/L (ref 0.8–5.3)
LYMPHOCYTES NFR BLD AUTO: 19.7 %
MCH RBC QN AUTO: 29.5 PG (ref 26.5–33)
MCHC RBC AUTO-ENTMCNC: 33.1 G/DL (ref 31.5–36.5)
MCV RBC AUTO: 89 FL (ref 78–100)
MONOCYTES # BLD AUTO: 0.8 10E9/L (ref 0–1.3)
MONOCYTES NFR BLD AUTO: 15.4 %
NEUTROPHILS # BLD AUTO: 3.1 10E9/L (ref 1.6–8.3)
NEUTROPHILS NFR BLD AUTO: 59.4 %
NONHDLC SERPL-MCNC: 77 MG/DL
PLATELET # BLD AUTO: 210 10E9/L (ref 150–450)
RBC # BLD AUTO: 3.22 10E12/L (ref 3.8–5.2)
RETICS # AUTO: 42.6 10E9/L (ref 25–95)
RETICS/RBC NFR AUTO: 1.3 % (ref 0.5–2)
TIBC SERPL-MCNC: 335 UG/DL (ref 240–430)
TRIGL SERPL-MCNC: 85 MG/DL
VIT B12 SERPL-MCNC: 298 PG/ML (ref 193–986)
WBC # BLD AUTO: 5.3 10E9/L (ref 4–11)

## 2021-05-17 PROCEDURE — 85060 BLOOD SMEAR INTERPRETATION: CPT | Performed by: PATHOLOGY

## 2021-05-17 PROCEDURE — 85045 AUTOMATED RETICULOCYTE COUNT: CPT | Performed by: FAMILY MEDICINE

## 2021-05-17 PROCEDURE — 82607 VITAMIN B-12: CPT | Performed by: FAMILY MEDICINE

## 2021-05-17 PROCEDURE — 83540 ASSAY OF IRON: CPT | Performed by: FAMILY MEDICINE

## 2021-05-17 PROCEDURE — 82728 ASSAY OF FERRITIN: CPT | Performed by: FAMILY MEDICINE

## 2021-05-17 PROCEDURE — 99N1109 PR STATISTIC MORPHOLOGY W/INTERP HISTOLOGY TC 85060: Performed by: FAMILY MEDICINE

## 2021-05-17 PROCEDURE — 83550 IRON BINDING TEST: CPT | Performed by: FAMILY MEDICINE

## 2021-05-17 PROCEDURE — 36415 COLL VENOUS BLD VENIPUNCTURE: CPT | Performed by: FAMILY MEDICINE

## 2021-05-17 PROCEDURE — 99214 OFFICE O/P EST MOD 30 MIN: CPT | Performed by: FAMILY MEDICINE

## 2021-05-17 PROCEDURE — 85025 COMPLETE CBC W/AUTO DIFF WBC: CPT | Performed by: FAMILY MEDICINE

## 2021-05-17 PROCEDURE — 80061 LIPID PANEL: CPT | Performed by: FAMILY MEDICINE

## 2021-05-17 RX ORDER — FLUTICASONE PROPIONATE AND SALMETEROL 113; 14 UG/1; UG/1
1 POWDER, METERED RESPIRATORY (INHALATION) 2 TIMES DAILY
Qty: 60 EACH | Refills: 11 | Status: SHIPPED | OUTPATIENT
Start: 2021-05-17 | End: 2022-04-22

## 2021-05-17 RX ORDER — HYDROCHLOROTHIAZIDE 25 MG/1
25 TABLET ORAL DAILY
Qty: 90 TABLET | Refills: 3 | Status: ON HOLD | OUTPATIENT
Start: 2021-05-17 | End: 2021-07-17

## 2021-05-17 RX ORDER — ROSUVASTATIN CALCIUM 5 MG/1
TABLET, COATED ORAL
Qty: 45 TABLET | Refills: 3 | Status: SHIPPED | OUTPATIENT
Start: 2021-05-17 | End: 2022-07-06

## 2021-05-17 RX ORDER — LISINOPRIL 40 MG/1
TABLET ORAL
Qty: 90 TABLET | Refills: 3 | Status: ON HOLD | OUTPATIENT
Start: 2021-05-17 | End: 2021-07-17

## 2021-05-17 ASSESSMENT — MIFFLIN-ST. JEOR: SCORE: 1132.72

## 2021-05-17 NOTE — PROGRESS NOTES
"    Assessment & Plan     Essential hypertension with goal blood pressure less than 140/90  Well controlled, mildly hyponatremic on last labs  - lisinopril (ZESTRIL) 40 MG tablet; TAKE ONE TABLET BY MOUTH DAILY  - hydrochlorothiazide (HYDRODIURIL) 25 MG tablet; Take 1 tablet (25 mg) by mouth daily    Hyperlipidemia LDL goal <160     - rosuvastatin (CRESTOR) 5 MG tablet; Taking every other day.  - Lipid panel reflex to direct LDL Non-fasting    Normocytic anemia  Longstanding  Due for colonoscopy according to oncology notes (history of GIST)  Oncology recommend w/u last November, patient wasn't \"aware of this\".   Will start evaluation  May be anemia of chronic disease  - Iron and iron binding capacity  - Ferritin  - Blood Morphology Pathologist Review  - CBC with platelets differential  - Reticulocyte Count  - Vitamin B12    Malignant gastrointestinal stromal tumor, unspecified site (H)       Centrilobular emphysema (H)  Continue Spiriva  Add inhaled steroid/long acting bronchodilator  - fluticasone-salmeterol (AIRDUO RESPICLICK) 113-14 MCG/ACT inhaler; Inhale 1 puff into the lungs 2 times daily             BMI:   Estimated body mass index is 27.8 kg/m  as calculated from the following:    Height as of this encounter: 1.575 m (5' 2\").    Weight as of this encounter: 68.9 kg (152 lb).           No follow-ups on file.    Ale Ordaz MD  [facility]   Idalmis is a 75 year old who presents for the following health issues  accompanied by her self:    HPI   Chief Complaint   Patient presents with     Results     Patient is here to discuss her pulmonary fuction testing results. Patient is still having a trouble with coughing in the morning and her breathing is still a concern ever with PT.      Lipids     Hypertension     Health Maintenance     schedule wellness exam         Hyperlipidemia Follow-Up      Are you regularly taking any medication or supplement to lower your " "cholesterol?   Yes- crestor    Are you having muscle aches or other side effects that you think could be caused by your cholesterol lowering medication?  No    Hypertension Follow-up      Do you check your blood pressure regularly outside of the clinic? No     Are you following a low salt diet? No    Are your blood pressures ever more than 140 on the top number (systolic) OR more   than 90 on the bottom number (diastolic), for example 140/90? No      How many servings of fruits and vegetables do you eat daily?  2-3    On average, how many sweetened beverages do you drink each day (Examples: soda, juice, sweet tea, etc.  Do NOT count diet or artificially sweetened beverages)?   0    How many days per week do you exercise enough to make your heart beat faster? 3 or less    How many minutes a day do you exercise enough to make your heart beat faster? 9 or less    How many days per week do you miss taking your medication? 0    COPD Follow-Up    PFTs done recently. CT scans do show centrilobular emphysema    She is also s/p resection of right lung     spiriva started recently, no real improvement per patient.         History   Smoking Status     Former Smoker     Packs/day: 0.50     Years: 49.00     Types: Cigarettes     Quit date: 4/19/2010   Smokeless Tobacco     Never Used     No results found for: FEV1, MWJ1NHT      Review of Systems   Constitutional, HEENT, cardiovascular, pulmonary, gi and gu systems are negative, except as otherwise noted.      Objective    /64   Pulse 77   Temp 98.7  F (37.1  C) (Tympanic)   Resp 16   Ht 1.575 m (5' 2\")   Wt 68.9 kg (152 lb)   LMP 01/01/1992   SpO2 98%   BMI 27.80 kg/m    Body mass index is 27.8 kg/m .  Physical Exam   GENERAL: healthy, alert, no distress and pale  NECK: no adenopathy, no asymmetry, masses, or scars and thyroid normal to palpation  RESP: lungs clear to auscultation - no rales, rhonchi or wheezes  CV: regular rate and rhythm, normal S1 S2, no S3 or " S4, no murmur, click or rub, no peripheral edema and peripheral pulses strong  ABDOMEN: soft, nontender, no hepatosplenomegaly, no masses and bowel sounds normal  MS: no gross musculoskeletal defects noted, no edema    Oncology labs reviewed  Oncology notes reviewed  Cardiology notes reviewed

## 2021-05-17 NOTE — PATIENT INSTRUCTIONS
Please schedule your wellness exam.     Our Clinic hours are:  Mondays    7:20 am - 7 pm  Tues -  Fri  7:20 am - 5 pm    Clinic Phone: [phone]    The clinic lab opens at 7:30 am Mon - Fri and appointments are required.    [facility]. [phone]  Monday  8 am - 7pm  Tues - Fri 8 am - 5:30 pm

## 2021-05-18 LAB — COPATH REPORT: NORMAL

## 2021-05-20 ENCOUNTER — HOSPITAL ENCOUNTER (OUTPATIENT)
Dept: PHYSICAL THERAPY | Facility: CLINIC | Age: 76
Setting detail: THERAPIES SERIES
End: 2021-05-20
Attending: FAMILY MEDICINE
Payer: COMMERCIAL

## 2021-05-20 PROCEDURE — 97110 THERAPEUTIC EXERCISES: CPT | Mod: GP | Performed by: PHYSICAL THERAPIST

## 2021-05-20 PROCEDURE — 97140 MANUAL THERAPY 1/> REGIONS: CPT | Mod: GP | Performed by: PHYSICAL THERAPIST

## 2021-05-20 NOTE — PROGRESS NOTES
Rehabilitation Services      OUTPATIENT PHYSICAL THERAPY  PLAN OF TREATMENT FOR OUTPATIENT REHABILITATION    Patient's Last Name, First Name, M.I.                YOB: 1945  Idalmis Abdul                        Provider's Name  Ta Villalta, PT Medical Record No.  [id number]                               Onset Date: 12/18/20   Start of Care Date: 3/25/2021   Type:     _X_PT   ___OT   ___SLP Medical Diagnosis: SI (sacroiliac) joint dysfunction                     PT Diagnosis: Right lumbar spine pain       _________________________________________________________________________________  Plan of Treatment:    Frequency/Duration: 1 time per week for 6 weeks     Goals:  Goal Identifier HEP   Goal Description Pt will be independent in HEP in order to achieve and maintain long term treatment goals.   Target Date 04/25/21   Date Met  05/20/21   Progress:     Goal Identifier Changing sheets   Goal Description Pt will be able to change the sheets on her bed with a minimal increase in pain 1-2/10.(Not met)   Target Date 05/20/21   Date Met      Progress:     Goal Identifier Dressing   Goal Description Pt will be able to get dressed with a minimal increase in pain 1-2/10.   Target Date 05/20/21   Date Met  05/20/21   Progress:     Goal Identifier Cooking   Goal Description Pt will be able to stand for 20 minutes in order to cook a meal without having to sit due to pain.(Not met)   Target Date 05/20/21   Date Met      Progress:       Progress Toward Goals:   Pt states that she has seen a 50% improvement since beginning physical therapy. Pt has met 2 of 4 goals currently, however her JO ANN remains close to her original score. Pt would benefit from skilled physical therapy in order to build additional core strength, reduce pain and meet remaining functional goals.      Certification date from 5/21/2021 to 7/1/2021.    Ta  Ambar PT          I CERTIFY THE NEED FOR THESE SERVICES FURNISHED UNDER        THIS PLAN OF TREATMENT AND WHILE UNDER MY CARE     (Physician co-signature of this document indicates review and certification of the therapy plan).                Referring Provider: Ale Ordaz MD

## 2021-05-20 NOTE — PROGRESS NOTES
Idalmis Abdul  1945 Physical Therapy Progress Note  05/20/21 1200   Signing Clinician's Name / Credentials   Signing clinician's name / credentials Fareed Villalta PT, DPT   Session Number   Session Number 6 (Start of Care Date - 3/25/2021) (Progress Note Date Range - 3/25/2021 - 5/202/2021)   Progress Note/Recertification   Progress Note Due Date 05/20/21   Progress Note Completed Date 05/20/21   Recertification Due Date 05/20/21   Adult Goals   PT Ortho Eval Goals 2;1;3;4   Ortho Goal 1   Goal Identifier HEP   Goal Description Pt will be independent in HEP in order to achieve and maintain long term treatment goals.   Target Date 04/25/21   Date Met 05/20/21   Ortho Goal 2   Goal Identifier Changing sheets   Goal Description Pt will be able to change the sheets on her bed with a minimal increase in pain 1-2/10.  (Not met)   Target Date 05/20/21   Ortho Goal 3   Goal Identifier Dressing   Goal Description Pt will be able to get dressed with a minimal increase in pain 1-2/10.   Target Date 05/20/21   Date Met 05/20/21   Ortho Goal 4   Goal Identifier Cooking   Goal Description Pt will be able to stand for 20 minutes in order to cook a meal without having to sit due to pain.  (Not met)   Target Date 05/20/21   Subjective Report   Subjective Report Feeling ok at times. It's not super, it's not fixed. Has to rest when making the bed. Gets pain in back when lifting a milk. 50% improvement since beginning physical therapy.   Objective Measures   Objective Measures Objective Measure 1;Objective Measure 2;Objective Measure 3   Objective Measure 1   Objective Measure JO ANN   Details 42.22%   Objective Measure 2   Objective Measure Strength   Details Hip flexio - 4/5 B / Knee extension - 4/5 B / Ankle DF - 5/5 B / Ankle PF - 4/5 B   Objective Measure 3   Objective Measure Patellar reflexes   Details 1+ B   Treatment Interventions   Interventions Therapeutic Procedure/Exercise;Manual Therapy;Self Care/Home Management    Therapeutic Procedure/exercise   Therapeutic Procedures: strength, endurance, ROM, flexibillity minutes (96908) 13   Skilled Intervention Strengthening exercise education   Patient Response Discomfort with side steps, not added to HEP   Treatment Detail Step-ups x10 B / Side steps at counter x6 lengths / Pelvic tilts in standing x12 /    Manual Therapy   Manual Therapy: Mobilization, MFR, MLD, friction massage minutes (26545) 12   Skilled Intervention STM / Joint mobs   Patient Response No increase in discomfort noted   Treatment Detail PA lumbar mobilizations grades 1-2 to decrease pain / STM to lumbar paraspinals B and right PSIS to reduce tone and pain   Education   Learner Patient   Readiness Acceptance   Method Booklet/handout;Explanation;Demonstration   Response Verbalizes Understanding;Demonstrates Understanding   Plan   Homework Fusion at L4-L5 / Osteoperosis / Previous heart bypass   Home program [id number] / Pelvic tilts in standing / Step-ups / Sit to stand / HS stretch / Lumbar rotations to right / Pelvic tilts with knee ext   Updates to plan of care 1 time per week for 6 weeks   Plan for next session [place] / SB exercise / STM / Light joint mobs    Comments   Comments Pt states that she has seen a 50% improvement since beginning physical therapy. Pt has met 2 of 4 goals currently, however her JO ANN remains close to her original score. Pt would benefit from skilled physical therapy in order to build additional core strength, reduce pain and meet remaining functional goals.   Total Session Time   Timed Code Treatment Minutes 25   Total Treatment Time (sum of timed and untimed services) 25   AMBULATORY CLINICS ONLY-MEDICAL AND TREATMENT DIAGNOSIS   Medical Diagnosis SI (sacroiliac) joint dysfunction   PT Diagnosis Right lumbar spine pain     Referring Physician - Ale Ordaz MD

## 2021-06-03 ENCOUNTER — HOSPITAL ENCOUNTER (OUTPATIENT)
Dept: PHYSICAL THERAPY | Facility: CLINIC | Age: 76
Setting detail: THERAPIES SERIES
End: 2021-06-03
Attending: FAMILY MEDICINE
Payer: COMMERCIAL

## 2021-06-03 PROCEDURE — 97110 THERAPEUTIC EXERCISES: CPT | Mod: GP | Performed by: PHYSICAL THERAPIST

## 2021-06-03 PROCEDURE — 97140 MANUAL THERAPY 1/> REGIONS: CPT | Mod: GP | Performed by: PHYSICAL THERAPIST

## 2021-06-03 NOTE — PROGRESS NOTES
Idalmis Abdul  1945 Physical Therapy Discharge Note  06/03/21 1000   Signing Clinician's Name / Credentials   Signing clinician's name / credentials Fareed Villalta PT, DPT   Session Number   Session Number 7 (Start of Care Date - 3/25/2021) (Progress Note Date Range - 3/25/2021 - 6/3/2021)   Progress Note/Recertification   Progress Note Due Date 07/01/21   Progress Note Completed Date 06/03/21   Recertification Due Date 07/01/21   Adult Goals   PT Ortho Eval Goals 2;1;3;4   Ortho Goal 1   Goal Identifier HEP   Goal Description Pt will be independent in HEP in order to achieve and maintain long term treatment goals.   Target Date 04/25/21   Date Met 05/20/21   Ortho Goal 2   Goal Identifier Changing sheets   Goal Description Pt will be able to change the sheets on her bed with a minimal increase in pain 1-2/10.  (Not met)   Target Date 05/20/21   Ortho Goal 3   Goal Identifier Dressing   Goal Description Pt will be able to get dressed with a minimal increase in pain 1-2/10.   Target Date 05/20/21   Date Met 05/20/21   Ortho Goal 4   Goal Identifier Cooking   Goal Description Pt will be able to stand for 20 minutes in order to cook a meal without having to sit due to pain.  (Not met)   Target Date 05/20/21   Subjective Report   Subjective Report May have overdone it yesterday. Was picking out flowers and was on her feet for too long. Was hurting at the end of the day. 6/10 pain right now.   Objective Measures   Objective Measures Objective Measure 1;Objective Measure 2;Objective Measure 3   Objective Measure 2   Objective Measure Strength   Details Hip flexion - 4/5 B / Knee extension - 4/5 B / Ankle DF - 5/5 B / Ankle PF - 4/5 B   Objective Measure 3   Objective Measure Patellar reflexes   Details 1+ B   Treatment Interventions   Interventions Therapeutic Procedure/Exercise;Manual Therapy;Self Care/Home Management   Therapeutic Procedure/exercise   Therapeutic Procedures: strength, endurance, ROM,  flexibillity minutes (90013) 12   Skilled Intervention Strengthening exercise education   Patient Response Discomfort with bridges, not added to HEP   Treatment Detail Pelvic tilts in standing x10 / Bridges x5 (stopped due to discomfort) / Supine knee push x10 B    Manual Therapy   Manual Therapy: Mobilization, MFR, MLD, friction massage minutes (52506) 12   Skilled Intervention STM / Joint mobs   Patient Response No increase in discomfort noted   Treatment Detail PA lumbar mobilizations grades 1-2 to decrease pain / STM to lumbar paraspinals B and right PSIS to reduce tone and pain   Education   Learner Patient   Readiness Acceptance   Method Booklet/handout;Explanation;Demonstration   Response Verbalizes Understanding;Demonstrates Understanding   Plan   Homework Fusion at L4-L5 / Osteoperosis / Previous heart bypass   Home program [id number] / Supine knee push / Pelvic tilts in standing / Step-ups / Sit to stand / HS stretch / Lumbar rotations to right / Pelvic tilts with knee ext   Updates to plan of care Discharged   Plan for next session Bridges / SB exercise / STM / Light joint mobs    Comments   Comments Pt states that she has seen a 50% improvement in her back pain since beginning physical therapy. Pt has met 2 of 4 goals. Pt would like to manage symptoms independently and be discharged to [facility].   Total Session Time   Timed Code Treatment Minutes 24   Total Treatment Time (sum of timed and untimed services) 24   AMBULATORY CLINICS ONLY-MEDICAL AND TREATMENT DIAGNOSIS   Medical Diagnosis SI (sacroiliac) joint dysfunction   PT Diagnosis Right lumbar spine pain     Referring Physician - Ale Ordaz MD

## 2021-06-10 ENCOUNTER — OFFICE VISIT (OUTPATIENT)
Dept: FAMILY MEDICINE | Facility: CLINIC | Age: 76
End: 2021-06-10
Payer: COMMERCIAL

## 2021-06-10 VITALS
BODY MASS INDEX: 27.79 KG/M2 | TEMPERATURE: 97.8 F | HEIGHT: 62 IN | RESPIRATION RATE: 16 BRPM | SYSTOLIC BLOOD PRESSURE: 132 MMHG | DIASTOLIC BLOOD PRESSURE: 60 MMHG | OXYGEN SATURATION: 98 % | HEART RATE: 72 BPM | WEIGHT: 151 LBS

## 2021-06-10 DIAGNOSIS — M10.072 ACUTE IDIOPATHIC GOUT OF LEFT FOOT: Primary | ICD-10-CM

## 2021-06-10 DIAGNOSIS — C49.A0 MALIGNANT GASTROINTESTINAL STROMAL TUMOR, UNSPECIFIED SITE (H): ICD-10-CM

## 2021-06-10 DIAGNOSIS — K59.09 CHRONIC CONSTIPATION: ICD-10-CM

## 2021-06-10 LAB — URATE SERPL-MCNC: 5.2 MG/DL (ref 2.6–6)

## 2021-06-10 PROCEDURE — 84550 ASSAY OF BLOOD/URIC ACID: CPT | Performed by: FAMILY MEDICINE

## 2021-06-10 PROCEDURE — 99213 OFFICE O/P EST LOW 20 MIN: CPT | Performed by: FAMILY MEDICINE

## 2021-06-10 PROCEDURE — 36415 COLL VENOUS BLD VENIPUNCTURE: CPT | Performed by: FAMILY MEDICINE

## 2021-06-10 ASSESSMENT — PAIN SCALES - GENERAL: PAINLEVEL: MILD PAIN (3)

## 2021-06-10 ASSESSMENT — MIFFLIN-ST. JEOR: SCORE: 1128.18

## 2021-06-10 NOTE — PROGRESS NOTES
"    Assessment & Plan     Acute idiopathic gout of left foot   less pain today, declined prednisone  Check uric acid level, may be a candidate for allopurinol  - Uric acid    Chronic constipation  Patient Instructions   1) Disimpaction phase is necessary before initiation of maintenance therapy: use 3 measuring cups of Mirlax daily for 3 days (better during weekend) to achieve clean out of the colon from hard stool matter. Please assure adequate hydration during this phase. The expectation is to have significant amount of large hard stool mixed with loose stool during this phase with only loose stool toward the third day of treatment.   2) Maintenance Therapy: after disimpaction phase, start with 1 measuring cup (17 gm) of Mirlax daily. If you start having very watery/loose stools, decrease the dose slowly by 1/2 measuring cup (8.5gm) of Mirlax.  If you are still constipated, increase to twice a day.          Malignant gastrointestinal stromal tumor, unspecified site (H)  Has colonoscopy scheduled in 1 month               BMI:   Estimated body mass index is 27.62 kg/m  as calculated from the following:    Height as of this encounter: 1.575 m (5' 2\").    Weight as of this encounter: 68.5 kg (151 lb).           No follow-ups on file.    Ale Ordaz MD  [facility]   Idalmis is a 75 year old who presents for the following health issues  accompanied by her self:    HPI     Chief Complaint   Patient presents with     Arthritis     Patient has gout in left ankle since Tuesday morning. Patient feels like it is getting better but is still having redness, swelling and pain. Patient rates pain at a 3/10.      Patient Request     Patient would like to discuss with provider trying to get in for a colonoscopy sooner. The earliest she could get in is July 14th in [place]. Patient is having trouble having a bowel movement with out having to use a enema.     Health Maintenance     Will " "come back for wellness exam.        Pain is better in the ankle today. Last gouty flare was a few years ago.    Having increased constipation, does have colonoscopy scheduled    Review of Systems   Constitutional, HEENT, cardiovascular, pulmonary, gi and gu systems are negative, except as otherwise noted.      Objective    /60   Pulse 72   Temp 97.8  F (36.6  C) (Tympanic)   Resp 16   Ht 1.575 m (5' 2\")   Wt 68.5 kg (151 lb)   LMP 01/01/1992   SpO2 98%   BMI 27.62 kg/m    Body mass index is 27.62 kg/m .  Physical Exam   GENERAL APPEARANCE: healthy, alert and no distress  SKIN: left ankle with 1+ edema, mild erythema  Full range of motion.               "

## 2021-07-14 ENCOUNTER — HOSPITAL ENCOUNTER (INPATIENT)
Facility: CLINIC | Age: 76
LOS: 3 days | Discharge: HOME | DRG: 641 | End: 2021-07-17
Attending: EMERGENCY MEDICINE | Admitting: INTERNAL MEDICINE
Payer: COMMERCIAL

## 2021-07-14 ENCOUNTER — NURSE TRIAGE (OUTPATIENT)
Dept: FAMILY MEDICINE | Facility: CLINIC | Age: 76
End: 2021-07-14

## 2021-07-14 ENCOUNTER — APPOINTMENT (OUTPATIENT)
Dept: CT IMAGING | Facility: CLINIC | Age: 76
DRG: 641 | End: 2021-07-14
Attending: EMERGENCY MEDICINE
Payer: COMMERCIAL

## 2021-07-14 DIAGNOSIS — I10 ESSENTIAL HYPERTENSION WITH GOAL BLOOD PRESSURE LESS THAN 140/90: ICD-10-CM

## 2021-07-14 DIAGNOSIS — E87.1 HYPONATREMIA: ICD-10-CM

## 2021-07-14 DIAGNOSIS — R55 VASOVAGAL NEAR SYNCOPE: ICD-10-CM

## 2021-07-14 DIAGNOSIS — Z11.52 ENCOUNTER FOR SCREENING LABORATORY TESTING FOR SEVERE ACUTE RESPIRATORY SYNDROME CORONAVIRUS 2 (SARS-COV-2): ICD-10-CM
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
## 2021-07-14 DIAGNOSIS — D64.9 ANEMIA, UNSPECIFIED TYPE: Primary | ICD-10-CM

## 2021-07-14 DIAGNOSIS — R11.2 NON-INTRACTABLE VOMITING WITH NAUSEA, UNSPECIFIED VOMITING TYPE: ICD-10-CM

## 2021-07-14 DIAGNOSIS — R55 SYNCOPE AND COLLAPSE: ICD-10-CM

## 2021-07-14 LAB
ALBUMIN SERPL-MCNC: 3.8 G/DL (ref 3.4–5)
ALBUMIN UR-MCNC: NEGATIVE MG/DL
ALP SERPL-CCNC: 46 U/L (ref 40–150)
ALT SERPL W P-5'-P-CCNC: 33 U/L (ref 0–50)
ANION GAP SERPL CALCULATED.3IONS-SCNC: 10 MMOL/L (ref 3–14)
ANION GAP SERPL CALCULATED.3IONS-SCNC: 10 MMOL/L (ref 3–14)
APPEARANCE UR: CLEAR
AST SERPL W P-5'-P-CCNC: 54 U/L (ref 0–45)
BACTERIA #/AREA URNS HPF: ABNORMAL /HPF
BASOPHILS # BLD AUTO: 0 10E3/UL (ref 0–0.2)
BASOPHILS # BLD AUTO: 0 10E3/UL (ref 0–0.2)
BASOPHILS NFR BLD AUTO: 0 %
BASOPHILS NFR BLD AUTO: 0 %
BILIRUB SERPL-MCNC: 0.6 MG/DL (ref 0.2–1.3)
BILIRUB UR QL STRIP: NEGATIVE
BUN SERPL-MCNC: 14 MG/DL (ref 7–30)
BUN SERPL-MCNC: 16 MG/DL (ref 7–30)
CALCIUM SERPL-MCNC: 8.9 MG/DL (ref 8.5–10.1)
CALCIUM SERPL-MCNC: 9.2 MG/DL (ref 8.5–10.1)
CHLORIDE BLD-SCNC: 82 MMOL/L (ref 94–109)
CHLORIDE BLD-SCNC: 84 MMOL/L (ref 94–109)
CO2 SERPL-SCNC: 29 MMOL/L (ref 20–32)
CO2 SERPL-SCNC: 31 MMOL/L (ref 20–32)
COLOR UR AUTO: ABNORMAL
CREAT SERPL-MCNC: 1.24 MG/DL (ref 0.52–1.04)
CREAT SERPL-MCNC: 1.24 MG/DL (ref 0.52–1.04)
EOSINOPHIL # BLD AUTO: 0 10E3/UL (ref 0–0.7)
EOSINOPHIL # BLD AUTO: 0 10E3/UL (ref 0–0.7)
EOSINOPHIL NFR BLD AUTO: 0 %
EOSINOPHIL NFR BLD AUTO: 0 %
ERYTHROCYTE [DISTWIDTH] IN BLOOD BY AUTOMATED COUNT: 13.5 % (ref 10–15)
ERYTHROCYTE [DISTWIDTH] IN BLOOD BY AUTOMATED COUNT: 13.7 % (ref 10–15)
GFR SERPL CREATININE-BSD FRML MDRD: 42 ML/MIN/1.73M2
GFR SERPL CREATININE-BSD FRML MDRD: 42 ML/MIN/1.73M2
GLUCOSE BLD-MCNC: 126 MG/DL (ref 70–99)
GLUCOSE BLD-MCNC: 139 MG/DL (ref 70–99)
GLUCOSE UR STRIP-MCNC: NEGATIVE MG/DL
HCT VFR BLD AUTO: 27.2 % (ref 35–47)
HCT VFR BLD AUTO: 27.4 % (ref 35–47)
HGB BLD-MCNC: 9.5 G/DL (ref 11.7–15.7)
HGB BLD-MCNC: 9.6 G/DL (ref 11.7–15.7)
HGB UR QL STRIP: ABNORMAL
HOLD SPECIMEN: NORMAL
HOLD SPECIMEN: NORMAL
HYALINE CASTS: 6 /LPF
IMM GRANULOCYTES # BLD: 0 10E3/UL
IMM GRANULOCYTES # BLD: 0 10E3/UL
IMM GRANULOCYTES NFR BLD: 0 %
IMM GRANULOCYTES NFR BLD: 1 %
KETONES UR STRIP-MCNC: NEGATIVE MG/DL
LEUKOCYTE ESTERASE UR QL STRIP: NEGATIVE
LIPASE SERPL-CCNC: 65 U/L (ref 73–393)
LYMPHOCYTES # BLD AUTO: 0.5 10E3/UL (ref 0.8–5.3)
LYMPHOCYTES # BLD AUTO: 0.9 10E3/UL (ref 0.8–5.3)
LYMPHOCYTES NFR BLD AUTO: 12 %
LYMPHOCYTES NFR BLD AUTO: 7 %
MCH RBC QN AUTO: 29.6 PG (ref 26.5–33)
MCH RBC QN AUTO: 29.6 PG (ref 26.5–33)
MCHC RBC AUTO-ENTMCNC: 34.9 G/DL (ref 31.5–36.5)
MCHC RBC AUTO-ENTMCNC: 35 G/DL (ref 31.5–36.5)
MCV RBC AUTO: 85 FL (ref 78–100)
MCV RBC AUTO: 85 FL (ref 78–100)
MONOCYTES # BLD AUTO: 0.6 10E3/UL (ref 0–1.3)
MONOCYTES # BLD AUTO: 1.1 10E3/UL (ref 0–1.3)
MONOCYTES NFR BLD AUTO: 14 %
MONOCYTES NFR BLD AUTO: 7 %
NEUTROPHILS # BLD AUTO: 5.4 10E3/UL (ref 1.6–8.3)
NEUTROPHILS # BLD AUTO: 6.8 10E3/UL (ref 1.6–8.3)
NEUTROPHILS NFR BLD AUTO: 74 %
NEUTROPHILS NFR BLD AUTO: 85 %
NITRATE UR QL: NEGATIVE
NRBC # BLD AUTO: 0 10E3/UL
NRBC # BLD AUTO: 0 10E3/UL
NRBC BLD AUTO-RTO: 0 /100
NRBC BLD AUTO-RTO: 0 /100
PH UR STRIP: 7 [PH] (ref 5–7)
PLATELET # BLD AUTO: 167 10E3/UL (ref 150–450)
PLATELET # BLD AUTO: 195 10E3/UL (ref 150–450)
POTASSIUM BLD-SCNC: 2.9 MMOL/L (ref 3.4–5.3)
POTASSIUM BLD-SCNC: 3.4 MMOL/L (ref 3.4–5.3)
PROT SERPL-MCNC: 6.9 G/DL (ref 6.8–8.8)
RBC # BLD AUTO: 3.21 10E6/UL (ref 3.8–5.2)
RBC # BLD AUTO: 3.24 10E6/UL (ref 3.8–5.2)
RBC URINE: 1 /HPF
SARS-COV-2 RNA RESP QL NAA+PROBE: NEGATIVE
SODIUM SERPL-SCNC: 121 MMOL/L (ref 133–144)
SODIUM SERPL-SCNC: 124 MMOL/L (ref 133–144)
SODIUM SERPL-SCNC: 125 MMOL/L (ref 133–144)
SP GR UR STRIP: 1.01 (ref 1–1.03)
SQUAMOUS EPITHELIAL: <1 /HPF
UROBILINOGEN UR STRIP-MCNC: NORMAL MG/DL
WBC # BLD AUTO: 7.5 10E3/UL (ref 4–11)
WBC # BLD AUTO: 7.9 10E3/UL (ref 4–11)
WBC URINE: 1 /HPF

## 2021-07-14 PROCEDURE — 96374 THER/PROPH/DIAG INJ IV PUSH: CPT | Mod: 59 | Performed by: EMERGENCY MEDICINE

## 2021-07-14 PROCEDURE — 250N000011 HC RX IP 250 OP 636: Performed by: EMERGENCY MEDICINE

## 2021-07-14 PROCEDURE — C9803 HOPD COVID-19 SPEC COLLECT: HCPCS | Performed by: EMERGENCY MEDICINE

## 2021-07-14 PROCEDURE — 87635 SARS-COV-2 COVID-19 AMP PRB: CPT | Performed by: EMERGENCY MEDICINE

## 2021-07-14 PROCEDURE — 81001 URINALYSIS AUTO W/SCOPE: CPT | Performed by: EMERGENCY MEDICINE

## 2021-07-14 PROCEDURE — 83735 ASSAY OF MAGNESIUM: CPT | Performed by: PHYSICIAN ASSISTANT

## 2021-07-14 PROCEDURE — 96376 TX/PRO/DX INJ SAME DRUG ADON: CPT | Performed by: EMERGENCY MEDICINE

## 2021-07-14 PROCEDURE — 36592 COLLECT BLOOD FROM PICC: CPT | Performed by: EMERGENCY MEDICINE

## 2021-07-14 PROCEDURE — 250N000011 HC RX IP 250 OP 636: Performed by: PHYSICIAN ASSISTANT

## 2021-07-14 PROCEDURE — 36415 COLL VENOUS BLD VENIPUNCTURE: CPT | Performed by: PHYSICIAN ASSISTANT

## 2021-07-14 PROCEDURE — 83690 ASSAY OF LIPASE: CPT | Performed by: EMERGENCY MEDICINE

## 2021-07-14 PROCEDURE — 258N000003 HC RX IP 258 OP 636: Performed by: EMERGENCY MEDICINE

## 2021-07-14 PROCEDURE — 250N000013 HC RX MED GY IP 250 OP 250 PS 637: Performed by: PHYSICIAN ASSISTANT

## 2021-07-14 PROCEDURE — 85025 COMPLETE CBC W/AUTO DIFF WBC: CPT | Performed by: EMERGENCY MEDICINE

## 2021-07-14 PROCEDURE — 96361 HYDRATE IV INFUSION ADD-ON: CPT | Performed by: EMERGENCY MEDICINE

## 2021-07-14 PROCEDURE — 80053 COMPREHEN METABOLIC PANEL: CPT | Performed by: EMERGENCY MEDICINE

## 2021-07-14 PROCEDURE — 84295 ASSAY OF SERUM SODIUM: CPT | Performed by: PHYSICIAN ASSISTANT

## 2021-07-14 PROCEDURE — 250N000009 HC RX 250: Performed by: EMERGENCY MEDICINE

## 2021-07-14 PROCEDURE — 36415 COLL VENOUS BLD VENIPUNCTURE: CPT | Performed by: EMERGENCY MEDICINE

## 2021-07-14 PROCEDURE — 82310 ASSAY OF CALCIUM: CPT | Performed by: EMERGENCY MEDICINE

## 2021-07-14 PROCEDURE — 99285 EMERGENCY DEPT VISIT HI MDM: CPT | Mod: 25 | Performed by: EMERGENCY MEDICINE

## 2021-07-14 PROCEDURE — 85025 COMPLETE CBC W/AUTO DIFF WBC: CPT | Performed by: PHYSICIAN ASSISTANT

## 2021-07-14 PROCEDURE — 99285 EMERGENCY DEPT VISIT HI MDM: CPT | Performed by: EMERGENCY MEDICINE

## 2021-07-14 PROCEDURE — 120N000001 HC R&B MED SURG/OB

## 2021-07-14 PROCEDURE — 74177 CT ABD & PELVIS W/CONTRAST: CPT

## 2021-07-14 RX ORDER — NALOXONE HYDROCHLORIDE 0.4 MG/ML
0.4 INJECTION, SOLUTION INTRAMUSCULAR; INTRAVENOUS; SUBCUTANEOUS
Status: DISCONTINUED | OUTPATIENT
Start: 2021-07-14 | End: 2021-07-17 | Stop reason: HOSPADM

## 2021-07-14 RX ORDER — NALOXONE HYDROCHLORIDE 0.4 MG/ML
0.4 INJECTION, SOLUTION INTRAMUSCULAR; INTRAVENOUS; SUBCUTANEOUS
Status: DISCONTINUED | OUTPATIENT
Start: 2021-07-14 | End: 2021-07-15

## 2021-07-14 RX ORDER — SODIUM CHLORIDE 9 MG/ML
INJECTION, SOLUTION INTRAVENOUS CONTINUOUS
Status: DISCONTINUED | OUTPATIENT
Start: 2021-07-14 | End: 2021-07-14

## 2021-07-14 RX ORDER — ACETAMINOPHEN 325 MG/1
325 TABLET ORAL EVERY 6 HOURS PRN
COMMUNITY
End: 2021-07-22

## 2021-07-14 RX ORDER — LIDOCAINE 40 MG/G
CREAM TOPICAL
Status: DISCONTINUED | OUTPATIENT
Start: 2021-07-14 | End: 2021-07-17 | Stop reason: HOSPADM

## 2021-07-14 RX ORDER — ACETAMINOPHEN 325 MG/1
650 TABLET ORAL EVERY 4 HOURS PRN
Status: DISCONTINUED | OUTPATIENT
Start: 2021-07-14 | End: 2021-07-17 | Stop reason: HOSPADM

## 2021-07-14 RX ORDER — HYDROMORPHONE HCL IN WATER/PF 6 MG/30 ML
0.2 PATIENT CONTROLLED ANALGESIA SYRINGE INTRAVENOUS
Status: DISCONTINUED | OUTPATIENT
Start: 2021-07-14 | End: 2021-07-17 | Stop reason: HOSPADM

## 2021-07-14 RX ORDER — PROCHLORPERAZINE 25 MG
12.5 SUPPOSITORY, RECTAL RECTAL EVERY 12 HOURS PRN
Status: DISCONTINUED | OUTPATIENT
Start: 2021-07-14 | End: 2021-07-17 | Stop reason: HOSPADM

## 2021-07-14 RX ORDER — PROCHLORPERAZINE MALEATE 5 MG
5 TABLET ORAL EVERY 6 HOURS PRN
Status: DISCONTINUED | OUTPATIENT
Start: 2021-07-14 | End: 2021-07-17 | Stop reason: HOSPADM

## 2021-07-14 RX ORDER — IOPAMIDOL 755 MG/ML
74 INJECTION, SOLUTION INTRAVASCULAR ONCE
Status: COMPLETED | OUTPATIENT
Start: 2021-07-14 | End: 2021-07-14

## 2021-07-14 RX ORDER — NALOXONE HYDROCHLORIDE 0.4 MG/ML
0.2 INJECTION, SOLUTION INTRAMUSCULAR; INTRAVENOUS; SUBCUTANEOUS
Status: DISCONTINUED | OUTPATIENT
Start: 2021-07-14 | End: 2021-07-15

## 2021-07-14 RX ORDER — OXYCODONE AND ACETAMINOPHEN 5; 325 MG/1; MG/1
1-2 TABLET ORAL EVERY 4 HOURS PRN
Status: DISCONTINUED | OUTPATIENT
Start: 2021-07-14 | End: 2021-07-17 | Stop reason: HOSPADM

## 2021-07-14 RX ORDER — ASPIRIN 81 MG/1
81 TABLET ORAL DAILY
Status: DISCONTINUED | OUTPATIENT
Start: 2021-07-15 | End: 2021-07-17 | Stop reason: HOSPADM

## 2021-07-14 RX ORDER — ONDANSETRON 4 MG/1
4 TABLET, ORALLY DISINTEGRATING ORAL 2 TIMES DAILY PRN
COMMUNITY
Start: 2021-06-30 | End: 2021-07-22

## 2021-07-14 RX ORDER — ONDANSETRON 2 MG/ML
4 INJECTION INTRAMUSCULAR; INTRAVENOUS EVERY 6 HOURS PRN
Status: DISCONTINUED | OUTPATIENT
Start: 2021-07-14 | End: 2021-07-17 | Stop reason: HOSPADM

## 2021-07-14 RX ORDER — NALOXONE HYDROCHLORIDE 0.4 MG/ML
0.2 INJECTION, SOLUTION INTRAMUSCULAR; INTRAVENOUS; SUBCUTANEOUS
Status: DISCONTINUED | OUTPATIENT
Start: 2021-07-14 | End: 2021-07-17 | Stop reason: HOSPADM

## 2021-07-14 RX ORDER — METHIMAZOLE 5 MG/1
2.5 TABLET ORAL DAILY
Status: DISCONTINUED | OUTPATIENT
Start: 2021-07-15 | End: 2021-07-17 | Stop reason: HOSPADM

## 2021-07-14 RX ORDER — ACETAMINOPHEN 325 MG/1
650 TABLET ORAL ONCE
Status: COMPLETED | OUTPATIENT
Start: 2021-07-14 | End: 2021-07-14

## 2021-07-14 RX ORDER — ONDANSETRON 2 MG/ML
4 INJECTION INTRAMUSCULAR; INTRAVENOUS
Status: COMPLETED | OUTPATIENT
Start: 2021-07-14 | End: 2021-07-14

## 2021-07-14 RX ORDER — ONDANSETRON 2 MG/ML
4 INJECTION INTRAMUSCULAR; INTRAVENOUS ONCE
Status: COMPLETED | OUTPATIENT
Start: 2021-07-14 | End: 2021-07-14

## 2021-07-14 RX ORDER — ONDANSETRON 4 MG/1
4 TABLET, ORALLY DISINTEGRATING ORAL EVERY 6 HOURS PRN
Status: DISCONTINUED | OUTPATIENT
Start: 2021-07-14 | End: 2021-07-17 | Stop reason: HOSPADM

## 2021-07-14 RX ADMIN — IOPAMIDOL 74 ML: 755 INJECTION, SOLUTION INTRAVENOUS at 16:01

## 2021-07-14 RX ADMIN — ONDANSETRON 4 MG: 2 INJECTION INTRAMUSCULAR; INTRAVENOUS at 17:53

## 2021-07-14 RX ADMIN — ACETAMINOPHEN 650 MG: 325 TABLET, FILM COATED ORAL at 20:26

## 2021-07-14 RX ADMIN — SODIUM CHLORIDE 58 ML: 9 INJECTION, SOLUTION INTRAVENOUS at 16:01

## 2021-07-14 RX ADMIN — ONDANSETRON 4 MG: 2 INJECTION INTRAMUSCULAR; INTRAVENOUS at 13:47

## 2021-07-14 RX ADMIN — SODIUM CHLORIDE, POTASSIUM CHLORIDE, SODIUM LACTATE AND CALCIUM CHLORIDE 1000 ML: 600; 310; 30; 20 INJECTION, SOLUTION INTRAVENOUS at 13:44

## 2021-07-14 RX ADMIN — PROCHLORPERAZINE EDISYLATE 5 MG: 5 INJECTION INTRAMUSCULAR; INTRAVENOUS at 22:11

## 2021-07-14 NOTE — ED TRIAGE NOTES
Pt took mag citrate today for colonoscopy prep, got the cold sweats and felt like she was going to pass out, states she did not, but got a moment of weakness, tired to lower self to ground, and fell backwards hit head on linoleum.

## 2021-07-14 NOTE — ED NOTES
Pt presents to ED with concerns of loss of consciousness. Notes she was preparing for a colonoscopy today, no food since Sun. Did double prep due to history. Pt notes she has not had a bowel movement with the prep. Pt notes she did have some emesis and then stood up and lost consciousness. States she did hit her head. Denies any injury due to the fall. Notes she's had increased constipation/difficult bowel movements for the last few months.

## 2021-07-14 NOTE — TELEPHONE ENCOUNTER
Reason for call:  Patient reporting a symptom    Symptom or request: Pt is doing a colonoscopy prep the last 2 days and cancelled the colonoscopy for today because she feels she has a blockage, due to pain and she is very nauseated.  Please call patient and advise.      Duration (how long have symptoms been present): ongoing    Have you been treated for this before? Yes    Additional comments:     Phone Number patient can be reached at:  Cell number on file:    Telephone Information:   Mobile [phone]     Best Time:  any    Can we leave a detailed message on this number:  YES    Call taken on 7/14/2021 at 10:59 AM by Casandra Dukes

## 2021-07-14 NOTE — TELEPHONE ENCOUNTER
"Patient was called, she reports she has constipation concerns for the past 2 months and was to have a colonoscopy today but this was cancelled by the patient as she says she took the mag citrate and threw up, got dizzy and fell and hit her head. Patient says she did not loose consciousness, but is having severe nausea, feels very weak and reports sever abdominal discomfort due to the prep.    Patient is advised to seek the ER due to fell and hit her head and to rule out bowel blockage as the patient reports has not had a Bowel movement in 3 days and severe constipation for the past 2 months. Patient says she does not have bumps or bruises on her head, says she bumped it on the floor.      Reason for Disposition    Patient sounds very sick or weak to the triager    Answer Assessment - Initial Assessment Questions  1. LOCATION: \"Where does it hurt?\"       Patient reports nausea and upset stomach since starting colonoscopy prep and has been dealing with constipation for 2 months  2. RADIATION: \"Does the pain shoot anywhere else?\" (e.g., chest, back)      Did not report  3. ONSET: \"When did the pain begin?\" (e.g., minutes, hours or days ago)       3 days ago  4. SUDDEN: \"Gradual or sudden onset?\"      gradula  5. PATTERN \"Does the pain come and go, or is it constant?\"     - If constant: \"Is it getting better, staying the same, or worsening?\"       (Note: Constant means the pain never goes away completely; most serious pain is constant and it progresses)      - If intermittent: \"How long does it last?\" \"Do you have pain now?\"      (Note: Intermittent means the pain goes away completely between bouts)      Constant  6. SEVERITY: \"How bad is the pain?\"  (e.g., Scale 1-10; mild, moderate, or severe)    - MILD (1-3): doesn't interfere with normal activities, abdomen soft and not tender to touch     - MODERATE (4-7): interferes with normal activities or awakens from sleep, tender to touch     - SEVERE (8-10): excruciating " "pain, doubled over, unable to do any normal activities       moderate  7. RECURRENT SYMPTOM: \"Have you ever had this type of abdominal pain before?\" If so, ask: \"When was the last time?\" and \"What happened that time?\"       Has constipation concerns  8. CAUSE: \"What do you think is causing the abdominal pain?\"      Patient is worried about a blockage  9. RELIEVING/AGGRAVATING FACTORS: \"What makes it better or worse?\" (e.g., movement, antacids, bowel movement)      Colonoscopy prep  10. OTHER SYMPTOMS: \"Has there been any vomiting, diarrhea, constipation, or urine problems?\"        Patient reports she took the mag citrate and threw up, got dizzy and lightheaded and fell and hit her head, severely nauseated  11. PREGNANCY: \"Is there any chance you are pregnant?\" \"When was your last menstrual period?\"        na    Protocols used: ABDOMINAL PAIN - FEMALE-A-OH      MARÍA Espana    "

## 2021-07-15 PROBLEM — K59.09 CHRONIC CONSTIPATION: Status: ACTIVE | Noted: 2021-07-15

## 2021-07-15 PROBLEM — E87.6 HYPOKALEMIA: Status: ACTIVE | Noted: 2021-07-15

## 2021-07-15 PROBLEM — E87.1 HYPONATREMIA: Status: ACTIVE | Noted: 2021-07-15

## 2021-07-15 LAB
ALBUMIN UR-MCNC: NEGATIVE MG/DL
ANION GAP SERPL CALCULATED.3IONS-SCNC: 18 MMOL/L (ref 3–14)
ANION GAP SERPL CALCULATED.3IONS-SCNC: 9 MMOL/L (ref 3–14)
APPEARANCE UR: ABNORMAL
BACTERIA #/AREA URNS HPF: ABNORMAL /HPF
BILIRUB UR QL STRIP: NEGATIVE
BUN SERPL-MCNC: 14 MG/DL (ref 7–30)
BUN SERPL-MCNC: 15 MG/DL (ref 7–30)
CALCIUM SERPL-MCNC: 8 MG/DL (ref 8.5–10.1)
CALCIUM SERPL-MCNC: 8.6 MG/DL (ref 8.5–10.1)
CHLORIDE BLD-SCNC: 82 MMOL/L (ref 94–109)
CHLORIDE BLD-SCNC: 87 MMOL/L (ref 94–109)
CO2 SERPL-SCNC: 23 MMOL/L (ref 20–32)
CO2 SERPL-SCNC: 28 MMOL/L (ref 20–32)
COLOR UR AUTO: ABNORMAL
CREAT SERPL-MCNC: 1.29 MG/DL (ref 0.52–1.04)
CREAT SERPL-MCNC: 1.39 MG/DL (ref 0.52–1.04)
ERYTHROCYTE [DISTWIDTH] IN BLOOD BY AUTOMATED COUNT: 13.7 % (ref 10–15)
GFR SERPL CREATININE-BSD FRML MDRD: 37 ML/MIN/1.73M2
GFR SERPL CREATININE-BSD FRML MDRD: 40 ML/MIN/1.73M2
GLUCOSE BLD-MCNC: 114 MG/DL (ref 70–99)
GLUCOSE BLD-MCNC: 122 MG/DL (ref 70–99)
GLUCOSE UR STRIP-MCNC: NEGATIVE MG/DL
HCT VFR BLD AUTO: 25.8 % (ref 35–47)
HGB BLD-MCNC: 8.9 G/DL (ref 11.7–15.7)
HGB UR QL STRIP: NEGATIVE
KETONES UR STRIP-MCNC: NEGATIVE MG/DL
LEUKOCYTE ESTERASE UR QL STRIP: NEGATIVE
MAGNESIUM SERPL-MCNC: 2.1 MG/DL (ref 1.6–2.3)
MCH RBC QN AUTO: 29.4 PG (ref 26.5–33)
MCHC RBC AUTO-ENTMCNC: 34.5 G/DL (ref 31.5–36.5)
MCV RBC AUTO: 85 FL (ref 78–100)
NITRATE UR QL: POSITIVE
OSMOLALITY UR: 354 MMOL/KG (ref 100–1200)
PH UR STRIP: 7 [PH] (ref 5–7)
PLATELET # BLD AUTO: 169 10E3/UL (ref 150–450)
POTASSIUM BLD-SCNC: 2.6 MMOL/L (ref 3.4–5.3)
POTASSIUM BLD-SCNC: 3 MMOL/L (ref 3.4–5.3)
POTASSIUM BLD-SCNC: 3.4 MMOL/L (ref 3.4–5.3)
POTASSIUM BLD-SCNC: 3.4 MMOL/L (ref 3.4–5.3)
POTASSIUM BLD-SCNC: 4.4 MMOL/L (ref 3.4–5.3)
RBC # BLD AUTO: 3.03 10E6/UL (ref 3.8–5.2)
RBC URINE: <1 /HPF
SODIUM SERPL-SCNC: 123 MMOL/L (ref 133–144)
SODIUM SERPL-SCNC: 123 MMOL/L (ref 133–144)
SODIUM SERPL-SCNC: 124 MMOL/L (ref 133–144)
SP GR UR STRIP: 1.01 (ref 1–1.03)
SQUAMOUS EPITHELIAL: 2 /HPF
UROBILINOGEN UR STRIP-MCNC: NORMAL MG/DL
WBC # BLD AUTO: 5.7 10E3/UL (ref 4–11)
WBC URINE: 2 /HPF

## 2021-07-15 PROCEDURE — 258N000003 HC RX IP 258 OP 636: Performed by: PHYSICIAN ASSISTANT

## 2021-07-15 PROCEDURE — 99207 PR APP CREDIT; MD BILLING SHARED VISIT: CPT | Performed by: PHYSICIAN ASSISTANT

## 2021-07-15 PROCEDURE — 99223 1ST HOSP IP/OBS HIGH 75: CPT | Mod: AI | Performed by: INTERNAL MEDICINE

## 2021-07-15 PROCEDURE — 250N000013 HC RX MED GY IP 250 OP 250 PS 637: Performed by: PHYSICIAN ASSISTANT

## 2021-07-15 PROCEDURE — 83935 ASSAY OF URINE OSMOLALITY: CPT | Performed by: PHYSICIAN ASSISTANT

## 2021-07-15 PROCEDURE — 85014 HEMATOCRIT: CPT | Performed by: PHYSICIAN ASSISTANT

## 2021-07-15 PROCEDURE — 120N000001 HC R&B MED SURG/OB

## 2021-07-15 PROCEDURE — 81003 URINALYSIS AUTO W/O SCOPE: CPT | Performed by: PHYSICIAN ASSISTANT

## 2021-07-15 PROCEDURE — 36415 COLL VENOUS BLD VENIPUNCTURE: CPT | Performed by: INTERNAL MEDICINE

## 2021-07-15 PROCEDURE — 99207 PR CDG-MDM COMPONENT: MEETS HIGH - UP CODED: CPT | Performed by: INTERNAL MEDICINE

## 2021-07-15 PROCEDURE — 80048 BASIC METABOLIC PNL TOTAL CA: CPT | Performed by: INTERNAL MEDICINE

## 2021-07-15 PROCEDURE — 250N000013 HC RX MED GY IP 250 OP 250 PS 637: Performed by: INTERNAL MEDICINE

## 2021-07-15 PROCEDURE — 84132 ASSAY OF SERUM POTASSIUM: CPT | Performed by: INTERNAL MEDICINE

## 2021-07-15 PROCEDURE — 84295 ASSAY OF SERUM SODIUM: CPT | Performed by: PHYSICIAN ASSISTANT

## 2021-07-15 PROCEDURE — 87086 URINE CULTURE/COLONY COUNT: CPT | Performed by: PHYSICIAN ASSISTANT

## 2021-07-15 PROCEDURE — 93005 ELECTROCARDIOGRAM TRACING: CPT

## 2021-07-15 RX ORDER — POTASSIUM CHLORIDE 1500 MG/1
20 TABLET, EXTENDED RELEASE ORAL ONCE
Status: COMPLETED | OUTPATIENT
Start: 2021-07-15 | End: 2021-07-15

## 2021-07-15 RX ORDER — POTASSIUM CHLORIDE 1500 MG/1
40 TABLET, EXTENDED RELEASE ORAL ONCE
Status: COMPLETED | OUTPATIENT
Start: 2021-07-15 | End: 2021-07-15

## 2021-07-15 RX ORDER — IPRATROPIUM BROMIDE AND ALBUTEROL SULFATE 2.5; .5 MG/3ML; MG/3ML
3 SOLUTION RESPIRATORY (INHALATION) EVERY 4 HOURS PRN
Status: DISCONTINUED | OUTPATIENT
Start: 2021-07-15 | End: 2021-07-17 | Stop reason: HOSPADM

## 2021-07-15 RX ORDER — ROSUVASTATIN CALCIUM 5 MG/1
5 TABLET, COATED ORAL EVERY EVENING
Status: DISCONTINUED | OUTPATIENT
Start: 2021-07-15 | End: 2021-07-17 | Stop reason: HOSPADM

## 2021-07-15 RX ORDER — AMLODIPINE BESYLATE 5 MG/1
5 TABLET ORAL DAILY
Status: DISCONTINUED | OUTPATIENT
Start: 2021-07-15 | End: 2021-07-17 | Stop reason: HOSPADM

## 2021-07-15 RX ORDER — LISINOPRIL 40 MG/1
40 TABLET ORAL DAILY
Status: DISCONTINUED | OUTPATIENT
Start: 2021-07-15 | End: 2021-07-17 | Stop reason: HOSPADM

## 2021-07-15 RX ORDER — IMATINIB MESYLATE 400 MG/1
400 TABLET, FILM COATED ORAL DAILY
Status: DISCONTINUED | OUTPATIENT
Start: 2021-07-15 | End: 2021-07-17 | Stop reason: HOSPADM

## 2021-07-15 RX ORDER — SODIUM CHLORIDE 9 MG/ML
INJECTION, SOLUTION INTRAVENOUS CONTINUOUS
Status: DISCONTINUED | OUTPATIENT
Start: 2021-07-15 | End: 2021-07-15

## 2021-07-15 RX ORDER — METOPROLOL TARTRATE 100 MG
100 TABLET ORAL 2 TIMES DAILY
Status: DISCONTINUED | OUTPATIENT
Start: 2021-07-15 | End: 2021-07-17 | Stop reason: HOSPADM

## 2021-07-15 RX ADMIN — ASPIRIN 81 MG: 81 TABLET, COATED ORAL at 08:07

## 2021-07-15 RX ADMIN — METOPROLOL TARTRATE 100 MG: 100 TABLET, FILM COATED ORAL at 08:07

## 2021-07-15 RX ADMIN — POTASSIUM CHLORIDE 40 MEQ: 20 TABLET, EXTENDED RELEASE ORAL at 02:01

## 2021-07-15 RX ADMIN — Medication 2.5 MG: at 08:07

## 2021-07-15 RX ADMIN — LISINOPRIL 40 MG: 40 TABLET ORAL at 14:46

## 2021-07-15 RX ADMIN — POTASSIUM CHLORIDE 20 MEQ: 20 TABLET, EXTENDED RELEASE ORAL at 11:32

## 2021-07-15 RX ADMIN — METOPROLOL TARTRATE 100 MG: 100 TABLET, FILM COATED ORAL at 21:02

## 2021-07-15 RX ADMIN — AMLODIPINE BESYLATE 5 MG: 5 TABLET ORAL at 14:46

## 2021-07-15 RX ADMIN — SODIUM CHLORIDE: 9 INJECTION, SOLUTION INTRAVENOUS at 00:26

## 2021-07-15 RX ADMIN — ROSUVASTATIN CALCIUM 5 MG: 5 TABLET, FILM COATED ORAL at 17:23

## 2021-07-15 RX ADMIN — POTASSIUM CHLORIDE 40 MEQ: 20 TABLET, EXTENDED RELEASE ORAL at 08:07

## 2021-07-15 RX ADMIN — ACETAMINOPHEN 650 MG: 325 TABLET, FILM COATED ORAL at 14:47

## 2021-07-15 RX ADMIN — POTASSIUM CHLORIDE 20 MEQ: 20 TABLET, EXTENDED RELEASE ORAL at 04:32

## 2021-07-15 RX ADMIN — Medication 1 MG: at 22:44

## 2021-07-15 ASSESSMENT — ACTIVITIES OF DAILY LIVING (ADL)
ADLS_ACUITY_SCORE: 15
ADLS_ACUITY_SCORE: 14
ADLS_ACUITY_SCORE: 14
ADLS_ACUITY_SCORE: 11
ADLS_ACUITY_SCORE: 15
ADLS_ACUITY_SCORE: 15

## 2021-07-15 NOTE — PROGRESS NOTES
Potassium replacement initiated. Per protocol with K+2.6, 40meq x 1, provider notified, NITZA Gonsalves gave verbal order for additional potassium 20meq x1 two hours following initial dose. Order entered.    ESAU Jennings RN

## 2021-07-15 NOTE — PLAN OF CARE
Patient had no further stools, since this am. Bladder scanned as was unable to urinate and complains that she doesn't seem to empty her bladder completely. Reports that after she voids she has to go back into the bathroom. Order placed to bladder scan patient and she scanned at 436. Did go to the bathroom and proceeded to urinate 250 ml of cloudy and odorous urine. Specimen sent to the lab. Ordered to straight cath for bladder scan over 400 ml x1 and place khan if residual over 400 ml. Potassium after replacement is 4.4. The sodium came back at 124. 1200 ml FR placed. Patient has been drinking fluids today. Plan also is to do orthostatics every shift. Will monitor.

## 2021-07-15 NOTE — PLAN OF CARE
AOx4, up w/SBA to commode void, one episode of diarrhea this shift. Critical potassium replaced per protocol x 3, next recheck due at 1030, order entered. PRN compazine x 1 for c/o ongoing nausea, NS @ 75ml/hr, denies pain, VSS. Rested comfortably throughout shift.

## 2021-07-15 NOTE — PROGRESS NOTES
[facility] [facility] ADMISSION NOTE    Patient admitted to room 2310 at approximately 2130 via cart from emergency room. Patient was accompanied by transport tech.     Verbal SBAR report received from MARÍA Bourgeois prior to patient arrival.     Patient ambulated to bed with stand-by assist. Patient alert and oriented X 3. The patient is not having any pain.  . Admission vital signs: Blood pressure 122/60, pulse 82, temperature 97.9  F (36.6  C), temperature source Oral, resp. rate 18, weight 68.9 kg (152 lb), last menstrual period 01/01/1992, SpO2 98 %, not currently breastfeeding. Patient was oriented to plan of care, call light, bed controls, tv, telephone, bathroom and visiting hours.     Risk Assessment    The following safety risks were identified during admission: fall, skin and isolation. Yellow risk band applied: NO.     Skin Initial Assessment    This writer admitted this patient and completed a full skin assessment and Chad score in the Adult PCS flowsheet. Appropriate interventions initiated as needed.     Secondary full skin check declined.    Chad Risk Assessment  Sensory Perception: 4-->no impairment  Moisture: 4-->rarely moist  Activity: 3-->walks occasionally  Mobility: 3-->slightly limited  Nutrition: 3-->adequate  Friction and Shear: 3-->no apparent problem  Chad Score: 20  Mattress: Standard Hospital Mattress (Foam)  Bed Frame: Standard width and length    Education    Patient has a [place] to Observation order: No  Observation education completed and documented: N/A      Vidhi Jennings RN

## 2021-07-15 NOTE — H&P
[facility]    History and Physical - Hospitalist Service       Date of Admission:  7/14/2021    Assessment & Plan      Idalmis Abdul is a 75 year old female admitted on 7/14/2021. She presented to the emergency department for evaluation of syncopal episode and was found to have hypokalemia and hyponatremia for which she is being admitted for further evaluation and treatment.    Hyponatremia  Admit sodium 121. Occurs in the setting of undergoing bowel prep for colonoscopy, drinking prep and fluids and not eating any food. Possibly due to rapid fluid intake with bowel prep vs SIADH; she is also on a thiazide which may be contributing.   Was given 1L LR in the emergency department, then maintenance NS @ 75 ml/hr.  Goal sodium not to exceed 129 by 1 pm 7/15/2021.   Sodium trend:  121 -- 125 -- 124 -- 125  - Check sodium q 6 hours  - Maintenance NS stopped late morning on 7/15/2021   - Holding prior to admission hydrochlorothiazide   - Urine sodium and urine osmolality pending    Hypokalemia  Admit K = 2.9. Had been NPO x 3 days prior to admission and undergoing bowel prep for colonoscopy (although had not had much stool output).   Potassium levels improving with replacement.  - K protocol    Syncope   Fall, initial encounter  Non-intractable vomiting with nausea, unspecified vomiting type  Had a syncopal event at home immediately after vomiting. Did fall and bump her head with brief loss of consciousness. Event sounds vasovagal in nature due to vomiting.   - Monitor on telemetry   - EKG pending  - No indication for head CT at this time    CKD (chronic kidney disease) stage 3, GFR 30-59 ml/min  Admit creatinine 1.24 (baseline 1.12 - 1.24), GFR 42 (baseline 42 - 48). Relatively stable.  - BMP in am     Anemia, chronic   Admit hemoglobin 9.6, baseline 9.3 - 10.2. Stable. No evidence of acute blood loss.  - CBC in am     Chronic constipation  Longstanding problem, was undergoing bowel prep  for colonoscopy for further evaluation. Patient is concerned about a possible obstructive polyp. She was unsuccessful with having bowel movements at home with bowel prep, but has since started passing some stool.  CT abdomen & pelvis was negative for any acute findings, no obstructive findings.   - No further bowel prep, hold off on a bowel regimen for now  - No plans to pursue colonoscopy during current hospitalization    Hypertension goal BP (blood pressure) < 140/90  Managed prior to admission with amlodipine 5 mg daily, hydrochlorothiazide 25 mg daily, lisinopril 40 mg daily, and metoprolol 100 mg bid.  - Hold hydrochlorothiazide due to hyponatremia  - Continue metoprolol, amlodipine, and lisinopril with holding parameters    ASCVD (arteriosclerotic cardiovascular disease)  S/P CABG x 4  History of non-ST elevation myocardial infarction (NSTEMI)  History CABG in 2012 complicated by post-op pericardial hematoma and tamponade s/p pericardial window. Follows with cardiology Dr. Herman, who recommended an upcoming dobutamine stress echo at last appointment 5/12/21. Managed prior to admission with aspirin 81 mg daily, ACEi, beta blocker, CCB, and statin.   - Continue prior to admission aspirin, statin, beta blocker, ACEi, and CCB  - Continue with outpatient management    GIST (gastrointestinal stromal tumor), malignant  Known history of GIST diagnosed in Sep 2003, s/p incomplete resection and re-resection in Oct 2003. Currently follows with [facility] oncology and managed on Gleevec 400 mg daily.  - Continue Gleevec  - Continue with outpatient management    Mixed simple and mucopurulent chronic bronchitis  Stable respiratory status. Managed prior to admission with AirDuo bid and Spiriva daily.  - Hold AirDuo and Spiriva  - Prn DuoNebs available    Hyperthyroidism  Graves' disease  Managed prior to admission with methimazole 2.5 mg daily, continue.     History of lung cancer  Diagnosed in 201, s/p VATS wedge resection. Follows  "with [facility] oncology.  - Continue with outpatient surveillance     History of tobacco abuse  Quit smoking a few years ago. No nicotine replacement needed.    COVID status  COVID PCR negative 7/14/21.  Completed Moderna vaccine series as of 4/16/21         Diet: Regular Diet Adult    DVT Prophylaxis: Pneumatic Compression Devices  Garsia Catheter: Not present  Central Lines: None  Code Status: Full Code  - discussed with patient on admission      Disposition Plan   Expected discharge: 1-2 days recommended to prior living arrangement once electrolytes normalize, stable vitals, tolerating oral intake.     The patient's care was discussed with the Attending Physician, Dr. Jonathon Peralta, Patient and discussed during interdisciplinary rounds.    Bety Abraham PA-C  [facility]  Securely message with the Vocera Web Console (learn more here)  Text page via [facility] Paging/Directory      ______________________________________________________________________    Chief Complaint   \"I fainted at home after I vomited.\"    History is obtained from the patient, review of EMR, and emergency department documentation.    History of Present Illness   Idalmis Abdul is a 75 year old female who presented to the emergency department for evaluation of syncope after vomiting.    Patient has ongoing constipation problems and was undergoing bowel prep for an upcoming colonoscopy.  She had not had anything to eat for 3 days (since 7/11), but had been drinking fluids with her bowel prep.  She was not having any stool output with the bowel prep until 2 small bowel movements at home on 7/14.  She took some magnesium citrate and about 1 hour later became nauseated and started to vomit.   She got up to go to the kitchen to vomit into the garbage, became lightheaded, and fell to the floor.  She did lightly hit her head and thinks she may have had a brief loss of consciousness.  She had to lay on the floor for a while " "until her dizziness resolved.  She then presented to the emergency department where work-up shows hypokalemia and hyponatremia.    She says her emesis was brown colored and stool is watery brown, no obvious blood / melena / hematemesis / hematochezia.   She has since had 3 more loose watery stools since arrival to the hospital. No further vomiting but does continue to have some nausea.   She denies any abdominal pain.    On review of systems she notes some chronic shortness of breath due to COPD, no changes from baseline.   She notes some feelings of incomplete bladder emptying, no dysuria.   She currently has a headache and feels generally weak.   She has chronic back pain and \"aches and pains\" that she attributes to old age, but no changes from baseline.        Review of Systems    The 10 point Review of Systems is negative other than noted in the HPI or here.    Past Medical History    I have reviewed this patient's medical history and updated it with pertinent information if needed.   Past Medical History:   Diagnosis Date     ASCVD (arteriosclerotic cardiovascular disease) 6/14/2012     Benign neoplasm of duodenum, jejunum, and ileum 2003, 2007    Gastrointestinal Stromal Tumor, seen at [facility], Dr. Schmitz, GI oncology-Gleevec treatment.  Surgical removal in fall 2004-no recurrence as of 3/05.     CA - lung cancer 4/2010    [facility], treated surgically     choledochal cyst 1963    CHOLEDOCHAL CYST, mild dilatation of biliary system     CKD (chronic kidney disease) stage 3, GFR 30-59 ml/min 5/23/2019     Coronary artery disease      DDD (degenerative disc disease), lumbar 3/22/2018     Dislocated finger, left middle PIP 7/26/2013     Dyspnea 8/27/2013     Eyelid edema 12/15/2011    side effect of gastric cancer medication      GERD (gastroesophageal reflux disease) 10/8/2013     GIST (gastrointestinal stromal tumor), malignant (H) 5/10/2011     Graves disease      Grief reaction 5/11/2009     History of lung cancer " 12/15/2011    S/p resection of right lung.  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  Past Surgical History:   Procedure Laterality Date     BLEPHAROPLASTY BILATERAL Bilateral 7/17/2019    Procedure: Bilateral Upper Eyelid Blepharoplasty;  Surgeon: Vasile Brasher MD;  Location:  OR     BYPASS GRAFT ARTERY CORONARY  6/14/2012    Procedure: BYPASS GRAFT ARTERY CORONARY;  Median Sternotomy Coronary Artery Bypass Graft  x 3        C THORACOSCOPY,DX W BX  fall 2003    benign tissue     CATARACT IOL, RT/LT      LE     CHOLECYSTECTOMY  1963     COLONOSCOPY  4-12-05     CORONARY ARTERY BYPASS      X4     CREATION PERICARDIAL WINDOW  6/15/2012    Procedure: CREATION PERICARDIAL WINDOW;  Mediastinal Exploration, Control of Bleeding;  Surgeon: Dwaine Griffin MD;  Location:  OR     EYE SURGERY  2014    left cataract removal     GI SURGERY  2003 and 2007    GIST tumor removal     GYN SURGERY      tubal ligation     HYSTERECTOMY VAGINAL, COLPORRHAPHY ANTERIOR, POSTERIOR, COMBINED N/A 10/17/2017    Procedure: COMBINED HYSTERECTOMY VAGINAL, COLPORRHAPHY ANTERIOR, POSTERIOR;  Total Vaginal Hysterectomy and Posterior  Repair,Sacrospinous Vault Suspension;  Surgeon: Ruth Farooq MD;  Location: [facility] OR     PHACOEMULSIFICATION WITH STANDARD INTRAOCULAR LENS IMPLANT  3/24/2014    Procedure: PHACOEMULSIFICATION WITH STANDARD INTRAOCULAR LENS IMPLANT;  Left Kelman Phacoemulsification with Intraocular Lens Implant;  Surgeon: Magnus Mckeon MD;  Location: [facility] OR     RECESSION RESECTION WITH ADJUSTABLE SUTURE BILATERAL Bilateral 2016    Procedure: RECESSION RESECTION WITH ADJUSTABLE SUTURE BILATERAL;  Surgeon: Erma Ordaz MD;  Location: [facility] OR     REPAIR ENTROPION Right 2019    Procedure: Right Upper Lid Entropion Repair;  Surgeon: Vasile Brasher MD;  Location: [facility]     REPAIR RETRACTION LID BILATERAL Bilateral 2019    Procedure: Bilateral Upper Eyelid Retraction Repair;  Surgeon: Vasile Brasher MD;  Location: [facility] OR     SURGICAL HISTORY OF -       cholecystectomy     SURGICAL HISTORY OF -       Tubal Ligation      SURGICAL HISTORY OF -       Explor. Lap, Excision of Small Bowel Tumor      SURGICAL HISTORY OF -   2010    lung cancer removed right lung       Social History   I have reviewed this patient's social history and updated it with pertinent information if needed.  Social History     Tobacco Use     Smoking status: Former Smoker     Packs/day: 0.50     Years: 49.00     Pack years: 24.50     Types: Cigarettes     Quit date: 2010     Years since quittin.2     Smokeless tobacco: Never Used   Substance Use Topics     Alcohol use: No     Drug use: No       Family History   I have reviewed this patient's family history and updated it with pertinent information if needed.  Family History   Problem Relation Age of Onset     Heart Disease Mother      Osteoporosis Mother      Respiratory Mother         Emphezyma     Hypertension Mother      Heart Disease Father      Alcohol/Drug Father      Hypertension Father      Hypertension Maternal Grandmother      Hypertension  Brother      Hypertension Sister      Arthritis Sister      Hypertension Son      Cancer Son         rectal       Prior to Admission Medications   Prior to Admission Medications   Prescriptions Last Dose Informant Patient Reported? Taking?   Blood Pressure Monitoring (ADULT BLOOD PRESSURE CUFF LG) KIT  Self No No   Si Device 2 times daily   acetaminophen (TYLENOL) 325 MG tablet Past Week at Unknown time Self Yes Yes   Sig: Take 325 mg by mouth every 6 hours as needed for headaches   amLODIPine (NORVASC) 5 MG tablet 2021 at 1900 Self No Yes   Sig: Take 1 tablet (5 mg) by mouth daily   aspirin EC 81 MG EC tablet 2021 at 0800 Self No Yes   Sig: Take 1 tablet by mouth daily.   fluticasone-salmeterol (AIRDUO RESPICLICK) 113-14 MCG/ACT inhaler 2021 at 0815 Self No Yes   Sig: Inhale 1 puff into the lungs 2 times daily   hydrochlorothiazide (HYDRODIURIL) 25 MG tablet 2021 at 0800 Self No Yes   Sig: Take 1 tablet (25 mg) by mouth daily   imatinib (GLEEVEC) 400 MG tablet 2021 at 0800 Self No Yes   Sig: Take 1 tablet (400 mg) by mouth daily Take with a meal and a large glass of water.   lisinopril (ZESTRIL) 40 MG tablet 2021 at 0800 Self No Yes   Sig: TAKE ONE TABLET BY MOUTH DAILY   Patient taking differently: Take 40 mg by mouth daily    melatonin 5 MG tablet 2021 at hs Self Yes Yes   Sig: Take 5 mg by mouth nightly as needed for sleep   methimazole (TAPAZOLE) 5 MG tablet 2021 at 0800 Self No Yes   Sig: Take 0.5 tablets (2.5 mg) by mouth daily   metoprolol tartrate (LOPRESSOR) 100 MG tablet 2021 at 0800 Self No Yes   Sig: TAKE ONE TABLET BY MOUTH TWICE A DAY   Patient taking differently: Take 100 mg by mouth 2 times daily    ondansetron (ZOFRAN-ODT) 4 MG ODT tab 2021 at Unknown time Self Yes Yes   Sig: Take 4 mg by mouth 2 times daily as needed for nausea   polyethylene glycol (MIRALAX/GLYCOLAX) powder 2021 at 1800 Self Yes Yes   Sig: Take 17 g by mouth daily    rosuvastatin (CRESTOR) 5 MG tablet 7/14/2021 at 1900 Self No Yes   Sig: Taking every other day.   Patient taking differently: Take 5 mg by mouth daily    tiotropium (SPIRIVA) 18 MCG inhaled capsule 7/14/2021 at 0800 Self No Yes   Sig: Inhale 1 capsule (18 mcg) into the lungs daily      Facility-Administered Medications: None     Allergies   Allergies   Allergen Reactions     Atorvastatin Cramps and Unknown     cramps         Physical Exam   Vital Signs: Temp: 97.7  F (36.5  C) Temp src: Oral BP: 106/47 Pulse: 70   Resp: 17 SpO2: 97 % O2 Device: None (Room air)    Weight: 152 lbs 0 oz    Constitutional: Alert, oriented, cooperative. No apparent distress, appears nontoxic. Speaking in full sentences.     Eyes: Eyes are clear, pupils are reactive. No scleral icterus.    HEENT: Oropharynx is clear and moist, no lesions. Normocephalic, no evidence of cranial trauma.      Cardiovascular: Regular rhythm and rate, normal S1 and S2. No murmur, rubs, or gallops. Peripheral pulses intact bilaterally. No lower extremity edema.    Respiratory: Lung sounds are clear to auscultation bilaterally without wheezes, rhonchi, or crackles.    GI: Soft, non-distended. Non-tender, no rebound or guarding. No hepatosplenomegaly or masses appreciated. Normal bowel sounds.     Musculoskeletal: Without obvious deformity, normal range of motion. Normal muscle bulk and tone. Distal CMS intact.      Skin: Warm and dry, no rashes or ecchymoses. No mottling of skin.      Neurologic: Patient moves all extremities. Gross strength and sensation are equal bilaterally.    Genitourinary: Deferred      Data   Data reviewed today: I reviewed all medications, new labs and imaging results over the last 24 hours. I personally reviewed the abdominal CT image(s) showing distended bladder, no obvious bowel obstructive process.    Recent Labs   Lab 07/15/21  0933 07/15/21  0508 07/14/21  2233 07/14/21  1738 07/14/21  1342   WBC  --  5.7 7.5  --  7.9   HGB  --   8.9* 9.5*  --  9.6*   MCV  --  85 85  --  85   PLT  --  169 167  --  195   *  --  123*  124* 125* 121*   POTASSIUM 3.4  3.4 3.0* 2.6* 3.4 2.9*   CHLORIDE 87*  --  82* 84* 82*   CO2 28  --  37* 31 29   BUN 15  --  14 14 16   CR 1.39*  --  1.29* 1.24* 1.24*   ANIONGAP 9  --  4 10 10   NAEEM 8.0*  --  8.6 8.9 9.2   *  --  114* 126* 139*   ALBUMIN  --   --   --   --  3.8   PROTTOTAL  --   --   --   --  6.9   BILITOTAL  --   --   --   --  0.6   ALKPHOS  --   --   --   --  46   ALT  --   --   --   --  33   AST  --   --   --   --  54*   LIPASE  --   --   --  65*  --      Recent Results (from the past 24 hour(s))   CT Abdomen Pelvis w Contrast    Narrative    CT ABDOMEN AND PELVIS WITH CONTRAST 7/14/2021 4:11 PM    CLINICAL HISTORY: Abdominal pain, acute, nonlocalized.    TECHNIQUE: CT scan of the abdomen and pelvis was performed following  injection of IV contrast. Multiplanar reformats were obtained. Dose  reduction techniques were used.  CONTRAST: 74 mL Isovue 370    COMPARISON: None.    FINDINGS:   LOWER CHEST: Minimal interstitial changes bilaterally.    HEPATOBILIARY: No significant mass or bile duct dilatation.  Cholecystectomy. There is some prominence to the biliary system, which  is common post cholecystectomy and may not have clinical significance.  Low-attenuation subcentimeter liver lesion(s) compatible with benign  cysts or other benign lesions. No specific evaluation or follow-up is  recommended in a low risk patient.    PANCREAS: No significant mass, duct dilatation, or inflammatory  change.    SPLEEN: Normal size.    ADRENAL GLANDS: No significant nodules.    KIDNEYS/BLADDER: No significant mass, stones, or hydronephrosis.    BOWEL: No obstruction or inflammatory change.    PELVIC ORGANS: No pelvic masses.    ADDITIONAL FINDINGS: No ascites.    There are extensive atherosclerotic changes of the visualized aorta  and its branches. There is no evidence of aortic dissection  or  aneurysm.    MUSCULOSKELETAL: Survey of the visualized bony structures demonstrates  no destructive bony lesions. Thoracolumbar scoliosis.      Impression    IMPRESSION: No acute process demonstrated in the abdomen and pelvis.    VISHAL GUPTA MD         SYSTEM ID:  [id number]

## 2021-07-15 NOTE — PLAN OF CARE
Patient has no complaints of dizziness or lightheadedness. Having loose brown stools, but not clear or watery. No complaints of nausea. Tolerating regular diet. Up in chair. Ambulated in room. On tele NSR. Replacing potassium. Vitals are stable. IVF infusing. Will monitor.

## 2021-07-15 NOTE — ED PROVIDER NOTES
History     Chief Complaint   Patient presents with     Fall     hit back of head, no LOC     HPI  Idalmis Abdul is a 75 year old female with past medical history significant for chronic kidney disease degenerative joint disease peripheral vascular disease 9 intractable vomiting with nausea, coronary artery disease with bypass who presents the emergency department complaining of a fall with closed head injury along with nausea vomiting some diarrhea.  Patient states she was preparing for colonoscopy for Wednesday.  She did a double prep due to her previous history and used 2 doses of mag citrate.  She has had no food since Sunday.  She had small amounts of bowel movements over the past few days and today was feeling a bit lightheaded.  She did not have a bowel movement but had a large emesis and stood up and felt like she was going to pass out so she began to go down the floor and got down close the floor and fell backwards striking her head on the linoleum.  She adamantly denies losing consciousness.  She does not have any headache or visual changes denies any neck pain or chest pain.  She has been having no shortness of breath.  She is having cramping abdominal pain.  She denies any bowel or bladder dysfunction has not had any focal numbness weakness in extremity.  She has had increased constipation and difficulty with bowel movements the last few months which is the reason why she is having her colonoscopy.  She denies any urinary symptoms has not had a rash.  Currently rates her pain a 1 out of 10.    Allergies:  Allergies   Allergen Reactions     Atorvastatin Cramps and Unknown     cramps         Problem List:    Patient Active Problem List    Diagnosis Date Noted     Fall, initial encounter 07/14/2021     Priority: Medium     Non-intractable vomiting with nausea, unspecified vomiting type 07/14/2021     Priority: Medium     Mixed simple and mucopurulent chronic bronchitis (H) 01/07/2020     Priority:  Medium     CKD (chronic kidney disease) stage 3, GFR 30-59 ml/min 05/23/2019     Priority: Medium     Spinal stenosis of lumbosacral region 03/22/2018     Priority: Medium     DDD (degenerative disc disease), lumbar 03/22/2018     Priority: Medium     Age-related osteoporosis without current pathological fracture 12/16/2014     Priority: Medium     PVD (posterior vitreous detachment) 11/17/2014     Priority: Medium     History of tobacco abuse 09/02/2014     Priority: Medium     QUIT in 2010       History of non-ST elevation myocardial infarction (NSTEMI) 09/02/2014     Priority: Medium     Chronic back pain 09/02/2014     Priority: Medium     No longer on oxycodone - had been on this long term in the past         Graves' disease 08/19/2014     Priority: Medium     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 Provider to review       Hyperthyroidism 02/04/2014     Priority: Medium     Seeing Endoncrinology at [facility]       GERD (gastroesophageal reflux disease) 10/08/2013     Priority: Medium     S/P CABG x 4 08/05/2012     Priority: Medium     ASCVD (arteriosclerotic cardiovascular disease) 06/14/2012     Priority: Medium     NSTEMI (non-ST elevated myocardial infarction) (H) 06/12/2012     Priority: Medium     Eyelid edema 12/15/2011     Priority: Medium     side effect of gastric cancer medication       History of lung cancer 12/15/2011     Priority: Medium     S/p resection of right lung.  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 Schmitz       Hypertension goal BP (blood pressure) < 140/90 03/24/2005      Priority: Medium        Past Medical History:    Past Medical History:   Diagnosis Date     ASCVD (arteriosclerotic cardiovascular disease) 6/14/2012     Benign neoplasm of duodenum, jejunum, and ileum 2003, 2007     CA - lung cancer 4/2010     choledochal cyst 1963     CKD (chronic kidney disease) stage 3, GFR 30-59 ml/min 5/23/2019     Coronary artery disease      DDD (degenerative disc disease), lumbar 3/22/2018     Dislocated finger, left middle PIP 7/26/2013     Dyspnea 8/27/2013     Eyelid edema 12/15/2011     GERD (gastroesophageal reflux disease) 10/8/2013     GIST (gastrointestinal stromal tumor), malignant (H) 5/10/2011     Graves disease      Grief reaction 5/11/2009     History of lung cancer 12/15/2011     Hyperlipidaemia      Hyperlipidemia LDL goal <160 12/17/2013     Hyperthyroidism 2/4/2014     Hypokalemia 8/5/2012     LBP (low back pain) 7/26/2013     Leiomyoma of uterus, unspecified      NSTEMI (non-ST elevated myocardial infarction) (H) 6/12/2012     NSTEMI (non-ST elevated myocardial infarction) (H)      osteopenia      Other benign neoplasm of connective and other soft tissue of thorax 2003     Other chronic pain      PONV (postoperative nausea and vomiting)      Postsurgical aortocoronary bypass status 7/4/2012     S/P CABG x 4 8/5/2012     Strabismus      Tobacco use disorder      Unspecified essential hypertension        Past Surgical History:    Past Surgical History:   Procedure Laterality Date     BLEPHAROPLASTY BILATERAL Bilateral 7/17/2019    Procedure: Bilateral Upper Eyelid Blepharoplasty;  Surgeon: Vasile Brasher MD;  Location: [facility] OR     BYPASS GRAFT ARTERY CORONARY  6/14/2012    Procedure: BYPASS GRAFT ARTERY CORONARY;  Median Sternotomy Coronary Artery Bypass Graft  x 3        C THORACOSCOPY,DX W BX  fall 2003    benign tissue     CATARACT IOL, RT/LT      LE     CHOLECYSTECTOMY  1963     COLONOSCOPY  4-12-05     CORONARY ARTERY BYPASS      X4     CREATION PERICARDIAL WINDOW   6/15/2012    Procedure: CREATION PERICARDIAL WINDOW;  Mediastinal Exploration, Control of Bleeding;  Surgeon: Dwaine Griffin MD;  Location: [facility] OR     EYE SURGERY  2014    left cataract removal     GI SURGERY  2003 and 2007    GIST tumor removal     GYN SURGERY      tubal ligation     HYSTERECTOMY VAGINAL, COLPORRHAPHY ANTERIOR, POSTERIOR, COMBINED N/A 10/17/2017    Procedure: COMBINED HYSTERECTOMY VAGINAL, COLPORRHAPHY ANTERIOR, POSTERIOR;  Total Vaginal Hysterectomy and Posterior Repair,Sacrospinous Vault Suspension;  Surgeon: Ruth Farooq MD;  Location: [facility]     PHACOEMULSIFICATION WITH STANDARD INTRAOCULAR LENS IMPLANT  3/24/2014    Procedure: PHACOEMULSIFICATION WITH STANDARD INTRAOCULAR LENS IMPLANT;  Left Kelman Phacoemulsification with Intraocular Lens Implant;  Surgeon: Magnus Mckeon MD;  Location: [facility] OR     RECESSION RESECTION WITH ADJUSTABLE SUTURE BILATERAL Bilateral 7/14/2016    Procedure: RECESSION RESECTION WITH ADJUSTABLE SUTURE BILATERAL;  Surgeon: Erma Ordaz MD;  Location: [facility] OR     REPAIR ENTROPION Right 8/21/2019    Procedure: Right Upper Lid Entropion Repair;  Surgeon: Vasile Brasher MD;  [facility]: [facility]     REPAIR RETRACTION LID BILATERAL Bilateral 7/17/2019    Procedure: Bilateral Upper Eyelid Retraction Repair;  Surgeon: Vasile Brasher MD;  Location: [facility] OR     SURGICAL HISTORY OF -   1963    cholecystectomy     SURGICAL HISTORY OF -   1970's    Tubal Ligation      SURGICAL HISTORY OF -   08/03    Explor. Lap, Excision of Small Bowel Tumor      SURGICAL HISTORY OF -   4/2010    lung cancer removed right lung       Family History:    Family History   Problem Relation Age of Onset     Heart Disease Mother      Osteoporosis Mother      Respiratory Mother         Emphezyma     Hypertension Mother      Heart Disease Father      Alcohol/Drug Father      Hypertension Father      Hypertension Maternal Grandmother      Hypertension Brother       Hypertension Sister      Arthritis Sister      Hypertension Son      Cancer Son         rectal       Social History:  Marital Status:   [5]  Social History     Tobacco Use     Smoking status: Former Smoker     Packs/day: 0.50     Years: 49.00     Pack years: 24.50     Types: Cigarettes     Quit date: 2010     Years since quittin.2     Smokeless tobacco: Never Used   Substance Use Topics     Alcohol use: No     Drug use: No        Medications:    No current outpatient medications on file.        Review of Systems  All systems reviewed and other than pertinent positives negatives in HPI all other systems are negative.  Physical Exam   BP: 113/70  Pulse: 89  Temp: 98.2  F (36.8  C)  Resp: 16  Weight: 68.9 kg (152 lb)  SpO2: 93 %      Physical Exam  Vitals and nursing note reviewed.   Constitutional:       General: She is not in acute distress.     Appearance: Normal appearance. She is ill-appearing. She is not toxic-appearing or diaphoretic.   HENT:      Head: Normocephalic.      Comments: Mild posterior occiput tenderness.     Right Ear: Tympanic membrane normal.      Left Ear: Tympanic membrane normal.      Nose: Nose normal. No congestion.      Mouth/Throat:      Mouth: Mucous membranes are moist.      Pharynx: Oropharynx is clear.   Eyes:      Extraocular Movements: Extraocular movements intact.      Conjunctiva/sclera: Conjunctivae normal.      Pupils: Pupils are equal, round, and reactive to light.   Neck:      Vascular: No carotid bruit.   Cardiovascular:      Rate and Rhythm: Normal rate and regular rhythm.      Pulses: Normal pulses.      Heart sounds: Normal heart sounds. No murmur heard.     Pulmonary:      Effort: No respiratory distress.      Breath sounds: Normal breath sounds. No wheezing, rhonchi or rales.   Chest:      Chest wall: No tenderness.   Abdominal:      General: Abdomen is flat. There is no distension.      Palpations: Abdomen is soft.      Tenderness: There is no right CVA  tenderness or left CVA tenderness.      Comments: Mild generalized tenderness to palpation bowel sounds are hyperactive.   Musculoskeletal:         General: No swelling or tenderness. Normal range of motion.      Cervical back: Normal range of motion and neck supple. No rigidity or tenderness.      Right lower leg: No edema.      Left lower leg: No edema.   Lymphadenopathy:      Cervical: No cervical adenopathy.   Skin:     General: Skin is warm and dry.      Capillary Refill: Capillary refill takes less than 2 seconds.      Findings: No rash.   Neurological:      General: No focal deficit present.      Mental Status: She is alert and oriented to person, place, and time.      Motor: No weakness.      Coordination: Coordination normal.   Psychiatric:         Mood and Affect: Mood normal.         ED Course        Procedures              Critical Care time:  none               Results for orders placed or performed during the hospital encounter of 07/14/21 (from the past 24 hour(s))   CBC with platelets, differential    Narrative    The following orders were created for panel order CBC with platelets, differential.  Procedure                               Abnormality         Status                     ---------                               -----------         ------                     CBC with platelets and d...[[id number]]  Abnormal            Final result                 Please view results for these tests on the individual orders.   Comprehensive metabolic panel   Result Value Ref Range    Sodium 121 (L) 133 - 144 mmol/L    Potassium 2.9 (L) 3.4 - 5.3 mmol/L    Chloride 82 (L) 94 - 109 mmol/L    Carbon Dioxide (CO2) 29 20 - 32 mmol/L    Anion Gap 10 3 - 14 mmol/L    Urea Nitrogen 16 7 - 30 mg/dL    Creatinine 1.24 (H) 0.52 - 1.04 mg/dL    Calcium 9.2 8.5 - 10.1 mg/dL    Glucose 139 (H) 70 - 99 mg/dL    Alkaline Phosphatase 46 40 - 150 U/L    AST 54 (H) 0 - 45 U/L    ALT 33 0 - 50 U/L    Protein Total 6.9 6.8 - 8.8  g/dL    Albumin 3.8 3.4 - 5.0 g/dL    Bilirubin Total 0.6 0.2 - 1.3 mg/dL    GFR Estimate 42 (L) >60 mL/min/1.73m2   CBC with platelets and differential   Result Value Ref Range    WBC Count 7.9 4.0 - 11.0 10e3/uL    RBC Count 3.24 (L) 3.80 - 5.20 10e6/uL    Hemoglobin 9.6 (L) 11.7 - 15.7 g/dL    Hematocrit 27.4 (L) 35.0 - 47.0 %    MCV 85 78 - 100 fL    MCH 29.6 26.5 - 33.0 pg    MCHC 35.0 31.5 - 36.5 g/dL    RDW 13.7 10.0 - 15.0 %    Platelet Count 195 150 - 450 10e3/uL    % Neutrophils 85 %    % Lymphocytes 7 %    % Monocytes 7 %    % Eosinophils 0 %    % Basophils 0 %    % Immature Granulocytes 1 %    NRBCs per 100 WBC 0 <1 /100    Absolute Neutrophils 6.8 1.6 - 8.3 10e3/uL    Absolute Lymphocytes 0.5 (L) 0.8 - 5.3 10e3/uL    Absolute Monocytes 0.6 0.0 - 1.3 10e3/uL    Absolute Eosinophils 0.0 0.0 - 0.7 10e3/uL    Absolute Basophils 0.0 0.0 - 0.2 10e3/uL    Absolute Immature Granulocytes 0.0 <=0.0 10e3/uL    Absolute NRBCs 0.0 10e3/uL   [place] Draw    Narrative    The following orders were created for panel order [place] Draw.  Procedure                               Abnormality         Status                     ---------                               -----------         ------                     Extra Red Top Tube[[id number]]                               Final result               Extra Green Top (Lithium...[[id number]]                      Final result                 Please view results for these tests on the individual orders.   Extra Red Top Tube   Result Value Ref Range    Hold Specimen JIC    Extra Green Top (Lithium Heparin) Tube   Result Value Ref Range    Hold Specimen JIC    UA reflex to Microscopic and Culture    Specimen: Urine, Midstream   Result Value Ref Range    Color Urine Straw Colorless, Straw, Light Yellow, Yellow    Appearance Urine Clear Clear    Glucose Urine Negative Negative mg/dL    Bilirubin Urine Negative Negative    Ketones Urine Negative Negative mg/dL    Specific Gravity Urine  1.006 1.003 - 1.035    Blood Urine Small (A) Negative    pH Urine 7.0 5.0 - 7.0    Protein Albumin Urine Negative Negative mg/dL    Urobilinogen Urine Normal Normal, 2.0 mg/dL    Nitrite Urine Negative Negative    Leukocyte Esterase Urine Negative Negative    Bacteria Urine Few (A) None Seen /HPF    RBC Urine 1 <=2 /HPF    WBC Urine 1 <=5 /HPF    Squamous Epithelials Urine <1 <=1 /HPF    Hyaline Casts Urine 6 (H) <=2 /LPF    Narrative    Urine Culture not indicated   CT Abdomen Pelvis w Contrast    Narrative    CT ABDOMEN AND PELVIS WITH CONTRAST 7/14/2021 4:11 PM    CLINICAL HISTORY: Abdominal pain, acute, nonlocalized.    TECHNIQUE: CT scan of the abdomen and pelvis was performed following  injection of IV contrast. Multiplanar reformats were obtained. Dose  reduction techniques were used.  CONTRAST: 74 mL Isovue 370    COMPARISON: None.    FINDINGS:   LOWER CHEST: Minimal interstitial changes bilaterally.    HEPATOBILIARY: No significant mass or bile duct dilatation.  Cholecystectomy. There is some prominence to the biliary system, which  is common post cholecystectomy and may not have clinical significance.  Low-attenuation subcentimeter liver lesion(s) compatible with benign  cysts or other benign lesions. No specific evaluation or follow-up is  recommended in a low risk patient.    PANCREAS: No significant mass, duct dilatation, or inflammatory  change.    SPLEEN: Normal size.    ADRENAL GLANDS: No significant nodules.    KIDNEYS/BLADDER: No significant mass, stones, or hydronephrosis.    BOWEL: No obstruction or inflammatory change.    PELVIC ORGANS: No pelvic masses.    ADDITIONAL FINDINGS: No ascites.    There are extensive atherosclerotic changes of the visualized aorta  and its branches. There is no evidence of aortic dissection or  aneurysm.    MUSCULOSKELETAL: Survey of the visualized bony structures demonstrates  no destructive bony lesions. Thoracolumbar scoliosis.      Impression    IMPRESSION: No  acute process demonstrated in the abdomen and pelvis.    VISHAL GUPTA MD         SYSTEM ID:  [id number]   Lipase   Result Value Ref Range    Lipase 65 (L) 73 - 393 U/L   Basic metabolic panel   Result Value Ref Range    Sodium 125 (L) 133 - 144 mmol/L    Potassium 3.4 3.4 - 5.3 mmol/L    Chloride 84 (L) 94 - 109 mmol/L    Carbon Dioxide (CO2) 31 20 - 32 mmol/L    Anion Gap 10 3 - 14 mmol/L    Urea Nitrogen 14 7 - 30 mg/dL    Creatinine 1.24 (H) 0.52 - 1.04 mg/dL    Calcium 8.9 8.5 - 10.1 mg/dL    Glucose 126 (H) 70 - 99 mg/dL    GFR Estimate 42 (L) >60 mL/min/1.73m2   Asymptomatic COVID-19 Virus (Coronavirus) by PCR Nasopharyngeal    Specimen: Nasopharyngeal; Swab    Narrative    The following orders were created for panel order Asymptomatic COVID-19 Virus (Coronavirus) by PCR Nasopharyngeal.  Procedure                               Abnormality         Status                     ---------                               -----------         ------                     SARS-COV2 (COVID-19) Vir...[[id number]]  Normal              Final result                 Please view results for these tests on the individual orders.   SARS-COV2 (COVID-19) Virus RT-PCR    Specimen: Nasopharyngeal; Swab   Result Value Ref Range    SARS CoV2 PCR Negative Negative    Narrative    Testing was performed using the gianluca  SARS-CoV-2 & Influenza A/B Assay on the gianluca  Corinne  System.  This test should be ordered for the detection of SARS-COV-2 in individuals who meet SARS-CoV-2 clinical and/or epidemiological criteria. Test performance is unknown in asymptomatic patients.  This test is for in vitro diagnostic use under the FDA EUA for laboratories certified under CLIA to perform moderate and/or high complexity testing. This test has not been FDA cleared or approved.  A negative test does not rule out the presence of PCR inhibitors in the specimen or target RNA in concentration below the limit of detection for the assay. The possibility of a false  negative should be considered if the patient's recent exposure or clinical presentation suggests COVID-19.  [facility] are certified under the Clinical Laboratory Improvement Amendments of 1988 (CLIA-88) as qualified to perform moderate and/or high complexity laboratory testing.   CBC with platelets differential    Narrative    The following orders were created for panel order CBC with platelets differential.  Procedure                               Abnormality         Status                     ---------                               -----------         ------                     CBC with platelets and d...[[id number]]  Abnormal            Final result                 Please view results for these tests on the individual orders.   Sodium   Result Value Ref Range    Sodium 124 (L) 133 - 144 mmol/L   CBC with platelets and differential   Result Value Ref Range    WBC Count 7.5 4.0 - 11.0 10e3/uL    RBC Count 3.21 (L) 3.80 - 5.20 10e6/uL    Hemoglobin 9.5 (L) 11.7 - 15.7 g/dL    Hematocrit 27.2 (L) 35.0 - 47.0 %    MCV 85 78 - 100 fL    MCH 29.6 26.5 - 33.0 pg    MCHC 34.9 31.5 - 36.5 g/dL    RDW 13.5 10.0 - 15.0 %    Platelet Count 167 150 - 450 10e3/uL    % Neutrophils 74 %    % Lymphocytes 12 %    % Monocytes 14 %    % Eosinophils 0 %    % Basophils 0 %    % Immature Granulocytes 0 %    NRBCs per 100 WBC 0 <1 /100    Absolute Neutrophils 5.4 1.6 - 8.3 10e3/uL    Absolute Lymphocytes 0.9 0.8 - 5.3 10e3/uL    Absolute Monocytes 1.1 0.0 - 1.3 10e3/uL    Absolute Eosinophils 0.0 0.0 - 0.7 10e3/uL    Absolute Basophils 0.0 0.0 - 0.2 10e3/uL    Absolute Immature Granulocytes 0.0 <=0.0 10e3/uL    Absolute NRBCs 0.0 10e3/uL     *Note: Due to a large number of results and/or encounters for the requested time period, some results have not been displayed. A complete set of results can be found in Results Review.       Medications   acetaminophen (TYLENOL) tablet 650 mg (has no administration in time range)    naloxone (NARCAN) injection 0.2 mg (has no administration in time range)     Or   naloxone (NARCAN) injection 0.4 mg (has no administration in time range)     Or   naloxone (NARCAN) injection 0.2 mg (has no administration in time range)     Or   naloxone (NARCAN) injection 0.4 mg (has no administration in time range)   oxyCODONE-acetaminophen (PERCOCET) 5-325 MG per tablet 1-2 tablet (has no administration in time range)   naloxone (NARCAN) injection 0.2 mg (has no administration in time range)     Or   naloxone (NARCAN) injection 0.4 mg (has no administration in time range)     Or   naloxone (NARCAN) injection 0.2 mg (has no administration in time range)     Or   naloxone (NARCAN) injection 0.4 mg (has no administration in time range)   HYDROmorphone (DILAUDID) injection 0.2 mg (has no administration in time range)   ondansetron (ZOFRAN-ODT) ODT tab 4 mg (has no administration in time range)     Or   ondansetron (ZOFRAN) injection 4 mg (has no administration in time range)   aspirin EC tablet 81 mg (has no administration in time range)   methimazole (TAPAZOLE) half-tab 2.5 mg (has no administration in time range)   lidocaine 1 % 0.1-1 mL (has no administration in time range)   lidocaine (LMX4) kit (has no administration in time range)   sodium chloride (PF) 0.9% PF flush 3 mL (3 mLs Intracatheter Not Given 7/14/21 2250)   sodium chloride (PF) 0.9% PF flush 3 mL (has no administration in time range)   melatonin tablet 1 mg (has no administration in time range)   prochlorperazine (COMPAZINE) injection 5 mg (5 mg Intravenous Given 7/14/21 2211)     Or   prochlorperazine (COMPAZINE) tablet 5 mg ( Oral See Alternative 7/14/21 2211)     Or   prochlorperazine (COMPAZINE) suppository 12.5 mg ( Rectal See Alternative 7/14/21 2211)   ondansetron (ZOFRAN) injection 4 mg (4 mg Intravenous Given 7/14/21 1347)   lactated ringers BOLUS 1,000 mL (0 mLs Intravenous Stopped 7/14/21 1454)   iopamidol (ISOVUE-370) solution 74 mL (74  mLs Intravenous Given 7/14/21 1601)   sodium chloride 0.9 % bag 500mL for CT scan flush use (58 mLs Intravenous Given 7/14/21 1601)   ondansetron (ZOFRAN) injection 4 mg (4 mg Intravenous Given 7/14/21 1753)   acetaminophen (TYLENOL) tablet 650 mg (650 mg Oral Given 7/14/21 2026)       Assessments & Plan (with Medical Decision Making) records were reviewed.  Labs were obtained.  Patient was given Zofran and IV fluids.  White count 7.9 hemoglobin 9.6 platelet count 195 there is no left shift.  Comprehensive metabolic panel significant for sodium 121 potassium was 2.9 chloride 82 creatinine slightly elevated 1.24.  Urinalysis with small blood few bacteria.  Rectal exam was performed and there was loose medium brown stool that was heme positive in nature no gross blood.  Patient states she did not lose consciousness again and did not hit her head significantly hard.  Her near syncopal event appears to be vasovagal process.  Due to patient's cramping abdominal pain and history of difficulty going to the bathroom a CT scan of the abdomen pelvis was obtained.  This revealed no acute intra-abdominal abnormality.  Repeat basic metabolic panel had a sodium at 125 and potassium had improved.  Due to patient's low sodium I felt it would be best to admit the patient for observation further work-up.  Patient may need a colonoscopy if possible and should have her hemoglobin follow for possible GI bleed.  Findings were discussed with Ama Cavazos with hospitalist service and she did agree with admitting patient for further evaluation and care.  Recommended holding IV fluids at this time.  I discussed the case with the patient and her son and they feel comfortable being admitted to the hospital for further evaluation and care.     I have reviewed the nursing notes.    I have reviewed the findings, diagnosis, plan and need for follow up with the patient.       Current Discharge Medication List          Final diagnoses:   Fall,  initial encounter   Non-intractable vomiting with nausea, unspecified vomiting type   Hyponatremia   Vasovagal near syncope       7/14/2021   [facility] SURGICAL     DrageRyan MD  07/16/21 0982

## 2021-07-15 NOTE — PLAN OF CARE
Updated Brina GODDARD : CANDI: Idalmis LOPEZ([id number]) Potassium 2.6 Will give replacement per protocol.

## 2021-07-16 LAB
ANION GAP SERPL CALCULATED.3IONS-SCNC: 5 MMOL/L (ref 3–14)
BUN SERPL-MCNC: 20 MG/DL (ref 7–30)
CALCIUM SERPL-MCNC: 7.9 MG/DL (ref 8.5–10.1)
CHLORIDE BLD-SCNC: 90 MMOL/L (ref 94–109)
CO2 SERPL-SCNC: 29 MMOL/L (ref 20–32)
CREAT SERPL-MCNC: 1.36 MG/DL (ref 0.52–1.04)
ERYTHROCYTE [DISTWIDTH] IN BLOOD BY AUTOMATED COUNT: 14.2 % (ref 10–15)
GFR SERPL CREATININE-BSD FRML MDRD: 38 ML/MIN/1.73M2
GLUCOSE BLD-MCNC: 102 MG/DL (ref 70–99)
HCT VFR BLD AUTO: 25.2 % (ref 35–47)
HGB BLD-MCNC: 8.7 G/DL (ref 11.7–15.7)
MCH RBC QN AUTO: 30 PG (ref 26.5–33)
MCHC RBC AUTO-ENTMCNC: 34.5 G/DL (ref 31.5–36.5)
MCV RBC AUTO: 87 FL (ref 78–100)
PLATELET # BLD AUTO: 179 10E3/UL (ref 150–450)
POTASSIUM BLD-SCNC: 4.1 MMOL/L (ref 3.4–5.3)
RBC # BLD AUTO: 2.9 10E6/UL (ref 3.8–5.2)
SODIUM SERPL-SCNC: 124 MMOL/L (ref 133–144)
SODIUM SERPL-SCNC: 124 MMOL/L (ref 133–144)
SODIUM SERPL-SCNC: 125 MMOL/L (ref 133–144)
SODIUM SERPL-SCNC: 125 MMOL/L (ref 133–144)
TSH SERPL DL<=0.005 MIU/L-ACNC: 0.64 MU/L (ref 0.4–4)
WBC # BLD AUTO: 7.3 10E3/UL (ref 4–11)

## 2021-07-16 PROCEDURE — 250N000011 HC RX IP 250 OP 636: Performed by: PHYSICIAN ASSISTANT

## 2021-07-16 PROCEDURE — 250N000013 HC RX MED GY IP 250 OP 250 PS 637: Performed by: PHYSICIAN ASSISTANT

## 2021-07-16 PROCEDURE — 84443 ASSAY THYROID STIM HORMONE: CPT | Performed by: INTERNAL MEDICINE

## 2021-07-16 PROCEDURE — 84295 ASSAY OF SERUM SODIUM: CPT | Performed by: PHYSICIAN ASSISTANT

## 2021-07-16 PROCEDURE — 120N000001 HC R&B MED SURG/OB

## 2021-07-16 PROCEDURE — 99233 SBSQ HOSP IP/OBS HIGH 50: CPT

## 2021-07-16 PROCEDURE — 36415 COLL VENOUS BLD VENIPUNCTURE: CPT | Performed by: PHYSICIAN ASSISTANT

## 2021-07-16 PROCEDURE — 85014 HEMATOCRIT: CPT | Performed by: PHYSICIAN ASSISTANT

## 2021-07-16 RX ADMIN — ASPIRIN 81 MG: 81 TABLET, COATED ORAL at 09:26

## 2021-07-16 RX ADMIN — Medication 2.5 MG: at 09:28

## 2021-07-16 RX ADMIN — PROCHLORPERAZINE EDISYLATE 5 MG: 5 INJECTION INTRAMUSCULAR; INTRAVENOUS at 04:01

## 2021-07-16 RX ADMIN — Medication 1 MG: at 22:31

## 2021-07-16 ASSESSMENT — ACTIVITIES OF DAILY LIVING (ADL)
ADLS_ACUITY_SCORE: 13
ADLS_ACUITY_SCORE: 13
DEPENDENT_IADLS:: INDEPENDENT
ADLS_ACUITY_SCORE: 13

## 2021-07-16 NOTE — PLAN OF CARE
Pt has been up to BR voiding multiple times tonight 100-150 each time. PVR bladder scan performed x2, 208 & 232 each time. Orthostatic BP check performed this morning with a notable drop from sitting to standing. Pt did report intermittent mild lightheadedness at that time. Pt received IV Compazine this am for c/o nausea w/ good relief.

## 2021-07-16 NOTE — CONSULTS
Care Management Initial Consult    General Information  Assessment completed with: PatientIdalmis  Type of CM/SW Visit: Initial Assessment    Primary Care Provider verified and updated as needed: Yes   Readmission within the last 30 days:        Reason for Consult: discharge planning  Advance Care Planning:     Not present on chart     Communication Assessment  Patient's communication style: spoken language (English or Bilingual)    Hearing Difficulty or Deaf: no   Wear Glasses or Blind: yes    Cognitive  Cognitive/Neuro/Behavioral: WDL                      Living Environment:   People in home: alone     Current living Arrangements:   House, rambler  Able to return to prior arrangements: yes    Family/Social Support:  Care provided by: self  Provides care for: no one  Marital Status:   Children, Neighbor          Description of Support System: Supportive, Involved    Support Assessment: Adequate family and caregiver support, Adequate social supports    Current Resources:   Patient receiving home care services: No  Community Resources: None  Equipment currently used at home: walker, rolling  Supplies currently used at home: None    Employment/Financial:  Employment Status: retired        Financial Concerns: No concerns identified   Referral to Financial Counselor: No     Lifestyle & Psychosocial Needs:  Social Determinants of Health     Tobacco Use: Medium Risk     Smoking Tobacco Use: Former Smoker     Smokeless Tobacco Use: Never Used   Alcohol Use:      Frequency of Alcohol Consumption:      Average Number of Drinks:      Frequency of Binge Drinking:    Financial Resource Strain:      Difficulty of Paying Living Expenses:    Food Insecurity:      Worried About Running Out of Food in the Last Year:      Ran Out of Food in the Last Year:    Transportation Needs:      Lack of Transportation (Medical):      Lack of Transportation (Non-Medical):    Physical Activity:      Days of Exercise per Week:       Minutes of Exercise per Session:    Stress:      Feeling of Stress :    Social Connections:      Frequency of Communication with Friends and Family:      Frequency of Social Gatherings with Friends and Family:      Attends [employer] Services:      Active Member of Clubs or Organizations:      Attends Club or Organization Meetings:      Marital Status:    Intimate Partner Violence:      Fear of Current or Ex-Partner:      Emotionally Abused:      Physically Abused:      Sexually Abused:    Depression: Not at risk     PHQ-2 Score: 0   Housing Stability:      Unable to Pay for Housing in the Last Year:      Number of Places Lived in the Last Year:      Unstable Housing in the Last Year:    ]  Functional Status:  Prior to admission patient needed assistance:   Dependent ADLs:: Independent  Dependent IADLs:: Independent (Uses a walker in public for long distances only)  Assesssment of Functional Status: At functional baseline    Mental Health Status:  Mental Health Status: No Current Concerns       Chemical Dependency Status:  Chemical Dependency Status: No Current Concerns           Values/Beliefs:  Spiritual, Cultural Beliefs, Amish Practices, Values that affect care: no             Additional Information:  Care Management referral due to elevated JULES score.  SW reviewed pt's EMR and then met with pt at bedside to introduce self/role, perform assessment, and discuss resources.    Pt shared that prior to preparing for a colonoscopy, she was independent with all I/ADLs and only used a walker for long distance walking while out in community.  Pt states she has a small rambler home of 44 years & typically walks independently in her home.  Pt does own meals, medications, showering, etc.    Pt has supportive children & states that her son Rk will transport her home & stay with her the night of discharge & a few days, if needed.    Pt declines the need for home care services, but is interested in clinic care  coordination services at time of discharge.  SW educated on program & telephonic visit once pt is home.      CECILLE Keita

## 2021-07-16 NOTE — PROGRESS NOTES
[facility] Progress Note  Date of Service: 07/16/2021    Assessment & Plan   Idalmis Abdul is a 75 year old female admitted on 7/14/2021. She presented to the emergency department for evaluation of syncopal episode and was found to have hypokalemia and hyponatremia for which she is being admitted for further evaluation and treatment.     Hyponatremia  Not entirely new.  The patient had sodium between 125 and 131 back in 2020, and sodium is 130-131 in 2018.  Admit sodium was 121.  She received LR 1 L IV bolus in the emergency department, followed by 0.9 normal saline at 75 mL/h for another several hours.  She is on a 1200 mL/day fluid restriction.  Her thiazide diuretic was stopped on admission.  However, sodium remains low, 125 most recently.  I think her persistent hyponatremia is probably attributable to lisinopril.  -We will hold lisinopril; today's dose has not yet been given.  -We will also leave hydrochlorothiazide on hold for now.  -Since her sodiums have been static, will stop checking sodium values every 6 hours.  Next labs will be ordered for tomorrow morning.      Hypokalemia  Admit K = 2.9, probably due to decreased oral intake for the 3 days prior to admission due to colonoscopy prep, as well as GI loss from diarrhea from that prep.  Potassium has been replaced, and has been normal for the last 24 hours.    -Resolved.  We will continue to monitor potassium.     Syncope   Fall, initial encounter  Non-intractable vomiting with nausea, unspecified vomiting type  Had a syncopal event at home immediately after vomiting. Did fall and bump her head with brief loss of consciousness. Event may have been vasovagal in nature due to vomiting, could pertain to volume depletion from GI loss due to colonoscopy prep, and she does have some orthostatic blood pressure changes here, see below.  She has been consistently normal sinus on telemetry, so that can be  discontinued.  She is neurologically intact.  -Discontinue telemetry.     Orthostatic hypotension  Has been consistently present since admission.  Most recent set of vitals showed supine /56, and standing BP 89/53.  She has been fairly consistently hypotensive since admission, with multiple supine BPs in the 90s systolic.  This may have been a contributor to the patient's syncope and fall at home.  -I am going to stop lisinopril due to orthostatic hypotension as well as hyponatremia, see above.  -Hydrochlorothiazide remains on hold.  -We will continue metoprolol, though I am going to decrease the dose to 50 mg twice a day from her preadmission dose of 100 mg twice daily.  -Continue amlodipine 5 mg daily.  -We will check next set of orthostatic pressures tomorrow morning.     CKD (chronic kidney disease) stage 3, GFR 30-59 ml/min  Baseline GFR over the past two years has been 42-46.  Admission GFR here was 42 --> 40 --> 37 --> 38, close to baseline range.  Clinically, her volume deficit on admission has been corrected.  -Hydrochlorothiazide is on hold.  -Lisinopril is being stopped today due to hyponatremia and hypotension, discussed above.  -We will monitor renal function.     Anemia, chronic   Baseline 9.3 - 10.2. Hgb on admission 9.6 --> 9.5 --> 8.9 --> 8.7 today.  The decline in hemoglobin from admission may be hemodilution all due to IV fluids.  There has been no clinical evidence of blood loss.    -Repeat hemoglobin in the morning.     Chronic constipation  Longstanding problem, was undergoing bowel prep for colonoscopy for further evaluation. Patient is concerned about a possible obstructive polyp.  CT abdomen & pelvis 7/14/2021 was negative for any acute findings, and particularly showed no obstructive findings.  She had a brisk response to bowel prep, though diarrhea output is finally slowing to only one small episode of loose stool a couple of hours ago.  -The patient plans on following up with  [facility] after discharge, though is hesitant to perform another colonoscopy prep.     Hypertension goal BP (blood pressure) < 140/90  Managed prior to admission with amlodipine 5 mg daily, hydrochlorothiazide 25 mg daily, lisinopril 40 mg daily, and metoprolol 100 mg bid.  As described above, the patient has been fairly consistently hypotensive since admission, with significant orthostatic hypotension.  -Hydrochlorothiazide remains on hold due to hyponatremia.  -Lisinopril was stopped today due to hyponatremia.  -Will cut her preadmission metoprolol dose in half to 50 mg twice daily.    -Continue preadmission amlodipine.      ASCVD (arteriosclerotic cardiovascular disease)  S/P CABG x 4  History of non-ST elevation myocardial infarction (NSTEMI)  History CABG in 2012 complicated by post-op pericardial hematoma and tamponade necessitating a pericardial window. Follows with cardiology Dr. Herman, who recommended an upcoming dobutamine stress echo at last appointment 5/12/2021. Managed prior to admission with aspirin 81 mg daily, beta blocker, and statin.  She reports no chest pain prior to admission, and has had none here.  -Continue prior to admission aspirin, statin, beta blocker.  -Continue with outpatient management.     GIST (gastrointestinal stromal tumor), malignant  Known history of GIST diagnosed in Sep 2003, S/P incomplete resection and re-resection in Oct 2003. Currently follows with [facility] and managed on Gleevec 400 mg daily.  -Continue Gleevec.  -Continue outpatient follow-up.     Mixed simple and mucopurulent chronic bronchitis  Managed prior to admission with AirDuo bid and Spiriva daily.  Lung exam today is normal.  She has no oxygen needs.  -We will resume preadmission inhalers of fluticasone/salmeterol 113/14 mcg 1 puff twice daily and tiotropium 1 capsule inhaled daily.  -Albuterol is available as needed.     Hyperthyroidism  Graves' disease  Managed prior to admission with  methimazole 2.5 mg daily.  TSH was normal 7/16/2021.  -Continue methimazole.     History of lung cancer  Diagnosed in 2010, S/P VATS wedge resection. Follows with [facility].  -Continue with outpatient surveillance      History of tobacco abuse  Quit smoking a few years ago. No nicotine replacement needed.     COVID status  COVID PCR negative 7/14/2021.  Completed Moderna vaccine series as of 4/16/2021.        Diet: Regular Diet Adult    DVT Prophylaxis: Pneumatic Compression Devices  Garsia Catheter: Not present  Central Lines: None  Code Status: Full Code  - discussed with patient on admission           Disposition Plan     Expected discharge: 1-2 days recommended to prior living arrangement once hypotension and orthostasis have improved, and hyponatremia is stable to improved.     Attestation:  I have reviewed today's vital signs, notes, medications, labs and imaging.  Amount of time performed on this hospital visit: 45 minutes.    Yovany Gillis MD   [facility]      Interval History   Diarrhea has slowed considerably.  She had a small, loose stool a couple of hours ago, but otherwise nothing per rectum since last evening.  She does have a lot of flatus.  She has no difficulty swallowing, and denies nausea or vomiting.  He denies any dyspnea at rest or over the short distance walking to the bathroom.  She denies musculoskeletal pain.    Physical Exam   Temp:  [96.5  F (35.8  C)-98.2  F (36.8  C)] 98  F (36.7  C)  Pulse:  [66-97] 86  Resp:  [16-18] 18  BP: ()/(46-61) 103/49  SpO2:  [93 %-98 %] 95 %    Weights:   Vitals:    07/14/21 1308 07/16/21 0558   Weight: 68.9 kg (152 lb) 66.8 kg (147 lb 4.3 oz)    Body mass index is 26.94 kg/m .    GENERAL: Very pleasant woman, who walked out of the bathroom and sat in the recliner when I came to see her.  She looks comfortable.  EYES: Eyes grossly normal to inspection, extraocular movements intact  HENT: Nares patent bilaterally, no discharge.    NECK:  Trachea midline, no stridor.  RESP: No accessory muscle use.  Lungs clear throughout on inspiration and expiration.  Expiration not prolonged, no wheeze.  CV: Regular rate and rhythm, non-tachycardic.  Normal S1 S2, grade II/VI holosystolic murmur heard along the left sternal border, no extra sound.  No lower extremity edema.  ABDOMEN: Soft, non-tender, no guarding.  Liver and spleen not enlarged, no masses palpable.  Bowel sounds positive.  MS: No bony deformities noted.  No red or inflamed joints.  SKIN: Warm and dry, no rashes.  NEURO: Alert, oriented, conversant.  Cranial nerves III - XII grossly intact.  No gross motor or sensory deficits.  Gait steady, symmetrical behind a walker over the short distance observed.  PSYCH: Calm, alert, conversant.  Able to articulate logical thoughts, no tangential thoughts, no hallucinations or delusions.  Affect normal.        Data   Recent Labs   Lab 07/16/21  1203 07/16/21  0620 07/16/21  0016 07/15/21  1619 07/15/21  0933 07/15/21  0508 07/14/21  2233 07/14/21  2233 07/14/21  1738 07/14/21  1342   WBC  --  7.3  --   --   --  5.7  --  7.5  --  7.9   HGB  --  8.7*  --   --   --  8.9*  --  9.5*  --  9.6*   MCV  --  87  --   --   --  85  --  85  --  85   PLT  --  179  --   --   --  169  --  167  --  195   * 124*  124* 125* 123* 124*  --   --  123*  124* 125* 121*   POTASSIUM  --  4.1  --  4.4 3.4  3.4 3.0*   < > 2.6* 3.4 2.9*   CHLORIDE  --  90*  --   --  87*  --   --  82* 84* 82*   CO2  --  29  --   --  28  --   --  23 31 29   BUN  --  20  --   --  15  --   --  14 14 16   CR  --  1.36*  --   --  1.39*  --   --  1.29* 1.24* 1.24*   ANIONGAP  --  5  --   --  9  --   --  18* 10 10   NAEEM  --  7.9*  --   --  8.0*  --   --  8.6 8.9 9.2   GLC  --  102*  --   --  122*  --   --  114* 126* 139*   ALBUMIN  --   --   --   --   --   --   --   --   --  3.8   PROTTOTAL  --   --   --   --   --   --   --   --   --  6.9   BILITOTAL  --   --   --   --   --   --   --   --   --  0.6    ALKPHOS  --   --   --   --   --   --   --   --   --  46   ALT  --   --   --   --   --   --   --   --   --  33   AST  --   --   --   --   --   --   --   --   --  54*   LIPASE  --   --   --   --   --   --   --   --  65*  --     < > = values in this interval not displayed.       Recent Labs   Lab 07/16/21  0620 07/15/21  0933 07/14/21  2233 07/14/21  1738 07/14/21  1342   * 122* 114* 126* 139*        Unresulted Labs Ordered in the Past 30 Days of this Admission     Date and Time Order Name Status Description    7/15/2021  5:16 PM Urine Culture In process            Imaging  No results found for this or any previous visit (from the past 24 hour(s)).     I reviewed all new labs and imaging results over the last 24 hours. I personally reviewed no images or EKG's today.    Medications       amLODIPine  5 mg Oral Daily     aspirin  81 mg Oral Daily     imatinib  400 mg Oral Daily     lisinopril  40 mg Oral Daily     methimazole  2.5 mg Oral Daily     metoprolol tartrate  100 mg Oral BID     rosuvastatin  5 mg Oral QPM     sodium chloride (PF)  3 mL Intracatheter Q8H       Yovany Gillis MD    [facility]

## 2021-07-17 VITALS
RESPIRATION RATE: 18 BRPM | TEMPERATURE: 97.3 F | HEART RATE: 100 BPM | HEIGHT: 62 IN | WEIGHT: 147.05 LBS | BODY MASS INDEX: 27.06 KG/M2 | SYSTOLIC BLOOD PRESSURE: 130 MMHG | DIASTOLIC BLOOD PRESSURE: 65 MMHG | OXYGEN SATURATION: 96 %

## 2021-07-17 LAB
ANION GAP SERPL CALCULATED.3IONS-SCNC: 8 MMOL/L (ref 3–14)
BUN SERPL-MCNC: 18 MG/DL (ref 7–30)
CALCIUM SERPL-MCNC: 7.9 MG/DL (ref 8.5–10.1)
CHLORIDE BLD-SCNC: 92 MMOL/L (ref 94–109)
CO2 SERPL-SCNC: 26 MMOL/L (ref 20–32)
CREAT SERPL-MCNC: 1.11 MG/DL (ref 0.52–1.04)
ERYTHROCYTE [DISTWIDTH] IN BLOOD BY AUTOMATED COUNT: 14.4 % (ref 10–15)
GFR SERPL CREATININE-BSD FRML MDRD: 48 ML/MIN/1.73M2
GLUCOSE BLD-MCNC: 94 MG/DL (ref 70–99)
HCT VFR BLD AUTO: 22.9 % (ref 35–47)
HGB BLD-MCNC: 7.7 G/DL (ref 11.7–15.7)
MCH RBC QN AUTO: 29.4 PG (ref 26.5–33)
MCHC RBC AUTO-ENTMCNC: 33.6 G/DL (ref 31.5–36.5)
MCV RBC AUTO: 87 FL (ref 78–100)
PLATELET # BLD AUTO: 158 10E3/UL (ref 150–450)
POTASSIUM BLD-SCNC: 3.9 MMOL/L (ref 3.4–5.3)
RBC # BLD AUTO: 2.62 10E6/UL (ref 3.8–5.2)
SODIUM SERPL-SCNC: 126 MMOL/L (ref 133–144)
WBC # BLD AUTO: 5.9 10E3/UL (ref 4–11)

## 2021-07-17 PROCEDURE — 999N000156 HC STATISTIC RCP CONSULT EA 30 MIN

## 2021-07-17 PROCEDURE — 36415 COLL VENOUS BLD VENIPUNCTURE: CPT

## 2021-07-17 PROCEDURE — 250N000013 HC RX MED GY IP 250 OP 250 PS 637: Performed by: PHYSICIAN ASSISTANT

## 2021-07-17 PROCEDURE — 80048 BASIC METABOLIC PNL TOTAL CA: CPT

## 2021-07-17 PROCEDURE — 250N000011 HC RX IP 250 OP 636: Performed by: PHYSICIAN ASSISTANT

## 2021-07-17 PROCEDURE — 99239 HOSP IP/OBS DSCHRG MGMT >30: CPT

## 2021-07-17 PROCEDURE — 85027 COMPLETE CBC AUTOMATED: CPT

## 2021-07-17 RX ADMIN — ONDANSETRON 4 MG: 4 TABLET, ORALLY DISINTEGRATING ORAL at 09:37

## 2021-07-17 RX ADMIN — METOPROLOL TARTRATE 100 MG: 100 TABLET, FILM COATED ORAL at 07:29

## 2021-07-17 RX ADMIN — Medication 2.5 MG: at 07:41

## 2021-07-17 RX ADMIN — AMLODIPINE BESYLATE 5 MG: 5 TABLET ORAL at 07:29

## 2021-07-17 RX ADMIN — ASPIRIN 81 MG: 81 TABLET, COATED ORAL at 07:29

## 2021-07-17 ASSESSMENT — ACTIVITIES OF DAILY LIVING (ADL)
ADLS_ACUITY_SCORE: 13

## 2021-07-17 ASSESSMENT — MIFFLIN-ST. JEOR: SCORE: 1110.25

## 2021-07-17 NOTE — PLAN OF CARE
[facility] DISCHARGE NOTE    Patient discharged to home at 4:52 PM via wheel chair. Accompanied by son and staff. Discharge instructions reviewed with patient, opportunity offered to ask questions. Prescriptions - None ordered for discharge. All belongings sent with patient.    Paty Lange RN

## 2021-07-17 NOTE — DISCHARGE SUMMARY
[facility]    Discharge Summary  Hospital Medicine    Date of Admission:  7/14/2021  Date of Discharge:  7/17/2021   Discharging Provider: Yovany Gillis  Date of Service: 7/17/2021      Primary Care     Ale Ordaz  [id number]  [facility] [address]      Identification and Chief Compaint:  Idalmis Abdul is a 75 year old female admitted on 7/14/2021. She presented to the emergency department for evaluation of syncopal episode.     Discharge Diagnoses       Hyponatremia    Hypokalemia    Syncopal episode    Orthostatic hypotension    Hypertension     Non-intractable vomiting with nausea, unspecified vomiting type    CKD (chronic kidney disease) stage 3, GFR 30-59 ml/min    Chronic anemia    Chronic constipation    ASCVD (arteriosclerotic cardiovascular disease) S/P CABG x 4    History of non-ST elevation myocardial infarction (NSTEMI)    Mixed simple and mucopurulent chronic bronchitis (H)    Hyperthyroidism    Graves' disease    GIST (gastrointestinal stromal tumor), malignant (H)    History of lung cancer    History of tobacco abuse    Discharge Disposition   Discharged to home    Discharge Orders      Basic metabolic panel     CBC with platelets     Reason for your hospital stay    You had a syncopal event (meaning you fainted) at home, and suffered a fall.  Based upon our evaluation here, it appears that you might have fallen because your blood pressure was getting a little low, particularly when you stood up, something called orthostatic hypotension.  During this hospital stay, we have discontinued both your lisinopril and hydrochlorothiazide.  Despite the absence of these two medications, your blood pressure control has been good, including when you are up and walking.  Therefore, I am hopeful that discontinuing these medications will prevent future episodes of syncope.    You also have had a low sodium level for the past couple of years.  Upon admission here, your  sodium was lower than it had been since 2018.  As we discussed, I think that lisinopril may be responsible for your low sodium.  We stopped lisinopril, and already we are starting to see an improvement in your sodium to 126 today.  You will need to have another set of labs drawn in a couple of days.  I have ordered some blood work to be done on Monday, 7/19/2021.  You can stop by any [facility] lab to have that drawn.  Your primary care practitioner can address the results.    You have also become anemic (low red blood cell counts).  I am not entirely sure why.  However, your hemoglobin will need to be checked with that Monday blood draw as well.  I have ordered that lab to be drawn.    It is unfortunate that you could not complete the colonoscopy after going through all the prep procedure.  I anticipate that you will want to have that colonoscopy rescheduled to evaluate the difficulties you are having defecating.     Follow-up and recommended labs and tests     Follow up with primary care provider, Ale Ordaz, within 7 days for hospital follow- up.  The following labs/tests are recommended: Please consider a repeat metabolic panel to evaluate sodium, as well as a repeat hemoglobin to compare to her discharge value of 7.7.     Activity    Your activity upon discharge: activity as tolerated.     Diet    Follow this diet upon discharge: Orders Placed This Encounter      Regular Diet Adult        Discharge Medications   Current Discharge Medication List      CONTINUE these medications which have NOT CHANGED    Details   acetaminophen (TYLENOL) 325 MG tablet Take 325 mg by mouth every 6 hours as needed for headaches      amLODIPine (NORVASC) 5 MG tablet Take 1 tablet (5 mg) by mouth daily  Qty: 90 tablet, Refills: 1    Associated Diagnoses: Essential hypertension      fluticasone-salmeterol (AIRDUO RESPICLICK) 113-14 MCG/ACT inhaler Inhale 1 puff into the lungs 2 times daily  Qty: 60 each, Refills: 11     Associated Diagnoses: Centrilobular emphysema (H)      imatinib (GLEEVEC) 400 MG tablet Take 1 tablet (400 mg) by mouth daily Take with a meal and a large glass of water.  Qty: 30 tablet, Refills: 11    Associated Diagnoses: Malignant gastrointestinal stromal tumor, unspecified site (H)      melatonin 5 MG tablet Take 5 mg by mouth nightly as needed for sleep      methimazole (TAPAZOLE) 5 MG tablet Take 0.5 tablets (2.5 mg) by mouth daily  Qty: 45 tablet, Refills: 1    Associated Diagnoses: Hyperthyroidism      metoprolol tartrate (LOPRESSOR) 100 MG tablet TAKE ONE TABLET BY MOUTH TWICE A DAY  Qty: 180 tablet, Refills: 1    Associated Diagnoses: Essential hypertension with goal blood pressure less than 140/90      ondansetron (ZOFRAN-ODT) 4 MG ODT tab Take 4 mg by mouth 2 times daily as needed for nausea      polyethylene glycol (MIRALAX/GLYCOLAX) powder Take 17 g by mouth daily      rosuvastatin (CRESTOR) 5 MG tablet Taking every other day.  Qty: 45 tablet, Refills: 3    Associated Diagnoses: Hyperlipidemia LDL goal <160      tiotropium (SPIRIVA) 18 MCG inhaled capsule Inhale 1 capsule (18 mcg) into the lungs daily  Qty: 30 capsule, Refills: 11    Associated Diagnoses: Mixed simple and mucopurulent chronic bronchitis (H)      Blood Pressure Monitoring (ADULT BLOOD PRESSURE CUFF LG) KIT 1 Device 2 times daily  Qty: 1 kit, Refills: 1    Associated Diagnoses: HTN, goal below 140/90         STOP taking these medications       aspirin EC 81 MG EC tablet Comments:   Reason for Stopping:         hydrochlorothiazide (HYDRODIURIL) 25 MG tablet Comments:   Reason for Stopping:         lisinopril (ZESTRIL) 40 MG tablet Comments:   Reason for Stopping:             Allergies   Allergies   Allergen Reactions     Atorvastatin Cramps and Unknown     cramps         Consultations This Hospital Stay    CARE MANAGEMENT / SOCIAL WORK IP CONSULT    Significant Results and Procedures   Procedures    None.    Data   Results for orders  placed or performed during the hospital encounter of 07/14/21   CT Abdomen Pelvis w Contrast    Narrative    CT ABDOMEN AND PELVIS WITH CONTRAST 7/14/2021 4:11 PM    CLINICAL HISTORY: Abdominal pain, acute, nonlocalized.    TECHNIQUE: CT scan of the abdomen and pelvis was performed following  injection of IV contrast. Multiplanar reformats were obtained. Dose  reduction techniques were used.  CONTRAST: 74 mL Isovue 370    COMPARISON: None.    FINDINGS:   LOWER CHEST: Minimal interstitial changes bilaterally.    HEPATOBILIARY: No significant mass or bile duct dilatation.  Cholecystectomy. There is some prominence to the biliary system, which  is common post cholecystectomy and may not have clinical significance.  Low-attenuation subcentimeter liver lesion(s) compatible with benign  cysts or other benign lesions. No specific evaluation or follow-up is  recommended in a low risk patient.    PANCREAS: No significant mass, duct dilatation, or inflammatory  change.    SPLEEN: Normal size.    ADRENAL GLANDS: No significant nodules.    KIDNEYS/BLADDER: No significant mass, stones, or hydronephrosis.    BOWEL: No obstruction or inflammatory change.    PELVIC ORGANS: No pelvic masses.    ADDITIONAL FINDINGS: No ascites.    There are extensive atherosclerotic changes of the visualized aorta  and its branches. There is no evidence of aortic dissection or  aneurysm.    MUSCULOSKELETAL: Survey of the visualized bony structures demonstrates  no destructive bony lesions. Thoracolumbar scoliosis.      Impression    IMPRESSION: No acute process demonstrated in the abdomen and pelvis.    VISHAL GUPTA MD         SYSTEM ID:  [id number]     *Note: Due to a large number of results and/or encounters for the requested time period, some results have not been displayed. A complete set of results can be found in Results Review.       History of Present Illness   (From H&P) Idalmis Abdul is a 75 year old female who presented to the  "emergency department for evaluation of syncope after vomiting.     Patient has ongoing constipation problems and was undergoing bowel prep for an upcoming colonoscopy.  She had not had anything to eat for 3 days (since 7/11), but had been drinking fluids with her bowel prep.  She was not having any stool output with the bowel prep until 2 small bowel movements at home on 7/14.  She took some magnesium citrate and about 1 hour later became nauseated and started to vomit.   She got up to go to the kitchen to vomit into the garbage, became lightheaded, and fell to the floor.  She did lightly hit her head and thinks she may have had a brief loss of consciousness.  She had to lay on the floor for a while until her dizziness resolved.  She then presented to the emergency department where work-up shows hypokalemia and hyponatremia.     She says her emesis was brown colored and stool is watery brown, no obvious blood / melena / hematemesis / hematochezia.   She has since had 3 more loose watery stools since arrival to the hospital. No further vomiting but does continue to have some nausea.   She denies any abdominal pain.     On review of systems she notes some chronic shortness of breath due to COPD, no changes from baseline.   She notes some feelings of incomplete bladder emptying, no dysuria.   She currently has a headache and feels generally weak.   She has chronic back pain and \"aches and pains\" that she attributes to old age, but no changes from baseline.     Hospital Course     Hyponatremia  Not entirely new.  The patient had sodium between 125 and 131 back in 2020, and sodium is 130-131 in 2018.  Admit sodium was 121.  She received LR 1 L IV bolus in the emergency department, followed by 0.9 normal saline at 75 mL/h for another several hours.  She was placed on a 1200 mL/day fluid restriction.  Her thiazide diuretic was stopped on admission.  However, sodium remained low, 125 on 7/16/2021.   I think her persistent " hyponatremia may be attributable to lisinopril, so on 7/16/2020 when I discontinued lisinopril (she did not get her 7/16 dose).  I stopped the fluid restriction as well.  Sodium this morning, 7/17/2021, is 126.  -Lisinopril will be stopped at discharge.  -Hydrochlorothiazide is also discontinued.  -I will order a future basic metabolic profile, anticipating that the patient may be able to get it done on Monday, 7/19/2021, with results addressed by her primary care practitioner.     Hypokalemia  Admit K = 2.9, probably due to decreased oral intake for the 3 days prior to admission due to colonoscopy prep, as well as GI loss from diarrhea from that prep.  Potassium was replaced, and has been normal for the last 3 days.   -Resolved.       Syncope   Non-intractable vomiting with nausea, unspecified vomiting type  Had a syncopal event at home immediately after vomiting. Did fall and bump her head with brief loss of consciousness. Event may have been vasovagal in nature due to vomiting, could pertain to volume depletion from GI loss due to colonoscopy prep, and she does have some orthostatic blood pressure changes here, see below.  She was consistently normal sinus on telemetry.  She has remained neurologically intact.  As of this morning, the patient denies dizziness with a feeling of unsteadiness when she is up and around.  This may pertain to operating at a higher blood pressure, see below under orthostatic hypotension.    Orthostatic hypotension  Was present at the time of admission.  Orthostatic vitals 7/16/2021 showed supine /56, and standing BP 89/53.  She had also been fairly consistently hypotensive since admission, with multiple supine BPs in the 90s systolic.  This may have been a contributor to the patient's syncope and fall at home.  Here, I stopped lisinopril both out of concern for symtpomatic hypotension and hyponatremia, discussed above.  Hydrochlorothiazide has also been stopped the same reasons.   This morning, the patient has been up and around in her room, and denies any dizziness, unsteadiness, or feeling of presyncope.  Hopefully, operating at a higher blood pressure will prevent future symptomatic orthostatic hypotension.  -Lisinopril and hydrochlorothiazide are discontinued at discharge.  -See below regarding management of hypertension.     CKD (chronic kidney disease) stage 3, GFR 30-59 ml/min  Baseline GFR over the past two years has been 42-46.  Admission GFR here was 42 --> 40 --> 37 --> 38 --> 48, a bit better than her recent baseline range.  Clinically, her volume deficit on admission has been corrected.  -Follow renal function as an outpatient.     Anemia, chronic   Baseline 9.3 - 10.2. Hgb on admission 9.6 --> 9.5 --> 8.9 --> 8.7 --> 7.7 today.  The decline in hemoglobin from admission may be hemodilution all due to IV fluids.  There has been no clinical evidence of blood loss.    -I have entered an order to repeat hemoglobin on Monday, 7/19/2021, the result to be addressed by the patient's primary care provider.  -I have asked the patient not to take her usual aspirin until her hemoglobin is confirmed stable.     Chronic constipation  Longstanding problem, was undergoing bowel prep for colonoscopy for further evaluation. Patient is concerned about a possible obstructive polyp.  CT abdomen & pelvis 7/14/2021 was negative for any acute findings, and particularly showed no obstructive findings.  She had a brisk response to bowel prep, though diarrhea output has gradually resolved.  -The patient plans on following up with [facility] after discharge, though is hesitant to perform another colonoscopy prep.     Hypertension goal BP (blood pressure) < 140/90  Managed prior to admission with amlodipine 5 mg daily, hydrochlorothiazide 25 mg daily, lisinopril 40 mg daily, and metoprolol 100 mg bid.  As described above, the patient has been fairly consistently hypotensive since admission,  with significant orthostatic hypotension.  Lisinopril and hydrochlorothiazide were stopped this hospital stay.  Metoprolol and amlodipine 5 mg daily were continued.  Despite discontinuation of lisinopril and hydrochlorothiazide, blood pressure control remains good.  She has had no additional episodes of hypotension.  -Continue metoprolol 100 mg twice daily and amlodipine 5 mg daily as her antihypertensive regimen.  -Hydrochlorothiazide  and lisinopril are discontinued, details described above.     ASCVD (arteriosclerotic cardiovascular disease)  S/P CABG x 4  History of non-ST elevation myocardial infarction (NSTEMI)  History CABG in 2012 complicated by post-op pericardial hematoma and tamponade necessitating a pericardial window. Follows with cardiology Dr. Herman, who recommended an upcoming dobutamine stress echo at last appointment 5/12/2021. Managed prior to admission with aspirin 81 mg daily, metoprolol 100 mg BID, and rosuvastatin 5 mg QHS.  She reports no chest pain prior to admission, and has had none here.  -Continue prior to admission aspirin, metoprolol, and rosuvastatin.  I have asked her not to resume aspirin until after her hemoglobin is drawn on Monday, details above under anemia.  -Continue with outpatient Cardiology follow-up.     GIST (gastrointestinal stromal tumor), malignant  Known history of GIST diagnosed in Sep 2003, S/P incomplete resection and re-resection in Oct 2003. Currently follows with [facility] and managed on Gleevec 400 mg daily.  -Continue Gleevec.  -Continue outpatient Oncology follow-up.     Mixed simple and mucopurulent chronic bronchitis  Managed prior to admission with AirDuo bid and Spiriva daily, continued here.  Lung exam today is normal.  She has no oxygen needs.  -Continue preadmission fluticasone/salmeterol 113/14 mcg 1 puff twice daily and tiotropium 1 capsule inhaled daily.     Hyperthyroidism  Graves' disease  Managed prior to admission with methimazole 2.5 mg  daily.  TSH was normal 7/16/2021.  -Continue methimazole.     History of lung cancer  Diagnosed in 2010, S/P VATS wedge resection. Follows with [facility].  -Continue with outpatient surveillance      History of tobacco abuse  Quit smoking a few years ago. No nicotine replacement was needed.     COVID status  COVID PCR negative 7/14/2021.  Completed Moderna vaccine series as of 4/16/2021.    Pending Results   Unresulted Labs Ordered in the Past 30 Days of this Admission     Date and Time Order Name Status Description    7/15/2021  5:16 PM Urine Culture Preliminary           Physical Exam   Temp:  [97.3  F (36.3  C)-98.1  F (36.7  C)] 97.3  F (36.3  C)  Pulse:  [] 100  Resp:  [18] 18  BP: (109-130)/(52-65) 130/65  SpO2:  [95 %-97 %] 96 %  Vitals:    07/14/21 1308 07/16/21 0558 07/17/21 0330   Weight: 68.9 kg (152 lb) 66.8 kg (147 lb 4.3 oz) 66.7 kg (147 lb 0.8 oz)       GENERAL: Very pleasant woman, seated in a recliner, looks comfortable.  RESP: No accessory muscle use.  Lungs clear throughout on inspiration and expiration.  Expiration not prolonged, no wheeze.  CV: Regular rate and rhythm, non-tachycardic.  Normal S1 S2, grade II/VI holosystolic murmur heard along the left sternal border, no extra sound.  No lower extremity edema.  ABDOMEN: Soft, non-tender, no guarding.  Bowel sounds positive.  NEURO: Alert, oriented, conversant.  Cranial nerves III - XII grossly intact.  No gross motor or sensory deficits.  Gait steady, symmetrical behind a walker over the short distance observed.  PSYCH: Calm, alert, conversant.  Able to articulate logical thoughts, no tangential thoughts, no hallucinations or delusions.  Affect normal.    The discharge plan was discussed with the patient, who expressed agreement.    Total time on this discharge was 40 minutes.    Yovany Gillis MD   [facility]

## 2021-07-17 NOTE — PLAN OF CARE
Pt is A/O, able to make needs known. Blood pressure 125/55, pulse 94, temperature 98  F (36.7  C), temperature source Oral, resp. rate 18, weight 66.8 kg (147 lb 4.3 oz), last menstrual period 01/01/1992, SpO2 96 %, not currently breastfeeding. Stable on room air. Denies pain. Up with SBA and walker. Tolerating food and fluids without nausea or emesis. Continue to assess per plan of care.

## 2021-07-17 NOTE — PLAN OF CARE
"Has been up to the bathroom with SBA and use of walker. Steady gait. Pt explaines \"ready to go home.\"  "

## 2021-07-17 NOTE — PROGRESS NOTES
Care Management Discharge Note    Discharge Date: 07/17/2021       Discharge Disposition: Home    Discharge Services: Other (see comment) (lifeline)    Discharge DME: Walker    Discharge Transportation: family or friend will provide    Private pay costs discussed: Not applicable    Patient/family educated on Medicare website which has current facility and service quality ratings: yes    Education Provided on the Discharge Plan:  patient  Persons Notified of Discharge Plans: patient  Patient/Family in Agreement with the Plan: yes    Handoff Referral Completed: Yes    Additional Information:  Met with patient at bedside.  Confirmed discharge plans to return home with family support.  She is in agreement.  Patient inquired about emergency button information.  Provided patient with lifeline document.  Encouraged patient to seek out various options online to determine which best suits her needs.  Patient states her family is already working to investigate.      Family will provide transportation home upon discharge.      Lolita Ware MSN, RN  Inpatient Care Coordinator  [facility] [phone]  [facility] [phone]

## 2021-07-18 LAB
BACTERIA UR CULT: ABNORMAL
BACTERIA UR CULT: ABNORMAL

## 2021-07-19 ENCOUNTER — TELEPHONE (OUTPATIENT)
Dept: FAMILY MEDICINE | Facility: CLINIC | Age: 76
End: 2021-07-19

## 2021-07-19 ENCOUNTER — PATIENT OUTREACH (OUTPATIENT)
Dept: CARE COORDINATION | Facility: CLINIC | Age: 76
End: 2021-07-19

## 2021-07-19 ENCOUNTER — LAB (OUTPATIENT)
Dept: LAB | Facility: CLINIC | Age: 76
End: 2021-07-19
Payer: COMMERCIAL

## 2021-07-19 DIAGNOSIS — E05.90 HYPERTHYROIDISM: ICD-10-CM

## 2021-07-19 DIAGNOSIS — E87.1 HYPONATREMIA: ICD-10-CM

## 2021-07-19 DIAGNOSIS — R55 SYNCOPE: ICD-10-CM
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
## 2021-07-19 DIAGNOSIS — Z71.89 OTHER SPECIFIED COUNSELING: ICD-10-CM

## 2021-07-19 DIAGNOSIS — D64.9 ANEMIA, UNSPECIFIED TYPE: ICD-10-CM

## 2021-07-19 LAB
ANION GAP SERPL CALCULATED.3IONS-SCNC: 8 MMOL/L (ref 3–14)
BUN SERPL-MCNC: 19 MG/DL (ref 7–30)
CALCIUM SERPL-MCNC: 8.8 MG/DL (ref 8.5–10.1)
CHLORIDE BLD-SCNC: 91 MMOL/L (ref 94–109)
CO2 SERPL-SCNC: 26 MMOL/L (ref 20–32)
CREAT SERPL-MCNC: 1.34 MG/DL (ref 0.52–1.04)
ERYTHROCYTE [DISTWIDTH] IN BLOOD BY AUTOMATED COUNT: 14.3 % (ref 10–15)
GFR SERPL CREATININE-BSD FRML MDRD: 39 ML/MIN/1.73M2
GLUCOSE BLD-MCNC: 103 MG/DL (ref 70–99)
HCT VFR BLD AUTO: 25 % (ref 35–47)
HGB BLD-MCNC: 8.6 G/DL (ref 11.7–15.7)
MCH RBC QN AUTO: 29.6 PG (ref 26.5–33)
MCHC RBC AUTO-ENTMCNC: 34.4 G/DL (ref 31.5–36.5)
MCV RBC AUTO: 86 FL (ref 78–100)
PLATELET # BLD AUTO: 232 10E3/UL (ref 150–450)
POTASSIUM BLD-SCNC: 4.1 MMOL/L (ref 3.4–5.3)
RBC # BLD AUTO: 2.91 10E6/UL (ref 3.8–5.2)
SODIUM SERPL-SCNC: 125 MMOL/L (ref 133–144)
WBC # BLD AUTO: 5.2 10E3/UL (ref 4–11)

## 2021-07-19 PROCEDURE — 85027 COMPLETE CBC AUTOMATED: CPT

## 2021-07-19 PROCEDURE — 80048 BASIC METABOLIC PNL TOTAL CA: CPT

## 2021-07-19 PROCEDURE — 36415 COLL VENOUS BLD VENIPUNCTURE: CPT

## 2021-07-19 ASSESSMENT — ACTIVITIES OF DAILY LIVING (ADL): DEPENDENT_IADLS:: INDEPENDENT

## 2021-07-19 NOTE — PROGRESS NOTES
Clinic Care Coordination Contact  [facility]/Voicemail    Referral Source: IP Report  Clinical Data:   [facility]     Discharge Summary  Hospital Medicine     Date of Admission:  7/14/2021  Date of Discharge:  7/17/2021   Discharging Provider: Yovany Gillis  Date of Service: 7/17/2021       Identification and Chief Compaint:  Idalmis Abdul is a 75 year old female admitted on 7/14/2021. She presented to the emergency department for evaluation of syncopal episode.         Discharge Diagnoses          Hyponatremia    Hypokalemia    Syncopal episode    Orthostatic hypotension    Hypertension     Non-intractable vomiting with nausea, unspecified vomiting type    CKD (chronic kidney disease) stage 3, GFR 30-59 ml/min    Chronic anemia    Chronic constipation    ASCVD (arteriosclerotic cardiovascular disease) S/P CABG x 4    History of non-ST elevation myocardial infarction (NSTEMI)    Mixed simple and mucopurulent chronic bronchitis (H)    Hyperthyroidism    Graves' disease    GIST (gastrointestinal stromal tumor), malignant (H)    History of lung cancer    History of tobacco abuse           Care Coordinator Outreach  Outreach attempted x 1.  Left message on patient's voicemail with call back information and requested return call.  Plan: . Care Coordinator will try to reach patient again in 1-2 business days.  [facility]   Carito Shields RN, Care Coordinator   [facility]   E-mail [email or web address]   [phone]

## 2021-07-19 NOTE — TELEPHONE ENCOUNTER
A same day spot can be used to see me this week, but not a double book.     If she feels she needs to be seen sooner than Thursday, can see one of my partners tomorrow.      Ale Ordaz M.D.

## 2021-07-19 NOTE — TELEPHONE ENCOUNTER
Reason for call:  Symptom   Symptom or request: Lightheadedness earlier today, declined fna    Duration (how long have symptoms been present): 5-10 minutes    Have you been treated for this before? Yes    Additional comments: Patient was released from hospital Saturday 7/17/21for a stay regarding lightheadedness and passing out. She stated she hasn't had the lightheadedness recur until earlier today where she felt like she might faint. Please advise.     Phone number to reach patient:  Cell number on file:    Telephone Information:   Mobile [phone]       Best Time:  Asap    Can we leave a detailed message on this number?  YES    Travel screening: Not Applicable

## 2021-07-19 NOTE — PROGRESS NOTES
Clinic Care Coordination Contact    Background: Care Coordination referral placed from [facility] discharge report for reason of patient meeting criteria for a TCM outreach call by [facility] team.    Assessment: Upon chart review, [facility] Team member will cancel/close the referral for TCM outreach due to reason below:     RN Care Coordinator is actively making outreach to patient therefore will cancel outreach attempts by [facility] to minimize duplicative outreach.       Radha Zayas RN  [facility]

## 2021-07-19 NOTE — TELEPHONE ENCOUNTER
Pt had episode of almost fainting [place] [employer] around noon today.  Pt is feeling good now.    See ER note from 7/17/21.  Pt was told to stop hydrochlorothiazide and Lisinopril while in the ER.    Please review CBC abnormal results, BMP pending.  Pt has ER/IP f/u appt scheduled for 7/26/21.    Overbook?  Get in sooner?  Advise.  KPavelRn

## 2021-07-19 NOTE — TELEPHONE ENCOUNTER
"ED/Discharge Protocol    \"Hi, my name is Марина Pichardo RN, a registered nurse, and I am calling on behalf of 's office at [place].  I am calling to follow up and see how things are going for you after your recent visit.\"    \"I see that you were in the (ER/UC/IP) on 7/14/-7/17/21 for Syncopal event.  How are you doing now that you are home?\"     Pt passed out with bowel prep.   ordered labs for today and pt has stopped 2 B/P meds per his order.  Has appt with  on 7/26/21.  KPavelRN        "

## 2021-07-19 NOTE — TELEPHONE ENCOUNTER
Requested Prescriptions   Pending Prescriptions Disp Refills     methimazole (TAPAZOLE) 5 MG tablet 45 tablet 1     Sig: Take 0.5 tablets (2.5 mg) by mouth daily       There is no refill protocol information for this order        Last office visit: Visit date not found with prescribing provider:  Dr. Yang   Future Office Visit:   Next 5 appointments (look out 90 days)    Jul 26, 2021  9:40 AM  Office Visit with Ale Ordaz MD  [facility] ([facility] ) [address] [facility] [id number]  [phone]               Denise Behrendt  Specialty CSS

## 2021-07-20 ENCOUNTER — PATIENT OUTREACH (OUTPATIENT)
Dept: NURSING | Facility: CLINIC | Age: 76
End: 2021-07-20
Payer: COMMERCIAL

## 2021-07-20 DIAGNOSIS — R55 SYNCOPE: ICD-10-CM

## 2021-07-20 DIAGNOSIS — D64.9 ANEMIA: Primary | ICD-10-CM

## 2021-07-20 SDOH — ECONOMIC STABILITY: TRANSPORTATION INSECURITY
IN THE PAST 12 MONTHS, HAS THE LACK OF TRANSPORTATION KEPT YOU FROM MEDICAL APPOINTMENTS OR FROM GETTING MEDICATIONS?: NO

## 2021-07-20 SDOH — ECONOMIC STABILITY: FOOD INSECURITY: WITHIN THE PAST 12 MONTHS, YOU WORRIED THAT YOUR FOOD WOULD RUN OUT BEFORE YOU GOT MONEY TO BUY MORE.: NEVER TRUE

## 2021-07-20 SDOH — HEALTH STABILITY: PHYSICAL HEALTH: ON AVERAGE, HOW MANY DAYS PER WEEK DO YOU ENGAGE IN MODERATE TO STRENUOUS EXERCISE (LIKE A BRISK WALK)?: 0 DAYS

## 2021-07-20 SDOH — HEALTH STABILITY: PHYSICAL HEALTH: ON AVERAGE, HOW MANY MINUTES DO YOU ENGAGE IN EXERCISE AT THIS LEVEL?: 0 MIN

## 2021-07-20 SDOH — ECONOMIC STABILITY: FOOD INSECURITY: WITHIN THE PAST 12 MONTHS, THE FOOD YOU BOUGHT JUST DIDN'T LAST AND YOU DIDN'T HAVE MONEY TO GET MORE.: NEVER TRUE

## 2021-07-20 SDOH — ECONOMIC STABILITY: TRANSPORTATION INSECURITY
IN THE PAST 12 MONTHS, HAS LACK OF TRANSPORTATION KEPT YOU FROM MEETINGS, WORK, OR FROM GETTING THINGS NEEDED FOR DAILY LIVING?: NO

## 2021-07-20 ASSESSMENT — ACTIVITIES OF DAILY LIVING (ADL)
DEPENDENT_IADLS:: INDEPENDENT
DEPENDENT_IADLS:: INDEPENDENT

## 2021-07-20 ASSESSMENT — SOCIAL DETERMINANTS OF HEALTH (SDOH)
IN A TYPICAL WEEK, HOW MANY TIMES DO YOU TALK ON THE PHONE WITH FAMILY, FRIENDS, OR NEIGHBORS?: MORE THAN THREE TIMES A WEEK
HOW HARD IS IT FOR YOU TO PAY FOR THE VERY BASICS LIKE FOOD, HOUSING, MEDICAL CARE, AND HEATING?: NOT VERY HARD
DO YOU BELONG TO ANY CLUBS OR ORGANIZATIONS SUCH AS CHURCH GROUPS UNIONS, FRATERNAL OR ATHLETIC GROUPS, OR SCHOOL GROUPS?: NO
HOW OFTEN DO YOU GET TOGETHER WITH FRIENDS OR RELATIVES?: TWICE A WEEK
HOW OFTEN DO YOU ATTEND CHURCH OR RELIGIOUS SERVICES?: NEVER
HOW OFTEN DO YOU ATTENT MEETINGS OF THE CLUB OR ORGANIZATION YOU BELONG TO?: NEVER

## 2021-07-20 NOTE — LETTER
[facility] CARE COORDINATION  [address]  [facility] [address]    July 20, 2021    Idalmis Abdul  [address]   [facility] [address]      Dear Idalmis,    I am a clinic care coordinator who works with Ale Ordaz MD at [facility] . I wanted to thank you for spending the time to talk with me.  Below is a description of clinic care coordination and how I can further assist you.      The clinic care coordination team is made up of a registered nurse,  and community health worker who understand the health care system. The goal of clinic care coordination is to help you manage your health and improve access to the health care system in the most efficient manner. The team can assist you in meeting your health care goals by providing education, coordinating services, strengthening the communication among your providers and supporting you with any resource needs.    Please feel free to contact me at [phone] with any questions or concerns. We are focused on providing you with the highest-quality healthcare experience possible and that all starts with you.     Sincerely,     [facility]   Carito Shields RN, Care Coordinator   [facility]'s   E-mail [email or web address]   [phone]    Enclosed: I have enclosed a copy of the Complex Care Plan. This has helpful information and goals that we have talked about. Please keep this in an easy to access place to use as needed.

## 2021-07-20 NOTE — LETTER
[facility]  Complex Care Plan  About Me:    Patient Name:  Idalmis Abdul    YOB: 1945  Age:         75 year old   Yemi MRN:    [id number] Telephone Information:  Home Phone [phone]   Mobile [phone]       Address:  [address] [address] Email address:  No e-mail address on record      Emergency Contact(s)    Name Relationship Lgl Grd Work Phone Home Phone Mobile Phone   1. LOREN ABDUL Son  [phone]   2. DAISY CEVALLOS Sister   [phone]            Primary language:  English     needed? No   [employer]:  [phone] op. 1  Other communication barriers: None  Preferred Method of Communication:  Mail  Current living arrangement:    Mobility Status/ Medical Equipment:      Health Maintenance  Health Maintenance Reviewed: Due/Overdue   Health Maintenance Due   Topic Date Due     HEPATITIS C SCREENING  Never done     ZOSTER IMMUNIZATION (1 of 2) Never done     MEDICARE ANNUAL WELLNESS VISIT  12/15/2012     ADVANCE CARE PLANNING  12/15/2016     URINE DRUG SCREEN  12/07/2019     COLORECTAL CANCER SCREENING  12/12/2019     DEXA  12/17/2020     FALL RISK ASSESSMENT  07/28/2021   Discuss at next provider appointment     My Access Plan  Medical Emergency 911   Primary Clinic Line [facility] - [phone]   24 Hour Appointment Line [phone] or  [phone]) (toll-free)   24 Hour Nurse Line [phone] (toll-free)   Preferred Urgent Care     Preferred Hospital     Preferred Pharmacy [facility] [address] [facility]     Behavioral Health Crisis Line The National Suicide Prevention Lifeline at 1-700.127.4564 or 911             My Care Team Members  Patient Care Team       Relationship Specialty Notifications Start End    Ale Ordaz MD PCP - General Family Practice  6/29/20     Phone: [phone] Pager:  [phone] Fax: [phone]        [address] [facility] [address]    Blayne Schmitz MD MD Oncology Admissions 11/20/13     Phone: [phone] Fax: [phone]         [address] [facility] [address] [facility] [address]    Ara Guzman APRN CNP Nurse Practitioner Nurse Practitioner Admissions 11/20/13     Phone: [phone] Fax: [phone]         [address] [facility] [address] [facility] [address]    Swetha Antoine MD MD INTERNAL MEDICINE - ENDOCRINOLOGY, DIABETES & METABOLISM  7/7/15     Phone: [phone] Pager: [phone] Fax: [phone]        [address] [facility] [address] [facility] [address]    Darlene Sultana MD MD Family Practice  7/9/15     referred to endocrine    Phone: [phone] Fax: [phone]         [address] [facility] [address]    Magnus Mckeon MD MD Ophthalmology  5/20/16     Referring Ophthalmologist    Phone: [phone] Fax: [phone]         [facility] [address] [facility] [address]    Erma Ordaz MD MD Ophthalmology  5/20/16     Phone: [phone] Fax: [phone]         [address] [place], [place] [address] [address] [address]    Yenifer Driver, RN Nurse Coordinator Oncology Admissions 4/6/18     Phone: [phone] Pager: [phone]        Sangeetha Murphy Fernández Student   6/5/19     Jessy Osman [facility] Pharmacist Pharmacist  9/4/19     Phone: [phone] Fax: [phone]         [address] [facility] [address]    Blayne Schmitz MD Assigned Cancer Care Provider   10/23/20     Phone: [phone] Fax: [phone]         [address] [address] [facility] [address]    Ale Ordaz MD Assigned PCP   4/11/21     Phone: [phone] Pager: [phone] Fax: [phone] [address] [facility] [address]    Tootie Herman MD Assigned Heart and Vascular Provider   5/23/21     Phone: [phone] Fax: [phone] [address] [address]    Federico Yang MD Assigned  Endocrinology Provider   7/16/21     Phone: [phone] Fax: [phone]         [facility] [place] [address]    Carito Shields, RN Clinic Care Coordinator Primary Care - CC Admissions 7/19/21     Phone: [phone]         Carito Shields RN Lead Care Coordinator Primary Care - CC Admissions 7/20/21     Phone: [phone]                 My Care Plans  Self Management and Treatment Plan  Goals and (Comments)  Goals        General     Medical (pt-stated)      Notes - Note edited  7/20/2021  2:14 PM by Carito Shields, RN     Goal Statement: I will get an answer for the cause of my syncope in the next month and complete my Health Maintenance due in the next 1-3 months   Date Goal set: 7/20/2021  Barriers: Deconditioned   Strengths: Supportive son   Date to Achieve By: 10/20/2021  Patient expressed understanding of goal: Yes  Action steps to achieve this goal:  1. I will set up a future Colonoscopy  2. I will keep future Gastroenterology appointment   3. I will pace my activity and use my walker for safety  4. I will check into a life alert   5.  I will discuss Health Maintenance due with my provider                Action Plans on File: None       Advance Care Plans/Directives Type: None       My Medical and Care Information  Problem List   Patient Active Problem List   Diagnosis     Hypertension goal BP (blood pressure) < 140/90     GIST (gastrointestinal stromal tumor), malignant (H)     Eyelid edema     History of lung cancer     Health Care Home     NSTEMI (non-ST elevated myocardial infarction) (H)     ASCVD (arteriosclerotic cardiovascular disease)     S/P CABG x 4     GERD (gastroesophageal reflux disease)     Hyperthyroidism     Thyroid eye disease     Eyelid retraction or lag     Thyrotoxic exophthalmos     Graves' disease     History of tobacco abuse     History of non-ST elevation myocardial infarction (NSTEMI)     Chronic back pain     PVD (posterior vitreous detachment)     Age-related  osteoporosis without current pathological fracture     Spinal stenosis of lumbosacral region     DDD (degenerative disc disease), lumbar     CKD (chronic kidney disease) stage 3, GFR 30-59 ml/min     Mixed simple and mucopurulent chronic bronchitis (H)     Fall, initial encounter     Non-intractable vomiting with nausea, unspecified vomiting type     Hypokalemia     Hyponatremia     Chronic constipation     Anemia      Current Medications and Allergies:    Current Outpatient Medications   Medication     acetaminophen (TYLENOL) 325 MG tablet     amLODIPine (NORVASC) 5 MG tablet     Blood Pressure Monitoring (ADULT BLOOD PRESSURE CUFF LG) KIT     fluticasone-salmeterol (AIRDUO RESPICLICK) 113-14 MCG/ACT inhaler     imatinib (GLEEVEC) 400 MG tablet     melatonin 5 MG tablet     methimazole (TAPAZOLE) 5 MG tablet     metoprolol tartrate (LOPRESSOR) 100 MG tablet     ondansetron (ZOFRAN-ODT) 4 MG ODT tab     polyethylene glycol (MIRALAX/GLYCOLAX) powder     rosuvastatin (CRESTOR) 5 MG tablet     tiotropium (SPIRIVA) 18 MCG inhaled capsule     No current facility-administered medications for this visit.       Care Coordination Start Date: 07/20/2021   Frequency of Care Coordination:  1-2 weeks   Form Last Updated: 07/20/2021

## 2021-07-20 NOTE — PROGRESS NOTES
Clinic Care Coordination Contact    Clinic Care Coordination Contact  OUTREACH    Referral Information:     Chief Complaint   Patient presents with     Clinic Care Coordination - Post Hospital     Clinic Care Coordination RN         Universal Utilization:   [facility]     Discharge Summary  Hospital Medicine     Date of Admission:  7/14/2021  Date of Discharge:  7/17/2021    Discharge Diagnoses          Hyponatremia    Hypokalemia    Syncopal episode    Orthostatic hypotension    Hypertension     Non-intractable vomiting with nausea, unspecified vomiting type    CKD (chronic kidney disease) stage 3, GFR 30-59 ml/min    Chronic anemia    Chronic constipation    ASCVD (arteriosclerotic cardiovascular disease) S/P CABG x 4    History of non-ST elevation myocardial infarction (NSTEMI)    Mixed simple and mucopurulent chronic bronchitis (H)    Hyperthyroidism    Graves' disease    GIST (gastrointestinal stromal tumor), malignant (H)    History of lung cancer    History of tobacco abuse     Utilization    Hospital Admissions  1             ED Visits  3             No Show Count (past year)  0                Current as of: 7/19/2021  5:36 PM              Clinical Concerns:  Current Medical Concerns:    Patient continues to feel weak.  Patient states the Colonoscopy was cancelled due to she was not able to complete the prep  Patient will reschedule.  Patient has an appointment with Gastroenterology next Tuesday      Patient reports she had loose dark stools on Sunday but has not experienced this since she has been home      Patient will pace her activity ,use a walker and check on a life Alert.  Patient lives alone but her son has been making meals and checking on her frequently   Current Behavioral Concerns: No    Education Provided to patient: CC introductory letter and care plan sent in the mail       Health Maintenance Reviewed:  Health Maintenance Due   Topic Date Due     HEPATITIS C  SCREENING  Never done     ZOSTER IMMUNIZATION (1 of 2) Never done     MEDICARE ANNUAL WELLNESS VISIT  12/15/2012     ADVANCE CARE PLANNING  12/15/2016     URINE DRUG SCREEN  12/07/2019     COLORECTAL CANCER SCREENING  12/12/2019     DEXA  12/17/2020     FALL RISK ASSESSMENT  07/28/2021   Will discuss at her next provider visit   Not interested in an ACD stating her son knows her wishes     Clinical Pathway: None    Medication Management:  Medication review status: Medications reviewed.  Changes noted per patient report.      Functional Status:  Uses a walker       Living Situation: Lives alone      Lifestyle & Psychosocial Needs:    Social Determinants of Health     Tobacco Use: Medium Risk     Smoking Tobacco Use: Former Smoker     Smokeless Tobacco Use: Never Used   Alcohol Use:      Frequency of Alcohol Consumption:      Average Number of Drinks:      Frequency of Binge Drinking:    Financial Resource Strain: Low Risk      Difficulty of Paying Living Expenses: Not very hard   Food Insecurity: No Food Insecurity     Worried About Running Out of Food in the Last Year: Never true     Ran Out of Food in the Last Year: Never true   Transportation Needs: No Transportation Needs     Lack of Transportation (Medical): No     Lack of Transportation (Non-Medical): No   Physical Activity: Inactive     Days of Exercise per Week: 0 days     Minutes of Exercise per Session: 0 min   Stress:      Feeling of Stress :    Social Connections: Socially Isolated     Frequency of Communication with Friends and Family: More than three times a week     Frequency of Social Gatherings with Friends and Family: Twice a week     Attends [employer] Services: Never     Active Member of Clubs or Organizations: No     Attends Club or Organization Meetings: Never     Marital Status:    Intimate Partner Violence:      Fear of Current or Ex-Partner:      Emotionally Abused:      Physically Abused:      Sexually Abused:    Depression: Not at  risk     PHQ-2 Score: 0   Housing Stability:      Unable to Pay for Housing in the Last Year:      Number of Places Lived in the Last Year:      Unstable Housing in the Last Year:            Goals:   Goals        General     Medical (pt-stated)      Notes - Note edited  7/20/2021  2:14 PM by Carito Shields, RN     Goal Statement: I will get an answer for the cause of my syncope in the next month and complete my Health Maintenance due in the next 1-3 months   Date Goal set: 7/20/2021  Barriers: Deconditioned   Strengths: Supportive son   Date to Achieve By: 10/20/2021  Patient expressed understanding of goal: Yes  Action steps to achieve this goal:  1. I will set up a future Colonoscopy  2. I will keep future Gastroenterology appointment   3. I will pace my activity and use my walker for safety  4. I will check into a life alert   5.  I will discuss Health Maintenance due with my provider               Patient/Caregiver understanding: Patient expresses good understanding of discharge instructions        Future Appointments              In 2 days Ale Ordaz MD [facility], [facility]    In 6 days Ale Ordaz MD [facility], [facility]    In 3 months [place] [facility], [facility]    In 3 months [facility], [facility]    In 3 months Blayne Schmitz MD [facility], [facility]          Plan:   1. Patient will keep hospital follow up Thursday and discuss rescheduling Colonoscopy and completing HM   2. Patient will pace activity and use walker for safety   3. Patient will check on a life Alert   4. Patient will keep Gastroenterology  appointment next Tuesday  5. CC RN will follow up in 1-2 weeks     [facility]   Carito Shields RN, Care Coordinator   [facility]'s   E-mail [email or web address]   [phone]

## 2021-07-21 DIAGNOSIS — E05.00 GRAVES' DISEASE: ICD-10-CM

## 2021-07-21 DIAGNOSIS — E05.90 HYPERTHYROIDISM: Primary | ICD-10-CM

## 2021-07-21 RX ORDER — METHIMAZOLE 5 MG/1
2.5 TABLET ORAL DAILY
Qty: 45 TABLET | Refills: 1 | Status: SHIPPED | OUTPATIENT
Start: 2021-07-21 | End: 2022-01-14

## 2021-07-21 RX ORDER — METHIMAZOLE 5 MG/1
2.5 TABLET ORAL DAILY
Qty: 45 TABLET | Refills: 1 | Status: SHIPPED | OUTPATIENT
Start: 2021-07-21 | End: 2021-07-22

## 2021-07-21 NOTE — TELEPHONE ENCOUNTER
METHIMAZOLE 5MG TABS    Last Written Prescription Date:  1/26/2021-7/25/2021  Last Fill Quantity: 45,   # refills: 1  Last Office Visit :  8/24/2020  Future Office visit:  None    Routing refill request to provider for review/approval because:  Refills for this medication group require provider approval    Lolita Arevalo RN  Central Triage Red Flags/Med Refills

## 2021-07-21 NOTE — TELEPHONE ENCOUNTER
Medication not on nurse protocol. Sent to provider for review. LOV 8/24/2020.    Radha Panchal RN BSN PHN

## 2021-07-22 ENCOUNTER — OFFICE VISIT (OUTPATIENT)
Dept: FAMILY MEDICINE | Facility: CLINIC | Age: 76
End: 2021-07-22
Payer: COMMERCIAL

## 2021-07-22 VITALS
WEIGHT: 146 LBS | DIASTOLIC BLOOD PRESSURE: 68 MMHG | TEMPERATURE: 97.1 F | RESPIRATION RATE: 16 BRPM | BODY MASS INDEX: 26.87 KG/M2 | OXYGEN SATURATION: 99 % | HEIGHT: 62 IN | HEART RATE: 71 BPM | SYSTOLIC BLOOD PRESSURE: 138 MMHG

## 2021-07-22 DIAGNOSIS — R11.0 NAUSEA: ICD-10-CM

## 2021-07-22 DIAGNOSIS — E87.1 HYPONATREMIA: Primary | ICD-10-CM

## 2021-07-22 DIAGNOSIS — D64.9 ANEMIA, UNSPECIFIED TYPE: ICD-10-CM

## 2021-07-22 LAB
ANION GAP SERPL CALCULATED.3IONS-SCNC: 9 MMOL/L (ref 3–14)
BUN SERPL-MCNC: 16 MG/DL (ref 7–30)
CALCIUM SERPL-MCNC: 8.6 MG/DL (ref 8.5–10.1)
CHLORIDE BLD-SCNC: 91 MMOL/L (ref 94–109)
CO2 SERPL-SCNC: 24 MMOL/L (ref 20–32)
CREAT SERPL-MCNC: 1.19 MG/DL (ref 0.52–1.04)
GFR SERPL CREATININE-BSD FRML MDRD: 44 ML/MIN/1.73M2
GLUCOSE BLD-MCNC: 116 MG/DL (ref 70–99)
HGB BLD-MCNC: 8.7 G/DL (ref 11.7–15.7)
OSMOLALITY SERPL: 256 MMOL/KG (ref 280–301)
OSMOLALITY UR: 423 MMOL/KG (ref 100–1200)
POTASSIUM BLD-SCNC: 3.8 MMOL/L (ref 3.4–5.3)
SODIUM SERPL-SCNC: 124 MMOL/L (ref 133–144)
SODIUM UR-SCNC: 49 MMOL/L

## 2021-07-22 PROCEDURE — 85018 HEMOGLOBIN: CPT | Performed by: FAMILY MEDICINE

## 2021-07-22 PROCEDURE — 80048 BASIC METABOLIC PNL TOTAL CA: CPT | Performed by: FAMILY MEDICINE

## 2021-07-22 PROCEDURE — 36415 COLL VENOUS BLD VENIPUNCTURE: CPT | Performed by: FAMILY MEDICINE

## 2021-07-22 PROCEDURE — 99495 TRANSJ CARE MGMT MOD F2F 14D: CPT | Performed by: FAMILY MEDICINE

## 2021-07-22 PROCEDURE — 83930 ASSAY OF BLOOD OSMOLALITY: CPT | Performed by: FAMILY MEDICINE

## 2021-07-22 PROCEDURE — 83935 ASSAY OF URINE OSMOLALITY: CPT | Performed by: FAMILY MEDICINE

## 2021-07-22 PROCEDURE — 84300 ASSAY OF URINE SODIUM: CPT | Performed by: FAMILY MEDICINE

## 2021-07-22 RX ORDER — ONDANSETRON 4 MG/1
4 TABLET, ORALLY DISINTEGRATING ORAL 2 TIMES DAILY PRN
Qty: 20 TABLET | Refills: 3 | Status: SHIPPED | OUTPATIENT
Start: 2021-07-22 | End: 2021-09-10

## 2021-07-22 ASSESSMENT — MIFFLIN-ST. JEOR: SCORE: 1105.5

## 2021-07-22 ASSESSMENT — PAIN SCALES - GENERAL: PAINLEVEL: NO PAIN (0)

## 2021-07-22 NOTE — RESULT ENCOUNTER NOTE
Called patient.  Na relatively stable over the pas few days.  She's drinking a fairly large amount of fluids.    We're going to try to fluid restrict to 1.5L/day - discussed that with patient and she is in agreement.   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    Hemoglobin stable.     Will go to ER if feeling worse over the weekend or if new symptoms develop.    Ale Ordaz M.D.

## 2021-07-22 NOTE — PROGRESS NOTES
"    Assessment & Plan     Hyponatremia  Thought to be due to her lisinopril or hydrochlorothiazide, however she also was doing a bowel prep when she had the syncopal episode that landed her in the hospital.    Unfortunately Monday she had a Na of 126 which was down from 128 on discharge.    She's drinking about 2L+ of fluid in a day by her recollection.    Likely the hyponatremia is leading to the nausea she's experiencing.   Depending on value today and lab work up for this, may need to go back to ER vs try some fluid restriction and recheck early next week.     - Basic metabolic panel  (Ca, Cl, CO2, Creat, Gluc, K, Na, BUN); Future  - Hemoglobin; Future  - Osmolality; Future  - Osmolality urine; Future  - Sodium random urine; Future  - Basic metabolic panel  (Ca, Cl, CO2, Creat, Gluc, K, Na, BUN)  - Sodium random urine  - Osmolality urine  - Osmolality  - Hemoglobin    Nausea     - ondansetron (ZOFRAN-ODT) 4 MG ODT tab; Take 1 tablet (4 mg) by mouth 2 times daily as needed for nausea    Anemia, unspecified type  Stable.  Trying to get colonoscopy done - is consulting with [facility] next week.        BMI:   Estimated body mass index is 26.7 kg/m  as calculated from the following:    Height as of this encounter: 1.575 m (5' 2\").    Weight as of this encounter: 66.2 kg (146 lb).           No follow-ups on file.    Ale Ordaz MD  [facility]   Idalmis is a 75 year old who presents for the following health issues     HPI   Chief Complaint   Patient presents with     Hospital F/U     hyponatremia     Medication Request     zofran      Medication Question     Should she restart [facility]         Hospital Follow-up Visit:    Hospital/Nursing Home/IP Rehab Facility: [facility]  Date of Admission: 7/14/21  Date of Discharge: 7/17/21  Reason(s) for Admission: hyponatremia      Was your hospitalization related to COVID-19? No   Problems taking medications " "regularly:  None  Medication changes since discharge: None  Problems adhering to non-medication therapy:  None    Summary of hospitalization:  [facility] discharge summary reviewed  Diagnostic Tests/Treatments reviewed.  Follow up needed: labs  Other Healthcare Providers Involved in Patient s Care:         None  Update since discharge: patient is still at the point of almost fainting. She had a recent episode at post office. Patient has been nauseated daily.  Post Discharge Medication Reconciliation: discharge medications reconciled, continue medications without change.  Plan of care communicated with patient            Review of Systems   Constitutional, HEENT, cardiovascular, pulmonary, gi and gu systems are negative, except as otherwise noted.      Objective    /68   Pulse 71   Temp 97.1  F (36.2  C) (Tympanic)   Resp 16   Ht 1.575 m (5' 2\")   Wt 66.2 kg (146 lb)   LMP 01/01/1992   SpO2 99%   BMI 26.70 kg/m    Body mass index is 26.7 kg/m .  Physical Exam   GENERAL: alert, no distress and pale  NECK: no adenopathy, no asymmetry, masses, or scars and thyroid normal to palpation  RESP: lungs clear to auscultation - no rales, rhonchi or wheezes  CV: regular rate and rhythm, normal S1 S2, no S3 or S4, no murmur, click or rub, no peripheral edema and peripheral pulses strong  ABDOMEN: soft, nontender, no hepatosplenomegaly, no masses and bowel sounds normal  MS: no gross musculoskeletal defects noted, no edema    Results for orders placed or performed in visit on 07/22/21   Hemoglobin     Status: Abnormal   Result Value Ref Range    Hemoglobin 8.7 (L) 11.7 - 15.7 g/dL                   "

## 2021-07-22 NOTE — PATIENT INSTRUCTIONS
Our Clinic hours are:  Mondays    7:20 am - 7 pm  Tues -  Fri  7:20 am - 5 pm    Clinic Phone: [phone]    The clinic lab opens at 7:30 am Mon - Fri and appointments are required.    [facility]. [phone]  Monday  8 am - 7pm  Tues - Fri 8 am - 5:30 pm

## 2021-07-24 ENCOUNTER — HOSPITAL ENCOUNTER (EMERGENCY)
Facility: CLINIC | Age: 76
Discharge: HOME | End: 2021-07-24
Attending: EMERGENCY MEDICINE | Admitting: EMERGENCY MEDICINE
Payer: COMMERCIAL

## 2021-07-24 VITALS
SYSTOLIC BLOOD PRESSURE: 114 MMHG | OXYGEN SATURATION: 98 % | HEIGHT: 62 IN | RESPIRATION RATE: 16 BRPM | WEIGHT: 146 LBS | BODY MASS INDEX: 26.87 KG/M2 | DIASTOLIC BLOOD PRESSURE: 63 MMHG | HEART RATE: 76 BPM | TEMPERATURE: 98.2 F

## 2021-07-24 DIAGNOSIS — E87.1 HYPONATREMIA: ICD-10-CM

## 2021-07-24 DIAGNOSIS — K59.00 CONSTIPATION, UNSPECIFIED CONSTIPATION TYPE: ICD-10-CM

## 2021-07-24 DIAGNOSIS — D64.9 ANEMIA, UNSPECIFIED TYPE: ICD-10-CM

## 2021-07-24 LAB
ANION GAP SERPL CALCULATED.3IONS-SCNC: 9 MMOL/L (ref 3–14)
BASOPHILS # BLD AUTO: 0 10E3/UL (ref 0–0.2)
BASOPHILS NFR BLD AUTO: 1 %
BUN SERPL-MCNC: 16 MG/DL (ref 7–30)
CALCIUM SERPL-MCNC: 8.2 MG/DL (ref 8.5–10.1)
CHLORIDE BLD-SCNC: 97 MMOL/L (ref 94–109)
CO2 SERPL-SCNC: 23 MMOL/L (ref 20–32)
CREAT SERPL-MCNC: 1.13 MG/DL (ref 0.52–1.04)
EOSINOPHIL # BLD AUTO: 0.1 10E3/UL (ref 0–0.7)
EOSINOPHIL NFR BLD AUTO: 1 %
ERYTHROCYTE [DISTWIDTH] IN BLOOD BY AUTOMATED COUNT: 14.9 % (ref 10–15)
GFR SERPL CREATININE-BSD FRML MDRD: 47 ML/MIN/1.73M2
GLUCOSE BLD-MCNC: 117 MG/DL (ref 70–99)
HCT VFR BLD AUTO: 25.9 % (ref 35–47)
HGB BLD-MCNC: 8.7 G/DL (ref 11.7–15.7)
IMM GRANULOCYTES # BLD: 0 10E3/UL
IMM GRANULOCYTES NFR BLD: 0 %
LYMPHOCYTES # BLD AUTO: 0.7 10E3/UL (ref 0.8–5.3)
LYMPHOCYTES NFR BLD AUTO: 11 %
MCH RBC QN AUTO: 29.7 PG (ref 26.5–33)
MCHC RBC AUTO-ENTMCNC: 33.6 G/DL (ref 31.5–36.5)
MCV RBC AUTO: 88 FL (ref 78–100)
MONOCYTES # BLD AUTO: 0.9 10E3/UL (ref 0–1.3)
MONOCYTES NFR BLD AUTO: 14 %
NEUTROPHILS # BLD AUTO: 4.7 10E3/UL (ref 1.6–8.3)
NEUTROPHILS NFR BLD AUTO: 73 %
NRBC # BLD AUTO: 0 10E3/UL
NRBC BLD AUTO-RTO: 0 /100
PLATELET # BLD AUTO: 229 10E3/UL (ref 150–450)
POTASSIUM BLD-SCNC: 3.7 MMOL/L (ref 3.4–5.3)
RBC # BLD AUTO: 2.93 10E6/UL (ref 3.8–5.2)
SODIUM SERPL-SCNC: 129 MMOL/L (ref 133–144)
WBC # BLD AUTO: 6.5 10E3/UL (ref 4–11)

## 2021-07-24 PROCEDURE — 99283 EMERGENCY DEPT VISIT LOW MDM: CPT | Performed by: EMERGENCY MEDICINE

## 2021-07-24 PROCEDURE — 36415 COLL VENOUS BLD VENIPUNCTURE: CPT | Performed by: EMERGENCY MEDICINE

## 2021-07-24 PROCEDURE — 80048 BASIC METABOLIC PNL TOTAL CA: CPT | Performed by: EMERGENCY MEDICINE

## 2021-07-24 PROCEDURE — 85025 COMPLETE CBC W/AUTO DIFF WBC: CPT | Performed by: EMERGENCY MEDICINE

## 2021-07-24 ASSESSMENT — ENCOUNTER SYMPTOMS
ABDOMINAL PAIN: 0
CONSTIPATION: 1
FEVER: 0
SHORTNESS OF BREATH: 0

## 2021-07-24 ASSESSMENT — MIFFLIN-ST. JEOR: SCORE: 1105.5

## 2021-07-24 NOTE — ED PROVIDER NOTES
History     Chief Complaint   Patient presents with     Constipation     Has not been able to have a BM since Monday. Tried home enema tonight and still did not work so came in. Also states sodium has been low for two years.     HPI  Idalmis Abdul is a 75 year old female who has past medical history significant for hypokalemia, hyponatremia, chronic constipation, chronic kidney disease, presenting to the emergency department with concerns regarding abdominal discomfort, with discomfort while sitting since patient has not had bowel movement since last Monday, 6 days ago.  Patient tried Fleet enema at home tonight, however this only caused increased amounts of pressure, and no significant stool output, and therefore patient presents to the emergency department.  She has also been following up on an outpatient basis regarding hyponatremia.  I reviewed most recent clinic visit.  Patient is currently with fluid restriction to 1.5 L.  She reports compliance on that.  Also requesting to have recheck of sodium to see where that stands.  Patient denies any fever.  No urinary symptoms.  No upper abdominal discomfort.  No prior history of bowel obstruction.  There has been no vomiting.    Allergies:  Allergies   Allergen Reactions     Atorvastatin Cramps and Unknown     cramps         Problem List:    Patient Active Problem List    Diagnosis Date Noted     Hypokalemia 07/15/2021     Priority: Medium     Hyponatremia 07/15/2021     Priority: Medium     Chronic constipation 07/15/2021     Priority: Medium     Anemia 07/15/2021     Priority: Medium     Fall, initial encounter 07/14/2021     Priority: Medium     Non-intractable vomiting with nausea, unspecified vomiting type 07/14/2021     Priority: Medium     Mixed simple and mucopurulent chronic bronchitis (H) 01/07/2020     Priority: Medium     CKD (chronic kidney disease) stage 3, GFR 30-59 ml/min 05/23/2019     Priority: Medium     Spinal stenosis of lumbosacral region  03/22/2018     Priority: Medium     DDD (degenerative disc disease), lumbar 03/22/2018     Priority: Medium     Age-related osteoporosis without current pathological fracture 12/16/2014     Priority: Medium     PVD (posterior vitreous detachment) 11/17/2014     Priority: Medium     History of tobacco abuse 09/02/2014     Priority: Medium     QUIT in 2010       History of non-ST elevation myocardial infarction (NSTEMI) 09/02/2014     Priority: Medium     Chronic back pain 09/02/2014     Priority: Medium     No longer on oxycodone - had been on this long term in the past         Graves' disease 08/19/2014     Priority: Medium     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 Provider to review       Hyperthyroidism 02/04/2014     Priority: Medium     Seeing Endoncrinology at [facility]       GERD (gastroesophageal reflux disease) 10/08/2013     Priority: Medium     S/P CABG x 4 08/05/2012     Priority: Medium     ASCVD (arteriosclerotic cardiovascular disease) 06/14/2012     Priority: Medium     NSTEMI (non-ST elevated myocardial infarction) (H) 06/12/2012     Priority: Medium     Eyelid edema 12/15/2011     Priority: Medium     side effect of gastric cancer medication       History of lung cancer 12/15/2011     Priority: Medium     S/p resection of right lung.  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 Schmitz       Hypertension goal BP (blood pressure) < 140/90 03/24/2005     Priority: Medium        Past Medical History:    Past Medical History:   Diagnosis Date     ASCVD (arteriosclerotic cardiovascular disease)  6/14/2012     Benign neoplasm of duodenum, jejunum, and ileum 2003, 2007     CA - lung cancer 4/2010     choledochal cyst 1963     CKD (chronic kidney disease) stage 3, GFR 30-59 ml/min 5/23/2019     Coronary artery disease      DDD (degenerative disc disease), lumbar 3/22/2018     Dislocated finger, left middle PIP 7/26/2013     Dyspnea 8/27/2013     Eyelid edema 12/15/2011     GERD (gastroesophageal reflux disease) 10/8/2013     GIST (gastrointestinal stromal tumor), malignant (H) 5/10/2011     Graves disease      Grief reaction 5/11/2009     History of lung cancer 12/15/2011     Hyperlipidaemia      Hyperlipidemia LDL goal <160 12/17/2013     Hyperthyroidism 2/4/2014     Hypokalemia 8/5/2012     LBP (low back pain) 7/26/2013     Leiomyoma of uterus, unspecified      NSTEMI (non-ST elevated myocardial infarction) (H) 6/12/2012     NSTEMI (non-ST elevated myocardial infarction) (H)      osteopenia      Other benign neoplasm of connective and other soft tissue of thorax 2003     Other chronic pain      PONV (postoperative nausea and vomiting)      Postsurgical aortocoronary bypass status 7/4/2012     S/P CABG x 4 8/5/2012     Strabismus      Tobacco use disorder      Unspecified essential hypertension        Past Surgical History:    Past Surgical History:   Procedure Laterality Date     BLEPHAROPLASTY BILATERAL Bilateral 7/17/2019    Procedure: Bilateral Upper Eyelid Blepharoplasty;  Surgeon: Vasile Brasher MD;  Location: [facility] OR     BYPASS GRAFT ARTERY CORONARY  6/14/2012    Procedure: BYPASS GRAFT ARTERY CORONARY;  Median Sternotomy Coronary Artery Bypass Graft  x 3        C THORACOSCOPY,DX W BX  fall 2003    benign tissue     CATARACT IOL, RT/LT      LE     CHOLECYSTECTOMY  1963     COLONOSCOPY  4-12-05     CORONARY ARTERY BYPASS      X4     CREATION PERICARDIAL WINDOW  6/15/2012    Procedure: CREATION PERICARDIAL WINDOW;  Mediastinal Exploration, Control of Bleeding;  Surgeon: Dwaine Griffin MD;   Location: [facility] OR     EYE SURGERY  2014    left cataract removal     GI SURGERY  2003 and 2007    GIST tumor removal     GYN SURGERY      tubal ligation     HYSTERECTOMY VAGINAL, COLPORRHAPHY ANTERIOR, POSTERIOR, COMBINED N/A 10/17/2017    Procedure: COMBINED HYSTERECTOMY VAGINAL, COLPORRHAPHY ANTERIOR, POSTERIOR;  Total Vaginal Hysterectomy and Posterior Repair,Sacrospinous Vault Suspension;  Surgeon: Ruth Farooq MD;  Location: [facility]     PHACOEMULSIFICATION WITH STANDARD INTRAOCULAR LENS IMPLANT  3/24/2014    Procedure: PHACOEMULSIFICATION WITH STANDARD INTRAOCULAR LENS IMPLANT;  Left Kelman Phacoemulsification with Intraocular Lens Implant;  Surgeon: Magnus Mckeon MD;  Location: [facility] OR     RECESSION RESECTION WITH ADJUSTABLE SUTURE BILATERAL Bilateral 7/14/2016    Procedure: RECESSION RESECTION WITH ADJUSTABLE SUTURE BILATERAL;  Surgeon: Erma Ordaz MD;  Location: [facility] OR     REPAIR ENTROPION Right 8/21/2019    Procedure: Right Upper Lid Entropion Repair;  Surgeon: Vasile Brasher MD;  Location: [facility]     REPAIR RETRACTION LID BILATERAL Bilateral 7/17/2019    Procedure: Bilateral Upper Eyelid Retraction Repair;  Surgeon: Vasile Brasher MD;  Location: [facility]     SURGICAL HISTORY OF -   1963    cholecystectomy     SURGICAL HISTORY OF -   1970's    Tubal Ligation      SURGICAL HISTORY OF -   08/03    Explor. Lap, Excision of Small Bowel Tumor      SURGICAL HISTORY OF -   4/2010    lung cancer removed right lung       Family History:    Family History   Problem Relation Age of Onset     Heart Disease Mother      Osteoporosis Mother      Respiratory Mother         Emphezyma     Hypertension Mother      Heart Disease Father      Alcohol/Drug Father      Hypertension Father      Hypertension Maternal Grandmother      Hypertension Brother      Hypertension Sister      Arthritis Sister      Hypertension Son      Cancer Son         rectal       Social History:  Marital Status:    "[5]  Social History     Tobacco Use     Smoking status: Former Smoker     Packs/day: 0.50     Years: 49.00     Pack years: 24.50     Types: Cigarettes     Quit date: 2010     Years since quittin.2     Smokeless tobacco: Never Used   Vaping Use     Vaping Use: Never used   Substance Use Topics     Alcohol use: No     Drug use: No        Medications:    amLODIPine (NORVASC) 5 MG tablet  Blood Pressure Monitoring (ADULT BLOOD PRESSURE CUFF LG) KIT  fluticasone-salmeterol (AIRDUO RESPICLICK) 113-14 MCG/ACT inhaler  imatinib (GLEEVEC) 400 MG tablet  melatonin 5 MG tablet  methimazole (TAPAZOLE) 5 MG tablet  metoprolol tartrate (LOPRESSOR) 100 MG tablet  ondansetron (ZOFRAN-ODT) 4 MG ODT tab  polyethylene glycol (MIRALAX/GLYCOLAX) powder  rosuvastatin (CRESTOR) 5 MG tablet  tiotropium (SPIRIVA) 18 MCG inhaled capsule          Review of Systems   Constitutional: Negative for fever.   Respiratory: Negative for shortness of breath.    Cardiovascular: Negative for chest pain.   Gastrointestinal: Positive for constipation. Negative for abdominal pain.   All other systems reviewed and are negative.      Physical Exam   BP: (!) 141/82  Pulse: 81  Temp: 98.2  F (36.8  C)  Resp: 16  Height: 157.5 cm (5' 2\")  Weight: 66.2 kg (146 lb)  SpO2: 98 %      Physical Exam  /63   Pulse 76   Temp 98.2  F (36.8  C) (Oral)   Resp 16   Ht 1.575 m (5' 2\")   Wt 66.2 kg (146 lb)   LMP 1992   SpO2 98%   BMI 26.70 kg/m    General: alert, interactive, in no apparent distress  Head: atraumatic  Nose: no rhinorrhea or epistaxis  Ears: no external auditory canal discharge or bleeding.    Eyes: Sclera nonicteric. Conjunctiva pale.    Mouth: no tonsillar erythema, edema, or exudate  Neck: supple, no palp LAD  Lungs: CTAB  CV: RRR, S1/S2; peripheral pulses palpable and symmetric  Abdomen: soft, nt, nd, no guarding or rebound. Positive bowel sounds  Extremities: no cyanosis or edema  Skin: no rash or diaphoresis  Neuro:  " strength 5/5 in UE and LEs bilaterally, sensation intact to light touch in UE and LEs bilaterally;       ED Course        Procedures              Critical Care time:  none               Results for orders placed or performed during the hospital encounter of 07/24/21 (from the past 24 hour(s))   Basic metabolic panel   Result Value Ref Range    Sodium 129 (L) 133 - 144 mmol/L    Potassium 3.7 3.4 - 5.3 mmol/L    Chloride 97 94 - 109 mmol/L    Carbon Dioxide (CO2) 23 20 - 32 mmol/L    Anion Gap 9 3 - 14 mmol/L    Urea Nitrogen 16 7 - 30 mg/dL    Creatinine 1.13 (H) 0.52 - 1.04 mg/dL    Calcium 8.2 (L) 8.5 - 10.1 mg/dL    Glucose 117 (H) 70 - 99 mg/dL    GFR Estimate 47 (L) >60 mL/min/1.73m2   CBC with platelets differential    Narrative    The following orders were created for panel order CBC with platelets differential.  Procedure                               Abnormality         Status                     ---------                               -----------         ------                     CBC with platelets and d...[[id number]]  Abnormal            Final result                 Please view results for these tests on the individual orders.   CBC with platelets and differential   Result Value Ref Range    WBC Count 6.5 4.0 - 11.0 10e3/uL    RBC Count 2.93 (L) 3.80 - 5.20 10e6/uL    Hemoglobin 8.7 (L) 11.7 - 15.7 g/dL    Hematocrit 25.9 (L) 35.0 - 47.0 %    MCV 88 78 - 100 fL    MCH 29.7 26.5 - 33.0 pg    MCHC 33.6 31.5 - 36.5 g/dL    RDW 14.9 10.0 - 15.0 %    Platelet Count 229 150 - 450 10e3/uL    % Neutrophils 73 %    % Lymphocytes 11 %    % Monocytes 14 %    % Eosinophils 1 %    % Basophils 1 %    % Immature Granulocytes 0 %    NRBCs per 100 WBC 0 <1 /100    Absolute Neutrophils 4.7 1.6 - 8.3 10e3/uL    Absolute Lymphocytes 0.7 (L) 0.8 - 5.3 10e3/uL    Absolute Monocytes 0.9 0.0 - 1.3 10e3/uL    Absolute Eosinophils 0.1 0.0 - 0.7 10e3/uL    Absolute Basophils 0.0 0.0 - 0.2 10e3/uL    Absolute Immature Granulocytes  0.0 <=0.0 10e3/uL    Absolute NRBCs 0.0 10e3/uL     *Note: Due to a large number of results and/or encounters for the requested time period, some results have not been displayed. A complete set of results can be found in Results Review.       Medications - No data to display    Assessments & Plan (with Medical Decision Making)  75 year old female presenting to the emergency department with concerns regarding constipation, with lower abdominal pressure-like sensation, causing difficulty to sleep, and trouble sitting.  Last bowel movement 6 days ago.  Clinically with history and exam consistent with constipation.  Benign abdominal exam.  Afebrile, with no urinary symptoms.  Blood work is checked, and hemoglobin is stable at 8.7, similar to prior values.    Basic metabolic panel showing improved sodium compared to a couple of days ago.  Sodium is now 129.  Hemoglobin is stable at 8.7, similar compared to prior hemoglobin values.    Patient likely with multifactorial combination of constipation related pains.  She has history of chronic constipation.  Has not been taking her MiraLAX.  Additionally, is now with free water restriction of 1.5 L for treatment of her SIADH.  This is likely contributing to her constipation as well.    Tap water enema administered.  Patient ultimately did have good results.  Plan is for discharge home, and I have recommended daily MiraLAX.  Follow-up in clinic as previously planned, and return if worsening symptoms.    Abdominal exam benign, and do not feel that bowel obstruction is likely.  No other acute abdominal process is thought to be likely, with nonsurgical abdominal exam to palpation.     I have reviewed the nursing notes.    I have reviewed the findings, diagnosis, plan and need for follow up with the patient.       Discharge Medication List as of 7/24/2021  6:30 AM          Final diagnoses:   Constipation, unspecified constipation type   Hyponatremia   Anemia, unspecified type        7/24/2021   [facility] EMERGENCY DEPT     Ryan Barrett MD  07/24/21 [id number]

## 2021-07-26 ENCOUNTER — LAB (OUTPATIENT)
Dept: LAB | Facility: CLINIC | Age: 76
End: 2021-07-26
Payer: COMMERCIAL

## 2021-07-26 ENCOUNTER — PATIENT OUTREACH (OUTPATIENT)
Dept: CARE COORDINATION | Facility: CLINIC | Age: 76
End: 2021-07-26

## 2021-07-26 DIAGNOSIS — K59.09 CHRONIC CONSTIPATION: Primary | ICD-10-CM

## 2021-07-26 DIAGNOSIS — E87.1 LOW SODIUM LEVELS: ICD-10-CM

## 2021-07-26 DIAGNOSIS — E87.1 HYPONATREMIA: ICD-10-CM

## 2021-07-26 LAB — SODIUM SERPL-SCNC: 128 MMOL/L (ref 133–144)

## 2021-07-26 PROCEDURE — 36415 COLL VENOUS BLD VENIPUNCTURE: CPT

## 2021-07-26 PROCEDURE — 84295 ASSAY OF SERUM SODIUM: CPT

## 2021-07-26 ASSESSMENT — ACTIVITIES OF DAILY LIVING (ADL): DEPENDENT_IADLS:: INDEPENDENT

## 2021-07-26 NOTE — PROGRESS NOTES
Clinic Care Coordination Contact  [facility]/Voicemail    Referral Source: ED Report  7/24/2021 ED visit Constipation Tap water enema given   Clinical Data: Care Coordinator Outreach  Outreach attempted x 1.  Left message on patient's voicemail with call back information and requested return call.  Plan:  Care Coordinator will try to reach patient again in 1-2 business days.  [facility]   Carito Shields RN, Care Coordinator   [facility]'s   E-mail [email or web address]   [phone]

## 2021-07-27 ENCOUNTER — HOSPITAL ENCOUNTER (EMERGENCY)
Facility: CLINIC | Age: 76
Discharge: HOME | End: 2021-07-27
Attending: EMERGENCY MEDICINE | Admitting: EMERGENCY MEDICINE
Payer: COMMERCIAL

## 2021-07-27 ENCOUNTER — APPOINTMENT (OUTPATIENT)
Dept: CT IMAGING | Facility: CLINIC | Age: 76
End: 2021-07-27
Attending: EMERGENCY MEDICINE
Payer: COMMERCIAL

## 2021-07-27 VITALS
HEIGHT: 62 IN | BODY MASS INDEX: 26.87 KG/M2 | OXYGEN SATURATION: 97 % | WEIGHT: 146 LBS | RESPIRATION RATE: 18 BRPM | SYSTOLIC BLOOD PRESSURE: 168 MMHG | TEMPERATURE: 98.2 F | DIASTOLIC BLOOD PRESSURE: 97 MMHG | HEART RATE: 91 BPM

## 2021-07-27 DIAGNOSIS — R93.89 ABNORMAL CT SCAN: ICD-10-CM

## 2021-07-27 DIAGNOSIS — K62.89 PROCTITIS: ICD-10-CM

## 2021-07-27 LAB
ABO/RH(D): NORMAL
ANION GAP SERPL CALCULATED.3IONS-SCNC: 10 MMOL/L (ref 3–14)
ANTIBODY SCREEN: NEGATIVE
BASOPHILS # BLD AUTO: 0 10E3/UL (ref 0–0.2)
BASOPHILS NFR BLD AUTO: 1 %
BUN SERPL-MCNC: 11 MG/DL (ref 7–30)
CALCIUM SERPL-MCNC: 8.5 MG/DL (ref 8.5–10.1)
CHLORIDE BLD-SCNC: 98 MMOL/L (ref 94–109)
CO2 SERPL-SCNC: 23 MMOL/L (ref 20–32)
CREAT SERPL-MCNC: 0.97 MG/DL (ref 0.52–1.04)
EOSINOPHIL # BLD AUTO: 0 10E3/UL (ref 0–0.7)
EOSINOPHIL NFR BLD AUTO: 0 %
ERYTHROCYTE [DISTWIDTH] IN BLOOD BY AUTOMATED COUNT: 15 % (ref 10–15)
GFR SERPL CREATININE-BSD FRML MDRD: 57 ML/MIN/1.73M2
GLUCOSE BLD-MCNC: 118 MG/DL (ref 70–99)
HCT VFR BLD AUTO: 26.1 % (ref 35–47)
HGB BLD-MCNC: 9 G/DL (ref 11.7–15.7)
IMM GRANULOCYTES # BLD: 0 10E3/UL
IMM GRANULOCYTES NFR BLD: 0 %
LYMPHOCYTES # BLD AUTO: 0.6 10E3/UL (ref 0.8–5.3)
LYMPHOCYTES NFR BLD AUTO: 10 %
MCH RBC QN AUTO: 30.1 PG (ref 26.5–33)
MCHC RBC AUTO-ENTMCNC: 34.5 G/DL (ref 31.5–36.5)
MCV RBC AUTO: 87 FL (ref 78–100)
MONOCYTES # BLD AUTO: 0.7 10E3/UL (ref 0–1.3)
MONOCYTES NFR BLD AUTO: 12 %
NEUTROPHILS # BLD AUTO: 4.5 10E3/UL (ref 1.6–8.3)
NEUTROPHILS NFR BLD AUTO: 77 %
NRBC # BLD AUTO: 0 10E3/UL
NRBC BLD AUTO-RTO: 0 /100
PLATELET # BLD AUTO: 215 10E3/UL (ref 150–450)
POTASSIUM BLD-SCNC: 3.5 MMOL/L (ref 3.4–5.3)
RBC # BLD AUTO: 2.99 10E6/UL (ref 3.8–5.2)
SODIUM SERPL-SCNC: 131 MMOL/L (ref 133–144)
SPECIMEN EXPIRATION DATE: NORMAL
WBC # BLD AUTO: 5.9 10E3/UL (ref 4–11)

## 2021-07-27 PROCEDURE — 36415 COLL VENOUS BLD VENIPUNCTURE: CPT | Performed by: EMERGENCY MEDICINE

## 2021-07-27 PROCEDURE — 86900 BLOOD TYPING SEROLOGIC ABO: CPT | Performed by: EMERGENCY MEDICINE

## 2021-07-27 PROCEDURE — 85025 COMPLETE CBC W/AUTO DIFF WBC: CPT | Performed by: EMERGENCY MEDICINE

## 2021-07-27 PROCEDURE — 99284 EMERGENCY DEPT VISIT MOD MDM: CPT | Mod: 25 | Performed by: EMERGENCY MEDICINE

## 2021-07-27 PROCEDURE — 80048 BASIC METABOLIC PNL TOTAL CA: CPT | Performed by: EMERGENCY MEDICINE

## 2021-07-27 PROCEDURE — 74176 CT ABD & PELVIS W/O CONTRAST: CPT

## 2021-07-27 PROCEDURE — 99285 EMERGENCY DEPT VISIT HI MDM: CPT | Performed by: EMERGENCY MEDICINE

## 2021-07-27 RX ORDER — HYDROCORTISONE ACETATE 25 MG/1
25 SUPPOSITORY RECTAL 2 TIMES DAILY
Qty: 14 SUPPOSITORY | Refills: 0 | Status: SHIPPED | OUTPATIENT
Start: 2021-07-27 | End: 2021-08-16

## 2021-07-27 ASSESSMENT — ENCOUNTER SYMPTOMS
PSYCHIATRIC NEGATIVE: 1
RESPIRATORY NEGATIVE: 1
ENDOCRINE NEGATIVE: 1
CARDIOVASCULAR NEGATIVE: 1
MUSCULOSKELETAL NEGATIVE: 1
HEMATOLOGIC/LYMPHATIC NEGATIVE: 1
CONSTIPATION: 1
WEAKNESS: 1
EYES NEGATIVE: 1

## 2021-07-27 ASSESSMENT — MIFFLIN-ST. JEOR: SCORE: 1105.5

## 2021-07-27 NOTE — ED TRIAGE NOTES
Pt reports she was seen here on Saturday for constipation. Given two enemas. Has since not been able to have a bowel movement.

## 2021-07-27 NOTE — ED PROVIDER NOTES
History     Chief Complaint   Patient presents with     Constipation     HPI  Idalmis Abdul is a 75 year old female who presents for evaluation with concern about constipation.  Patient has multiple medical diagnoses including history of GIST.  History of hypertension, prior diagnosis of non-ST elevation MI, CHD status post CABG x4.  History of GERD, hypothyroidism, thyrotoxic exophthalmos with Graves' disease, chronic back pain, chronic constipation, and anemia.  Patient is currently on amlodipine 5 mg daily, methimazole, metoprolol, Crestor, Zofran, Spiriva, Flonase, Gleevec, melatonin and does take MiraLAX daily.  Patient arrived by car with her daughter-in-law (Марина) from home in [place], [place] reporting that she feels constipated.  Patient reports that she has been using MiraLAX 2 caps daily. Patient reports she has used enema and performed a digital rectal exam.  Patient reports she was supposed to have a colonoscopy on July 14, 2021 through [facility].  The colonoscopy was not completed because her bowel prep was poor by report.  Patient reports since her evaluation here in the emergency department 3 days ago she has felt the need to defecate.  She reports she had a good stool output after her evaluation in the emergency department 3 days earlier.  Марина is concerned that patient is not safe to live alone at home because of recent falls including fainting spell which resulted in her hospitalization in July 14, 2021.  Patient reports no back pain or flank pain.       Chart Review  CT ABDOMEN AND PELVIS WITH CONTRAST 7/14/2021 4:11 PM     CLINICAL HISTORY: Abdominal pain, acute, nonlocalized.     TECHNIQUE: CT scan of the abdomen and pelvis was performed following  injection of IV contrast. Multiplanar reformats were obtained. Dose  reduction techniques were used.  CONTRAST: 74 mL Isovue 370     COMPARISON: None.     FINDINGS:   LOWER CHEST: Minimal interstitial changes bilaterally.     HEPATOBILIARY: No  significant mass or bile duct dilatation.  Cholecystectomy. There is some prominence to the biliary system, which  is common post cholecystectomy and may not have clinical significance.  Low-attenuation subcentimeter liver lesion(s) compatible with benign  cysts or other benign lesions. No specific evaluation or follow-up is  recommended in a low risk patient.     PANCREAS: No significant mass, duct dilatation, or inflammatory  change.     SPLEEN: Normal size.     ADRENAL GLANDS: No significant nodules.     KIDNEYS/BLADDER: No significant mass, stones, or hydronephrosis.     BOWEL: No obstruction or inflammatory change.     PELVIC ORGANS: No pelvic masses.     ADDITIONAL FINDINGS: No ascites.     There are extensive atherosclerotic changes of the visualized aorta  and its branches. There is no evidence of aortic dissection or  aneurysm.     MUSCULOSKELETAL: Survey of the visualized bony structures demonstrates  no destructive bony lesions. Thoracolumbar scoliosis.                                                                      IMPRESSION: No acute process demonstrated in the abdomen and pelvis.     VISHAL GUPTA MD         SYSTEM ID:  [id number]           Allergies:  Allergies   Allergen Reactions     Atorvastatin Cramps              Problem List:    Patient Active Problem List    Diagnosis Date Noted     Hypokalemia 07/15/2021     Priority: Medium     Hyponatremia 07/15/2021     Priority: Medium     Chronic constipation 07/15/2021     Priority: Medium     Anemia 07/15/2021     Priority: Medium     Fall, initial encounter 07/14/2021     Priority: Medium     Non-intractable vomiting with nausea, unspecified vomiting type 07/14/2021     Priority: Medium     Mixed simple and mucopurulent chronic bronchitis (H) 01/07/2020     Priority: Medium     CKD (chronic kidney disease) stage 3, GFR 30-59 ml/min 05/23/2019     Priority: Medium     Spinal stenosis of lumbosacral region 03/22/2018     Priority: Medium     DDD  (degenerative disc disease), lumbar 03/22/2018     Priority: Medium     Age-related osteoporosis without current pathological fracture 12/16/2014     Priority: Medium     PVD (posterior vitreous detachment) 11/17/2014     Priority: Medium     History of tobacco abuse 09/02/2014     Priority: Medium     QUIT in 2010       History of non-ST elevation myocardial infarction (NSTEMI) 09/02/2014     Priority: Medium     Chronic back pain 09/02/2014     Priority: Medium     No longer on oxycodone - had been on this long term in the past         Graves' disease 08/19/2014     Priority: Medium     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 Provider to review       Hyperthyroidism 02/04/2014     Priority: Medium     Seeing Endoncrinology at [facility]       GERD (gastroesophageal reflux disease) 10/08/2013     Priority: Medium     S/P CABG x 4 08/05/2012     Priority: Medium     ASCVD (arteriosclerotic cardiovascular disease) 06/14/2012     Priority: Medium     NSTEMI (non-ST elevated myocardial infarction) (H) 06/12/2012     Priority: Medium     Eyelid edema 12/15/2011     Priority: Medium     side effect of gastric cancer medication       History of lung cancer 12/15/2011     Priority: Medium     S/p resection of right lung.  Sees  @ [facility]       Health Care Home 12/15/2011     Priority: Medium                  GIST (gastrointestinal stromal tumor), 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     Priority: Medium     resected 2003, recurred 2007, resected, and now on adjuvant gleevec  CT scan every 6 months  Dr. Schmitz       Hypertension goal BP (blood pressure) < 140/90 03/24/2005     Priority: Medium        Past Medical History:    Past Medical History:   Diagnosis Date     ASCVD (arteriosclerotic cardiovascular disease) 6/14/2012     Benign neoplasm of duodenum,  jejunum, and ileum 2003, 2007     CA - lung cancer 4/2010     choledochal cyst 1963     CKD (chronic kidney disease) stage 3, GFR 30-59 ml/min 5/23/2019     Coronary artery disease      DDD (degenerative disc disease), lumbar 3/22/2018     Dislocated finger, left middle PIP 7/26/2013     Dyspnea 8/27/2013     Eyelid edema 12/15/2011     GERD (gastroesophageal reflux disease) 10/8/2013     GIST (gastrointestinal stromal tumor), malignant (H) 5/10/2011     Graves disease      Grief reaction 5/11/2009     History of lung cancer 12/15/2011     Hyperlipidaemia      Hyperlipidemia LDL goal <160 12/17/2013     Hyperthyroidism 2/4/2014     Hypokalemia 8/5/2012     LBP (low back pain) 7/26/2013     Leiomyoma of uterus, unspecified      NSTEMI (non-ST elevated myocardial infarction) (H) 6/12/2012     NSTEMI (non-ST elevated myocardial infarction) (H)      osteopenia      Other benign neoplasm of connective and other soft tissue of thorax 2003     Other chronic pain      PONV (postoperative nausea and vomiting)      Postsurgical aortocoronary bypass status 7/4/2012     S/P CABG x 4 8/5/2012     Strabismus      Tobacco use disorder      Unspecified essential hypertension        Past Surgical History:    Past Surgical History:   Procedure Laterality Date     BLEPHAROPLASTY BILATERAL Bilateral 7/17/2019    Procedure: Bilateral Upper Eyelid Blepharoplasty;  Surgeon: Vasile Brasher MD;  Location:  OR     BYPASS GRAFT ARTERY CORONARY  6/14/2012    Procedure: BYPASS GRAFT ARTERY CORONARY;  Median Sternotomy Coronary Artery Bypass Graft  x 3        C THORACOSCOPY,DX W BX  fall 2003    benign tissue     CATARACT IOL, RT/LT      LE     CHOLECYSTECTOMY  1963     COLONOSCOPY  4-12-05     CORONARY ARTERY BYPASS      X4     CREATION PERICARDIAL WINDOW  6/15/2012    Procedure: CREATION PERICARDIAL WINDOW;  Mediastinal Exploration, Control of Bleeding;  Surgeon: Dwaine Griffin MD;  Location: [facility] OR     EYE SURGERY  2014    left  cataract removal     GI SURGERY  2003 and 2007    GIST tumor removal     GYN SURGERY      tubal ligation     HYSTERECTOMY VAGINAL, COLPORRHAPHY ANTERIOR, POSTERIOR, COMBINED N/A 10/17/2017    Procedure: COMBINED HYSTERECTOMY VAGINAL, COLPORRHAPHY ANTERIOR, POSTERIOR;  Total Vaginal Hysterectomy and Posterior Repair,Sacrospinous Vault Suspension;  Surgeon: Ruth Farooq MD;  Location: [facility] OR     PHACOEMULSIFICATION WITH STANDARD INTRAOCULAR LENS IMPLANT  3/24/2014    Procedure: PHACOEMULSIFICATION WITH STANDARD INTRAOCULAR LENS IMPLANT;  Left Kelman Phacoemulsification with Intraocular Lens Implant;  Surgeon: Magnus Mckeon MD;  Location: [facility] OR     RECESSION RESECTION WITH ADJUSTABLE SUTURE BILATERAL Bilateral 7/14/2016    Procedure: RECESSION RESECTION WITH ADJUSTABLE SUTURE BILATERAL;  Surgeon: Erma Ordaz MD;  Location: [facility] OR     REPAIR ENTROPION Right 8/21/2019    Procedure: Right Upper Lid Entropion Repair;  Surgeon: Vasile Brasher MD;  Location: [facility] OR     REPAIR RETRACTION LID BILATERAL Bilateral 7/17/2019    Procedure: Bilateral Upper Eyelid Retraction Repair;  Surgeon: Vasile Brasher MD;  Location: [facility] OR     SURGICAL HISTORY OF -   1963    cholecystectomy     SURGICAL HISTORY OF -   1970's    Tubal Ligation      SURGICAL HISTORY OF -   08/03    Explor. Lap, Excision of Small Bowel Tumor      SURGICAL HISTORY OF -   4/2010    lung cancer removed right lung       Family History:    Family History   Problem Relation Age of Onset     Heart Disease Mother      Osteoporosis Mother      Respiratory Mother         Emphezyma     Hypertension Mother      Heart Disease Father      Alcohol/Drug Father      Hypertension Father      Hypertension Maternal Grandmother      Hypertension Brother      Hypertension Sister      Arthritis Sister      Hypertension Son      Cancer Son         rectal       Social History:  Marital Status:   [5]  Social History     Tobacco Use     Smoking  "status: Former Smoker     Packs/day: 0.50     Years: 49.00     Pack years: 24.50     Types: Cigarettes     Quit date: 2010     Years since quittin.2     Smokeless tobacco: Never Used   Vaping Use     Vaping Use: Never used   Substance Use Topics     Alcohol use: No     Drug use: No        Medications:    amLODIPine (NORVASC) 5 MG tablet  fluticasone-salmeterol (AIRDUO RESPICLICK) 113-14 MCG/ACT inhaler  hydrocortisone (ANUSOL-HC) 25 MG suppository  imatinib (GLEEVEC) 400 MG tablet  melatonin 5 MG tablet  methimazole (TAPAZOLE) 5 MG tablet  metoprolol tartrate (LOPRESSOR) 100 MG tablet  ondansetron (ZOFRAN-ODT) 4 MG ODT tab  polyethylene glycol (MIRALAX/GLYCOLAX) powder  rosuvastatin (CRESTOR) 5 MG tablet  tiotropium (SPIRIVA) 18 MCG inhaled capsule  Blood Pressure Monitoring (ADULT BLOOD PRESSURE CUFF LG) KIT          Review of Systems   Constitutional:        History of fall and fainting   HENT: Negative.    Eyes: Negative.    Respiratory: Negative.    Cardiovascular: Negative.    Gastrointestinal: Positive for constipation.   Endocrine: Negative.    Genitourinary: Negative.    Musculoskeletal: Negative.    Skin: Negative.    Neurological: Positive for weakness.   Hematological: Negative.    Psychiatric/Behavioral: Negative.    All other systems reviewed and are negative.      Physical Exam   BP: (!) 154/85  Pulse: 104  Temp: 98.2  F (36.8  C)  Resp: 18  Height: 157.5 cm (5' 2\")  Weight: 66.2 kg (146 lb)  SpO2: 99 %      Physical Exam  Constitutional:       General: She is not in acute distress.     Appearance: Normal appearance. She is ill-appearing. She is not toxic-appearing or diaphoretic.   HENT:      Head: Normocephalic and atraumatic.      Nose: Nose normal.      Mouth/Throat:      Mouth: Mucous membranes are moist.   Eyes:      General: No scleral icterus.        Left eye: No discharge.      Extraocular Movements: Extraocular movements intact.      Pupils: Pupils are equal, round, and reactive to " light.   Cardiovascular:      Rate and Rhythm: Normal rate and regular rhythm.      Pulses: Normal pulses.      Heart sounds: Normal heart sounds.   Pulmonary:      Effort: Pulmonary effort is normal. No respiratory distress.      Breath sounds: Normal breath sounds. No stridor. No wheezing, rhonchi or rales.   Chest:      Chest wall: No tenderness.   Genitourinary:     Rectum: Normal.   Musculoskeletal:      Cervical back: Normal range of motion and neck supple.   Skin:     Capillary Refill: Capillary refill takes 2 to 3 seconds.      Coloration: Skin is pale.   Neurological:      General: No focal deficit present.      Mental Status: She is alert and oriented to person, place, and time.      Cranial Nerves: No cranial nerve deficit.      Sensory: No sensory deficit.      Motor: No weakness.      Coordination: Coordination normal.      Gait: Gait normal.      Deep Tendon Reflexes: Reflexes normal.   Psychiatric:         Mood and Affect: Mood normal.         Behavior: Behavior normal.         Thought Content: Thought content normal.         Judgment: Judgment normal.         ED Course        Procedures              Critical Care time:  none               ED medications: none      ED Vitals:  Vitals:    07/27/21 1030 07/27/21 1100 07/27/21 1150 07/27/21 1210   BP: 138/88 (!) 168/97     Pulse: 114 91     Resp:       Temp:       TempSrc:       SpO2:  97% 98% 97%   Weight:       Height:         Vitals:    07/27/21 1150 07/27/21 1200 07/27/21 1210 07/27/21 1215   BP:       Pulse:       Resp:       Temp:       TempSrc:       SpO2: 98% 97% 97% 97%   Weight:       Height:           ED labs and imaging:  Results for orders placed or performed during the hospital encounter of 07/27/21 (from the past 24 hour(s))   ABO/Rh type and screen *Canceled*    Narrative    The following orders were created for panel order ABO/Rh type and screen.  Procedure                               Abnormality         Status                      ---------                               -----------         ------                       Please view results for these tests on the individual orders.   CBC with platelets differential    Narrative    The following orders were created for panel order CBC with platelets differential.  Procedure                               Abnormality         Status                     ---------                               -----------         ------                     CBC with platelets and d...[[id number]]  Abnormal            Final result                 Please view results for these tests on the individual orders.   Basic metabolic panel   Result Value Ref Range    Sodium 131 (L) 133 - 144 mmol/L    Potassium 3.5 3.4 - 5.3 mmol/L    Chloride 98 94 - 109 mmol/L    Carbon Dioxide (CO2) 23 20 - 32 mmol/L    Anion Gap 10 3 - 14 mmol/L    Urea Nitrogen 11 7 - 30 mg/dL    Creatinine 0.97 0.52 - 1.04 mg/dL    Calcium 8.5 8.5 - 10.1 mg/dL    Glucose 118 (H) 70 - 99 mg/dL    GFR Estimate 57 (L) >60 mL/min/1.73m2   ABO/Rh type and screen *Canceled*    Narrative    The following orders were created for panel order ABO/Rh type and screen.  Procedure                               Abnormality         Status                     ---------                               -----------         ------                     Adult Type and Screen[[id number]]                                                         Please view results for these tests on the individual orders.   CBC with platelets and differential   Result Value Ref Range    WBC Count 5.9 4.0 - 11.0 10e3/uL    RBC Count 2.99 (L) 3.80 - 5.20 10e6/uL    Hemoglobin 9.0 (L) 11.7 - 15.7 g/dL    Hematocrit 26.1 (L) 35.0 - 47.0 %    MCV 87 78 - 100 fL    MCH 30.1 26.5 - 33.0 pg    MCHC 34.5 31.5 - 36.5 g/dL    RDW 15.0 10.0 - 15.0 %    Platelet Count 215 150 - 450 10e3/uL    % Neutrophils 77 %    % Lymphocytes 10 %    % Monocytes 12 %    % Eosinophils 0 %    % Basophils 1 %    % Immature  Granulocytes 0 %    NRBCs per 100 WBC 0 <1 /100    Absolute Neutrophils 4.5 1.6 - 8.3 10e3/uL    Absolute Lymphocytes 0.6 (L) 0.8 - 5.3 10e3/uL    Absolute Monocytes 0.7 0.0 - 1.3 10e3/uL    Absolute Eosinophils 0.0 0.0 - 0.7 10e3/uL    Absolute Basophils 0.0 0.0 - 0.2 10e3/uL    Absolute Immature Granulocytes 0.0 <=0.0 10e3/uL    Absolute NRBCs 0.0 10e3/uL   ABO/Rh type and screen *Canceled*    Narrative    The following orders were created for panel order ABO/Rh type and screen.  Procedure                               Abnormality         Status                     ---------                               -----------         ------                     Adult Type and Screen[[id number]]                                                         Please view results for these tests on the individual orders.   ABO/Rh type and screen    Narrative    The following orders were created for panel order ABO/Rh type and screen.  Procedure                               Abnormality         Status                     ---------                               -----------         ------                     Adult Type and Screen[[id number]]                            Edited Result - FINAL        Please view results for these tests on the individual orders.   Adult Type and Screen   Result Value Ref Range    ABO/RH(D) A POS     Antibody Screen Negative Negative    SPECIMEN EXPIRATION DATE [id number]    ABO/Rh type and screen *Canceled*    Narrative    The following orders were created for panel order ABO/Rh type and screen.  Procedure                               Abnormality         Status                     ---------                               -----------         ------                       Please view results for these tests on the individual orders.   CT Abdomen Pelvis w/o Contrast    Narrative    CT ABDOMEN AND PELVIS WITHOUT CONTRAST  7/27/2021 9:53 AM    CLINICAL HISTORY: Constipation.    TECHNIQUE: CT scan of the abdomen  and pelvis was performed without IV  contrast. Multiplanar reformats were obtained. Dose reduction  techniques were used.  CONTRAST: None.    COMPARISON: CT of the abdomen and pelvis performed 7/14/2021.    FINDINGS:   LOWER CHEST: The visualized lung bases are clear. Small hiatal hernia.    HEPATOBILIARY: The gallbladder is not seen, and is surgically absent  by history. No hepatic masses are seen.    PANCREAS: Normal.    SPLEEN: Scattered calcified granulomas in the spleen.    ADRENAL GLANDS: Normal.    KIDNEYS/BLADDER: Unremarkable. No hydronephrosis.    BOWEL: Mild circumferential bowel wall thickening with surrounding fat  stranding in the lower rectum is new since the previous exam, and  suggests proctitis. No bowel obstruction. There is a moderate amount  of stool in the ascending and transverse colon. The appendix is not  visualized; however, there is no significant inflammatory change in  the expected region of the appendix.    PELVIC ORGANS: The uterus is not seen, and is likely surgically  absent. An indeterminate left ovarian cystic lesion measures 3 cm, and  is unchanged.    LYMPH NODES: No enlarged lymph nodes are identified in the abdomen or  pelvis.    VASCULATURE: Moderate atherosclerotic aortoiliac calcification.    ADDITIONAL FINDINGS: None.    MUSCULOSKELETAL: Degenerative changes are noted in the visualized  thoracolumbar spine. Postoperative changes of posterior kurtis and  pedicle screw fusion at L4-L5. Thoracolumbar curve.      Impression    IMPRESSION:   1.  Mild circumferential bowel wall thickening in the lower rectum  with mild surrounding fat stranding, suspicious for an infectious or  inflammatory proctitis. Please clinically correlate.  2.  Moderate amount of stool in the ascending and transverse colon.  3.  Indeterminate left ovarian cystic lesion measures 3 cm. Pelvic  ultrasound is recommended for further characterization.    SARAH PONCE MD         SYSTEM ID:  [id number]     *Note:  Due to a large number of results and/or encounters for the requested time period, some results have not been displayed. A complete set of results can be found in Results Review.           Assessments & Plan (with Medical Decision Making)   Assessment Summary and Clinical Impression: 75-year-old female with multiple medical diagnoses including history of GIST, hypothyroidism and Graves' disease on methimazole, hypertension on amlodipine, history of coronary heart disease status post CABG x4, low back pain, stage III chronic kidney disease,  history of anemia and chronic constipation who presented with concern about constipation.  Recent care and evaluation  about acute on chronic hyponatremia, hypokalemia with orthostatic hypotension and syncope.   During her recent hospitalization 12 days ago patient was noted to have longstanding problem with constipation in need of a colonoscopy.  Imaging during that hospital course did not reveal any acute intra-abdominal process.  Patient reported that she was concerned she was constipated because she has the urge to defecate with minimal stool output. Patient reports she has good stool output after her exam in the department 3 days earlier.  Daughter-in-law at the bedside was concerned that patient was not safe to be home alone because of recurrent falls with fainting including recent hospitalization 2 weeks earlier.  On my exam patient appeared pale. Patient was in no acute distress.  Tachycardic at rest, afebrile blood pressure is 154/85.  Her abdomen is soft but protuberant with quiet bowel sounds throughout.  Chaperoned rectal exam revealed normal rectal tone without fecal impaction in the rectum no hemorrhoids or bleeding. Work-up revealed resolving hyponatremia and stable hemoglobin (improved from recent baseline). Imaging revealed proctitis- query infectious or inflammatory with moderate stool. Additional findings with follow-up ultrasound recommended. Patient and family  elected to go home with close outpatient follow-up and use of anusol after review with surgery on-call with low threshold to return for re-evaluation if worsening symptoms.    ED course and Plan:  Reviewed the medical record.  Evaluated on July 24, 2021-with report of abdominal discomfort after bowel movements.  Patient has a known history of hypokalemia and hyponatremia and is on fluid restrictions of 1.5 L/day-for history of SIADH.  Patient's sodium was 129- 3 days earlier.  Recheck 128-24 hours prior.  Hospital course July 14 through July 17-patient's hyponatremia was described to be acute on chronic.  Her sodium has ranged from 1 25 to131.  Reviewed CT interpretation report from July 14, 2021 showing no acute process in the abdomen or pelvis.  We discussed that the cause of her sense of feeling constipated is not clear.  Patient has acute on chronic anemia.  Her hemoglobin was 8.7 and her hematocrit was 25.9-2 days earlier.  Her sodium is 128  During her hospital course on July 14 her hemoglobin did drift down to 7.7.  There is no evidence of blood loss at that time.  A broad differential was considered symptoms reported recent hospital course and evaluation.  Blood work was obtained given pallor and history of chronic anemia.  Work-up today revealed a stable hemogram today, sodium is 131 improved from yesterday.  Normal electrolytes and creatinine.  CT imaging revealed some circumferential bowel thickening in the lower rectum with mild surrounding fat stranding suspicious for inflammatory proctitis.  Moderate amount of stool was noted in the ascending and transverse colon. Left ovarian cystic lesion measuring 3 cm.  Pelvic ultrasound was recommended for further characterization.  See detail in the radiology report.   Reviewed  CT interpretation with patient and her daughter-in-law. We discussed options for further care  with concern about her wellbeing and safety at home.  Patient and family expressed comfort  going home and that they would seek resources to help provide additional support at home.  We reviewed next steps for care.  I placed a general surgery clinic referral for procedure given that she missed a colonoscopy due to incomplete bowel prep on 7/14/2021.  I reviewed CT findings with surgery on-call-to seek advice on additional treatment modalities that could be helpful to manage patient's symptoms. I spoke with Dr Prater at 12.51pm- surgery on-call-was supported decision about use of Anusol suppository for symptom management and plan of care as an outpatient.    We discussed a trial of Anusol suppository to help manage the inflammation in the lower rectum.  Patient expressed comfort going home pending outpatient referral. Referral  placed for gastroenterology for further interventions if required.  We discussed and reviewed worrisome symptoms including reasons to return to the department to be evaluated.  Patient, son, and daughter-in-law who were present during ED course expressed understanding and agreement with the plan of care.      Disclaimer: This note consists of symbols derived from keyboarding, dictation and/or voice recognition software. As a result, there may be errors in the script that have gone undetected. Please consider this when interpreting information found in this chart.  I have reviewed the nursing notes.    I have reviewed the findings, diagnosis, plan and need for follow up with the patient.       New Prescriptions    HYDROCORTISONE (ANUSOL-HC) 25 MG SUPPOSITORY    Place 1 suppository (25 mg) rectally 2 times daily for 7 days       Final diagnoses:   Proctitis - Infectious versus inflammatory.   Abnormal CT scan - Mild circumferential bowel wall thickening in the lower rectum       7/27/2021   [facility] EMERGENCY DEPT     William Carrillo MD  07/27/21 7079

## 2021-07-27 NOTE — DISCHARGE INSTRUCTIONS
1) Your evaluation today suggest that your abdominal discomfort and bloating is due to some modest constipation however having some difficulty defecating due to proctitis which is inflammation around the rectum.  A referral has been placed to general surgery to help you with coordinating your colonoscopy given you were not able to complete this is reported on 7/14/2021.  Your blood work today was actually really showing has your sodium is improving her hemoglobin is stable    2) we have discussed ways to help manage your proctitis.  Your visit and imaging was reviewed with the general surgeon on-call. Awaiting a phone call.  If there is any new recommendations advised by the surgeon on-call I will call to give you an update.    3) A prescription for Anusol suppository cream was provided to see if this will help provide some relief in the rectal inflammation or density in basing symptoms reported.    4) You appear stable for discharge to home at this time however if you develop new symptoms of concern including fever, bloody diarrhea, or any new concerns you should return to be reevaluated

## 2021-07-28 ENCOUNTER — PATIENT OUTREACH (OUTPATIENT)
Dept: CARE COORDINATION | Facility: CLINIC | Age: 76
End: 2021-07-28

## 2021-07-28 ENCOUNTER — TELEPHONE (OUTPATIENT)
Dept: ENDOCRINOLOGY | Facility: CLINIC | Age: 76
End: 2021-07-28

## 2021-07-28 DIAGNOSIS — K62.89 PROCTITIS: Primary | ICD-10-CM

## 2021-07-28 ASSESSMENT — ACTIVITIES OF DAILY LIVING (ADL): DEPENDENT_IADLS:: INDEPENDENT

## 2021-07-28 NOTE — TELEPHONE ENCOUNTER
----- Message from Swetha Antoine MD sent at 7/21/2021  7:13 PM CDT -----  This patient needs a visit with me and labs, next available or so.  Thanks,  Zenia Antoine MD PhD    Division of Endocrinology and Diabetes

## 2021-07-28 NOTE — PROGRESS NOTES
Clinic Care Coordination Contact  [facility]/Voicemail    Referral Source: ED Follow-Up  ED visit 7/27/2021 Proctitis   Clinical Data: Care Coordinator Outreach  Outreach attempted x 1.  Left message on patient's voicemail with call back information and requested return call.  Plan: . Care Coordinator will try to reach patient again in 1-2 business days.  [facility]   Carito Shields RN, Care Coordinator   [facility]'s   E-mail [email or web address]   [phone]

## 2021-07-29 ENCOUNTER — PATIENT OUTREACH (OUTPATIENT)
Dept: NURSING | Facility: CLINIC | Age: 76
End: 2021-07-29
Payer: COMMERCIAL

## 2021-07-29 ASSESSMENT — ACTIVITIES OF DAILY LIVING (ADL): DEPENDENT_IADLS:: INDEPENDENT

## 2021-07-29 NOTE — PROGRESS NOTES
Clinic Care Coordination Contact    Clinic Care Coordination Contact  OUTREACH    Referral Information:  Referral Source: ED Follow-Up    Primary Diagnosis: GI Disorders (Anemia/fall)    Chief Complaint   Patient presents with     Clinic Care Coordination - [facility]        [place] Utilization:   Clinic Utilization  Difficulty keeping appointments: No  Compliance Concerns: No  No-Show Concerns: No  No PCP office visit in Past Year: No    Utilization    Hospital Admissions  1             ED Visits  4             No Show Count (past year)  0                Current as of: 7/29/2021 10:42 AM              Clinical Concerns:  Current Medical Concerns: Pt presented to [facility] ED 7/27/21 for evaluation of constipation.       Patient Active Problem List   Diagnosis     Hypertension goal BP (blood pressure) < 140/90     GIST (gastrointestinal stromal tumor), malignant (H)     Eyelid edema     History of lung cancer     Health Care Home     NSTEMI (non-ST elevated myocardial infarction) (H)     ASCVD (arteriosclerotic cardiovascular disease)     S/P CABG x 4     GERD (gastroesophageal reflux disease)     Hyperthyroidism     Thyroid eye disease     Eyelid retraction or lag     Thyrotoxic exophthalmos     Graves' disease     History of tobacco abuse     History of non-ST elevation myocardial infarction (NSTEMI)     Chronic back pain     PVD (posterior vitreous detachment)     Age-related osteoporosis without current pathological fracture     Spinal stenosis of lumbosacral region     DDD (degenerative disc disease), lumbar     CKD (chronic kidney disease) stage 3, GFR 30-59 ml/min     Mixed simple and mucopurulent chronic bronchitis (H)     Fall, initial encounter     Non-intractable vomiting with nausea, unspecified vomiting type     Hypokalemia     Hyponatremia     Chronic constipation     Anemia       Current Behavioral Concerns: n/a      Education Provided to patient: ED/AVS review     SW CC outreach to pt for ED  follow up per RN coverage request.     Notes: trial of Anusol suppository to help manage the inflammation in the lower rectum. Patient expressed comfort going home pending outpatient referral. Referral  placed for gastroenterology for further interventions if required.    Pt noted she was with her DIL today, has been staying with her son and DIL as they have been very helpful. Plans to maybe schedule PCP visit in coming weeks.     Pt noted that the care team told her that she cannot have a colonoscopy for at least 2 weeks due to swelling.     Inquired about gastro referral, reviewed orders tab. Pt has #s and CC confirmed. She will call and schedule.     Adult Gastro Ref - Procedure Only [phone]  [facility] Gastroenterology [phone]    Pain  Pain: Not discussed     Health Maintenance Reviewed: Due/Overdue     Health Maintenance Due   Topic Date Due     HEPATITIS C SCREENING  Never done     ZOSTER IMMUNIZATION (1 of 2) Never done     MEDICARE ANNUAL WELLNESS VISIT  12/15/2012     ADVANCE CARE PLANNING  12/15/2016     URINE DRUG SCREEN  12/07/2019     COLORECTAL CANCER SCREENING  12/12/2019     DEXA  12/17/2020       Clinical Pathway: None    Medication Management:  Medication review status: Medications reviewed and no changes reported per patient.          START taking:  hydrocortisone (ANUSOL-HC)     Functional Status:  Dependent ADLs: Ambulation-walker  Dependent IADLs: Independent (Uses a walker in public for long distances only)  Bed or wheelchair confined: No  Mobility Status: Independent w/Device    Living Situation:  Current living arrangement: I live in a private home    Lifestyle & Psychosocial Needs:    Social Determinants of Health     Tobacco Use: Medium Risk     Smoking Tobacco Use: Former Smoker     Smokeless Tobacco Use: Never Used   Alcohol Use:      Frequency of Alcohol Consumption:      Average Number of Drinks:      Frequency of Binge Drinking:    Financial Resource Strain: Low Risk       Difficulty of Paying Living Expenses: Not very hard   Food Insecurity: No Food Insecurity     Worried About Running Out of Food in the Last Year: Never true     Ran Out of Food in the Last Year: Never true   Transportation Needs: No Transportation Needs     Lack of Transportation (Medical): No     Lack of Transportation (Non-Medical): No   Physical Activity: Inactive     Days of Exercise per Week: 0 days     Minutes of Exercise per Session: 0 min   Stress:      Feeling of Stress :    Social Connections: Socially Isolated     Frequency of Communication with Friends and Family: More than three times a week     Frequency of Social Gatherings with Friends and Family: Twice a week     Attends [facility] Services: Never     Active Member of Clubs or Organizations: No     Attends Club or Organization Meetings: Never     Marital Status:    Intimate Partner Violence:      Fear of Current or Ex-Partner:      Emotionally Abused:      Physically Abused:      Sexually Abused:    Depression: Not at risk     PHQ-2 Score: 0   Housing Stability:      Unable to Pay for Housing in the Last Year:      Number of Places Lived in the Last Year:      Unstable Housing in the Last Year:        Diet: Regular  Inadequate nutrition: No  Tube Feeding: No  Inadequate activity/exercise: No  Significant changes in sleep pattern: No  Transportation means: Family     Samaritan or spiritual beliefs that impact treatment: No  Mental health DX: No  Mental health management concern: No  Chemical Dependency Status: No Current Concerns  Informal Support system: Children     Care Coordinator has reviewed patient's Social Determinants of Health (SDoH) on this date. Upon review, changes were not made.      Resources and Interventions:  Current Resources:   Community Resources: Financial/Insurance, DME  Supplies used at home: None  Equipment Currently Used at Home: walker, rolling  Employment Status: retired    Advance Care Plan/Directive  Advanced Care  Plans/Directives on file: No  Advanced Care Plan/Directive Status: Declined Further Information    Referrals Placed: None     Goals:   Goals        General     1. Medical (pt-stated)      Notes - Note edited  7/29/2021  1:15 PM by Taryn Dobson LSW     Goal Statement: I will get an answer for the cause of my syncope in the next month and complete my Health Maintenance due in the next 1-3 months.  Date Goal set: 7/20/2021  Barriers: Deconditioned   Strengths: Supportive son   Date to Achieve By: 10/20/2021  Patient expressed understanding of goal: Yes    Action steps to achieve this goal:  1. I will set up a future Colonoscopy.  2. I will keep future Gastroenterology appointment.  3. I will pace my activity and use my walker for safety.  4. I will check into a life alert.  5. I will discuss Health Maintenance due with my provider.              Patient/Caregiver understanding: Pt reports understanding and denies any additional questions or concerns at this times. SW CC engaged in AIDET communication during encounter.    Outreach Frequency: 2 weeks    Future Appointments              In 3 months [place] [facility]    In 3 months [facility], [place] [facility]    In 3 months Blayne Schmitz MD [facility], [facility]          Plan: Patient was provided with this writer's contact information and encouraged to call with any questions or concerns.     RN CC will follow up with pt early next week. Routing to RN CC as FYYAZ.     CECILLE Harrington   Social Work Clinic Care Coordinator   [facility]  [phone]  [email or web address]

## 2021-07-30 DIAGNOSIS — I10 ESSENTIAL HYPERTENSION WITH GOAL BLOOD PRESSURE LESS THAN 140/90: ICD-10-CM

## 2021-08-03 RX ORDER — METOPROLOL TARTRATE 100 MG
100 TABLET ORAL 2 TIMES DAILY
Qty: 180 TABLET | Refills: 1 | Status: SHIPPED | OUTPATIENT
Start: 2021-08-03 | End: 2022-01-18

## 2021-08-06 ENCOUNTER — PATIENT OUTREACH (OUTPATIENT)
Dept: CARE COORDINATION | Facility: CLINIC | Age: 76
End: 2021-08-06

## 2021-08-06 DIAGNOSIS — K62.89 PROCTITIS: Primary | ICD-10-CM

## 2021-08-06 ASSESSMENT — ACTIVITIES OF DAILY LIVING (ADL): DEPENDENT_IADLS:: INDEPENDENT

## 2021-08-06 NOTE — PROGRESS NOTES
Clinic Care Coordination Contact  [facility]/Voicemail    Referral Source: ED Follow-Up  7/27/2021 ED visit Proctitis   Clinical Data: Care Coordinator Outreach  Outreach attempted x 1.  Left message on patient's voicemail with call back information and requested return call.  Plan: . Care Coordinator will try to reach patient again in 3-5 business days.  [facility]   Carito Shields RN, Care Coordinator   [facility]'s   E-mail [email or web address]   [phone]

## 2021-08-09 DIAGNOSIS — C49.A0 GIST (GASTROINTESTINAL STROMAL TUMOR), MALIGNANT (H): ICD-10-CM

## 2021-08-09 DIAGNOSIS — C49.A0 MALIGNANT GASTROINTESTINAL STROMAL TUMOR, UNSPECIFIED SITE (H): Primary | ICD-10-CM

## 2021-08-12 ENCOUNTER — OFFICE VISIT (OUTPATIENT)
Dept: FAMILY MEDICINE | Facility: CLINIC | Age: 76
End: 2021-08-12
Payer: COMMERCIAL

## 2021-08-12 VITALS
DIASTOLIC BLOOD PRESSURE: 62 MMHG | TEMPERATURE: 98 F | HEART RATE: 78 BPM | BODY MASS INDEX: 26.31 KG/M2 | RESPIRATION RATE: 16 BRPM | SYSTOLIC BLOOD PRESSURE: 122 MMHG | WEIGHT: 143 LBS | HEIGHT: 62 IN | OXYGEN SATURATION: 98 %

## 2021-08-12 DIAGNOSIS — E87.1 LOW SODIUM LEVELS: ICD-10-CM

## 2021-08-12 DIAGNOSIS — K62.89 PROCTITIS: Primary | ICD-10-CM

## 2021-08-12 DIAGNOSIS — E87.1 HYPONATREMIA: ICD-10-CM

## 2021-08-12 DIAGNOSIS — I95.1 ORTHOSTATIC HYPOTENSION: ICD-10-CM

## 2021-08-12 DIAGNOSIS — D64.9 ANEMIA, UNSPECIFIED TYPE: ICD-10-CM

## 2021-08-12 LAB
ANION GAP SERPL CALCULATED.3IONS-SCNC: 7 MMOL/L (ref 3–14)
BUN SERPL-MCNC: 15 MG/DL (ref 7–30)
CALCIUM SERPL-MCNC: 9 MG/DL (ref 8.5–10.1)
CHLORIDE BLD-SCNC: 100 MMOL/L (ref 94–109)
CO2 SERPL-SCNC: 26 MMOL/L (ref 20–32)
CREAT SERPL-MCNC: 1.28 MG/DL (ref 0.52–1.04)
GFR SERPL CREATININE-BSD FRML MDRD: 41 ML/MIN/1.73M2
GLUCOSE BLD-MCNC: 108 MG/DL (ref 70–99)
HGB BLD-MCNC: 9.3 G/DL (ref 11.7–15.7)
POTASSIUM BLD-SCNC: 3.7 MMOL/L (ref 3.4–5.3)
SODIUM SERPL-SCNC: 133 MMOL/L (ref 133–144)

## 2021-08-12 PROCEDURE — 80048 BASIC METABOLIC PNL TOTAL CA: CPT | Performed by: FAMILY MEDICINE

## 2021-08-12 PROCEDURE — 85018 HEMOGLOBIN: CPT | Performed by: FAMILY MEDICINE

## 2021-08-12 PROCEDURE — 36415 COLL VENOUS BLD VENIPUNCTURE: CPT | Performed by: FAMILY MEDICINE

## 2021-08-12 PROCEDURE — 99214 OFFICE O/P EST MOD 30 MIN: CPT | Performed by: FAMILY MEDICINE

## 2021-08-12 ASSESSMENT — PAIN SCALES - GENERAL: PAINLEVEL: NO PAIN (0)

## 2021-08-12 ASSESSMENT — MIFFLIN-ST. JEOR: SCORE: 1091.89

## 2021-08-12 NOTE — PROGRESS NOTES
"    Assessment & Plan     Proctitis  - doing anusol suppository.   -needs follow up with GI - has referral but was unclear on the plan of care from ER visit  -needs to have colonoscopy given rectal bleeding (small amount) and anemia    Orthostatic hypotension  Discontinue amlodipine  Follow up with cardiology    Hyponatremia  Had been improving in the ER was 131  She continues to do some fluid restriction to 1500 mL daily  - Basic metabolic panel  (Ca, Cl, CO2, Creat, Gluc, K, Na, BUN); Future  - Basic metabolic panel  (Ca, Cl, CO2, Creat, Gluc, K, Na, BUN)    Anemia, unspecified type  Stable to slightly improved    - Hemoglobin; Future  - Hemoglobin               BMI:   Estimated body mass index is 26.16 kg/m  as calculated from the following:    Height as of this encounter: 1.575 m (5' 2\").    Weight as of this encounter: 64.9 kg (143 lb).           No follow-ups on file.    Ale Ordaz MD  [facility]    Jeni Raygoza is a 75 year old who presents for the following health issues     HPI     ED/UC Followup:    Facility:  [facility]  Date of visit: 7/27/21  Reason for visit: Proctitis, abnormal CT scan  HPI  Idalmis Abdul is a 75 year old female who presents for evaluation with concern about constipation.  Patient has multiple medical diagnoses including history of GIST.  History of hypertension, prior diagnosis of non-ST elevation MI, CHD status post CABG x4.  History of GERD, hypothyroidism, thyrotoxic exophthalmos with Graves' disease, chronic back pain, chronic constipation, and anemia.  Patient is currently on amlodipine 5 mg daily, methimazole, metoprolol, Crestor, Zofran, Spiriva, Flonase, Gleevec, melatonin and does take MiraLAX daily.  Patient arrived by car with her daughter-in-law (Марина) from home in [place], [place] reporting that she feels constipated.  Patient reports that she has been using MiraLAX 2 caps daily. Patient reports she has used " "enema and performed a digital rectal exam.  Patient reports she was supposed to have a colonoscopy on July 14, 2021 through [facility].  The colonoscopy was not completed because her bowel prep was poor by report.  Patient reports since her evaluation here in the emergency department 3 days ago she has felt the need to defecate.  She reports she had a good stool output after her evaluation in the emergency department 3 days earlier.  Марина is concerned that patient is not safe to live alone at home because of recent falls including fainting spell which resulted in her hospitalization in July 14, 2021.  Patient reports no back pain or flank pain.   Current Status: Patient is still having constipation concerns. Patient has doubled up on miralax and suppositories and is only able to get a small snake like stool. About a month ago while prepping for colonoscopy she fainted and had other fainting episodes.            Review of Systems   Constitutional, HEENT, cardiovascular, pulmonary, gi and gu systems are negative, except as otherwise noted.      Objective    /62   Pulse 78   Temp 98  F (36.7  C) (Tympanic)   Resp 16   Ht 1.575 m (5' 2\")   Wt 64.9 kg (143 lb)   LMP 01/01/1992   SpO2 98%   Breastfeeding No   BMI 26.16 kg/m    Body mass index is 26.16 kg/m .   Orthostatic Vitals from 08/10/21 1434 to 08/12/21 1434    Date and Time Orthostatic BP Orthostatic Pulse Patient Position BP   Location Cuff Size   08/12/21 1306 88/52 80 Standing Right arm Adult Small   08/12/21 1305 124/66 80 Sitting Right arm Adult Small   08/12/21 1302 138/70 81 Supine Right arm Adult Small      Not having tachycardic but she is beta blocked       Physical Exam   GENERAL: healthy, alert and no distress  NECK: no adenopathy, no asymmetry, masses, or scars and thyroid normal to palpation  RESP: lungs clear to auscultation - no rales, rhonchi or wheezes  CV: regular rates and rhythm, normal S1 S2, no S3 or S4, grade 3/6 systolic murmur " heard best over the LUSB, peripheral pulses strong and no peripheral edema  ABDOMEN: soft, nontender, no hepatosplenomegaly, no masses and bowel sounds normal  MS: no gross musculoskeletal defects noted, no edema

## 2021-08-12 NOTE — PATIENT INSTRUCTIONS
Our Clinic hours are:  Mondays    7:20 am - 7 pm  Tues -  Fri  7:20 am - 5 pm    Clinic Phone: [phone]    The clinic lab opens at 7:30 am Mon - Fri and appointments are required.    [facility]. [phone]  Monday  8 am - 7pm  Tues - Fri 8 am - 5:30 pm

## 2021-08-13 ENCOUNTER — TELEPHONE (OUTPATIENT)
Dept: FAMILY MEDICINE | Facility: CLINIC | Age: 76
End: 2021-08-13

## 2021-08-13 DIAGNOSIS — C49.A0 MALIGNANT GASTROINTESTINAL STROMAL TUMOR, UNSPECIFIED SITE (H): Primary | ICD-10-CM

## 2021-08-13 DIAGNOSIS — K62.89 PROCTITIS: Primary | ICD-10-CM

## 2021-08-13 RX ORDER — IMATINIB MESYLATE 400 MG/1
400 TABLET, FILM COATED ORAL DAILY
Qty: 30 TABLET | Refills: 11 | Status: SHIPPED | OUTPATIENT
Start: 2021-08-13 | End: 2022-08-09

## 2021-08-13 NOTE — TELEPHONE ENCOUNTER
Reason for call:  Patient's daughter in law reporting a symptom    Symptom or request: Daughter in law, Марина, calling re: blood pressure readings for patient: at 9 am 130/79 resting, 1 pm 106-64 resting. Patient's daughter is concerned about blood pressure fluctuation.    Duration (how long have symptoms been present): Today, 8/13/2021    Have you been treated for this before? Yes    Phone Number patient's daughter in law, Марина, can be reached at:  [phone]    Best Time:  Any    Can we leave a detailed message on this number:  YES    Call taken on 8/13/2021 at 2:20 PM by Regine Vega

## 2021-08-13 NOTE — TELEPHONE ENCOUNTER
Patient is looking fo r more Hydrocortisone 25mg Suppositories, she originally got them prescribed by ER doctor Lorena,      If any questions please contact the site directly.    Thank you,  Ayla Alvares [facility] Float Technician

## 2021-08-13 NOTE — TELEPHONE ENCOUNTER
Patient was called, her daughter says she is concerned about her B/P and it fluctuating, says it 130/79 (pulse is 83) in the am today and then at 1pm it was 106/64( pulse is 85). Says she fainted a month ago while taking mag citrate for colonoscopy procedure, then almost went down after feeling lightheaded [place] the post office a month ago. Patient says she has not felt faint since these 2 episodes. Patient was seen in clinic yesterday and was told by PCP to notify PCP if this continues to be a concern. Patient is advised to seek the ER if feels faint or extremely dizzy or lightness. Patient is safe and has family around.    Routing to PCP for review.    MARÍA Espana

## 2021-08-16 ENCOUNTER — PATIENT OUTREACH (OUTPATIENT)
Dept: CARE COORDINATION | Facility: CLINIC | Age: 76
End: 2021-08-16

## 2021-08-16 DIAGNOSIS — K59.09 CHRONIC CONSTIPATION: Primary | ICD-10-CM

## 2021-08-16 RX ORDER — HYDROCORTISONE ACETATE 25 MG/1
25 SUPPOSITORY RECTAL 2 TIMES DAILY
Qty: 14 SUPPOSITORY | Refills: 0 | Status: SHIPPED | OUTPATIENT
Start: 2021-08-16 | End: 2021-09-10

## 2021-08-16 ASSESSMENT — ACTIVITIES OF DAILY LIVING (ADL): DEPENDENT_IADLS:: INDEPENDENT

## 2021-08-16 NOTE — TELEPHONE ENCOUNTER
Patient notified. She reports that she is not feeling as dizzy or light headed as much. She is taking her time when she needs to change positions or get up. She is going to continue to check her BP at least once per day. Routed to provider to inform.  Kamilla Dewitt RN

## 2021-08-16 NOTE — PROGRESS NOTES
Clinic Care Coordination Contact  [facility]/Voicemail    Referral Source: ED Follow-Up  Clinical Data: ED visit 7/27/2021 Proctitis    Care Coordinator Outreach  Outreach attempted x 2.  Left message on patient's voicemail with call back information and requested return call.  Plan: Care Coordinator will do no further outreaches at this time.  [facility]   Carito Shields RN, Care Coordinator   [facility]'s   E-mail [email or web address]   [phone]

## 2021-08-16 NOTE — PROGRESS NOTES
Return call from the patient.  Left a message she is staying with her son and her daughter in law and they are watching her  like a hawk and no needs  CC RN will follow up in 1-2 weeks   [facility]   Carito Shields RN, Care Coordinator   [facility]'s   E-mail [email or web address]   [phone]

## 2021-08-16 NOTE — TELEPHONE ENCOUNTER
B/P update since stopping Amlodipine on 8/12/21.  B/P 8/16/21 - 133/73.         8/15/21 -  117/69, 122/65.    KPavelRN

## 2021-08-16 NOTE — TELEPHONE ENCOUNTER
We stopped the amlodipine on 8/12.  She called the next day.     Now that it has been a few days, how is blood pressure doing?    Ale Ordaz M.D.

## 2021-08-16 NOTE — TELEPHONE ENCOUNTER
Okay, this looks good. Is she still having problems feeling lightheaded/dizzy.    Ale Ordaz M.D.

## 2021-09-01 ENCOUNTER — PATIENT OUTREACH (OUTPATIENT)
Dept: NURSING | Facility: CLINIC | Age: 76
End: 2021-09-01
Payer: COMMERCIAL

## 2021-09-01 DIAGNOSIS — K59.09 CHRONIC CONSTIPATION: Primary | ICD-10-CM

## 2021-09-01 ASSESSMENT — ACTIVITIES OF DAILY LIVING (ADL): DEPENDENT_IADLS:: INDEPENDENT

## 2021-09-01 NOTE — LETTER
[facility] COORDINATION  [address] [facility] [address]  September 1, 2021    Idalmis Abdul  [address]   [facility] [address]    Dear Idalmis,  Your Care Team congratulates you on your journey to maintain wellness. This document will help guide you on your journey to maintain a healthy lifestyle.  You can use this to help you overcome any barriers you may encounter.  If you should have any questions or concerns, you can contact the members of your Care Team or contact your Primary Care Clinic for assistance.     Health Maintenance  Health Maintenance Reviewed: Not assessed    My Access Plan  Medical Emergency 911   Primary Clinic Line [facility] - [phone]   24 Hour Appointment Line [phone] or  [phone]) (toll-free)   24 Hour Nurse Line [phone] (toll-free)   Preferred Urgent Care     Preferred Hospital [place], [place]  [phone]   Preferred Pharmacy [facility] [address] [facility] AT [facility]     Behavioral Health Crisis Line The National Suicide Prevention Lifeline at 1-649.928.5129 or 911     My Care Team Members  Patient Care Team       Relationship Specialty Notifications Start End    Ale Ordaz MD PCP - General Family Practice  6/29/20     Phone: [phone] Pager: [phone] Fax: [phone]        [address] [facility] [address]    Blayne Schmitz MD MD Oncology Admissions 11/20/13     Phone: [phone] Fax: [phone]         [address] [facility] [address] [facility] [address]    Ara Guzman APRN CNP Nurse Practitioner Nurse Practitioner Admissions 11/20/13     Phone: [phone] Fax: [phone]         [address] [facility] [address] [facility] [address]    Swetha Antoine MD MD INTERNAL MEDICINE - ENDOCRINOLOGY, DIABETES & METABOLISM  7/7/15     Phone: [phone] Pager: [phone] Fax: [phone]        [address] [facility]  [address] [facility] [address]    Darlene Sultana MD MD Family Practice  7/9/15     referred to endocrine    Phone: [phone] Fax: [phone]         [address] [facility] [address]    Magnus Mckeon MD MD Ophthalmology  5/20/16     Referring Ophthalmologist    Phone: [phone] Fax: [phone]         [facility] [address] [facility] [address]    Erma Ordaz MD MD Ophthalmology  5/20/16     Phone: [phone] Fax: [phone]         [address] [facility] [address] [address]    Yenifer Driver, RN Nurse Coordinator Oncology Admissions 4/6/18     Phone: [phone] Pager: [phone]        Sangeetha Murphy Fernández Student   6/5/19     Jessy Osman [facility] Pharmacist Pharmacist  9/4/19     Phone: [phone] Fax: [phone]         [facility] [address]    Blayne Schmitz MD Assigned Cancer Care Provider   10/23/20     Phone: [phone] Fax: [phone]         [address] [facility] [address] [facility] [address]    Ale Ordaz MD Assigned PCP   4/11/21     Phone: [phone] Pager: [phone] Fax: [phone] [address] [facility] [address]    Tootie Herman MD Assigned Heart and Vascular Provider   5/23/21     Phone: [phone] Fax: [phone] [address] [facility] [address] [facility] [address]    Federico Yang MD Assigned Endocrinology Provider   7/16/21     Phone: [phone] Fax: [phone] [address] [facility] [address]              Goals        COMPLETED: 1. Medical (pt-stated)       Goal Statement: I will get an answer for the cause of my syncope in the next month and complete my Health Maintenance due in the next 1-3 months.  Date Goal set: 7/20/2021  Barriers: Deconditioned   Strengths: Supportive son   Date to Achieve By: 10/20/2021  Patient expressed understanding of goal: Yes    Action steps to achieve this goal:  1. I will set up a future Colonoscopy.  2. I will keep future Gastroenterology  appointment.  3. I will pace my activity and use my walker for safety.  4. I will check into a life alert.  5. I will discuss Health Maintenance due with my provider.           COMPLETED: I want to be able to have a  to help me coordinate and understand my medical care/needs. Started 9/10/14.  (pt-stated)       As of today's date 9/10/2014 goal is met at 51 - 75%.   Goal Status:  Active  Plan: To connect and continue contact with RN Care Coordinator for assistance.   As of 9/28/2015, RN CC continues to provide support with follow up calls every 1-2 months. I can call as needed for assistance also. Goal is met at 51 - 75%. Goal Status: Active                 Advance Care Plans/Directives Type:      We notice that you do not have an Advance Directive on file. Upon completion of your Health Care Directive, please bring a copy with you to your next office visit.    It has been your Clinic Care Team's pleasure to work with you on your goals.    Regards,  Your Clinic Care Team  [facility]   Carito Shields RN, Care Coordinator   [facility]'s   E-mail [email or web address]   [phone]

## 2021-09-01 NOTE — PROGRESS NOTES
Clinic Care Coordination Contact    Follow Up Progress Note   Clinical Data: ED visit 7/27/2021 Proctitis      Assessment: Patient was staying with her son and daughter in law and now back home   Patient son stays overnight twice a week   Patient continues to use Miralax and suppositories as needed     Care Gaps:    Health Maintenance Due   Topic Date Due     HEPATITIS C SCREENING  Never done     ZOSTER IMMUNIZATION (1 of 2) Never done     MEDICARE ANNUAL WELLNESS VISIT  12/15/2012     ADVANCE CARE PLANNING  12/15/2016     URINE DRUG SCREEN  12/07/2019     DEXA  12/17/2020     INFLUENZA VACCINE (1) 09/01/2021       Care Gap Goal set: Yes    Goals addressed this encounter:   Goals Addressed                    This Visit's Progress       COMPLETED: 1. Medical (pt-stated)   90%      Goal Statement: I will get an answer for the cause of my syncope in the next month and complete my Health Maintenance due in the next 1-3 months.  Date Goal set: 7/20/2021  Barriers: Deconditioned   Strengths: Supportive son   Date to Achieve By: 10/20/2021  Patient expressed understanding of goal: Yes    Action steps to achieve this goal:  1. I will set up a future Colonoscopy.  2. I will keep future Gastroenterology appointment.  3. I will pace my activity and use my walker for safety.  4. I will check into a life alert.  5. I will discuss Health Maintenance due with my provider.              Intervention/Education provided during outreach: CC RN available in the future if any needs           Plan:   Patient will keep Gastroenterology appointment in October  Patient will continue Miralax and suppositories as needed   No unmet needs, no further care coordination needed at this time   [facility]   Carito Shields RN, Care Coordinator   [facility]'s   E-mail [email or web address]   [phone]

## 2021-09-02 ENCOUNTER — LAB (OUTPATIENT)
Dept: LAB | Facility: CLINIC | Age: 76
End: 2021-09-02
Payer: COMMERCIAL

## 2021-09-02 ENCOUNTER — TELEPHONE (OUTPATIENT)
Dept: FAMILY MEDICINE | Facility: CLINIC | Age: 76
End: 2021-09-02

## 2021-09-02 ENCOUNTER — HOSPITAL ENCOUNTER (EMERGENCY)
Facility: CLINIC | Age: 76
Discharge: HOME | End: 2021-09-02
Attending: EMERGENCY MEDICINE | Admitting: EMERGENCY MEDICINE
Payer: COMMERCIAL

## 2021-09-02 ENCOUNTER — APPOINTMENT (OUTPATIENT)
Dept: GENERAL RADIOLOGY | Facility: CLINIC | Age: 76
End: 2021-09-02
Attending: EMERGENCY MEDICINE
Payer: COMMERCIAL

## 2021-09-02 VITALS
SYSTOLIC BLOOD PRESSURE: 175 MMHG | BODY MASS INDEX: 26.13 KG/M2 | WEIGHT: 142 LBS | HEART RATE: 83 BPM | RESPIRATION RATE: 20 BRPM | OXYGEN SATURATION: 96 % | HEIGHT: 62 IN | TEMPERATURE: 98.3 F | DIASTOLIC BLOOD PRESSURE: 88 MMHG

## 2021-09-02 DIAGNOSIS — K62.89 PROCTITIS: ICD-10-CM

## 2021-09-02 DIAGNOSIS — N39.0 URINARY TRACT INFECTION WITHOUT HEMATURIA, SITE UNSPECIFIED: ICD-10-CM

## 2021-09-02 DIAGNOSIS — K59.09 CHRONIC CONSTIPATION: ICD-10-CM

## 2021-09-02 DIAGNOSIS — C49.A0 GIST (GASTROINTESTINAL STROMAL TUMOR), MALIGNANT (H): ICD-10-CM

## 2021-09-02 DIAGNOSIS — N83.202 LEFT OVARIAN CYST: ICD-10-CM

## 2021-09-02 DIAGNOSIS — E05.00 GRAVES' DISEASE: ICD-10-CM

## 2021-09-02 LAB
ALBUMIN SERPL-MCNC: 3.4 G/DL (ref 3.4–5)
ALBUMIN UR-MCNC: 30 MG/DL
ALP SERPL-CCNC: 59 U/L (ref 40–150)
ALT SERPL W P-5'-P-CCNC: 20 U/L (ref 0–50)
AMORPH CRY #/AREA URNS HPF: ABNORMAL /HPF
ANION GAP SERPL CALCULATED.3IONS-SCNC: 8 MMOL/L (ref 3–14)
APPEARANCE UR: ABNORMAL
AST SERPL W P-5'-P-CCNC: 25 U/L (ref 0–45)
BACTERIA #/AREA URNS HPF: ABNORMAL /HPF
BASOPHILS # BLD AUTO: 0 10E3/UL (ref 0–0.2)
BASOPHILS NFR BLD AUTO: 0 %
BILIRUB SERPL-MCNC: 0.5 MG/DL (ref 0.2–1.3)
BILIRUB UR QL STRIP: NEGATIVE
BUN SERPL-MCNC: 10 MG/DL (ref 7–30)
CALCIUM SERPL-MCNC: 8.1 MG/DL (ref 8.5–10.1)
CHLORIDE BLD-SCNC: 96 MMOL/L (ref 94–109)
CO2 SERPL-SCNC: 24 MMOL/L (ref 20–32)
COLOR UR AUTO: ABNORMAL
CREAT SERPL-MCNC: 0.84 MG/DL (ref 0.52–1.04)
EOSINOPHIL # BLD AUTO: 0 10E3/UL (ref 0–0.7)
EOSINOPHIL NFR BLD AUTO: 0 %
ERYTHROCYTE [DISTWIDTH] IN BLOOD BY AUTOMATED COUNT: 14.9 % (ref 10–15)
GFR SERPL CREATININE-BSD FRML MDRD: 68 ML/MIN/1.73M2
GLUCOSE BLD-MCNC: 114 MG/DL (ref 70–99)
GLUCOSE UR STRIP-MCNC: NEGATIVE MG/DL
HCT VFR BLD AUTO: 29.5 % (ref 35–47)
HGB BLD-MCNC: 10 G/DL (ref 11.7–15.7)
HGB UR QL STRIP: ABNORMAL
HOLD SPECIMEN: NORMAL
IMM GRANULOCYTES # BLD: 0 10E3/UL
IMM GRANULOCYTES NFR BLD: 0 %
KETONES UR STRIP-MCNC: NEGATIVE MG/DL
LEUKOCYTE ESTERASE UR QL STRIP: NEGATIVE
LYMPHOCYTES # BLD AUTO: 0.9 10E3/UL (ref 0.8–5.3)
LYMPHOCYTES NFR BLD AUTO: 13 %
MCH RBC QN AUTO: 29.6 PG (ref 26.5–33)
MCHC RBC AUTO-ENTMCNC: 33.9 G/DL (ref 31.5–36.5)
MCV RBC AUTO: 87 FL (ref 78–100)
MONOCYTES # BLD AUTO: 0.9 10E3/UL (ref 0–1.3)
MONOCYTES NFR BLD AUTO: 13 %
MUCOUS THREADS #/AREA URNS LPF: PRESENT /LPF
NEUTROPHILS # BLD AUTO: 5.3 10E3/UL (ref 1.6–8.3)
NEUTROPHILS NFR BLD AUTO: 74 %
NITRATE UR QL: POSITIVE
NRBC # BLD AUTO: 0 10E3/UL
NRBC BLD AUTO-RTO: 0 /100
PH UR STRIP: 7 [PH] (ref 5–7)
PLATELET # BLD AUTO: 171 10E3/UL (ref 150–450)
POTASSIUM BLD-SCNC: 3 MMOL/L (ref 3.4–5.3)
PROT SERPL-MCNC: 6.7 G/DL (ref 6.8–8.8)
RBC # BLD AUTO: 3.38 10E6/UL (ref 3.8–5.2)
RBC URINE: 7 /HPF
SODIUM SERPL-SCNC: 128 MMOL/L (ref 133–144)
SP GR UR STRIP: 1.01 (ref 1–1.03)
SQUAMOUS EPITHELIAL: 1 /HPF
TSH SERPL DL<=0.005 MIU/L-ACNC: 0.76 MU/L (ref 0.4–4)
TSH SERPL DL<=0.005 MIU/L-ACNC: 1.02 MU/L (ref 0.4–4)
UROBILINOGEN UR STRIP-MCNC: NORMAL MG/DL
WBC # BLD AUTO: 7.2 10E3/UL (ref 4–11)
WBC URINE: <1 /HPF

## 2021-09-02 PROCEDURE — 85025 COMPLETE CBC W/AUTO DIFF WBC: CPT | Performed by: EMERGENCY MEDICINE

## 2021-09-02 PROCEDURE — 51798 US URINE CAPACITY MEASURE: CPT | Performed by: EMERGENCY MEDICINE

## 2021-09-02 PROCEDURE — 81001 URINALYSIS AUTO W/SCOPE: CPT | Performed by: EMERGENCY MEDICINE

## 2021-09-02 PROCEDURE — 36415 COLL VENOUS BLD VENIPUNCTURE: CPT

## 2021-09-02 PROCEDURE — 250N000011 HC RX IP 250 OP 636: Performed by: EMERGENCY MEDICINE

## 2021-09-02 PROCEDURE — 36415 COLL VENOUS BLD VENIPUNCTURE: CPT | Performed by: EMERGENCY MEDICINE

## 2021-09-02 PROCEDURE — 87086 URINE CULTURE/COLONY COUNT: CPT | Performed by: EMERGENCY MEDICINE

## 2021-09-02 PROCEDURE — 99285 EMERGENCY DEPT VISIT HI MDM: CPT | Performed by: EMERGENCY MEDICINE

## 2021-09-02 PROCEDURE — 82040 ASSAY OF SERUM ALBUMIN: CPT | Performed by: EMERGENCY MEDICINE

## 2021-09-02 PROCEDURE — 74019 RADEX ABDOMEN 2 VIEWS: CPT

## 2021-09-02 PROCEDURE — 99284 EMERGENCY DEPT VISIT MOD MDM: CPT | Mod: 25 | Performed by: EMERGENCY MEDICINE

## 2021-09-02 PROCEDURE — 84443 ASSAY THYROID STIM HORMONE: CPT | Performed by: EMERGENCY MEDICINE

## 2021-09-02 PROCEDURE — 96365 THER/PROPH/DIAG IV INF INIT: CPT | Performed by: EMERGENCY MEDICINE

## 2021-09-02 PROCEDURE — 80050 GENERAL HEALTH PANEL: CPT

## 2021-09-02 PROCEDURE — 250N000013 HC RX MED GY IP 250 OP 250 PS 637: Performed by: EMERGENCY MEDICINE

## 2021-09-02 RX ORDER — HYDROCORTISONE 100 MG/60ML
100 SUSPENSION RECTAL AT BEDTIME
Qty: 7 ENEMA | Refills: 0 | Status: SHIPPED | OUTPATIENT
Start: 2021-09-02 | End: 2021-09-10

## 2021-09-02 RX ORDER — CEPHALEXIN 500 MG/1
500 CAPSULE ORAL 3 TIMES DAILY
Qty: 21 CAPSULE | Refills: 0 | Status: SHIPPED | OUTPATIENT
Start: 2021-09-02 | End: 2021-09-04

## 2021-09-02 RX ORDER — CEFTRIAXONE SODIUM 2 G/50ML
2 INJECTION, SOLUTION INTRAVENOUS ONCE
Status: COMPLETED | OUTPATIENT
Start: 2021-09-02 | End: 2021-09-02

## 2021-09-02 RX ORDER — LACTULOSE 10 G/15ML
SOLUTION ORAL
Qty: 946 ML | Refills: 1 | Status: SHIPPED | OUTPATIENT
Start: 2021-09-02 | End: 2021-09-10

## 2021-09-02 RX ADMIN — CEFTRIAXONE SODIUM 2 G: 2 INJECTION, SOLUTION INTRAVENOUS at 20:10

## 2021-09-02 RX ADMIN — SODIUM PHOSPHATE, DIBASIC AND SODIUM PHOSPHATE, MONOBASIC 1 ENEMA: 7; 19 ENEMA RECTAL at 19:35

## 2021-09-02 ASSESSMENT — MIFFLIN-ST. JEOR: SCORE: 1087.36

## 2021-09-02 NOTE — LETTER
September 7, 2021      Idalmis Abdul     [facility] [address]        Dear ,    We are writing to inform you of your test results.    TSH stable and normal.   Due for yearly follow up.       Resulted Orders   **TSH with free T4 reflex FUTURE anytime   Result Value Ref Range    TSH 1.02 0.40 - 4.00 mU/L       If you have any questions or concerns, please call the clinic at the number listed above.       Sincerely,      Federico Yang MD

## 2021-09-02 NOTE — ED NOTES
Hx of constipation, uses miralax daily & often suppositories. Used to do daily enemas & was told it was giving her proctitis, hasn't had good BM in many days, small amount on Tuesday. Now having a lot of rectal pressure & UOP has slowed dramatically, states she has only been able to void a couple drops since this am. Bladder scanned for 363 on arrival.

## 2021-09-02 NOTE — ED TRIAGE NOTES
Since Tuesday patient has had difficulty with voiding and evacuation of bowels (noted blood in stools as well) she has had these problems in past

## 2021-09-02 NOTE — TELEPHONE ENCOUNTER
Patients daughter-in-law reports that for the past 2 days, patient has had bright red blood in her stool and has had difficulty with urinating. She feels like she needs to go all the time, but very little is coming out. Daughter-in-law os going to bring patient in to be seen in the urgent care.  Kamilla Dewitt RN

## 2021-09-02 NOTE — ED PROVIDER NOTES
History     Chief Complaint   Patient presents with     Constipation     Urinary Retention     HPI  Idalmis Abdul is a 75 year old female with chronic constipation with new and worsened difficulty having a BM and urinating in the past 2 days.  She feels that her rectum/anus tightens up when she strains to have a bowel movement, making it difficult to pass stool.  She has noticed bright red blood on the toilet paper, but small thin pieces of blood.  Stool are not changed from baseline.  Symptoms are refractory to MiraLAX and laxatives suppositories.  No acute abdominal pain, feels constipated and bloated.  No fever, chills or vomiting.  No symptom relief from Anusol HC suppositories prescribed after ED evaluation for constipation 7/27/2021, 5 weeks ago, with CT diagnosis of proctitis.  She is having difficulty urinating today and only passing very small amounts of urine when voiding.  No dysuria, hematuria, no back or flank pain.       Previous Records Reviewed:  CT ABDOMEN AND PELVIS WITHOUT CONTRAST  7/27/2021      CLINICAL HISTORY: Constipation.     TECHNIQUE: CT scan of the abdomen and pelvis was performed without IV  contrast. Multiplanar reformats were obtained. Dose reduction  techniques were used.  CONTRAST: None.     COMPARISON: CT of the abdomen and pelvis performed 7/14/2021.     FINDINGS:   LOWER CHEST: The visualized lung bases are clear. Small hiatal hernia.     HEPATOBILIARY: The gallbladder is not seen, and is surgically absent  by history. No hepatic masses are seen.     PANCREAS: Normal.     SPLEEN: Scattered calcified granulomas in the spleen.     ADRENAL GLANDS: Normal.     KIDNEYS/BLADDER: Unremarkable. No hydronephrosis.     BOWEL: Mild circumferential bowel wall thickening with surrounding fat  stranding in the lower rectum is new since the previous exam, and  suggests proctitis. No bowel obstruction. There is a moderate amount  of stool in the ascending and transverse colon. The appendix  is not  visualized; however, there is no significant inflammatory change in  the expected region of the appendix.     PELVIC ORGANS: The uterus is not seen, and is likely surgically  absent. An indeterminate left ovarian cystic lesion measures 3 cm, and  is unchanged.     LYMPH NODES: No enlarged lymph nodes are identified in the abdomen or  pelvis.     VASCULATURE: Moderate atherosclerotic aortoiliac calcification.     ADDITIONAL FINDINGS: None.     MUSCULOSKELETAL: Degenerative changes are noted in the visualized  thoracolumbar spine. Postoperative changes of posterior kurtis and  pedicle screw fusion at L4-L5. Thoracolumbar curve.                                                                      IMPRESSION:   1.  Mild circumferential bowel wall thickening in the lower rectum  with mild surrounding fat stranding, suspicious for an infectious or  inflammatory proctitis. Please clinically correlate.  2.  Moderate amount of stool in the ascending and transverse colon.  3.  Indeterminate left ovarian cystic lesion measures 3 cm. Pelvic  ultrasound is recommended for further characterization.       Allergies:  Allergies   Allergen Reactions     Atorvastatin Cramps              Problem List:    Patient Active Problem List    Diagnosis Date Noted     Hypokalemia 07/15/2021     Priority: Medium     Hyponatremia 07/15/2021     Priority: Medium     Chronic constipation 07/15/2021     Priority: Medium     Anemia 07/15/2021     Priority: Medium     Fall, initial encounter 07/14/2021     Priority: Medium     Non-intractable vomiting with nausea, unspecified vomiting type 07/14/2021     Priority: Medium     Mixed simple and mucopurulent chronic bronchitis (H) 01/07/2020     Priority: Medium     CKD (chronic kidney disease) stage 3, GFR 30-59 ml/min 05/23/2019     Priority: Medium     Spinal stenosis of lumbosacral region 03/22/2018     Priority: Medium     DDD (degenerative disc disease), lumbar 03/22/2018     Priority: Medium      Age-related osteoporosis without current pathological fracture 12/16/2014     Priority: Medium     PVD (posterior vitreous detachment) 11/17/2014     Priority: Medium     History of tobacco abuse 09/02/2014     Priority: Medium     QUIT in 2010       History of non-ST elevation myocardial infarction (NSTEMI) 09/02/2014     Priority: Medium     Chronic back pain 09/02/2014     Priority: Medium     No longer on oxycodone - had been on this long term in the past         Graves' disease 08/19/2014     Priority: Medium     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 Provider to review       Hyperthyroidism 02/04/2014     Priority: Medium     Seeing Endoncrinology at [facility]       GERD (gastroesophageal reflux disease) 10/08/2013     Priority: Medium     S/P CABG x 4 08/05/2012     Priority: Medium     ASCVD (arteriosclerotic cardiovascular disease) 06/14/2012     Priority: Medium     NSTEMI (non-ST elevated myocardial infarction) (H) 06/12/2012     Priority: Medium     Eyelid edema 12/15/2011     Priority: Medium     side effect of gastric cancer medication       History of lung cancer 12/15/2011     Priority: Medium     S/p resection of right lung.  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 Schmitz       Hypertension goal BP (blood pressure) < 140/90 03/24/2005     Priority: Medium        Past Medical History:    Past Medical History:   Diagnosis Date     ASCVD (arteriosclerotic cardiovascular disease) 6/14/2012     Benign neoplasm of duodenum, jejunum, and ileum 2003, 2007     CA - lung cancer 4/2010      choledochal cyst 1963     CKD (chronic kidney disease) stage 3, GFR 30-59 ml/min 5/23/2019     Coronary artery disease      DDD (degenerative disc disease), lumbar 3/22/2018     Dislocated finger, left middle PIP 7/26/2013     Dyspnea 8/27/2013     Eyelid edema 12/15/2011     GERD (gastroesophageal reflux disease) 10/8/2013     GIST (gastrointestinal stromal tumor), malignant (H) 5/10/2011     Graves disease      Grief reaction 5/11/2009     History of lung cancer 12/15/2011     Hyperlipidaemia      Hyperlipidemia LDL goal <160 12/17/2013     Hyperthyroidism 2/4/2014     Hypokalemia 8/5/2012     LBP (low back pain) 7/26/2013     Leiomyoma of uterus, unspecified      NSTEMI (non-ST elevated myocardial infarction) (H) 6/12/2012     NSTEMI (non-ST elevated myocardial infarction) (H)      osteopenia      Other benign neoplasm of connective and other soft tissue of thorax 2003     Other chronic pain      PONV (postoperative nausea and vomiting)      Postsurgical aortocoronary bypass status 7/4/2012     S/P CABG x 4 8/5/2012     Strabismus      Tobacco use disorder      Unspecified essential hypertension        Past Surgical History:    Past Surgical History:   Procedure Laterality Date     BLEPHAROPLASTY BILATERAL Bilateral 7/17/2019    Procedure: Bilateral Upper Eyelid Blepharoplasty;  Surgeon: Vasile Brasher MD;  Location:  OR     BYPASS GRAFT ARTERY CORONARY  6/14/2012    Procedure: BYPASS GRAFT ARTERY CORONARY;  Median Sternotomy Coronary Artery Bypass Graft  x 3        C THORACOSCOPY,DX W BX  fall 2003    benign tissue     CATARACT IOL, RT/LT      LE     CHOLECYSTECTOMY  1963     COLONOSCOPY  4-12-05     CORONARY ARTERY BYPASS      X4     CREATION PERICARDIAL WINDOW  6/15/2012    Procedure: CREATION PERICARDIAL WINDOW;  Mediastinal Exploration, Control of Bleeding;  Surgeon: Dwaine Griffin MD;  Location:  OR     EYE SURGERY  2014    left cataract removal     GI SURGERY  2003 and 2007    GIST tumor  removal     GYN SURGERY      tubal ligation     HYSTERECTOMY VAGINAL, COLPORRHAPHY ANTERIOR, POSTERIOR, COMBINED N/A 10/17/2017    Procedure: COMBINED HYSTERECTOMY VAGINAL, COLPORRHAPHY ANTERIOR, POSTERIOR;  Total Vaginal Hysterectomy and Posterior Repair,Sacrospinous Vault Suspension;  Surgeon: Ruth Farooq MD;  Location: [facility] OR     PHACOEMULSIFICATION WITH STANDARD INTRAOCULAR LENS IMPLANT  3/24/2014    Procedure: PHACOEMULSIFICATION WITH STANDARD INTRAOCULAR LENS IMPLANT;  Left Kelman Phacoemulsification with Intraocular Lens Implant;  Surgeon: Magnus Mckeon MD;  Location: [facility] OR     RECESSION RESECTION WITH ADJUSTABLE SUTURE BILATERAL Bilateral 7/14/2016    Procedure: RECESSION RESECTION WITH ADJUSTABLE SUTURE BILATERAL;  Surgeon: Erma Ordaz MD;  Location: [facility] OR     REPAIR ENTROPION Right 8/21/2019    Procedure: Right Upper Lid Entropion Repair;  Surgeon: Vasile Brasher MD;  Location: [facility]     REPAIR RETRACTION LID BILATERAL Bilateral 7/17/2019    Procedure: Bilateral Upper Eyelid Retraction Repair;  Surgeon: Vasile Brasher MD;  Location: [facility]     SURGICAL HISTORY OF -   1963    cholecystectomy     SURGICAL HISTORY OF -   1970's    Tubal Ligation      SURGICAL HISTORY OF -   08/03    Explor. Lap, Excision of Small Bowel Tumor      SURGICAL HISTORY OF -   4/2010    lung cancer removed right lung       Family History:    Family History   Problem Relation Age of Onset     Heart Disease Mother      Osteoporosis Mother      Respiratory Mother         Emphezyma     Hypertension Mother      Heart Disease Father      Alcohol/Drug Father      Hypertension Father      Hypertension Maternal Grandmother      Hypertension Brother      Hypertension Sister      Arthritis Sister      Hypertension Son      Cancer Son         rectal       Social History:  Marital Status:   [5]  Social History     Tobacco Use     Smoking status: Former Smoker     Packs/day: 0.50     Years: 49.00      "Pack years: 24.50     Types: Cigarettes     Quit date: 2010     Years since quittin.3     Smokeless tobacco: Never Used   Vaping Use     Vaping Use: Never used   Substance Use Topics     Alcohol use: No     Drug use: No        Medications:    hydrocortisone (CORTENEMA) 100 MG/60ML enema  lactulose (CHRONULAC) 10 GM/15ML solution  Blood Pressure Monitoring (ADULT BLOOD PRESSURE CUFF LG) KIT  cefpodoxime (VANTIN) 100 MG tablet  fluticasone-salmeterol (AIRDUO RESPICLICK) 113-14 MCG/ACT inhaler  hydrocortisone (ANUSOL-HC) 25 MG suppository  imatinib (GLEEVEC) 400 MG tablet  melatonin 5 MG tablet  methimazole (TAPAZOLE) 5 MG tablet  metoprolol tartrate (LOPRESSOR) 100 MG tablet  ondansetron (ZOFRAN-ODT) 4 MG ODT tab  polyethylene glycol (MIRALAX/GLYCOLAX) powder  rosuvastatin (CRESTOR) 5 MG tablet  tiotropium (SPIRIVA) 18 MCG inhaled capsule        Review of Systems  As mentioned above in the history present illness.  All other systems were reviewed and are negative.    Physical Exam   BP: 116/71  Pulse: 79  Temp: 98.3  F (36.8  C)  Resp: 20  Height: 157.5 cm (5' 2\")  Weight: 64.4 kg (142 lb)  SpO2: 97 %      Physical Exam  Vitals and nursing note reviewed.   Constitutional:       General: She is not in acute distress.     Appearance: Normal appearance. She is well-developed. She is not ill-appearing or diaphoretic.   HENT:      Head: Normocephalic and atraumatic.      Right Ear: External ear normal.      Left Ear: External ear normal.      Nose: Nose normal.      Mouth/Throat:      Mouth: Mucous membranes are moist.   Eyes:      General: No scleral icterus.     Extraocular Movements: Extraocular movements intact.      Conjunctiva/sclera: Conjunctivae normal.   Neck:      Trachea: No tracheal deviation.   Cardiovascular:      Rate and Rhythm: Normal rate and regular rhythm.      Heart sounds: Normal heart sounds. No murmur heard.   No friction rub. No gallop.    Pulmonary:      Effort: Pulmonary effort is " normal. No respiratory distress.      Breath sounds: Normal breath sounds. No wheezing, rhonchi or rales.   Abdominal:      General: There is no distension.      Palpations: Abdomen is soft. There is no mass.      Tenderness: There is no abdominal tenderness. There is no right CVA tenderness, left CVA tenderness, guarding or rebound.      Hernia: No hernia is present.   Genitourinary:      Musculoskeletal:         General: Normal range of motion.      Cervical back: Normal range of motion and neck supple.      Right lower leg: No edema.      Left lower leg: No edema.   Skin:     General: Skin is warm and dry.      Coloration: Skin is not pale.      Findings: No erythema or rash.   Neurological:      General: No focal deficit present.      Mental Status: She is alert and oriented to person, place, and time.   Psychiatric:         Mood and Affect: Mood normal.         Behavior: Behavior normal.         ED Course        Procedures         Results for orders placed or performed during the hospital encounter of 09/02/21   XR Abdomen 2 Views     Status: None    Narrative    XR ABDOMEN 2VIEWS 9/2/2021 7:17 PM    HISTORY: acute on chronic constipation    COMPARISON: 7/27/2021      Impression    IMPRESSION: Large amount of stool throughout the colon consistent with  history. No obstruction or free intraperitoneal air. Scoliosis.  Postoperative changes lower lumbar spine.    EDWARD TELLO MD         SYSTEM ID:  [id number]   UA with Microscopic reflex to Culture     Status: Abnormal    Specimen: Urine, Midstream   Result Value Ref Range    Color Urine Red (A) Colorless, Straw, Light Yellow, Yellow    Appearance Urine Cloudy (A) Clear    Glucose Urine Negative Negative mg/dL    Bilirubin Urine Negative Negative    Ketones Urine Negative Negative mg/dL    Specific Gravity Urine 1.008 1.003 - 1.035    Blood Urine Moderate (A) Negative    pH Urine 7.0 5.0 - 7.0    Protein Albumin Urine 30  (A) Negative mg/dL    Urobilinogen Urine  Normal Normal, 2.0 mg/dL    Nitrite Urine Positive (A) Negative    Leukocyte Esterase Urine Negative Negative    Bacteria Urine Many (A) None Seen /HPF    Mucus Urine Present (A) None Seen /LPF    Amorphous Crystals Urine Few (A) None Seen /HPF    RBC Urine 7 (H) <=2 /HPF    WBC Urine <1 <=5 /HPF    Squamous Epithelials Urine 1 <=1 /HPF    Narrative    Urine Culture ordered based on laboratory criteria   CBC with platelets differential     Status: Abnormal    Narrative    The following orders were created for panel order CBC with platelets differential.  Procedure                               Abnormality         Status                     ---------                               -----------         ------                     CBC with platelets and d...[[id number]]  Abnormal            Final result                 Please view results for these tests on the individual orders.   Comprehensive metabolic panel     Status: Abnormal   Result Value Ref Range    Sodium 128 (L) 133 - 144 mmol/L    Potassium 3.0 (L) 3.4 - 5.3 mmol/L    Chloride 96 94 - 109 mmol/L    Carbon Dioxide (CO2) 24 20 - 32 mmol/L    Anion Gap 8 3 - 14 mmol/L    Urea Nitrogen 10 7 - 30 mg/dL    Creatinine 0.84 0.52 - 1.04 mg/dL    Calcium 8.1 (L) 8.5 - 10.1 mg/dL    Glucose 114 (H) 70 - 99 mg/dL    Alkaline Phosphatase 59 40 - 150 U/L    AST 25 0 - 45 U/L    ALT 20 0 - 50 U/L    Protein Total 6.7 (L) 6.8 - 8.8 g/dL    Albumin 3.4 3.4 - 5.0 g/dL    Bilirubin Total 0.5 0.2 - 1.3 mg/dL    GFR Estimate 68 >60 mL/min/1.73m2   TSH with free T4 reflex     Status: Normal   Result Value Ref Range    TSH 0.76 0.40 - 4.00 mU/L   CBC with platelets and differential     Status: Abnormal   Result Value Ref Range    WBC Count 7.2 4.0 - 11.0 10e3/uL    RBC Count 3.38 (L) 3.80 - 5.20 10e6/uL    Hemoglobin 10.0 (L) 11.7 - 15.7 g/dL    Hematocrit 29.5 (L) 35.0 - 47.0 %    MCV 87 78 - 100 fL    MCH 29.6 26.5 - 33.0 pg    MCHC 33.9 31.5 - 36.5 g/dL    RDW 14.9 10.0 - 15.0 %     Platelet Count 171 150 - 450 10e3/uL    % Neutrophils 74 %    % Lymphocytes 13 %    % Monocytes 13 %    % Eosinophils 0 %    % Basophils 0 %    % Immature Granulocytes 0 %    NRBCs per 100 WBC 0 <1 /100    Absolute Neutrophils 5.3 1.6 - 8.3 10e3/uL    Absolute Lymphocytes 0.9 0.8 - 5.3 10e3/uL    Absolute Monocytes 0.9 0.0 - 1.3 10e3/uL    Absolute Eosinophils 0.0 0.0 - 0.7 10e3/uL    Absolute Basophils 0.0 0.0 - 0.2 10e3/uL    Absolute Immature Granulocytes 0.0 <=0.0 10e3/uL    Absolute NRBCs 0.0 10e3/uL   Extra Blue Top Tube     Status: None   Result Value Ref Range    Hold Specimen JIC    Extra Red Top Tube     Status: None   Result Value Ref Range    Hold Specimen JIC    Extra Green Top (Lithium Heparin) Tube     Status: None   Result Value Ref Range    Hold Specimen JIC    Extra Green Top (Lithium Heparin) ON ICE     Status: None   Result Value Ref Range    Hold Specimen JIC    Urine Culture     Status: Abnormal    Specimen: Urine, Midstream   Result Value Ref Range    Culture Culture in progress     Culture >100,000 CFU/mL Morganella morganii (A)     Culture >100,000 CFU/mL Aerococcus sanguinicola (A)        Susceptibility    Morganella morganii - ABEL     Ampicillin* >=32.0 Resistant ug/mL      * Intrinsically Resistant     Ampicillin/ Sulbactam >=32.0 Resistant ug/mL     Piperacillin/Tazobactam <=4.0 Susceptible ug/mL     Cefazolin* >=64.0 Resistant ug/mL      * Cefazolin ABEL breakpoints are for the treatment of uncomplicated urinary tract infections. For the treatment of systemic infections, please contact the laboratory for additional testing.     Ceftazidime <=1.0 Susceptible ug/mL     Ceftriaxone <=1.0 Susceptible ug/mL     Cefepime <=1.0 Susceptible ug/mL     Gentamicin <=1.0 Susceptible ug/mL     Tobramycin <=1.0 Susceptible ug/mL     Ciprofloxacin <=0.25 Susceptible ug/mL     Levofloxacin <=0.12 Susceptible ug/mL     Nitrofurantoin 128.0 Resistant ug/mL     Trimethoprim/Sulfamethoxazole <=1/19  Susceptible ug/mL   [place] Draw     Status: None    Narrative    The following orders were created for panel order [place] Draw.  Procedure                               Abnormality         Status                     ---------                               -----------         ------                     Extra Blue Top Tube[[id number]]                              Final result               Extra Red Top Tube[[id number]]                               Final result               Extra Green Top (Lithium...[[id number]]                      Final result               Extra Green Top (Lithium...[[id number]]                      Final result                 Please view results for these tests on the individual orders.   Results for orders placed or performed in visit on 09/02/21   CBC with platelets differential *Canceled*     Status: None ()    Narrative    The following orders were created for panel order CBC with platelets differential.  Procedure                               Abnormality         Status                     ---------                               -----------         ------                     CBC with platelets and d...[[id number]]                                                   Please view results for these tests on the individual orders.   **TSH with free T4 reflex FUTURE anytime     Status: Normal   Result Value Ref Range    TSH 1.02 0.40 - 4.00 mU/L                Medications   sodium phosphate (FLEET ENEMA) 1 enema (1 enema Rectal Given 9/2/21 1935)   cefTRIAXone IN D5W (ROCEPHIN) intermittent infusion 2 g (0 g Intravenous Stopped 9/2/21 2040)       Prevoid bladder scan = 363 ml    Post void bladder scan = 280 mL    Assessments & Plan (with Medical Decision Making)   75 year old female with chronic constipation with new and worsened difficulty having a BM and urinating in the past 2 days.  She feels that her rectum/anus tightens up when she strains to have a bowel movement, making it difficult to pass  stool.  She has noticed bright red blood on the toilet paper, with small thin pieces of stool.  Stools are not changed from baseline.  Symptoms are refractory to MiraLAX and laxatives suppositories and Anusol HC suppositories prescribed after ED evaluation for constipation 7/27/2021, 5 weeks ago, with CT diagnosis of proctitis. CT scan from 7/27/2021 also showed an incidental finding of an indeterminant left ovarian cystic lesion measuring 3 cm.  I will order an outpatient ultrasound evaluation of this. Post void bladder scan = 280 mL and UA shows RBC, many bacteria, and nitrite.  Urine culture pending.  Will initiate Keflex for UTI pending urine culture results.  She, her daughter and I discussed placing a Garsia catheter but elected to defer this today pending treatment of UTI which may be causing bladder outlet spasm/retention, as well as retention due to constipation which will be treated aggressively with lactulose.  We discussed Urology clinic follow-up for any persistent urinary problems after UTI treatment, and I recommended she follow-up with her primary care provider in the near future. Her exam is remarkable for a benign abdomen and prolapsing rectal mass.  I consulted the Surgeon on-call and he will see her in follow-up.  He recommended a daily Cortenema X 7 days. She and her daughter were provided instructions for supportive care and will return as needed for worsened condition or worsening symptoms, or new problems or concerns.    I have reviewed the nursing notes.    I have reviewed the findings, diagnosis, plan and need for follow up with the patient.      Discharge Medication List as of 9/2/2021  8:40 PM      START taking these medications    Details   hydrocortisone (CORTENEMA) 100 MG/60ML enema Place 1 enema rectally At Bedtime for 7 days, Disp-7 enema, R-0, E-Prescribe      lactulose (CHRONULAC) 10 GM/15ML solution 10-20 g p.o. once to twice daily as needed for constipation., Disp-946 mL, R-1,  E-Prescribe      cephALEXin (KEFLEX) 500 MG capsule Take 1 capsule (500 mg) by mouth 3 times daily for 7 days, Disp-21 capsule, R-0, E-Prescribe             Final diagnoses:   Proctitis   Chronic constipation   Left ovarian cyst - 3 cm cystic mass seen on CT 7/27/2021   Urinary tract infection without hematuria, site unspecified       9/2/2021   [facility] EMERGENCY DEPT     Griselda, Андрей HANDY MD  09/05/21 [id number]

## 2021-09-03 ENCOUNTER — PATIENT OUTREACH (OUTPATIENT)
Dept: ONCOLOGY | Facility: CLINIC | Age: 76
End: 2021-09-03

## 2021-09-03 NOTE — PROGRESS NOTES
RN Care Coordination Note  Incoming Call:   Received call from patient stating she was recently seen in ED and has questions regarding recent finding.      Outgoing Call:   Placed return call to patient. She reports while in ED they did CT and mentioned she had a cyst on her left ovary. We reviewed her scans from 7/2021, 5/2021 - cyst is noted in both, back to 5/2020. Patient states she has no pain or discomfort. Patient would like to continue to monitor and discuss more with Dr Schmitz at follow-up in Nov 2021.     Patient also questions if she qualifies for COVID-19 booster. Reviewed she likely does not qualify at this time, as she is not immunocompromised. However, she should plan for booster 8 months after her 2nd vaccine. Patient voiced understanding and had no further questions at this time.        Yenifer Driver RN, BSN, OCN   RN Care Coordinator   [facility]

## 2021-09-03 NOTE — RESULT ENCOUNTER NOTE
[facility] Emergency Dept discharge antibiotic (if prescribed): Cephalexin (Keflex) 500 mg capsule, 1 capsule (500 mg) by mouth 3 times daily for 7 days.  Date of Rx (if applicable):  9/2/21  No changes in treatment per [facility] ED Lab Result Urine culture protocol.

## 2021-09-03 NOTE — DISCHARGE INSTRUCTIONS
Treatment of constipation:    Increase fiber in diet (see handout)  Fiber supplement daily  Milk of Magnesia daily  Miralax once daily  Colace (stool softener)   Dulcolax or Senokot if needed (laxative pills or suppositories)  Magnesium Citrate one bottle if needed for severe constipation  Fleets Enema if needed for severe constipation    These are all available over the counter without prescription. And you may use more than one and use them in combination.

## 2021-09-04 ENCOUNTER — TELEPHONE (OUTPATIENT)
Dept: EMERGENCY MEDICINE | Facility: CLINIC | Age: 76
End: 2021-09-04

## 2021-09-04 DIAGNOSIS — N39.0 URINARY TRACT INFECTION: ICD-10-CM

## 2021-09-04 LAB
BACTERIA UR CULT: ABNORMAL

## 2021-09-04 RX ORDER — CEFPODOXIME PROXETIL 100 MG/1
100 TABLET, FILM COATED ORAL 2 TIMES DAILY
Qty: 10 TABLET | Refills: 0 | Status: SHIPPED | OUTPATIENT
Start: 2021-09-04 | End: 2021-09-09

## 2021-09-04 NOTE — TELEPHONE ENCOUNTER
"[facility] Emergency Department Lab result notification [Adult-Female]    [place] ED lab result protocol used  Urine culture    Reason for call  Notify of lab results, assess symptoms,  review ED providers recommendations/discharge instructions (if necessary) and advise per ED lab result f/u protocol    Lab Result (including Rx patient on, if applicable)  Final Urine Culture Report on 9/4/21.  [facility] Emergency Dept discharge antibiotic prescribed: Cephalexin (Keflex) 500 mg capsule, 1 capsule (500 mg) by mouth 3 times daily for 7 days.  Bacteria #1: >100,000 CFU/mL Morganella morganii is [RESISTANT] to antibiotic  Bacteria #2: >100,000 CFU/mL Aerococcus sanguinicola is [SUSCEPTIBLE] to antibiotic  Recommendations in treatment per [facility] ED Lab result protocol.  Information table from Emergency Dept Provider visit on 9/2/21  Symptoms reported at ED visit (Chief complaint, HPI) Constipation      Urinary Retention     ED note incomplete   Significant Medical hx, if applicable (i.e. CKD, diabetes) Reviewed   Allergies Allergies   Allergen Reactions     Atorvastatin Cramps             Weight, if applicable Wt Readings from Last 2 Encounters:   09/02/21 64.4 kg (142 lb)   08/12/21 64.9 kg (143 lb)      Coumadin/Warfarin [Yes /No] No   Creatinine Level (mg/dl) Creatinine   Date Value Ref Range Status   09/02/2021 0.84 0.52 - 1.04 mg/dL Final   05/06/2021 1.15 (H) 0.52 - 1.04 mg/dL Final      Creatinine clearance (ml/min), if applicable Serum creatinine: 0.84 mg/dL 09/02/21 1741  Estimated creatinine clearance: 50.2 mL/min   ED providers Impression and Plan (applicable information) Note incomplete   ED diagnosis Proctitis, UTI   ED provider  Андрей Villalobos MD      RN Assessment (Patient s current Symptoms), include time called.  [Insert Left message here if message left]  2:34PM: Spoke with patient. She states she is having some trouble urinating. \"I didn't have any problem with " "peeing overnight, so I'm sure it'll get better again.\" Denies any pain with urination or urgency. Has been taking 2 different bowel medications \"to clean me out. I'm having diarrhea from that, but not constantly.\" Feels alittle nauseated. No vomiting. Denies any fever, abdominal pain, back pain or weakness.     RN Recommendations/Instructions per [facility] ED lab result protocol  Patient notified of lab result and treatment recommendations.  Rx for Cefpodoxime (Vantin) 100 MG tablet, 1 tablet (100 mg) by mouth 2 times daily for 5 days sent to [Pharmacy - [facility] in [facility] in [place]].  RN reviewed information about UTI's.  Verified with the patient that the Keflex was prescribed by the ED provider for a UTI only and nothing related to her bowels.   Advised to stop Keflex and start Vantin.  Advised to return to ED with any worsening symptoms.  The patient is comfortable with the information given and has no further questions.     Please Contact your PCP clinic or return to the Emergency department if your:    Symptoms worsen or other concerning symptom's.    PCP follow-up Questions asked: YES       Viv Cazares RN  [facility]  Emergency Dept Lab Result RN  # [phone]     Copy of Lab result   Contains abnormal data Urine Culture  Order: [id number]  Collected:  9/2/2021  5:52 PM Status:  Final result   Visible to patient:  No (scheduled for 9/4/2021  3:03 PM)  Specimen Information: Urine, Midstream         2 Result Notes  Culture Culture in progress       >100,000 CFU/mL Morganella morganiiAbnormal        >100,000 CFU/mL Aerococcus sanguinicolaAbnormal     Aerococcus species is usually susceptible to penicillin, cephalosporins, tetracycline and vancomycin.      Resulting Agency: [facility]   Susceptibility     Morganella morganii     ABEL     Ampicillin >=32.0 ug/mL Resistant 1     Ampicillin/ Sulbactam >=32.0 ug/mL Resistant     Cefazolin >=64.0 ug/mL Resistant 2     Cefepime <=1.0 " ug/mL Susceptible     Ceftazidime <=1.0 ug/mL Susceptible     Ceftriaxone <=1.0 ug/mL Susceptible     Ciprofloxacin <=0.25 ug/mL Susceptible     Gentamicin <=1.0 ug/mL Susceptible     Levofloxacin <=0.12 ug/mL Susceptible     Nitrofurantoin 128.0 ug/mL Resistant     Piperacillin/Tazobactam <=4.0 ug/mL Susceptible     Tobramycin <=1.0 ug/mL Susceptible     Trimethoprim/Sulfamethoxazole <=1/19 ug/mL Susceptible           1 Intrinsically Resistant   2 Cefazolin ABEL breakpoints are for the treatment of uncomplicated urinary tract infections. For the treatment of systemic infections, please contact the laboratory for additional testing.            Specimen Collected: 09/02/21  5:52 PM Last Resulted: 09/04/21  2:03 PM

## 2021-09-09 ENCOUNTER — TELEPHONE (OUTPATIENT)
Dept: FAMILY MEDICINE | Facility: CLINIC | Age: 76
End: 2021-09-09

## 2021-09-09 NOTE — TELEPHONE ENCOUNTER
Reason for call:  Patient reporting a symptom    Symptom or request: Pt  Is finishing antibiotics today for her UTI and is still having symptoms.  Please call patient and advise.    [facility]    Duration (how long have symptoms been present): ongoing    Have you been treated for this before? Yes    Additional comments:     Phone Number patient can be reached at:  Cell number on file:    Telephone Information:   Mobile [phone]     Best Time:  any    Can we leave a detailed message on this number:  YES    Call taken on 9/9/2021 at 7:18 AM by Casandra Dukes

## 2021-09-09 NOTE — TELEPHONE ENCOUNTER
Update on UTI:  Treated with Keflex which was dc'd and started on 5 day course of Cefpdoxine on 9/4/21,completed this AM.  Pt continues to have pressure while trying to urinate, feels like she is not emptying.  No fever.  Is pushing fluids.  Pharm is [facility].  Advise.  Joann

## 2021-09-10 ENCOUNTER — OFFICE VISIT (OUTPATIENT)
Dept: SURGERY | Facility: CLINIC | Age: 76
End: 2021-09-10
Payer: COMMERCIAL

## 2021-09-10 ENCOUNTER — OFFICE VISIT (OUTPATIENT)
Dept: FAMILY MEDICINE | Facility: CLINIC | Age: 76
End: 2021-09-10
Payer: COMMERCIAL

## 2021-09-10 VITALS
OXYGEN SATURATION: 98 % | BODY MASS INDEX: 26.23 KG/M2 | RESPIRATION RATE: 16 BRPM | TEMPERATURE: 97.9 F | WEIGHT: 143.4 LBS | HEART RATE: 76 BPM | DIASTOLIC BLOOD PRESSURE: 72 MMHG | SYSTOLIC BLOOD PRESSURE: 160 MMHG

## 2021-09-10 VITALS
HEIGHT: 62 IN | WEIGHT: 142 LBS | BODY MASS INDEX: 26.13 KG/M2 | TEMPERATURE: 98.1 F | SYSTOLIC BLOOD PRESSURE: 152 MMHG | DIASTOLIC BLOOD PRESSURE: 83 MMHG | HEART RATE: 88 BPM

## 2021-09-10 DIAGNOSIS — R19.4 CHANGE IN BOWEL HABITS: Primary | ICD-10-CM

## 2021-09-10 DIAGNOSIS — R39.9 UTI SYMPTOMS: ICD-10-CM

## 2021-09-10 DIAGNOSIS — N30.01 ACUTE CYSTITIS WITH HEMATURIA: Primary | ICD-10-CM

## 2021-09-10 LAB
ALBUMIN UR-MCNC: 30 MG/DL
APPEARANCE UR: CLEAR
BACTERIA #/AREA URNS HPF: ABNORMAL /HPF
BILIRUB UR QL STRIP: NEGATIVE
COLOR UR AUTO: YELLOW
GLUCOSE UR STRIP-MCNC: NEGATIVE MG/DL
HGB UR QL STRIP: ABNORMAL
KETONES UR STRIP-MCNC: ABNORMAL MG/DL
LEUKOCYTE ESTERASE UR QL STRIP: ABNORMAL
MUCOUS THREADS #/AREA URNS LPF: PRESENT /LPF
NITRATE UR QL: NEGATIVE
PH UR STRIP: 6.5 [PH] (ref 5–7)
RBC #/AREA URNS AUTO: ABNORMAL /HPF
SP GR UR STRIP: 1.02 (ref 1–1.03)
SQUAMOUS #/AREA URNS AUTO: ABNORMAL /LPF
UROBILINOGEN UR STRIP-ACNC: 0.2 E.U./DL
WBC #/AREA URNS AUTO: ABNORMAL /HPF

## 2021-09-10 PROCEDURE — 87088 URINE BACTERIA CULTURE: CPT | Performed by: FAMILY MEDICINE

## 2021-09-10 PROCEDURE — 87086 URINE CULTURE/COLONY COUNT: CPT | Performed by: FAMILY MEDICINE

## 2021-09-10 PROCEDURE — 87186 SC STD MICRODIL/AGAR DIL: CPT | Performed by: FAMILY MEDICINE

## 2021-09-10 PROCEDURE — 99202 OFFICE O/P NEW SF 15 MIN: CPT | Performed by: SURGERY

## 2021-09-10 PROCEDURE — 99214 OFFICE O/P EST MOD 30 MIN: CPT | Performed by: FAMILY MEDICINE

## 2021-09-10 PROCEDURE — 81001 URINALYSIS AUTO W/SCOPE: CPT | Performed by: FAMILY MEDICINE

## 2021-09-10 RX ORDER — SULFAMETHOXAZOLE/TRIMETHOPRIM 800-160 MG
1 TABLET ORAL 2 TIMES DAILY
Qty: 14 TABLET | Refills: 0 | Status: SHIPPED | OUTPATIENT
Start: 2021-09-10 | End: 2021-09-17

## 2021-09-10 ASSESSMENT — PAIN SCALES - GENERAL: PAINLEVEL: NO PAIN (0)

## 2021-09-10 ASSESSMENT — MIFFLIN-ST. JEOR: SCORE: 1087.36

## 2021-09-10 NOTE — PROGRESS NOTES
"    Assessment & Plan     UTI symptoms  - UA macro with reflex to Microscopic and Culture - Clinc Collect  - Urine Microscopic  - Urine Culture    Acute cystitis with hematuria  Likely unresolved infection due to incomplete emptying and constipation  Plan to switch to bactrim which the infection is sensitive to.  Urology if not improving with emptying.  - sulfamethoxazole-trimethoprim (BACTRIM DS) 800-160 MG tablet  Dispense: 14 tablet; Refill: 0  - Adult Urology Referral       BMI:   Estimated body mass index is 26.23 kg/m  as calculated from the following:    Height as of an earlier encounter on 9/10/21: 1.575 m (5' 2\").    Weight as of this encounter: 65 kg (143 lb 6.4 oz).     See Patient Instructions    No follow-ups on file.    Nghia Zarco MD  [facility]    Jeni Raygoza is a 75 year old who presents for the following health issues  accompanied by her Daughter in law:    HPI     ED/UC Followup:    Facility:  [facility] ED  Date of visit: 9/2/2021  Reason for visit: UTI  Current Status: Patient was put on Cefpodoxime and that started to help. Patient states she took her last dose yesterday. Patient states she has a lot of pressure and is not able to empty all the way. Feels like she has a full bladder but can only urinate a little bit.         Review of Systems   Constitutional, HEENT, cardiovascular, pulmonary, gi and gu systems are negative, except as otherwise noted.      Objective    BP (!) 160/72   Pulse 76   Temp 97.9  F (36.6  C) (Tympanic)   Resp 16   Wt 65 kg (143 lb 6.4 oz)   LMP 01/01/1992   SpO2 98%   BMI 26.23 kg/m    Body mass index is 26.23 kg/m .  Physical Exam   GENERAL: healthy, alert and no distress  CV: regular rate and rhythm, normal S1 S2, no S3 or S4, no murmur, click or rub, no peripheral edema and peripheral pulses strong  ABDOMEN: soft, nontender, no hepatosplenomegaly, no masses and bowel sounds normal  BACK: no CVA " tenderness, no paralumbar tenderness    Results for orders placed or performed in visit on 09/10/21   UA macro with reflex to Microscopic and Culture - Clinc Collect     Status: Abnormal    Specimen: Urine, Clean Catch   Result Value Ref Range    Color Urine Yellow Colorless, Straw, Light Yellow, Yellow    Appearance Urine Clear Clear    Glucose Urine Negative Negative mg/dL    Bilirubin Urine Negative Negative    Ketones Urine Trace (A) Negative mg/dL    Specific Gravity Urine 1.025 1.003 - 1.035    Blood Urine Moderate (A) Negative    pH Urine 6.5 5.0 - 7.0    Protein Albumin Urine 30  (A) Negative mg/dL    Urobilinogen Urine 0.2 0.2, 1.0 E.U./dL    Nitrite Urine Negative Negative    Leukocyte Esterase Urine Small (A) Negative   Urine Microscopic     Status: Abnormal   Result Value Ref Range    Bacteria Urine Moderate (A) None Seen /HPF    RBC Urine 10-25 (A) 0-2 /HPF /HPF    WBC Urine 10-25 (A) 0-5 /HPF /HPF    Squamous Epithelials Urine Few (A) None Seen /LPF    Mucus Urine Present (A) None Seen /LPF

## 2021-09-10 NOTE — PATIENT INSTRUCTIONS
1. Stop Miralax and Lactulose.    2. Start Metamucil, one glass daily.    3. If BMs still soft, not well formed, add Imodium, one tablet daily.  Increase to 2 tablets if necessary.     4. Return to see me in 3-4 weeks.      Idalmis to follow up with Primary Care provider regarding elevated blood pressure.

## 2021-09-10 NOTE — PROGRESS NOTES
75-year-old female with longstanding history of constipation recently failed a colonoscopy prep as she was unable to finish drinking the solution and especially the magnesium citrate.  Just last week she was seen in the emergency room being unable to urinate or pass bowel movements.  Patient reports feeling a tightness in and around her anus.  She was started on Jose Luis enema which she just finished.  She was also started on lactulose and takes MiraLAX daily.  She reports her bowel movements are very soft.  She reports her hemorrhoids are flaring up.  She has never taken fiber supplements although she does report eating a high-fiber diet.  Patient is due for colonoscopy.    Assessment and plan: 75-year-old female with changes in bowel habits.  Advised patient since she is having soft bowel movements and a flareup of her hemorrhoids, now she should stop the MiraLAX and lactulose and add only Metamucil daily.  1 glass.  We discussed how to take this.  If after about a week or so she still having loose bowel movements she can add 1 or 2 Imodium's daily to try to normalize these.  She will follow-up again with me in 3 to 4 weeks.    Etienne Killian MD

## 2021-09-10 NOTE — PATIENT INSTRUCTIONS
Switch to a different antibiotic called Bactrim 1 pill twice a day for 7 days.    If not improving over the weekend with emptying the bladder then I need you to make an appt with Urology specialist, [phone]    If any worsening, vomiting, severe abdominal pain, dehydration, unable to empty the bladder at all then to the ER.    Patient Education     Understanding Urinary Tract Infections (UTIs)   Most UTIs are caused by bacteria, but they may also be caused by viruses or fungi. Bacteria from the bowel are the most common source of infection. The infection may start because of any of the following:     Sexual activity.  During sex, bacteria can travel from the penis, vagina, or rectum into the urethra.     Bacteria outside the rectum getting into the urethra.  Bacteria on the skin outside the rectum may travel into the urethra. This is more common in women since the rectum and urethra are closer to each other than in men. Wiping from front to back after using the toilet and keeping the area clean can help prevent germs from getting to the urethra.    Blocked urine flow through the urinary tract. If urine sits too long, germs may start to grow out of control.  Parts of the urinary tract  The infection can occur in any part of the urinary tract.       The kidneys. These organs collect and store urine.    The ureters. These tubes carry urine from the kidneys to the bladder.    The bladder. This holds urine until you are ready to let it out.    The urethra. This tube carries urine from the bladder out of the body. It is shorter in women, so bacteria can move through it more easily. The urethra is longer in men, so a UTI is less likely to reach the bladder or kidneys in men.  [employer] last reviewed this educational content on 1/1/2020 2000-2021 [employer]. All rights reserved. This information is not intended as a substitute for professional medical care. Always follow your healthcare professional's  instructions.

## 2021-09-10 NOTE — LETTER
9/10/2021         RE: Idalmis Abdul  [address]  [facility] [address]        Dear Colleague,    Thank you for referring your patient, Idalmis Abdul, to the [facility]. Please see a copy of my visit note below.    75-year-old female with longstanding history of constipation recently failed a colonoscopy prep as she was unable to finish drinking the solution and especially the magnesium citrate.  Just last week she was seen in the emergency room being unable to urinate or pass bowel movements.  Patient reports feeling a tightness in and around her anus.  She was started on Jose Luis enema which she just finished.  She was also started on lactulose and takes MiraLAX daily.  She reports her bowel movements are very soft.  She reports her hemorrhoids are flaring up.  She has never taken fiber supplements although she does report eating a high-fiber diet.  Patient is due for colonoscopy.    Assessment and plan: 75-year-old female with changes in bowel habits.  Advised patient since she is having soft bowel movements and a flareup of her hemorrhoids, now she should stop the MiraLAX and lactulose and add only Metamucil daily.  1 glass.  We discussed how to take this.  If after about a week or so she still having loose bowel movements she can add 1 or 2 Imodium's daily to try to normalize these.  She will follow-up again with me in 3 to 4 weeks.    Etienne Killian MD       Again, thank you for allowing me to participate in the care of your patient.        Sincerely,        Etienne Killian MD

## 2021-09-10 NOTE — NURSING NOTE
"Initial BP (!) 152/83 (BP Location: Right arm, Patient Position: Sitting, Cuff Size: Adult Regular)   Pulse 88   Temp 98.1  F (36.7  C) (Tympanic)   Ht 1.575 m (5' 2\")   Wt 64.4 kg (142 lb)   LMP 01/01/1992   BMI 25.97 kg/m   Estimated body mass index is 25.97 kg/m  as calculated from the following:    Height as of this encounter: 1.575 m (5' 2\").    Weight as of this encounter: 64.4 kg (142 lb). .    Nadine Barrios LPN on 9/10/2021 at 8:58 AM    "

## 2021-09-13 LAB — BACTERIA UR CULT: ABNORMAL

## 2021-09-13 RX ORDER — AMOXICILLIN 500 MG/1
500 CAPSULE ORAL 3 TIMES DAILY
Qty: 21 CAPSULE | Refills: 0 | Status: SHIPPED | OUTPATIENT
Start: 2021-09-13 | End: 2021-09-20

## 2021-09-20 ENCOUNTER — TELEPHONE (OUTPATIENT)
Dept: SURGERY | Facility: CLINIC | Age: 76
End: 2021-09-20

## 2021-09-20 NOTE — TELEPHONE ENCOUNTER
Discussed with Dr. Killian   Patient to increase fiber to 1 heaping tablespoon  Can add in miralax if needed, but ideally would like to transition to metamucil only.     Patient called. Aware of plan. No further questions. Reviewed with patient when to go to ED/UC for s/s of bowel obstruction    Ginger GOODMAN RN   [facility]

## 2021-09-20 NOTE — TELEPHONE ENCOUNTER
Patient called. LOV 9/10. Patient stopped MiraLAX and lactulose. Started 1 heaping tsp of Metamucil.   States constipation is worsening. 3 days w/ minimal stool output. Is passing gas with straining only. Small amount of blood noted with wiping- patient has hx of hemorrhoids.     Please advise. Patient unsure if she should restart previous meds, do enema or suppository, or increase Metamucil?     Thank you,   Ginger GOODMAN RN   [facility]

## 2021-09-23 ENCOUNTER — OFFICE VISIT (OUTPATIENT)
Dept: ENDOCRINOLOGY | Facility: CLINIC | Age: 76
End: 2021-09-23
Payer: COMMERCIAL

## 2021-09-23 VITALS
DIASTOLIC BLOOD PRESSURE: 70 MMHG | BODY MASS INDEX: 26.31 KG/M2 | SYSTOLIC BLOOD PRESSURE: 125 MMHG | RESPIRATION RATE: 16 BRPM | WEIGHT: 143 LBS | HEIGHT: 62 IN | HEART RATE: 80 BPM

## 2021-09-23 DIAGNOSIS — E05.90 HYPERTHYROIDISM: ICD-10-CM

## 2021-09-23 DIAGNOSIS — E05.00 GRAVES' DISEASE: Primary | ICD-10-CM

## 2021-09-23 DIAGNOSIS — E04.1 THYROID NODULE: ICD-10-CM

## 2021-09-23 PROCEDURE — 99214 OFFICE O/P EST MOD 30 MIN: CPT | Performed by: INTERNAL MEDICINE

## 2021-09-23 ASSESSMENT — MIFFLIN-ST. JEOR: SCORE: 1091.89

## 2021-09-23 NOTE — NURSING NOTE
"Chief Complaint   Patient presents with     Thyroid Disease     Follow Up       Initial /70 (BP Location: Left arm, Patient Position: Sitting, Cuff Size: Adult Regular)   Pulse 80   Resp 16   Ht 1.575 m (5' 2\")   Wt 64.9 kg (143 lb)   LMP 01/01/1992   BMI 26.16 kg/m   Estimated body mass index is 26.16 kg/m  as calculated from the following:    Height as of this encounter: 1.575 m (5' 2\").    Weight as of this encounter: 64.9 kg (143 lb).  BP completed using cuff size: regular  Medications and allergies reviewed.      Yael BROWN MA    "

## 2021-09-23 NOTE — PATIENT INSTRUCTIONS
-No change to methimazole.   -Labs in 6 and 12 months.   -Repeat ultrasound in summer 2022.   -See me in 1 year.

## 2021-09-23 NOTE — PROGRESS NOTES
"S:   Pt being seen in f/u for Graves'.  Patient was previously seen by Dr Antoine.   \"Thyroid dysfunction: Briefly, Ms. Abdul reported thyroid function studies were abnormal since August 2013. The patient was initially seen by Dr. Rama Coburn (February 2014).  Hyperthyroidism was confirmed, and a thyroid uptake and scan prior to starting therapy showed an homogenous uptake of 19%.  This has been managed with methimazole.  She is currently on methimazole 2.5 mg daily.  She has been on this same dose since February 14, 2018.  She feels well and has no symptoms suggestive of hypo-or hyperthyroidism. She denies palpitations, tremors or changes in her bowel movements. She has chronic constipation, likely related to chronic oxycodone use (for back pain). She takes senna docusate to help with her constipation. She had eye surgery in 7/2016, and that has helped with her diplopia.   I have previously reviewed the patient's medical records, that showed that Ms. Abdul actually had a suppressed TSH and a positive TSI in June 2012.  She reports she was at the hospital at that time due to heart issues (atrial fibrillation).  It seems the patient was seen by the endocrinology consult team at that time, but she didn't have any follow-up as an outpatient. \"    US Thyroid:  EXAMINATION: US THYROID, 1/9/2018 9:30 AM      COMPARISON: 2/5/2014, 6/18/2012, 11/9/2007.     HISTORY: Hyperthyroidism     Technique: Grayscale and color ultrasound imaging of the thyroid was performed.     Findings:    Thyroid parenchyma: Several thyroid nodules described below, the thyroid parenchyma is otherwise homogeneous and unremarkable.  The right lobe of the thyroid measures: 2.1 x 2.2 x 5.7 cm   The thyroid isthmus measures: 0.3 cm   The left lobe of the thyroid measures: 1.6 x 1.6 x 3.8 cm      Right lobe:  Nodule 1: mid  Nodule measurement: 0.5 x 0.3 x 0.6 cm  Echogenicity: Hypoechoic  Consistency: Cystic/spongiform  Calcifications: " no  Hypervascular: no  Interval growth (>20%): no     Nodule 2: Inferior  Nodule measurement: 1.5 x 1.8 x 2.1 cm  Echogenicity: Heterogeneous, predominantly isoechoic  Consistency: Mostly solid  Calcifications: no  Hypervascular: yes  Interval growth (>20%): Yes (previously 2.0 x 1.3 x 1.1 cm).     Isthmus:   No nodules identified.     Left Lobe:   Nodule 1: Superior  Nodule measurement: 0.5 x 0.3 x 0.7 cm  Echogenicity: Heterogeneous, predominantly hypoechoic  Consistency: spongiform  Calcifications: no  Hypervascular: Minimal internal vascularity  Interval growth (>20%): NA     Nodule 2: Mid  Nodule measurement: 0.3 x 0.2 x 0.4 cm  Echogenicity: Heterogeneous, predominantly hypoechoic  Consistency: spongiform  Calcifications: no  Hypervascular: no  Interval growth (>20%): NA     Nodule 3: Mid to inferior posteriorly  Nodule measurement: 0.5 x 0.3 x 1.0 cm  Echogenicity: Hypoechoic  Consistency: Cystic/spongiform  Calcifications: no  Hypervascular: no  Interval growth (>20%): NA     Impression:  Several thyroid nodules as described above, the largest of which is exophytic along the posterior and inferior right thyroid lobe and measures up to 2.1 cm. This appears increased compared to 2014 and not significantly changed from 7/7/2017 CT given differences in technique. Consider further evaluation with fine-needle aspiration.          US THYROID 2/5/2014 10:06 AM    HISTORY: Hyperthyroid.    COMPARISON: None.    FINDINGS: The right lobe of the thyroid gland measures 5.6 x 1.4 x 1.5 cm. The left lobe of the thyroid gland measures 3.5 x 1.6 x 1.5 cm. The isthmus measures 0.2 cm.  There is a somewhat ill-defined hypoechoic nodule in the mid to upper pole of the right lobe of the thyroid gland that measures 0.4 x 0.4 x 0.3 cm. This has calcifications within it. There is a predominantly solid nodule at the posterior margin of the lower pole of the right lobe of the thyroid gland that measures 2.0 x 1.3 x 1.1 cm. This nodule  shows no significant change in size in retrospect from prior chest CTs dating back to 9/11/2012. This stability is supportive evidence for a benign entity.     Uptake and Scan: 2/28/2014  The uptake at 24 hours was measured at 19 %. The normal range at 24 hours is from 10 to 30%. Uptake on right: 12 %, Uptake on Left: 6.8 %.    Total computer estimated weight of the gland: 44.8 kg, Right gland weight: 29.9 kg, Left gland weight: 14.9 kg.    Images of the gland demonstrates normal appearance of the right and left lobes. No additional findings. No autonomous nodules were identified.     Copath Report 02/07/2018 10:54 AM          Patient Name: PIEDAD FAN   MR#: [id number]   Specimen #: [id number]   Collected: 2/7/2018   Received: 2/7/2018   Reported: 2/8/2018 10:13   Ordering Phy(s): EVELYN NEGRETE     For improved result formatting, select 'View Enhanced Report Format' under  Linked Documents section.     SPECIMEN/STAIN PROCESS:   FNA-thyroid, Right Inferior (1.5x1.8x2.1 cm)        Pap-Cyto x 3, Diff Quick Stain-cyto x 3     ----------------------------------------------------------------     CYTOLOGIC INTERPRETATION:     Thyroid, Right Inferior, Ultrasound-Guided Fine Needle Aspiration:   Benign   Consistent with a benign nodule (includes adenomatoid nodule, colloid nodule, etc.)   Specimen Adequacy: Satisfactory for evaluation.     The [place] implied risk of malignancy and recommended clinical management:   Benign has a 0-3% risk of malignancy, recommended management is clinical follow-up     I have personally reviewed all specimens and/or slides, including the listed special stains, and used them   with my medical judgement to determine or confirm the final diagnosis.        She continues 2.5 mg of methimazole daily.     Occasional sense of tightness in her throat that last 10 seconds at a time.   Occurs when she has nausea and thus she has attributed this to heart burn.     She continues to  "have insomnia. Improved with taking melatonin.   Occasional tremors which have no clear pattern to their occurrence.     ELIZALDE she attributes to COPD. This has gotten worse over the last year.     ROS: 10 point ROS neg other than the symptoms noted above in the HPI.    O:  Vital signs:      BP: 125/70 Pulse: 80   Resp: 16       Height: 157.5 cm (5' 2\") Weight: 64.9 kg (143 lb)  Estimated body mass index is 26.16 kg/m  as calculated from the following:    Height as of this encounter: 1.575 m (5' 2\").    Weight as of this encounter: 64.9 kg (143 lb).  Gen: In NAD.   HEENT: no proptosis or lid lag, EOMI, thyroid enlarged R>L.   Card: S1 S2 RRR no m/r/g.  Pulm: CTA b/l.   GI: NT ND +BS.   Ext: no LE edema.   Neuro: no tremor, +2 DTR's.      A/P:   Graves' - Her uptake and scan from 2014 was normal. History of +TSI from 2012. Repeat TSI in 11/2019 was still positive. She has ELOISA and had strabismus surgery in 2016. We discussed I 131, methimazole, and surgery.   TSH stable in 9/2021. She would like to continue on methimazole.   -No change to methimazole.   -Labs in 6 and 12 months.       Thyroid nodules - I have reviewed the natural history of thyroid nodules and thyroid cancer with the patient. FNA of the right inferior nodule was benign on 2/7/18.   Stable in 8/2020.   -Repeat ultrasound in summer 2022.     Federico Yang MD on 9/23/2021 at 11:33 AM            "

## 2021-09-23 NOTE — LETTER
"    9/23/2021         RE: Idalmis Abdul  [address]  [facility] [address]        Dear Colleague,    Thank you for referring your patient, Idalmis Abdul, to the [facility] ENDOCRINOLOGY. Please see a copy of my visit note below.    S:   Pt being seen in f/u for Graves'.  Patient was previously seen by Dr Antoine.   \"Thyroid dysfunction: Briefly, Ms. Abdul reported thyroid function studies were abnormal since August 2013. The patient was initially seen by Dr. Rama Coburn (February 2014).  Hyperthyroidism was confirmed, and a thyroid uptake and scan prior to starting therapy showed an homogenous uptake of 19%.  This has been managed with methimazole.  She is currently on methimazole 2.5 mg daily.  She has been on this same dose since February 14, 2018.  She feels well and has no symptoms suggestive of hypo-or hyperthyroidism. She denies palpitations, tremors or changes in her bowel movements. She has chronic constipation, likely related to chronic oxycodone use (for back pain). She takes senna docusate to help with her constipation. She had eye surgery in 7/2016, and that has helped with her diplopia.   I have previously reviewed the patient's medical records, that showed that Ms. Abdul actually had a suppressed TSH and a positive TSI in June 2012.  She reports she was at the hospital at that time due to heart issues (atrial fibrillation).  It seems the patient was seen by the endocrinology consult team at that time, but she didn't have any follow-up as an outpatient. \"    US Thyroid:  EXAMINATION: US THYROID, 1/9/2018 9:30 AM      COMPARISON: 2/5/2014, 6/18/2012, 11/9/2007.     HISTORY: Hyperthyroidism     Technique: Grayscale and color ultrasound imaging of the thyroid was performed.     Findings:    Thyroid parenchyma: Several thyroid nodules described below, the thyroid parenchyma is otherwise homogeneous and unremarkable.  The right lobe of the thyroid measures: 2.1 x 2.2 x 5.7 " cm   The thyroid isthmus measures: 0.3 cm   The left lobe of the thyroid measures: 1.6 x 1.6 x 3.8 cm      Right lobe:  Nodule 1: mid  Nodule measurement: 0.5 x 0.3 x 0.6 cm  Echogenicity: Hypoechoic  Consistency: Cystic/spongiform  Calcifications: no  Hypervascular: no  Interval growth (>20%): no     Nodule 2: Inferior  Nodule measurement: 1.5 x 1.8 x 2.1 cm  Echogenicity: Heterogeneous, predominantly isoechoic  Consistency: Mostly solid  Calcifications: no  Hypervascular: yes  Interval growth (>20%): Yes (previously 2.0 x 1.3 x 1.1 cm).     Isthmus:   No nodules identified.     Left Lobe:   Nodule 1: Superior  Nodule measurement: 0.5 x 0.3 x 0.7 cm  Echogenicity: Heterogeneous, predominantly hypoechoic  Consistency: spongiform  Calcifications: no  Hypervascular: Minimal internal vascularity  Interval growth (>20%): NA     Nodule 2: Mid  Nodule measurement: 0.3 x 0.2 x 0.4 cm  Echogenicity: Heterogeneous, predominantly hypoechoic  Consistency: spongiform  Calcifications: no  Hypervascular: no  Interval growth (>20%): NA     Nodule 3: Mid to inferior posteriorly  Nodule measurement: 0.5 x 0.3 x 1.0 cm  Echogenicity: Hypoechoic  Consistency: Cystic/spongiform  Calcifications: no  Hypervascular: no  Interval growth (>20%): NA     Impression:  Several thyroid nodules as described above, the largest of which is exophytic along the posterior and inferior right thyroid lobe and measures up to 2.1 cm. This appears increased compared to 2014 and not significantly changed from 7/7/2017 CT given differences in technique. Consider further evaluation with fine-needle aspiration.          US THYROID 2/5/2014 10:06 AM    HISTORY: Hyperthyroid.    COMPARISON: None.    FINDINGS: The right lobe of the thyroid gland measures 5.6 x 1.4 x 1.5 cm. The left lobe of the thyroid gland measures 3.5 x 1.6 x 1.5 cm. The isthmus measures 0.2 cm.  There is a somewhat ill-defined hypoechoic nodule in the mid to upper pole of the right lobe of  the thyroid gland that measures 0.4 x 0.4 x 0.3 cm. This has calcifications within it. There is a predominantly solid nodule at the posterior margin of the lower pole of the right lobe of the thyroid gland that measures 2.0 x 1.3 x 1.1 cm. This nodule shows no significant change in size in retrospect from prior chest CTs dating back to 9/11/2012. This stability is supportive evidence for a benign entity.     Uptake and Scan: 2/28/2014  The uptake at 24 hours was measured at 19 %. The normal range at 24 hours is from 10 to 30%. Uptake on right: 12 %, Uptake on Left: 6.8 %.    Total computer estimated weight of the gland: 44.8 kg, Right gland weight: 29.9 kg, Left gland weight: 14.9 kg.    Images of the gland demonstrates normal appearance of the right and left lobes. No additional findings. No autonomous nodules were identified.     Copath Report 02/07/2018 10:54 AM          Patient Name: PIEDAD FAN   MR#: [id number]   Specimen #: [id number]   Collected: 2/7/2018   Received: 2/7/2018   Reported: 2/8/2018 10:13   Ordering Phy(s): EVELYN NEGRETE     For improved result formatting, select 'View Enhanced Report Format' under  Linked Documents section.     SPECIMEN/STAIN PROCESS:   FNA-thyroid, Right Inferior (1.5x1.8x2.1 cm)        Pap-Cyto x 3, Diff Quick Stain-cyto x 3     ----------------------------------------------------------------     CYTOLOGIC INTERPRETATION:     Thyroid, Right Inferior, Ultrasound-Guided Fine Needle Aspiration:   Benign   Consistent with a benign nodule (includes adenomatoid nodule, colloid nodule, etc.)   Specimen Adequacy: Satisfactory for evaluation.     The [place] implied risk of malignancy and recommended clinical management:   Benign has a 0-3% risk of malignancy, recommended management is clinical follow-up     I have personally reviewed all specimens and/or slides, including the listed special stains, and used them   with my medical judgement to determine or  "confirm the final diagnosis.        She continues 2.5 mg of methimazole daily.     Occasional sense of tightness in her throat that last 10 seconds at a time.   Occurs when she has nausea and thus she has attributed this to heart burn.     She continues to have insomnia. Improved with taking melatonin.   Occasional tremors which have no clear pattern to their occurrence.     ELIZALDE she attributes to COPD. This has gotten worse over the last year.     ROS: 10 point ROS neg other than the symptoms noted above in the HPI.    O:  Vital signs:      BP: 125/70 Pulse: 80   Resp: 16       Height: 157.5 cm (5' 2\") Weight: 64.9 kg (143 lb)  Estimated body mass index is 26.16 kg/m  as calculated from the following:    Height as of this encounter: 1.575 m (5' 2\").    Weight as of this encounter: 64.9 kg (143 lb).  Gen: In NAD.   HEENT: no proptosis or lid lag, EOMI, thyroid enlarged R>L.   Card: S1 S2 RRR no m/r/g.  Pulm: CTA b/l.   GI: NT ND +BS.   Ext: no LE edema.   Neuro: no tremor, +2 DTR's.      A/P:   Graves' - Her uptake and scan from 2014 was normal. History of +TSI from 2012. Repeat TSI in 11/2019 was still positive. She has ELOISA and had strabismus surgery in 2016. We discussed I 131, methimazole, and surgery.   TSH stable in 9/2021. She would like to continue on methimazole.   -No change to methimazole.   -Labs in 6 and 12 months.       Thyroid nodules - I have reviewed the natural history of thyroid nodules and thyroid cancer with the patient. FNA of the right inferior nodule was benign on 2/7/18.   Stable in 8/2020.   -Repeat ultrasound in summer 2022.     Federico Yang MD on 9/23/2021 at 11:33 AM                Again, thank you for allowing me to participate in the care of your patient.        Sincerely,        Federico Yang MD    "

## 2021-09-24 ENCOUNTER — OFFICE VISIT (OUTPATIENT)
Dept: FAMILY MEDICINE | Facility: CLINIC | Age: 76
End: 2021-09-24
Payer: COMMERCIAL

## 2021-09-24 VITALS
HEIGHT: 62 IN | RESPIRATION RATE: 12 BRPM | BODY MASS INDEX: 27.12 KG/M2 | TEMPERATURE: 97.3 F | WEIGHT: 147.4 LBS | OXYGEN SATURATION: 95 % | DIASTOLIC BLOOD PRESSURE: 78 MMHG | HEART RATE: 77 BPM | SYSTOLIC BLOOD PRESSURE: 138 MMHG

## 2021-09-24 DIAGNOSIS — R30.0 DYSURIA: Primary | ICD-10-CM

## 2021-09-24 DIAGNOSIS — N39.0 RECURRENT UTI: ICD-10-CM

## 2021-09-24 DIAGNOSIS — K59.09 CHRONIC CONSTIPATION: ICD-10-CM

## 2021-09-24 LAB
ALBUMIN UR-MCNC: NEGATIVE MG/DL
APPEARANCE UR: CLEAR
BILIRUB UR QL STRIP: NEGATIVE
COLOR UR AUTO: YELLOW
GLUCOSE UR STRIP-MCNC: NEGATIVE MG/DL
HGB UR QL STRIP: ABNORMAL
KETONES UR STRIP-MCNC: NEGATIVE MG/DL
LEUKOCYTE ESTERASE UR QL STRIP: NEGATIVE
NITRATE UR QL: NEGATIVE
PH UR STRIP: 7 [PH] (ref 5–7)
RBC #/AREA URNS AUTO: NORMAL /HPF
SP GR UR STRIP: 1.01 (ref 1–1.03)
UROBILINOGEN UR STRIP-ACNC: 0.2 E.U./DL
WBC #/AREA URNS AUTO: NORMAL /HPF

## 2021-09-24 PROCEDURE — 99213 OFFICE O/P EST LOW 20 MIN: CPT | Performed by: FAMILY MEDICINE

## 2021-09-24 PROCEDURE — 81001 URINALYSIS AUTO W/SCOPE: CPT | Performed by: FAMILY MEDICINE

## 2021-09-24 ASSESSMENT — MIFFLIN-ST. JEOR: SCORE: 1111.85

## 2021-09-24 NOTE — PROGRESS NOTES
"    Assessment & Plan     Dysuria   no evidence of UTI today, urine is normal.     - UA macro with reflex to Microscopic and Culture - Clinc Collect  - Urine Microscopic    Recurrent UTI   seeing Dr. Garcia next week, suspect she's having some atrophic vaginitis but she declined a vaginal exam today by me stating she'd rather just \"do this once\"  Also suspect she has some degree of incomplete emptying, we do not have a bladder scan in clinic.      Chronic constipation  Continue metamucil  Increase miralax to twice daily and titrate as needed         BMI:   Estimated body mass index is 26.96 kg/m  as calculated from the following:    Height as of this encounter: 1.575 m (5' 2\").    Weight as of this encounter: 66.9 kg (147 lb 6.4 oz).           No follow-ups on file.    Ale Ordaz MD  [facility]   Idalmis is a 75 year old who presents for the following health issues     HPI     Genitourinary - Female  Onset/Duration: 2 weeks   Description:   Painful urination (Dysuria): YES- burns            Frequency: no  Blood in urine (Hematuria): no  Delay in urine (Hesitency): YES  Intensity: mild  Progression of Symptoms:  improving  Accompanying Signs & Symptoms:  Fever/chills: no  Flank pain: no  Nausea and vomiting: YES- nausea, gets this periodically and feels it might not be related   Vaginal symptoms: none  Abdominal/Pelvic Pain: no  History:   History of frequent UTI s: no  History of kidney stones: no  Sexually Active: no  Possibility of pregnancy: No  Precipitating or alleviating factors: lower kidney function  Therapies tried and outcome: Increase fluid intake        Review of Systems   Constitutional, HEENT, cardiovascular, pulmonary, gi and gu systems are negative, except as otherwise noted.      Objective    /78   Pulse 77   Temp 97.3  F (36.3  C) (Tympanic)   Resp 12   Ht 1.575 m (5' 2\")   Wt 66.9 kg (147 lb 6.4 oz)   LMP 01/01/1992   SpO2 95%   BMI " 26.96 kg/m    Body mass index is 26.96 kg/m .  Physical Exam   GENERAL: healthy, alert, no distress and pale    ABDOMEN: soft, nontender, no hepatosplenomegaly, no masses and bowel sounds normal

## 2021-09-29 ENCOUNTER — OFFICE VISIT (OUTPATIENT)
Dept: UROLOGY | Facility: CLINIC | Age: 76
End: 2021-09-29
Payer: COMMERCIAL

## 2021-09-29 VITALS — SYSTOLIC BLOOD PRESSURE: 178 MMHG | DIASTOLIC BLOOD PRESSURE: 89 MMHG | OXYGEN SATURATION: 97 % | HEART RATE: 82 BPM

## 2021-09-29 DIAGNOSIS — N32.81 OAB (OVERACTIVE BLADDER): ICD-10-CM

## 2021-09-29 DIAGNOSIS — Z87.440 PERSONAL HISTORY OF URINARY TRACT INFECTION: Primary | ICD-10-CM

## 2021-09-29 LAB
ALBUMIN UR-MCNC: NEGATIVE MG/DL
APPEARANCE UR: CLEAR
BACTERIA #/AREA URNS HPF: ABNORMAL /HPF
BILIRUB UR QL STRIP: NEGATIVE
COLOR UR AUTO: YELLOW
GLUCOSE UR STRIP-MCNC: NEGATIVE MG/DL
HGB UR QL STRIP: ABNORMAL
KETONES UR STRIP-MCNC: NEGATIVE MG/DL
LEUKOCYTE ESTERASE UR QL STRIP: NEGATIVE
NITRATE UR QL: NEGATIVE
PH UR STRIP: 7 [PH] (ref 5–7)
RBC #/AREA URNS AUTO: ABNORMAL /HPF
SP GR UR STRIP: 1.01 (ref 1–1.03)
UROBILINOGEN UR STRIP-ACNC: 0.2 E.U./DL
WBC #/AREA URNS AUTO: ABNORMAL /HPF

## 2021-09-29 PROCEDURE — 81001 URINALYSIS AUTO W/SCOPE: CPT | Performed by: UROLOGY

## 2021-09-29 PROCEDURE — 99205 OFFICE O/P NEW HI 60 MIN: CPT | Mod: 25 | Performed by: UROLOGY

## 2021-09-29 PROCEDURE — 52000 CYSTOURETHROSCOPY: CPT | Performed by: UROLOGY

## 2021-09-29 RX ORDER — ESTRADIOL 0.1 MG/G
2 CREAM VAGINAL
Qty: 42.5 G | Refills: 3 | Status: SHIPPED | OUTPATIENT
Start: 2021-09-29 | End: 2022-04-08

## 2021-09-29 NOTE — PROGRESS NOTES
"Idalmis Abdul is a 75 year old female seen in consultation for UTI. Consult from Ale Ordaz.      Pt with recent UTI, now wants to \"introduce myself\" to prevent more. Does not feel as though she has had prior UTI's.    Does suffer from chronic GI issues. Chronic constipation, now improving with titration of Miralax.    Her sister saw us for recurrent UTI; has done \"really, really well\" on topical HRT.     Denies dysuria, gross hematuria, frequency. Nocturia x 3.    Wipes back-to-front. Uses shower.    Denies use of bladder meds, prior  eval, hx bladder surgery.    Hx 2 vag deliveries, hyster. Not on HRT (ever).    Drinks 1 caf coffee, 7-8 [place] (including metamucil). (Did not complete voiding diagram).    Retired.      Past Medical History:   Diagnosis Date     ASCVD (arteriosclerotic cardiovascular disease) 6/14/2012     Benign neoplasm of duodenum, jejunum, and ileum 2003, 2007    Gastrointestinal Stromal Tumor, seen at [facility], Dr. Schmitz, GI oncology-Gleevec treatment.  Surgical removal in fall 2004-no recurrence as of 3/05.     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     Other chronic pain     back     PONV (postoperative nausea and vomiting)      Postsurgical aortocoronary bypass status 7/4/2012     S/P CABG x 4 8/5/2012     Strabismus      Tobacco use disorder     Quit     Unspecified essential hypertension        Past Surgical History:   Procedure Laterality Date     BLEPHAROPLASTY BILATERAL Bilateral 7/17/2019    Procedure: Bilateral Upper Eyelid Blepharoplasty;  Surgeon: Vasile Brasher MD;  [facility]: [facility] OR     BYPASS GRAFT ARTERY CORONARY  6/14/2012    Procedure: BYPASS GRAFT ARTERY CORONARY;  Median Sternotomy Coronary Artery Bypass Graft  x 3        C THORACOSCOPY,DX W BX  fall 2003    benign tissue     CATARACT IOL, RT/LT      LE     CHOLECYSTECTOMY  1963     COLONOSCOPY  4-12-05     CORONARY ARTERY BYPASS      X4     CREATION PERICARDIAL WINDOW  6/15/2012    Procedure: CREATION PERICARDIAL WINDOW;  Mediastinal Exploration, Control of Bleeding;  Surgeon: Dwaine Griffin MD;  [facility] OR     EYE SURGERY  2014    left cataract removal     GI SURGERY  2003 and 2007    GIST tumor removal     GYN SURGERY      tubal ligation     HYSTERECTOMY VAGINAL, COLPORRHAPHY ANTERIOR, POSTERIOR, COMBINED N/A 10/17/2017    Procedure: COMBINED HYSTERECTOMY VAGINAL, COLPORRHAPHY ANTERIOR, POSTERIOR;  Total Vaginal Hysterectomy and Posterior Repair,Sacrospinous Vault Suspension;  Surgeon: Ruth Farooq MD;  Location: [facility] OR     PHACOEMULSIFICATION WITH STANDARD INTRAOCULAR LENS IMPLANT  3/24/2014    Procedure: PHACOEMULSIFICATION WITH STANDARD INTRAOCULAR LENS IMPLANT;  Left Kelman Phacoemulsification with Intraocular Lens Implant;  Surgeon: Magnus Mckeon MD;  Location: [facility] OR     RECESSION RESECTION WITH ADJUSTABLE  SUTURE BILATERAL Bilateral 2016    Procedure: RECESSION RESECTION WITH ADJUSTABLE SUTURE BILATERAL;  Surgeon: Erma Ordaz MD;  Location: [facility] OR     REPAIR ENTROPION Right 2019    Procedure: Right Upper Lid Entropion Repair;  Surgeon: Vasile Brasher MD;  [facility]     REPAIR RETRACTION LID BILATERAL Bilateral 2019    Procedure: Bilateral Upper Eyelid Retraction Repair;  Surgeon: Vasile Brasher MD;  [facility]: [facility] OR     SURGICAL HISTORY OF -       cholecystectomy     SURGICAL HISTORY OF -       Tubal Ligation      SURGICAL HISTORY OF -       Explor. Lap, Excision of Small Bowel Tumor      SURGICAL HISTORY OF -   2010    lung cancer removed right lung       Social History     Socioeconomic History     Marital status:      Spouse name: Not on file     Number of children: Not on file     Years of education: Not on file     Highest education level: Not on file   Occupational History     Not on file   Tobacco Use     Smoking status: Former Smoker     Packs/day: 0.50     Years: 49.00     Pack years: 24.50     Types: Cigarettes     Quit date: 2010     Years since quittin.4     Smokeless tobacco: Never Used   Vaping Use     Vaping Use: Never used   Substance and Sexual Activity     Alcohol use: No     Drug use: No     Sexual activity: Not Currently     Partners: Male     Comment:    Other Topics Concern      Service No     Blood Transfusions No     Caffeine Concern Yes     Comment: 3 cups     Occupational Exposure No     Hobby Hazards No     Sleep Concern No     Stress Concern No     Comment: son  and much happiness in her life, big burden lifted,      Weight Concern No     Special Diet No     Back Care Yes     Comment: lower back herniated disc       Exercise No     Bike Helmet No     Seat Belt Yes     Self-Exams Yes     Parent/sibling w/ CABG, MI or angioplasty before 65F 55M? 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  Son (Rk, with wife Марина and grandson) lives next door -- quadriplegic and high functioning, cannot provide physical assistance/support.     Social Determinants of Health     Financial Resource Strain: Low Risk      Difficulty of Paying Living Expenses: Not very hard   Food Insecurity: No Food Insecurity     Worried About Running Out of Food in the Last Year: Never true     Ran Out of Food in the Last Year: Never true   Transportation Needs: No Transportation Needs     Lack of Transportation (Medical): No     Lack of Transportation (Non-Medical): No   Physical Activity: Inactive     Days of Exercise per Week: 0 days     Minutes of Exercise per Session: 0 min   Stress:      Feeling of Stress :    Social Connections: Socially Isolated     Frequency of Communication with Friends and Family: More than three times a week     Frequency of Social Gatherings with Friends and Family: Twice a week     Attends Restorationist Services: Never     Active Member of Clubs or Organizations: No     Attends Club or Organization Meetings: Never     Marital Status:    Intimate Partner Violence:      Fear of Current or Ex-Partner:      Emotionally Abused:      Physically Abused:      Sexually Abused:        Current Outpatient Medications   Medication Sig Dispense Refill     Blood Pressure Monitoring (ADULT BLOOD PRESSURE CUFF LG) KIT 1 Device 2 times daily 1 kit 1     fluticasone-salmeterol (AIRDUO RESPICLICK) 113-14 MCG/ACT inhaler Inhale 1 puff into the lungs 2 times daily 60 each 11     imatinib (GLEEVEC) 400 MG tablet Take 1 tablet (400 mg) by mouth daily Take with a meal and a large glass of water. 30 tablet 11     melatonin 5 MG tablet Take 5 mg by mouth nightly as needed for sleep       methimazole (TAPAZOLE) 5 MG tablet Take 0.5 tablets (2.5 mg) by mouth daily 45 tablet 1     metoprolol tartrate (LOPRESSOR) 100 MG tablet Take 1 tablet  (100 mg) by mouth 2 times daily 180 tablet 1     psyllium (METAMUCIL/KONSYL) 58.6 % powder Take by mouth daily       rosuvastatin (CRESTOR) 5 MG tablet Taking every other day. (Patient taking differently: Take 5 mg by mouth every other day Taking every other day.) 45 tablet 3     tiotropium (SPIRIVA) 18 MCG inhaled capsule Inhale 1 capsule (18 mcg) into the lungs daily 30 capsule 11       Physical Exam:    GENL: NAD.    ABD: Soft, non-tender, no masses.    EG: Poorly-estrogenized, no masses.    VAGINA: Poorly-estrogenized, no masses.    BN HYPERMOBILITY: Mild.    CYSTOCELE: Grade 1.    APICAL PROLAPSE: Mild.    RECTOCELE: None.    BIMANUAL: No mass or tenderness.    Cysto:    (Informed consent obtained. Pause for cause performed)   Sterile prep.    17 Fr scope inserted through urethra. Systematic examination w 70 degree lens.   PVR: 10 cc   MUCOSA: Normal without lesion   ORIFICES: Normal location and morphology   CAPACITY: 450 cc; no pain with filling   Scope withdrawn without untoward effect.    (Pt tolerated procedure without difficulty).      7/14/21 UA: negative  7/15/21      [facility]: Morganella  7/27/21 CT: proctitis, constipation  9/2/21  UA: positive    [facility]: Aerococcus, Morganella  9/10/21 UA: 10-25 RBC, 10-25 WBC  [facility]: Enterococcus  9/24/21 UA: negative    Today:    Results for orders placed or performed in visit on 09/29/21   UA reflex to Microscopic     Status: Abnormal   Result Value Ref Range    Color Urine Yellow Colorless, Straw, Light Yellow, Yellow    Appearance Urine Clear Clear    Glucose Urine Negative Negative mg/dL    Bilirubin Urine Negative Negative    Ketones Urine Negative Negative mg/dL    Specific Gravity Urine 1.015 1.003 - 1.035    Blood Urine Trace (A) Negative    pH Urine 7.0 5.0 - 7.0    Protein Albumin Urine Negative Negative mg/dL    Urobilinogen Urine 0.2 0.2, 1.0 E.U./dL    Nitrite Urine Negative Negative    Leukocyte Esterase Urine Negative Negative   Urine Microscopic Exam     Status:  Abnormal   Result Value Ref Range    Bacteria Urine None Seen None Seen /HPF    RBC Urine 2-5 (A) 0-2 /HPF /HPF    WBC Urine None Seen 0-5 /HPF /HPF           IMP:  1. Recent UTI, resolved  2. Atrophic vulvovaginitis  3. Chronic constipation, recent proctitis  4. Other medical conditions      PLAN:  1. Discussed situation with patient in detail.  2. Carefully instructed in proper perineal hygiene  3. Stressed importance of constipation regulation; pt expresses understanding  4. Consider topical HRT; discussed in detail rationale, mech of action, potential risk, etc; pt elects trial  5. RTC only PRN  6. Total time spent in reviewing patient records, discussing history, performing exam, discussing diagnosis, outlining treatment plan and documentation: 60 minutes

## 2021-10-13 ENCOUNTER — OFFICE VISIT (OUTPATIENT)
Dept: SURGERY | Facility: CLINIC | Age: 76
End: 2021-10-13
Payer: COMMERCIAL

## 2021-10-13 VITALS
DIASTOLIC BLOOD PRESSURE: 90 MMHG | TEMPERATURE: 99.1 F | SYSTOLIC BLOOD PRESSURE: 186 MMHG | WEIGHT: 147.49 LBS | BODY MASS INDEX: 27.14 KG/M2 | HEIGHT: 62 IN | HEART RATE: 85 BPM

## 2021-10-13 DIAGNOSIS — R19.4 CHANGE IN BOWEL HABITS: Primary | ICD-10-CM

## 2021-10-13 PROCEDURE — 99212 OFFICE O/P EST SF 10 MIN: CPT | Performed by: SURGERY

## 2021-10-13 ASSESSMENT — MIFFLIN-ST. JEOR: SCORE: 1112.25

## 2021-10-13 NOTE — LETTER
10/13/2021         RE: Idalmis Abdul  [address]  [facility] [address]        Dear Colleague,    Thank you for referring your patient, Idalmis Abdul, to the [facility]. Please see a copy of my visit note below.    75-year-old female here for follow-up.  Patient has been on Metamucil daily but still felt constipated.  She added MiraLAX in the morning and still reports occasional loose bowel movements but she still feels like she is constipated.  She has an appointment with [place] [facility] next week.  Until then, recommend adding MiraLAX to twice a day and keeping the Metamucil.  I suspect patient has slow transit constipation and will require some sort of stimulant laxative and I will defer to GI for their recommendations on this.  She is welcome to follow-up with me again as necessary.    Etienne Killian MD       Again, thank you for allowing me to participate in the care of your patient.        Sincerely,        Etienne Killian MD

## 2021-10-13 NOTE — NURSING NOTE
"Initial BP (!) 186/90 (BP Location: Right arm, Patient Position: Sitting, Cuff Size: Adult Large)   Pulse 85   Temp 99.1  F (37.3  C) (Tympanic)   Ht 1.575 m (5' 2\")   Wt 66.9 kg (147 lb 7.8 oz)   LMP 01/01/1992   BMI 26.98 kg/m   Estimated body mass index is 26.98 kg/m  as calculated from the following:    Height as of this encounter: 1.575 m (5' 2\").    Weight as of this encounter: 66.9 kg (147 lb 7.8 oz). .    Ale Cali MA    "

## 2021-10-13 NOTE — PROGRESS NOTES
75-year-old female here for follow-up.  Patient has been on Metamucil daily but still felt constipated.  She added MiraLAX in the morning and still reports occasional loose bowel movements but she still feels like she is constipated.  She has an appointment with [place] [facility] next week.  Until then, recommend adding MiraLAX to twice a day and keeping the Metamucil.  I suspect patient has slow transit constipation and will require some sort of stimulant laxative and I will defer to GI for their recommendations on this.  She is welcome to follow-up with me again as necessary.    Etienne Killian MD

## 2021-10-21 ENCOUNTER — TRANSFERRED RECORDS (OUTPATIENT)
Dept: HEALTH INFORMATION MANAGEMENT | Facility: CLINIC | Age: 76
End: 2021-10-21

## 2021-11-05 ENCOUNTER — LAB (OUTPATIENT)
Dept: LAB | Facility: CLINIC | Age: 76
End: 2021-11-05
Payer: COMMERCIAL

## 2021-11-05 ENCOUNTER — HOSPITAL ENCOUNTER (OUTPATIENT)
Dept: CT IMAGING | Facility: CLINIC | Age: 76
Discharge: HOME | End: 2021-11-05
Attending: NURSE PRACTITIONER | Admitting: NURSE PRACTITIONER
Payer: COMMERCIAL

## 2021-11-05 DIAGNOSIS — E05.00 GRAVES' DISEASE: ICD-10-CM

## 2021-11-05 DIAGNOSIS — C49.A0 GIST (GASTROINTESTINAL STROMAL TUMOR), MALIGNANT (H): ICD-10-CM

## 2021-11-05 DIAGNOSIS — C49.A0 MALIGNANT GASTROINTESTINAL STROMAL TUMOR, UNSPECIFIED SITE (H): ICD-10-CM

## 2021-11-05 LAB
ALBUMIN SERPL-MCNC: 3.1 G/DL (ref 3.4–5)
ALP SERPL-CCNC: 64 U/L (ref 40–150)
ALT SERPL W P-5'-P-CCNC: 24 U/L (ref 0–50)
ANION GAP SERPL CALCULATED.3IONS-SCNC: 9 MMOL/L (ref 3–14)
AST SERPL W P-5'-P-CCNC: 28 U/L (ref 0–45)
BASOPHILS # BLD AUTO: 0 10E3/UL (ref 0–0.2)
BASOPHILS NFR BLD AUTO: 1 %
BILIRUB SERPL-MCNC: 0.3 MG/DL (ref 0.2–1.3)
BUN SERPL-MCNC: 12 MG/DL (ref 7–30)
CALCIUM SERPL-MCNC: 8.7 MG/DL (ref 8.5–10.1)
CHLORIDE BLD-SCNC: 101 MMOL/L (ref 94–109)
CO2 SERPL-SCNC: 25 MMOL/L (ref 20–32)
CREAT SERPL-MCNC: 0.92 MG/DL (ref 0.52–1.04)
EOSINOPHIL # BLD AUTO: 0.1 10E3/UL (ref 0–0.7)
EOSINOPHIL NFR BLD AUTO: 2 %
ERYTHROCYTE [DISTWIDTH] IN BLOOD BY AUTOMATED COUNT: 16.3 % (ref 10–15)
GFR SERPL CREATININE-BSD FRML MDRD: 61 ML/MIN/1.73M2
GLUCOSE BLD-MCNC: 106 MG/DL (ref 70–99)
HCT VFR BLD AUTO: 30.8 % (ref 35–47)
HGB BLD-MCNC: 10.1 G/DL (ref 11.7–15.7)
LYMPHOCYTES # BLD AUTO: 1 10E3/UL (ref 0.8–5.3)
LYMPHOCYTES NFR BLD AUTO: 20 %
MCH RBC QN AUTO: 28.5 PG (ref 26.5–33)
MCHC RBC AUTO-ENTMCNC: 32.8 G/DL (ref 31.5–36.5)
MCV RBC AUTO: 87 FL (ref 78–100)
MONOCYTES # BLD AUTO: 1 10E3/UL (ref 0–1.3)
MONOCYTES NFR BLD AUTO: 20 %
NEUTROPHILS # BLD AUTO: 2.9 10E3/UL (ref 1.6–8.3)
NEUTROPHILS NFR BLD AUTO: 58 %
PLATELET # BLD AUTO: 174 10E3/UL (ref 150–450)
POTASSIUM BLD-SCNC: 3.8 MMOL/L (ref 3.4–5.3)
PROT SERPL-MCNC: 6.5 G/DL (ref 6.8–8.8)
RBC # BLD AUTO: 3.54 10E6/UL (ref 3.8–5.2)
SODIUM SERPL-SCNC: 135 MMOL/L (ref 133–144)
WBC # BLD AUTO: 5 10E3/UL (ref 4–11)

## 2021-11-05 PROCEDURE — 250N000011 HC RX IP 250 OP 636: Performed by: RADIOLOGY

## 2021-11-05 PROCEDURE — 250N000009 HC RX 250: Performed by: RADIOLOGY

## 2021-11-05 PROCEDURE — 80053 COMPREHEN METABOLIC PANEL: CPT

## 2021-11-05 PROCEDURE — 71260 CT THORAX DX C+: CPT

## 2021-11-05 PROCEDURE — 85025 COMPLETE CBC W/AUTO DIFF WBC: CPT

## 2021-11-05 PROCEDURE — 36415 COLL VENOUS BLD VENIPUNCTURE: CPT

## 2021-11-05 RX ORDER — IOPAMIDOL 755 MG/ML
71 INJECTION, SOLUTION INTRAVASCULAR ONCE
Status: COMPLETED | OUTPATIENT
Start: 2021-11-05 | End: 2021-11-05

## 2021-11-05 RX ADMIN — SODIUM CHLORIDE 58 ML: 9 INJECTION, SOLUTION INTRAVENOUS at 12:10

## 2021-11-05 RX ADMIN — IOPAMIDOL 71 ML: 755 INJECTION, SOLUTION INTRAVENOUS at 12:10

## 2021-11-08 ENCOUNTER — TELEPHONE (OUTPATIENT)
Dept: ONCOLOGY | Facility: CLINIC | Age: 76
End: 2021-11-08
Payer: COMMERCIAL

## 2021-11-08 ENCOUNTER — ONCOLOGY VISIT (OUTPATIENT)
Dept: ONCOLOGY | Facility: CLINIC | Age: 76
End: 2021-11-08
Attending: INTERNAL MEDICINE
Payer: COMMERCIAL

## 2021-11-08 VITALS
DIASTOLIC BLOOD PRESSURE: 92 MMHG | SYSTOLIC BLOOD PRESSURE: 215 MMHG | TEMPERATURE: 98.4 F | OXYGEN SATURATION: 98 % | RESPIRATION RATE: 16 BRPM | BODY MASS INDEX: 26.7 KG/M2 | WEIGHT: 146 LBS | HEART RATE: 88 BPM

## 2021-11-08 DIAGNOSIS — E05.00 THYROTOXIC EXOPHTHALMOS: ICD-10-CM

## 2021-11-08 DIAGNOSIS — Z95.1 S/P CABG X 4: ICD-10-CM

## 2021-11-08 DIAGNOSIS — H02.849 EDEMA OF EYELID, UNSPECIFIED LATERALITY: ICD-10-CM

## 2021-11-08 DIAGNOSIS — N18.31 STAGE 3A CHRONIC KIDNEY DISEASE (H): ICD-10-CM

## 2021-11-08 DIAGNOSIS — I25.10 ASCVD (ARTERIOSCLEROTIC CARDIOVASCULAR DISEASE): ICD-10-CM

## 2021-11-08 DIAGNOSIS — C49.A0 MALIGNANT GASTROINTESTINAL STROMAL TUMOR, UNSPECIFIED SITE (H): Primary | ICD-10-CM

## 2021-11-08 DIAGNOSIS — Z85.118 HISTORY OF LUNG CANCER: ICD-10-CM

## 2021-11-08 PROCEDURE — 99214 OFFICE O/P EST MOD 30 MIN: CPT | Performed by: INTERNAL MEDICINE

## 2021-11-08 PROCEDURE — G0463 HOSPITAL OUTPT CLINIC VISIT: HCPCS

## 2021-11-08 ASSESSMENT — PAIN SCALES - GENERAL: PAINLEVEL: NO PAIN (0)

## 2021-11-08 NOTE — CONFIDENTIAL NOTE
Having diarrhea, loose stools since 4 Am this morning. Has had 4 loose stools. Has not taken anything for the loose stools. Advised Imodium 2 tabs now and 1 tab after each subsequent loose stool up to 8 tablets in a day. Also advised that she can wear a pad but that she keep her appointment today with Dr Schmitz.   States she will do as recommended above.

## 2021-11-08 NOTE — LETTER
11/8/2021         RE: Idalmis Abdul  [address]  [facility] [address]      I am seeing Idalmis Abdul today in follow-up of recurrent gastrointestinal stromal tumor.    She is now 75 years old and had her original primary resected more than 15 years ago but had a subsequent recurrence after she had completed her period of adjuvant Gleevec.  She has since been on Gleevec with excellent control of her disease for the past decade.  Her course has been complicated by a stage I lung cancer which was resected in 2010.  She has had problems related to severe atherosclerotic disease and required coronary vascular reconstruction.  She has had significant periorbital/eyelid edema related to her Gleevec as well as exophthalmos related to thyroid disease.  She has had problems more recently with rectal bleeding that is been attributed to hemorrhoids and has not yet been able to organize getting a colonoscopy, but reports the bleeding is resolved since adding fiber.  She returns for ongoing surveillance of the status of her disease.  She reports no new symptoms other than she had some fairly severe diarrhea early today but that seems to have already resolved.  There is no associated nausea/vomiting, fevers or other associated symptoms.  She is not aware of any ill contacts.    On physical exam she appears cheerful but anxious as always.  Her eyelid edema appears stable.  She has no palpable adenopathy.  Her lungs are clear.  Her abdomen is soft nontender without mass organomegaly.  She has no peripheral edema.    I personally reviewed her imaging studies and went over the results with her.  She has no evidence of recurrent tumor.  She has stable mild emphysematous changes in her lungs.    Her lab work shows normal electrolytes and renal function.  Her albumin is marginally depressed at 3.1 and her bilirubin and liver enzymes are normal.  She has mild normocytic anemia and otherwise normal blood  counts.    Assessment/plan:  1.  Metastatic gastrointestinal stromal tumor at present she has complete control of her disease on Gleevec and will continue on with the same dose and schedule.  We will reevaluate her disease status again in another 6 months.  2.  History of stage I lung cancer.  At present she has no evidence of recurrence or of new lung cancers but remains at risk given her prior smoking history.  3.  History of constipation and rectal bleeding.  This seems to have resolved with some dietary interventions but she still needs a follow-up colonoscopy and she is can try again to get that organized.        Blayne Schmitz MD

## 2021-11-15 PROBLEM — N18.30 CKD (CHRONIC KIDNEY DISEASE) STAGE 3, GFR 30-59 ML/MIN (H): Status: RESOLVED | Noted: 2019-05-23 | Resolved: 2021-11-15

## 2021-11-15 PROBLEM — I25.10 CORONARY ATHEROSCLEROSIS: Status: ACTIVE | Noted: 2018-04-05

## 2021-11-15 RX ORDER — LISINOPRIL 40 MG/1
TABLET ORAL
COMMUNITY
Start: 2021-10-05 | End: 2021-12-22

## 2021-11-15 NOTE — PROGRESS NOTES
I am seeing Idalmis Abdul today in follow-up of recurrent gastrointestinal stromal tumor.    She is now 75 years old and had her original primary resected more than 15 years ago but had a subsequent recurrence after she had completed her period of adjuvant Gleevec.  She has since been on Gleevec with excellent control of her disease for the past decade.  Her course has been complicated by a stage I lung cancer which was resected in 2010.  She has had problems related to severe atherosclerotic disease and required coronary vascular reconstruction.  She has had significant periorbital/eyelid edema related to her Gleevec as well as exophthalmos related to thyroid disease.  She has had problems more recently with rectal bleeding that is been attributed to hemorrhoids and has not yet been able to organize getting a colonoscopy, but reports the bleeding is resolved since adding fiber.  She returns for ongoing surveillance of the status of her disease.  She reports no new symptoms other than she had some fairly severe diarrhea early today but that seems to have already resolved.  There is no associated nausea/vomiting, fevers or other associated symptoms.  She is not aware of any ill contacts.    On physical exam she appears cheerful but anxious as always.  Her eyelid edema appears stable.  She has no palpable adenopathy.  Her lungs are clear.  Her abdomen is soft nontender without mass organomegaly.  She has no peripheral edema.    I personally reviewed her imaging studies and went over the results with her.  She has no evidence of recurrent tumor.  She has stable mild emphysematous changes in her lungs.    Her lab work shows normal electrolytes and renal function.  Her albumin is marginally depressed at 3.1 and her bilirubin and liver enzymes are normal.  She has mild normocytic anemia and otherwise normal blood counts.    Assessment/plan:  1.  Metastatic gastrointestinal stromal tumor at present she has complete  control of her disease on Gleevec and will continue on with the same dose and schedule.  We will reevaluate her disease status again in another 6 months.  2.  History of stage I lung cancer.  At present she has no evidence of recurrence or of new lung cancers but remains at risk given her prior smoking history.  3.  History of constipation and rectal bleeding.  This seems to have resolved with some dietary interventions but she still needs a follow-up colonoscopy and she is can try again to get that organized.   Purse String (Intermediate) Text: Given the location of the defect and the characteristics of the surrounding skin a purse string intermediate closure was deemed most appropriate.  Undermining was performed circumfirentially around the surgical defect.  A purse string suture was then placed and tightened.

## 2021-12-08 ENCOUNTER — TRANSFERRED RECORDS (OUTPATIENT)
Dept: HEALTH INFORMATION MANAGEMENT | Facility: CLINIC | Age: 76
End: 2021-12-08
Payer: COMMERCIAL

## 2021-12-22 ENCOUNTER — OFFICE VISIT (OUTPATIENT)
Dept: FAMILY MEDICINE | Facility: CLINIC | Age: 76
End: 2021-12-22
Payer: COMMERCIAL

## 2021-12-22 VITALS
RESPIRATION RATE: 14 BRPM | SYSTOLIC BLOOD PRESSURE: 170 MMHG | DIASTOLIC BLOOD PRESSURE: 92 MMHG | OXYGEN SATURATION: 98 % | BODY MASS INDEX: 26.13 KG/M2 | WEIGHT: 142 LBS | HEIGHT: 62 IN | TEMPERATURE: 97 F | HEART RATE: 79 BPM

## 2021-12-22 DIAGNOSIS — Z23 HIGH PRIORITY FOR 2019-NCOV VACCINE: ICD-10-CM

## 2021-12-22 DIAGNOSIS — Z23 NEED FOR PROPHYLACTIC VACCINATION AND INOCULATION AGAINST INFLUENZA: ICD-10-CM

## 2021-12-22 DIAGNOSIS — I10 HYPERTENSION GOAL BP (BLOOD PRESSURE) < 140/90: Primary | ICD-10-CM

## 2021-12-22 PROCEDURE — 90662 IIV NO PRSV INCREASED AG IM: CPT | Performed by: FAMILY MEDICINE

## 2021-12-22 PROCEDURE — 0064A COVID-19,PF,MODERNA (18+ YRS BOOSTER .25ML): CPT | Performed by: FAMILY MEDICINE

## 2021-12-22 PROCEDURE — G0008 ADMIN INFLUENZA VIRUS VAC: HCPCS | Performed by: FAMILY MEDICINE

## 2021-12-22 PROCEDURE — 99214 OFFICE O/P EST MOD 30 MIN: CPT | Mod: 25 | Performed by: FAMILY MEDICINE

## 2021-12-22 PROCEDURE — 91306 COVID-19,PF,MODERNA (18+ YRS BOOSTER .25ML): CPT | Performed by: FAMILY MEDICINE

## 2021-12-22 ASSESSMENT — MIFFLIN-ST. JEOR: SCORE: 1082.36

## 2021-12-22 ASSESSMENT — PAIN SCALES - GENERAL: PAINLEVEL: NO PAIN (0)

## 2021-12-22 NOTE — PROGRESS NOTES
"  Assessment & Plan     Hypertension goal BP (blood pressure) < 140/90  Uncontrolled.    Restart amlodipine    Recheck blood pressure with RN in 2-3 weeks    High priority for 2019-nCoV vaccine     - COVID-19,PF,MODERNA (18+ Yrs BOOSTER .25mL)    Need for prophylactic vaccination and inoculation against influenza     - INFLUENZA, QUAD, HIGH DOSE, PF, 65YR + (FLUZONE HD)             BMI:   Estimated body mass index is 25.97 kg/m  as calculated from the following:    Height as of this encounter: 1.575 m (5' 2\").    Weight as of this encounter: 64.4 kg (142 lb).           No follow-ups on file.    Ale Ordaz MD  [facility]   Idalmis is a 76 year old who presents for the following health issues     HPI     Hypertension Follow-up      Do you check your blood pressure regularly outside of the clinic? Yes   ER doctor had taken her off of a few blood pressure medications.  Lisinopril, metoprolol, and amlodipine was discontinued    Lisinopril and hydrochlorothiazide were discontinued in April when she was hospitalized.  Amlodipine discontinued in the ER.    Blood pressures elevated     BP Readings from Last 6 Encounters:   12/22/21 (!) 170/92   11/08/21 (!) 215/92   10/13/21 (!) 186/90   09/29/21 (!) 178/89   09/24/21 138/78   09/23/21 125/70           Are you following a low salt diet? No    Are your blood pressures ever more than 140 on the top number (systolic) OR more   than 90 on the bottom number (diastolic), for example 140/90? Yes      How many servings of fruits and vegetables do you eat daily?  2-3    On average, how many sweetened beverages do you drink each day (Examples: soda, juice, sweet tea, etc.  Do NOT count diet or artificially sweetened beverages)?   1    How many days per week do you exercise enough to make your heart beat faster? 3 or less    How many minutes a day do you exercise enough to make your heart beat faster? 9 or less    How many days per week do " "you miss taking your medication? 0      Ear Problem      Description (location/character/radiation): Would like her left ear looked at, hasn't had any earwax come out for a while.  Just want to make sure it isn't blocked.                Review of Systems   Constitutional, HEENT, cardiovascular, pulmonary, gi and gu systems are negative, except as otherwise noted.      Objective    BP (!) 170/92 (BP Location: Right arm, Patient Position: Chair, Cuff Size: Adult Regular)   Pulse 79   Temp 97  F (36.1  C) (Tympanic)   Resp 14   Ht 1.575 m (5' 2\")   Wt 64.4 kg (142 lb)   LMP 01/01/1992   SpO2 98%   BMI 25.97 kg/m    Body mass index is 25.97 kg/m .  Physical Exam   GENERAL: healthy, alert and no distress  NECK: no adenopathy, no asymmetry, masses, or scars and thyroid normal to palpation  RESP: lungs clear to auscultation - no rales, rhonchi or wheezes  CV: regular rate and rhythm, normal S1 S2, no S3 or S4, no murmur, click or rub, no peripheral edema and peripheral pulses strong  ABDOMEN: soft, nontender, no hepatosplenomegaly, no masses and bowel sounds normal  MS: no gross musculoskeletal defects noted, no edema                "

## 2021-12-22 NOTE — PATIENT INSTRUCTIONS
Restart Amlodipine 5 mg    Keep an eye on blood pressure - if routinely >140/90 or less than 100/60 please let me know so we can adjust medication further.     Recheck blood pressure with the nurse in 2-3 weeks.     Ale Ordaz M.D.

## 2021-12-22 NOTE — NURSING NOTE
"Chief Complaint   Patient presents with     Hypertension     Imm/Inj     COVID-19 VACCINE       Initial BP (!) 170/92 (BP Location: Right arm, Patient Position: Chair, Cuff Size: Adult Regular)   Pulse 79   Temp 97  F (36.1  C) (Tympanic)   Resp 14   Ht 1.575 m (5' 2\")   Wt 64.4 kg (142 lb)   LMP 01/01/1992   SpO2 98%   BMI 25.97 kg/m   Estimated body mass index is 25.97 kg/m  as calculated from the following:    Height as of this encounter: 1.575 m (5' 2\").    Weight as of this encounter: 64.4 kg (142 lb).    Patient presents to the clinic using No DME    Health Maintenance that is potentially due pending provider review:  NONE        Is there anyone who you would like to be able to receive your results? No  If yes have patient fill out JENNIFER      "

## 2022-01-10 DIAGNOSIS — E05.90 HYPERTHYROIDISM: ICD-10-CM

## 2022-01-10 NOTE — TELEPHONE ENCOUNTER
Requested Prescriptions   Pending Prescriptions Disp Refills     methimazole (TAPAZOLE) 5 MG tablet 45 tablet 1     Sig: Take 0.5 tablets (2.5 mg) by mouth daily       There is no refill protocol information for this order           Last office visit: 9/23/2021 with prescribing provider:     Future Office Visit:      Vonnie UREÑA, [facility]

## 2022-01-14 RX ORDER — METHIMAZOLE 5 MG/1
2.5 TABLET ORAL DAILY
Qty: 45 TABLET | Refills: 1 | Status: SHIPPED | OUTPATIENT
Start: 2022-01-14

## 2022-01-14 NOTE — TELEPHONE ENCOUNTER
LOV 9/23/2021: Graves: Plan to follow-up in 1 year. To have labs rechecked @ March.     Called patient. LM to have labs rechecked in the beginning of March to ensure provider can review and refill meds at that time if needed. Also encouraged her to make appointment in September with new Endo.     Medication not listed on [facility] refill protocol. To provider for consideration    Ginger GOODMAN    [facility] - Flex RN

## 2022-01-17 DIAGNOSIS — I10 ESSENTIAL HYPERTENSION WITH GOAL BLOOD PRESSURE LESS THAN 140/90: ICD-10-CM

## 2022-01-18 RX ORDER — METOPROLOL TARTRATE 100 MG
100 TABLET ORAL 2 TIMES DAILY
Qty: 180 TABLET | Refills: 1 | Status: SHIPPED | OUTPATIENT
Start: 2022-01-18 | End: 2022-07-06

## 2022-01-27 ENCOUNTER — ALLIED HEALTH/NURSE VISIT (OUTPATIENT)
Dept: FAMILY MEDICINE | Facility: CLINIC | Age: 77
End: 2022-01-27
Payer: COMMERCIAL

## 2022-01-27 ENCOUNTER — TELEPHONE (OUTPATIENT)
Dept: FAMILY MEDICINE | Facility: CLINIC | Age: 77
End: 2022-01-27

## 2022-01-27 VITALS — DIASTOLIC BLOOD PRESSURE: 70 MMHG | SYSTOLIC BLOOD PRESSURE: 130 MMHG | HEART RATE: 72 BPM

## 2022-01-27 DIAGNOSIS — Z01.30 BLOOD PRESSURE CHECK: Primary | ICD-10-CM

## 2022-01-27 DIAGNOSIS — J43.2 CENTRILOBULAR EMPHYSEMA (H): Primary | ICD-10-CM

## 2022-01-27 PROCEDURE — 99207 PR NO CHARGE NURSE ONLY: CPT

## 2022-01-27 NOTE — TELEPHONE ENCOUNTER
Discontinue Spiriva.    Start Incruse ellipta- this is in the same class of medications, should work similarly.     New prescription sent to pharmacy.    Ale Ordaz M.D.

## 2022-01-27 NOTE — TELEPHONE ENCOUNTER
Called and spoke to Idalmis. Idalmis mentioned today when she was here that her insurance will not cover her Spiriva inhaler, RN looked into this more and huddled with Provider. Provider suggested that patient should call her insurance company to see which inhaler they will cover then to give the clinic a return call with that information an Dr. Ordaz can then order it for her.     Idalmis agrees with this plan.     Sinai De Paz RN BSN

## 2022-01-27 NOTE — TELEPHONE ENCOUNTER
Dr Ordaz,    Patient returning call covered options are:    Trelegy Ellipta 100 or 200 62.5MCG  Incruse Ellipta 62.5MCG  Anoro Ellipta 62.5 25MCG   Albuterol (generic) HFA 90MCG     Spiriva order can be cancelled

## 2022-03-02 ENCOUNTER — LAB (OUTPATIENT)
Dept: LAB | Facility: CLINIC | Age: 77
End: 2022-03-02
Payer: COMMERCIAL

## 2022-03-02 DIAGNOSIS — E05.00 GRAVES' DISEASE: ICD-10-CM

## 2022-03-02 DIAGNOSIS — E05.90 HYPERTHYROIDISM: ICD-10-CM

## 2022-03-02 LAB
ALBUMIN SERPL-MCNC: 3.3 G/DL (ref 3.4–5)
ALP SERPL-CCNC: 48 U/L (ref 40–150)
ALT SERPL W P-5'-P-CCNC: 22 U/L (ref 0–50)
AST SERPL W P-5'-P-CCNC: 31 U/L (ref 0–45)
BILIRUB DIRECT SERPL-MCNC: 0.1 MG/DL (ref 0–0.2)
BILIRUB SERPL-MCNC: 0.4 MG/DL (ref 0.2–1.3)
PROT SERPL-MCNC: 6.5 G/DL (ref 6.8–8.8)
TSH SERPL DL<=0.005 MIU/L-ACNC: 1.04 MU/L (ref 0.4–4)

## 2022-03-02 PROCEDURE — 80076 HEPATIC FUNCTION PANEL: CPT

## 2022-03-02 PROCEDURE — 84443 ASSAY THYROID STIM HORMONE: CPT

## 2022-03-02 PROCEDURE — 36415 COLL VENOUS BLD VENIPUNCTURE: CPT

## 2022-04-08 ENCOUNTER — TELEPHONE (OUTPATIENT)
Dept: FAMILY MEDICINE | Facility: CLINIC | Age: 77
End: 2022-04-08

## 2022-04-08 ENCOUNTER — VIRTUAL VISIT (OUTPATIENT)
Dept: FAMILY MEDICINE | Facility: CLINIC | Age: 77
End: 2022-04-08
Payer: COMMERCIAL

## 2022-04-08 ENCOUNTER — LAB (OUTPATIENT)
Dept: LAB | Facility: CLINIC | Age: 77
End: 2022-04-08
Payer: COMMERCIAL

## 2022-04-08 DIAGNOSIS — R30.0 DYSURIA: ICD-10-CM

## 2022-04-08 DIAGNOSIS — R39.9 UTI SYMPTOMS: Primary | ICD-10-CM

## 2022-04-08 DIAGNOSIS — R39.9 UTI SYMPTOMS: ICD-10-CM

## 2022-04-08 DIAGNOSIS — R30.0 DYSURIA: Primary | ICD-10-CM

## 2022-04-08 DIAGNOSIS — Z87.440 PERSONAL HISTORY OF URINARY TRACT INFECTION: ICD-10-CM

## 2022-04-08 LAB
ALBUMIN UR-MCNC: NEGATIVE MG/DL
APPEARANCE UR: CLEAR
BACTERIA #/AREA URNS HPF: ABNORMAL /HPF
BILIRUB UR QL STRIP: NEGATIVE
COLOR UR AUTO: YELLOW
GLUCOSE UR STRIP-MCNC: NEGATIVE MG/DL
HGB UR QL STRIP: ABNORMAL
KETONES UR STRIP-MCNC: NEGATIVE MG/DL
LEUKOCYTE ESTERASE UR QL STRIP: NEGATIVE
NITRATE UR QL: NEGATIVE
PH UR STRIP: 7 [PH] (ref 5–7)
RBC #/AREA URNS AUTO: ABNORMAL /HPF
SP GR UR STRIP: 1.02 (ref 1–1.03)
SQUAMOUS #/AREA URNS AUTO: ABNORMAL /LPF
UROBILINOGEN UR STRIP-ACNC: 0.2 E.U./DL
WBC #/AREA URNS AUTO: ABNORMAL /HPF

## 2022-04-08 PROCEDURE — 99213 OFFICE O/P EST LOW 20 MIN: CPT | Mod: TEL | Performed by: NURSE PRACTITIONER

## 2022-04-08 PROCEDURE — 81001 URINALYSIS AUTO W/SCOPE: CPT

## 2022-04-08 PROCEDURE — 87086 URINE CULTURE/COLONY COUNT: CPT

## 2022-04-08 RX ORDER — ESTRADIOL 0.1 MG/G
2 CREAM VAGINAL
Qty: 42.5 G | Refills: 3 | Status: SHIPPED | OUTPATIENT
Start: 2022-04-08 | End: 2023-01-20

## 2022-04-08 RX ORDER — LISINOPRIL 40 MG/1
TABLET ORAL
COMMUNITY
Start: 2022-01-02 | End: 2022-04-08

## 2022-04-08 ASSESSMENT — PAIN SCALES - GENERAL: PAINLEVEL: MODERATE PAIN (4)

## 2022-04-08 NOTE — PATIENT INSTRUCTIONS
Drink plenty of fluids.  Can hold estradiol for a dose or 2 to see if this helps.  If not better by Monday, you'll need to come in for an exam.

## 2022-04-08 NOTE — TELEPHONE ENCOUNTER
RN reached out to patient to triage per provider. Patient has telephone appt for UTI sx this afternoon, and provider would like to ensure no sx of sepsis and that patient aware she'll need to come in for urine sample.    Patient reports burning and frequency since late Wednesday night. Denies hematuria, fever, chills, dizziness.  She is agreeable to lab in addition to telephone visit, states she would've just come in, but no in-person appointments.  RN messaged provider, who placed orders for UA/UC prior to telephone appt, and RN assisted patient with scheduling lab at [facility], as it's close to patient's home.     Elysia Zapata RN  [facility]

## 2022-04-08 NOTE — PROGRESS NOTES
"Idalmis is a 76 year old who is being evaluated via a billable telephone visit.      What phone number would you like to be contacted at? [phone]   How would you like to obtain your AVS? Mail a copy    Assessment & Plan     Dysuria  UA not overly concerning for UTI, will culture. Advised to push fluids. Can hold estradiol for a couple days as she thinks this contributed to the itching. If not improved by Monday, follow up in clinic for exam.  - Urine Culture Aerobic Bacterial - lab collect; Future         BMI:   Estimated body mass index is 25.97 kg/m  as calculated from the following:    Height as of 12/22/21: 1.575 m (5' 2\").    Weight as of 12/22/21: 64.4 kg (142 lb).       Patient Instructions   Drink plenty of fluids.  Can hold estradiol for a dose or 2 to see if this helps.  If not better by Monday, you'll need to come in for an exam.      Return in about 1 week (around 4/15/2022) for worsening or continued symptoms.    JAIME Post [facility]    Subjective   Idalmis is a 76 year old who presents for the following health issues     HPI     Genitourinary - Female  Onset/Duration: started Wednesday night in the middle of the night  Description:   Painful urination (Dysuria): YES           Frequency: YES  Blood in urine (Hematuria): no  Delay in urine (Hesitency): no  Intensity: mild, 4/10  Progression of Symptoms:  same  Accompanying Signs & Symptoms:  Fever/chills: no  Flank pain: no  Nausea and vomiting: no  Vaginal symptoms: none and itching  Abdominal/Pelvic Pain: no  History:   History of frequent UTI s: no  History of kidney stones: no  Sexually Active: no  Possibility of pregnancy: No  Precipitating or alleviating factors: None  Therapies tried and outcome:  None    Above HPI reviewed. Additionally, symptoms began shortly after using topical estradiol. No back pain, abdominal pain, fevers, chills. No discharge.     Review of Systems   Constitutional, HEENT, " cardiovascular, pulmonary, gi and gu systems are negative, except as otherwise noted.      Objective    Vitals - Patient Reported  Pain Score: Moderate Pain (4)  Pain Loc: Bladder      Vitals:  No vitals were obtained today due to virtual visit.    Physical Exam   healthy, alert and no distress  PSYCH: Alert and oriented times 3; coherent speech, normal   rate and volume, able to articulate logical thoughts, able   to abstract reason, no tangential thoughts, no hallucinations   or delusions  Her affect is normal  RESP: No cough, no audible wheezing, able to talk in full sentences  Remainder of exam unable to be completed due to telephone visits    Results for orders placed or performed in visit on 04/08/22 (from the past 24 hour(s))   UA reflex to Microscopic and Culture    Specimen: Urine, Midstream   Result Value Ref Range    Color Urine Yellow Colorless, Straw, Light Yellow, Yellow    Appearance Urine Clear Clear    Glucose Urine Negative Negative mg/dL    Bilirubin Urine Negative Negative    Ketones Urine Negative Negative mg/dL    Specific Gravity Urine 1.020 1.003 - 1.035    Blood Urine Trace (A) Negative    pH Urine 7.0 5.0 - 7.0    Protein Albumin Urine Negative Negative mg/dL    Urobilinogen Urine 0.2 0.2, 1.0 E.U./dL    Nitrite Urine Negative Negative    Leukocyte Esterase Urine Negative Negative   Urine Microscopic   Result Value Ref Range    Bacteria Urine Few (A) None Seen /HPF    RBC Urine 0-2 0-2 /HPF /HPF    WBC Urine 0-5 0-5 /HPF /HPF    Squamous Epithelials Urine Few (A) None Seen /LPF    Narrative    Urine Culture not indicated     *Note: Due to a large number of results and/or encounters for the requested time period, some results have not been displayed. A complete set of results can be found in Results Review.               Phone call duration: 5 minutes

## 2022-04-08 NOTE — TELEPHONE ENCOUNTER
Refilled Estradiol. 42.5g with 3 refills. Last OV= 09/29/21.    Binta OCHOA RN Urology 4/8/2022 3:32 PM

## 2022-04-10 LAB — BACTERIA UR CULT: NORMAL

## 2022-04-21 DIAGNOSIS — J43.2 CENTRILOBULAR EMPHYSEMA (H): ICD-10-CM

## 2022-04-22 RX ORDER — FLUTICASONE PROPIONATE AND SALMETEROL 113; 14 UG/1; UG/1
1 POWDER, METERED RESPIRATORY (INHALATION) 2 TIMES DAILY
Qty: 1 EACH | Refills: 11 | Status: SHIPPED | OUTPATIENT
Start: 2022-04-22 | End: 2023-03-27

## 2022-05-05 ENCOUNTER — LAB (OUTPATIENT)
Dept: LAB | Facility: CLINIC | Age: 77
End: 2022-05-05

## 2022-05-05 ENCOUNTER — HOSPITAL ENCOUNTER (OUTPATIENT)
Dept: CT IMAGING | Facility: CLINIC | Age: 77
Discharge: HOME | End: 2022-05-05
Attending: INTERNAL MEDICINE | Admitting: INTERNAL MEDICINE
Payer: COMMERCIAL

## 2022-05-05 DIAGNOSIS — C49.A0 MALIGNANT GASTROINTESTINAL STROMAL TUMOR, UNSPECIFIED SITE (H): ICD-10-CM

## 2022-05-05 LAB
ALBUMIN SERPL-MCNC: 3.4 G/DL (ref 3.4–5)
ALP SERPL-CCNC: 68 U/L (ref 40–150)
ALT SERPL W P-5'-P-CCNC: 20 U/L (ref 0–50)
ANION GAP SERPL CALCULATED.3IONS-SCNC: 6 MMOL/L (ref 3–14)
AST SERPL W P-5'-P-CCNC: 25 U/L (ref 0–45)
BASOPHILS # BLD AUTO: 0 10E3/UL (ref 0–0.2)
BASOPHILS NFR BLD AUTO: 0 %
BILIRUB SERPL-MCNC: 0.4 MG/DL (ref 0.2–1.3)
BUN SERPL-MCNC: 20 MG/DL (ref 7–30)
CALCIUM SERPL-MCNC: 8.6 MG/DL (ref 8.5–10.1)
CHLORIDE BLD-SCNC: 102 MMOL/L (ref 94–109)
CO2 SERPL-SCNC: 27 MMOL/L (ref 20–32)
CREAT BLD-MCNC: 0.9 MG/DL (ref 0.5–1)
CREAT SERPL-MCNC: 0.93 MG/DL (ref 0.52–1.04)
EOSINOPHIL # BLD AUTO: 0.1 10E3/UL (ref 0–0.7)
EOSINOPHIL NFR BLD AUTO: 2 %
ERYTHROCYTE [DISTWIDTH] IN BLOOD BY AUTOMATED COUNT: 14.9 % (ref 10–15)
GFR SERPL CREATININE-BSD FRML MDRD: 63 ML/MIN/1.73M2
GFR SERPL CREATININE-BSD FRML MDRD: >60 ML/MIN/1.73M2
GLUCOSE BLD-MCNC: 104 MG/DL (ref 70–99)
HCT VFR BLD AUTO: 29.8 % (ref 35–47)
HGB BLD-MCNC: 9.6 G/DL (ref 11.7–15.7)
LYMPHOCYTES # BLD AUTO: 1.4 10E3/UL (ref 0.8–5.3)
LYMPHOCYTES NFR BLD AUTO: 24 %
MCH RBC QN AUTO: 29.3 PG (ref 26.5–33)
MCHC RBC AUTO-ENTMCNC: 32.2 G/DL (ref 31.5–36.5)
MCV RBC AUTO: 91 FL (ref 78–100)
MONOCYTES # BLD AUTO: 0.9 10E3/UL (ref 0–1.3)
MONOCYTES NFR BLD AUTO: 15 %
NEUTROPHILS # BLD AUTO: 3.4 10E3/UL (ref 1.6–8.3)
NEUTROPHILS NFR BLD AUTO: 59 %
PLATELET # BLD AUTO: 206 10E3/UL (ref 150–450)
POTASSIUM BLD-SCNC: 4.1 MMOL/L (ref 3.4–5.3)
PROT SERPL-MCNC: 6.9 G/DL (ref 6.8–8.8)
RBC # BLD AUTO: 3.28 10E6/UL (ref 3.8–5.2)
SODIUM SERPL-SCNC: 135 MMOL/L (ref 133–144)
WBC # BLD AUTO: 5.8 10E3/UL (ref 4–11)

## 2022-05-05 PROCEDURE — 36415 COLL VENOUS BLD VENIPUNCTURE: CPT

## 2022-05-05 PROCEDURE — 74177 CT ABD & PELVIS W/CONTRAST: CPT

## 2022-05-05 PROCEDURE — 82565 ASSAY OF CREATININE: CPT

## 2022-05-05 PROCEDURE — 250N000011 HC RX IP 250 OP 636: Performed by: RADIOLOGY

## 2022-05-05 PROCEDURE — 85025 COMPLETE CBC W/AUTO DIFF WBC: CPT

## 2022-05-05 PROCEDURE — 250N000009 HC RX 250: Performed by: RADIOLOGY

## 2022-05-05 RX ORDER — IOPAMIDOL 755 MG/ML
69 INJECTION, SOLUTION INTRAVASCULAR ONCE
Status: COMPLETED | OUTPATIENT
Start: 2022-05-05 | End: 2022-05-05

## 2022-05-05 RX ADMIN — IOPAMIDOL 69 ML: 755 INJECTION, SOLUTION INTRAVENOUS at 12:32

## 2022-05-05 RX ADMIN — SODIUM CHLORIDE 57 ML: 9 INJECTION, SOLUTION INTRAVENOUS at 12:33

## 2022-05-09 ENCOUNTER — VIRTUAL VISIT (OUTPATIENT)
Dept: ONCOLOGY | Facility: CLINIC | Age: 77
End: 2022-05-09
Attending: INTERNAL MEDICINE
Payer: COMMERCIAL

## 2022-05-09 DIAGNOSIS — C49.A0 MALIGNANT GASTROINTESTINAL STROMAL TUMOR, UNSPECIFIED SITE (H): Primary | ICD-10-CM

## 2022-05-09 DIAGNOSIS — Z85.118 HISTORY OF LUNG CANCER: ICD-10-CM

## 2022-05-09 PROCEDURE — 99215 OFFICE O/P EST HI 40 MIN: CPT | Mod: GT | Performed by: INTERNAL MEDICINE

## 2022-05-09 NOTE — LETTER
5/9/2022         RE: Idalmis Abdul  [address]  [facility] [address]        Dear Colleague,    Thank you for referring your patient, Idalmis Abdul, to the [facility]. Please see a copy of my visit note below.    Idalmis is a 76 year old who is being evaluated via a billable video visit.      How would you like to obtain your AVS? Mail a copy  If the video visit is dropped, the invitation should be resent by: Text to cell phone: [phone]  Will anyone else be joining your video visit? No      Video Start Time: 1:15  Video-Visit Details    Type of service:  Video Visit    Video End Time:1:45    Originating Location (pt. Location): Home    Distant Location (provider location):  [facility]     Platform used for Video Visit: Jae Silva        I am seeing Idalmis Abdul today in follow-up of recurrent gastrointestinal stromal tumor.  She is a 76-year-old woman who had her primary resected a decade and a half ago and then had an intra-abdominal recurrence after she discontinued her adjuvant Gleevec and that was also resected.  She has been back on Gleevec since 2007 and remains without evidence of disease.  She has had a stage I lung cancer resected along the way in 2010.  She has had difficulty with eyelid edema related to her Gleevec requiring surgical correction, as well as thyroid disease related exophthalmos.  When I saw her last she been having some trouble with constipation and rectal bleeding attributed to hemorrhoids.  She has been plan to get a colonoscopy but after her symptoms resolved with MiraLAX and fiber she had a discussion with her gastroenterologist who felt she did not need a colonoscopy.  She currently is just recovering from an upper respiratory infection that her whole family has had for the last 3 weeks.  None of them tested positive for COVID she says her cough is nearly gone but her head is still  congested.  She has had no significant fevers with that.  She thinks she is doing okay with her thyroid having had good levels on testing 2 months ago but is distressed because her endocrinologist is leaving and has not yet been able to find a new one.    GENERAL: Healthy, alert and no distress  EYES: Mild periorbital edema.  No discharge or erythema, or obvious scleral/conjunctival abnormalities.  RESP: No audible wheeze, cough, or visible cyanosis.  No visible retractions or increased work of breathing.    SKIN: Visible skin clear. No significant rash, abnormal pigmentation or lesions.  NEURO: Cranial nerves grossly intact.  Mentation and speech appropriate for age.  PSYCH: Mentation appears normal, affect normal/bright, judgement and insight intact, normal speech and appearance well-groomed.    I personally viewed her CT scan went over the results with her.  She has no evidence of recurrence of her GIST.  She may have marginal increase in the size of a thyroid nodule.  There is nothing to suggest new lung cancer.  Her lab results show normal electrolytes, renal function, bilirubin, albumin, liver enzymes and stable normocytic anemia.    Assessment/plan:  1.  Recurrent GIST, now without evidence of disease since 2007.  Given her rapid recurrence last time she stopped her Gleevec, we will continue on with that indefinitely.  But I think we can safely reduce the frequency of her scans to just once per year.  2.  History of stage I lung cancer without evidence of recurrence.      Again, thank you for allowing me to participate in the care of your patient.        Sincerely,        Blayne Schmitz MD

## 2022-05-09 NOTE — PROGRESS NOTES
Idalmis is a 76 year old who is being evaluated via a billable video visit.      How would you like to obtain your AVS? Mail a copy  If the video visit is dropped, the invitation should be resent by: Text to cell phone: [phone]  Will anyone else be joining your video visit? No      Video Start Time: 1:15  Video-Visit Details    Type of service:  Video Visit    Video End Time:1:45    Originating Location (pt. Location): Home    Distant Location (provider location):  [facility]     Platform used for Video Visit: Jae Silva        I am seeing Idalmis Abdul today in follow-up of recurrent gastrointestinal stromal tumor.  She is a 76-year-old woman who had her primary resected a decade and a half ago and then had an intra-abdominal recurrence after she discontinued her adjuvant Gleevec and that was also resected.  She has been back on Gleevec since 2007 and remains without evidence of disease.  She has had a stage I lung cancer resected along the way in 2010.  She has had difficulty with eyelid edema related to her Gleevec requiring surgical correction, as well as thyroid disease related exophthalmos.  When I saw her last she been having some trouble with constipation and rectal bleeding attributed to hemorrhoids.  She has been plan to get a colonoscopy but after her symptoms resolved with MiraLAX and fiber she had a discussion with her gastroenterologist who felt she did not need a colonoscopy.  She currently is just recovering from an upper respiratory infection that her whole family has had for the last 3 weeks.  None of them tested positive for COVID she says her cough is nearly gone but her head is still congested.  She has had no significant fevers with that.  She thinks she is doing okay with her thyroid having had good levels on testing 2 months ago but is distressed because her endocrinologist is leaving and has not yet been able to find a new  one.    GENERAL: Healthy, alert and no distress  EYES: Mild periorbital edema.  No discharge or erythema, or obvious scleral/conjunctival abnormalities.  RESP: No audible wheeze, cough, or visible cyanosis.  No visible retractions or increased work of breathing.    SKIN: Visible skin clear. No significant rash, abnormal pigmentation or lesions.  NEURO: Cranial nerves grossly intact.  Mentation and speech appropriate for age.  PSYCH: Mentation appears normal, affect normal/bright, judgement and insight intact, normal speech and appearance well-groomed.    I personally viewed her CT scan went over the results with her.  She has no evidence of recurrence of her GIST.  She may have marginal increase in the size of a thyroid nodule.  There is nothing to suggest new lung cancer.  Her lab results show normal electrolytes, renal function, bilirubin, albumin, liver enzymes and stable normocytic anemia.    Assessment/plan:  1.  Recurrent GIST, now without evidence of disease since 2007.  Given her rapid recurrence last time she stopped her Gleevec, we will continue on with that indefinitely.  But I think we can safely reduce the frequency of her scans to just once per year.  2.  History of stage I lung cancer without evidence of recurrence.

## 2022-05-09 NOTE — NURSING NOTE
Patient hasn't been feeling well. She has been having body aches and caught a cold that is lingering for the past few weeks.     She also mentioned she does not have any new allergies but feels like something may be triggering her cold.     Sheeba Silva

## 2022-07-12 NOTE — TELEPHONE ENCOUNTER
GI Discharge Instructions Endoscopy      7/12/2022    During your exam, the physician:    Took a biopsy of  Colon    DIET INSTRUCTIONS:  Resume your regular diet    PRESCRIPTIONS/MEDICATIONS  No new prescriptions given today    A RESPONSIBLE ADULT MUST ACCOMPANY YOU AND DRIVE YOU HOME    You had the following procedure(s) today:   Colonoscopy     Avoid aspirin for 5 days.  Avoid anti-inflammatory drugs, (Nuprin, Ibuprofen, Motrin, Advil, etc.) for 5 days.     Following sedation, your judgement, perception and coordination are impaired for a minimum of       24 hours.      Therefore:  Do not drive.  Do not return to work today.  Do not operate appliances or machinery that require quick reaction time  Do not sign legal documents or be involved in work decisions  Do not smoke or drink alcoholic beverages for 24 hours  Plan to spend a few hours resting before resuming your normal routine    Please call your physician in the event that you experience any of the following or proceed to  the nearest hospital in the event of an emergency:     For Upper Endoscopy  Difficulty swallowing or breathing  Neck swelling  Excessive pain, you may have mild chest pain or discomfort which should pass in 1-2 hours with the passage of air.  Nausea or vomiting  Abdominal distention  Fever  Mild throat soreness may follow this procedure.  Warm salt-water gargle or lozenges may relieve your discomfort    For Colonoscopy / Sigmoidoscopy  Severe abdominal distention or pain. Some mild distention and/or cramping are normal after these procedures but should pass within an hour or two with the passage of air.  Rectal bleeding more than blood streaking on the toilet tissue  Nausea or vomiting  Fever    If you have any questions or concerns, contact Dr. Owens office [place] [phone]    ADDITIONAL INSTRUCTIONS: Await pathology, repeat colonoscopy in 5-10 years pending results      Left message for patient to return call to clinic.  Jeniffer MOREL RN

## 2022-08-05 DIAGNOSIS — C49.A0 MALIGNANT GASTROINTESTINAL STROMAL TUMOR, UNSPECIFIED SITE (H): ICD-10-CM

## 2022-08-05 RX ORDER — IMATINIB MESYLATE 400 MG/1
400 TABLET, FILM COATED ORAL DAILY
Refills: 8 | OUTPATIENT
Start: 2022-08-05

## 2022-08-07 DIAGNOSIS — C49.A0 GIST (GASTROINTESTINAL STROMAL TUMOR), MALIGNANT (H): ICD-10-CM

## 2022-08-07 DIAGNOSIS — C49.A0 MALIGNANT GASTROINTESTINAL STROMAL TUMOR, UNSPECIFIED SITE (H): Primary | ICD-10-CM

## 2022-08-09 DIAGNOSIS — C49.A0 MALIGNANT GASTROINTESTINAL STROMAL TUMOR, UNSPECIFIED SITE (H): Primary | ICD-10-CM

## 2022-08-09 RX ORDER — IMATINIB MESYLATE 400 MG/1
400 TABLET, FILM COATED ORAL DAILY
Qty: 30 TABLET | Refills: 11 | Status: SHIPPED | OUTPATIENT
Start: 2022-08-09 | End: 2023-07-21

## 2022-09-07 ENCOUNTER — OFFICE VISIT (OUTPATIENT)
Dept: FAMILY MEDICINE | Facility: CLINIC | Age: 77
End: 2022-09-07
Payer: COMMERCIAL

## 2022-09-07 VITALS
DIASTOLIC BLOOD PRESSURE: 70 MMHG | BODY MASS INDEX: 27.44 KG/M2 | TEMPERATURE: 98.8 F | RESPIRATION RATE: 16 BRPM | HEIGHT: 62 IN | SYSTOLIC BLOOD PRESSURE: 122 MMHG | OXYGEN SATURATION: 94 % | HEART RATE: 78 BPM | WEIGHT: 149.13 LBS

## 2022-09-07 DIAGNOSIS — J43.2 CENTRILOBULAR EMPHYSEMA (H): ICD-10-CM

## 2022-09-07 DIAGNOSIS — I10 ESSENTIAL HYPERTENSION WITH GOAL BLOOD PRESSURE LESS THAN 140/90: ICD-10-CM

## 2022-09-07 DIAGNOSIS — Z00.00 ENCOUNTER FOR MEDICARE ANNUAL WELLNESS EXAM: Primary | ICD-10-CM

## 2022-09-07 DIAGNOSIS — E78.5 HYPERLIPIDEMIA LDL GOAL <160: ICD-10-CM

## 2022-09-07 DIAGNOSIS — C49.A0 MALIGNANT GASTROINTESTINAL STROMAL TUMOR, UNSPECIFIED SITE (H): ICD-10-CM

## 2022-09-07 LAB
ALBUMIN SERPL BCG-MCNC: 3.8 G/DL (ref 3.5–5.2)
ALP SERPL-CCNC: 70 U/L (ref 35–104)
ALT SERPL W P-5'-P-CCNC: 19 U/L (ref 10–35)
ANION GAP SERPL CALCULATED.3IONS-SCNC: 10 MMOL/L (ref 7–15)
AST SERPL W P-5'-P-CCNC: 33 U/L (ref 10–35)
BASOPHILS # BLD AUTO: 0 10E3/UL (ref 0–0.2)
BASOPHILS NFR BLD AUTO: 1 %
BILIRUB SERPL-MCNC: 0.3 MG/DL
BUN SERPL-MCNC: 14.7 MG/DL (ref 8–23)
CALCIUM SERPL-MCNC: 8.6 MG/DL (ref 8.8–10.2)
CHLORIDE SERPL-SCNC: 102 MMOL/L (ref 98–107)
CHOLEST SERPL-MCNC: 124 MG/DL
CREAT SERPL-MCNC: 0.94 MG/DL (ref 0.51–0.95)
DEPRECATED HCO3 PLAS-SCNC: 25 MMOL/L (ref 22–29)
EOSINOPHIL # BLD AUTO: 0.3 10E3/UL (ref 0–0.7)
EOSINOPHIL NFR BLD AUTO: 5 %
ERYTHROCYTE [DISTWIDTH] IN BLOOD BY AUTOMATED COUNT: 15.7 % (ref 10–15)
GFR SERPL CREATININE-BSD FRML MDRD: 62 ML/MIN/1.73M2
GLUCOSE SERPL-MCNC: 108 MG/DL (ref 70–99)
HCT VFR BLD AUTO: 31.6 % (ref 35–47)
HDLC SERPL-MCNC: 52 MG/DL
HGB BLD-MCNC: 10.4 G/DL (ref 11.7–15.7)
IMM GRANULOCYTES # BLD: 0 10E3/UL
IMM GRANULOCYTES NFR BLD: 0 %
LDLC SERPL CALC-MCNC: 50 MG/DL
LYMPHOCYTES # BLD AUTO: 1.4 10E3/UL (ref 0.8–5.3)
LYMPHOCYTES NFR BLD AUTO: 27 %
MCH RBC QN AUTO: 29.7 PG (ref 26.5–33)
MCHC RBC AUTO-ENTMCNC: 32.9 G/DL (ref 31.5–36.5)
MCV RBC AUTO: 90 FL (ref 78–100)
MONOCYTES # BLD AUTO: 1 10E3/UL (ref 0–1.3)
MONOCYTES NFR BLD AUTO: 18 %
NEUTROPHILS # BLD AUTO: 2.5 10E3/UL (ref 1.6–8.3)
NEUTROPHILS NFR BLD AUTO: 48 %
NONHDLC SERPL-MCNC: 72 MG/DL
PLATELET # BLD AUTO: 211 10E3/UL (ref 150–450)
POTASSIUM SERPL-SCNC: 4.1 MMOL/L (ref 3.4–5.3)
PROT SERPL-MCNC: 6.2 G/DL (ref 6.4–8.3)
RBC # BLD AUTO: 3.5 10E6/UL (ref 3.8–5.2)
SODIUM SERPL-SCNC: 137 MMOL/L (ref 136–145)
TRIGL SERPL-MCNC: 110 MG/DL
WBC # BLD AUTO: 5.3 10E3/UL (ref 4–11)

## 2022-09-07 PROCEDURE — 80061 LIPID PANEL: CPT | Performed by: FAMILY MEDICINE

## 2022-09-07 PROCEDURE — 85025 COMPLETE CBC W/AUTO DIFF WBC: CPT | Performed by: FAMILY MEDICINE

## 2022-09-07 PROCEDURE — 80053 COMPREHEN METABOLIC PANEL: CPT | Performed by: FAMILY MEDICINE

## 2022-09-07 PROCEDURE — G0008 ADMIN INFLUENZA VIRUS VAC: HCPCS | Performed by: FAMILY MEDICINE

## 2022-09-07 PROCEDURE — 99214 OFFICE O/P EST MOD 30 MIN: CPT | Mod: 25 | Performed by: FAMILY MEDICINE

## 2022-09-07 PROCEDURE — 90662 IIV NO PRSV INCREASED AG IM: CPT | Performed by: FAMILY MEDICINE

## 2022-09-07 PROCEDURE — 36415 COLL VENOUS BLD VENIPUNCTURE: CPT | Performed by: FAMILY MEDICINE

## 2022-09-07 PROCEDURE — G0439 PPPS, SUBSEQ VISIT: HCPCS | Performed by: FAMILY MEDICINE

## 2022-09-07 RX ORDER — METOPROLOL TARTRATE 100 MG
100 TABLET ORAL 2 TIMES DAILY
Qty: 180 TABLET | Refills: 3 | Status: SHIPPED | OUTPATIENT
Start: 2022-09-07 | End: 2023-09-12

## 2022-09-07 RX ORDER — AMLODIPINE BESYLATE 5 MG/1
5 TABLET ORAL DAILY
Qty: 90 TABLET | Refills: 3 | Status: SHIPPED | OUTPATIENT
Start: 2022-09-07 | End: 2023-11-13

## 2022-09-07 RX ORDER — ROSUVASTATIN CALCIUM 5 MG/1
5 TABLET, COATED ORAL EVERY OTHER DAY
Qty: 45 TABLET | Refills: 3 | Status: SHIPPED | OUTPATIENT
Start: 2022-09-07 | End: 2023-10-02

## 2022-09-07 ASSESSMENT — ENCOUNTER SYMPTOMS
EYE PAIN: 1
CONSTIPATION: 0
DIZZINESS: 0
NAUSEA: 0
SHORTNESS OF BREATH: 1
HEMATOCHEZIA: 0
MYALGIAS: 1
DYSURIA: 0
HEADACHES: 0
COUGH: 0
DIARRHEA: 0
NERVOUS/ANXIOUS: 0
JOINT SWELLING: 1
FREQUENCY: 0
PARESTHESIAS: 0
WEAKNESS: 1
ABDOMINAL PAIN: 0
SORE THROAT: 0
ARTHRALGIAS: 1
CHILLS: 0
PALPITATIONS: 0
HEARTBURN: 0
BREAST MASS: 0
HEMATURIA: 0
FEVER: 0

## 2022-09-07 ASSESSMENT — ANXIETY QUESTIONNAIRES
2. NOT BEING ABLE TO STOP OR CONTROL WORRYING: NOT AT ALL
GAD7 TOTAL SCORE: 0
4. TROUBLE RELAXING: NOT AT ALL
5. BEING SO RESTLESS THAT IT IS HARD TO SIT STILL: NOT AT ALL
7. FEELING AFRAID AS IF SOMETHING AWFUL MIGHT HAPPEN: NOT AT ALL
IF YOU CHECKED OFF ANY PROBLEMS ON THIS QUESTIONNAIRE, HOW DIFFICULT HAVE THESE PROBLEMS MADE IT FOR YOU TO DO YOUR WORK, TAKE CARE OF THINGS AT HOME, OR GET ALONG WITH OTHER PEOPLE: NOT DIFFICULT AT ALL
GAD7 TOTAL SCORE: 0
1. FEELING NERVOUS, ANXIOUS, OR ON EDGE: NOT AT ALL
8. IF YOU CHECKED OFF ANY PROBLEMS, HOW DIFFICULT HAVE THESE MADE IT FOR YOU TO DO YOUR WORK, TAKE CARE OF THINGS AT HOME, OR GET ALONG WITH OTHER PEOPLE?: NOT DIFFICULT AT ALL
3. WORRYING TOO MUCH ABOUT DIFFERENT THINGS: NOT AT ALL
7. FEELING AFRAID AS IF SOMETHING AWFUL MIGHT HAPPEN: NOT AT ALL
6. BECOMING EASILY ANNOYED OR IRRITABLE: NOT AT ALL

## 2022-09-07 ASSESSMENT — PAIN SCALES - GENERAL: PAINLEVEL: NO PAIN (0)

## 2022-09-07 ASSESSMENT — ACTIVITIES OF DAILY LIVING (ADL): CURRENT_FUNCTION: LAUNDRY REQUIRES ASSISTANCE

## 2022-09-07 NOTE — PATIENT INSTRUCTIONS
Patient Education   Personalized Prevention Plan  You are due for the preventive services outlined below.  Your care team is available to assist you in scheduling these services.  If you have already completed any of these items, please share that information with your care team to update in your medical record.  Health Maintenance Due   Topic Date Due     ANNUAL REVIEW OF HM ORDERS  Never done     Hepatitis C Screening  Never done     Zoster (Shingles) Vaccine (1 of 2) Never done     Discuss Advance Care Planning  12/15/2016     URINE DRUG SCREEN  12/07/2019     Osteoporosis Screening  12/17/2020     PHQ-2 (once per calendar year)  01/01/2022     COVID-19 Vaccine (4 - Booster for Moderna series) 04/22/2022     Cholesterol Lab  05/17/2022     FALL RISK ASSESSMENT  07/20/2022     Flu Vaccine (1) 09/01/2022

## 2022-09-07 NOTE — PROGRESS NOTES
"SUBJECTIVE:   Idalmis Abdul is a 76 year old female who presents for Preventive Visit.      Patient has been advised of split billing requirements and indicates understanding: Yes  Are you in the first 12 months of your Medicare coverage?  No    Healthy Habits:     In general, how would you rate your overall health?  Good    Frequency of exercise:  2-3 days/week    Duration of exercise:  15-30 minutes    Do you usually eat at least 4 servings of fruit and vegetables a day, include whole grains    & fiber and avoid regularly eating high fat or \"junk\" foods?  Yes    Taking medications regularly:  Yes    Medication side effects:  None    Ability to successfully perform activities of daily living:  Laundry requires assistance    Home Safety:  No safety concerns identified    Hearing Impairment:  Difficulty following a conversation in a noisy restaurant or crowded room and need to ask people to speak up or repeat themselves    In the past 6 months, have you been bothered by leaking of urine? Yes    In general, how would you rate your overall mental or emotional health?  Good      PHQ-2 Total Score: 0    Additional concerns today:  No    Do you feel safe in your environment? Yes    Have you ever done Advance Care Planning? (For example, a Health Directive, POLST, or a discussion with a medical provider or your loved ones about your wishes): No, advance care planning information given to patient to review.  Patient declined advance care planning discussion at this time.       Fall risk  Fallen 2 or more times in the past year?: No  Any fall with injury in the past year?: No    Cognitive Screening   1) Repeat 3 items (Leader, Season, Table)    2) Clock draw: NORMAL  3) 3 item recall: Recalls 3 objects  Results: 3 items recalled: COGNITIVE IMPAIRMENT LESS LIKELY    Mini-CogTM Copyright GABRIELA Shea. Licensed by the author for use in [facility]; reprinted with permission (radha@.[email or web address]). All rights reserved.  "     Do you have sleep apnea, excessive snoring or daytime drowsiness?: yes    Reviewed and updated as needed this visit by clinical staff   Tobacco  Allergies  Meds   Med Hx  Surg Hx  Fam Hx  Soc Hx          Reviewed and updated as needed this visit by Provider                   Social History     Tobacco Use     Smoking status: Former Smoker     Packs/day: 0.50     Years: 49.00     Pack years: 24.50     Types: Cigarettes     Quit date: 2010     Years since quittin.3     Smokeless tobacco: Never Used   Substance Use Topics     Alcohol use: No     If you drink alcohol do you typically have >3 drinks per day or >7 drinks per week? No    Alcohol Use 2022   Prescreen: >3 drinks/day or >7 drinks/week? Not Applicable   Prescreen: >3 drinks/day or >7 drinks/week? -         Hyperlipidemia Follow-Up  Rosuvastatin 5mg qd    Are you regularly taking any medication or supplement to lower your cholesterol?   Yes- statin    Are you having muscle aches or other side effects that you think could be caused by your cholesterol lowering medication?  No    Hypertension Follow-up  Metoprolol 100mg bid, amlodipine 5mg qd    Do you check your blood pressure regularly outside of the clinic? Yes     Are you following a low salt diet? No    Are your blood pressures ever more than 140 on the top number (systolic) OR more   than 90 on the bottom number (diastolic), for example 140/90? No    BP Readings from Last 6 Encounters:   22 122/70   22 130/70   21 (!) 170/92   21 (!) 215/92   10/13/21 (!) 186/90   21 (!) 178/89         Current providers sharing in care for this patient include:   Patient Care Team:  Ale Ordaz MD as PCP - General (Family Practice)  Blayne Schmitz MD as MD (Oncology)  Ara Guzman APRN CNP as Nurse Practitioner (Nurse Practitioner)  Swetha Antoine MD as MD (INTERNAL MEDICINE - ENDOCRINOLOGY, DIABETES & METABOLISM)  Darlene Sultana MD as  "MD (Family Practice)  Magnus Mckeon MD as MD (Ophthalmology)  Erma Ordaz MD as MD (Ophthalmology)  Yenifer Driver RN as Nurse Coordinator (Oncology)  Sangeetha Murphy as Student  Jessy Osman [facility] as Pharmacist (Pharmacist)  Ale Ordaz MD as Assigned PCP  Tootie Herman MD as Assigned Heart and Vascular Provider  Federico Yang MD as Assigned Endocrinology Provider  Blayne Schmitz MD as Assigned Cancer Care Provider  Jonny Garcia MD as Assigned Surgical Provider    The following health maintenance items are reviewed in Epic and correct as of today:  Health Maintenance Due   Topic Date Due     ANNUAL REVIEW OF HM ORDERS  Never done     ZOSTER IMMUNIZATION (1 of 2) Never done     ADVANCE CARE PLANNING  12/15/2016     DEXA  12/17/2020     COVID-19 Vaccine (4 - Booster for Moderna series) 04/22/2022     LIPID  05/17/2022     INFLUENZA VACCINE (1) 09/01/2022     Lab work is in process  Labs reviewed in EPIC        Pertinent mammograms are reviewed under the imaging tab.    Review of Systems  Constitutional, HEENT, cardiovascular, pulmonary, gi and gu systems are negative, except as otherwise noted.    OBJECTIVE:   /70   Pulse 78   Temp 98.8  F (37.1  C) (Tympanic)   Resp 16   Ht 1.575 m (5' 2\")   Wt 67.6 kg (149 lb 2 oz)   LMP 01/01/1992   SpO2 94%   Breastfeeding No   BMI 27.28 kg/m   Estimated body mass index is 27.28 kg/m  as calculated from the following:    Height as of this encounter: 1.575 m (5' 2\").    Weight as of this encounter: 67.6 kg (149 lb 2 oz).  Physical Exam  GENERAL: healthy, alert and no distress  NECK: no adenopathy, no asymmetry, masses, or scars and thyroid normal to palpation  RESP: lungs clear to auscultation - no rales, rhonchi or wheezes and decreased breath sounds throughout  CV: regular rate and rhythm, normal S1 S2, no S3 or S4, no murmur, click or rub, no peripheral edema and peripheral pulses strong  ABDOMEN: " "soft, nontender, no hepatosplenomegaly, no masses and bowel sounds normal  MS: no gross musculoskeletal defects noted, no edema  NEURO: Normal strength and tone, mentation intact and speech normal  PSYCH: mentation appears normal, affect normal/bright    Diagnostic Test Results:  Labs reviewed in Epic    ASSESSMENT / PLAN:   (Z00.00) Encounter for Medicare annual wellness exam  (primary encounter diagnosis)  Comment:    Plan:      (J43.2) Centrilobular emphysema (H)  Comment:    Plan: stable on the incruse ellipta an dairduo    (I10) Essential hypertension with goal blood pressure less than 140/90  Comment: well controlled  Plan: amLODIPine (NORVASC) 5 MG tablet, metoprolol         tartrate (LOPRESSOR) 100 MG tablet             (E78.5) Hyperlipidemia LDL goal <160  Comment:    Plan: Lipid panel reflex to direct LDL Non-fasting,         rosuvastatin (CRESTOR) 5 MG tablet             (C49.A0) GIST (gastrointestinal stromal tumor), malignant (H)  Comment: follows with Dr. Schmitz, is on gleevec regularly  Plan:     Patient has been advised of split billing requirements and indicates understanding: Yes    COUNSELING:  Reviewed preventive health counseling, as reflected in patient instructions       Regular exercise       Healthy diet/nutrition    Estimated body mass index is 27.28 kg/m  as calculated from the following:    Height as of this encounter: 1.575 m (5' 2\").    Weight as of this encounter: 67.6 kg (149 lb 2 oz).        She reports that she quit smoking about 12 years ago. Her smoking use included cigarettes. She has a 24.50 pack-year smoking history. She has never used smokeless tobacco.      Appropriate preventive services were discussed with this patient, including applicable screening as appropriate for cardiovascular disease, diabetes, osteopenia/osteoporosis, and glaucoma.  As appropriate for age/gender, discussed screening for colorectal cancer, prostate cancer, breast cancer, and cervical cancer. " Checklist reviewing preventive services available has been given to the patient.    Reviewed patients plan of care and provided an AVS. The Basic Care Plan (routine screening as documented in Health Maintenance) for Idalmis meets the Care Plan requirement. This Care Plan has been established and reviewed with the Patient.    Counseling Resources:  ATP IV Guidelines  Pooled Cohorts Equation Calculator  Breast Cancer Risk Calculator  Breast Cancer: Medication to Reduce Risk  FRAX Risk Assessment  ICSI Preventive Guidelines  Dietary Guidelines for Americans, 2010  SOAK ([employer] tool[employer])'s MyPlate  ASA Prophylaxis  Lung CA Screening    Ale Ordaz MD  [facility]    Identified Health Risks:        Answers for HPI/ROS submitted by the patient on 9/7/2022  ANIBAL 7 TOTAL SCORE: 0

## 2022-09-07 NOTE — LETTER
September 8, 2022      Idalmis Abdul     [facility] [id number]        Dear ,    We are writing to inform you of your test results.    These results are normal/acceptable.  Please continue with current treatment plan.    Resulted Orders   Lipid panel reflex to direct LDL Non-fasting   Result Value Ref Range    Cholesterol 124 <200 mg/dL    Triglycerides 110 <150 mg/dL    Direct Measure HDL 52 >=50 mg/dL    LDL Cholesterol Calculated 50 <=100 mg/dL    Non HDL Cholesterol 72 <130 mg/dL    Narrative    Cholesterol  Desirable:  <200 mg/dL    Triglycerides  Normal:  Less than 150 mg/dL  Borderline High:  150-199 mg/dL  High:  200-499 mg/dL  Very High:  Greater than or equal to 500 mg/dL    Direct Measure HDL  Female:  Greater than or equal to 50 mg/dL   Male:  Greater than or equal to 40 mg/dL    LDL Cholesterol  Desirable:  <100mg/dL  Above Desirable:  100-129 mg/dL   Borderline High:  130-159 mg/dL   High:  160-189 mg/dL   Very High:  >= 190 mg/dL    Non HDL Cholesterol  Desirable:  130 mg/dL  Above Desirable:  130-159 mg/dL  Borderline High:  160-189 mg/dL  High:  190-219 mg/dL  Very High:  Greater than or equal to 220 mg/dL       If you have any questions or concerns, please call the clinic at the number listed above.       Sincerely,      Ale Ordaz MD

## 2022-09-29 NOTE — NURSING NOTE
"Initial /70  Pulse 80  Temp 99  F (37.2  C) (Tympanic)  Wt 157 lb 9.6 oz (71.5 kg)  LMP 01/01/1992  BMI 27.92 kg/m2 Estimated body mass index is 27.92 kg/(m^2) as calculated from the following:    Height as of 11/1/17: 5' 3\" (1.6 m).    Weight as of this encounter: 157 lb 9.6 oz (71.5 kg). .      "
[[id number]]

## 2022-10-21 ENCOUNTER — NURSE TRIAGE (OUTPATIENT)
Dept: FAMILY MEDICINE | Facility: CLINIC | Age: 77
End: 2022-10-21

## 2022-10-21 NOTE — TELEPHONE ENCOUNTER
"S-(situation): feels constipated, last BM Tuesday    B-(background): hx constipation issues & hemorroids. Has been to ER in past for enemas. States has been stable x 1.5 yrs. Last BM Tuesday-normal soft, brown formed. Since Tuesday had a few \"marbles\" daily. One spot of blood on toilet paper from hemorroids & pushing.  Only change was she took a lorazepam 1 tab for 3 days. Thinks this is the cause of the constipation.    A-(assessment): abdomen soft, +flatus. Denies temp, abdominal pain. States abdominal \"muscles hurt when pushing\". Pt took 5 tabs colace yesterday (100mg tabs), miralax & metamucil. Today took 2 tabs colace so far.    R-(recommendations): protocol recommends to try home care. Spoke to pt re: diet. High fiber, lots of water. Can try prunes or prune juice, pt has not tried this yet. Pt told to only take colace and any other medication as recommended on pkg. Pt told if sx worsen, abdominal pain, vomiting, fever will need to be seen in UC/ER over the weekend. Since it has been 3 days since last BM advised pt to try home tx & to call back next week if no improvement. Pt verbalized understanding of advice. Pt will try the prune juice today.    Tova Rodríguez RN        Reason for Disposition    Uses laxative (e.g., PEG / Miralax. milk of magnesia) or enema more than once a month    Constipation is a recurrent ongoing problem (i.e., < 3 BMs / week or straining > 25% of the time)    Minor bleeding from rectum (e.g., blood just on toilet paper, few drops, streaks on surface of normal formed BM) occurs more than twice    Answer Assessment - Initial Assessment Questions  1. STOOL PATTERN OR FREQUENCY: \"How often do you have a bowel movement (BM)?\"  (Normal range: 3 times a day to every 3 days)  \"When was your last BM?\"        Usually has one BM every day, takes miralax daily. Sometimes has 2 small BM's daily-depends on what she eats. Last BM 3 days ago Tuesday(decent one), since Tuesday has had a few \"marbles\" daily " "(maybe 2-3). Tuesday-stool was soft, brown, formed.  2. STRAINING: \"Do you have to strain to have a BM?\"       yes  3. RECTAL PAIN: \"Does your rectum hurt when the stool comes out?\" If Yes, ask: \"Do you have hemorrhoids? How bad is the pain?\"  (Scale 1-10; or mild, moderate, severe)      Yes 3/10. Dissipates quickly  4. STOOL COMPOSITION: \"Are the stools hard?\"       Yes.   5. BLOOD ON STOOLS: \"Has there been any blood on the toilet tissue or on the surface of the BM?\" If Yes, ask: \"When was the last time?\"       Only been tiny spot of blood on the toilet paper Tuesday but pt has hemorrhoids & contributes the blood to this  6. CHRONIC CONSTIPATION: \"Is this a new problem for you?\"  If no, ask: \"How long have you had this problem?\" (days, weeks, months)       No. Pt has had constipation issues for years. Has been to ER in the past for enemas. Hemorrhoids getting worse-doesn't want to do enemas herself  7. CHANGES IN DIET OR HYDRATION: \"Have there been any recent changes in your diet?\" \"How much fluids are you drinking on a daily basis?\"  \"How much have you had to drink today?\"      Last week took 3 lorazepam (1 each day) for hives. No changes to diet. Drinking enough fluids, lots of water. Today: 4oz water, 8 oz reg coffee, 4oz juice. Felt nauseated this morning, didn't eat much last night. Think might be from the stool softener. Only has 1 cup reg coffee/day.  8. MEDICATIONS: \"Have you been taking any new medications?\" \"Are you taking any narcotic pain medications?\" (e.g., Vicodin, Percocet, morphine, Dilaudid)      No new meds. No pain meds.   9. LAXATIVES: \"Have you been using any stool softeners, laxatives, or enemas?\"  If yes, ask \"What, how often, and when was the last time?\"      miralax 1 dose daily, metamucil 1 dose daily but skipped that this morning.  10. ACTIVITY:  \"How much walking do you do every day?\"  \"Has your activity level decreased in the past week?\"         Pt gets some activity daily but not " "like she used to get. Might walk 2 miles daily total, not at one time.  11. CAUSE: \"What do you think is causing the constipation?\"         Pt thinks the lorazepam could be causing the problem  12. OTHER SYMPTOMS: \"Do you have any other symptoms?\" (e.g., abdominal pain, bloating, fever, vomiting)        Abdominal muscles hurt when trying to push. No bloating, no fever, no vomiting. Pt felt nauseated this morning but thinks was from stool softeners on an empty stomach. Pt took 5 colace tabs yesterday (100mg), miralax, metamucil & today took 2 colace tabs only so far today  13. MEDICAL HISTORY: \"Do you have a history of hemorrhoids, rectal fissures, or rectal surgery or rectal abscess?\"          Hemorrhoids. No hx fissures, surgery or abscess.    14. PREGNANCY: \"Is there any chance you are pregnant?\" \"When was your last menstrual period?\"    Protocols used: CONSTIPATION-A-OH      "

## 2022-12-07 ENCOUNTER — OFFICE VISIT (OUTPATIENT)
Dept: FAMILY MEDICINE | Facility: CLINIC | Age: 77
End: 2022-12-07
Payer: COMMERCIAL

## 2022-12-07 VITALS
WEIGHT: 150 LBS | TEMPERATURE: 98.3 F | SYSTOLIC BLOOD PRESSURE: 144 MMHG | HEART RATE: 88 BPM | RESPIRATION RATE: 12 BRPM | BODY MASS INDEX: 27.6 KG/M2 | DIASTOLIC BLOOD PRESSURE: 70 MMHG | OXYGEN SATURATION: 96 % | HEIGHT: 62 IN

## 2022-12-07 DIAGNOSIS — R30.0 DYSURIA: ICD-10-CM

## 2022-12-07 DIAGNOSIS — N95.2 ATROPHIC VAGINITIS: ICD-10-CM

## 2022-12-07 DIAGNOSIS — N90.4 LICHEN SCLEROSUS ET ATROPHICUS OF THE VULVA: Primary | ICD-10-CM

## 2022-12-07 LAB
ALBUMIN UR-MCNC: NEGATIVE MG/DL
APPEARANCE UR: CLEAR
BACTERIA #/AREA URNS HPF: ABNORMAL /HPF
BILIRUB UR QL STRIP: NEGATIVE
COLOR UR AUTO: YELLOW
GLUCOSE UR STRIP-MCNC: NEGATIVE MG/DL
HGB UR QL STRIP: ABNORMAL
KETONES UR STRIP-MCNC: NEGATIVE MG/DL
LEUKOCYTE ESTERASE UR QL STRIP: ABNORMAL
NITRATE UR QL: NEGATIVE
PH UR STRIP: 6.5 [PH] (ref 5–7)
RBC #/AREA URNS AUTO: ABNORMAL /HPF
SP GR UR STRIP: 1.01 (ref 1–1.03)
UROBILINOGEN UR STRIP-ACNC: 0.2 E.U./DL
WBC #/AREA URNS AUTO: ABNORMAL /HPF

## 2022-12-07 PROCEDURE — 87086 URINE CULTURE/COLONY COUNT: CPT | Performed by: FAMILY MEDICINE

## 2022-12-07 PROCEDURE — 81001 URINALYSIS AUTO W/SCOPE: CPT | Performed by: FAMILY MEDICINE

## 2022-12-07 PROCEDURE — 99214 OFFICE O/P EST MOD 30 MIN: CPT | Performed by: FAMILY MEDICINE

## 2022-12-07 RX ORDER — CLOBETASOL PROPIONATE 0.5 MG/G
OINTMENT TOPICAL 2 TIMES DAILY
Qty: 60 G | Refills: 1 | Status: SHIPPED | OUTPATIENT
Start: 2022-12-07 | End: 2023-01-20

## 2022-12-07 RX ORDER — ESTRADIOL 0.5 MG/1
0.5 TABLET ORAL
Qty: 12 TABLET | Refills: 3 | Status: SHIPPED | OUTPATIENT
Start: 2022-12-08 | End: 2023-05-16

## 2022-12-07 ASSESSMENT — ANXIETY QUESTIONNAIRES
2. NOT BEING ABLE TO STOP OR CONTROL WORRYING: NOT AT ALL
7. FEELING AFRAID AS IF SOMETHING AWFUL MIGHT HAPPEN: NOT AT ALL
6. BECOMING EASILY ANNOYED OR IRRITABLE: NOT AT ALL
GAD7 TOTAL SCORE: 0
GAD7 TOTAL SCORE: 0
5. BEING SO RESTLESS THAT IT IS HARD TO SIT STILL: NOT AT ALL
4. TROUBLE RELAXING: NOT AT ALL
7. FEELING AFRAID AS IF SOMETHING AWFUL MIGHT HAPPEN: NOT AT ALL
IF YOU CHECKED OFF ANY PROBLEMS ON THIS QUESTIONNAIRE, HOW DIFFICULT HAVE THESE PROBLEMS MADE IT FOR YOU TO DO YOUR WORK, TAKE CARE OF THINGS AT HOME, OR GET ALONG WITH OTHER PEOPLE: NOT DIFFICULT AT ALL
1. FEELING NERVOUS, ANXIOUS, OR ON EDGE: NOT AT ALL
1. FEELING NERVOUS, ANXIOUS, OR ON EDGE: NOT AT ALL
8. IF YOU CHECKED OFF ANY PROBLEMS, HOW DIFFICULT HAVE THESE MADE IT FOR YOU TO DO YOUR WORK, TAKE CARE OF THINGS AT HOME, OR GET ALONG WITH OTHER PEOPLE?: NOT DIFFICULT AT ALL
GAD7 TOTAL SCORE: 0
3. WORRYING TOO MUCH ABOUT DIFFERENT THINGS: NOT AT ALL
IF YOU CHECKED OFF ANY PROBLEMS ON THIS QUESTIONNAIRE, HOW DIFFICULT HAVE THESE PROBLEMS MADE IT FOR YOU TO DO YOUR WORK, TAKE CARE OF THINGS AT HOME, OR GET ALONG WITH OTHER PEOPLE: NOT DIFFICULT AT ALL
7. FEELING AFRAID AS IF SOMETHING AWFUL MIGHT HAPPEN: NOT AT ALL
3. WORRYING TOO MUCH ABOUT DIFFERENT THINGS: NOT AT ALL
5. BEING SO RESTLESS THAT IT IS HARD TO SIT STILL: NOT AT ALL
2. NOT BEING ABLE TO STOP OR CONTROL WORRYING: NOT AT ALL
6. BECOMING EASILY ANNOYED OR IRRITABLE: NOT AT ALL
4. TROUBLE RELAXING: NOT AT ALL

## 2022-12-07 ASSESSMENT — PAIN SCALES - GENERAL: PAINLEVEL: NO PAIN (0)

## 2022-12-07 NOTE — PROGRESS NOTES
"  Assessment & Plan     Lichen sclerosus et atrophicus of the vulva  Start treatment  Follow up in 4 weeks for recheck of this  If not improving will refer to gyn for biopsy  - clobetasol (TEMOVATE) 0.05 % external ointment; Apply topically 2 times daily    Atrophic vaginitis  Discussed rationale behind treatment  - estradiol (ESTRACE) 0.5 MG tablet; Place 1 tablet (0.5 mg) vaginally twice a week    Dysuria   no signs of infection, will culture to be sure  - UA Macro with Reflex to Micro and Culture - lab collect; Future  - UA Macro with Reflex to Micro and Culture - lab collect  - Urine Microscopic  - Urine Culture Aerobic Bacterial - lab collect; Future  - Urine Culture Aerobic Bacterial - lab collect                 No follow-ups on file.    Ale Ordaz MD  [facility]   Idalmis is a 77 year old, presenting for the following health issues:  Urinary Problem      History of Present Illness       Reason for visit:  Bladder infection  Symptom onset:  1-2 weeks ago  Symptoms include:  Burning after urination   Symptom intensity:  Moderate  Symptom progression:  Worsening  Had these symptoms before:  No  Prior treatment description:  N/A  What makes it worse:  No  What makes it better:  No    She eats 2-3 servings of fruits and vegetables daily.She consumes 1 sweetened beverage(s) daily.She exercises with enough effort to increase her heart rate 10 to 19 minutes per day.  She exercises with enough effort to increase her heart rate 3 or less days per week.   She is taking medications regularly.  Today's ANIBAL-7 Score: 0             Review of Systems   Constitutional, HEENT, cardiovascular, pulmonary, gi and gu systems are negative, except as otherwise noted.      Objective    BP (!) 156/80   Pulse 88   Temp 98.3  F (36.8  C) (Tympanic)   Resp 12   Ht 1.575 m (5' 2\")   Wt 68 kg (150 lb)   LMP 01/01/1992   SpO2 96%   BMI 27.44 kg/m    Body mass index is 27.44 " kg/m .  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        Results for orders placed or performed in visit on 12/07/22   UA Macro with Reflex to Micro and Culture - lab collect     Status: Abnormal    Specimen: Urine, Midstream   Result Value Ref Range    Color Urine Yellow Colorless, Straw, Light Yellow, Yellow    Appearance Urine Clear Clear    Glucose Urine Negative Negative mg/dL    Bilirubin Urine Negative Negative    Ketones Urine Negative Negative mg/dL    Specific Gravity Urine 1.010 1.003 - 1.035    Blood Urine Trace (A) Negative    pH Urine 6.5 5.0 - 7.0    Protein Albumin Urine Negative Negative mg/dL    Urobilinogen Urine 0.2 0.2, 1.0 E.U./dL    Nitrite Urine Negative Negative    Leukocyte Esterase Urine Trace (A) Negative   Urine Microscopic     Status: Abnormal   Result Value Ref Range    Bacteria Urine Few (A) None Seen /HPF    RBC Urine 0-2 0-2 /HPF /HPF    WBC Urine 0-5 0-5 /HPF /HPF    Narrative    Urine Culture not indicated

## 2022-12-09 LAB — BACTERIA UR CULT: NORMAL

## 2022-12-22 DIAGNOSIS — J43.2 CENTRILOBULAR EMPHYSEMA (H): ICD-10-CM

## 2022-12-22 RX ORDER — UMECLIDINIUM 62.5 UG/1
AEROSOL, POWDER ORAL
Qty: 30 EACH | Refills: 11 | Status: SHIPPED | OUTPATIENT
Start: 2022-12-22 | End: 2023-12-04

## 2023-01-20 ENCOUNTER — OFFICE VISIT (OUTPATIENT)
Dept: FAMILY MEDICINE | Facility: CLINIC | Age: 78
End: 2023-01-20
Payer: COMMERCIAL

## 2023-01-20 VITALS
DIASTOLIC BLOOD PRESSURE: 64 MMHG | SYSTOLIC BLOOD PRESSURE: 124 MMHG | HEART RATE: 73 BPM | RESPIRATION RATE: 20 BRPM | BODY MASS INDEX: 28.04 KG/M2 | OXYGEN SATURATION: 94 % | WEIGHT: 152.38 LBS | TEMPERATURE: 97.8 F | HEIGHT: 62 IN

## 2023-01-20 DIAGNOSIS — M81.0 AGE-RELATED OSTEOPOROSIS WITHOUT CURRENT PATHOLOGICAL FRACTURE: ICD-10-CM

## 2023-01-20 DIAGNOSIS — N90.4 LICHEN SCLEROSUS ET ATROPHICUS OF THE VULVA: Primary | ICD-10-CM

## 2023-01-20 DIAGNOSIS — C49.A0 MALIGNANT GASTROINTESTINAL STROMAL TUMOR, UNSPECIFIED SITE (H): ICD-10-CM

## 2023-01-20 DIAGNOSIS — J43.2 CENTRILOBULAR EMPHYSEMA (H): ICD-10-CM

## 2023-01-20 DIAGNOSIS — Z87.440 PERSONAL HISTORY OF URINARY TRACT INFECTION: ICD-10-CM

## 2023-01-20 PROCEDURE — 99213 OFFICE O/P EST LOW 20 MIN: CPT | Performed by: FAMILY MEDICINE

## 2023-01-20 RX ORDER — CLOBETASOL PROPIONATE 0.5 MG/G
OINTMENT TOPICAL 2 TIMES DAILY
Qty: 60 G | Refills: 3 | Status: SHIPPED | OUTPATIENT
Start: 2023-01-20 | End: 2024-01-24

## 2023-01-20 RX ORDER — ESTRADIOL 0.1 MG/G
2 CREAM VAGINAL
Qty: 42.5 G | Refills: 3 | Status: SHIPPED | OUTPATIENT
Start: 2023-01-20 | End: 2023-06-05

## 2023-01-20 ASSESSMENT — ANXIETY QUESTIONNAIRES
7. FEELING AFRAID AS IF SOMETHING AWFUL MIGHT HAPPEN: NOT AT ALL
4. TROUBLE RELAXING: NOT AT ALL
GAD7 TOTAL SCORE: 0
8. IF YOU CHECKED OFF ANY PROBLEMS, HOW DIFFICULT HAVE THESE MADE IT FOR YOU TO DO YOUR WORK, TAKE CARE OF THINGS AT HOME, OR GET ALONG WITH OTHER PEOPLE?: NOT DIFFICULT AT ALL
5. BEING SO RESTLESS THAT IT IS HARD TO SIT STILL: NOT AT ALL
IF YOU CHECKED OFF ANY PROBLEMS ON THIS QUESTIONNAIRE, HOW DIFFICULT HAVE THESE PROBLEMS MADE IT FOR YOU TO DO YOUR WORK, TAKE CARE OF THINGS AT HOME, OR GET ALONG WITH OTHER PEOPLE: NOT DIFFICULT AT ALL
7. FEELING AFRAID AS IF SOMETHING AWFUL MIGHT HAPPEN: NOT AT ALL
2. NOT BEING ABLE TO STOP OR CONTROL WORRYING: NOT AT ALL
3. WORRYING TOO MUCH ABOUT DIFFERENT THINGS: NOT AT ALL
GAD7 TOTAL SCORE: 0
6. BECOMING EASILY ANNOYED OR IRRITABLE: NOT AT ALL
1. FEELING NERVOUS, ANXIOUS, OR ON EDGE: NOT AT ALL

## 2023-01-20 ASSESSMENT — PAIN SCALES - GENERAL: PAINLEVEL: MODERATE PAIN (4)

## 2023-01-20 NOTE — PROGRESS NOTES
Assessment & Plan     Lichen sclerosus et atrophicus of the vulva  Atrophic vaginitis  Vast improvement in symptoms and appearance in the past 6 weeks  Continue treatment, try to apply the clobetasol a bit further back to the perineum and posterior introitus.  Discussed with patient.   Will re-examine at next visit  - clobetasol (TEMOVATE) 0.05 % external ointment; Apply topically 2 times daily Profile Rx: patient will contact pharmacy when needed    Age-related osteoporosis without current pathological fracture       Malignant gastrointestinal stromal tumor, unspecified site (H)  On gleevec  Sees oncology    Centrilobular emphysema (H)  Stable  Continue airduo and incruse ellipta    Personal history of urinary tract infection     - estradiol (ESTRACE VAGINAL) 0.1 MG/GM vaginal cream; Place 2 g vaginally three times a week Profile Rx: patient will contact pharmacy when needed                 No follow-ups on file.    Ale Ordaz MD  [facility]   Idalmis is a 77 year old, presenting for the following health issues:  Vaginal Problem      History of Present Illness       Reason for visit:  Follow up for vaginal discomfort    She eats 2-3 servings of fruits and vegetables daily.She consumes 1 sweetened beverage(s) daily.She exercises with enough effort to increase her heart rate 10 to 19 minutes per day.  She exercises with enough effort to increase her heart rate 3 or less days per week.   She is taking medications regularly.  Today's ANIBAL-7 Score: 0       - She declines vaccine update.    - Follow up after 12/7/2022 visit. Due to lichen sclerosus et atrophicus of the vulva. She has been using Temovate 0.05% ointment and Estrace 0.5mg with improvement. No vaginal itching odor or discharge. She notes burning with urination has resolved. No other urinary sx. No fevers.        Review of Systems   Constitutional, HEENT, cardiovascular, pulmonary, gi and gu systems are  "negative, except as otherwise noted.      Objective    /64   Pulse 73   Temp 97.8  F (36.6  C) (Tympanic)   Resp 20   Ht 1.575 m (5' 2\")   Wt 69.1 kg (152 lb 6 oz)   LMP 01/01/1992   SpO2 94%   BMI 27.87 kg/m    Body mass index is 27.87 kg/m .  Physical Exam   GENERAL APPEARANCE: healthy, alert and no distress   (female): vast improvement in the lichen sclerosis other than posterior introitus.   Improvement in quality of the tissue, but still somewhat friable.                     "

## 2023-04-06 ENCOUNTER — LAB (OUTPATIENT)
Dept: LAB | Facility: CLINIC | Age: 78
End: 2023-04-06
Payer: COMMERCIAL

## 2023-04-06 ENCOUNTER — HOSPITAL ENCOUNTER (OUTPATIENT)
Dept: CT IMAGING | Facility: CLINIC | Age: 78
Discharge: HOME | End: 2023-04-06
Attending: INTERNAL MEDICINE | Admitting: INTERNAL MEDICINE
Payer: COMMERCIAL

## 2023-04-06 DIAGNOSIS — Z85.118 HISTORY OF LUNG CANCER: ICD-10-CM

## 2023-04-06 DIAGNOSIS — C49.A0 MALIGNANT GASTROINTESTINAL STROMAL TUMOR, UNSPECIFIED SITE (H): ICD-10-CM

## 2023-04-06 LAB
ALBUMIN SERPL BCG-MCNC: 4 G/DL (ref 3.5–5.2)
ALP SERPL-CCNC: 65 U/L (ref 35–104)
ALT SERPL W P-5'-P-CCNC: 24 U/L (ref 10–35)
ANION GAP SERPL CALCULATED.3IONS-SCNC: 12 MMOL/L (ref 7–15)
AST SERPL W P-5'-P-CCNC: 33 U/L (ref 10–35)
BASOPHILS # BLD AUTO: 0 10E3/UL (ref 0–0.2)
BASOPHILS NFR BLD AUTO: 1 %
BILIRUB SERPL-MCNC: 0.3 MG/DL
BUN SERPL-MCNC: 11.9 MG/DL (ref 8–23)
CALCIUM SERPL-MCNC: 9 MG/DL (ref 8.8–10.2)
CHLORIDE SERPL-SCNC: 101 MMOL/L (ref 98–107)
CREAT SERPL-MCNC: 0.96 MG/DL (ref 0.51–0.95)
DEPRECATED HCO3 PLAS-SCNC: 23 MMOL/L (ref 22–29)
EOSINOPHIL # BLD AUTO: 0.2 10E3/UL (ref 0–0.7)
EOSINOPHIL NFR BLD AUTO: 3 %
ERYTHROCYTE [DISTWIDTH] IN BLOOD BY AUTOMATED COUNT: 16.1 % (ref 10–15)
GFR SERPL CREATININE-BSD FRML MDRD: 60 ML/MIN/1.73M2
GLUCOSE SERPL-MCNC: 110 MG/DL (ref 70–99)
HCT VFR BLD AUTO: 34.4 % (ref 35–47)
HGB BLD-MCNC: 11.2 G/DL (ref 11.7–15.7)
IMM GRANULOCYTES # BLD: 0 10E3/UL
IMM GRANULOCYTES NFR BLD: 0 %
LYMPHOCYTES # BLD AUTO: 1.5 10E3/UL (ref 0.8–5.3)
LYMPHOCYTES NFR BLD AUTO: 26 %
MCH RBC QN AUTO: 29.6 PG (ref 26.5–33)
MCHC RBC AUTO-ENTMCNC: 32.6 G/DL (ref 31.5–36.5)
MCV RBC AUTO: 91 FL (ref 78–100)
MONOCYTES # BLD AUTO: 1 10E3/UL (ref 0–1.3)
MONOCYTES NFR BLD AUTO: 19 %
NEUTROPHILS # BLD AUTO: 2.8 10E3/UL (ref 1.6–8.3)
NEUTROPHILS NFR BLD AUTO: 51 %
NRBC # BLD AUTO: 0 10E3/UL
NRBC BLD AUTO-RTO: 0 /100
PLATELET # BLD AUTO: 205 10E3/UL (ref 150–450)
POTASSIUM SERPL-SCNC: 4.5 MMOL/L (ref 3.4–5.3)
PROT SERPL-MCNC: 6.4 G/DL (ref 6.4–8.3)
RBC # BLD AUTO: 3.78 10E6/UL (ref 3.8–5.2)
SODIUM SERPL-SCNC: 136 MMOL/L (ref 136–145)
WBC # BLD AUTO: 5.6 10E3/UL (ref 4–11)

## 2023-04-06 PROCEDURE — 250N000009 HC RX 250: Performed by: RADIOLOGY

## 2023-04-06 PROCEDURE — 250N000011 HC RX IP 250 OP 636: Performed by: RADIOLOGY

## 2023-04-06 PROCEDURE — 74177 CT ABD & PELVIS W/CONTRAST: CPT

## 2023-04-06 PROCEDURE — 85025 COMPLETE CBC W/AUTO DIFF WBC: CPT

## 2023-04-06 PROCEDURE — 80053 COMPREHEN METABOLIC PANEL: CPT

## 2023-04-06 PROCEDURE — 36415 COLL VENOUS BLD VENIPUNCTURE: CPT

## 2023-04-06 RX ORDER — IOPAMIDOL 755 MG/ML
68 INJECTION, SOLUTION INTRAVASCULAR ONCE
Status: COMPLETED | OUTPATIENT
Start: 2023-04-06 | End: 2023-04-06

## 2023-04-06 RX ADMIN — IOPAMIDOL 68 ML: 755 INJECTION, SOLUTION INTRAVENOUS at 12:47

## 2023-04-06 RX ADMIN — SODIUM CHLORIDE 59 ML: 9 INJECTION, SOLUTION INTRAVENOUS at 12:48

## 2023-04-10 ENCOUNTER — VIRTUAL VISIT (OUTPATIENT)
Dept: ONCOLOGY | Facility: CLINIC | Age: 78
End: 2023-04-10
Attending: INTERNAL MEDICINE
Payer: COMMERCIAL

## 2023-04-10 VITALS — WEIGHT: 150 LBS | BODY MASS INDEX: 26.58 KG/M2 | HEIGHT: 63 IN

## 2023-04-10 DIAGNOSIS — C49.A0 MALIGNANT GASTROINTESTINAL STROMAL TUMOR, UNSPECIFIED SITE (H): Primary | ICD-10-CM

## 2023-04-10 DIAGNOSIS — Z85.118 HISTORY OF LUNG CANCER: ICD-10-CM

## 2023-04-10 PROBLEM — D64.9 ANEMIA DUE TO UNKNOWN MECHANISM: Status: ACTIVE | Noted: 2021-07-15

## 2023-04-10 PROBLEM — K59.00 CONSTIPATION: Status: ACTIVE | Noted: 2021-07-15

## 2023-04-10 PROCEDURE — G0463 HOSPITAL OUTPT CLINIC VISIT: HCPCS | Mod: PN,GT | Performed by: INTERNAL MEDICINE

## 2023-04-10 PROCEDURE — 99215 OFFICE O/P EST HI 40 MIN: CPT | Mod: VID | Performed by: INTERNAL MEDICINE

## 2023-04-10 ASSESSMENT — PAIN SCALES - GENERAL: PAINLEVEL: NO PAIN (0)

## 2023-04-10 NOTE — LETTER
4/10/2023         RE: Idalmis Abdul  [address]  [facility] [address]        Dear Colleague,    Thank you for referring your patient, Idalmis Abdul, to the [facility]. Please see a copy of my visit note below.      Idalmis Abdul returns today in follow-up of recurrent gastrointestinal stromal tumor.    She is now 77 years old and had her primary resected about 15 years ago and then had an intra-abdominal reoccurrence after she had discontinued her adjuvant Gleevec.  She had that resected and has been back on Gleevec since 2007 without evidence of recurrence.  Along the way she had a stage I lung cancer resected in 2010.  She has had difficulty with eyelid edema related to the Gleevec requiring surgical correction.  She also has had Graves' disease and related exophthalmos.  She tells me currently she is feeling quite well and really has not had any significant problems she is needed to see the doctor for.  Her thyroid nodularity which had been getting somewhat larger was biopsied again this past summer and showed nothing of concern.  Her bowels are currently working well.  She notes her breathing is perhaps a little bit worse with her chronic lung disease but she still getting out frequently to go to hockey games which she enjoys.  Her chronic back pain is unchanged.  She uses a walker when ambulating long distances but otherwise is not too limited in her activity level.    GENERAL: Healthy, alert and no distress  EYES: Eyes mild lid edema.  No discharge or erythema, or obvious scleral/conjunctival abnormalities.  RESP: No audible wheeze, cough, or visible cyanosis.  No visible retractions or increased work of breathing.    SKIN: Visible skin clear. No significant rash, abnormal pigmentation or lesions.  NEURO: Cranial nerves grossly intact.  Mentation and speech appropriate for age.  PSYCH: Mentation appears normal, affect normal/bright, judgement and insight intact,  normal speech and appearance well-groomed.    I personally reviewed her CT scan or the results with her.  I see no evidence of recurrence of her intra-abdominal disease.  Her dominant thyroid nodule is slightly larger.  I do not see anything new on her scan.    She has normal electrolytes with a borderline abnormal creatinine.  Her bilirubin, albumin and liver enzymes are normal her blood counts are nearly normal with mild normocytic anemia.    Assessment/plan:  1.  Recurrent intra-abdominal GIST currently without evidence of disease on ongoing Gleevec.  She is tolerating this well with mild associated anemia and lid edema which I do not think warrant any change in her current therapy.  We will continue on with the same dose and schedule and reevaluate her cancer in another year.  2.  Graves' disease and goiter.  She has ongoing close follow-up with an external endocrinologist.  3.  History of lung cancer.  She currently has no evidence of recurrence.  4.  History of tobacco use, she has now quit for 12 years.      Again, thank you for allowing me to participate in the care of your patient.        Sincerely,        Blayne Schmitz MD

## 2023-04-10 NOTE — LETTER
4/10/2023         RE: Idalmis Abdul  [address]  [facility] [address]          Idalmis Abdul returns today in follow-up of recurrent gastrointestinal stromal tumor.    She is now 77 years old and had her primary resected about 15 years ago and then had an intra-abdominal reoccurrence after she had discontinued her adjuvant Gleevec.  She had that resected and has been back on Gleevec since 2007 without evidence of recurrence.  Along the way she had a stage I lung cancer resected in 2010.  She has had difficulty with eyelid edema related to the Gleevec requiring surgical correction.  She also has had Graves' disease and related exophthalmos.  She tells me currently she is feeling quite well and really has not had any significant problems she is needed to see the doctor for.  Her thyroid nodularity which had been getting somewhat larger was biopsied again this past summer and showed nothing of concern.  Her bowels are currently working well.  She notes her breathing is perhaps a little bit worse with her chronic lung disease but she still getting out frequently to go to hockey games which she enjoys.  Her chronic back pain is unchanged.  She uses a walker when ambulating long distances but otherwise is not too limited in her activity level.    GENERAL: Healthy, alert and no distress  EYES: Eyes mild lid edema.  No discharge or erythema, or obvious scleral/conjunctival abnormalities.  RESP: No audible wheeze, cough, or visible cyanosis.  No visible retractions or increased work of breathing.    SKIN: Visible skin clear. No significant rash, abnormal pigmentation or lesions.  NEURO: Cranial nerves grossly intact.  Mentation and speech appropriate for age.  PSYCH: Mentation appears normal, affect normal/bright, judgement and insight intact, normal speech and appearance well-groomed.    I personally reviewed her CT scan or the results with her.  I see no evidence of recurrence of her intra-abdominal  disease.  Her dominant thyroid nodule is slightly larger.  I do not see anything new on her scan.    She has normal electrolytes with a borderline abnormal creatinine.  Her bilirubin, albumin and liver enzymes are normal her blood counts are nearly normal with mild normocytic anemia.    Assessment/plan:  1.  Recurrent intra-abdominal GIST currently without evidence of disease on ongoing Gleevec.  She is tolerating this well with mild associated anemia and lid edema which I do not think warrant any change in her current therapy.  We will continue on with the same dose and schedule and reevaluate her cancer in another year.  2.  Graves' disease and goiter.  She has ongoing close follow-up with an external endocrinologist.  3.  History of lung cancer.  She currently has no evidence of recurrence.  4.  History of tobacco use, she has now quit for 12 years.        Blayne Schmitz MD

## 2023-04-10 NOTE — PROGRESS NOTES
Virtual Visit Details    Type of service:  Video Visit   Video Start Time: 1:30  Video End Time:2:00    Originating Location (pt. Location): Home  Distant Location (provider location):  On-site  Platform used for Video Visit: Nolberto          Idalmis Abdul returns today in follow-up of recurrent gastrointestinal stromal tumor.    She is now 77 years old and had her primary resected about 15 years ago and then had an intra-abdominal reoccurrence after she had discontinued her adjuvant Gleevec.  She had that resected and has been back on Gleevec since 2007 without evidence of recurrence.  Along the way she had a stage I lung cancer resected in 2010.  She has had difficulty with eyelid edema related to the Gleevec requiring surgical correction.  She also has had Graves' disease and related exophthalmos.  She tells me currently she is feeling quite well and really has not had any significant problems she is needed to see the doctor for.  Her thyroid nodularity which had been getting somewhat larger was biopsied again this past summer and showed nothing of concern.  Her bowels are currently working well.  She notes her breathing is perhaps a little bit worse with her chronic lung disease but she still getting out frequently to go to hockey games which she enjoys.  Her chronic back pain is unchanged.  She uses a walker when ambulating long distances but otherwise is not too limited in her activity level.    GENERAL: Healthy, alert and no distress  EYES: Eyes mild lid edema.  No discharge or erythema, or obvious scleral/conjunctival abnormalities.  RESP: No audible wheeze, cough, or visible cyanosis.  No visible retractions or increased work of breathing.    SKIN: Visible skin clear. No significant rash, abnormal pigmentation or lesions.  NEURO: Cranial nerves grossly intact.  Mentation and speech appropriate for age.  PSYCH: Mentation appears normal, affect normal/bright, judgement and insight intact, normal speech and  appearance well-groomed.    I personally reviewed her CT scan or the results with her.  I see no evidence of recurrence of her intra-abdominal disease.  Her dominant thyroid nodule is slightly larger.  I do not see anything new on her scan.    She has normal electrolytes with a borderline abnormal creatinine.  Her bilirubin, albumin and liver enzymes are normal her blood counts are nearly normal with mild normocytic anemia.    Assessment/plan:  1.  Recurrent intra-abdominal GIST currently without evidence of disease on ongoing Gleevec.  She is tolerating this well with mild associated anemia and lid edema which I do not think warrant any change in her current therapy.  We will continue on with the same dose and schedule and reevaluate her cancer in another year.  2.  Graves' disease and goiter.  She has ongoing close follow-up with an external endocrinologist.  3.  History of lung cancer.  She currently has no evidence of recurrence.  4.  History of tobacco use, she has now quit for 12 years.

## 2023-04-10 NOTE — NURSING NOTE
Is the patient currently in the state of [place]? YES    Visit mode:VIDEO    If the visit is dropped, the patient can be reconnected by: VIDEO VISIT: Send to e-mail at: No e-mail address on record    Will anyone else be joining the visit? NO      How would you like to obtain your AVS? Mail      Are changes needed to the allergy or medication list? NO    Reason for visit: Return Visit

## 2023-05-08 ENCOUNTER — OFFICE VISIT (OUTPATIENT)
Dept: FAMILY MEDICINE | Facility: CLINIC | Age: 78
End: 2023-05-08
Payer: COMMERCIAL

## 2023-05-08 VITALS
OXYGEN SATURATION: 96 % | DIASTOLIC BLOOD PRESSURE: 74 MMHG | WEIGHT: 150.38 LBS | HEART RATE: 82 BPM | TEMPERATURE: 99.4 F | RESPIRATION RATE: 16 BRPM | HEIGHT: 62 IN | BODY MASS INDEX: 27.67 KG/M2 | SYSTOLIC BLOOD PRESSURE: 139 MMHG

## 2023-05-08 DIAGNOSIS — M54.42 CHRONIC BILATERAL LOW BACK PAIN WITH LEFT-SIDED SCIATICA: ICD-10-CM

## 2023-05-08 DIAGNOSIS — R29.898 BILATERAL LEG WEAKNESS: ICD-10-CM

## 2023-05-08 DIAGNOSIS — G89.29 CHRONIC BILATERAL LOW BACK PAIN WITH LEFT-SIDED SCIATICA: ICD-10-CM

## 2023-05-08 DIAGNOSIS — K21.9 LPRD (LARYNGOPHARYNGEAL REFLUX DISEASE): Primary | ICD-10-CM

## 2023-05-08 PROCEDURE — 99214 OFFICE O/P EST MOD 30 MIN: CPT | Performed by: FAMILY MEDICINE

## 2023-05-08 ASSESSMENT — PAIN SCALES - GENERAL: PAINLEVEL: NO PAIN (0)

## 2023-05-08 NOTE — PROGRESS NOTES
"  Assessment & Plan     (K21.9) LPRD (laryngopharyngeal reflux disease)  (primary encounter diagnosis)  Comment:  Start omeprazole, let me know if symptoms not improving  Plan: omeprazole (PRILOSEC) 20 MG DR capsule             (M54.42,  G89.29) Chronic bilateral low back pain with left-sided sciatica  Comment:  No red flag symptoms at this time, start pt  Plan: Physical Therapy Referral             (R28.782) Bilateral leg weakness  Comment:    Plan: Physical Therapy Referral                        BMI:   Estimated body mass index is 27.5 kg/m  as calculated from the following:    Height as of this encounter: 1.575 m (5' 2\").    Weight as of this encounter: 68.2 kg (150 lb 6 oz).           Ale Ordaz MD  [facility]   Idalmis is a 77 year old, presenting for the following health issues:  Heartburn      HPI     - Numbness in bilateral feet.  R foot has been numb since surgery.    Now having pain in her left lower back - about 3 months.  That's a sharp pain in her back.  When she changes position then it really flares up.   The left toes are now numb as well.      GERD/Heartburn  Onset/Duration: 1-2 months, PMHx GERD  Description: Feeling of something stuck in throat with a tightening feeling. Episodes can happen 1-4 times per day and happen spiratically. Episodes last about 1 minute. She notes this can happen when sitting doing a puzzle, when walking or when eating  Intensity: moderate  Progression of Symptoms: same  Accompanying Signs & Symptoms:  Does it feel like food gets stuck or trouble swallowing: YES  Nausea: No  Vomiting (bloody?): No  Abdominal Pain: No  Black-Tarry stools: No  Bloody stools: No  History:  Previous similar episodes: No  Previous ulcers: No  Precipitating factors:   Caffeine use: YES- 1 cuo of coffee each morning  Alcohol use: No  NSAID/Aspirin use: YES  Tobacco use: No  Worse with no particular food or drink.  Alleviating factors: " "None  Therapies tried and outcome:             Lifestyle changes: None            Medications: TUMs    More belching/burping.          Review of Systems   Constitutional, HEENT, cardiovascular, pulmonary, gi and gu systems are negative, except as otherwise noted.      Objective    /74   Pulse 82   Temp 99.4  F (37.4  C) (Tympanic)   Resp 16   Ht 1.575 m (5' 2\")   Wt 68.2 kg (150 lb 6 oz)   LMP 01/01/1992   SpO2 96%   BMI 27.50 kg/m    Body mass index is 27.5 kg/m .  Physical Exam   GENERAL: healthy, alert and no distress  NECK: no adenopathy, no asymmetry, masses, or scars and thyroid normal to palpation  RESP: lungs clear to auscultation - no rales, rhonchi or wheezes  CV: regular rate and rhythm, normal S1 S2, no S3 or S4, no murmur, click or rub, no peripheral edema and peripheral pulses strong  ABDOMEN: soft, nontender, no hepatosplenomegaly, no masses and bowel sounds normal  MS: pelvis uneven, tender to palpation along lumbar spine/paraspinals  Negative SLR bilaterally  Strength 4+/5 bilaterally in hip flexors                    "

## 2023-05-16 DIAGNOSIS — N95.2 ATROPHIC VAGINITIS: ICD-10-CM

## 2023-05-16 RX ORDER — ESTRADIOL 0.5 MG/1
0.5 TABLET ORAL
Qty: 12 TABLET | Refills: 3 | Status: SHIPPED | OUTPATIENT
Start: 2023-05-18 | End: 2023-10-17

## 2023-05-26 ENCOUNTER — THERAPY VISIT (OUTPATIENT)
Dept: PHYSICAL THERAPY | Facility: CLINIC | Age: 78
End: 2023-05-26
Attending: FAMILY MEDICINE
Payer: COMMERCIAL

## 2023-05-26 DIAGNOSIS — G89.29 CHRONIC BILATERAL LOW BACK PAIN WITH LEFT-SIDED SCIATICA: ICD-10-CM

## 2023-05-26 DIAGNOSIS — R29.898 BILATERAL LEG WEAKNESS: ICD-10-CM

## 2023-05-26 DIAGNOSIS — M54.42 CHRONIC BILATERAL LOW BACK PAIN WITH LEFT-SIDED SCIATICA: ICD-10-CM

## 2023-05-26 PROCEDURE — 97161 PT EVAL LOW COMPLEX 20 MIN: CPT | Mod: GP | Performed by: PHYSICAL THERAPIST

## 2023-05-26 PROCEDURE — 97110 THERAPEUTIC EXERCISES: CPT | Mod: GP | Performed by: PHYSICAL THERAPIST

## 2023-05-26 NOTE — PROGRESS NOTES
PHYSICAL THERAPY EVALUATION  Type of Visit: Evaluation    See electronic medical record for Abuse and Falls Screening details.    Subjective      Presenting condition or subjective complaint: low back pain, weak legs, numbness in feet  Date of onset: 23    Relevant medical history: Arthritis; Cancer; Chest pain; COPD; High blood pressure; Menopause; Migraines or headaches; Significant weakness; Smoking; Thyroid problems; Vision problems   Dates & types of surgery:      Prior diagnostic imaging/testing results: CT scan; X-ray; Bone scan     Prior therapy history for the same diagnosis, illness or injury: Yes 2 years ago with physical therapy    Prior Level of Function   Transfers: Independent  Ambulation: Independent    Living Environment  Social support: Alone   Type of home: House; 1 level   Stairs to enter the home: Yes 3 Is there a railing: Yes   Ramp: Yes   Stairs inside the home: Yes 8 Is there a railing: Yes   Help at home: Home and Yard maintenance tasks  Equipment owned: Walker with wheels; Grab bars     Employment: No    Hobbies/Interests: walking    Patient goals for therapy: walk up steps    Pain assessment: Pain present  Location: R anterior thigh aching and L foot N&T/Ratin/10     Objective   LUMBAR SPINE EVALUATION  PAIN: Pain Level at Rest: 2/10  Pain Level with Use: 8/10  Pain Location: lumbar spine and travels into right anterior thigh and L foot  Pain Quality: Aching, Burning and stinging  Pain Frequency: constant  Pain is Worst: doesn't matter time of day  Pain is Exacerbated By: standing, walking  Pain is Relieved By: heat, rest and sitting  INTEGUMENTARY (edema, incisions): WNL  POSTURE: WNL  GAIT:   Weightbearing Status: WBAT  Assistive Device(s): None, Walker (four wheeled)  Gait Deviations: Antalgic  Stride length decreased  Balance/Proprioception: poor bilaterally   ROM: left lateral trunk shift posture with moderate lumbar spine curvature to the R, increase in back pain with  "active correction and decrease in back pain with passive correction, lumbar ROM moderately limited d/t pain in all planes, pain with L hip FADIR  PELVIC/SI SCREEN: DNT  Strength: BLE 4-/5 with LLE being weaker than RLE  NEURAL TENSION: will assess sciatic and femoral nerve tension next visit  FLEXIBILITY: hamstring and quad moderately limited bilaterally  LUMBAR/HIP Special Tests:    Left Right   JOSE Negative  Negative    FADIR/Labrum/ROSITA Negative  Positive   Femoral Nerve DNT DNT        Piriformis Positive Positive   Quadrant Testing Positive Positive   SLR Positive Positive   Slump DNT DNT   Stork with Extension Negative  Negative    Ryne Positive Positive           PELVIS/SI SPECIAL TESTS: DNT d/t time limitations  FUNCTIONAL TESTS: Step Test: 4\" with L knee genu valgum  Double Leg Squat: Anterior knee translation, Knee valgus, Hip internal rotation and Improper use of glutes/hips  PALPATION: WNL    Assessment & Plan   CLINICAL IMPRESSIONS   Medical Diagnosis: Chronic bilateral low back pain with left-sided sciatica (M54.42, G89.29); Bilateral leg weakness (R29.898)    Treatment Diagnosis: radicular low back pain, BLE weakness   Impression/Assessment: Patient is a 77 year old female with low back and and BLE complaints.  The following significant findings have been identified: Pain, Decreased ROM/flexibility, Decreased joint mobility, Decreased strength, Impaired balance, Impaired gait, Impaired muscle performance, Decreased activity tolerance and N&T into feet and anterior R thigh. These impairments interfere with their ability to perform self care tasks, recreational activities, household chores, household mobility and community mobility as compared to previous level of function.     Clinical Decision Making (Complexity):   Clinical Presentation: Stable/Uncomplicated  Clinical Presentation Rationale: based on medical and personal factors listed in PT evaluation  Clinical Decision Making (Complexity): Low " complexity    PLAN OF CARE  Treatment Interventions:  Interventions: Gait Training, Manual Therapy, Neuromuscular Re-education, Therapeutic Activity, Therapeutic Exercise    Long Term Goals     PT Goal 1  Goal Identifier: 1.  Goal Description: Patient will note a 25% decrease in radicular symptoms in order to improve QOL.  Target Date: 06/30/23  PT Goal 2  Goal Identifier: 2.  Goal Description: Patient will demonstrate ability to navigate stairs with reciprocal pattern without L knee buckling in order to safely enter home.  Target Date: 07/21/23  PT Goal 3  Goal Identifier: 3.  Goal Description: Patient will be independent with HEP in order to manage pain and improve functional strength.  Target Date: 08/18/23      Frequency of Treatment: 1x/week pending scheduling  Duration of Treatment: 12 wekks      Education Assessment:        Risks and benefits of evaluation/treatment have been explained.   Patient/Family/caregiver agrees with Plan of Care.     Evaluation Time:     PT Eval, Low Complexity Minutes (80979): 30    Signing Clinician: BRIAN [facility]                                                                                   OUTPATIENT PHYSICAL THERAPY      PLAN OF TREATMENT FOR OUTPATIENT REHABILITATION   Patient's Last Name, First Name, Idalmis De Souza YOB: 1945   Provider's Name   [facility]   Medical Record No.  [id number]     Onset Date: 05/08/23  Start of Care Date: 05/26/23     Medical Diagnosis:  Chronic bilateral low back pain with left-sided sciatica (M54.42, G89.29); Bilateral leg weakness (R29.898)      PT Treatment Diagnosis:  radicular low back pain, BLE weakness Plan of Treatment  Frequency/Duration: 1x/week pending scheduling/ 12 wekks    Certification date from 05/26/23 to 08/18/23         See note for plan of treatment details and functional goals     Ale Stovall, PT                          I CERTIFY THE NEED FOR THESE SERVICES FURNISHED UNDER        THIS PLAN OF TREATMENT AND WHILE UNDER MY CARE     (Physician attestation of this document indicates review and certification of the therapy plan).                  Referring Provider:  Ale Ordaz      Initial Assessment  See Epic Evaluation- Start of Care Date: 05/26/23

## 2023-05-31 ENCOUNTER — THERAPY VISIT (OUTPATIENT)
Dept: PHYSICAL THERAPY | Facility: CLINIC | Age: 78
End: 2023-05-31
Attending: FAMILY MEDICINE
Payer: COMMERCIAL

## 2023-05-31 DIAGNOSIS — G89.29 CHRONIC BILATERAL LOW BACK PAIN WITH LEFT-SIDED SCIATICA: Primary | ICD-10-CM

## 2023-05-31 DIAGNOSIS — M54.42 CHRONIC BILATERAL LOW BACK PAIN WITH LEFT-SIDED SCIATICA: Primary | ICD-10-CM

## 2023-05-31 PROCEDURE — 97110 THERAPEUTIC EXERCISES: CPT | Mod: GP | Performed by: PHYSICAL THERAPIST

## 2023-06-01 ENCOUNTER — PATIENT OUTREACH (OUTPATIENT)
Dept: ONCOLOGY | Facility: CLINIC | Age: 78
End: 2023-06-01
Payer: COMMERCIAL

## 2023-06-02 NOTE — PROGRESS NOTES
[facility]: Cancer Care                                                                                          Received call from patient stating she has had leg pain and weakness for a number of years. She reports she has had numbness in right toes/foot for many years. The left toes are now numb as well. She questions if this could be related to long term use of Gleevec. RNCC reviewed it is unlikely this is related, however will review with Dr Schmitz.    Discussed with Dr Schmitz. Does not feel numbness is likely related to Gleevec. RNCC returned call to patient and reviewed above. Patient voiced understanding. No further questions at this time.         Yenifer Driver RN, BSN, OCN   RN Care Coordinator   [facility]

## 2023-06-05 ENCOUNTER — OFFICE VISIT (OUTPATIENT)
Dept: FAMILY MEDICINE | Facility: CLINIC | Age: 78
End: 2023-06-05
Payer: COMMERCIAL

## 2023-06-05 ENCOUNTER — LAB (OUTPATIENT)
Dept: LAB | Facility: CLINIC | Age: 78
End: 2023-06-05
Payer: COMMERCIAL

## 2023-06-05 VITALS
HEIGHT: 62 IN | HEART RATE: 66 BPM | DIASTOLIC BLOOD PRESSURE: 60 MMHG | SYSTOLIC BLOOD PRESSURE: 138 MMHG | TEMPERATURE: 97.9 F | WEIGHT: 151 LBS | BODY MASS INDEX: 27.79 KG/M2 | OXYGEN SATURATION: 97 % | RESPIRATION RATE: 14 BRPM

## 2023-06-05 DIAGNOSIS — G62.9 NEUROPATHY: ICD-10-CM

## 2023-06-05 DIAGNOSIS — G89.29 CHRONIC RADICULAR LUMBAR PAIN: ICD-10-CM

## 2023-06-05 DIAGNOSIS — M81.0 AGE-RELATED OSTEOPOROSIS WITHOUT CURRENT PATHOLOGICAL FRACTURE: Primary | ICD-10-CM

## 2023-06-05 DIAGNOSIS — Z85.118 HISTORY OF LUNG CANCER: ICD-10-CM

## 2023-06-05 DIAGNOSIS — C49.A0 MALIGNANT GASTROINTESTINAL STROMAL TUMOR, UNSPECIFIED SITE (H): ICD-10-CM

## 2023-06-05 DIAGNOSIS — M54.16 CHRONIC RADICULAR LUMBAR PAIN: ICD-10-CM

## 2023-06-05 LAB
ALBUMIN SERPL BCG-MCNC: 4.1 G/DL (ref 3.5–5.2)
ALP SERPL-CCNC: 63 U/L (ref 35–104)
ALT SERPL W P-5'-P-CCNC: 22 U/L (ref 10–35)
ANION GAP SERPL CALCULATED.3IONS-SCNC: 6 MMOL/L (ref 7–15)
AST SERPL W P-5'-P-CCNC: 37 U/L (ref 10–35)
BASOPHILS # BLD AUTO: 0 10E3/UL (ref 0–0.2)
BASOPHILS NFR BLD AUTO: 1 %
BILIRUB SERPL-MCNC: 0.3 MG/DL
BUN SERPL-MCNC: 14 MG/DL (ref 8–23)
CALCIUM SERPL-MCNC: 8.9 MG/DL (ref 8.8–10.2)
CHLORIDE SERPL-SCNC: 101 MMOL/L (ref 98–107)
CREAT SERPL-MCNC: 0.98 MG/DL (ref 0.51–0.95)
DEPRECATED HCO3 PLAS-SCNC: 28 MMOL/L (ref 22–29)
EOSINOPHIL # BLD AUTO: 0.3 10E3/UL (ref 0–0.7)
EOSINOPHIL NFR BLD AUTO: 5 %
ERYTHROCYTE [DISTWIDTH] IN BLOOD BY AUTOMATED COUNT: 15.4 % (ref 10–15)
GFR SERPL CREATININE-BSD FRML MDRD: 59 ML/MIN/1.73M2
GLUCOSE SERPL-MCNC: 106 MG/DL (ref 70–99)
HCT VFR BLD AUTO: 33.6 % (ref 35–47)
HGB BLD-MCNC: 10.9 G/DL (ref 11.7–15.7)
IMM GRANULOCYTES # BLD: 0 10E3/UL
IMM GRANULOCYTES NFR BLD: 0 %
LYMPHOCYTES # BLD AUTO: 1.3 10E3/UL (ref 0.8–5.3)
LYMPHOCYTES NFR BLD AUTO: 25 %
MCH RBC QN AUTO: 28.9 PG (ref 26.5–33)
MCHC RBC AUTO-ENTMCNC: 32.4 G/DL (ref 31.5–36.5)
MCV RBC AUTO: 89 FL (ref 78–100)
MONOCYTES # BLD AUTO: 0.9 10E3/UL (ref 0–1.3)
MONOCYTES NFR BLD AUTO: 18 %
NEUTROPHILS # BLD AUTO: 2.8 10E3/UL (ref 1.6–8.3)
NEUTROPHILS NFR BLD AUTO: 52 %
PLATELET # BLD AUTO: 228 10E3/UL (ref 150–450)
POTASSIUM SERPL-SCNC: 4.7 MMOL/L (ref 3.4–5.3)
PROT SERPL-MCNC: 6.5 G/DL (ref 6.4–8.3)
RBC # BLD AUTO: 3.77 10E6/UL (ref 3.8–5.2)
SODIUM SERPL-SCNC: 135 MMOL/L (ref 136–145)
VIT B12 SERPL-MCNC: 1693 PG/ML (ref 232–1245)
WBC # BLD AUTO: 5.3 10E3/UL (ref 4–11)

## 2023-06-05 PROCEDURE — 36415 COLL VENOUS BLD VENIPUNCTURE: CPT

## 2023-06-05 PROCEDURE — 85025 COMPLETE CBC W/AUTO DIFF WBC: CPT

## 2023-06-05 PROCEDURE — 99214 OFFICE O/P EST MOD 30 MIN: CPT | Performed by: FAMILY MEDICINE

## 2023-06-05 PROCEDURE — 82607 VITAMIN B-12: CPT

## 2023-06-05 PROCEDURE — 80053 COMPREHEN METABOLIC PANEL: CPT

## 2023-06-05 ASSESSMENT — ENCOUNTER SYMPTOMS: NUMBNESS: 1

## 2023-06-05 ASSESSMENT — PAIN SCALES - GENERAL: PAINLEVEL: NO PAIN (0)

## 2023-06-05 NOTE — LETTER
June 6, 2023      Idalmis Abdul     [facility] [address]        Dear ,    We are writing to inform you of your test results.    Normal/acceptable results.     Resulted Orders   Vitamin B12   Result Value Ref Range    Vitamin B12 1,693 (H) 232 - 1,245 pg/mL       If you have any questions or concerns, please call the clinic at the number listed above.       Sincerely,      Ale Ordaz MD

## 2023-06-05 NOTE — PROGRESS NOTES
"  Assessment & Plan       Chronic radicular lumbar pain  Has 2-3 months of L3-4 radiculopathy on the right.  H/o L4-5 fusion in 2019 or so  Has had right foot numbness since shortly after the surgery.     - MR Lumbar Spine w/o & w Contrast; Future    Neuropathy  R/o B12 deficiency  - Vitamin B12; Future               BMI:   Estimated body mass index is 27.62 kg/m  as calculated from the following:    Height as of this encounter: 1.575 m (5' 2\").    Weight as of this encounter: 68.5 kg (151 lb).           Ale Ordaz MD  [facility]   Idalmis is a 77 year old, presenting for the following health issues:  Numbness, Blood Draw, and Health Maintenance (Advised patient of . Declines shots today.)        6/5/2023    11:21 AM   Additional Questions   Roomed by Radha MENDEZ CMA   Accompanied by self     Patient has been having numbness in her feet and isn't sure if it's connected to her back pain. The numbness is constant throughout the day and ends with pain in the evening. Numbness in the right foot for 4 years, is now stating in the left foot. Numbness doesn't radiate up her leg at all.        Had spine surgery in 2018/2019 L4-L5 fusion  After surgery she developed numbness/tingling in right foot  That has been persistent.  Now the numbness has spread to more of the foot.     Has been doing PT for her lower back pain and pain down the front of her right leg L3-4 distribution      Numbness  Associated symptoms include numbness.   History of Present Illness       Back Pain:  She presents for follow up of back pain. Patient's back pain is a recurring problem.  Location of back pain:  Right lower back and left lower back  Description of back pain: burning  Back pain spreads: right thigh, right foot and left foot    Since patient first noticed back pain, pain is: always present, but gets better and worse  Does back pain interfere with her job:  Not applicable      Diabetes:   She " "presents for follow up of diabetes.  She is not checking blood glucose. She is concerned about other. She is having numbness in feet.         Reason for visit:  Numbness in feet    She eats 2-3 servings of fruits and vegetables daily.She consumes 1 sweetened beverage(s) daily.She exercises with enough effort to increase her heart rate 30 to 60 minutes per day.  She exercises with enough effort to increase her heart rate 4 days per week.   She is taking medications regularly.               Review of Systems   Neurological: Positive for numbness.            Objective    /60 (BP Location: Right arm, Patient Position: Sitting, Cuff Size: Adult Regular)   Pulse 66   Temp 97.9  F (36.6  C) (Tympanic)   Resp 14   Ht 1.575 m (5' 2\")   Wt 68.5 kg (151 lb)   LMP 01/01/1992   SpO2 97%   BMI 27.62 kg/m    Body mass index is 27.62 kg/m .  Physical Exam   GENERAL APPEARANCE: healthy, alert and no distress  MS:   MS: pelvis uneven, tender to palpation along lumbar spine/paraspinals  Negative SLR bilaterally  Strength 4+/5 bilaterally in hip flexors                 "

## 2023-06-12 ENCOUNTER — HOSPITAL ENCOUNTER (OUTPATIENT)
Dept: MRI IMAGING | Facility: CLINIC | Age: 78
Discharge: HOME | End: 2023-06-12
Attending: FAMILY MEDICINE | Admitting: FAMILY MEDICINE
Payer: COMMERCIAL

## 2023-06-12 DIAGNOSIS — G89.29 CHRONIC RADICULAR LUMBAR PAIN: ICD-10-CM

## 2023-06-12 DIAGNOSIS — M54.16 CHRONIC RADICULAR LUMBAR PAIN: ICD-10-CM

## 2023-06-12 PROCEDURE — 72148 MRI LUMBAR SPINE W/O DYE: CPT

## 2023-06-14 ENCOUNTER — THERAPY VISIT (OUTPATIENT)
Dept: PHYSICAL THERAPY | Facility: CLINIC | Age: 78
End: 2023-06-14
Attending: FAMILY MEDICINE
Payer: COMMERCIAL

## 2023-06-14 DIAGNOSIS — M51.369 DDD (DEGENERATIVE DISC DISEASE), LUMBAR: ICD-10-CM

## 2023-06-14 DIAGNOSIS — M54.42 CHRONIC BILATERAL LOW BACK PAIN WITH LEFT-SIDED SCIATICA: ICD-10-CM

## 2023-06-14 DIAGNOSIS — M48.061 FORAMINAL STENOSIS OF LUMBAR REGION: Primary | ICD-10-CM

## 2023-06-14 DIAGNOSIS — G89.29 CHRONIC BILATERAL LOW BACK PAIN WITH LEFT-SIDED SCIATICA: ICD-10-CM

## 2023-06-14 PROCEDURE — 97110 THERAPEUTIC EXERCISES: CPT | Mod: GP | Performed by: PHYSICAL THERAPIST

## 2023-06-21 ENCOUNTER — THERAPY VISIT (OUTPATIENT)
Dept: PHYSICAL THERAPY | Facility: CLINIC | Age: 78
End: 2023-06-21
Attending: FAMILY MEDICINE
Payer: COMMERCIAL

## 2023-06-21 DIAGNOSIS — M54.42 CHRONIC BILATERAL LOW BACK PAIN WITH LEFT-SIDED SCIATICA: Primary | ICD-10-CM

## 2023-06-21 DIAGNOSIS — G89.29 CHRONIC BILATERAL LOW BACK PAIN WITH LEFT-SIDED SCIATICA: Primary | ICD-10-CM

## 2023-06-21 PROCEDURE — 97110 THERAPEUTIC EXERCISES: CPT | Mod: GP | Performed by: PHYSICAL THERAPIST

## 2023-06-26 DIAGNOSIS — M54.50 CHRONIC LOW BACK PAIN, UNSPECIFIED BACK PAIN LATERALITY, UNSPECIFIED WHETHER SCIATICA PRESENT: Primary | ICD-10-CM

## 2023-06-26 DIAGNOSIS — G89.29 CHRONIC LOW BACK PAIN, UNSPECIFIED BACK PAIN LATERALITY, UNSPECIFIED WHETHER SCIATICA PRESENT: Primary | ICD-10-CM

## 2023-06-28 ENCOUNTER — THERAPY VISIT (OUTPATIENT)
Dept: PHYSICAL THERAPY | Facility: CLINIC | Age: 78
End: 2023-06-28
Attending: FAMILY MEDICINE
Payer: COMMERCIAL

## 2023-06-28 DIAGNOSIS — M54.42 CHRONIC BILATERAL LOW BACK PAIN WITH LEFT-SIDED SCIATICA: Primary | ICD-10-CM

## 2023-06-28 DIAGNOSIS — G89.29 CHRONIC BILATERAL LOW BACK PAIN WITH LEFT-SIDED SCIATICA: Primary | ICD-10-CM

## 2023-06-28 PROCEDURE — 97110 THERAPEUTIC EXERCISES: CPT | Mod: GP | Performed by: PHYSICAL THERAPIST

## 2023-07-03 ENCOUNTER — ANCILLARY PROCEDURE (OUTPATIENT)
Dept: GENERAL RADIOLOGY | Facility: CLINIC | Age: 78
End: 2023-07-03
Attending: ORTHOPAEDIC SURGERY
Payer: COMMERCIAL

## 2023-07-03 ENCOUNTER — OFFICE VISIT (OUTPATIENT)
Dept: ORTHOPEDICS | Facility: CLINIC | Age: 78
End: 2023-07-03
Payer: COMMERCIAL

## 2023-07-03 DIAGNOSIS — G89.29 CHRONIC BILATERAL LOW BACK PAIN WITH LEFT-SIDED SCIATICA: ICD-10-CM

## 2023-07-03 DIAGNOSIS — E05.00 THYROTOXIC EXOPHTHALMOS: ICD-10-CM

## 2023-07-03 DIAGNOSIS — D64.9 ANEMIA DUE TO UNKNOWN MECHANISM: ICD-10-CM

## 2023-07-03 DIAGNOSIS — G89.29 CHRONIC LOW BACK PAIN, UNSPECIFIED BACK PAIN LATERALITY, UNSPECIFIED WHETHER SCIATICA PRESENT: ICD-10-CM

## 2023-07-03 DIAGNOSIS — Z85.118 HISTORY OF LUNG CANCER: ICD-10-CM

## 2023-07-03 DIAGNOSIS — M48.062 SPINAL STENOSIS OF LUMBAR REGION WITH NEUROGENIC CLAUDICATION: Primary | ICD-10-CM

## 2023-07-03 DIAGNOSIS — I21.4 NSTEMI (NON-ST ELEVATED MYOCARDIAL INFARCTION) (H): ICD-10-CM

## 2023-07-03 DIAGNOSIS — M54.42 CHRONIC BILATERAL LOW BACK PAIN WITH LEFT-SIDED SCIATICA: ICD-10-CM

## 2023-07-03 DIAGNOSIS — E87.6 HYPOKALEMIA: ICD-10-CM

## 2023-07-03 DIAGNOSIS — E05.00 GRAVES' DISEASE: ICD-10-CM

## 2023-07-03 DIAGNOSIS — M81.0 AGE-RELATED OSTEOPOROSIS WITHOUT CURRENT PATHOLOGICAL FRACTURE: ICD-10-CM

## 2023-07-03 DIAGNOSIS — M54.50 CHRONIC LOW BACK PAIN, UNSPECIFIED BACK PAIN LATERALITY, UNSPECIFIED WHETHER SCIATICA PRESENT: ICD-10-CM

## 2023-07-03 DIAGNOSIS — E87.1 HYPONATREMIA: ICD-10-CM

## 2023-07-03 PROCEDURE — 99205 OFFICE O/P NEW HI 60 MIN: CPT | Performed by: ORTHOPAEDIC SURGERY

## 2023-07-03 PROCEDURE — 72110 X-RAY EXAM L-2 SPINE 4/>VWS: CPT | Mod: TC | Performed by: RADIOLOGY

## 2023-07-03 RX ORDER — PREGABALIN 25 MG/1
25 CAPSULE ORAL 2 TIMES DAILY
Qty: 60 CAPSULE | Refills: 3 | Status: SHIPPED | OUTPATIENT
Start: 2023-07-03 | End: 2023-09-28

## 2023-07-03 NOTE — PROGRESS NOTES
Spine Surgery Consultation    REFERRING PHYSICIAN: No ref. provider found   PRIMARY CARE PHYSICIAN: Ale Ordaz           Chief Complaint:   Consult (Foraminal stenosis of lumbar region, DDD / MRI / Ale Ordaz, MD / [facility] / [facility])      History of Present Illness:  Symptom Profile Including: location of symptoms, onset, severity, exacerbating/alleviating factors, previous treatments:        Idalmis Abdul is a 77 year old female who presents today for evaluation of bilateral lower extremity sensory changes.  Previous L4-5 instrumented fusion in 2018 with Jerry Taylor. She reports that since her surgery in 2018 she has had right foot numbness however over the past few months has also developed left foot numbness on the plantar surface as well as the dorsal toes.  She does get painful paresthesias in a similar distribution. She can walk 50 steps prior to needing to sit or lean forward on something. If she has a walker to lean on she can walk 1/4 mile.  Denies any inciting event.  She does have chronic back pain which is largely unchanged secondary to her known scoliosis.    Patient denies saddle anesthesia, loss of bowel or bladder control.     Past treatments tried:  - Physical therapy: Has had extensive therapy and continues to work with physical therapy  - Injections: Reports having epidural steroid injection prior to her surgery in 2018 which did not help  - Medications: Is not currently on gabapentin or Lyrica         Past Medical History:     Past Medical History:   Diagnosis Date     ASCVD (arteriosclerotic cardiovascular disease) 6/14/2012     Benign neoplasm of duodenum, jejunum, and ileum 2003, 2007    Gastrointestinal Stromal Tumor, seen at [facility], Dr. Schmitz, GI oncology-Gleevec treatment.  Surgical removal in fall 2004-no recurrence as of 3/05.     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 2004     Other chronic pain     back     PONV (postoperative nausea and vomiting)      Postsurgical aortocoronary bypass status 7/4/2012     S/P CABG x 4 8/5/2012     Strabismus      Tobacco use disorder     Quit     Unspecified essential hypertension             Past Surgical History:     Past Surgical History:   Procedure Laterality Date     BLEPHAROPLASTY BILATERAL Bilateral 07/17/2019    Procedure: Bilateral Upper Eyelid Blepharoplasty;  Surgeon: Vasile Brasher MD;  Location: [facility]     BYPASS GRAFT ARTERY CORONARY  06/14/2012    Procedure: BYPASS GRAFT ARTERY CORONARY;  Median Sternotomy Coronary Artery Bypass Graft  x 3        C THORACOSCOPY,DX W BX  fall 2003    benign tissue     CATARACT IOL, RT/LT      LE     CHOLECYSTECTOMY  01/01/1963     COLONOSCOPY  04/12/2005     CORONARY ARTERY BYPASS      X4     CREATION PERICARDIAL WINDOW  06/15/2012    Procedure: CREATION PERICARDIAL WINDOW;  Mediastinal Exploration, Control of  Bleeding;  Surgeon: Dwaine Griffin MD;  Location: [facility] OR     EYE SURGERY  2014    left cataract removal     GI SURGERY   and     GIST tumor removal     GYN SURGERY      tubal ligation     HYSTERECTOMY VAGINAL, COLPORRHAPHY ANTERIOR, POSTERIOR, COMBINED N/A 10/17/2017    Procedure: COMBINED HYSTERECTOMY VAGINAL, COLPORRHAPHY ANTERIOR, POSTERIOR;  Total Vaginal Hysterectomy and Posterior Repair,Sacrospinous Vault Suspension;  Surgeon: Ruth Farooq MD;  Location: [facility] OR     l4-5 fusion  2019    Dr. Calvin [facility]     PHACOEMULSIFICATION WITH STANDARD INTRAOCULAR LENS IMPLANT  2014    Procedure: PHACOEMULSIFICATION WITH STANDARD INTRAOCULAR LENS IMPLANT;  Left Kelman Phacoemulsification with Intraocular Lens Implant;  Surgeon: Magnus Mckeon MD;  Location: [facility] OR     RECESSION RESECTION WITH ADJUSTABLE SUTURE BILATERAL Bilateral 2016    Procedure: RECESSION RESECTION WITH ADJUSTABLE SUTURE BILATERAL;  Surgeon: Erma Ordaz MD;  Location: [facility] OR     REPAIR ENTROPION Right 2019    Procedure: Right Upper Lid Entropion Repair;  Surgeon: Vasile Brasher MD;  [facility]: [facility]     REPAIR RETRACTION LID BILATERAL Bilateral 2019    Procedure: Bilateral Upper Eyelid Retraction Repair;  Surgeon: Vasile Brasher MD;  Location: [facility]     SURGICAL HISTORY OF -   1963    cholecystectomy     SURGICAL HISTORY OF -   1970    Tubal Ligation      SURGICAL HISTORY OF -   2003    Explor. Lap, Excision of Small Bowel Tumor      SURGICAL HISTORY OF -   2010    lung cancer removed right lung            Social History:     Social History     Tobacco Use     Smoking status: Former     Packs/day: 0.50     Years: 49.00     Pack years: 24.50     Types: Cigarettes     Quit date: 2010     Years since quittin.2     Smokeless tobacco: Never   Substance Use Topics     Alcohol use: No            Family History:     Family History    Problem Relation Age of Onset     Heart Disease Mother      Osteoporosis Mother      Respiratory Mother         Emphezyma     Hypertension Mother      Heart Disease Father      Alcohol/Drug Father      Hypertension Father      Hypertension Maternal Grandmother      Hypertension Brother      Hypertension Sister      Arthritis Sister      Hypertension Son      Cancer Son         rectal            Allergies:     Allergies   Allergen Reactions     Atorvastatin Cramps                   Medications:     Current Outpatient Medications   Medication     amLODIPine (NORVASC) 5 MG tablet     Blood Pressure Monitoring (ADULT BLOOD PRESSURE CUFF LG) KIT     clobetasol (TEMOVATE) 0.05 % external ointment     estradiol (ESTRACE) 0.5 MG tablet     fluticasone-salmeterol (AIRDUO RESPICLICK) 113-14 MCG/ACT inhaler     imatinib (GLEEVEC) 400 MG tablet     INCRUSE ELLIPTA 62.5 MCG/ACT inhaler     melatonin 5 MG tablet     methimazole (TAPAZOLE) 5 MG tablet     metoprolol tartrate (LOPRESSOR) 100 MG tablet     omeprazole (PRILOSEC) 20 MG DR capsule     rosuvastatin (CRESTOR) 5 MG tablet     No current facility-administered medications for this visit.             Review of Systems:     A 10 point ROS was performed and reviewed. Specific responses to these questions are noted at the end of the document.         Physical Exam:   Alert and oriented, NAD  Has scoliotic deformity, compensates for coronal deformity by shifting pelvis to right  Sagittal balance is neutral  Is able to walk without assistive device. Difficulty walking on toes or heels  Resisted strength testing notable for 4/5 strength with left tibialis anterior, otherwise 5/5 throughout lower extremities  SILT l3-S1             Imaging:   We ordered and independently reviewed new radiographs at this clinic visit. The results were discussed with the patient.  Findings include:    Previous posterior instrumentation with pedicle screw and kurtis fixation at L4-5. Compared to  previous imaging there appears to be progressive lucency about pedicle screws and apparent migration of L4 screws which now appear to violate superior endplate of L4.  Patient has a long sweeping leftward thoracolumbar scoliotic deformity. There is lumbar flatback deformity evidenced by L1-S1 lordosis of 19 degrees              CT reviewed shows extensive spondylosis throughout lumbar spine. Vacuum disc phenomenon seen at T12-L1, L2-3, L3-4 and L5-S1.  There is lucency about the pedicle screws within pedicles. There is a posterior fusion mass bilaterally.     MRI lumbar spine shows multilevel disc herniations including at L2-3 where there  Is left sided paracentral disc with caudal extrusion causing severe stenosis of the lateral recess and foramen, At L3-4 there is severe disc height loss and herniation as well as buckling of ligamentum flavum resulting in severe stenosis, At L4-5 there is spondylolisthesis with disc herniation and hypertrophied facet joint causing severe left foraminal stenosis. L5-S1 there is severe left foraminal stenosis and bilateral lateral recess stenosis compressing the traversing S1 nerve roots.            Assessment and Plan:   Assessment:  77 year old female with idiopathic scoliosis and multilevel degenerative stenosis, history of 2018 L4-5 posterior spinal fusion presenting with worsening of bilateral L5 and S1 radiculopathy.     We discussed the options for treatment recommending continued nonoperative measures.  This includes directed physical therapy aimed at core stabilization, strengthening and stretching.  We also discussed the safe use of over-the-counter nonsteroidal anti-inflammatory drugs and the contraindications for these medications.  We reviewed the option of gabapentin which although it is not indicated for all is often helpful in the setting of symptoms associated with neural element compression.  We also reviewed the option of image guided injections which have both  therapeutic and diagnostic benefit.  While injections are not a cure for degenerative spinal conditions they can provide several weeks to months of symptomatic relief and can also be used in differential manner to better identify the pathologic cause of symptoms.  When these measures fail and the symptoms are severe enough that the patient is no longer able to perform the level they are comfortable with discussion of surgery becomes a reasonable option.  We are not at that point at this time and I recommended continued nonoperative measures and will follow up in 2 to 3 months to review how the patient is doing.    Prescription provided for lyrica.If symptoms do not improve will consider injection.     Time spent on this clinical encounter including previsit chart review, history and physical examination, patient counseling and documentation was 60 minutes on the date of encounter.    Jonathan Mckeon MD  Orthopedic Spine Surgeon  , Department of Orthopedic Surgery    Respectfully,  Jonathan Mckeon MD  Spine Surgery  [facility]

## 2023-07-03 NOTE — LETTER
7/3/2023         RE: Idalmis Abdul  [address]  [facility] [address]        Dear Colleague,    Thank you for referring your patient, Idalmis Abdul, to the [facility]. Please see a copy of my visit note below.    Spine Surgery Consultation    REFERRING PHYSICIAN: No ref. provider found   PRIMARY CARE PHYSICIAN: Ale Ordaz           Chief Complaint:   Consult (Foraminal stenosis of lumbar region, DDD / MRI / Ale Ordaz, MD / [facility] / [facility])      History of Present Illness:  Symptom Profile Including: location of symptoms, onset, severity, exacerbating/alleviating factors, previous treatments:        Idalmis Abdul is a 77 year old female who presents today for evaluation of bilateral lower extremity sensory changes.  Previous L4-5 instrumented fusion in 2018 with Jerry Taylor. She reports that since her surgery in 2018 she has had right foot numbness however over the past few months has also developed left foot numbness on the plantar surface as well as the dorsal toes.  She does get painful paresthesias in a similar distribution. She can walk 50 steps prior to needing to sit or lean forward on something. If she has a walker to lean on she can walk 1/4 mile.  Denies any inciting event.  She does have chronic back pain which is largely unchanged secondary to her known scoliosis.    Patient denies saddle anesthesia, loss of bowel or bladder control.     Past treatments tried:  - Physical therapy: Has had extensive therapy and continues to work with physical therapy  - Injections: Reports having epidural steroid injection prior to her surgery in 2018 which did not help  - Medications: Is not currently on gabapentin or Lyrica         Past Medical History:     Past Medical History:   Diagnosis Date    ASCVD (arteriosclerotic cardiovascular disease) 6/14/2012    Benign neoplasm of duodenum, jejunum, and ileum 2003, 2007    Gastrointestinal Stromal Tumor, seen at  [facility], Dr. Schmitz, GI oncology-Gleevec treatment.  Surgical removal in fall 2004-no recurrence as of 3/05.    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  Sees  @ [facility]     Hyperlipidaemia     Hyperlipidemia LDL goal <160 12/17/2013    Hyperthyroidism 2/4/2014    Hypokalemia 8/5/2012    LBP (low back pain) 7/26/2013    Leiomyoma of uterus, unspecified     NSTEMI (non-ST elevated myocardial infarction) (H) 6/12/2012    NSTEMI (non-ST elevated myocardial infarction) (H)     osteopenia     Other benign neoplasm of connective and other soft tissue of thorax 2003    Hamartoma, lung-?right-s/p  resection 2004    Other chronic pain     back    PONV (postoperative nausea and vomiting)     Postsurgical aortocoronary bypass status 7/4/2012    S/P CABG x 4 8/5/2012    Strabismus     Tobacco use disorder     Quit    Unspecified essential hypertension             Past Surgical History:     Past Surgical History:   Procedure Laterality Date    BLEPHAROPLASTY BILATERAL Bilateral 07/17/2019    Procedure: Bilateral Upper Eyelid Blepharoplasty;  Surgeon: Vasile Brasher MD;  [facility]: [facility]    BYPASS GRAFT ARTERY CORONARY  06/14/2012    Procedure: BYPASS GRAFT ARTERY CORONARY;  Median Sternotomy Coronary Artery Bypass Graft  x 3       C THORACOSCOPY,DX W BX  fall 2003    benign tissue    CATARACT IOL, RT/LT      LE     CHOLECYSTECTOMY  01/01/1963    COLONOSCOPY  04/12/2005    CORONARY ARTERY BYPASS      X4    CREATION PERICARDIAL WINDOW  06/15/2012    Procedure: CREATION PERICARDIAL WINDOW;  Mediastinal Exploration, Control of Bleeding;  Surgeon: Dwaine Griffin MD;  [facility]    EYE SURGERY  01/01/2014    left cataract removal    GI SURGERY  2003 and 2007    GIST tumor removal    GYN SURGERY      tubal ligation    HYSTERECTOMY VAGINAL, COLPORRHAPHY ANTERIOR, POSTERIOR, COMBINED N/A 10/17/2017    Procedure: COMBINED HYSTERECTOMY VAGINAL, COLPORRHAPHY ANTERIOR, POSTERIOR;  Total Vaginal Hysterectomy and Posterior Repair,Sacrospinous Vault Suspension;  Surgeon: Ruth Farooq MD;  Location: [facility] OR    l4-5 fusion  2019     [facility]    PHACOEMULSIFICATION WITH STANDARD INTRAOCULAR LENS IMPLANT  03/24/2014    Procedure: PHACOEMULSIFICATION WITH STANDARD INTRAOCULAR LENS IMPLANT;  Left Kelman Phacoemulsification with Intraocular Lens Implant;  Surgeon: Magnus Mckeon MD;  Location: [facility] OR    RECESSION RESECTION WITH ADJUSTABLE SUTURE BILATERAL Bilateral 07/14/2016    Procedure: RECESSION RESECTION WITH ADJUSTABLE SUTURE BILATERAL;  Surgeon: Erma Ordaz MD;  Location: [facility] OR    REPAIR ENTROPION Right 08/21/2019    Procedure: Right Upper Lid Entropion Repair;  Surgeon: Vasile Brasher MD;  [facility]    REPAIR RETRACTION LID BILATERAL Bilateral 07/17/2019    Procedure: Bilateral Upper Eyelid Retraction Repair;  Surgeon: Vasile Brasher MD;  [facility]    SURGICAL HISTORY OF -   01/01/1963    cholecystectomy    SURGICAL HISTORY OF -   09/01/1970    Tubal Ligation     SURGICAL HISTORY OF -   08/01/2003    Explor. Lap, Excision of Small Bowel Tumor     SURGICAL HISTORY OF -   04/01/2010    lung cancer removed right lung            Social History:     Social History     Tobacco Use    Smoking status: Former     Packs/day: 0.50     Years: 49.00     Pack years: 24.50      Types: Cigarettes     Quit date: 2010     Years since quittin.2    Smokeless tobacco: Never   Substance Use Topics    Alcohol use: No            Family History:     Family History   Problem Relation Age of Onset    Heart Disease Mother     Osteoporosis Mother     Respiratory Mother         Emphezyma    Hypertension Mother     Heart Disease Father     Alcohol/Drug Father     Hypertension Father     Hypertension Maternal Grandmother     Hypertension Brother     Hypertension Sister     Arthritis Sister     Hypertension Son     Cancer Son         rectal            Allergies:     Allergies   Allergen Reactions    Atorvastatin Cramps                   Medications:     Current Outpatient Medications   Medication    amLODIPine (NORVASC) 5 MG tablet    Blood Pressure Monitoring (ADULT BLOOD PRESSURE CUFF LG) KIT    clobetasol (TEMOVATE) 0.05 % external ointment    estradiol (ESTRACE) 0.5 MG tablet    fluticasone-salmeterol (AIRDUO RESPICLICK) 113-14 MCG/ACT inhaler    imatinib (GLEEVEC) 400 MG tablet    INCRUSE ELLIPTA 62.5 MCG/ACT inhaler    melatonin 5 MG tablet    methimazole (TAPAZOLE) 5 MG tablet    metoprolol tartrate (LOPRESSOR) 100 MG tablet    omeprazole (PRILOSEC) 20 MG DR capsule    rosuvastatin (CRESTOR) 5 MG tablet     No current facility-administered medications for this visit.             Review of Systems:     A 10 point ROS was performed and reviewed. Specific responses to these questions are noted at the end of the document.         Physical Exam:   Alert and oriented, NAD  Has scoliotic deformity, compensates for coronal deformity by shifting pelvis to right  Sagittal balance is neutral  Is able to walk without assistive device. Difficulty walking on toes or heels  Resisted strength testing notable for 4/5 strength with left tibialis anterior, otherwise 5/5 throughout lower extremities  SILT l3-S1             Imaging:   We ordered and independently reviewed new radiographs at this clinic visit.  The results were discussed with the patient.  Findings include:    Previous posterior instrumentation with pedicle screw and kurtis fixation at L4-5. Compared to previous imaging there appears to be progressive lucency about pedicle screws and apparent migration of L4 screws which now appear to violate superior endplate of L4.  Patient has a long sweeping leftward thoracolumbar scoliotic deformity. There is lumbar flatback deformity evidenced by L1-S1 lordosis of 19 degrees              CT reviewed shows extensive spondylosis throughout lumbar spine. Vacuum disc phenomenon seen at T12-L1, L2-3, L3-4 and L5-S1.  There is lucency about the pedicle screws within pedicles. There is a posterior fusion mass bilaterally.     MRI lumbar spine shows multilevel disc herniations including at L2-3 where there  Is left sided paracentral disc with caudal extrusion causing severe stenosis of the lateral recess and foramen, At L3-4 there is severe disc height loss and herniation as well as buckling of ligamentum flavum resulting in severe stenosis, At L4-5 there is spondylolisthesis with disc herniation and hypertrophied facet joint causing severe left foraminal stenosis. L5-S1 there is severe left foraminal stenosis and bilateral lateral recess stenosis compressing the traversing S1 nerve roots.            Assessment and Plan:   Assessment:  77 year old female with idiopathic scoliosis and multilevel degenerative stenosis, history of 2018 L4-5 posterior spinal fusion presenting with worsening of bilateral L5 and S1 radiculopathy.     We discussed the options for treatment recommending continued nonoperative measures.  This includes directed physical therapy aimed at core stabilization, strengthening and stretching.  We also discussed the safe use of over-the-counter nonsteroidal anti-inflammatory drugs and the contraindications for these medications.  We reviewed the option of gabapentin which although it is not indicated for all is  often helpful in the setting of symptoms associated with neural element compression.  We also reviewed the option of image guided injections which have both therapeutic and diagnostic benefit.  While injections are not a cure for degenerative spinal conditions they can provide several weeks to months of symptomatic relief and can also be used in differential manner to better identify the pathologic cause of symptoms.  When these measures fail and the symptoms are severe enough that the patient is no longer able to perform the level they are comfortable with discussion of surgery becomes a reasonable option.  We are not at that point at this time and I recommended continued nonoperative measures and will follow up in 2 to 3 months to review how the patient is doing.    Prescription provided for lyrica.If symptoms do not improve will consider injection.     Time spent on this clinical encounter including previsit chart review, history and physical examination, patient counseling and documentation was 60 minutes on the date of encounter.    Jonathan Mckeon MD  Orthopedic Spine Surgeon  , Department of Orthopedic Surgery    Respectfully,  Jonathan Mckeon MD  Spine Surgery  [facility]          Reason For Visit:   Chief Complaint   Patient presents with    Consult     Foraminal stenosis of lumbar region, DDD / MRI / Ale Ordaz MD / [facility] / [facility]       Primary MD: Ale Ordaz  Ref. MD: Dr. Ordaz    ?  No  Occupation NA.  Currently working? No.  Work status?  Retired.    Date of surgery: 2018  Type of surgery: L4-5 OBLIQUE LUMBAR INTERBODY FUSION WITH L4-5 POSTERIOR SPINAL FUSION AND L4 LAMINECTOMY, WITH RIGHT ILIAC CREST BONE GRAFT, USE OF INFUSE     Smoker: No  Request smoking cessation information: No    LMP 01/01/1992     Pain Assessment  Patient Currently in Pain: Yes  0-10 Pain Scale: 3  Primary Pain Location: Back    Oswestry (JO ANN) Questionnaire         7/3/2023     3:01 PM   OSWESTRY DISABILITY INDEX   Count 9   Sum 25   Oswestry Score (%) 55.56 %            Visual Analog Pain Scale  Back Pain Scale 0-10: 3  Right leg pain: 4 (occassionally. Right foot feeling numb since 2018)  Left leg pain: 0 (pt reports feeling numb- 6 months ago)  Neck Pain Scale 0-10: 0  Right arm pain: 0  Left arm pain: 0    Promis 10 Assessment        10/22/2019     1:03 PM   PROMIS 10   In general, would you say your health is: Good   In general, would you say your quality of life is: Good   In general, how would you rate your physical health? Good   In general, how would you rate your mental health, including your mood and your ability to think? Good   In general, how would you rate your satisfaction with your social activities and relationships? Good   In general, please rate how well you carry out your usual social activities and roles Good   To what extent are you able to carry out your everyday physical activities such as walking, climbing stairs, carrying groceries, or moving a chair? Moderately   In the past 7 days, how often have you been bothered by emotional problems such as feeling anxious, depressed, or irritable? Rarely   In the past 7 days, how would you rate your fatigue on average? Mild   In the past 7 days, how would you rate your pain on average, where 0 means no pain, and 10 means worst imaginable pain? 5   In general, would you say your health is: 3   In general, would you say your quality of life is: 3   In general, how would you rate your physical health? 3   In general, how would you rate your mental health, including your mood and your ability to think? 3   In general, how would you rate your satisfaction with your social activities and relationships? 3   In general, please rate how well you carry out your usual social activities and roles. (This includes activities at home, at work and in your community, and responsibilities as a parent, child, spouse, employee,  friend, etc.) 3   To what extent are you able to carry out your everyday physical activities such as walking, climbing stairs, carrying groceries, or moving a chair? 3   In the past 7 days, how often have you been bothered by emotional problems such as feeling anxious, depressed, or irritable? 2   In the past 7 days, how would you rate your fatigue on average? 2   In the past 7 days, how would you rate your pain on average, where 0 means no pain, and 10 means worst imaginable pain? 5   Global Mental Health Score 13   Global Physical Health Score 13   PROMIS TOTAL - SUBSCORES 26                Neema Briggs

## 2023-07-03 NOTE — PROGRESS NOTES
Reason For Visit:   Chief Complaint   Patient presents with     Consult     Foraminal stenosis of lumbar region, DDD / MRI / Ale Ordaz MD / [facility]       Primary MD: Ale Ordaz  Ref. MD: Dr. Ordaz    ?  No  Occupation NA.  Currently working? No.  Work status?  Retired.    Date of surgery: 2018  Type of surgery: L4-5 OBLIQUE LUMBAR INTERBODY FUSION WITH L4-5 POSTERIOR SPINAL FUSION AND L4 LAMINECTOMY, WITH RIGHT ILIAC CREST BONE GRAFT, USE OF INFUSE     Smoker: No  Request smoking cessation information: No    LMP 01/01/1992     Pain Assessment  Patient Currently in Pain: Yes  0-10 Pain Scale: 3  Primary Pain Location: Back    Oswestry (JO ANN) Questionnaire        7/3/2023     3:01 PM   OSWESTRY DISABILITY INDEX   Count 9   Sum 25   Oswestry Score (%) 55.56 %            Visual Analog Pain Scale  Back Pain Scale 0-10: 3  Right leg pain: 4 (occassionally. Right foot feeling numb since 2018)  Left leg pain: 0 (pt reports feeling numb- 6 months ago)  Neck Pain Scale 0-10: 0  Right arm pain: 0  Left arm pain: 0    Promis 10 Assessment        10/22/2019     1:03 PM   PROMIS 10   In general, would you say your health is: Good   In general, would you say your quality of life is: Good   In general, how would you rate your physical health? Good   In general, how would you rate your mental health, including your mood and your ability to think? Good   In general, how would you rate your satisfaction with your social activities and relationships? Good   In general, please rate how well you carry out your usual social activities and roles Good   To what extent are you able to carry out your everyday physical activities such as walking, climbing stairs, carrying groceries, or moving a chair? Moderately   In the past 7 days, how often have you been bothered by emotional problems such as feeling anxious, depressed, or irritable? Rarely   In the past 7 days, how would you rate your fatigue on average? Mild    In the past 7 days, how would you rate your pain on average, where 0 means no pain, and 10 means worst imaginable pain? 5   In general, would you say your health is: 3   In general, would you say your quality of life is: 3   In general, how would you rate your physical health? 3   In general, how would you rate your mental health, including your mood and your ability to think? 3   In general, how would you rate your satisfaction with your social activities and relationships? 3   In general, please rate how well you carry out your usual social activities and roles. (This includes activities at home, at work and in your community, and responsibilities as a parent, child, spouse, employee, friend, etc.) 3   To what extent are you able to carry out your everyday physical activities such as walking, climbing stairs, carrying groceries, or moving a chair? 3   In the past 7 days, how often have you been bothered by emotional problems such as feeling anxious, depressed, or irritable? 2   In the past 7 days, how would you rate your fatigue on average? 2   In the past 7 days, how would you rate your pain on average, where 0 means no pain, and 10 means worst imaginable pain? 5   Global Mental Health Score 13   Global Physical Health Score 13   PROMIS TOTAL - SUBSCORES 26                Neema Briggs

## 2023-07-09 PROBLEM — M48.062 SPINAL STENOSIS OF LUMBAR REGION WITH NEUROGENIC CLAUDICATION: Status: ACTIVE | Noted: 2018-03-22

## 2023-07-13 ENCOUNTER — THERAPY VISIT (OUTPATIENT)
Dept: PHYSICAL THERAPY | Facility: CLINIC | Age: 78
End: 2023-07-13
Attending: FAMILY MEDICINE
Payer: COMMERCIAL

## 2023-07-13 DIAGNOSIS — G89.29 CHRONIC BILATERAL LOW BACK PAIN WITH LEFT-SIDED SCIATICA: Primary | ICD-10-CM

## 2023-07-13 DIAGNOSIS — M54.42 CHRONIC BILATERAL LOW BACK PAIN WITH LEFT-SIDED SCIATICA: Primary | ICD-10-CM

## 2023-07-13 PROCEDURE — 97110 THERAPEUTIC EXERCISES: CPT | Mod: GP | Performed by: PHYSICAL THERAPIST

## 2023-07-21 DIAGNOSIS — C49.A0 MALIGNANT GASTROINTESTINAL STROMAL TUMOR, UNSPECIFIED SITE (H): ICD-10-CM

## 2023-07-21 RX ORDER — IMATINIB MESYLATE 400 MG/1
400 TABLET, FILM COATED ORAL DAILY
Refills: 11 | OUTPATIENT
Start: 2023-07-21

## 2023-07-21 RX ORDER — IMATINIB MESYLATE 400 MG/1
400 TABLET, FILM COATED ORAL DAILY
Qty: 30 TABLET | Refills: 11 | Status: SHIPPED | OUTPATIENT
Start: 2023-07-21 | End: 2024-06-28

## 2023-07-21 NOTE — TELEPHONE ENCOUNTER
We got a rejection for a refill of the imatinib mesylate 400mg tabs stating this patient is unkown to the provider. He is the only doctor on her profile to prescribe this medication and she has been on it for years.

## 2023-07-27 DIAGNOSIS — C49.A0 MALIGNANT GASTROINTESTINAL STROMAL TUMOR, UNSPECIFIED SITE (H): Primary | ICD-10-CM

## 2023-07-28 ENCOUNTER — THERAPY VISIT (OUTPATIENT)
Dept: PHYSICAL THERAPY | Facility: CLINIC | Age: 78
End: 2023-07-28
Attending: FAMILY MEDICINE
Payer: COMMERCIAL

## 2023-07-28 DIAGNOSIS — G89.29 CHRONIC BILATERAL LOW BACK PAIN WITH LEFT-SIDED SCIATICA: Primary | ICD-10-CM

## 2023-07-28 DIAGNOSIS — M54.42 CHRONIC BILATERAL LOW BACK PAIN WITH LEFT-SIDED SCIATICA: Primary | ICD-10-CM

## 2023-07-28 PROCEDURE — 97112 NEUROMUSCULAR REEDUCATION: CPT | Mod: GP | Performed by: PHYSICAL THERAPIST

## 2023-07-28 PROCEDURE — 97110 THERAPEUTIC EXERCISES: CPT | Mod: GP | Performed by: PHYSICAL THERAPIST

## 2023-08-08 ENCOUNTER — PATIENT OUTREACH (OUTPATIENT)
Dept: CARE COORDINATION | Facility: CLINIC | Age: 78
End: 2023-08-08
Payer: COMMERCIAL

## 2023-08-18 ENCOUNTER — THERAPY VISIT (OUTPATIENT)
Dept: PHYSICAL THERAPY | Facility: CLINIC | Age: 78
End: 2023-08-18
Attending: FAMILY MEDICINE
Payer: COMMERCIAL

## 2023-08-18 DIAGNOSIS — M54.42 CHRONIC BILATERAL LOW BACK PAIN WITH LEFT-SIDED SCIATICA: Primary | ICD-10-CM

## 2023-08-18 DIAGNOSIS — G89.29 CHRONIC BILATERAL LOW BACK PAIN WITH LEFT-SIDED SCIATICA: Primary | ICD-10-CM

## 2023-08-18 PROCEDURE — 97110 THERAPEUTIC EXERCISES: CPT | Mod: GP | Performed by: PHYSICAL THERAPIST

## 2023-08-18 PROCEDURE — 97112 NEUROMUSCULAR REEDUCATION: CPT | Mod: GP | Performed by: PHYSICAL THERAPIST

## 2023-08-18 NOTE — PROGRESS NOTES
08/18/23 0500   Appointment Info   Signing clinician's name / credentials Ale Stovall, PT, DPT   Visits Used 8   Medical Diagnosis Chronic bilateral low back pain with left-sided sciatica (M54.42, G89.29); Bilateral leg weakness (R29.898)   PT Tx Diagnosis radicular low back pain, BLE weakness   Quick Adds Certification   Progress Note/Certification   Start of Care Date 05/26/23   Onset of illness/injury or Date of Surgery 05/08/23   Therapy Frequency 1x/week pending scheduling   Predicted Duration 12 weeks   Certification date from 05/26/23   Certification date to 08/18/23   GOALS   PT Goals 2;3   PT Goal 1   Goal Identifier 1.   Goal Description Patient will note a 25% decrease in radicular symptoms in order to improve QOL.   Goal Progress met   Target Date 06/30/23   Date Met 06/21/23   PT Goal 2   Goal Identifier 2.   Goal Description Patient will demonstrate ability to navigate stairs with reciprocal pattern without L knee buckling in order to safely enter home.   Goal Progress met   Target Date 07/21/23   Date Met 07/13/23   PT Goal 3   Goal Identifier 3.   Goal Description Patient will be independent with HEP in order to manage pain and improve functional strength.   Goal Progress progressing, continues to require guidance   Target Date 08/18/23   Subjective Report   Subjective Report Patient states that she has been busy and back is a little sore. Okay to DC from PT.   Treatment Interventions (PT)   Interventions Therapeutic Procedure/Exercise;Neuromuscular Re-education   Therapeutic Procedure/Exercise   Therapeutic Procedures: strength, endurance, ROM, flexibillity minutes (94528) 15   Ther Proc 1 strength, ROM   Ther Proc 1 - Details nustep 3 minutes level 6; heel/toe raises 2x10, golfer  1 x 10 with 5#, discussed POC and reviewed HEP   Skilled Intervention TCs and VCs on safety, technique, tempo   Neuromuscular Re-education   Neuromuscular re-ed of mvmt, balance, coord, kinesthetic sense,  "posture, proprioception minutes (80439) 10   Neuro Re-ed 1 static balance   Neuro Re-ed 1 - Details grapevine 1x25', tandem stance 1x30 seonds, feet together 2x30\", toe tapping 1x10 each side, heel toe walking 2x15'   Plan   Home program [id number]   Plan for next session DC today   Total Session Time   Timed Code Treatment Minutes 25   Total Treatment Time (sum of timed and untimed services) 25         DISCHARGE  Reason for Discharge: No further expectation of progress.    Equipment Issued: HEP    Discharge Plan: Patient to continue home program.    Referring Provider:  Ale Ordaz    "

## 2023-08-20 DIAGNOSIS — J43.2 CENTRILOBULAR EMPHYSEMA (H): ICD-10-CM

## 2023-08-21 RX ORDER — FLUTICASONE PROPIONATE AND SALMETEROL 113; 14 UG/1; UG/1
1 POWDER, METERED RESPIRATORY (INHALATION) 2 TIMES DAILY
Qty: 1 EACH | Refills: 4 | Status: SHIPPED | OUTPATIENT
Start: 2023-08-21 | End: 2024-01-08

## 2023-08-21 NOTE — TELEPHONE ENCOUNTER
"Requested Prescriptions   Pending Prescriptions Disp Refills    fluticasone-salmeterol (AIRDUO RESPICLICK) 113-14 MCG/ACT inhaler [Pharmacy Med Name: FLUTICASONE-SALMETEROL 113-14 AEPB]  4     Sig: INHALE 1 PUFF INTO THE LUNGS 2 TIMES DAILY       Inhaled Steroids Protocol Passed - 8/20/2023  5:01 AM        Passed - Patient is age 12 or older        Passed - Recent (12 mo) or future (30 days) visit within the authorizing provider's specialty     Patient has had an office visit with the authorizing provider or a provider within the authorizing providers department within the previous 12 mos or has a future within next 30 days. See \"Patient Info\" tab in inbasket, or \"Choose Columns\" in Meds & Orders section of the refill encounter.              Passed - Medication is active on med list       Long-Acting Beta Agonist Inhalers Protocol  Passed - 8/20/2023  5:01 AM        Passed - Patient is age 12 or older        Passed - Recent (12 mo) or future (30 days) visit within the authorizing provider's specialty     Patient has had an office visit with the authorizing provider or a provider within the authorizing providers department within the previous 12 mos or has a future within next 30 days. See \"Patient Info\" tab in inbasket, or \"Choose Columns\" in Meds & Orders section of the refill encounter.              Passed - Medication is active on med list             "

## 2023-08-22 ENCOUNTER — PATIENT OUTREACH (OUTPATIENT)
Dept: CARE COORDINATION | Facility: CLINIC | Age: 78
End: 2023-08-22
Payer: COMMERCIAL

## 2023-09-12 DIAGNOSIS — I10 ESSENTIAL HYPERTENSION WITH GOAL BLOOD PRESSURE LESS THAN 140/90: ICD-10-CM

## 2023-09-12 RX ORDER — METOPROLOL TARTRATE 100 MG
100 TABLET ORAL 2 TIMES DAILY
Qty: 180 TABLET | Refills: 2 | Status: SHIPPED | OUTPATIENT
Start: 2023-09-12 | End: 2024-01-24

## 2023-09-28 ENCOUNTER — TELEPHONE (OUTPATIENT)
Dept: ORTHOPEDICS | Facility: CLINIC | Age: 78
End: 2023-09-28
Payer: COMMERCIAL

## 2023-09-28 DIAGNOSIS — M48.062 SPINAL STENOSIS OF LUMBAR REGION WITH NEUROGENIC CLAUDICATION: ICD-10-CM

## 2023-09-28 RX ORDER — PREGABALIN 25 MG/1
50 CAPSULE ORAL 2 TIMES DAILY
Qty: 60 CAPSULE | Refills: 3 | Status: SHIPPED | OUTPATIENT
Start: 2023-09-28 | End: 2023-12-20

## 2023-09-28 NOTE — TELEPHONE ENCOUNTER
[facility] Call Center    Phone Message    May a detailed message be left on voicemail: yes     Reason for Call: Other: Idalmis called Dr. Mckeon started her on a low dose of pregabalin she is wondering if he could increase the dosage because she has not seen any improvement. Please call to discuss     Action Taken: Other: [facility] Spine    Travel Screening: Not Applicable

## 2023-09-28 NOTE — TELEPHONE ENCOUNTER
Signed refill for 2 tablets BID with instructions to start with 2 tablets at night and increase as tolerated.    Edilia Moran PA-C

## 2023-10-02 DIAGNOSIS — E78.5 HYPERLIPIDEMIA LDL GOAL <160: ICD-10-CM

## 2023-10-02 RX ORDER — ROSUVASTATIN CALCIUM 5 MG/1
5 TABLET, COATED ORAL EVERY OTHER DAY
Qty: 45 TABLET | Refills: 3 | Status: SHIPPED | OUTPATIENT
Start: 2023-10-02 | End: 2024-09-04

## 2023-10-10 ENCOUNTER — PATIENT OUTREACH (OUTPATIENT)
Dept: ONCOLOGY | Facility: CLINIC | Age: 78
End: 2023-10-10
Payer: COMMERCIAL

## 2023-10-10 NOTE — PROGRESS NOTES
[facility]: Cancer Care                                                                                          Received call from patient asking to schedule upcoming CT, labs and return visit with Dr Schmitz.Message sent to scheduling team with request. Reassured patient she will receive call for scheduling. Patient also states she will be having dental work to extract 1-2 teeth. RNCC reviewed with elenita Dawson to move forward no changes needed with Gleevec. Updated patient. She has no additional questions or concerns at this time.      Yenifer Driver RN, BSN, OCN   RN Care Coordinator   [facility]

## 2023-10-17 DIAGNOSIS — N95.2 ATROPHIC VAGINITIS: ICD-10-CM

## 2023-10-17 RX ORDER — ESTRADIOL 0.5 MG/1
TABLET ORAL
Qty: 12 TABLET | Refills: 1 | Status: SHIPPED | OUTPATIENT
Start: 2023-10-17 | End: 2024-01-08

## 2023-10-22 DIAGNOSIS — K21.9 LPRD (LARYNGOPHARYNGEAL REFLUX DISEASE): ICD-10-CM

## 2023-11-13 DIAGNOSIS — I10 ESSENTIAL HYPERTENSION WITH GOAL BLOOD PRESSURE LESS THAN 140/90: ICD-10-CM

## 2023-11-13 RX ORDER — AMLODIPINE BESYLATE 5 MG/1
5 TABLET ORAL DAILY
Qty: 90 TABLET | Refills: 0 | Status: SHIPPED | OUTPATIENT
Start: 2023-11-13 | End: 2024-01-24

## 2023-11-27 ENCOUNTER — TELEPHONE (OUTPATIENT)
Dept: PHARMACY | Facility: PHYSICIAN GROUP | Age: 78
End: 2023-11-27
Payer: COMMERCIAL

## 2023-11-27 NOTE — TELEPHONE ENCOUNTER
Called patient to schedule an MTM visit based on an insurance referral from [employer]. Left a voicemail with the MTM scheduling line ([phone]) for the patient to call back.     Kevin Moran, PharmD  Medication Therapy Management Pharmacist  [facility]  Office phone: [phone]

## 2023-12-02 DIAGNOSIS — J43.2 CENTRILOBULAR EMPHYSEMA (H): ICD-10-CM

## 2023-12-04 RX ORDER — UMECLIDINIUM 62.5 UG/1
AEROSOL, POWDER ORAL
Qty: 30 EACH | Refills: 10 | Status: SHIPPED | OUTPATIENT
Start: 2023-12-04

## 2023-12-05 ENCOUNTER — VIRTUAL VISIT (OUTPATIENT)
Dept: PHARMACY | Facility: CLINIC | Age: 78
End: 2023-12-05
Payer: COMMERCIAL

## 2023-12-05 DIAGNOSIS — G47.00 INSOMNIA, UNSPECIFIED TYPE: ICD-10-CM

## 2023-12-05 DIAGNOSIS — I10 HYPERTENSION GOAL BP (BLOOD PRESSURE) < 140/90: ICD-10-CM

## 2023-12-05 DIAGNOSIS — Z78.9 TAKES DIETARY SUPPLEMENTS: ICD-10-CM

## 2023-12-05 DIAGNOSIS — K21.00 GASTROESOPHAGEAL REFLUX DISEASE WITH ESOPHAGITIS WITHOUT HEMORRHAGE: ICD-10-CM

## 2023-12-05 DIAGNOSIS — M54.50 CHRONIC LOW BACK PAIN, UNSPECIFIED BACK PAIN LATERALITY, UNSPECIFIED WHETHER SCIATICA PRESENT: Primary | ICD-10-CM

## 2023-12-05 DIAGNOSIS — C49.A0 MALIGNANT GASTROINTESTINAL STROMAL TUMOR, UNSPECIFIED SITE (H): ICD-10-CM

## 2023-12-05 DIAGNOSIS — K59.00 CONSTIPATION, UNSPECIFIED CONSTIPATION TYPE: ICD-10-CM

## 2023-12-05 DIAGNOSIS — J44.9 CHRONIC OBSTRUCTIVE PULMONARY DISEASE, UNSPECIFIED COPD TYPE (H): ICD-10-CM

## 2023-12-05 DIAGNOSIS — I25.10 ASCVD (ARTERIOSCLEROTIC CARDIOVASCULAR DISEASE): ICD-10-CM

## 2023-12-05 DIAGNOSIS — G89.29 CHRONIC LOW BACK PAIN, UNSPECIFIED BACK PAIN LATERALITY, UNSPECIFIED WHETHER SCIATICA PRESENT: Primary | ICD-10-CM

## 2023-12-05 DIAGNOSIS — E78.5 HYPERLIPIDEMIA LDL GOAL <70: ICD-10-CM

## 2023-12-05 DIAGNOSIS — E05.90 HYPERTHYROIDISM: ICD-10-CM

## 2023-12-05 PROCEDURE — 99607 MTMS BY PHARM ADDL 15 MIN: CPT | Performed by: PHARMACIST

## 2023-12-05 PROCEDURE — 99605 MTMS BY PHARM NP 15 MIN: CPT | Performed by: PHARMACIST

## 2023-12-05 RX ORDER — VITAMIN B COMPLEX
1 TABLET ORAL DAILY
COMMUNITY

## 2023-12-05 RX ORDER — POLYETHYLENE GLYCOL 3350 17 G/17G
1 POWDER, FOR SOLUTION ORAL DAILY PRN
COMMUNITY

## 2023-12-05 RX ORDER — LANOLIN ALCOHOL/MO/W.PET/CERES
1000 CREAM (GRAM) TOPICAL DAILY
COMMUNITY

## 2023-12-05 RX ORDER — ASPIRIN 81 MG/1
81 TABLET, CHEWABLE ORAL DAILY
COMMUNITY

## 2023-12-05 NOTE — LETTER
"Recommended To-Do List      Prepared on: 12/06/2023       You can get the best results from your medications by completing the items on this \"To-Do List.\"      Bring your To-Do List when you go to your doctor. And, share it with your family or caregivers.    My To-Do List:  What we talked about: What I should do:   A new medication that may be of benefit to you    Talk to Dr. Ordaz about using a rescue inhaler (ALBUTEROL)          What we talked about: What I should do:   A new medication that may be of benefit to you    Try famotidine (PEPCID) 20 mg for breakthrough reflux symptoms          What we talked about: What I should do:   An issue with your medication    Change when you are taking amLODIPine (NORVASC) to the evening to prevent dizziness          What we talked about: What I should do:                     "

## 2023-12-05 NOTE — LETTER
December 6, 2023  Idalmis Abdul  [address]   [facility] [address]    Dear Ms. Abdul, [facility]     Thank you for talking with me on Dec 5, 2023 about your health and medications. As a follow-up to our conversation, I have included two documents:      Your Recommended To-Do List has steps you should take to get the best results from your medications.  Your Medication List will help you keep track of your medications and how to take them.    If you want to talk about these documents, please call Jessy Osman RPH at phone: [phone], Monday-Friday 8-4:30pm.    I look forward to working with you and your doctors to make sure your medications work well for you.    Sincerely,  Jessy Osman RPH  [facility] Pharmacist, [facility]

## 2023-12-05 NOTE — LETTER
_  Medication List        Prepared on: 12/06/2023     Bring your Medication List when you go to the doctor, hospital, or   emergency room. And, share it with your family or caregivers.     Note any changes to how you take your medications.  Cross out medications when you no longer use them.    Medication How I take it Why I use it Prescriber   amLODIPine (NORVASC) 5 MG tablet Take 1 tablet (5 mg) by mouth daily  Essential hypertension with goal blood pressure less than 140/90 Ale Ordaz MD   aspirin (ASA) 81 MG chewable tablet Take 81 mg by mouth daily General Health   Patient Reported   clobetasol (TEMOVATE) 0.05 % external ointment Apply topically 2 times daily Profile Rx: patient will contact pharmacy when needed Lichen Sclerosus et Atrophicus of the Vulva Ale Ordaz MD   cyanocobalamin (VITAMIN B-12) 1000 MCG tablet Take 1,000 mcg by mouth daily General Health   Patient Reported   estradiol (ESTRACE) 0.5 MG tablet INSERT ONE TABLET VAGINALLY TWICE WEEKLY AS DIRECTED Atrophic Vaginitis Ale Ordaz MD   fluticasone-salmeterol (AIRDUO RESPICLICK) 113-14 MCG/ACT inhaler INHALE 1 PUFF INTO THE LUNGS 2 TIMES DAILY Centrilobular emphysema (H) Ale Ordaz MD   imatinib (GLEEVEC) 400 MG tablet Take 1 tablet (400 mg) by mouth daily Take with a meal and a large glass of water. Malignant gastrointestinal stromal tumor, unspecified site (H) Blayne Schmitz MD   melatonin 5 MG tablet Take 5 mg by mouth daily Insomnia Patient Reported   methimazole (TAPAZOLE) 5 MG tablet Take 0.5 tablets (2.5 mg) by mouth daily Hyperthyroidism Federico Yang MD   metoprolol tartrate (LOPRESSOR) 100 MG tablet TAKE 1 TABLET (100 MG) BY MOUTH 2 TIMES DAILY Essential hypertension with goal blood pressure less than 140/90 Ale Ordaz MD   omeprazole (PRILOSEC) 20 MG DR capsule TAKE 1 CAPSULE BY MOUTH ONCE DAILY LPRD (Laryngopharyngeal Reflux Disease) Ale Ordaz MD   polyethylene glycol (MIRALAX) 17 g  packet Take 1 packet by mouth daily as needed for constipation Constipation Patient Reported   pregabalin (LYRICA) 25 MG capsule Take 2 capsules (50 mg) by mouth 2 times daily Start with 2 capsules at night for 3-5 days; if tolerated, advance to 2 capsules twice daily Spinal stenosis of lumbar region with neurogenic claudication Edilia Moran PA-C   psyllium (METAMUCIL/KONSYL) 58.6 % powder Take 1 teaspoonful by mouth daily Constipation Patient Reported   rosuvastatin (CRESTOR) 5 MG tablet TAKE 1 TABLET (5 MG) BY MOUTH EVERY OTHER DAY Hyperlipidemia LDL Goal <160 Ale Ordaz MD   umeclidinium (INCRUSE ELLIPTA) 62.5 MCG/ACT inhaler INHALE 1 PUFF INTO THE LUNGS DAILY Centrilobular emphysema (H) Ale Ordaz MD   Vitamin D3 (VITAMIN D, CHOLECALCIFEROL,) 25 mcg (1000 units) tablet Take 1 tablet by mouth daily General Health   Patient Reported         Add new medications, over-the-counter drugs, herbals, vitamins, or  minerals in the blank rows below.    Medication How I take it Why I use it Prescriber                                      Allergies:      atorvastatin        Side effects I have had:               Other Information:              My notes and questions:

## 2023-12-05 NOTE — PROGRESS NOTES
Medication Therapy Management (MTM) Encounter    ASSESSMENT:                            Medication Adherence/Access: No issues identified    Back Pain: patient to follow up with ortho as scheduled.    CAD/HTN: stable - try moving amlodipine to the evening to prevent dizziness.     Hyperlipidemia: stable - due for cholesterol labs (patient states due for follow up visit with PCP).    Hyperthyroidism: stable - continue to follow up with endocrinology as planned.     Insomnia: stable    GIST: stable - continue to follow up with oncology as planned.     Constipation: stable    Supplements: stable    COPD: Recommend patient has a rescue inhaler (albuterol) she states she is due for a follow up visit with Dr. Ordaz and she will discuss with her at that visit.    GERD: Try famotidine for breakthrough symptoms - monitor if burping improves.     PLAN:                            1. Try moving amlodipine to the evening to prevent dizziness.      2. Patient to discuss getting a rescue inhaler at her appointment with her PCP.     3. Try famotidine for breakthrough reflux symptoms.    4. Due for cholesterol labs and follow up with PCP.    Follow-up: yearly    SUBJECTIVE/OBJECTIVE:                          Idalmis Abdul is a 78 year old female called for an initial visit. She was referred to me from her insurance.      Reason for visit: MTM Comprehensive Medication Review.    Allergies/ADRs: Reviewed in chart  Past Medical History: Reviewed in chart  Tobacco: She reports that she quit smoking about 13 years ago. Her smoking use included cigarettes. She has a 24.50 pack-year smoking history. She has never used smokeless tobacco.  Alcohol: not currently using    Medication Adherence/Access: no issues reported    Back Pain:   Pregabalin 50 mg twice daily    Followed by orthopedics - taking pregabalin for back pain, states not helping has upcoming follow up to discuss.      CAD/HTN:   Metoprolol 100 mg twice daily  Amlodipine 5  mg daily   Aspirin 81 mg daily    Tolerating well with occasionally having slight dizziness. History of MI and received a bypass in 2012.      BP Readings from Last 3 Encounters:   06/05/23 138/60   05/08/23 139/74   01/20/23 124/64     Hyperlipidemia:   Rosuvastatin 5mg every other day      Patient reports the following side effects: none.     Recent Labs   Lab Test 09/07/22  1449 05/17/21  1137   CHOL 124 126   HDL 52 49*   LDL 50 60   TRIG 110 85     Hyperthyroidism:   Methimazole 2.5 mg daily.     Followed by endocrinology - yearly follow up.   TSH and free T4 from 10/18/2023 were wnls.    Insomnia:   Melatonin 5 mg at bedtime    Working well, tolerating well.     GIST:   Gleevec 400mg daily     Taking for GI cancer prevention since 2007. Notes no side effects. No tumors - follows up with oncology - was every 6 months - now once a year the last 2 years. CT scan yearly.     Constipation:   Miralax daily as needed  Psyllium one teaspoonful daily    Working well.     Supplements:  Vitamin B12 1000 mcg daily  Vitamin D 1000 units daily    COPD:   ICS/LABA - Airduo Respiclick 113/14 mcg - 1 puff(s) twice daily  LAMA - Incruse Ellipta 62.5 mcg - one puff once daily    Patient does not have a rescue inhaler. States breathing is okay. When breathing gets difficult she sits down and it improves.     Patient rinses their mouth after using steroid inhaler.    Patient reports no current medication side effects.      GERD:  Omeprazole 20 mg daily    Working okay, still burping but feeling in throat is improved. Taking in the morning before breakfast. Burping is starting in the afternoon.    ----------------    I spent 28 minutes with this patient today (an extra 15 minutes was spent creating the Medication Action Plan). All changes were made via collaborative practice agreement with Ale Ordaz MD. A copy of the visit note was provided to the patient's provider(s).    A summary of these recommendations was mailed to the  patient.    Jessy sOman, PharmD  Medication Therapy Management     Telemedicine Visit Details  Type of service:  Telephone visit  Start Time:  1:30 PM  End Time: 1:58 PM     Medication Therapy Recommendations  Chronic obstructive pulmonary disease, unspecified COPD type (H)    Rationale: Synergistic therapy - Needs additional medication therapy - Indication   Recommendation: Start Medication - ALBUTEROL   Status: Contact Provider - Awaiting Response         GERD (gastroesophageal reflux disease)    Rationale: Synergistic therapy - Needs additional medication therapy - Indication   Recommendation: Start Medication - famotidine 20 MG tablet   Status: Accepted - no CPA Needed         Hypertension goal BP (blood pressure) < 140/90    Current Medication: amLODIPine (NORVASC) 5 MG tablet   Rationale: Undesirable effect - Adverse medication event - Safety   Recommendation: Change Administration Time   Status: Patient Agreed - Adherence/Education

## 2023-12-06 NOTE — PATIENT INSTRUCTIONS
"Recommendations from today's MTM visit:                                                    MTM (medication therapy management) is a service provided by a clinical pharmacist designed to help you get the most of out of your medicines.   Today we reviewed what your medicines are for, how to know if they are working, that your medicines are safe and how to make your medicine regimen as easy as possible.      Idalmis,    It was a pleasure talking with you! We discussed the followin. Try taking your amlodipine in the evening to prevent dizziness.      2. Discuss getting a rescue inhaler (albuterol) when you see Dr. Ordza.      3. Try famotidine 20 mg (over the counter) for breakthrough reflux symptoms. Monitor if burping improves.     4. You are due for cholesterol labs and a follow up with Dr. Ordaz.    Follow-up: yearly    It was great speaking with you today.  I value your experience and would be very thankful for your time in providing feedback in our clinic survey. In the next few days, you may receive an email or text message from [employer] with a link to a survey related to your  clinical pharmacist.\"     To schedule another MTM appointment, please call the clinic directly or you may call the MTM scheduling line at [phone] or toll-free at [phone].     My Clinical Pharmacist's contact information:                                                      Please feel free to contact me with any questions or concerns you have.      Keep up the good work!!    Jessy Osman, PharmD  [phone] in clinic on Tuesday and Wednesday        "

## 2023-12-18 ENCOUNTER — OFFICE VISIT (OUTPATIENT)
Dept: ORTHOPEDICS | Facility: CLINIC | Age: 78
End: 2023-12-18
Payer: COMMERCIAL

## 2023-12-18 DIAGNOSIS — G89.29 CHRONIC LOW BACK PAIN, UNSPECIFIED BACK PAIN LATERALITY, UNSPECIFIED WHETHER SCIATICA PRESENT: ICD-10-CM

## 2023-12-18 DIAGNOSIS — M54.50 CHRONIC LOW BACK PAIN, UNSPECIFIED BACK PAIN LATERALITY, UNSPECIFIED WHETHER SCIATICA PRESENT: ICD-10-CM

## 2023-12-18 DIAGNOSIS — M48.062 SPINAL STENOSIS OF LUMBAR REGION WITH NEUROGENIC CLAUDICATION: Primary | ICD-10-CM

## 2023-12-18 PROCEDURE — 99214 OFFICE O/P EST MOD 30 MIN: CPT | Performed by: ORTHOPAEDIC SURGERY

## 2023-12-18 NOTE — LETTER
12/18/2023         RE: Idalmis Abdul  [address]  [facility] [address]        Dear Colleague,    Thank you for referring your patient, Idalmis Abdul, to the [facility]. Please see a copy of my visit note below.    Chief Concern: follow up lumbar radiculopathy    History present illness  Idalmis Abdul is a very pleasant 78 year old female with lumbar spondylosis, prior L4-5 fusion in setting of degenerative versus adult idiopathic scoliosis, .       Since increasing lyrica dose she is having less burning pain in back with pain radiating down front of right thigh to level of knee however that is not constant.     Still very limited in her ability to walk or stand on her feet. She does still need to use walker. If not using walker is limited by back pain.     Bernal angle from superior T10 to inferior L3 measures 33 degrees, rib/pelvis impingement on right.  Lumbar lordosis only measures 20 degrees consistent with flatback deformity. No spondylolisthesis or dynamic instability on flex/ex views (below)    MRI from June 2023 shows multiple levels of severe central and foraminal stenosis including at level of her prior fusion.          Impression and plan:   78 year old woman with prior L4-5 fusion, adult spinal deformity including coronal and fixed sagittal imbalance, multilevel lumbar stenosis. She does not have myelopathic symptoms or cauda equina syndrome.  I counseled patient that surgical approach to her pathology would be an enormous surgery with risk of major complication exceeding 75%. Given this I recommend connecting with pain management team to maximize nonoperative symptom management. In my opinion the risks of the surgery outweigh the potential benefits.  Patient was understanding of this.     I have placed a referral for lumbar epidural steroid injection as well as referral to pain management service for consideration of other modalities they may recommend.  Follow up with me as needed.     Time spent on this clinical encounter including previsit chart review, history and physical examination, patient counseling and documentation was 34 minutes on the date of encounter.    Jonathan Mckeon MD  , Department of Orthopedic Surgery  [facility]

## 2023-12-18 NOTE — NURSING NOTE
Reason For Visit:   Chief Complaint   Patient presents with    RECHECK     PT and med check       Primary MD: Ale Ordaz  Ref. MD: EST     ?  No  Occupation NA.  Currently working? No.  Work status?  Retired.     Date of surgery: 2018  Type of surgery: L4-5 OBLIQUE LUMBAR INTERBODY FUSION WITH L4-5 POSTERIOR SPINAL FUSION AND L4 LAMINECTOMY, WITH RIGHT ILIAC CREST BONE GRAFT, USE OF INFUSE      Smoker: No  Request smoking cessation information: No    LMP 01/01/1992     Pain Assessment  Patient Currently in Pain: Yes  Patient's Stated Pain Goal: 3  Primary Pain Location: Back    Oswestry (JO ANN) Questionnaire        12/18/2023    10:53 AM   OSWESTRY DISABILITY INDEX   Count 9   Sum 19   Oswestry Score (%) 42.22 %          Visual Analog Pain Scale  Back Pain Scale 0-10: 3  Right leg pain: 3  Left leg pain: 0  Neck Pain Scale 0-10: 0  Right arm pain: 0  Left arm pain: 0    Promis 10 Assessment        10/22/2019     1:03 PM   PROMIS 10   In general, would you say your health is: Good   In general, would you say your quality of life is: Good   In general, how would you rate your physical health? Good   In general, how would you rate your mental health, including your mood and your ability to think? Good   In general, how would you rate your satisfaction with your social activities and relationships? Good   In general, please rate how well you carry out your usual social activities and roles Good   To what extent are you able to carry out your everyday physical activities such as walking, climbing stairs, carrying groceries, or moving a chair? Moderately   In the past 7 days, how often have you been bothered by emotional problems such as feeling anxious, depressed, or irritable? Rarely   In the past 7 days, how would you rate your fatigue on average? Mild   In the past 7 days, how would you rate your pain on average, where 0 means no pain, and 10 means worst imaginable pain? 5   In general, would you say  your health is: 3   In general, would you say your quality of life is: 3   In general, how would you rate your physical health? 3   In general, how would you rate your mental health, including your mood and your ability to think? 3   In general, how would you rate your satisfaction with your social activities and relationships? 3   In general, please rate how well you carry out your usual social activities and roles. (This includes activities at home, at work and in your community, and responsibilities as a parent, child, spouse, employee, friend, etc.) 3   To what extent are you able to carry out your everyday physical activities such as walking, climbing stairs, carrying groceries, or moving a chair? 3   In the past 7 days, how often have you been bothered by emotional problems such as feeling anxious, depressed, or irritable? 2   In the past 7 days, how would you rate your fatigue on average? 2   In the past 7 days, how would you rate your pain on average, where 0 means no pain, and 10 means worst imaginable pain? 5   Global Mental Health Score 13   Global Physical Health Score 13   PROMIS TOTAL - SUBSCORES 26                Neema Briggs

## 2023-12-18 NOTE — PROGRESS NOTES
Chief Concern: follow up lumbar radiculopathy    History present illness  Idalmis Abdul is a very pleasant 78 year old female with lumbar spondylosis, prior L4-5 fusion in setting of degenerative versus adult idiopathic scoliosis, .       Since increasing lyrica dose she is having less burning pain in back with pain radiating down front of right thigh to level of knee however that is not constant.     Still very limited in her ability to walk or stand on her feet. She does still need to use walker. If not using walker is limited by back pain.     Bernal angle from superior T10 to inferior L3 measures 33 degrees, rib/pelvis impingement on right.  Lumbar lordosis only measures 20 degrees consistent with flatback deformity. No spondylolisthesis or dynamic instability on flex/ex views (below)    MRI from June 2023 shows multiple levels of severe central and foraminal stenosis including at level of her prior fusion.          Impression and plan:   78 year old woman with prior L4-5 fusion, adult spinal deformity including coronal and fixed sagittal imbalance, multilevel lumbar stenosis. She does not have myelopathic symptoms or cauda equina syndrome.  I counseled patient that surgical approach to her pathology would be an enormous surgery with risk of major complication exceeding 75%. Given this I recommend connecting with pain management team to maximize nonoperative symptom management. In my opinion the risks of the surgery outweigh the potential benefits.  Patient was understanding of this.     I have placed a referral for lumbar epidural steroid injection as well as referral to pain management service for consideration of other modalities they may recommend. Follow up with me as needed.     Time spent on this clinical encounter including previsit chart review, history and physical examination, patient counseling and documentation was 34 minutes on the date of encounter.    Jonathan Mckeon MD  Assistant  Professor, Department of Orthopedic Surgery  [facility]

## 2023-12-20 DIAGNOSIS — M48.062 SPINAL STENOSIS OF LUMBAR REGION WITH NEUROGENIC CLAUDICATION: ICD-10-CM

## 2023-12-20 RX ORDER — PREGABALIN 25 MG/1
50 CAPSULE ORAL 2 TIMES DAILY
Qty: 120 CAPSULE | Refills: 3 | Status: SHIPPED | OUTPATIENT
Start: 2023-12-20 | End: 2024-03-18

## 2023-12-20 NOTE — TELEPHONE ENCOUNTER
[facility] Call Center    Phone Message    May a detailed message be left on voicemail: yes     Reason for Call: Medication Refill Request    Has the patient contacted the pharmacy for the refill? Yes   Name of medication being requested: pregabalin (LYRICA) 25 MG   Provider who prescribed the medication: Dr Mckeon  Pharmacy: [facility]  Date medication is needed: as soon as possible, Patient only has 2 pills left     Action Taken: Other: [id number]    Travel Screening: Not Applicable

## 2024-01-06 DIAGNOSIS — N95.2 ATROPHIC VAGINITIS: ICD-10-CM

## 2024-01-06 DIAGNOSIS — J43.2 CENTRILOBULAR EMPHYSEMA (H): ICD-10-CM

## 2024-01-08 RX ORDER — ESTRADIOL 0.5 MG/1
TABLET ORAL
Qty: 12 TABLET | Refills: 1 | Status: SHIPPED | OUTPATIENT
Start: 2024-01-08 | End: 2024-01-24

## 2024-01-08 RX ORDER — FLUTICASONE PROPIONATE AND SALMETEROL 113; 14 UG/1; UG/1
1 POWDER, METERED RESPIRATORY (INHALATION) 2 TIMES DAILY
Qty: 1 EACH | Refills: 4 | Status: SHIPPED | OUTPATIENT
Start: 2024-01-08 | End: 2024-01-24

## 2024-01-19 ENCOUNTER — TRANSFERRED RECORDS (OUTPATIENT)
Dept: HEALTH INFORMATION MANAGEMENT | Facility: CLINIC | Age: 79
End: 2024-01-19
Payer: COMMERCIAL

## 2024-01-24 ENCOUNTER — LAB (OUTPATIENT)
Dept: LAB | Facility: CLINIC | Age: 79
End: 2024-01-24
Payer: COMMERCIAL

## 2024-01-24 ENCOUNTER — OFFICE VISIT (OUTPATIENT)
Dept: FAMILY MEDICINE | Facility: CLINIC | Age: 79
End: 2024-01-24
Payer: COMMERCIAL

## 2024-01-24 VITALS
HEART RATE: 74 BPM | WEIGHT: 154.5 LBS | SYSTOLIC BLOOD PRESSURE: 142 MMHG | TEMPERATURE: 97.6 F | BODY MASS INDEX: 28.43 KG/M2 | OXYGEN SATURATION: 96 % | DIASTOLIC BLOOD PRESSURE: 70 MMHG | HEIGHT: 62 IN | RESPIRATION RATE: 22 BRPM

## 2024-01-24 DIAGNOSIS — N90.4 LICHEN SCLEROSUS ET ATROPHICUS OF THE VULVA: ICD-10-CM

## 2024-01-24 DIAGNOSIS — I10 ESSENTIAL HYPERTENSION WITH GOAL BLOOD PRESSURE LESS THAN 140/90: ICD-10-CM

## 2024-01-24 DIAGNOSIS — I25.10 ATHEROSCLEROSIS OF CORONARY ARTERY OF NATIVE HEART WITHOUT ANGINA PECTORIS, UNSPECIFIED VESSEL OR LESION TYPE: ICD-10-CM

## 2024-01-24 DIAGNOSIS — N95.2 ATROPHIC VAGINITIS: ICD-10-CM

## 2024-01-24 DIAGNOSIS — J43.2 CENTRILOBULAR EMPHYSEMA (H): ICD-10-CM

## 2024-01-24 DIAGNOSIS — J41.8 MIXED SIMPLE AND MUCOPURULENT CHRONIC BRONCHITIS (H): ICD-10-CM

## 2024-01-24 DIAGNOSIS — Z00.00 ENCOUNTER FOR MEDICARE ANNUAL WELLNESS EXAM: Primary | ICD-10-CM

## 2024-01-24 DIAGNOSIS — C49.A0 MALIGNANT GASTROINTESTINAL STROMAL TUMOR, UNSPECIFIED SITE (H): ICD-10-CM

## 2024-01-24 DIAGNOSIS — M81.0 AGE-RELATED OSTEOPOROSIS WITHOUT CURRENT PATHOLOGICAL FRACTURE: ICD-10-CM

## 2024-01-24 DIAGNOSIS — K21.9 LPRD (LARYNGOPHARYNGEAL REFLUX DISEASE): ICD-10-CM

## 2024-01-24 LAB
ANION GAP SERPL CALCULATED.3IONS-SCNC: 11 MMOL/L (ref 7–15)
BUN SERPL-MCNC: 16.8 MG/DL (ref 8–23)
CALCIUM SERPL-MCNC: 8.6 MG/DL (ref 8.8–10.2)
CHLORIDE SERPL-SCNC: 104 MMOL/L (ref 98–107)
CHOLEST SERPL-MCNC: 127 MG/DL
CREAT SERPL-MCNC: 0.99 MG/DL (ref 0.51–0.95)
DEPRECATED HCO3 PLAS-SCNC: 23 MMOL/L (ref 22–29)
EGFRCR SERPLBLD CKD-EPI 2021: 58 ML/MIN/1.73M2
FASTING STATUS PATIENT QL REPORTED: NO
GLUCOSE SERPL-MCNC: 112 MG/DL (ref 70–99)
HDLC SERPL-MCNC: 48 MG/DL
LDLC SERPL CALC-MCNC: 62 MG/DL
NONHDLC SERPL-MCNC: 79 MG/DL
POTASSIUM SERPL-SCNC: 4.6 MMOL/L (ref 3.4–5.3)
SODIUM SERPL-SCNC: 138 MMOL/L (ref 135–145)
TRIGL SERPL-MCNC: 85 MG/DL

## 2024-01-24 PROCEDURE — G0439 PPPS, SUBSEQ VISIT: HCPCS | Performed by: FAMILY MEDICINE

## 2024-01-24 PROCEDURE — 99214 OFFICE O/P EST MOD 30 MIN: CPT | Mod: 25 | Performed by: FAMILY MEDICINE

## 2024-01-24 PROCEDURE — 80061 LIPID PANEL: CPT

## 2024-01-24 PROCEDURE — 36415 COLL VENOUS BLD VENIPUNCTURE: CPT

## 2024-01-24 PROCEDURE — 80048 BASIC METABOLIC PNL TOTAL CA: CPT

## 2024-01-24 RX ORDER — FLUTICASONE PROPIONATE AND SALMETEROL 100; 50 UG/1; UG/1
1 POWDER RESPIRATORY (INHALATION) EVERY 12 HOURS
Qty: 60 EACH | Refills: 11 | Status: SHIPPED | OUTPATIENT
Start: 2024-01-24

## 2024-01-24 RX ORDER — METOPROLOL TARTRATE 100 MG
100 TABLET ORAL 2 TIMES DAILY
Qty: 180 TABLET | Refills: 3 | Status: SHIPPED | OUTPATIENT
Start: 2024-01-24

## 2024-01-24 RX ORDER — CLOBETASOL PROPIONATE 0.5 MG/G
OINTMENT TOPICAL 2 TIMES DAILY
Qty: 60 G | Refills: 3 | Status: SHIPPED | OUTPATIENT
Start: 2024-01-24

## 2024-01-24 RX ORDER — RESPIRATORY SYNCYTIAL VIRUS VACCINE 120MCG/0.5
0.5 KIT INTRAMUSCULAR ONCE
Qty: 1 EACH | Refills: 0 | Status: CANCELLED | OUTPATIENT
Start: 2024-01-24 | End: 2024-01-24

## 2024-01-24 RX ORDER — ESTRADIOL 0.5 MG/1
TABLET ORAL
Qty: 12 TABLET | Refills: 3 | Status: SHIPPED | OUTPATIENT
Start: 2024-01-24 | End: 2024-07-22

## 2024-01-24 RX ORDER — ALBUTEROL SULFATE 90 UG/1
2 AEROSOL, METERED RESPIRATORY (INHALATION) EVERY 6 HOURS PRN
Qty: 18 G | Refills: 1 | Status: SHIPPED | OUTPATIENT
Start: 2024-01-24 | End: 2024-03-11

## 2024-01-24 RX ORDER — AMLODIPINE BESYLATE 5 MG/1
5 TABLET ORAL DAILY
Qty: 90 TABLET | Refills: 3 | Status: SHIPPED | OUTPATIENT
Start: 2024-01-24

## 2024-01-24 ASSESSMENT — ENCOUNTER SYMPTOMS
FREQUENCY: 0
DIARRHEA: 0
BACK PAIN: 1
COUGH: 1
SHORTNESS OF BREATH: 1
SINUS PRESSURE: 0
PALPITATIONS: 0
EYE PAIN: 1
NAUSEA: 0
CONSTIPATION: 0
FEVER: 0
VOMITING: 0
RHINORRHEA: 0
DIZZINESS: 0
JOINT SWELLING: 0
HEMATURIA: 0
DYSURIA: 0
SINUS PAIN: 0
NERVOUS/ANXIOUS: 0
SORE THROAT: 0
WHEEZING: 1

## 2024-01-24 ASSESSMENT — PAIN SCALES - GENERAL: PAINLEVEL: MODERATE PAIN (5)

## 2024-01-24 ASSESSMENT — ACTIVITIES OF DAILY LIVING (ADL): CURRENT_FUNCTION: NO ASSISTANCE NEEDED

## 2024-01-24 NOTE — LETTER
January 25, 2024      Idalmis Abdul     [facility] [address]        Dear ,    We are writing to inform you of your test results.    Your results are within normal/acceptable limits.     Resulted Orders   Lipid panel reflex to direct LDL Non-fasting   Result Value Ref Range    Cholesterol 127 <200 mg/dL    Triglycerides 85 <150 mg/dL    Direct Measure HDL 48 (L) >=50 mg/dL    LDL Cholesterol Calculated 62 <=100 mg/dL    Non HDL Cholesterol 79 <130 mg/dL    Patient Fasting > 8hrs? No     Narrative    Cholesterol  Desirable:  <200 mg/dL    Triglycerides  Normal:  Less than 150 mg/dL  Borderline High:  150-199 mg/dL  High:  200-499 mg/dL  Very High:  Greater than or equal to 500 mg/dL    Direct Measure HDL  Female:  Greater than or equal to 50 mg/dL   Male:  Greater than or equal to 40 mg/dL    LDL Cholesterol  Desirable:  <100mg/dL  Above Desirable:  100-129 mg/dL   Borderline High:  130-159 mg/dL   High:  160-189 mg/dL   Very High:  >= 190 mg/dL    Non HDL Cholesterol  Desirable:  130 mg/dL  Above Desirable:  130-159 mg/dL  Borderline High:  160-189 mg/dL  High:  190-219 mg/dL  Very High:  Greater than or equal to 220 mg/dL   Basic metabolic panel  (Ca, Cl, CO2, Creat, Gluc, K, Na, BUN)   Result Value Ref Range    Sodium 138 135 - 145 mmol/L      Comment:      Reference intervals for this test were updated on 09/26/2023 to more accurately reflect our healthy population. There may be differences in the flagging of prior results with similar values performed with this method. Interpretation of those prior results can be made in the context of the updated reference intervals.     Potassium 4.6 3.4 - 5.3 mmol/L    Chloride 104 98 - 107 mmol/L    Carbon Dioxide (CO2) 23 22 - 29 mmol/L    Anion Gap 11 7 - 15 mmol/L    Urea Nitrogen 16.8 8.0 - 23.0 mg/dL    Creatinine 0.99 (H) 0.51 - 0.95 mg/dL    GFR Estimate 58 (L) >60 mL/min/1.73m2    Calcium 8.6 (L) 8.8 - 10.2 mg/dL    Glucose 112 (H) 70 - 99  mg/dL       If you have any questions or concerns, please call the clinic at the number listed above.       Sincerely,      Ale Ordaz MD

## 2024-01-24 NOTE — PATIENT INSTRUCTIONS
Patient Education   Personalized Prevention Plan  You are due for the preventive services outlined below.  Your care team is available to assist you in scheduling these services.  If you have already completed any of these items, please share that information with your care team to update in your medical record.  Health Maintenance Due   Topic Date Due     Zoster (Shingles) Vaccine (1 of 2) Never done     RSV VACCINE (Pregnancy & 60+) (1 - 1-dose 60+ series) Never done     Osteoporosis Screening  12/17/2020     COVID-19 Vaccine (3 - Moderna risk series) 01/19/2022     Cholesterol Lab  09/07/2023     ANNUAL REVIEW OF HM ORDERS  09/07/2023     FALL RISK ASSESSMENT  09/07/2023     Diptheria Tetanus Pertussis (DTAP/TDAP/TD) Vaccine (2 - Td or Tdap) 09/13/2023     PHQ-2 (once per calendar year)  01/01/2024

## 2024-01-24 NOTE — PROGRESS NOTES
"Preventive Care Visit  [facility]  Ale Ordaz MD, Family Medicine  Jan 24, 2024      SUBJECTIVE:   Idalmis is a 78 year old, presenting for the following:  Medicare Visit        1/24/2024     2:25 PM   Additional Questions   Roomed by Dana WHALEY CMA   Accompanied by Self     Are you in the first 12 months of your Medicare coverage?  No    History of Present Illness       Reason for visit:  Prescription refill and breathing    She eats 2-3 servings of fruits and vegetables daily.She consumes 1 sweetened beverage(s) daily.She exercises with enough effort to increase her heart rate 20 to 29 minutes per day.  She exercises with enough effort to increase her heart rate 4 days per week.   She is taking medications regularly.  She is not taking prescribed medications regularly due to None.  Healthy Habits:     In general, how would you rate your overall health?  Good    Frequency of exercise:  None    Duration of exercise:  Less than 15 minutes    Do you usually eat at least 4 servings of fruit and vegetables a day, include whole grains    & fiber and avoid regularly eating high fat or \"junk\" foods?  Yes    Taking medications regularly:  0    Barriers to taking medications:  None    Medication side effects:  None    Ability to successfully perform activities of daily living:  No assistance needed    Home Safety:  No safety concerns identified    Hearing Impairment:  No hearing concerns    In the past 6 months, have you been bothered by leaking of urine?  No    In general, how would you rate your overall mental or emotional health?  Very good    Additional concerns today:  Yes          Have you ever done Advance Care Planning? (For example, a Health Directive, POLST, or a discussion with a medical provider or your loved ones about your wishes): Yes, advance care planning is on file.       Fall risk  Fallen 2 or more times in the past year?: No  Any fall with injury in the past year?: " No    Cognitive Screening   1) Repeat 3 items (Leader, Season, Table)    2) Clock draw: NORMAL  3) 3 item recall: Recalls 2 objects   Results: NORMAL clock, 1-2 items recalled: COGNITIVE IMPAIRMENT LESS LIKELY    Mini-CogTM Copyright GABRIELA Shea. Licensed by the author for use in [facility]; reprinted with permission (radha@[facility]). All rights reserved.      Do you have sleep apnea, excessive snoring or daytime drowsiness? : no    Reviewed and updated as needed this visit by clinical staff   Tobacco  Allergies  Meds              Reviewed and updated as needed this visit by Provider                  Social History     Tobacco Use    Smoking status: Former     Packs/day: 0.50     Years: 49.00     Additional pack years: 0.00     Total pack years: 24.50     Types: Cigarettes     Quit date: 2010     Years since quittin.7    Smokeless tobacco: Never   Substance Use Topics    Alcohol use: No             2022     2:19 PM   Alcohol Use   Prescreen: >3 drinks/day or >7 drinks/week? Not Applicable     Do you have a current opioid prescription? No  Do you use any other controlled substances or medications that are not prescribed by a provider? None        Hyperlipidemia Follow-Up  Rosuvastatin 5mg every other day   Are you regularly taking any medication or supplement to lower your cholesterol?   Yes- statin  Are you having muscle aches or other side effects that you think could be caused by your cholesterol lowering medication?  No    Hypertension Follow-up  Metoprolol 100mg bid, amlodipine 5mg qd  Do you check your blood pressure regularly outside of the clinic? No   Are you following a low salt diet? No  Are your blood pressures ever more than 140 on the top number (systolic) OR more   than 90 on the bottom number (diastolic), for example 140/90? N/A    BP Readings from Last 6 Encounters:   24 (!) 142/70   23 138/60   23 139/74   23 124/64   22 (!) 144/70   22  122/70       Current providers sharing in care for this patient include:   Patient Care Team:  Ale Ordaz MD as PCP - General (Family Practice)  Blayne Schmitz MD as MD (Oncology)  Ara Guzman APRN CNP as Nurse Practitioner (Nurse Practitioner)  Swetha Antoine MD as MD (INTERNAL MEDICINE - ENDOCRINOLOGY, DIABETES & METABOLISM)  Darlene Sultana MD as MD (Family Practice)  Magnus Mckeon MD as MD (Ophthalmology)  Erma Ordaz MD as MD (Ophthalmology)  Yenifer Driver RN as Nurse Coordinator (Oncology)  Sangeetha Murphy as Student  Jessy Osman [facility] as Pharmacist (Pharmacist)  lAe Ordaz MD as Assigned PCP  Blayne Schmitz MD as Assigned Cancer Care Provider  Jonathan Mckeon MD as Assigned Musculoskeletal Provider  Kevin Moran RPH as Pharmacist (Pharmacist)  Jessy Osman RPH as Assigned [place] Pharmacist    The following health maintenance items are reviewed in Epic and correct as of today:  Health Maintenance   Topic Date Due    ZOSTER IMMUNIZATION (1 of 2) Never done    RSV VACCINE (Pregnancy & 60+) (1 - 1-dose 60+ series) Never done    DEXA  12/17/2020    COVID-19 Vaccine (3 - Moderna risk series) 01/19/2022    LIPID  09/07/2023    ANNUAL REVIEW OF HM ORDERS  09/07/2023    DTAP/TDAP/TD IMMUNIZATION (2 - Td or Tdap) 09/13/2023    LUNG CANCER SCREENING  04/06/2024    MEDICARE ANNUAL WELLNESS VISIT  01/24/2025    FALL RISK ASSESSMENT  01/24/2025    ADVANCE CARE PLANNING  01/24/2029    SPIROMETRY  Completed    COPD ACTION PLAN  Completed    PHQ-2 (once per calendar year)  Completed    INFLUENZA VACCINE  Completed    Pneumococcal Vaccine: 65+ Years  Completed    IPV IMMUNIZATION  Aged Out    HPV IMMUNIZATION  Aged Out    MENINGITIS IMMUNIZATION  Aged Out    RSV MONOCLONAL ANTIBODY  Aged Out    URINE DRUG SCREEN  Discontinued    HEPATITIS C SCREENING  Discontinued    MAMMO SCREENING  Discontinued    COLORECTAL CANCER SCREENING   "Discontinued     Lab work is in process        Pertinent mammograms are reviewed under the imaging tab.  Review of Systems   Constitutional:  Negative for fever.   HENT:  Negative for congestion, ear pain, hearing loss, rhinorrhea, sinus pressure, sinus pain and sore throat.    Eyes:  Positive for pain.   Respiratory:  Positive for cough, shortness of breath and wheezing.    Cardiovascular:  Negative for chest pain, palpitations and leg swelling.   Gastrointestinal:  Negative for constipation, diarrhea, nausea and vomiting.   Genitourinary:  Negative for dysuria, frequency, hematuria, pelvic pain, urgency, vaginal bleeding, vaginal discharge and vaginal pain.   Musculoskeletal:  Positive for back pain. Negative for joint swelling.   Skin: Negative.    Neurological:  Negative for dizziness.   Psychiatric/Behavioral:  The patient is not nervous/anxious.           OBJECTIVE:   BP (!) 142/70   Pulse 74   Temp 97.6  F (36.4  C) (Tympanic)   Resp 22   Ht 1.575 m (5' 2\")   Wt 70.1 kg (154 lb 8 oz)   LMP 01/01/1992   SpO2 96%   BMI 28.26 kg/m     Estimated body mass index is 28.26 kg/m  as calculated from the following:    Height as of this encounter: 1.575 m (5' 2\").    Weight as of this encounter: 70.1 kg (154 lb 8 oz).  Physical Exam  GENERAL: alert and no distress  NECK: no adenopathy, no asymmetry, masses, or scars  RESP: lungs clear to auscultation - no rales, rhonchi or wheezes  CV: regular rate and rhythm, normal S1 S2, no S3 or S4, no murmur, click or rub, no peripheral edema  ABDOMEN: soft, nontender, no hepatosplenomegaly, no masses and bowel sounds normal  MS: no gross musculoskeletal defects noted, no edema  NEURO: Normal strength and tone, mentation intact and speech normal  PSYCH: mentation appears normal, affect normal/bright    Diagnostic Test Results:  Labs reviewed in Epic    ASSESSMENT / PLAN:   Encounter for Medicare annual wellness exam       Age-related osteoporosis without current " pathological fracture     - DEXA HIP/PELVIS/SPINE - Future; Future    Atherosclerosis of coronary artery of native heart without angina pectoris, unspecified vessel or lesion type  Asymptomatic continue aspirin, statin, beta blocker and     - Lipid panel reflex to direct LDL Non-fasting; Future    Essential hypertension with goal blood pressure less than 140/90   Fair control, patient says blood pressure is better at home  Will have her bring in her home cuff and have it checked for accuracy  - amLODIPine (NORVASC) 5 MG tablet; Take 1 tablet (5 mg) by mouth daily  - metoprolol tartrate (LOPRESSOR) 100 MG tablet; Take 1 tablet (100 mg) by mouth 2 times daily  - Basic metabolic panel  (Ca, Cl, CO2, Creat, Gluc, K, Na, BUN); Future    Mixed simple and mucopurulent chronic bronchitis (H)       Malignant gastrointestinal stromal tumor, unspecified site (H)  Seeing oncology  Is on Gleevec     Atrophic vaginitis     - estradiol (ESTRACE) 0.5 MG tablet; INSERT ONE TABLET VAGINALLY TWICE WEEKLY AS DIRECTED    Lichen sclerosus et atrophicus of the vulva     - clobetasol (TEMOVATE) 0.05 % external ointment; Apply topically 2 times daily Profile Rx: patient will contact pharmacy when needed    LPRD (laryngopharyngeal reflux disease)  stable  - omeprazole (PRILOSEC) 20 MG DR capsule; Take 1 capsule (20 mg) by mouth daily    Centrilobular emphysema (H)  Has oxygen desaturations to the mid 80s with activity (dressing).   Declined PFTs or home oxygen    - fluticasone-salmeterol (ADVAIR) 100-50 MCG/ACT inhaler; Inhale 1 puff into the lungs every 12 hours  - albuterol (PROAIR HFA/PROVENTIL HFA/VENTOLIN HFA) 108 (90 Base) MCG/ACT inhaler; Inhale 2 puffs into the lungs every 6 hours as needed    Patient has been advised of split billing requirements and indicates understanding: Yes      Counseling  Reviewed preventive health counseling, as reflected in patient instructions       Regular exercise       Healthy  "diet/nutrition      BMI  Estimated body mass index is 28.26 kg/m  as calculated from the following:    Height as of this encounter: 1.575 m (5' 2\").    Weight as of this encounter: 70.1 kg (154 lb 8 oz).         She reports that she quit smoking about 13 years ago. Her smoking use included cigarettes. She has a 24.5 pack-year smoking history. She has never used smokeless tobacco.      Appropriate preventive services were discussed with this patient, including applicable screening as appropriate for fall prevention, nutrition, physical activity, Tobacco-use cessation, weight loss and cognition.  Checklist reviewing preventive services available has been given to the patient.    Reviewed patients plan of care and provided an AVS. The Basic Care Plan (routine screening as documented in Health Maintenance) for Idalmis meets the Care Plan requirement. This Care Plan has been established and reviewed with the Patient.        Signed Electronically by: Ale Ordaz MD    Identified Health Risks  I have reviewed Opioid Use Disorder and Substance Use Disorder risk factors and made any needed referrals.   "

## 2024-01-26 ENCOUNTER — TRANSFERRED RECORDS (OUTPATIENT)
Dept: HEALTH INFORMATION MANAGEMENT | Facility: CLINIC | Age: 79
End: 2024-01-26
Payer: COMMERCIAL

## 2024-01-29 ENCOUNTER — TELEPHONE (OUTPATIENT)
Dept: FAMILY MEDICINE | Facility: CLINIC | Age: 79
End: 2024-01-29
Payer: COMMERCIAL

## 2024-01-29 NOTE — TELEPHONE ENCOUNTER
Order/Referral Request    Who is requesting: pt    Orders being requested: Home Oxygen    Has this been discussed with Provider: Yes was recommended by Dr Ordaz and at first patient didn't want to have this but now patient thinks it may be necessary.      Does patient have a preference on a Group/Provider/Facility? no      Okay to leave a detailed message?: Yes at Cell number on file:    Telephone Information:   Mobile [phone]       Obdulia Kaufman on 1/29/2024 at 11:46 AM

## 2024-01-29 NOTE — TELEPHONE ENCOUNTER
Yes, needs to have pulse oximetry done while at rest, walking and then checked while on oxygen to meet Medicare requirements.   Can use a same day.    Ale Ordaz M.D.

## 2024-01-30 ENCOUNTER — TELEPHONE (OUTPATIENT)
Dept: MEDSURG UNIT | Facility: CLINIC | Age: 79
End: 2024-01-30
Payer: COMMERCIAL

## 2024-02-01 ENCOUNTER — OFFICE VISIT (OUTPATIENT)
Dept: FAMILY MEDICINE | Facility: CLINIC | Age: 79
End: 2024-02-01
Payer: COMMERCIAL

## 2024-02-01 VITALS
RESPIRATION RATE: 24 BRPM | TEMPERATURE: 98.3 F | BODY MASS INDEX: 28.34 KG/M2 | WEIGHT: 154 LBS | DIASTOLIC BLOOD PRESSURE: 72 MMHG | HEIGHT: 62 IN | OXYGEN SATURATION: 94 % | SYSTOLIC BLOOD PRESSURE: 130 MMHG | HEART RATE: 77 BPM

## 2024-02-01 DIAGNOSIS — J43.2 CENTRILOBULAR EMPHYSEMA (H): Primary | ICD-10-CM

## 2024-02-01 PROCEDURE — 99213 OFFICE O/P EST LOW 20 MIN: CPT | Performed by: FAMILY MEDICINE

## 2024-02-01 RX ORDER — RESPIRATORY SYNCYTIAL VIRUS VACCINE 120MCG/0.5
0.5 KIT INTRAMUSCULAR ONCE
Qty: 1 EACH | Refills: 0 | Status: CANCELLED | OUTPATIENT
Start: 2024-02-01 | End: 2024-02-01

## 2024-02-01 ASSESSMENT — PAIN SCALES - GENERAL: PAINLEVEL: MODERATE PAIN (4)

## 2024-02-01 NOTE — PROGRESS NOTES
"  Assessment & Plan     Centrilobular emphysema (H)  Home oxygen ordered for activity  Continue Incruse ellipta, advair    - Home Oxygen Order for DME - ONLY FOR DME        BMI  Estimated body mass index is 28.17 kg/m  as calculated from the following:    Height as of this encounter: 1.575 m (5' 2\").    Weight as of this encounter: 69.9 kg (154 lb).         Patient has been assessed for Home Oxygen needs.     Pulse oximetry (SpO2) and Oxygen flow readings:    SpO2 = 94 % on room air at rest while awake.    SpO2 improved to 98 % on 1  liters/minute at rest.    SpO2 =  87 % on room air during activity/with exercise.    *SpO2 improved to 96 % on  1 liters/minute during activity/with exercise.    I certify that this patient, Idalmis Abdul has been under my care (or a nurse practitioner or physican's assistant working with me). This is the face-to-face encounter for oxygen medical necessity.      At the time of this encounter supplemental oxygen is reasonable and necessary and is expected to improve the patient's condition in a home setting.       Patient has continued oxygen desaturation due to Emphysema J43.9.    If portability is ordered, is the patient mobile within the home? yes        Subjective   Idalmis is a 78 year old, presenting for the following health issues:  Breathing Problem        2/1/2024    10:21 AM   Additional Questions   Roomed by Dana ferrer CMA   Accompanied by      History of Present Illness       Reason for visit:  Prescription refill and breathing    She eats 2-3 servings of fruits and vegetables daily.She consumes 1 sweetened beverage(s) daily.She exercises with enough effort to increase her heart rate 20 to 29 minutes per day.  She exercises with enough effort to increase her heart rate 4 days per week.   She is taking medications regularly.         BP Readings from Last 6 Encounters:   02/01/24 130/72   01/24/24 (!) 142/70   06/05/23 138/60   05/08/23 139/74   01/20/23 124/64 " "  12/07/22 (!) 144/70       Medication Followup of emphysema   Advair 100-50mcg/ACT bid, albuterol 108mcg/ACT 2 puffs q6h prn  Taking Medication as prescribed: yes  Side Effects:  None  Medication Helping Symptoms:  Discuss getting home oxygen    More and more SOA with activity.  Has oximeter at home and drops to 85% with getting dressed/making the bed, etc.           Objective    /72   Pulse 77   Temp 98.3  F (36.8  C) (Tympanic)   Resp 24   Ht 1.575 m (5' 2\")   Wt 69.9 kg (154 lb)   LMP 01/01/1992   SpO2 94%   BMI 28.17 kg/m    Body mass index is 28.17 kg/m .  Physical Exam   GENERAL: alert and no distress  NECK: no adenopathy, no asymmetry, masses, or scars  RESP: lungs clear to auscultation - no rales, rhonchi or wheezes and decreased breath sounds throughout  CV: regular rate and rhythm, normal S1 S2, no S3 or S4, no murmur, click or rub, no peripheral edema  ABDOMEN: soft, nontender, no hepatosplenomegaly, no masses and bowel sounds normal  MS: no gross musculoskeletal defects noted, no edema            Signed Electronically by: Ale Ordaz MD    "

## 2024-02-05 RX ORDER — LIDOCAINE 40 MG/G
CREAM TOPICAL
Status: CANCELLED | OUTPATIENT
Start: 2024-02-05

## 2024-02-06 ENCOUNTER — HOSPITAL ENCOUNTER (OUTPATIENT)
Dept: GENERAL RADIOLOGY | Facility: CLINIC | Age: 79
Discharge: HOME | End: 2024-02-06
Attending: ORTHOPAEDIC SURGERY
Payer: COMMERCIAL

## 2024-02-06 ENCOUNTER — HOSPITAL ENCOUNTER (OUTPATIENT)
Facility: CLINIC | Age: 79
Discharge: HOME | End: 2024-02-06
Admitting: ORTHOPAEDIC SURGERY
Payer: COMMERCIAL

## 2024-02-06 VITALS — HEART RATE: 74 BPM | OXYGEN SATURATION: 96 % | SYSTOLIC BLOOD PRESSURE: 133 MMHG | DIASTOLIC BLOOD PRESSURE: 71 MMHG

## 2024-02-06 VITALS
RESPIRATION RATE: 18 BRPM | SYSTOLIC BLOOD PRESSURE: 130 MMHG | HEART RATE: 78 BPM | OXYGEN SATURATION: 97 % | DIASTOLIC BLOOD PRESSURE: 74 MMHG

## 2024-02-06 DIAGNOSIS — G89.29 CHRONIC LOW BACK PAIN, UNSPECIFIED BACK PAIN LATERALITY, UNSPECIFIED WHETHER SCIATICA PRESENT: ICD-10-CM

## 2024-02-06 DIAGNOSIS — M48.062 SPINAL STENOSIS OF LUMBAR REGION WITH NEUROGENIC CLAUDICATION: ICD-10-CM

## 2024-02-06 DIAGNOSIS — M54.50 CHRONIC LOW BACK PAIN, UNSPECIFIED BACK PAIN LATERALITY, UNSPECIFIED WHETHER SCIATICA PRESENT: ICD-10-CM

## 2024-02-06 PROCEDURE — 62323 NJX INTERLAMINAR LMBR/SAC: CPT

## 2024-02-06 PROCEDURE — 999N000154 HC STATISTIC RADIOLOGY XRAY, US, CT, MAR, NM

## 2024-02-06 PROCEDURE — 250N000009 HC RX 250: Performed by: ORTHOPAEDIC SURGERY

## 2024-02-06 PROCEDURE — 255N000002 HC RX 255 OP 636: Performed by: ORTHOPAEDIC SURGERY

## 2024-02-06 PROCEDURE — 250N000011 HC RX IP 250 OP 636: Performed by: ORTHOPAEDIC SURGERY

## 2024-02-06 RX ORDER — DEXTROSE MONOHYDRATE 25 G/50ML
25-50 INJECTION, SOLUTION INTRAVENOUS
Status: DISCONTINUED | OUTPATIENT
Start: 2024-02-06 | End: 2024-02-07 | Stop reason: HOSPADM

## 2024-02-06 RX ORDER — NICOTINE POLACRILEX 4 MG
15-30 LOZENGE BUCCAL
Status: DISCONTINUED | OUTPATIENT
Start: 2024-02-06 | End: 2024-02-07 | Stop reason: HOSPADM

## 2024-02-06 RX ORDER — BETAMETHASONE SODIUM PHOSPHATE AND BETAMETHASONE ACETATE 3; 3 MG/ML; MG/ML
3 INJECTION, SUSPENSION INTRA-ARTICULAR; INTRALESIONAL; INTRAMUSCULAR; SOFT TISSUE ONCE
Status: COMPLETED | OUTPATIENT
Start: 2024-02-06 | End: 2024-02-06

## 2024-02-06 RX ORDER — IOPAMIDOL 408 MG/ML
10 INJECTION, SOLUTION INTRATHECAL ONCE
Status: COMPLETED | OUTPATIENT
Start: 2024-02-06 | End: 2024-02-06

## 2024-02-06 RX ADMIN — LIDOCAINE HYDROCHLORIDE 3 ML: 10 INJECTION, SOLUTION EPIDURAL; INFILTRATION; INTRACAUDAL; PERINEURAL at 09:26

## 2024-02-06 RX ADMIN — BETAMETHASONE SODIUM PHOSPHATE AND BETAMETHASONE ACETATE 3 ML: 3; 3 INJECTION, SUSPENSION INTRA-ARTICULAR; INTRALESIONAL; INTRAMUSCULAR at 09:26

## 2024-02-06 RX ADMIN — IOPAMIDOL 3 ML: 408 INJECTION, SOLUTION INTRATHECAL at 09:26

## 2024-02-06 RX ADMIN — LIDOCAINE HYDROCHLORIDE 1.5 ML: 10 INJECTION, SOLUTION EPIDURAL; INFILTRATION; INTRACAUDAL; PERINEURAL at 09:24

## 2024-02-06 ASSESSMENT — ACTIVITIES OF DAILY LIVING (ADL): ADLS_ACUITY_SCORE: 38

## 2024-02-06 NOTE — PROCEDURES
[facility]    Procedure: Right L5-S1 ILESI    Date/Time: 2/6/2024 9:46 AM    Performed by: Peterson Singh PA-C  Authorized by: Peterson Singh PA-C      UNIVERSAL PROTOCOL   Site Marked: Yes  Prior Images Obtained and Reviewed:  Yes  Required items: Required blood products, implants, devices and special equipment available    Patient identity confirmed:  Verbally with patient  Patient was reevaluated immediately before administering moderate or deep sedation or anesthesia  Confirmation Checklist:  Patient's identity using two indicators, relevant allergies, procedure was appropriate and matched the consent or emergent situation and correct equipment/implants were available  Time out: Immediately prior to the procedure a time out was called    Universal Protocol: the Joint Commission Universal Protocol was followed    Preparation: Patient was prepped and draped in usual sterile fashion       ANESTHESIA    Local Anesthetic:  Lidocaine 1% without epinephrine      SEDATION    Patient Sedated: No    See dictated procedure note for full details.    PROCEDURE  Describe Procedure: Could consider caudal approach if this is not helpful  Patient Tolerance:  Patient tolerated the procedure well with no immediate complications  Length of time physician/provider present for 1:1 monitoring during sedation: 0

## 2024-02-06 NOTE — DISCHARGE INSTRUCTIONS
Steroid Injection Discharge Instructions     After you go home:    You may resume your normal diet.    Care of Puncture Site:    If you have a bandaid on your puncture site, you may remove it the next morning  You may shower tomorrow  No bath tubs, whirlpools or swimming pool for at least 48 hours  Use ice packs as needed for discomfort     Activity:    Minimize your activity today. You may gradually resume your normal activity as tolerated  Avoid vigorous or strenuous activity until your symptoms improve or as directed by your doctor  Do NOT drive a vehicle for a few hours after the injection - or longer if you develop numbness in your arm or leg    Medicines:    You may resume all medications, including blood thinners  Resume your Warfarin/Coumadin at your regular dose today. Follow up with your provider to have your INR rechecked  Resume your Platelet Inhibitors and Aspirin tomorrow at your regular dose  For minor discomfort, you may take Acetaminophen (Tylenol) or Ibuprofen (Advil)    Pain:     You may experience increased or different pain over the next 24-48 hours  For the next 48 hrs - you may use ice packs for discomfort     Call your primary care doctor if:    You have severe pain that does not improve with pain medication  You have chills or a fever greater than 101 F (38 C)  The site is red, swollen, hot or tender  Increase in pain, weakness or numbness  New problems with your bowel or bladder  Any questions or concerns    What to watch for:    It can be normal to have some bruising or slight swelling at the puncture site.   After the procedure, you may have some new weakness or numbness down your arm/leg from the numbing medicine. This should resolve in a few hours.   You may feel some temporary relief from the numbing medicine, but that will wear off within a few hours.  Your symptoms may return to pre procedure level, or can even be worse for the first 1-2 days.  For many people, the steroid begins to  provide some relief within 2-3 days, but it can take up to 2 weeks to obtain the full results.  Some people will get lasting relief from a single injection. Others may require up to 3 injections to get results. If you have more than one steroid injection, they should be given 2 weeks apart.  If you have no improvement in your symptoms after two weeks, please contact the doctor who ordered this procedure to discuss the next steps.  Side effects of your steroid injection are mild and will go away in 2-3 days  Insomnia  Irritability  Flushed face  Water retention  Restlessness  Difficulty sleeping  Increased appetite  Increased blood sugar  If you are diabetic, monitor your blood sugar closely. Contact the provider who manages your diabetes to help you control your blood sugar if needed.    If you have questions or concerns call:                  [facility] Radiology Dept @ [phone]                                    between 8am-4:30pm Mon-Fri    If you have urgent questions outside of these normal business hours, please contact the [facility] on call doctor @ [phone]      The provider who performed your procedure was _________________.

## 2024-02-06 NOTE — PROGRESS NOTES
Care Suites Discharge Nursing Note    Patient Information  Name: Idalmis Abdul  Age: 78 year old    Discharge Education:  Discharge instructions reviewed: Yes  Additional education/resources provided: none  Patient/patient representative verbalizes understanding: Yes  Patient discharging on new medications: No  Medication education completed: N/A    Discharge Plans:   Discharge location: home  Discharge ride contacted: Yes  Approximate discharge time: 1005    Discharge Criteria:  Discharge criteria met and vital signs stable: Yes    Pt denies pain, injection site covered with adhesive bandage, no evidence of bleeding or hematoma. Pt reported some numbness in pelvic region and bilateral lower extremities when she stood to use the bathroom, reports numbness has now resolved. No other questions or concerns.     Patient Belongs:  Patient belongings returned to patient: Yes    Taryn Quevedo RN

## 2024-02-08 ENCOUNTER — TELEPHONE (OUTPATIENT)
Dept: OPHTHALMOLOGY | Facility: CLINIC | Age: 79
End: 2024-02-08
Payer: COMMERCIAL

## 2024-02-09 NOTE — TELEPHONE ENCOUNTER
FUTURE VISIT INFORMATION      FUTURE VISIT INFORMATION:  Date: 5/13/24  Time: 12:15pm  Location: [facility]  REFERRAL INFORMATION:  Referring providers clinic:  [facility]  Reason for visit/diagnosis  RUL not closing, ELOISA, previous sx w/ ARH 2019, referred by Magnus Mckeon     RECORDS REQUESTED FROM:       Clinic name Comments Records Status Imaging Status   [facility] Request for recs sent 2/9- received and sent to scanning epic

## 2024-02-16 ENCOUNTER — TELEPHONE (OUTPATIENT)
Dept: FAMILY MEDICINE | Facility: CLINIC | Age: 79
End: 2024-02-16
Payer: COMMERCIAL

## 2024-02-16 NOTE — TELEPHONE ENCOUNTER
General Call      Reason for Call:  Pt called with questions regarding her O2. Someone told pt she should be using her oxygen all the time because it is not good to go on and off if it. Pt says she does not feel that she always needs the oxygen. Pt would like clarification on oxygen use.       Okay to leave a detailed message?: Yes at Home number on file [phone] (home)

## 2024-02-16 NOTE — TELEPHONE ENCOUNTER
RN called patient to fine out more information about home oxygen question.    Patient wants clarification from provider if she needs to be on oxygen 27/7?    Patient reports her daughter in-law had told patient it would be more beneficial to wear oxygen all of the time. This conversation came up when her daughter in law walked by but patient and she was not wearing her oxygen but patient was not having breathing issues.    Patient goes without oxygen when she is walking to the bathroom or cooking. She dose not use walker when doing small things around the house. Patient reports mild shortness of breath when she is moving  around the house but will sit down and catch her breath when this happens.   Patient is not concerned and reports she feels there are not any issues when she is off the oxygen and regains her breath after she sits down.     Patient reports the small oxygen tank is heavy and difficult to walk around her house because and causes back pain when she uses the strap over her shoulder to carry it around.  Patient has a big tank when she is sitting at home and 2 smaller tanks when she is walking around. Patient uses oxygen when she walks with walker.    She checks her 02 levels and is usually 94% or better when at rest and when she walks around it can go down to 88% sometimes with oxygen.     Routing to Dr. Ordaz to please advise. Patient is aware the provider is back in office on Monday.    Carol Arenas RN on 2/16/2024 at 1:57 PM

## 2024-02-19 NOTE — TELEPHONE ENCOUNTER
She does not need to use the oxygen at rest if saturations are >90%. Doesn't need it continuously.  Should use with activity to keep oxygen sats >90%.    Ale Ordaz M.D.

## 2024-02-29 ENCOUNTER — HOSPITAL ENCOUNTER (OUTPATIENT)
Dept: BONE DENSITY | Facility: CLINIC | Age: 79
Discharge: HOME | End: 2024-02-29
Attending: FAMILY MEDICINE
Payer: COMMERCIAL

## 2024-02-29 DIAGNOSIS — M81.0 AGE-RELATED OSTEOPOROSIS WITHOUT CURRENT PATHOLOGICAL FRACTURE: ICD-10-CM

## 2024-02-29 PROCEDURE — 77081 DXA BONE DENSITY APPENDICULR: CPT | Mod: XU

## 2024-02-29 PROCEDURE — 77080 DXA BONE DENSITY AXIAL: CPT

## 2024-03-05 ENCOUNTER — TELEPHONE (OUTPATIENT)
Dept: ORTHOPEDICS | Facility: CLINIC | Age: 79
End: 2024-03-05
Payer: COMMERCIAL

## 2024-03-05 NOTE — TELEPHONE ENCOUNTER
Writer called and spoke with patient on the phone. Pt is rescheduled to 3/18/24 at the [facility] location at 9:40 am.    Alexa Daley LPN

## 2024-03-05 NOTE — TELEPHONE ENCOUNTER
[facility] Call Center    Phone Message    May a detailed message be left on voicemail: yes     Reason for Call: Other: Idalmis called the spine injection she had on 2/6 did not work. She is having numbness from lower back down into her buttocks. She is scheduled to see Dr. Mckeon on 4/1/24 but is wondering if she needs to be seen sooner. Please call patient to discuss     Action Taken: Other: [facility]    Travel Screening: Not Applicable

## 2024-03-09 DIAGNOSIS — J43.2 CENTRILOBULAR EMPHYSEMA (H): ICD-10-CM

## 2024-03-11 RX ORDER — ALBUTEROL SULFATE 90 UG/1
2 AEROSOL, METERED RESPIRATORY (INHALATION) EVERY 6 HOURS PRN
Qty: 8.5 G | Refills: 1 | Status: SHIPPED | OUTPATIENT
Start: 2024-03-11 | End: 2024-04-22

## 2024-03-18 ENCOUNTER — OFFICE VISIT (OUTPATIENT)
Dept: ORTHOPEDICS | Facility: CLINIC | Age: 79
End: 2024-03-18
Payer: COMMERCIAL

## 2024-03-18 ENCOUNTER — TELEPHONE (OUTPATIENT)
Dept: ORTHOPEDICS | Facility: CLINIC | Age: 79
End: 2024-03-18

## 2024-03-18 DIAGNOSIS — M48.062 SPINAL STENOSIS OF LUMBAR REGION WITH NEUROGENIC CLAUDICATION: Primary | ICD-10-CM

## 2024-03-18 DIAGNOSIS — M54.50 CHRONIC LOW BACK PAIN, UNSPECIFIED BACK PAIN LATERALITY, UNSPECIFIED WHETHER SCIATICA PRESENT: ICD-10-CM

## 2024-03-18 DIAGNOSIS — Z98.1 HISTORY OF LUMBAR FUSION: ICD-10-CM

## 2024-03-18 DIAGNOSIS — M51.369 DDD (DEGENERATIVE DISC DISEASE), LUMBAR: ICD-10-CM

## 2024-03-18 DIAGNOSIS — G89.29 CHRONIC LOW BACK PAIN, UNSPECIFIED BACK PAIN LATERALITY, UNSPECIFIED WHETHER SCIATICA PRESENT: ICD-10-CM

## 2024-03-18 DIAGNOSIS — M81.0 AGE-RELATED OSTEOPOROSIS WITHOUT CURRENT PATHOLOGICAL FRACTURE: ICD-10-CM

## 2024-03-18 DIAGNOSIS — I21.4 NSTEMI (NON-ST ELEVATED MYOCARDIAL INFARCTION) (H): ICD-10-CM

## 2024-03-18 DIAGNOSIS — J43.2 CENTRILOBULAR EMPHYSEMA (H): ICD-10-CM

## 2024-03-18 DIAGNOSIS — I25.10 ASCVD (ARTERIOSCLEROTIC CARDIOVASCULAR DISEASE): ICD-10-CM

## 2024-03-18 PROCEDURE — 99214 OFFICE O/P EST MOD 30 MIN: CPT | Performed by: ORTHOPAEDIC SURGERY

## 2024-03-18 RX ORDER — PREGABALIN 25 MG/1
100 CAPSULE ORAL 2 TIMES DAILY
Qty: 120 CAPSULE | Refills: 3 | Status: SHIPPED | OUTPATIENT
Start: 2024-03-18 | End: 2024-05-22

## 2024-03-18 NOTE — TELEPHONE ENCOUNTER
Retail Pharmacy Prior Authorization Team   Phone: [phone]    Note: Due to record-high volumes, our turn-around time is taking longer than usual . We are currently 10 business days behind in the pools.   We are working diligently to submit all requests in a timely manner and in the order they are received. Please only flag TRUE URGENT requests as high priority to the pool at this time.   If you have questions - please send a note/message in the active PA encounter and send back to the RPPA (Retail Pharmacy Prior Authorization) team [[id number]].    If you have more specific questions about our process please reach out to our supervisor Rocio Mcgovern.   Thank you!

## 2024-03-18 NOTE — LETTER
3/18/2024         RE: Idalmis Abdul  [address]  [facility] [address]        Dear Colleague,    Thank you for referring your patient, Idalmis Abdul, to the [facility]. Please see a copy of my visit note below.    Reason For Visit:   Chief Complaint   Patient presents with    RECHECK     Follow up after injection did not work also having numbness going from lower back into buttocks       Primary MD: Ale Ordaz  Ref. MD: EST     ?  No  Occupation NA.  Currently working? No.  Work status?  Retired.     Date of surgery: 2018  Type of surgery: L4-5 OBLIQUE LUMBAR INTERBODY FUSION WITH L4-5 POSTERIOR SPINAL FUSION AND L4 LAMINECTOMY, WITH RIGHT ILIAC CREST BONE GRAFT, USE OF INFUSE      Smoker: No  Request smoking cessation information: No    Injection did not seem to help,  Who did injection recommended going a little lower might help some. No period of relief from injection  Patient does not feel Pregabalin is helping at all  Pain is now radiating to the left side, that never has before. Pain is sudden and shooting.     LMP 01/01/1992     Pain Assessment  Patient Currently in Pain: Yes  Patient's Stated Pain Goal: 3  Primary Pain Location: Back    Oswestry (JO ANN) Questionnaire        3/18/2024     9:40 AM   OSWESTRY DISABILITY INDEX   Count 9   Sum 20   Oswestry Score (%) 44.44 %        Visual Analog Pain Scale  Back Pain Scale 0-10: 3  Right leg pain: 0  Left leg pain: 2 (knee)  Neck Pain Scale 0-10: 0  Right arm pain: 0  Left arm pain: 0    Promis 10 Assessment        10/22/2019     1:03 PM   PROMIS 10   In general, would you say your health is: Good   In general, would you say your quality of life is: Good   In general, how would you rate your physical health? Good   In general, how would you rate your mental health, including your mood and your ability to think? Good   In general, how would you rate your satisfaction with your social activities and  relationships? Good   In general, please rate how well you carry out your usual social activities and roles Good   To what extent are you able to carry out your everyday physical activities such as walking, climbing stairs, carrying groceries, or moving a chair? Moderately   In the past 7 days, how often have you been bothered by emotional problems such as feeling anxious, depressed, or irritable? Rarely   In the past 7 days, how would you rate your fatigue on average? Mild   In the past 7 days, how would you rate your pain on average, where 0 means no pain, and 10 means worst imaginable pain? 5   In general, would you say your health is: 3   In general, would you say your quality of life is: 3   In general, how would you rate your physical health? 3   In general, how would you rate your mental health, including your mood and your ability to think? 3   In general, how would you rate your satisfaction with your social activities and relationships? 3   In general, please rate how well you carry out your usual social activities and roles. (This includes activities at home, at work and in your community, and responsibilities as a parent, child, spouse, employee, friend, etc.) 3   To what extent are you able to carry out your everyday physical activities such as walking, climbing stairs, carrying groceries, or moving a chair? 3   In the past 7 days, how often have you been bothered by emotional problems such as feeling anxious, depressed, or irritable? 2   In the past 7 days, how would you rate your fatigue on average? 2   In the past 7 days, how would you rate your pain on average, where 0 means no pain, and 10 means worst imaginable pain? 5   Global Mental Health Score 13   Global Physical Health Score 13   PROMIS TOTAL - SUBSCORES 26                Neema Briggs     Chief concern: Persistent lumbar radiculopathy, follow-up after injection    History of present illness:  Patient is a very pleasant 78-year-old female  with prior L4-5 fusion in setting of degenerative lumbosacral stenosis and idiopathic scoliosis.  Symptoms include neurogenic claudication and radiculopathy. Patient returns today in follow-up after L5-S1 interlaminar epidural steroid injection on 2/6/2024.  Patient reports that she experienced no relief of radicular symptoms immediately after or in the weeks after her injection.  She has persistent low back pain with radiation into the buttocks.  She endorses numbness in the L5-S1 distributions.    Patient today she is alert and oriented no acute distress  Nonlabored respiration with difficulty walking on heels and tiptoes. Resisted strength testing 5/5 hip flexion, knee extension, 4/5 ankle dorsiflexion and plantarflexion on left. Sensation intact L3-S1 bilaterally    No new imaging today. Reviewed her previous imaging as well as fluoro spots from her previous injection which show dye pool outlining right L5 nerve root    Impression and Plan:  78 year old female with prior L4-5 fusion, severe stenosis with idiopathic scoliosis and degenerative fractional curve at lumbosacral junction. Continued radicular pain and weakness despite injection. Discussed referral for repeat injection which patient is willing to proceed with. Referral placed for caudal BOBY vs TFESI depending on provider preference.     Will follow up in 3 months or sooner as necessary.     Time spent on this clinical encounter including previsit chart review, history and physical examination, patient counseling and documentation was 31 minutes on the date of encounter.    Jonathan Mckeon MD  , Department of Orthopedic Surgery  [facility]

## 2024-03-18 NOTE — PROGRESS NOTES
Reason For Visit:   Chief Complaint   Patient presents with    RECHECK     Follow up after injection did not work also having numbness going from lower back into buttocks       Primary MD: Ale Ordaz  Ref. MD: EST     ?  No  Occupation NA.  Currently working? No.  Work status?  Retired.     Date of surgery: 2018  Type of surgery: L4-5 OBLIQUE LUMBAR INTERBODY FUSION WITH L4-5 POSTERIOR SPINAL FUSION AND L4 LAMINECTOMY, WITH RIGHT ILIAC CREST BONE GRAFT, USE OF INFUSE      Smoker: No  Request smoking cessation information: No    Injection did not seem to help,  Who did injection recommended going a little lower might help some. No period of relief from injection  Patient does not feel Pregabalin is helping at all  Pain is now radiating to the left side, that never has before. Pain is sudden and shooting.     LMP 01/01/1992     Pain Assessment  Patient Currently in Pain: Yes  Patient's Stated Pain Goal: 3  Primary Pain Location: Back    Oswestry (JO ANN) Questionnaire        3/18/2024     9:40 AM   OSWESTRY DISABILITY INDEX   Count 9   Sum 20   Oswestry Score (%) 44.44 %        Visual Analog Pain Scale  Back Pain Scale 0-10: 3  Right leg pain: 0  Left leg pain: 2 (knee)  Neck Pain Scale 0-10: 0  Right arm pain: 0  Left arm pain: 0    Promis 10 Assessment        10/22/2019     1:03 PM   PROMIS 10   In general, would you say your health is: Good   In general, would you say your quality of life is: Good   In general, how would you rate your physical health? Good   In general, how would you rate your mental health, including your mood and your ability to think? Good   In general, how would you rate your satisfaction with your social activities and relationships? Good   In general, please rate how well you carry out your usual social activities and roles Good   To what extent are you able to carry out your everyday physical activities such as walking, climbing stairs, carrying groceries, or moving a chair?  Moderately   In the past 7 days, how often have you been bothered by emotional problems such as feeling anxious, depressed, or irritable? Rarely   In the past 7 days, how would you rate your fatigue on average? Mild   In the past 7 days, how would you rate your pain on average, where 0 means no pain, and 10 means worst imaginable pain? 5   In general, would you say your health is: 3   In general, would you say your quality of life is: 3   In general, how would you rate your physical health? 3   In general, how would you rate your mental health, including your mood and your ability to think? 3   In general, how would you rate your satisfaction with your social activities and relationships? 3   In general, please rate how well you carry out your usual social activities and roles. (This includes activities at home, at work and in your community, and responsibilities as a parent, child, spouse, employee, friend, etc.) 3   To what extent are you able to carry out your everyday physical activities such as walking, climbing stairs, carrying groceries, or moving a chair? 3   In the past 7 days, how often have you been bothered by emotional problems such as feeling anxious, depressed, or irritable? 2   In the past 7 days, how would you rate your fatigue on average? 2   In the past 7 days, how would you rate your pain on average, where 0 means no pain, and 10 means worst imaginable pain? 5   Global Mental Health Score 13   Global Physical Health Score 13   PROMIS TOTAL - SUBSCORES 26                Neema Briggs

## 2024-03-19 PROBLEM — Z98.1 HISTORY OF LUMBAR FUSION: Status: ACTIVE | Noted: 2024-03-19

## 2024-03-20 NOTE — TELEPHONE ENCOUNTER
Retail Pharmacy Prior Authorization Team   Phone: [phone]    PA Initiation    Medication: PREGABALIN 25 MG PO CAPS  Insurance Company: [employer] - Phone [phone] Fax [phone]  Pharmacy Filling the Rx: [place] [facility] - [place], [place] - [address] [address]  Filling Pharmacy Phone: [phone]  Filling Pharmacy Fax:    Start Date: 3/20/2024    RECEIVED QUESTION SET FROM INSURANCE, FILLED OUT AND FAXED BACK -

## 2024-03-20 NOTE — PROGRESS NOTES
Chief concern: Persistent lumbar radiculopathy, follow-up after injection    History of present illness:  Patient is a very pleasant 78-year-old female with prior L4-5 fusion in setting of degenerative lumbosacral stenosis and idiopathic scoliosis.  Symptoms include neurogenic claudication and radiculopathy. Patient returns today in follow-up after L5-S1 interlaminar epidural steroid injection on 2/6/2024.  Patient reports that she experienced no relief of radicular symptoms immediately after or in the weeks after her injection.  She has persistent low back pain with radiation into the buttocks.  She endorses numbness in the L5-S1 distributions.    Patient today she is alert and oriented no acute distress  Nonlabored respiration with difficulty walking on heels and tiptoes. Resisted strength testing 5/5 hip flexion, knee extension, 4/5 ankle dorsiflexion and plantarflexion on left. Sensation intact L3-S1 bilaterally    No new imaging today. Reviewed her previous imaging as well as fluoro spots from her previous injection which show dye pool outlining right L5 nerve root    Impression and Plan:  78 year old female with prior L4-5 fusion, severe stenosis with idiopathic scoliosis and degenerative fractional curve at lumbosacral junction. Continued radicular pain and weakness despite injection. Discussed referral for repeat injection which patient is willing to proceed with. Referral placed for caudal BOBY vs TFESI depending on provider preference.     Will follow up in 3 months or sooner as necessary.     Time spent on this clinical encounter including previsit chart review, history and physical examination, patient counseling and documentation was 31 minutes on the date of encounter.    Jonathan Mckeon MD  , Department of Orthopedic Surgery  [facility]

## 2024-03-21 ENCOUNTER — OFFICE VISIT (OUTPATIENT)
Dept: FAMILY MEDICINE | Facility: CLINIC | Age: 79
End: 2024-03-21
Payer: COMMERCIAL

## 2024-03-21 VITALS
BODY MASS INDEX: 27.79 KG/M2 | HEART RATE: 79 BPM | TEMPERATURE: 97 F | DIASTOLIC BLOOD PRESSURE: 72 MMHG | SYSTOLIC BLOOD PRESSURE: 139 MMHG | WEIGHT: 151 LBS | HEIGHT: 62 IN | OXYGEN SATURATION: 95 %

## 2024-03-21 DIAGNOSIS — M81.0 AGE RELATED OSTEOPOROSIS, UNSPECIFIED PATHOLOGICAL FRACTURE PRESENCE: Primary | ICD-10-CM

## 2024-03-21 PROCEDURE — 99213 OFFICE O/P EST LOW 20 MIN: CPT | Performed by: FAMILY MEDICINE

## 2024-03-21 RX ORDER — IBANDRONATE SODIUM 150 MG/1
150 TABLET, FILM COATED ORAL
Qty: 3 TABLET | Refills: 3 | Status: SHIPPED | OUTPATIENT
Start: 2024-03-21

## 2024-03-21 RX ORDER — RESPIRATORY SYNCYTIAL VIRUS VACCINE 120MCG/0.5
0.5 KIT INTRAMUSCULAR ONCE
Qty: 1 EACH | Refills: 0 | Status: CANCELLED | OUTPATIENT
Start: 2024-03-21 | End: 2024-03-21

## 2024-03-21 RX ORDER — CALCIUM CARBONATE/VITAMIN D3 600 MG-10
1 TABLET ORAL 2 TIMES DAILY
COMMUNITY

## 2024-03-21 ASSESSMENT — PAIN SCALES - GENERAL: PAINLEVEL: MODERATE PAIN (4)

## 2024-03-21 NOTE — PATIENT INSTRUCTIONS
"    When you are out of refills or the refills say \"zero\", it is time to schedule your next appointment in clinic!    Labs are released to you almost immediately and sometimes before I have had a chance to review them.  I review labs regularly and once they are all in, you will either be sent a letter with your results or if you are signed up for on-line services, you will be notified that results are available to you on [employer]. If there are serious findings, you typically will be called.    If you have any questions about your visit, your symptoms, your medication, your test results or it is not clear what your diagnosis or treatment plan is please contact me (via [employer]) or call the care team at [phone] option #2          "

## 2024-03-22 NOTE — TELEPHONE ENCOUNTER
Prior Authorization Approval    Medication: PREGABALIN 25 MG PO CAPS  Authorization Effective Date: 2/20/2024  Authorization Expiration Date: 3/20/2025  Insurance Company: [employer] - Phone [phone] Fax [phone]  Which Pharmacy is filling the prescription: [place] [facility] - [facility] [address] [address]  Pharmacy Notified: YES  Patient Notified: YES (pharmacy will notify the patient when ready)

## 2024-04-04 ENCOUNTER — LAB (OUTPATIENT)
Dept: LAB | Facility: CLINIC | Age: 79
End: 2024-04-04
Payer: COMMERCIAL

## 2024-04-04 ENCOUNTER — HOSPITAL ENCOUNTER (OUTPATIENT)
Dept: CT IMAGING | Facility: CLINIC | Age: 79
Discharge: HOME | End: 2024-04-04
Attending: INTERNAL MEDICINE | Admitting: INTERNAL MEDICINE
Payer: COMMERCIAL

## 2024-04-04 DIAGNOSIS — Z85.118 HISTORY OF LUNG CANCER: ICD-10-CM

## 2024-04-04 DIAGNOSIS — C49.A0 MALIGNANT GASTROINTESTINAL STROMAL TUMOR, UNSPECIFIED SITE (H): ICD-10-CM

## 2024-04-04 LAB
ALBUMIN SERPL BCG-MCNC: 3.8 G/DL (ref 3.5–5.2)
ALP SERPL-CCNC: 68 U/L (ref 40–150)
ALT SERPL W P-5'-P-CCNC: 17 U/L (ref 0–50)
ANION GAP SERPL CALCULATED.3IONS-SCNC: 9 MMOL/L (ref 7–15)
AST SERPL W P-5'-P-CCNC: 30 U/L (ref 0–45)
BASOPHILS # BLD AUTO: 0.1 10E3/UL (ref 0–0.2)
BASOPHILS NFR BLD AUTO: 1 %
BILIRUB SERPL-MCNC: 0.2 MG/DL
BUN SERPL-MCNC: 18.3 MG/DL (ref 8–23)
CALCIUM SERPL-MCNC: 8.6 MG/DL (ref 8.8–10.2)
CHLORIDE SERPL-SCNC: 104 MMOL/L (ref 98–107)
CREAT SERPL-MCNC: 1.05 MG/DL (ref 0.51–0.95)
DEPRECATED HCO3 PLAS-SCNC: 24 MMOL/L (ref 22–29)
EGFRCR SERPLBLD CKD-EPI 2021: 54 ML/MIN/1.73M2
EOSINOPHIL # BLD AUTO: 0.4 10E3/UL (ref 0–0.7)
EOSINOPHIL NFR BLD AUTO: 7 %
ERYTHROCYTE [DISTWIDTH] IN BLOOD BY AUTOMATED COUNT: 15.9 % (ref 10–15)
GLUCOSE SERPL-MCNC: 79 MG/DL (ref 70–99)
HCT VFR BLD AUTO: 31.9 % (ref 35–47)
HGB BLD-MCNC: 10.2 G/DL (ref 11.7–15.7)
IMM GRANULOCYTES # BLD: 0 10E3/UL
IMM GRANULOCYTES NFR BLD: 0 %
LYMPHOCYTES # BLD AUTO: 1.2 10E3/UL (ref 0.8–5.3)
LYMPHOCYTES NFR BLD AUTO: 19 %
MCH RBC QN AUTO: 28.3 PG (ref 26.5–33)
MCHC RBC AUTO-ENTMCNC: 32 G/DL (ref 31.5–36.5)
MCV RBC AUTO: 89 FL (ref 78–100)
MONOCYTES # BLD AUTO: 1.2 10E3/UL (ref 0–1.3)
MONOCYTES NFR BLD AUTO: 19 %
NEUTROPHILS # BLD AUTO: 3.4 10E3/UL (ref 1.6–8.3)
NEUTROPHILS NFR BLD AUTO: 54 %
PLATELET # BLD AUTO: 208 10E3/UL (ref 150–450)
POTASSIUM SERPL-SCNC: 4.3 MMOL/L (ref 3.4–5.3)
PROT SERPL-MCNC: 6.4 G/DL (ref 6.4–8.3)
RBC # BLD AUTO: 3.6 10E6/UL (ref 3.8–5.2)
SODIUM SERPL-SCNC: 137 MMOL/L (ref 135–145)
WBC # BLD AUTO: 6.2 10E3/UL (ref 4–11)

## 2024-04-04 PROCEDURE — 85025 COMPLETE CBC W/AUTO DIFF WBC: CPT

## 2024-04-04 PROCEDURE — 80053 COMPREHEN METABOLIC PANEL: CPT

## 2024-04-04 PROCEDURE — 36415 COLL VENOUS BLD VENIPUNCTURE: CPT

## 2024-04-04 PROCEDURE — 250N000011 HC RX IP 250 OP 636: Performed by: RADIOLOGY

## 2024-04-04 PROCEDURE — 71260 CT THORAX DX C+: CPT

## 2024-04-04 PROCEDURE — 250N000009 HC RX 250: Performed by: RADIOLOGY

## 2024-04-04 RX ORDER — IOPAMIDOL 755 MG/ML
74 INJECTION, SOLUTION INTRAVASCULAR ONCE
Status: COMPLETED | OUTPATIENT
Start: 2024-04-04 | End: 2024-04-04

## 2024-04-04 RX ADMIN — IOPAMIDOL 74 ML: 755 INJECTION, SOLUTION INTRAVENOUS at 12:19

## 2024-04-04 RX ADMIN — SODIUM CHLORIDE 58 ML: 9 INJECTION, SOLUTION INTRAVENOUS at 12:19

## 2024-04-05 ENCOUNTER — TELEPHONE (OUTPATIENT)
Dept: ORTHOPEDICS | Facility: CLINIC | Age: 79
End: 2024-04-05
Payer: COMMERCIAL

## 2024-04-05 DIAGNOSIS — M48.062 SPINAL STENOSIS OF LUMBAR REGION WITH NEUROGENIC CLAUDICATION: Primary | ICD-10-CM

## 2024-04-05 DIAGNOSIS — M51.369 DDD (DEGENERATIVE DISC DISEASE), LUMBAR: ICD-10-CM

## 2024-04-05 DIAGNOSIS — Z98.1 HISTORY OF LUMBAR FUSION: ICD-10-CM

## 2024-04-05 NOTE — TELEPHONE ENCOUNTER
Writer called and spoke with patient on the phone. Writer gave pt the phone number to call and schedule the injection at [facility] with Peterson Hernandez [phone].     Alexa Daley LPN

## 2024-04-05 NOTE — TELEPHONE ENCOUNTER
[facility] Call Center    Phone Message    May a detailed message be left on voicemail: yes     Reason for Call: Order(s): Other:   Reason for requested: Injection  Date needed: asap  Provider name: Jonathan Mckeon    Action Taken: Message routed to:  [facility] ([facility] Orthopedics    Travel Screening: Not Applicable

## 2024-04-05 NOTE — TELEPHONE ENCOUNTER
Writer called and spoke with patient on the phone. The injection order was placed differently from pain referral. Pt should be able to call and schedule injection now.     Alexa Daley LPN

## 2024-04-05 NOTE — TELEPHONE ENCOUNTER
[facility] Call Center     Phone Message     May a detailed message be left on voicemail: Yes     Reason for Call:  Pt is waiting on someone to called her regarding her getting another injection for her lower back.

## 2024-04-08 ENCOUNTER — TELEPHONE (OUTPATIENT)
Dept: MEDSURG UNIT | Facility: CLINIC | Age: 79
End: 2024-04-08
Payer: COMMERCIAL

## 2024-04-09 ENCOUNTER — HOSPITAL ENCOUNTER (OUTPATIENT)
Dept: GENERAL RADIOLOGY | Facility: CLINIC | Age: 79
Discharge: HOME | End: 2024-04-09
Attending: ORTHOPAEDIC SURGERY
Payer: COMMERCIAL

## 2024-04-09 ENCOUNTER — HOSPITAL ENCOUNTER (OUTPATIENT)
Facility: CLINIC | Age: 79
Discharge: HOME | End: 2024-04-09
Admitting: PHYSICIAN ASSISTANT
Payer: COMMERCIAL

## 2024-04-09 VITALS
RESPIRATION RATE: 18 BRPM | OXYGEN SATURATION: 94 % | HEART RATE: 78 BPM | DIASTOLIC BLOOD PRESSURE: 59 MMHG | SYSTOLIC BLOOD PRESSURE: 141 MMHG

## 2024-04-09 DIAGNOSIS — M51.369 DDD (DEGENERATIVE DISC DISEASE), LUMBAR: ICD-10-CM

## 2024-04-09 DIAGNOSIS — Z98.1 HISTORY OF LUMBAR FUSION: ICD-10-CM

## 2024-04-09 DIAGNOSIS — M48.062 SPINAL STENOSIS OF LUMBAR REGION WITH NEUROGENIC CLAUDICATION: ICD-10-CM

## 2024-04-09 PROCEDURE — 62323 NJX INTERLAMINAR LMBR/SAC: CPT

## 2024-04-09 PROCEDURE — 250N000011 HC RX IP 250 OP 636: Performed by: ORTHOPAEDIC SURGERY

## 2024-04-09 PROCEDURE — 255N000002 HC RX 255 OP 636: Performed by: ORTHOPAEDIC SURGERY

## 2024-04-09 PROCEDURE — 999N000154 HC STATISTIC RADIOLOGY XRAY, US, CT, MAR, NM

## 2024-04-09 PROCEDURE — 250N000009 HC RX 250: Performed by: ORTHOPAEDIC SURGERY

## 2024-04-09 RX ORDER — BETAMETHASONE SODIUM PHOSPHATE AND BETAMETHASONE ACETATE 3; 3 MG/ML; MG/ML
3 INJECTION, SUSPENSION INTRA-ARTICULAR; INTRALESIONAL; INTRAMUSCULAR; SOFT TISSUE ONCE
Status: COMPLETED | OUTPATIENT
Start: 2024-04-09 | End: 2024-04-09

## 2024-04-09 RX ORDER — DEXTROSE MONOHYDRATE 25 G/50ML
25-50 INJECTION, SOLUTION INTRAVENOUS
Status: DISCONTINUED | OUTPATIENT
Start: 2024-04-09 | End: 2024-04-10 | Stop reason: HOSPADM

## 2024-04-09 RX ORDER — IOPAMIDOL 408 MG/ML
10 INJECTION, SOLUTION INTRATHECAL ONCE
Status: COMPLETED | OUTPATIENT
Start: 2024-04-09 | End: 2024-04-09

## 2024-04-09 RX ORDER — NICOTINE POLACRILEX 4 MG
15-30 LOZENGE BUCCAL
Status: DISCONTINUED | OUTPATIENT
Start: 2024-04-09 | End: 2024-04-10 | Stop reason: HOSPADM

## 2024-04-09 RX ADMIN — LIDOCAINE HYDROCHLORIDE 6 ML: 10 INJECTION, SOLUTION EPIDURAL; INFILTRATION; INTRACAUDAL; PERINEURAL at 14:36

## 2024-04-09 RX ADMIN — IOPAMIDOL 5 ML: 408 INJECTION, SOLUTION INTRATHECAL at 14:37

## 2024-04-09 RX ADMIN — BETAMETHASONE SODIUM PHOSPHATE AND BETAMETHASONE ACETATE 3 ML: 3; 3 INJECTION, SUSPENSION INTRA-ARTICULAR; INTRALESIONAL; INTRAMUSCULAR at 14:37

## 2024-04-09 ASSESSMENT — ACTIVITIES OF DAILY LIVING (ADL)
ADLS_ACUITY_SCORE: 38
ADLS_ACUITY_SCORE: 36

## 2024-04-09 NOTE — PROCEDURES
[facility]    Procedure: Caudal BOBY    Date/Time: 4/9/2024 2:51 PM    Performed by: Peterson Singh PA-C  Authorized by: Peterson Singh PA-C      UNIVERSAL PROTOCOL   Site Marked: Yes  Prior Images Obtained and Reviewed:  Yes  Required items: Required blood products, implants, devices and special equipment available    Patient identity confirmed:  Verbally with patient  Patient was reevaluated immediately before administering moderate or deep sedation or anesthesia  Confirmation Checklist:  Patient's identity using two indicators, relevant allergies, procedure was appropriate and matched the consent or emergent situation and correct equipment/implants were available  Time out: Immediately prior to the procedure a time out was called    Universal Protocol: the Joint Commission Universal Protocol was followed    Preparation: Patient was prepped and draped in usual sterile fashion       ANESTHESIA    Local Anesthetic:  Lidocaine 1% without epinephrine      SEDATION    Patient Sedated: No    See dictated procedure note for full details.    PROCEDURE    Patient Tolerance:  Patient tolerated the procedure well with no immediate complications  Length of time physician/provider present for 1:1 monitoring during sedation: 0

## 2024-04-09 NOTE — PROGRESS NOTES
Care Suites Discharge Summary    Discharge Criteria:   Discharge Criteria met per MD orders: Yes.   Vital signs stable.     Pt demonstrates ability to ambulate safely: Yes.  (See discharge questionnaire for additional information)    Discharge instructions & education:   Discharge instructions reviewed with patient. Patient verbalizes  understanding.   Additional patient education provided: BOBY      Medications:   Patient will be discharging on new medications- No. Patient verbalizes reason for use, start date, and side effects NA.    Items returned to patient:   Home and hospital acquired medications returned to patient NA   Listed belongings gathered and returned to patient: Yes    Patient discharged to home.     Magdi Do RN

## 2024-04-11 ENCOUNTER — VIRTUAL VISIT (OUTPATIENT)
Dept: ONCOLOGY | Facility: CLINIC | Age: 79
End: 2024-04-11
Attending: INTERNAL MEDICINE
Payer: COMMERCIAL

## 2024-04-11 VITALS — WEIGHT: 151 LBS | HEIGHT: 62 IN | BODY MASS INDEX: 27.79 KG/M2

## 2024-04-11 DIAGNOSIS — H02.849 EDEMA OF EYELID, UNSPECIFIED LATERALITY: ICD-10-CM

## 2024-04-11 DIAGNOSIS — C49.A0 MALIGNANT GASTROINTESTINAL STROMAL TUMOR, UNSPECIFIED SITE (H): Primary | ICD-10-CM

## 2024-04-11 DIAGNOSIS — J43.2 CENTRILOBULAR EMPHYSEMA (H): ICD-10-CM

## 2024-04-11 PROCEDURE — 99214 OFFICE O/P EST MOD 30 MIN: CPT | Mod: 95 | Performed by: INTERNAL MEDICINE

## 2024-04-11 ASSESSMENT — PAIN SCALES - GENERAL: PAINLEVEL: MILD PAIN (3)

## 2024-04-11 NOTE — NURSING NOTE
Is the patient currently in the state of [place]? YES    Visit mode:VIDEO    If the visit is dropped, the patient can be reconnected by: VIDEO VISIT: Text to cell phone:   Telephone Information:   Mobile [phone]       Will anyone else be joining the visit? NO  (If patient encounters technical issues they should call [phone] :[phone])    How would you like to obtain your AVS? MyChart    Are changes needed to the allergy or medication list? Pt stated no changes to allergies and Pt stated no med changes        Reason for visit: INGE Christina LPN

## 2024-04-11 NOTE — LETTER
4/11/2024         RE: Idalmis Abdul  [address]  [place] [address]        Dear Colleague,    Thank you for referring your patient, Idalmis Abdul, to the [facility]. Please see a copy of my visit note below.    Virtual Visit Details    Type of service:  Video Visit   Video Start Time:  1030  Video End Time: 1045  Originating Location (pt. Location): Home  Distant Location (provider location):  Off-site  Platform used for Video Visit: [employer]    I am seeing Idalmis Abdul today in follow-up of recurrent gastrointestinal stromal tumor.    She is 78 years old and had her primary resected more than 15 years ago and then had intra-abdominal reoccurrence when she discontinued her adjuvant Gleevec.  She resumed Gleevec in 2007 with a complete response and has been free of evidence of recurrence on ongoing therapy ever since.  She also had a stage I lung cancer resected in 2010.  She has had eyelid edema of the severe enough from her Gleevec that she is required surgical correction.  She tells me overall she feels generally well but with what she considers the expected age-related decline in her overall status and other medical problems.  She is now on oxygen because of her chronic lung disease.  She is starting a new therapy for her osteoporosis.    On exam via the video link she appears cheerful and well.  She appears her stated age.  She has no icterus.  She has no respiratory distress apparent    I personally reviewed her CT scan over the results with her.  That shows no evidence of recurrence of her cancer.  She has ongoing nodularity of her thyroid that is not much changed.    Her electrolytes are unremarkable and her renal function nearly normal.  Her bilirubin, albumin liver enzymes are unremarkable.  She has expected mild macrocytic anemia and otherwise unremarkable blood counts.    Assessment/plan: Recurrent gastrointestinal stromal tumor with an ongoing complete  response on Gleevec.  She is tolerating this quite well and we will continue on with the same active therapy with a reevaluation for progression in a year or sooner if she develops new symptoms.    Total time by me on the date of service inclusive of the above visit, review of imaging and labs, ordering, and documentation was approximately 30 minutes.      Again, thank you for allowing me to participate in the care of your patient.        Sincerely,        Blayne Schmitz MD

## 2024-04-11 NOTE — PROGRESS NOTES
Virtual Visit Details    Type of service:  Video Visit   Video Start Time:  1030  Video End Time: 1045  Originating Location (pt. Location): Home  Distant Location (provider location):  Off-site  Platform used for Video Visit: [employer]    I am seeing Idalmis Abdul today in follow-up of recurrent gastrointestinal stromal tumor.    She is 78 years old and had her primary resected more than 15 years ago and then had intra-abdominal reoccurrence when she discontinued her adjuvant Gleevec.  She resumed Gleevec in 2007 with a complete response and has been free of evidence of recurrence on ongoing therapy ever since.  She also had a stage I lung cancer resected in 2010.  She has had eyelid edema of the severe enough from her Gleevec that she is required surgical correction.  She tells me overall she feels generally well but with what she considers the expected age-related decline in her overall status and other medical problems.  She is now on oxygen because of her chronic lung disease.  She is starting a new therapy for her osteoporosis.    On exam via the video link she appears cheerful and well.  She appears her stated age.  She has no icterus.  She has no respiratory distress apparent    I personally reviewed her CT scan over the results with her.  That shows no evidence of recurrence of her cancer.  She has ongoing nodularity of her thyroid that is not much changed.    Her electrolytes are unremarkable and her renal function nearly normal.  Her bilirubin, albumin liver enzymes are unremarkable.  She has expected mild macrocytic anemia and otherwise unremarkable blood counts.    Assessment/plan: Recurrent gastrointestinal stromal tumor with an ongoing complete response on Gleevec.  She is tolerating this quite well and we will continue on with the same active therapy with a reevaluation for progression in a year or sooner if she develops new symptoms.    Total time by me on the date of service inclusive of the above  visit, review of imaging and labs, ordering, and documentation was approximately 30 minutes.

## 2024-04-11 NOTE — LETTER
4/11/2024         RE: Idalmis Abdul  [address]  [facility] [address]      Virtual Visit Details    Type of service:  Video Visit   Video Start Time:  1030  Video End Time: 1045  Originating Location (pt. Location): Home  Distant Location (provider location):  Off-site  Platform used for Video Visit: [employer]    I am seeing Idalmis Abdul today in follow-up of recurrent gastrointestinal stromal tumor.    She is 78 years old and had her primary resected more than 15 years ago and then had intra-abdominal reoccurrence when she discontinued her adjuvant Gleevec.  She resumed Gleevec in 2007 with a complete response and has been free of evidence of recurrence on ongoing therapy ever since.  She also had a stage I lung cancer resected in 2010.  She has had eyelid edema of the severe enough from her Gleevec that she is required surgical correction.  She tells me overall she feels generally well but with what she considers the expected age-related decline in her overall status and other medical problems.  She is now on oxygen because of her chronic lung disease.  She is starting a new therapy for her osteoporosis.    On exam via the video link she appears cheerful and well.  She appears her stated age.  She has no icterus.  She has no respiratory distress apparent    I personally reviewed her CT scan over the results with her.  That shows no evidence of recurrence of her cancer.  She has ongoing nodularity of her thyroid that is not much changed.    Her electrolytes are unremarkable and her renal function nearly normal.  Her bilirubin, albumin liver enzymes are unremarkable.  She has expected mild macrocytic anemia and otherwise unremarkable blood counts.    Assessment/plan: Recurrent gastrointestinal stromal tumor with an ongoing complete response on Gleevec.  She is tolerating this quite well and we will continue on with the same active therapy with a reevaluation for progression in a year or sooner if she  develops new symptoms.    Total time by me on the date of service inclusive of the above visit, review of imaging and labs, ordering, and documentation was approximately 30 minutes.        Blayne Schmitz MD

## 2024-04-12 NOTE — TELEPHONE ENCOUNTER
Pt was seen yesterday for red,swollen,painful index finger.   Has taken total of Omnicef 300 mg #4 tabs since she was seen.   Redness and swelling has moved up to the wrist.  Reviewed with NAKUL Peterson and pt needs to go to ER for eval.   Informed pt and she will go to the ER the AM.  Jazmine  
Reason for call:  Patient reporting a symptom    Symptom or request: Pt was seen in clinic yesterday, by SHANTANU Grier for a left hand infection.  Swelling and redness is much worse than yesterday, despite oral antibiotics.  Please call patient and advise.      Duration (how long have symptoms been present): ongoing    Have you been treated for this before? Yes    Additional comments:     Phone Number patient can be reached at:  Cell number on file:    Telephone Information:   Mobile [phone]     Best Time:  any    Can we leave a detailed message on this number:  YES    Call taken on 7/29/2020 at 7:51 AM by Casandra Claudio    
Statement Selected

## 2024-04-22 DIAGNOSIS — J43.2 CENTRILOBULAR EMPHYSEMA (H): ICD-10-CM

## 2024-04-22 RX ORDER — ALBUTEROL SULFATE 90 UG/1
2 AEROSOL, METERED RESPIRATORY (INHALATION) EVERY 6 HOURS PRN
Qty: 8.5 G | Refills: 1 | Status: SHIPPED | OUTPATIENT
Start: 2024-04-22

## 2024-04-24 ENCOUNTER — VIRTUAL VISIT (OUTPATIENT)
Dept: FAMILY MEDICINE | Facility: CLINIC | Age: 79
End: 2024-04-24
Payer: COMMERCIAL

## 2024-04-24 ENCOUNTER — TELEPHONE (OUTPATIENT)
Dept: FAMILY MEDICINE | Facility: CLINIC | Age: 79
End: 2024-04-24

## 2024-04-24 DIAGNOSIS — J41.8 MIXED SIMPLE AND MUCOPURULENT CHRONIC BRONCHITIS (H): Primary | ICD-10-CM

## 2024-04-24 DIAGNOSIS — C49.A0 MALIGNANT GASTROINTESTINAL STROMAL TUMOR, UNSPECIFIED SITE (H): ICD-10-CM

## 2024-04-24 DIAGNOSIS — U07.1 INFECTION DUE TO 2019 NOVEL CORONAVIRUS: ICD-10-CM

## 2024-04-24 PROCEDURE — 99213 OFFICE O/P EST LOW 20 MIN: CPT | Mod: 95 | Performed by: FAMILY MEDICINE

## 2024-04-24 RX ORDER — BUDESONIDE AND FORMOTEROL FUMARATE DIHYDRATE 160; 4.5 UG/1; UG/1
2 AEROSOL RESPIRATORY (INHALATION) 2 TIMES DAILY
Qty: 6 G | Refills: 0 | Status: SHIPPED | OUTPATIENT
Start: 2024-04-24

## 2024-04-24 NOTE — PROGRESS NOTES
"Idalmis is a 78 year old who is being evaluated via a billable video visit.    How would you like to obtain your AVS? mail  If the video visit is dropped, the invitation should be resent by: Text to cell phone: [phone]  Will anyone else be joining your video visit? No      Assessment & Plan     Malignant gastrointestinal stromal tumor, unspecified site (H)  Following with oncology    Mixed simple and mucopurulent chronic bronchitis (H)  Stable, but needing to stop Advair and instead start Symbicort while taking paxlovid due to interaction with salmeterol.  - budesonide-formoterol (SYMBICORT) 160-4.5 MCG/ACT Inhaler; Inhale 2 puffs into the lungs 2 times daily Substitute symbicort instead of Advair while taking Paxlovid.    Infection due to 2019 novel coronavirus  COVID positive  Discussed medications.  Start Paxlovid  Stop the advair and instead use the Symbicort inhaler for 5 days while you take the paxlovid  Stop the crestor (rosuvastatin) cholesterol lowering medication for the 5 days that you take the paxlovid and restart after you are done with paxlovid.  Decrease the Gleevec to every other day for the 5 days that you take the paxlovid.  - nirmatrelvir and ritonavir (PAXLOVID) 150 mg/100 mg therapy pack; Take 2 tablets by mouth 2 times daily (Take one tablet of Nirmatelvir and 1 tablet of Ritonavir twice daily for 5 days)        BMI  Estimated body mass index is 27.62 kg/m  as calculated from the following:    Height as of 4/11/24: 1.575 m (5' 2\").    Weight as of 4/11/24: 68.5 kg (151 lb).   Weight management plan: Discussed healthy diet and exercise guidelines      See Patient Instructions    Subjective   Idalmis is a 78 year old, presenting for the following health issues:  Suspected Covid (Wanting treatment)      4/24/2024     4:14 PM   Additional Questions   Roomed by Pushpa Cifuentes   Accompanied by self         4/24/2024     4:14 PM   Patient Reported Additional Medications   Patient reports taking " the following new medications none     Video Start Time: 4:35 PM    History of Present Illness       Reason for visit:  COVID positive today  Symptom onset:  Today  Symptoms include:  Coughing, fatigued, SOB- little/ normal for her, runny/ stuffy nose  Symptom intensity:  Moderate  Symptom progression:  Staying the same  Had these symptoms before:  No  What makes it worse:  Nothing  What makes it better:  Nothing    She eats 2-3 servings of fruits and vegetables daily.She consumes 0 sweetened beverage(s) daily.She exercises with enough effort to increase her heart rate 20 to 29 minutes per day.  She exercises with enough effort to increase her heart rate 3 or less days per week.   She is taking medications regularly.     Symptoms started Sunday.  Oxygen saturation 92-95 with sitting and drops below 90 if out walking and then recovers quickly.  Very fatigued.    Estimated Creatinine Clearance: 39.4 mL/min (A) (based on SCr of 1.05 mg/dL (H)).            Objective    Vitals - Patient Reported  SpO2 (Patient Reported): 92  Pain Score: No Pain (0)        Physical Exam   GENERAL: alert and no distress  EYES: Eyes grossly normal to inspection.  No discharge or erythema, or obvious scleral/conjunctival abnormalities.  RESP: wheezing and cough. Able to speak in full sentences. No visible cyanosis.    SKIN: Visible skin clear. No significant rash, abnormal pigmentation or lesions.  NEURO: Cranial nerves grossly intact.  Mentation and speech appropriate for age.  PSYCH: Appropriate affect, tone, and pace of words          Video-Visit Details    Type of service:  Video Visit   Video End Time:4:59 PM  Originating Location (pt. Location): Home    Distant Location (provider location):  On-site  Platform used for Video Visit: Jae  Signed Electronically by: Nghia Zarco MD

## 2024-04-24 NOTE — TELEPHONE ENCOUNTER
COVID Positive/Requesting COVID treatment    Patient is positive for COVID and requesting treatment options.    Date of positive COVID test (PCR or at home)? 4/24/24    Current COVID symptoms: cough, fatigue, headache, and congestion or runny nose - Not SOB any more than usual    Date COVID symptoms began: 4/21/24    Message should be routed to clinic RN pool. Best phone number to use for call back: [phone]

## 2024-04-24 NOTE — PATIENT INSTRUCTIONS
Start Paxlovid  Stop the advair and instead use the Symbicort inhaler for 5 days while you take the paxlovid  Stop the crestor (rosuvastatin) cholesterol lowering medication for the 5 days that you take the paxlovid and restart after you are done with paxlovid.  Decrease the Gleevec to every other day for the 5 days that you take the paxlovid.

## 2024-04-24 NOTE — TELEPHONE ENCOUNTER
Patient has chronic health conditions and wants a provider to evaluate and treat covid.    RN scheduled patient a virtual appointment today with Dr. Zarco. Ok to start visit when team is ready. Please call mobile phone in chart. [phone]       Carol Arenas RN on 4/24/2024 at 3:41 PM

## 2024-05-09 ENCOUNTER — TELEPHONE (OUTPATIENT)
Dept: OPHTHALMOLOGY | Facility: CLINIC | Age: 79
End: 2024-05-09
Payer: COMMERCIAL

## 2024-05-13 ENCOUNTER — PRE VISIT (OUTPATIENT)
Dept: OPHTHALMOLOGY | Facility: CLINIC | Age: 79
End: 2024-05-13

## 2024-05-13 ENCOUNTER — OFFICE VISIT (OUTPATIENT)
Dept: OPHTHALMOLOGY | Facility: CLINIC | Age: 79
End: 2024-05-13
Payer: COMMERCIAL

## 2024-05-13 DIAGNOSIS — E07.9 THYROID EYE DISEASE: ICD-10-CM

## 2024-05-13 DIAGNOSIS — H57.89 THYROID EYE DISEASE: ICD-10-CM

## 2024-05-13 DIAGNOSIS — H02.531 EYELID RETRACTION RIGHT UPPER EYELID: Primary | ICD-10-CM

## 2024-05-13 PROCEDURE — 99213 OFFICE O/P EST LOW 20 MIN: CPT | Performed by: OPHTHALMOLOGY

## 2024-05-13 PROCEDURE — 92285 EXTERNAL OCULAR PHOTOGRAPHY: CPT | Performed by: OPHTHALMOLOGY

## 2024-05-13 ASSESSMENT — VISUAL ACUITY
CORRECTION_TYPE: GLASSES
OD_CC: 20/60
OD_CC+: +2
OS_CC: 20/25
METHOD: SNELLEN - LINEAR

## 2024-05-13 ASSESSMENT — CONF VISUAL FIELD
OD_SUPERIOR_NASAL_RESTRICTION: 0
COMMENTS: SOME PEAKING OU
OD_SUPERIOR_TEMPORAL_RESTRICTION: 0
OS_INFERIOR_NASAL_RESTRICTION: 0
OS_INFERIOR_TEMPORAL_RESTRICTION: 0
OS_SUPERIOR_NASAL_RESTRICTION: 0
OD_INFERIOR_NASAL_RESTRICTION: 0
OD_INFERIOR_TEMPORAL_RESTRICTION: 0
OS_NORMAL: 1
METHOD: COUNTING FINGERS
OS_SUPERIOR_TEMPORAL_RESTRICTION: 0
OD_NORMAL: 1

## 2024-05-13 ASSESSMENT — TONOMETRY
IOP_METHOD: ICARE
OD_IOP_MMHG: 10
OS_IOP_MMHG: 10

## 2024-05-13 ASSESSMENT — SLIT LAMP EXAM - LIDS
COMMENTS: TRICHIASIS LLL
COMMENTS: LID RETRACTION: LOWER LID

## 2024-05-13 NOTE — PROGRESS NOTES
"Chief Complaints and History of Present Illnesses   Patient presents with    Consult For     Idalmis Abdul is being seen for a consult today by the request of Dr. Mckeon due to Carrie to see if surgery could help with lowering right eye lid to see if it could improve closure.      Chief Complaint(s) and History of Present Illness(es)     Consult For     Additional comments: Idalmis Abdul is being seen for a consult today   by the request of Dr. Mckeon due to Carrie to see if surgery could help   with lowering right eye lid to see if it could improve closure.            Comments    History includes Bilateral Upper eye lids Blepharoplasty/ upper lid   retraction repair 7/17/2019 and Right upper lid entropion repair   8/21/2019.  Patient reports dryness with right eye causes intermittent pain for years.      Left eye swelling due to medication taken and so \" left eye looking   different due to this.\"  Using lubricant Q 2 h right eye and gel Q HS right eye.   Does get intermittent double vision.   TV watching closing on eye will resolve. This has been ongoing for years   since having Graves disease.     Patient present today with her son.     Maeknna Ordaz, COT COT 12:30 PM May 13, 2024     Chief Complaints and History of Present Illnesses   Patient presents with    Consult For     Idalmis Abdul is being seen for a consult today by the request of Dr. Mckeon due to Carrie to see if surgery could help with lowering right eye lid to see if it could improve closure.      Chief Complaint(s) and History of Present Illness(es)     Consult For     Additional comments: Idalmiscarissa Abdul is being seen for a consult today   by the request of Dr. Mckeon due to Carrie to see if surgery could help   with lowering right eye lid to see if it could improve closure.            Comments    History includes Bilateral Upper eye lids Blepharoplasty/ upper lid   retraction repair 7/17/2019 and Right upper lid entropion " "repair   8/21/2019.  Patient reports dryness with right eye causes intermittent pain for years.      Left eye swelling due to medication taken and so \" left eye looking   different due to this.\"  Using lubricant Q 2 h right eye and gel Q HS right eye.   Does get intermittent double vision.   TV watching closing on eye will resolve. This has been ongoing for years   since having Graves disease.     Patient present today with her son.     BRANDON Hare COT 12:30 PM May 13, 2024             Assessment & Plan     Idalmis Abdul is a 78 year old female with the following diagnoses:   1. Eyelid retraction right upper eyelid    2. Thyroid eye disease         Plan:  Right upper lid retraction repair   (full thickness blepharotomy)            Attending Physician Attestation:  Complete documentation of historical and exam elements from today's encounter can be found in the full encounter summary report (not reduplicated in this progress note).  I personally obtained the chief complaint(s) and history of present illness.  I confirmed and edited as necessary the review of systems, past medical/surgical history, family history, social history, and examination findings as documented by others; and I examined the patient myself.  I personally reviewed the relevant tests, images, and reports as documented above.  I formulated and edited as necessary the assessment and plan and discussed the findings and management plan with the patient and family. I personally reviewed the ophthalmic test(s) associated with this encounter, agree with the interpretation(s) as documented and have edited the corresponding report(s) as necessary.   -Vasile Brasher MD  12:57 PM 5/13/2024      Today with Idalmis Abdul  and her son, I reviewed the indications, risks, benefits, and alternatives of the proposed surgical procedure including, but not limited to, failure obtain the desired result  and need for additional surgery, bleeding, " infection, loss of vision, loss of the eye, and the remote possibility of permanent damage to any organ system or death with the use of anesthesia.  I provided multiple opportunities for the questions, answered all questions to the best of my ability, and confirmed that my answers and my discussion were understood.   - Vasile Brasher MD 1:03 PM 5/13/2024                            Assessment & Plan     Idalmis Abdul is a 78 year old female with the following diagnoses:   No diagnosis found.                 Attending Physician Attestation:  Complete documentation of historical and exam elements from today's encounter can be found in the full encounter summary report (not reduplicated in this progress note).  I personally obtained the chief complaint(s) and history of present illness.  I confirmed and edited as necessary the review of systems, past medical/surgical history, family history, social history, and examination findings as documented by others; and I examined the patient myself.  I personally reviewed the relevant tests, images, and reports as documented above.  I formulated and edited as necessary the assessment and plan and discussed the findings and management plan with the patient and family. I personally reviewed the ophthalmic test(s) associated with this encounter, agree with the interpretation(s) as documented by the resident/fellow, and have edited the corresponding report(s) as necessary.   -Vasile Brasher MD  12:56 PM 5/13/2024

## 2024-05-13 NOTE — NURSING NOTE
"Chief Complaints and History of Present Illnesses   Patient presents with    Consult For     Idalmis Abdul is being seen for a consult today by the request of Dr. Mckeon due to Graves to see if surgery could help with lowering right eye lid to see if it could improve closure.      Chief Complaint(s) and History of Present Illness(es)       Consult For              Comments: Idalmis Abdul is being seen for a consult today by the request of Dr. Mckeon due to Graves to see if surgery could help with lowering right eye lid to see if it could improve closure.               Comments    History includes Bilateral Upper eye lids Blepharoplasty/ upper lid retraction repair 7/17/2019 and Right upper lid entropion repair 8/21/2019.  Patient reports dryness with right eye causes intermittent pain for years.    Left eye swelling due to medication taken and so \" left eye looking different due to this.\"  Using lubricant Q 2 h right eye and gel Q HS right eye.   Does get intermittent double vision.   TV watching closing on eye will resolve. This has been ongoing for years since having Graves disease.     Patient present today with her son.     Makenna Ordaz, COT COT 12:30 PM May 13, 2024                                "

## 2024-05-14 ENCOUNTER — TELEPHONE (OUTPATIENT)
Dept: OPHTHALMOLOGY | Facility: CLINIC | Age: 79
End: 2024-05-14
Payer: COMMERCIAL

## 2024-05-14 PROBLEM — H02.531 EYELID RETRACTION RIGHT UPPER EYELID: Status: ACTIVE | Noted: 2024-05-13

## 2024-05-14 NOTE — TELEPHONE ENCOUNTER
Called patient to schedule surgery with Dr. Brasher    Spoke with: Idalmis    Date of Surgery: 8/7 (Right)      Patient aware of approximate arrival time: Yes at 1130     Location of surgery: [facility] (Right)      Pre-Op H&P: [facility]     Informed patient that they need to call to schedule pre-op H&P with [facility] within 30 days of surgery date: Yes    Post-Op Appt Dates: 8/19     Discussed with patient pre-op RN will call 2-3 days prior to surgery with arrival time and instructions:  Yes      Standard Surgery Packet Sent: Yes 05/14/24  via Mail - Standard      Surgical Soap Discussed with Patient: Yes  - Received:  Local Pharmacy       Additional Information Sent in Packet:  NA       Informed patient that they will need an adult  to bring patient home from surgery: Yes  : Rk (son)         Additional Comments:  NA      All patients questions were answered and was instructed to review surgical packet and call back [phone] with any questions or concerns.       Sarah Enriquez on 5/14/2024 at 4:28 PM

## 2024-05-22 DIAGNOSIS — M48.062 SPINAL STENOSIS OF LUMBAR REGION WITH NEUROGENIC CLAUDICATION: ICD-10-CM

## 2024-05-22 RX ORDER — PREGABALIN 50 MG/1
100 CAPSULE ORAL 2 TIMES DAILY
Qty: 120 CAPSULE | Refills: 1 | Status: SHIPPED | OUTPATIENT
Start: 2024-05-22 | End: 2024-06-28

## 2024-05-22 NOTE — TELEPHONE ENCOUNTER
Medication Refill Request    Has the patient contacted the pharmacy for the refill? Yes   Name of medication being requested:   pregabalin (LYRICA) 25 MG capsule   Provider who prescribed the medication: Dr. Mckeon  Pharmacy:   [facility] [id number] [facility]   Date medication is needed: 5/22       Could we send this information to you in [employer] or would you prefer to receive a phone call?:   Patient would prefer a phone call   Okay to leave a detailed message?: Yes at Cell number on file:    Telephone Information:   Mobile [phone]

## 2024-06-17 ENCOUNTER — OFFICE VISIT (OUTPATIENT)
Dept: ORTHOPEDICS | Facility: CLINIC | Age: 79
End: 2024-06-17
Payer: COMMERCIAL

## 2024-06-17 DIAGNOSIS — Z98.1 HISTORY OF LUMBAR FUSION: ICD-10-CM

## 2024-06-17 DIAGNOSIS — M48.062 SPINAL STENOSIS OF LUMBAR REGION WITH NEUROGENIC CLAUDICATION: Primary | ICD-10-CM

## 2024-06-17 PROCEDURE — 99214 OFFICE O/P EST MOD 30 MIN: CPT | Performed by: ORTHOPAEDIC SURGERY

## 2024-06-17 RX ORDER — AMITRIPTYLINE HYDROCHLORIDE 10 MG/1
10 TABLET ORAL AT BEDTIME
Qty: 30 TABLET | Refills: 3 | Status: SHIPPED | OUTPATIENT
Start: 2024-06-17 | End: 2024-06-28 | Stop reason: DRUGHIGH

## 2024-06-17 NOTE — PROGRESS NOTES
Reason For Visit:   Chief Complaint   Patient presents with    RECHECK     3 month follow up- pain management/medication     Primary MD: Ale Ordaz  Ref. MD: EST     ?  No  Occupation NA.  Currently working? No.  Work status?  Retired.     Date of surgery: 2018  Type of surgery: L4-5 OBLIQUE LUMBAR INTERBODY FUSION WITH L4-5 POSTERIOR SPINAL FUSION AND L4 LAMINECTOMY, WITH RIGHT ILIAC CREST BONE GRAFT, USE OF INFUSE      Smoker: No  Request smoking cessation information: No    Pain management does not appear to be helping, increasingly difficult to drive car  Inj about a month ago, 3 days after felt like it was going to work. Patient describes relief decreasing after that and now back to baseline.   Left leg pain really increased after inj      LMP 01/01/1992     Pain Assessment  Patient Currently in Pain: Yes  0-10 Pain Scale: 5  Primary Pain Location: Leg (left)    Oswestry (JO ANN) Questionnaire        3/18/2024     9:40 AM   OSWESTRY DISABILITY INDEX   Count 9   Sum 20   Oswestry Score (%) 44.44 %        Visual Analog Pain Scale  Back Pain Scale 0-10: 6  Right leg pain: 0  Left leg pain: 6  Neck Pain Scale 0-10: 0  Right arm pain: 0  Left arm pain: 0    Promis 10 Assessment        10/22/2019     1:03 PM   PROMIS 10   In general, would you say your health is: Good   In general, would you say your quality of life is: Good   In general, how would you rate your physical health? Good   In general, how would you rate your mental health, including your mood and your ability to think? Good   In general, how would you rate your satisfaction with your social activities and relationships? Good   In general, please rate how well you carry out your usual social activities and roles Good   To what extent are you able to carry out your everyday physical activities such as walking, climbing stairs, carrying groceries, or moving a chair? Moderately   In the past 7 days, how often have you been bothered by emotional  problems such as feeling anxious, depressed, or irritable? Rarely   In the past 7 days, how would you rate your fatigue on average? Mild   In the past 7 days, how would you rate your pain on average, where 0 means no pain, and 10 means worst imaginable pain? 5   In general, would you say your health is: 3   In general, would you say your quality of life is: 3   In general, how would you rate your physical health? 3   In general, how would you rate your mental health, including your mood and your ability to think? 3   In general, how would you rate your satisfaction with your social activities and relationships? 3   In general, please rate how well you carry out your usual social activities and roles. (This includes activities at home, at work and in your community, and responsibilities as a parent, child, spouse, employee, friend, etc.) 3   To what extent are you able to carry out your everyday physical activities such as walking, climbing stairs, carrying groceries, or moving a chair? 3   In the past 7 days, how often have you been bothered by emotional problems such as feeling anxious, depressed, or irritable? 2   In the past 7 days, how would you rate your fatigue on average? 2   In the past 7 days, how would you rate your pain on average, where 0 means no pain, and 10 means worst imaginable pain? 5   Global Mental Health Score 13   Global Physical Health Score 13   PROMIS TOTAL - SUBSCORES 26                Neema Briggs

## 2024-06-17 NOTE — LETTER
6/17/2024      Idalmis Abdul  [address]  [facility] [address]      Dear Colleague,    Thank you for referring your patient, Idalmis Abdul, to the [facility]. Please see a copy of my visit note below.    Reason For Visit:   Chief Complaint   Patient presents with    RECHECK     3 month follow up- pain management/medication     Primary MD: Ale Ordaz  Ref. MD: EST     ?  No  Occupation NA.  Currently working? No.  Work status?  Retired.     Date of surgery: 2018  Type of surgery: L4-5 OBLIQUE LUMBAR INTERBODY FUSION WITH L4-5 POSTERIOR SPINAL FUSION AND L4 LAMINECTOMY, WITH RIGHT ILIAC CREST BONE GRAFT, USE OF INFUSE      Smoker: No  Request smoking cessation information: No    Pain management does not appear to be helping, increasingly difficult to drive car  Inj about a month ago, 3 days after felt like it was going to work. Patient describes relief decreasing after that and now back to baseline.   Left leg pain really increased after inj      LMP 01/01/1992     Pain Assessment  Patient Currently in Pain: Yes  0-10 Pain Scale: 5  Primary Pain Location: Leg (left)    Oswestry (JO ANN) Questionnaire        3/18/2024     9:40 AM   OSWESTRY DISABILITY INDEX   Count 9   Sum 20   Oswestry Score (%) 44.44 %        Visual Analog Pain Scale  Back Pain Scale 0-10: 6  Right leg pain: 0  Left leg pain: 6  Neck Pain Scale 0-10: 0  Right arm pain: 0  Left arm pain: 0    Promis 10 Assessment        10/22/2019     1:03 PM   PROMIS 10   In general, would you say your health is: Good   In general, would you say your quality of life is: Good   In general, how would you rate your physical health? Good   In general, how would you rate your mental health, including your mood and your ability to think? Good   In general, how would you rate your satisfaction with your social activities and relationships? Good   In general, please rate how well you carry out your usual social activities  and roles Good   To what extent are you able to carry out your everyday physical activities such as walking, climbing stairs, carrying groceries, or moving a chair? Moderately   In the past 7 days, how often have you been bothered by emotional problems such as feeling anxious, depressed, or irritable? Rarely   In the past 7 days, how would you rate your fatigue on average? Mild   In the past 7 days, how would you rate your pain on average, where 0 means no pain, and 10 means worst imaginable pain? 5   In general, would you say your health is: 3   In general, would you say your quality of life is: 3   In general, how would you rate your physical health? 3   In general, how would you rate your mental health, including your mood and your ability to think? 3   In general, how would you rate your satisfaction with your social activities and relationships? 3   In general, please rate how well you carry out your usual social activities and roles. (This includes activities at home, at work and in your community, and responsibilities as a parent, child, spouse, employee, friend, etc.) 3   To what extent are you able to carry out your everyday physical activities such as walking, climbing stairs, carrying groceries, or moving a chair? 3   In the past 7 days, how often have you been bothered by emotional problems such as feeling anxious, depressed, or irritable? 2   In the past 7 days, how would you rate your fatigue on average? 2   In the past 7 days, how would you rate your pain on average, where 0 means no pain, and 10 means worst imaginable pain? 5   Global Mental Health Score 13   Global Physical Health Score 13   PROMIS TOTAL - SUBSCORES 26                Neema Briggs     Chief Concern: follow up pain control for lumbar spinal stenosis,  neurogenic claudication, radicular symptoms    HPI:   Patient is a very pleasant 78-year-old female with prior L4-5 fusion in setting of degenerative lumbosacral stenosis and  idiopathic scoliosis with symptoms consistent with neurogenic claudication and radiculopathy.  She has a lumbosacral fractional curve with radicular pain due to nerve root compression as well as central stenosis with neurogenic claudication.     She has had previous right L5-S1 TFESI with no help. More recently had caudal in April with two days of relief but symptoms then returned to pre-injection.     Currently interested in medication changes to try to improve control.  Not interested in surgery at this time.     Nonlabored respiration with difficulty walking on heels and tiptoes. Resisted strength testing 5/5 hip flexion, knee extension, 4/5 ankle dorsiflexion and plantarflexion on left. Sensation intact L3-S1 bilaterally     Impression and Plan:  78 year old female with lumbar stenosis with neurogenic claudication and radiculopathy.  No benefit from two epidural steroid injections. Patient would like to try different medication for pain control. She has tried gabapentin and lyrica with no help. Recommended trial of amitriptyline. Patient agreeable to this.      Will follow up in a few months. Patient will reach out if symptoms worsening and needs earlier follow up.     Time spent on this clinical encounter including previsit chart review, history and physical examination, patient counseling and documentation was 26 minutes on the date of encounter.    Jonathan Mckeon MD  , Department of Orthopedic Surgery  [facility]

## 2024-06-18 NOTE — PROGRESS NOTES
Chief Concern: follow up pain control for lumbar spinal stenosis,  neurogenic claudication, radicular symptoms    HPI:   Patient is a very pleasant 78-year-old female with prior L4-5 fusion in setting of degenerative lumbosacral stenosis and idiopathic scoliosis with symptoms consistent with neurogenic claudication and radiculopathy.  She has a lumbosacral fractional curve with radicular pain due to nerve root compression as well as central stenosis with neurogenic claudication.     She has had previous right L5-S1 TFESI with no help. More recently had caudal in April with two days of relief but symptoms then returned to pre-injection.     Currently interested in medication changes to try to improve control.  Not interested in surgery at this time.     Nonlabored respiration with difficulty walking on heels and tiptoes. Resisted strength testing 5/5 hip flexion, knee extension, 4/5 ankle dorsiflexion and plantarflexion on left. Sensation intact L3-S1 bilaterally     Impression and Plan:  78 year old female with lumbar stenosis with neurogenic claudication and radiculopathy.  No benefit from two epidural steroid injections. Patient would like to try different medication for pain control. She has tried gabapentin and lyrica with no help. Recommended trial of amitriptyline. Patient agreeable to this.      Will follow up in a few months. Patient will reach out if symptoms worsening and needs earlier follow up.     Time spent on this clinical encounter including previsit chart review, history and physical examination, patient counseling and documentation was 26 minutes on the date of encounter.    Jonathan Mckeon MD  , Department of Orthopedic Surgery  [facility], formerly [facility]

## 2024-06-22 DIAGNOSIS — C49.A0 MALIGNANT GASTROINTESTINAL STROMAL TUMOR, UNSPECIFIED SITE (H): ICD-10-CM

## 2024-06-24 RX ORDER — IMATINIB MESYLATE 400 MG/1
TABLET, FILM COATED ORAL
Refills: 11 | OUTPATIENT
Start: 2024-06-24

## 2024-06-28 DIAGNOSIS — C49.A0 MALIGNANT GASTROINTESTINAL STROMAL TUMOR, UNSPECIFIED SITE (H): Primary | ICD-10-CM

## 2024-06-28 RX ORDER — IMATINIB MESYLATE 400 MG/1
400 TABLET, FILM COATED ORAL DAILY
Qty: 30 TABLET | Refills: 11 | Status: SHIPPED | OUTPATIENT
Start: 2024-06-28

## 2024-07-09 DIAGNOSIS — M48.062 SPINAL STENOSIS OF LUMBAR REGION WITH NEUROGENIC CLAUDICATION: ICD-10-CM

## 2024-07-18 ENCOUNTER — OFFICE VISIT (OUTPATIENT)
Dept: FAMILY MEDICINE | Facility: CLINIC | Age: 79
End: 2024-07-18
Payer: COMMERCIAL

## 2024-07-18 VITALS
SYSTOLIC BLOOD PRESSURE: 130 MMHG | HEIGHT: 62 IN | TEMPERATURE: 97.4 F | OXYGEN SATURATION: 96 % | RESPIRATION RATE: 20 BRPM | WEIGHT: 149.13 LBS | BODY MASS INDEX: 27.44 KG/M2 | DIASTOLIC BLOOD PRESSURE: 82 MMHG | HEART RATE: 68 BPM

## 2024-07-18 DIAGNOSIS — I25.10 ASCVD (ARTERIOSCLEROTIC CARDIOVASCULAR DISEASE): ICD-10-CM

## 2024-07-18 DIAGNOSIS — E07.9 THYROID EYE DISEASE: ICD-10-CM

## 2024-07-18 DIAGNOSIS — Z01.818 PREOP GENERAL PHYSICAL EXAM: Primary | ICD-10-CM

## 2024-07-18 DIAGNOSIS — H02.531 EYELID RETRACTION RIGHT UPPER EYELID: ICD-10-CM

## 2024-07-18 DIAGNOSIS — E05.90 HYPERTHYROIDISM: ICD-10-CM

## 2024-07-18 DIAGNOSIS — J43.2 CENTRILOBULAR EMPHYSEMA (H): ICD-10-CM

## 2024-07-18 DIAGNOSIS — C49.A0 MALIGNANT GASTROINTESTINAL STROMAL TUMOR, UNSPECIFIED SITE (H): ICD-10-CM

## 2024-07-18 DIAGNOSIS — I10 HYPERTENSION GOAL BP (BLOOD PRESSURE) < 140/90: ICD-10-CM

## 2024-07-18 DIAGNOSIS — H57.89 THYROID EYE DISEASE: ICD-10-CM

## 2024-07-18 PROBLEM — E87.6 HYPOKALEMIA: Status: RESOLVED | Noted: 2021-07-15 | Resolved: 2024-07-18

## 2024-07-18 PROBLEM — K59.00 CONSTIPATION: Status: RESOLVED | Noted: 2021-07-15 | Resolved: 2024-07-18
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
## 2024-07-18 PROBLEM — R11.2 NON-INTRACTABLE VOMITING WITH NAUSEA: Status: RESOLVED | Noted: 2021-07-14 | Resolved: 2024-07-18

## 2024-07-18 PROBLEM — E87.1 HYPONATREMIA: Status: RESOLVED | Noted: 2021-07-15 | Resolved: 2024-07-18

## 2024-07-18 PROCEDURE — 99214 OFFICE O/P EST MOD 30 MIN: CPT | Performed by: FAMILY MEDICINE

## 2024-07-18 RX ORDER — RESPIRATORY SYNCYTIAL VIRUS VACCINE 120MCG/0.5
0.5 KIT INTRAMUSCULAR ONCE
Qty: 1 EACH | Refills: 0 | Status: CANCELLED | OUTPATIENT
Start: 2024-07-18 | End: 2024-07-18

## 2024-07-18 ASSESSMENT — PAIN SCALES - GENERAL: PAINLEVEL: NO PAIN (0)

## 2024-07-18 NOTE — PROGRESS NOTES
Preoperative Evaluation  [facility]  [address]  [facility] [address]  Phone: [phone]  Primary Provider: Ale Ordaz MD  Pre-op Performing Provider: Ale Ordaz MD  Jul 18, 2024 7/18/2024   Surgical Information   What procedure is being done? eyelid   Facility or Hospital where procedure/surgery will be performed: [facility]   Who is doing the procedure / surgery? giovanna fernandes   Date of surgery / procedure: august 7   Time of surgery / procedure: do not know   Where do you plan to recover after surgery? at home alone        Fax number for surgical facility: [phone]    Assessment & Plan     The proposed surgical procedure is considered LOW risk.    Preop general physical exam       Thyroid eye disease       Eyelid retraction right upper eyelid       Hypertension goal BP (blood pressure) < 140/90  Well controlled    Malignant gastrointestinal stromal tumor, unspecified site (H)  Stable, on gleevec    ASCVD (arteriosclerotic cardiovascular disease)  Asymptomatic    Hyperthyroidism       Centrilobular emphysema (H)  Wears oxygen intermittently at home  Well managed currently  Oxygen sats on room air today 96% at rest            - No identified additional risk factors other than previously addressed    Antiplatelet or Anticoagulation Medication Instructions   - aspirin: Discontinue aspirin 7-10 days prior to procedure to reduce bleeding risk. It should be resumed postoperatively.     Additional Medication Instructions  Take all scheduled medications on the day of surgery    Recommendation  Approval given to proceed with proposed procedure, without further diagnostic evaluation.    Jeni Raygoza is a 78 year old, presenting for the following:  Pre-Op Exam          7/18/2024     9:04 AM   Additional Questions   Roomed by Dana ferrer CMA   Accompanied by Self     HPI related to upcoming procedure: 79 yo female with hypertension, GIST, ASCVD, COPD,  hyperthyroidism with thyroid eye disease who has had two previous surgeries on the right upper lid is now having problems with dry eye due to upper lid retraction and is having surgery to fix that.          7/18/2024   Pre-Op Questionnaire   Have you ever had a heart attack or stroke? (!) YES 2012   Have you ever had surgery on your heart or blood vessels, such as a stent placement, a coronary artery bypass, or surgery on an artery in your head, neck, heart, or legs? (!) YES  2012 CABG x 4   Do you have chest pain with activity? No   Do you have a history of heart failure? No   Do you currently have a cold, bronchitis or symptoms of other infection? No   Do you have a cough, shortness of breath, or wheezing? No   Do you or anyone in your family have previous history of blood clots? No   Do you or does anyone in your family have a serious bleeding problem such as prolonged bleeding following surgeries or cuts? No   Have you ever had problems with anemia or been told to take iron pills? No   Have you had any abnormal blood loss such as black, tarry or bloody stools, or abnormal vaginal bleeding? No   Have you ever had a blood transfusion? (!) YES   Have you ever had a transfusion reaction? No   Are you willing to have a blood transfusion if it is medically needed before, during, or after your surgery? Yes   Have you or any of your relatives ever had problems with anesthesia? (!) YES     Do you have sleep apnea, excessive snoring or daytime drowsiness? No   Do you have any artifical heart valves or other implanted medical devices like a pacemaker, defibrillator, or continuous glucose monitor? No   Do you have artificial joints? No   Are you allergic to latex? No        Health Care Directive  Patient does not have a Health Care Directive or Living Will: Discussed advance care planning with patient; however, patient declined at this time.    Preoperative Review of    reviewed - controlled substances reflected in  medication list.      Status of Chronic Conditions:  See problem list for active medical problems.  Problems all longstanding and stable, except as noted/documented.  See ROS for pertinent symptoms related to these conditions.    Patient Active Problem List    Diagnosis Date Noted    Eyelid retraction right upper eyelid 05/13/2024     Priority: Medium    History of lumbar fusion 03/19/2024     Priority: Medium    Centrilobular emphysema (H) 02/01/2024     Priority: Medium    Hypokalemia 07/15/2021     Priority: Medium    Hyponatremia 07/15/2021     Priority: Medium    Constipation 07/15/2021     Priority: Medium    Anemia due to unknown mechanism 07/15/2021     Priority: Medium    Fall, initial encounter 07/14/2021     Priority: Medium    Non-intractable vomiting with nausea, unspecified vomiting type 07/14/2021     Priority: Medium    Mixed simple and mucopurulent chronic bronchitis (H) 01/07/2020     Priority: Medium    Coronary atherosclerosis 04/05/2018     Priority: Medium     Formatting of this note might be different from the original.  Coronary artery bypass graft x 4 in 2012      Spinal stenosis of lumbar region with neurogenic claudication 03/22/2018     Priority: Medium    DDD (degenerative disc disease), lumbar 03/22/2018     Priority: Medium    Age-related osteoporosis without current pathological fracture 12/16/2014     Priority: Medium    PVD (posterior vitreous detachment) 11/17/2014     Priority: Medium    History of tobacco abuse 09/02/2014     Priority: Medium     QUIT in 2010      History of non-ST elevation myocardial infarction (NSTEMI) 09/02/2014     Priority: Medium    Chronic back pain 09/02/2014     Priority: Medium     No longer on oxycodone - had been on this long term in the past        Graves' disease 08/19/2014     Priority: Medium    Thyroid eye disease 04/28/2014     Priority: Medium    Eyelid retraction or lag 04/28/2014     Priority: Medium    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 Provider to review      Hyperthyroidism 02/04/2014     Priority: Medium     Seeing Endoncrinology at [facility]      GERD (gastroesophageal reflux disease) 10/08/2013     Priority: Medium    S/P CABG x 4 08/05/2012     Priority: Medium    ASCVD (arteriosclerotic cardiovascular disease) 06/14/2012     Priority: Medium    NSTEMI (non-ST elevated myocardial infarction) (H) 06/12/2012     Priority: Medium    Eyelid edema 12/15/2011     Priority: Medium     side effect of gastric cancer medication      History of lung cancer 12/15/2011     Priority: Medium     S/p resection of right lung.  Sees  @ [facility]    Formatting of this note might be different from the original.  s/p lung resection - follows at [facility]      GIST (gastrointestinal stromal tumor), 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     Priority: Medium     resected 2003, recurred 2007, resected, and now on adjuvant gleevec  CT scan every 6 months  Dr. Schmitz      Hypertension goal BP (blood pressure) < 140/90 03/24/2005     Priority: Medium      Past Medical History:   Diagnosis Date    ASCVD (arteriosclerotic cardiovascular disease) 6/14/2012    Benign neoplasm of duodenum, jejunum, and ileum 2003, 2007    Gastrointestinal Stromal Tumor, seen at [facility], Dr. Schmitz, GI oncology-Gleevec treatment.  Surgical removal in fall 2004-no recurrence as of 3/05.    CA - lung cancer 4/2010    [facility], treated surgically    choledochal cyst 1963    CHOLEDOCHAL CYST, mild dilatation of biliary system    CKD (chronic kidney disease) stage 3, GFR 30-59 ml/min (H) 5/23/2019    Coronary artery disease     DDD (degenerative disc disease), lumbar 3/22/2018    Dislocated finger, left middle PIP 7/26/2013    Dyspnea 8/27/2013    Eyelid edema 12/15/2011    side effect of gastric cancer medication     GERD (gastroesophageal reflux disease) 10/8/2013    GIST (gastrointestinal stromal tumor), malignant (H) 5/10/2011    Graves  disease     Grief reaction 5/11/2009    History of lung cancer 12/15/2011    S/p resection of right lung.  Sees  @ [facility]     Hyperlipidaemia     Hyperlipidemia LDL goal <160 12/17/2013    Hyperthyroidism 2/4/2014    Hypokalemia 8/5/2012    LBP (low back pain) 7/26/2013    Leiomyoma of uterus, unspecified     NSTEMI (non-ST elevated myocardial infarction) (H) 6/12/2012    NSTEMI (non-ST elevated myocardial infarction) (H)     osteopenia     Other benign neoplasm of connective and other soft tissue of thorax 2003    Hamartoma, lung-?right-s/p  resection 2004    Other chronic pain     back    PONV (postoperative nausea and vomiting)     Postsurgical aortocoronary bypass status 7/4/2012    S/P CABG x 4 8/5/2012    Strabismus     Tobacco use disorder     Quit    Unspecified essential hypertension      Past Surgical History:   Procedure Laterality Date    BLEPHAROPLASTY BILATERAL Bilateral 07/17/2019    Procedure: Bilateral Upper Eyelid Blepharoplasty;  Surgeon: Vasile Brasher MD;  Location:  OR    BYPASS GRAFT ARTERY CORONARY  06/14/2012    Procedure: BYPASS GRAFT ARTERY CORONARY;  Median Sternotomy Coronary Artery Bypass Graft  x 3       C THORACOSCOPY,DX W BX  fall 2003    benign tissue    CATARACT IOL, RT/LT      LE    CHOLECYSTECTOMY  01/01/1963    COLONOSCOPY  04/12/2005    CORONARY ARTERY BYPASS      X4    CREATION PERICARDIAL WINDOW  06/15/2012    Procedure: CREATION PERICARDIAL WINDOW;  Mediastinal Exploration, Control of Bleeding;  Surgeon: Dwaine Griffin MD;  Location:  OR    EYE SURGERY  01/01/2014    left cataract removal    GI SURGERY  2003 and 2007    GIST tumor removal    GYN SURGERY      tubal ligation    HYSTERECTOMY VAGINAL, COLPORRHAPHY ANTERIOR, POSTERIOR, COMBINED N/A 10/17/2017    Procedure: COMBINED HYSTERECTOMY VAGINAL, COLPORRHAPHY ANTERIOR, POSTERIOR;  Total Vaginal Hysterectomy and Posterior Repair,Sacrospinous Vault Suspension;  Surgeon: Ruth Farooq MD;   Location: [facility] OR    l4-5 fusion  2019    Dr. Calvin - [facility]    PHACOEMULSIFICATION WITH STANDARD INTRAOCULAR LENS IMPLANT  03/24/2014    Procedure: PHACOEMULSIFICATION WITH STANDARD INTRAOCULAR LENS IMPLANT;  Left Kelman Phacoemulsification with Intraocular Lens Implant;  Surgeon: Magnus Mckeon MD;  Location: [facility] OR    RECESSION RESECTION WITH ADJUSTABLE SUTURE BILATERAL Bilateral 07/14/2016    Procedure: RECESSION RESECTION WITH ADJUSTABLE SUTURE BILATERAL;  Surgeon: Erma Ordaz MD;  Location: [facility] OR    REPAIR ENTROPION Right 08/21/2019    Procedure: Right Upper Lid Entropion Repair;  Surgeon: Vasile Brasher MD;  Location: [facility] OR    REPAIR RETRACTION LID BILATERAL Bilateral 07/17/2019    Procedure: Bilateral Upper Eyelid Retraction Repair;  Surgeon: Vasile Brasher MD;  Location: [facility] OR    SURGICAL HISTORY OF -   01/01/1963    cholecystectomy    SURGICAL HISTORY OF -   09/01/1970    Tubal Ligation     SURGICAL HISTORY OF -   08/01/2003    Explor. Lap, Excision of Small Bowel Tumor     SURGICAL HISTORY OF -   04/01/2010    lung cancer removed right lung     Current Outpatient Medications   Medication Sig Dispense Refill    amLODIPine (NORVASC) 5 MG tablet Take 1 tablet (5 mg) by mouth daily 90 tablet 3    aspirin (ASA) 81 MG chewable tablet Take 81 mg by mouth daily      budesonide-formoterol (SYMBICORT) 160-4.5 MCG/ACT Inhaler Inhale 2 puffs into the lungs 2 times daily Substitute symbicort instead of Advair while taking Paxlovid. 6 g 0    calcium carbonate-vitamin D (CALTRATE) 600-10 MG-MCG per tablet Take 1 tablet by mouth 2 times daily      cyanocobalamin (VITAMIN B-12) 1000 MCG tablet Take 1,000 mcg by mouth daily      estradiol (ESTRACE) 0.5 MG tablet INSERT ONE TABLET VAGINALLY TWICE WEEKLY AS DIRECTED 12 tablet 3    fluticasone-salmeterol (ADVAIR) 100-50 MCG/ACT inhaler Inhale 1 puff into the lungs every 12 hours 60 each 11    IBANdronate (BONIVA) 150 MG tablet Take 1 tablet (150  mg) by mouth every 30 days 3 tablet 3    imatinib (GLEEVEC) 400 MG tablet Take 1 tablet (400 mg) by mouth daily Take with a meal and a large glass of water. 30 tablet 11    melatonin 5 MG tablet Take 5 mg by mouth daily      methimazole (TAPAZOLE) 5 MG tablet Take 0.5 tablets (2.5 mg) by mouth daily 45 tablet 1    metoprolol tartrate (LOPRESSOR) 100 MG tablet Take 1 tablet (100 mg) by mouth 2 times daily 180 tablet 3    omeprazole (PRILOSEC) 20 MG DR capsule Take 1 capsule (20 mg) by mouth daily 90 capsule 3    rosuvastatin (CRESTOR) 5 MG tablet TAKE 1 TABLET (5 MG) BY MOUTH EVERY OTHER DAY 45 tablet 3    umeclidinium (INCRUSE ELLIPTA) 62.5 MCG/ACT inhaler INHALE 1 PUFF INTO THE LUNGS DAILY 30 each 10    Vitamin D3 (VITAMIN D, CHOLECALCIFEROL,) 25 mcg (1000 units) tablet Take 1 tablet by mouth daily      albuterol (PROAIR HFA/PROVENTIL HFA/VENTOLIN HFA) 108 (90 Base) MCG/ACT inhaler INHALE 2 PUFFS INTO THE LUNGS EVERY 6 HOURS AS NEEDED 8.5 g 1    Blood Pressure Monitoring (ADULT BLOOD PRESSURE CUFF LG) KIT 1 Device 2 times daily 1 kit 1    clobetasol (TEMOVATE) 0.05 % external ointment Apply topically 2 times daily Profile Rx: patient will contact pharmacy when needed 60 g 3    gabapentin (NEURONTIN) 300 MG capsule Take 1 capsule (300 mg) by mouth at bedtime for 5 days, THEN 1 capsule (300 mg) 2 times daily for 5 days, THEN 1 capsule (300 mg) 3 times daily for 5 days. Increase dosage if no relief 90 capsule 1    nirmatrelvir and ritonavir (PAXLOVID) 150 mg/100 mg therapy pack Take 2 tablets by mouth 2 times daily (Take one tablet of Nirmatelvir and 1 tablet of Ritonavir twice daily for 5 days) (Patient not taking: Reported on 7/18/2024) 20 tablet 0    polyethylene glycol (MIRALAX) 17 g packet Take 1 packet by mouth daily as needed for constipation      psyllium (METAMUCIL/KONSYL) 58.6 % powder Take 1 teaspoonful by mouth daily         Allergies   Allergen Reactions    Atorvastatin Cramps               Social History  "    Tobacco Use    Smoking status: Former     Current packs/day: 0.00     Average packs/day: 0.5 packs/day for 49.0 years (24.5 ttl pk-yrs)     Types: Cigarettes     Start date: 1961     Quit date: 2010     Years since quittin.2    Smokeless tobacco: Never   Substance Use Topics    Alcohol use: No     Family History   Problem Relation Age of Onset    Heart Disease Mother     Osteoporosis Mother     Respiratory Mother         Emphezyma    Hypertension Mother     Heart Disease Father     Alcohol/Drug Father     Hypertension Father     Hypertension Maternal Grandmother     Hypertension Brother     Hypertension Sister     Arthritis Sister     Hypertension Son     Cancer Son         rectal     History   Drug Use No             Review of Systems  Constitutional, HEENT, cardiovascular, pulmonary, gi and gu systems are negative, except as otherwise noted.    Objective    /82   Pulse 68   Temp 97.4  F (36.3  C) (Tympanic)   Resp 20   Ht 1.575 m (5' 2\")   Wt 67.6 kg (149 lb 2 oz)   LMP 1992   SpO2 96%   BMI 27.28 kg/m     Estimated body mass index is 27.28 kg/m  as calculated from the following:    Height as of this encounter: 1.575 m (5' 2\").    Weight as of this encounter: 67.6 kg (149 lb 2 oz).  Physical Exam  GENERAL: alert and no distress  NECK: no adenopathy, no asymmetry, masses, or scars  RESP: lungs clear to auscultation - no rales, rhonchi or wheezes  CV: regular rates and rhythm, normal S1 S2, no S3 or S4, grade 3/6 systolic murmur heard best over the LUSB, peripheral pulses strong, and no peripheral edema  ABDOMEN: soft, nontender, no hepatosplenomegaly, no masses and bowel sounds normal  MS: no gross musculoskeletal defects noted, no edema    Recent Labs   Lab Test 24  1112 24  1503   HGB 10.2*  --      --     138   POTASSIUM 4.3 4.6   CR 1.05* 0.99*        Diagnostics  No labs were ordered during this visit.   No EKG required for low risk surgery " (cataract, skin procedure, breast biopsy, etc).    Revised Cardiac Risk Index (RCRI)  The patient has the following serious cardiovascular risks for perioperative complications:   - Coronary Artery Disease (MI, positive stress test, angina, Qs on EKG) = 1 point     RCRI Interpretation: 1 point: Class II (low risk - 0.9% complication rate)         Signed Electronically by: Ale Ordaz MD  Copy of this evaluation report is provided to requesting physician.

## 2024-07-18 NOTE — PATIENT INSTRUCTIONS
How to Take Your Medication Before Surgery  Preoperative Medication Instructions   Antiplatelet or Anticoagulation Medication Instructions   - aspirin: Discontinue aspirin 7-10 days prior to procedure to reduce bleeding risk. It should be resumed postoperatively.     Additional Medication Instructions  Take all scheduled medications on the day of surgery       Patient Education   Preparing for Your Surgery  Getting started  A nurse will call you to review your health history and instructions. They will give you an arrival time based on your scheduled surgery time. Please be ready to share:  Your doctor's clinic name and phone number  Your medical, surgical, and anesthesia history  A list of allergies and sensitivities  A list of medicines, including herbal treatments and over-the-counter drugs  Whether the patient has a legal guardian (ask how to send us the papers in advance)  Please tell us if you're pregnant--or if there's any chance you might be pregnant. Some surgeries may injure a fetus (unborn baby), so they require a pregnancy test. Surgeries that are safe for a fetus don't always need a test, and you can choose whether to have one.   If you have a child who's having surgery, please ask for a copy of Preparing for Your Child's Surgery.    Preparing for surgery  Within 10 to 30 days of surgery: Have a pre-op exam (sometimes called an H&P, or History and Physical). This can be done at a clinic or pre-operative center.  If you're having a , you may not need this exam. Talk to your care team.  At your pre-op exam, talk to your care team about all medicines you take. If you need to stop any medicines before surgery, ask when to start taking them again.  We do this for your safety. Many medicines can make you bleed too much during surgery. Some change how well surgery (anesthesia) drugs work.  Call your insurance company to let them know you're having surgery. (If you don't have insurance, call  [phone].)  Call your clinic if there's any change in your health. This includes signs of a cold or flu (sore throat, runny nose, cough, rash, fever). It also includes a scrape or scratch near the surgery site.  If you have questions on the day of surgery, call your hospital or surgery center.  Eating and drinking guidelines  For your safety: Unless your surgeon tells you otherwise, follow the guidelines below.  Eat and drink as usual until 8 hours before you arrive for surgery. After that, no food or milk.  Drink clear liquids until 2 hours before you arrive. These are liquids you can see through, like water, Gatorade, and Propel Water. They also include plain black coffee and tea (no cream or milk), candy, and breath mints. You can spit out gum when you arrive.  If you drink alcohol: Stop drinking it the night before surgery.  If your care team tells you to take medicine on the morning of surgery, it's okay to take it with a sip of water.  Preventing infection  Shower or bathe the night before and morning of your surgery. Follow the instructions your clinic gave you. (If no instructions, use regular soap.)  Don't shave or clip hair near your surgery site. We'll remove the hair if needed.  Don't smoke or vape the morning of surgery. You may chew nicotine gum up to 2 hours before surgery. A nicotine patch is okay.  Note: Some surgeries require you to completely quit smoking and nicotine. Check with your surgeon.  Your care team will make every effort to keep you safe from infection. We will:  Clean our hands often with soap and water (or an alcohol-based hand rub).  Clean the skin at your surgery site with a special soap that kills germs.  Give you a special gown to keep you warm. (Cold raises the risk of infection.)  Wear special hair covers, masks, gowns and gloves during surgery.  Give antibiotic medicine, if prescribed. Not all surgeries need antibiotics.  What to bring on the day of surgery  Photo ID and  insurance card  Copy of your health care directive, if you have one  Glasses and hearing aids (bring cases)  You can't wear contacts during surgery  Inhaler and eye drops, if you use them (tell us about these when you arrive)  CPAP machine or breathing device, if you use them  A few personal items, if spending the night  If you have . . .  A pacemaker, ICD (cardiac defibrillator) or other implant: Bring the ID card.  An implanted stimulator: Bring the remote control.  A legal guardian: Bring a copy of the certified (court-stamped) guardianship papers.  Please remove any jewelry, including body piercings. Leave jewelry and other valuables at home.  If you're going home the day of surgery  You must have a responsible adult drive you home. They should stay with you overnight as well.  If you don't have someone to stay with you, and you aren't safe to go home alone, we may keep you overnight. Insurance often won't pay for this.  After surgery  If it's hard to control your pain or you need more pain medicine, please call your surgeon's office.  Questions?   If you have any questions for your care team, list them here: _________________________________________________________________________________________________________________________________________________________________________ ____________________________________ ____________________________________ ____________________________________  For informational purposes only. Not to replace the advice of your health care provider. Copyright   2003, 2019 [facility]. All rights reserved. Clinically reviewed by Davida Whyte MD. [employer] AB [id number] - REV 12/22.

## 2024-07-22 ENCOUNTER — VIRTUAL VISIT (OUTPATIENT)
Dept: ORTHOPEDICS | Facility: CLINIC | Age: 79
End: 2024-07-22
Payer: COMMERCIAL

## 2024-07-22 DIAGNOSIS — M48.062 SPINAL STENOSIS OF LUMBAR REGION WITH NEUROGENIC CLAUDICATION: Primary | ICD-10-CM

## 2024-07-22 DIAGNOSIS — N95.2 ATROPHIC VAGINITIS: ICD-10-CM

## 2024-07-22 PROCEDURE — 98966 PH1 ASSMT&MGMT NQHP 5-10: CPT | Mod: 93 | Performed by: PHYSICIAN ASSISTANT

## 2024-07-22 RX ORDER — METHOCARBAMOL 500 MG/1
500 TABLET, FILM COATED ORAL
Qty: 30 TABLET | Refills: 0 | Status: SHIPPED | OUTPATIENT
Start: 2024-07-22 | End: 2024-08-15

## 2024-07-22 RX ORDER — ESTRADIOL 0.5 MG/1
TABLET ORAL
Qty: 12 TABLET | Refills: 3 | Status: SHIPPED | OUTPATIENT
Start: 2024-07-22

## 2024-07-22 RX ORDER — PREGABALIN 50 MG/1
CAPSULE ORAL
Qty: 67 CAPSULE | Refills: 0 | Status: SHIPPED | OUTPATIENT
Start: 2024-07-22 | End: 2024-08-05

## 2024-07-22 NOTE — PROGRESS NOTES
"Idalmis Abdul is a 78 year old female who is being evaluated via a billable telephone visit.      The patient has been notified of following:      \"This telephone visit will be conducted via a call between you and your physician/provider. We have found that certain health care needs can be provided without the need for an in-person physical exam.  This service lets us provide the care you need with a telephone conversation.  If a prescription is necessary we can send it directly to your pharmacy.  If lab work is needed we can place an order for that and you can then stop by our lab to have the test done at a later time.     Telephone visits are billed at different rates depending on your insurance coverage.  Please reach out to your insurance provider with any questions.     If during the course of the call the physician/provider feels a telephone visit is not appropriate, you will not be charged for this service.\"    I have reviewed and updated the patient's Past Medical History, Social History, Family History and Medication List.    ALLERGIES  Atorvastatin    Spine Surgery Follow Up    REFERRING PHYSICIAN: No ref. provider found   PRIMARY CARE PHYSICIAN: Ale Ordaz           Chief Complaint:   Medication Problem      History of Present Illness:  Symptom Profile Including: location of symptoms, onset, severity, exacerbating/alleviating factors, previous treatments:        Idalmis Abdul is a 78 year old female who presents today for medication management of her radiculopathy. She has a history prior L4-5 fusion in setting of degenerative lumbosacral stenosis and idiopathic scoliosis with symptoms consistent with neurogenic claudication and radiculopathy.  She has a lumbosacral fractional curve with radicular pain due to nerve root compression as well as central stenosis with neurogenic claudication.      She has had previous right L5-S1 TFESI with no help. More recently had caudal in April with two days " of relief but symptoms then returned to pre-injection.     For medications, she has tried gabapentin, lyrica, and most recently amitriptyline. Amitriptyline caused headaches, stomach pain, nausea. Lyrica caused dry mouth, she was taking 100 BID and stopped it. Gabapentin she called with blurred vision. She stopped gabapentin last Thursday but symptoms are not improved. She noted diplopia when watching TV and when reading the paper. This started 2.5 weeks ago. She also has photophobia. She has a history of Graves disease and has an ophthalmologist that she follows with.     Since going off the meds, she is having a lot of pain in her left hip and thigh to the knee. She takes ASA but is wondering about other pain medications.     JO ANN Scores:  Oswestry (JO ANN) Questionnaire        3/18/2024     9:40 AM   OSWESTRY DISABILITY INDEX   Count 9   Sum 20   Oswestry Score (%) 44.44 %                Physical Exam:   This was a telephone visit, so very limited exam could be performed.  On telephone, patient sounded alert, oriented x 3, cooperative, with coherent speech, and not in cardiorespiratory distress.  Able to respond to questions appropriately and follow instructions.           Assessment and Plan:   Assessment:  78 year old female with radiculopathy, multiple drug allergies     Plan:  Restart lyrica 50 mg hs, titrate up to 50 mg BID. She did have side effects at 100 mg BID but could titrate up to 75 mg BID. Also methocarbamol hs, S/E discussed. Could consider short dose of tramadol but would not recommend long-term use.   Recommend she see her ophthalmologist to follow up on vision changes.     Rosa Maria Fox PA-C, CODY  Orthopaedic Spine Surgery  Dept Orthopaedic Surgery, [facility]    Dictation Disclaimer: Some of this Note has been completed with voice-recognition dictation software. Although errors are generally corrected real-time, there is the potential for a rare error to be present in the completed  chart.      Start time of call: 12:51  End time of call: 1:00  Total call duration: 9:30  Location of provider during call: on site

## 2024-07-22 NOTE — TELEPHONE ENCOUNTER
Requested Prescriptions   Pending Prescriptions Disp Refills    estradiol (ESTRACE) 0.5 MG tablet [Pharmacy Med Name: ESTRADIOL 0.5MG TABS] 12 tablet 3     Sig: INSERT ONE TABLET VAGINALLY TWICE WEEKLY AS DIRECTED       Contraceptives Protocol Failed - 7/22/2024  5:02 AM        Failed - Medication indicated for associated diagnosis     Medication is associated with one or more of the following diagnoses:  Contraception  Acne  Dysmenorrhea  Menorrhagia  Amenorrhea  PCOS  Premenstrual Dysphoric Disorder  Irregular menses          Passed - Patient is not a current smoker if age is 35 or older        Passed - Recent (12 mo) or future (30 days) visit within the authorizing provider's specialty     The patient must have completed an in-person or virtual visit within the past 12 months or has a future visit scheduled within the next 90 days with the authorizing provider s specialty.  Urgent care and e-visits do not quality as an office visit for this protocol.          Passed - Medication is active on med list        Passed - No active pregnancy on record        Passed - No positive pregnancy test in past 12 months       Hormone Replacement Therapy Passed - 7/22/2024  5:02 AM        Passed - Blood pressure under 140/90 in past 12 months     BP Readings from Last 3 Encounters:   07/18/24 130/82   04/09/24 (!) 141/59   03/21/24 139/72       No data recorded            Passed - Medication is active on med list        Passed - Recent (12 mo) or future (90 days) visit within the authorizing provider's specialty     The patient must have completed an in-person or virtual visit within the past 12 months or has a future visit scheduled within the next 90 days with the authorizing provider s specialty.  Urgent care and e-visits do not quality as an office visit for this protocol.          Passed - Medication indicated for associated diagnosis     The medication is prescribed for one or more of the following conditions:     Menopause   Vulva/Vaginal atrophy   Low Estrogen   Gender Dysphoria   Male to Female transgender          Passed - Patient is 18 years of age or older        Passed - No active pregnancy on record        Passed - No positive pregnancy test on record in past 12 months       Hormone Replacement Therapy (vaginal) Passed - 7/22/2024  5:02 AM        Passed - Medication is active on med list        Passed - Recent (12 mo) or future (90 days) visit within the authorizing provider's specialty     The patient must have completed an in-person or virtual visit within the past 12 months or has a future visit scheduled within the next 90 days with the authorizing provider s specialty.  Urgent care and e-visits do not quality as an office visit for this protocol.          Passed - Medication indicated for associated diagnosis     Medication is associated with one or more of the following diagnoses:     Menopause   Vulva/Vaginal atrophy   UTI prophylaxis          Passed - Patient is 18 years of age or older        Passed - No active pregnancy on record        Passed - No positive pregnancy test on record in past 12 months

## 2024-07-22 NOTE — NURSING NOTE
Reason For Visit:   Chief Complaint   Patient presents with    Medication Problem     Discontinued gabapentin 7/18/24       Primary MD: Ale Ordaz  Ref. MD: EST    ?  No  Occupation NA.  Currently working? No.  Work status?  Retired.     Date of surgery: 2018  Type of surgery: L4-5 OBLIQUE LUMBAR INTERBODY FUSION WITH L4-5 POSTERIOR SPINAL FUSION AND L4 LAMINECTOMY, WITH RIGHT ILIAC CREST BONE GRAFT, USE OF INFUSE   Smoker: No  Request smoking cessation information: No    LMP 01/01/1992          Oswestry (JO ANN) Questionnaire        3/18/2024     9:40 AM   OSWESTRY DISABILITY INDEX   Count 9   Sum 20   Oswestry Score (%) 44.44 %            Neck Disability Index (NDI) Questionnaire         No data to display                            Promis 10 Assessment        10/22/2019     1:03 PM   PROMIS 10   In general, would you say your health is: Good   In general, would you say your quality of life is: Good   In general, how would you rate your physical health? Good   In general, how would you rate your mental health, including your mood and your ability to think? Good   In general, how would you rate your satisfaction with your social activities and relationships? Good   In general, please rate how well you carry out your usual social activities and roles Good   To what extent are you able to carry out your everyday physical activities such as walking, climbing stairs, carrying groceries, or moving a chair? Moderately   In the past 7 days, how often have you been bothered by emotional problems such as feeling anxious, depressed, or irritable? Rarely   In the past 7 days, how would you rate your fatigue on average? Mild   In the past 7 days, how would you rate your pain on average, where 0 means no pain, and 10 means worst imaginable pain? 5   In general, would you say your health is: 3   In general, would you say your quality of life is: 3   In general, how would you rate your physical health? 3   In  general, how would you rate your mental health, including your mood and your ability to think? 3   In general, how would you rate your satisfaction with your social activities and relationships? 3   In general, please rate how well you carry out your usual social activities and roles. (This includes activities at home, at work and in your community, and responsibilities as a parent, child, spouse, employee, friend, etc.) 3   To what extent are you able to carry out your everyday physical activities such as walking, climbing stairs, carrying groceries, or moving a chair? 3   In the past 7 days, how often have you been bothered by emotional problems such as feeling anxious, depressed, or irritable? 2   In the past 7 days, how would you rate your fatigue on average? 2   In the past 7 days, how would you rate your pain on average, where 0 means no pain, and 10 means worst imaginable pain? 5   Global Mental Health Score 13   Global Physical Health Score 13   PROMIS TOTAL - SUBSCORES 26                Nanette Martínez, CMA

## 2024-07-23 ENCOUNTER — NURSE TRIAGE (OUTPATIENT)
Dept: FAMILY MEDICINE | Facility: CLINIC | Age: 79
End: 2024-07-23
Payer: COMMERCIAL

## 2024-07-23 NOTE — TELEPHONE ENCOUNTER
Symptoms    Describe your symptoms: Pt has been having issues with reading and blurriness X 3 weeks and made appt to see Mike at [facility].  Pt has also been having some nausea X 1 week and wants to know if that could be from her eyes?  Please call patient and advise.      Any pain: No    How long have you been having symptoms: 1  weeks    Have you been seen for this:  No    Appointment offered?: No    Triage offered?: Yes:     Home remedies tried:     Preferred Pharmacy:   [facility] [place], [place] - [address]  [address] [facility] [address]  Phone: [phone] Fax: [phone] Alternate Fax: [phone]    Okay to leave a detailed message?: Yes at Cell number on file:    Telephone Information:   Mobile [phone]

## 2024-07-23 NOTE — TELEPHONE ENCOUNTER
"Reason for Disposition   Patient wants to be seen    Additional Information   Negative: Shock suspected (e.g., cold/pale/clammy skin, too weak to stand, low BP, rapid pulse)   Negative: Sounds like a life-threatening emergency to the triager   Negative: Nausea or vomiting and pregnancy < 20 weeks   Negative: Menstrual Period - Missed or Late (i.e., pregnancy suspected)   Negative: Heat exhaustion suspected (i.e., dehydration from heat exposure)   Negative: Anxiety or stress suspected (i.e., nausea with anxiety attacks or stressful situations)   Negative: Traumatic Brain Injury (TBI) suspected   Negative: Vomiting occurs   Negative: Other symptom is present, see that guideline.  (e.g., chest pain, headache, dizziness, abdominal pain, colds, sore throat, etc.).   Negative: Unable to walk, or can only walk with assistance (e.g., requires support)   Negative: Difficulty breathing   Negative: Insulin-dependent diabetes (Type I) and glucose > 400 mg/dL (22 mmol/L)   Negative: Drinking very little and dehydration suspected (e.g., no urine > 12 hours, very dry mouth, very lightheaded)   Negative: Patient sounds very sick or weak to the triager   Negative: Fever > 104 F  (40 C)   Negative: Fever > 101 F  (38.3 C) and over 60 years of age   Negative: Fever > 100.0 F  (37.8 C) and bedridden (e.g., CVA, chronic illness, recovering from surgery)   Negative: Fever > 100.0 F  (37.8 C) and diabetes mellitus or weak immune system (e.g., HIV positive, cancer chemo, splenectomy, chronic steroids)   Negative: Taking any of the following medications: digoxin (Lanoxin), lithium, theophylline, phenytoin (Dilantin)   Negative: Yellowish color of the skin or white of the eye (i.e., jaundice)   Negative: Fever present > 3 days (72 hours)    Answer Assessment - Initial Assessment Questions  1. NAUSEA SEVERITY: \"How bad is the nausea?\" (e.g., mild, moderate, severe; dehydration, weight loss)    - MILD: loss of appetite without change in eating " "habits    - MODERATE: decreased oral intake without significant weight loss, dehydration, or malnutrition    - SEVERE: inadequate caloric or fluid intake, significant weight loss, symptoms of dehydration      Moderate   2. ONSET: \"When did the nausea begin?\"      About a week   3. VOMITING: \"Any vomiting?\" If Yes, ask: \"How many times today?\"      No   4. RECURRENT SYMPTOM: \"Have you had nausea before?\" If Yes, ask: \"When was the last time?\" \"What happened that time?\"      Yes she had this a few years ago but it would resolve after a day or two.   5. CAUSE: \"What do you think is causing the nausea?  The past 3 weeks patient had eye issues of blurred vision and at night for the past few nights vertigo when she lays in bed for a few minutes then goes away.  6. PREGNANCY: \"Is there any chance you are pregnant?\" (e.g., unprotected intercourse, missed birth control pill, broken condom)      No    Protocols used: Nausea-A-OH    RN scheduled patient with Dr. Schroeder tomorrow in a same day appointment slot.    Carol Arenas RN on 7/23/2024 at 3:00 PM    "

## 2024-07-24 ENCOUNTER — OFFICE VISIT (OUTPATIENT)
Dept: FAMILY MEDICINE | Facility: CLINIC | Age: 79
End: 2024-07-24
Payer: COMMERCIAL

## 2024-07-24 ENCOUNTER — HOSPITAL ENCOUNTER (OUTPATIENT)
Dept: CT IMAGING | Facility: CLINIC | Age: 79
Discharge: HOME | End: 2024-07-24
Attending: FAMILY MEDICINE | Admitting: FAMILY MEDICINE
Payer: COMMERCIAL

## 2024-07-24 VITALS
RESPIRATION RATE: 16 BRPM | BODY MASS INDEX: 27.34 KG/M2 | HEART RATE: 81 BPM | OXYGEN SATURATION: 97 % | HEIGHT: 62 IN | WEIGHT: 148.6 LBS | DIASTOLIC BLOOD PRESSURE: 72 MMHG | SYSTOLIC BLOOD PRESSURE: 160 MMHG

## 2024-07-24 DIAGNOSIS — H53.8 BLURRED VISION: Primary | ICD-10-CM

## 2024-07-24 DIAGNOSIS — I10 HYPERTENSION GOAL BP (BLOOD PRESSURE) < 140/90: ICD-10-CM

## 2024-07-24 DIAGNOSIS — E07.9 THYROID EYE DISEASE: ICD-10-CM

## 2024-07-24 DIAGNOSIS — H57.89 THYROID EYE DISEASE: ICD-10-CM

## 2024-07-24 DIAGNOSIS — H53.8 BLURRED VISION: ICD-10-CM

## 2024-07-24 LAB
ALBUMIN SERPL BCG-MCNC: 3.9 G/DL (ref 3.5–5.2)
ALP SERPL-CCNC: 71 U/L (ref 40–150)
ALT SERPL W P-5'-P-CCNC: 23 U/L (ref 0–50)
ANION GAP SERPL CALCULATED.3IONS-SCNC: 14 MMOL/L (ref 7–15)
AST SERPL W P-5'-P-CCNC: 39 U/L (ref 0–45)
BASOPHILS # BLD AUTO: 0 10E3/UL (ref 0–0.2)
BASOPHILS NFR BLD AUTO: 1 %
BILIRUB SERPL-MCNC: 0.3 MG/DL
BUN SERPL-MCNC: 16.1 MG/DL (ref 8–23)
CALCIUM SERPL-MCNC: 9 MG/DL (ref 8.8–10.4)
CHLORIDE SERPL-SCNC: 98 MMOL/L (ref 98–107)
CREAT BLD-MCNC: 0.8 MG/DL (ref 0.5–1)
CREAT SERPL-MCNC: 0.94 MG/DL (ref 0.51–0.95)
EGFRCR SERPLBLD CKD-EPI 2021: 61 ML/MIN/1.73M2
EGFRCR SERPLBLD CKD-EPI 2021: >60 ML/MIN/1.73M2
EOSINOPHIL # BLD AUTO: 0.3 10E3/UL (ref 0–0.7)
EOSINOPHIL NFR BLD AUTO: 3 %
ERYTHROCYTE [DISTWIDTH] IN BLOOD BY AUTOMATED COUNT: 16.1 % (ref 10–15)
GLUCOSE SERPL-MCNC: 119 MG/DL (ref 70–99)
HCO3 SERPL-SCNC: 22 MMOL/L (ref 22–29)
HCT VFR BLD AUTO: 32.5 % (ref 35–47)
HGB BLD-MCNC: 10.7 G/DL (ref 11.7–15.7)
IMM GRANULOCYTES # BLD: 0 10E3/UL
IMM GRANULOCYTES NFR BLD: 0 %
LYMPHOCYTES # BLD AUTO: 1.9 10E3/UL (ref 0.8–5.3)
LYMPHOCYTES NFR BLD AUTO: 24 %
MCH RBC QN AUTO: 28.5 PG (ref 26.5–33)
MCHC RBC AUTO-ENTMCNC: 32.9 G/DL (ref 31.5–36.5)
MCV RBC AUTO: 87 FL (ref 78–100)
MONOCYTES # BLD AUTO: 1.1 10E3/UL (ref 0–1.3)
MONOCYTES NFR BLD AUTO: 14 %
NEUTROPHILS # BLD AUTO: 4.5 10E3/UL (ref 1.6–8.3)
NEUTROPHILS NFR BLD AUTO: 58 %
PLATELET # BLD AUTO: 221 10E3/UL (ref 150–450)
POTASSIUM SERPL-SCNC: 4.1 MMOL/L (ref 3.4–5.3)
PROT SERPL-MCNC: 6.6 G/DL (ref 6.4–8.3)
RBC # BLD AUTO: 3.75 10E6/UL (ref 3.8–5.2)
SODIUM SERPL-SCNC: 134 MMOL/L (ref 135–145)
TSH SERPL DL<=0.005 MIU/L-ACNC: 0.41 UIU/ML (ref 0.3–4.2)
WBC # BLD AUTO: 7.8 10E3/UL (ref 4–11)

## 2024-07-24 PROCEDURE — 99214 OFFICE O/P EST MOD 30 MIN: CPT | Performed by: FAMILY MEDICINE

## 2024-07-24 PROCEDURE — 70496 CT ANGIOGRAPHY HEAD: CPT

## 2024-07-24 PROCEDURE — 82565 ASSAY OF CREATININE: CPT

## 2024-07-24 PROCEDURE — G2211 COMPLEX E/M VISIT ADD ON: HCPCS | Performed by: FAMILY MEDICINE

## 2024-07-24 PROCEDURE — 250N000009 HC RX 250: Performed by: FAMILY MEDICINE

## 2024-07-24 PROCEDURE — 70450 CT HEAD/BRAIN W/O DYE: CPT | Mod: XU

## 2024-07-24 PROCEDURE — 84443 ASSAY THYROID STIM HORMONE: CPT | Performed by: FAMILY MEDICINE

## 2024-07-24 PROCEDURE — 85025 COMPLETE CBC W/AUTO DIFF WBC: CPT | Performed by: FAMILY MEDICINE

## 2024-07-24 PROCEDURE — 82565 ASSAY OF CREATININE: CPT | Performed by: FAMILY MEDICINE

## 2024-07-24 PROCEDURE — 36415 COLL VENOUS BLD VENIPUNCTURE: CPT | Performed by: FAMILY MEDICINE

## 2024-07-24 PROCEDURE — 250N000011 HC RX IP 250 OP 636: Performed by: FAMILY MEDICINE

## 2024-07-24 RX ORDER — IOPAMIDOL 755 MG/ML
67 INJECTION, SOLUTION INTRAVASCULAR ONCE
Status: COMPLETED | OUTPATIENT
Start: 2024-07-24 | End: 2024-07-24

## 2024-07-24 RX ADMIN — SODIUM CHLORIDE 100 ML: 9 INJECTION, SOLUTION INTRAVENOUS at 16:11

## 2024-07-24 RX ADMIN — IOPAMIDOL 67 ML: 755 INJECTION, SOLUTION INTRAVENOUS at 16:10

## 2024-07-24 ASSESSMENT — ENCOUNTER SYMPTOMS: NAUSEA: 1

## 2024-07-24 NOTE — PROGRESS NOTES
Assessment & Plan     Blurred vision  New blurry vision, headache and nausea. BP has been high-140s/80s at dentist today did take yesterday, and higher in the office today.  No other focal neurological symptoms but with ongoing symptoms, some dizziness headache and nausea would like to rule out stroke.  Stat CT and CTA.  Labs as below.  Patient thought this was a side effect from her gabapentin and had weaned yourself off of it with no improvement in symptoms.  - CT Head w/o Contrast  - CTA Head Neck with Contrast  - Comprehensive metabolic panel (BMP + Alb, Alk Phos, ALT, AST, Total. Bili, TP)  - TSH with free T4 reflex  - CBC with platelets and differential    Thyroid eye disease  - TSH with free T4 reflex    Hypertension goal BP (blood pressure) < 140/90  Not well controlled , higher blood pressure in the office and at her dentist yesterday.  Plan to increase her amlodipine to 10 mg to see if we can get better blood pressure control.  She will reach out to me in 2 days with her blood pressure and how her symptoms are doing.  Discussed symptoms that would require more urgent evaluation in the ER patient expressed understanding.      Subjective   Idalmis is a 78 year old, presenting for the following health issues:  Nausea        7/24/2024     2:10 PM   Additional Questions   Roomed by Nanette LANGE MA   Accompanied by Self     History of Present Illness       Reason for visit:  Eyes and nausea  Symptom onset:  3-7 days ago  Symptoms include:  Blurry eyes and nausea  Symptom intensity:  Moderate  Symptom progression:  Staying the same  Had these symptoms before:  Yes  Has tried/received treatment for these symptoms:  Yes  Previous treatment was successful:  Yes  Prior treatment description:  Eye surgery and omepresol  What makes it worse:  No  What makes it better:  No    She eats 2-3 servings of fruits and vegetables daily.She consumes 1 sweetened beverage(s) daily.She exercises with enough effort to increase her  "heart rate 10 to 19 minutes per day.  She exercises with enough effort to increase her heart rate 3 or less days per week.   She is taking medications regularly.     Eyes have become very light sensitive with blurry vision.  Is wondering if that could cause nausea.  Has been nauseated lately.    Does have an appointment scheduled with Dr. Mckeon on 8/1/24 for her blurry vision and light sensitivity.  States does have a hx of Graves and not sure if that is starting up again.  History of migraines many many years ago.  She does have a very low level headache.       Review of Systems  Constitutional, HEENT, cardiovascular, pulmonary, gi and gu systems are negative, except as otherwise noted.      Objective    BP (!) 160/72 (BP Location: Right arm, Patient Position: Chair, Cuff Size: Adult Regular)   Pulse 81   Resp 16   Ht 1.575 m (5' 2\")   Wt 67.4 kg (148 lb 9.6 oz)   LMP 01/01/1992   SpO2 97%   BMI 27.18 kg/m    Body mass index is 27.18 kg/m .  Physical Exam  HENT:      Mouth/Throat:      Mouth: Mucous membranes are moist.   Eyes:      Conjunctiva/sclera: Conjunctivae normal.   Cardiovascular:      Rate and Rhythm: Normal rate and regular rhythm.      Pulses: Normal pulses.   Pulmonary:      Effort: Pulmonary effort is normal.      Breath sounds: Normal breath sounds.   Skin:     General: Skin is warm and dry.   Neurological:      General: No focal deficit present.      Mental Status: She is alert.      Sensory: No sensory deficit.      Motor: No weakness.      Coordination: Coordination normal.   Psychiatric:         Thought Content: Thought content normal.          Signed Electronically by: GEOVANNA LINARES DO    "

## 2024-07-29 ENCOUNTER — ANESTHESIA EVENT (OUTPATIENT)
Dept: SURGERY | Facility: AMBULATORY SURGERY CENTER | Age: 79
End: 2024-07-29
Payer: COMMERCIAL

## 2024-08-05 ENCOUNTER — VIRTUAL VISIT (OUTPATIENT)
Dept: ORTHOPEDICS | Facility: CLINIC | Age: 79
End: 2024-08-05
Payer: COMMERCIAL

## 2024-08-05 DIAGNOSIS — M48.062 SPINAL STENOSIS OF LUMBAR REGION WITH NEUROGENIC CLAUDICATION: ICD-10-CM

## 2024-08-05 DIAGNOSIS — Z98.1 HISTORY OF LUMBAR FUSION: ICD-10-CM

## 2024-08-05 DIAGNOSIS — G89.29 CHRONIC LOW BACK PAIN, UNSPECIFIED BACK PAIN LATERALITY, UNSPECIFIED WHETHER SCIATICA PRESENT: Primary | ICD-10-CM

## 2024-08-05 DIAGNOSIS — M54.50 CHRONIC LOW BACK PAIN, UNSPECIFIED BACK PAIN LATERALITY, UNSPECIFIED WHETHER SCIATICA PRESENT: Primary | ICD-10-CM

## 2024-08-05 PROCEDURE — 99214 OFFICE O/P EST MOD 30 MIN: CPT | Performed by: ORTHOPAEDIC SURGERY

## 2024-08-05 RX ORDER — PREGABALIN 50 MG/1
CAPSULE ORAL
Qty: 120 CAPSULE | Refills: 2 | Status: SHIPPED | OUTPATIENT
Start: 2024-08-05 | End: 2024-09-11

## 2024-08-05 NOTE — NURSING NOTE
Reason For Visit:   Chief Complaint   Patient presents with    RECHECK     Recheck meds       Primary MD: Ale Ordaz  Ref. MD: EST     ?  No  Occupation NA.  Currently working? No.  Work status?  Retired.     Date of surgery: 2018  Type of surgery: L4-5 OBLIQUE LUMBAR INTERBODY FUSION WITH L4-5 POSTERIOR SPINAL FUSION AND L4 LAMINECTOMY, WITH RIGHT ILIAC CREST BONE GRAFT, USE OF INFUSE   Smoker: No  Request smoking cessation information: No    LMP 01/01/1992          Oswestry (JO ANN) Questionnaire        3/18/2024     9:40 AM   OSWESTRY DISABILITY INDEX   Count 9   Sum 20   Oswestry Score (%) 44.44 %            Neck Disability Index (NDI) Questionnaire         No data to display                            Promis 10 Assessment        10/22/2019     1:03 PM   PROMIS 10   In general, would you say your health is: Good   In general, would you say your quality of life is: Good   In general, how would you rate your physical health? Good   In general, how would you rate your mental health, including your mood and your ability to think? Good   In general, how would you rate your satisfaction with your social activities and relationships? Good   In general, please rate how well you carry out your usual social activities and roles Good   To what extent are you able to carry out your everyday physical activities such as walking, climbing stairs, carrying groceries, or moving a chair? Moderately   In the past 7 days, how often have you been bothered by emotional problems such as feeling anxious, depressed, or irritable? Rarely   In the past 7 days, how would you rate your fatigue on average? Mild   In the past 7 days, how would you rate your pain on average, where 0 means no pain, and 10 means worst imaginable pain? 5   In general, would you say your health is: 3   In general, would you say your quality of life is: 3   In general, how would you rate your physical health? 3   In general, how would you rate your  mental health, including your mood and your ability to think? 3   In general, how would you rate your satisfaction with your social activities and relationships? 3   In general, please rate how well you carry out your usual social activities and roles. (This includes activities at home, at work and in your community, and responsibilities as a parent, child, spouse, employee, friend, etc.) 3   To what extent are you able to carry out your everyday physical activities such as walking, climbing stairs, carrying groceries, or moving a chair? 3   In the past 7 days, how often have you been bothered by emotional problems such as feeling anxious, depressed, or irritable? 2   In the past 7 days, how would you rate your fatigue on average? 2   In the past 7 days, how would you rate your pain on average, where 0 means no pain, and 10 means worst imaginable pain? 5   Global Mental Health Score 13   Global Physical Health Score 13   PROMIS TOTAL - SUBSCORES 26                Arina Mares, ATC

## 2024-08-05 NOTE — LETTER
8/5/2024      Idalmis Abdul  [address]  [facility] [address]      Dear Colleague,    Thank you for referring your patient, Idalmis Abdul, to the [facility]. Please see a copy of my visit note below.    Chief Concern: follow up medication concern    History Present Illness:  Very pleasant 78 year old female with idiopathic scoliosis, prior 2018 L4-5 OLIF with posterior spinal fusion.     Recently discontinued lyrica and thereafter had increasing pain radiating to left lateral thigh consistent with worsening of her left L5 radiculopathy.  Resumed pregabalin recently at 1/2 dose she was previously taking and would like to increase back to her previous dose of 100 mg twice daily.  She denies pain deep in the left groin or tenderness over the trochanteric bursa.      I agree with increasing her lyrica dose to 100 mg twice daily.  Will follow up as needed.     I spent 7 minutes on phone with patient and another 8 minutes in chart review, care coordination and documentation on date of encounter.     Jonathan Mckeon MD  , Department of Orthopedic Surgery  [facility]      Again, thank you for allowing me to participate in the care of your patient.        Sincerely,        Jonathan Mckeon MD

## 2024-08-05 NOTE — PROGRESS NOTES
Chief Concern: follow up medication concern    History Present Illness:  Very pleasant 78 year old female with idiopathic scoliosis, prior 2018 L4-5 OLIF with posterior spinal fusion.     Recently discontinued lyrica and thereafter had increasing pain radiating to left lateral thigh consistent with worsening of her left L5 radiculopathy.  Resumed pregabalin recently at 1/2 dose she was previously taking and would like to increase back to her previous dose of 100 mg twice daily.  She denies pain deep in the left groin or tenderness over the trochanteric bursa.      I agree with increasing her lyrica dose to 100 mg twice daily.  Will follow up as needed.     I spent 7 minutes on phone with patient and another 8 minutes in chart review, care coordination and documentation on date of encounter.     Jonathan Mckeon MD  , Department of Orthopedic Surgery  [facility]

## 2024-08-06 RX ORDER — NALOXONE HYDROCHLORIDE 0.4 MG/ML
0.1 INJECTION, SOLUTION INTRAMUSCULAR; INTRAVENOUS; SUBCUTANEOUS
Status: DISCONTINUED | OUTPATIENT
Start: 2024-08-06 | End: 2024-08-08 | Stop reason: HOSPADM

## 2024-08-06 RX ORDER — OXYCODONE HYDROCHLORIDE 5 MG/1
5 TABLET ORAL
Status: DISCONTINUED | OUTPATIENT
Start: 2024-08-06 | End: 2024-08-08 | Stop reason: HOSPADM

## 2024-08-06 RX ORDER — SODIUM CHLORIDE, SODIUM LACTATE, POTASSIUM CHLORIDE, CALCIUM CHLORIDE 600; 310; 30; 20 MG/100ML; MG/100ML; MG/100ML; MG/100ML
INJECTION, SOLUTION INTRAVENOUS CONTINUOUS
Status: DISCONTINUED | OUTPATIENT
Start: 2024-08-06 | End: 2024-08-08 | Stop reason: HOSPADM

## 2024-08-06 RX ORDER — HYDROMORPHONE HYDROCHLORIDE 1 MG/ML
0.2 INJECTION, SOLUTION INTRAMUSCULAR; INTRAVENOUS; SUBCUTANEOUS EVERY 5 MIN PRN
Status: DISCONTINUED | OUTPATIENT
Start: 2024-08-06 | End: 2024-08-08 | Stop reason: HOSPADM

## 2024-08-06 RX ORDER — ONDANSETRON 4 MG/1
4 TABLET, ORALLY DISINTEGRATING ORAL EVERY 30 MIN PRN
Status: DISCONTINUED | OUTPATIENT
Start: 2024-08-06 | End: 2024-08-08 | Stop reason: HOSPADM

## 2024-08-06 RX ORDER — FENTANYL CITRATE 50 UG/ML
50 INJECTION, SOLUTION INTRAMUSCULAR; INTRAVENOUS EVERY 5 MIN PRN
Status: DISCONTINUED | OUTPATIENT
Start: 2024-08-06 | End: 2024-08-08 | Stop reason: HOSPADM

## 2024-08-06 RX ORDER — ONDANSETRON 2 MG/ML
4 INJECTION INTRAMUSCULAR; INTRAVENOUS EVERY 30 MIN PRN
Status: DISCONTINUED | OUTPATIENT
Start: 2024-08-06 | End: 2024-08-08 | Stop reason: HOSPADM

## 2024-08-06 RX ORDER — HYDROMORPHONE HYDROCHLORIDE 1 MG/ML
0.4 INJECTION, SOLUTION INTRAMUSCULAR; INTRAVENOUS; SUBCUTANEOUS EVERY 5 MIN PRN
Status: DISCONTINUED | OUTPATIENT
Start: 2024-08-06 | End: 2024-08-08 | Stop reason: HOSPADM

## 2024-08-06 RX ORDER — LABETALOL HYDROCHLORIDE 5 MG/ML
10 INJECTION, SOLUTION INTRAVENOUS
Status: DISCONTINUED | OUTPATIENT
Start: 2024-08-06 | End: 2024-08-08 | Stop reason: HOSPADM

## 2024-08-06 RX ORDER — DEXAMETHASONE SODIUM PHOSPHATE 10 MG/ML
4 INJECTION, SOLUTION INTRAMUSCULAR; INTRAVENOUS
Status: DISCONTINUED | OUTPATIENT
Start: 2024-08-06 | End: 2024-08-08 | Stop reason: HOSPADM

## 2024-08-06 RX ORDER — FENTANYL CITRATE 50 UG/ML
25 INJECTION, SOLUTION INTRAMUSCULAR; INTRAVENOUS EVERY 5 MIN PRN
Status: DISCONTINUED | OUTPATIENT
Start: 2024-08-06 | End: 2024-08-08 | Stop reason: HOSPADM

## 2024-08-06 RX ORDER — OXYCODONE HYDROCHLORIDE 5 MG/1
10 TABLET ORAL
Status: DISCONTINUED | OUTPATIENT
Start: 2024-08-06 | End: 2024-08-08 | Stop reason: HOSPADM

## 2024-08-07 ENCOUNTER — HOSPITAL ENCOUNTER (OUTPATIENT)
Facility: AMBULATORY SURGERY CENTER | Age: 79
Discharge: HOME | End: 2024-08-07
Attending: OPHTHALMOLOGY
Payer: COMMERCIAL

## 2024-08-07 ENCOUNTER — ANESTHESIA (OUTPATIENT)
Dept: SURGERY | Facility: AMBULATORY SURGERY CENTER | Age: 79
End: 2024-08-07
Payer: COMMERCIAL

## 2024-08-07 VITALS
OXYGEN SATURATION: 93 % | TEMPERATURE: 97.2 F | HEIGHT: 62 IN | BODY MASS INDEX: 27.6 KG/M2 | HEART RATE: 67 BPM | RESPIRATION RATE: 16 BRPM | SYSTOLIC BLOOD PRESSURE: 152 MMHG | DIASTOLIC BLOOD PRESSURE: 65 MMHG | WEIGHT: 150 LBS

## 2024-08-07 DIAGNOSIS — Z98.890 POSTOPERATIVE EYE STATE: Primary | ICD-10-CM

## 2024-08-07 PROCEDURE — 67911 REVISE EYELID DEFECT: CPT | Mod: E3

## 2024-08-07 PROCEDURE — 67911 REVISE EYELID DEFECT: CPT | Mod: E3 | Performed by: OPHTHALMOLOGY

## 2024-08-07 PROCEDURE — 67911 REVISE EYELID DEFECT: CPT | Performed by: NURSE ANESTHETIST, CERTIFIED REGISTERED

## 2024-08-07 PROCEDURE — 67911 REVISE EYELID DEFECT: CPT | Performed by: ANESTHESIOLOGY

## 2024-08-07 PROCEDURE — 99100 ANES PT EXTEME AGE<1 YR&>70: CPT | Performed by: ANESTHESIOLOGY

## 2024-08-07 PROCEDURE — 99100 ANES PT EXTEME AGE<1 YR&>70: CPT | Performed by: NURSE ANESTHETIST, CERTIFIED REGISTERED

## 2024-08-07 RX ORDER — FENTANYL CITRATE 50 UG/ML
50 INJECTION, SOLUTION INTRAMUSCULAR; INTRAVENOUS EVERY 5 MIN PRN
Status: DISCONTINUED | OUTPATIENT
Start: 2024-08-07 | End: 2024-08-08 | Stop reason: HOSPADM

## 2024-08-07 RX ORDER — ONDANSETRON 4 MG/1
4 TABLET, ORALLY DISINTEGRATING ORAL EVERY 30 MIN PRN
Status: DISCONTINUED | OUTPATIENT
Start: 2024-08-07 | End: 2024-08-08 | Stop reason: HOSPADM

## 2024-08-07 RX ORDER — HYDROMORPHONE HYDROCHLORIDE 1 MG/ML
0.4 INJECTION, SOLUTION INTRAMUSCULAR; INTRAVENOUS; SUBCUTANEOUS EVERY 5 MIN PRN
Status: DISCONTINUED | OUTPATIENT
Start: 2024-08-07 | End: 2024-08-08 | Stop reason: HOSPADM

## 2024-08-07 RX ORDER — LIDOCAINE HYDROCHLORIDE AND EPINEPHRINE 10; 10 MG/ML; UG/ML
INJECTION, SOLUTION INFILTRATION; PERINEURAL PRN
Status: DISCONTINUED | OUTPATIENT
Start: 2024-08-07 | End: 2024-08-07 | Stop reason: HOSPADM

## 2024-08-07 RX ORDER — NALOXONE HYDROCHLORIDE 0.4 MG/ML
0.1 INJECTION, SOLUTION INTRAMUSCULAR; INTRAVENOUS; SUBCUTANEOUS
Status: DISCONTINUED | OUTPATIENT
Start: 2024-08-07 | End: 2024-08-08 | Stop reason: HOSPADM

## 2024-08-07 RX ORDER — OXYCODONE HYDROCHLORIDE 5 MG/1
10 TABLET ORAL
Status: DISCONTINUED | OUTPATIENT
Start: 2024-08-07 | End: 2024-08-08 | Stop reason: HOSPADM

## 2024-08-07 RX ORDER — ONDANSETRON 2 MG/ML
4 INJECTION INTRAMUSCULAR; INTRAVENOUS EVERY 30 MIN PRN
Status: DISCONTINUED | OUTPATIENT
Start: 2024-08-07 | End: 2024-08-08 | Stop reason: HOSPADM

## 2024-08-07 RX ORDER — ERYTHROMYCIN 5 MG/G
OINTMENT OPHTHALMIC PRN
Status: DISCONTINUED | OUTPATIENT
Start: 2024-08-07 | End: 2024-08-07 | Stop reason: HOSPADM

## 2024-08-07 RX ORDER — HYDROMORPHONE HYDROCHLORIDE 1 MG/ML
0.2 INJECTION, SOLUTION INTRAMUSCULAR; INTRAVENOUS; SUBCUTANEOUS EVERY 5 MIN PRN
Status: DISCONTINUED | OUTPATIENT
Start: 2024-08-07 | End: 2024-08-08 | Stop reason: HOSPADM

## 2024-08-07 RX ORDER — LIDOCAINE 40 MG/G
CREAM TOPICAL
Status: DISCONTINUED | OUTPATIENT
Start: 2024-08-07 | End: 2024-08-08 | Stop reason: HOSPADM

## 2024-08-07 RX ORDER — ERYTHROMYCIN 5 MG/G
OINTMENT OPHTHALMIC
Qty: 3.5 G | Refills: 1 | Status: SHIPPED | OUTPATIENT
Start: 2024-08-07

## 2024-08-07 RX ORDER — TETRACAINE HYDROCHLORIDE 5 MG/ML
SOLUTION OPHTHALMIC PRN
Status: DISCONTINUED | OUTPATIENT
Start: 2024-08-07 | End: 2024-08-07 | Stop reason: HOSPADM

## 2024-08-07 RX ORDER — OXYCODONE HYDROCHLORIDE 5 MG/1
5 TABLET ORAL
Status: DISCONTINUED | OUTPATIENT
Start: 2024-08-07 | End: 2024-08-08 | Stop reason: HOSPADM

## 2024-08-07 RX ORDER — DEXAMETHASONE SODIUM PHOSPHATE 10 MG/ML
4 INJECTION, SOLUTION INTRAMUSCULAR; INTRAVENOUS
Status: DISCONTINUED | OUTPATIENT
Start: 2024-08-07 | End: 2024-08-08 | Stop reason: HOSPADM

## 2024-08-07 RX ORDER — OXYCODONE HYDROCHLORIDE 5 MG/1
5 TABLET ORAL EVERY 6 HOURS PRN
Qty: 12 TABLET | Refills: 0 | Status: SHIPPED | OUTPATIENT
Start: 2024-08-07 | End: 2024-08-10

## 2024-08-07 RX ORDER — PROPOFOL 10 MG/ML
INJECTION, EMULSION INTRAVENOUS PRN
Status: DISCONTINUED | OUTPATIENT
Start: 2024-08-07 | End: 2024-08-07

## 2024-08-07 RX ORDER — ACETAMINOPHEN 325 MG/1
975 TABLET ORAL ONCE
Status: COMPLETED | OUTPATIENT
Start: 2024-08-07 | End: 2024-08-07

## 2024-08-07 RX ORDER — FENTANYL CITRATE 50 UG/ML
25 INJECTION, SOLUTION INTRAMUSCULAR; INTRAVENOUS EVERY 5 MIN PRN
Status: DISCONTINUED | OUTPATIENT
Start: 2024-08-07 | End: 2024-08-08 | Stop reason: HOSPADM

## 2024-08-07 RX ORDER — NEOMYCIN POLYMYXIN B SULFATES AND DEXAMETHASONE 3.5; 10000; 1 MG/ML; [USP'U]/ML; MG/ML
SUSPENSION/ DROPS OPHTHALMIC
Qty: 5 ML | Refills: 0 | Status: SHIPPED | OUTPATIENT
Start: 2024-08-07

## 2024-08-07 RX ORDER — SODIUM CHLORIDE, SODIUM LACTATE, POTASSIUM CHLORIDE, CALCIUM CHLORIDE 600; 310; 30; 20 MG/100ML; MG/100ML; MG/100ML; MG/100ML
INJECTION, SOLUTION INTRAVENOUS CONTINUOUS
Status: DISCONTINUED | OUTPATIENT
Start: 2024-08-07 | End: 2024-08-08 | Stop reason: HOSPADM

## 2024-08-07 RX ADMIN — PROPOFOL 60 MG: 10 INJECTION, EMULSION INTRAVENOUS at 11:24

## 2024-08-07 RX ADMIN — PROPOFOL 30 MG: 10 INJECTION, EMULSION INTRAVENOUS at 11:38

## 2024-08-07 RX ADMIN — ACETAMINOPHEN 975 MG: 325 TABLET ORAL at 10:10

## 2024-08-07 RX ADMIN — SODIUM CHLORIDE, SODIUM LACTATE, POTASSIUM CHLORIDE, CALCIUM CHLORIDE: 600; 310; 30; 20 INJECTION, SOLUTION INTRAVENOUS at 11:17

## 2024-08-07 ASSESSMENT — COPD QUESTIONNAIRES
COPD: 1
CAT_SEVERITY: MODERATE

## 2024-08-07 ASSESSMENT — LIFESTYLE VARIABLES: TOBACCO_USE: 1

## 2024-08-07 NOTE — OP NOTE
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     PROCEDURE:Right upper eyelid recession by full thickness blepharotomy.     SURGEON: Maribeth Brasher MD    ASSISTANT: Lexus Gutierrez MD, JONATHAN    ANESTHESIA: Monitored with local infiltration of 1% lidocaine with epinephrine [id number].     COMPLICATIONS: None.     ESTIMATED BLOOD LOSS: Less than 5 mL.     HISTORY: Idalmis Abdul  presented with retraction of theright upper lid with exposure keratopathy. After risks, benefits and alternatives to the proposed procedure were explained, informed consent was obtained.     DESCRIPTION OF PROCEDURE: Idalmis Abdul  was brought to the operating room and placed supine on the operating table. IV sedation was given. The upper lid crease and excess eyelid skin was marked with a marking pen and infiltrated with local anesthetic. The area was prepped and draped in the typical fashion. Attention was directed to the right side. A scleral shield was placed over the eye. A #15 blade was used to incise the skin following the marked line. Excess skin was excised with the high temperature cautery.  Dissection was carried down to the orbicularis with high temperature cautery. Orbital septum was opened horizontally. Dissection was carried to the superior tarsal plate. Levator aponeurosis, Herring's muscle and conjunctiva were released from the superior tarsal border nasally and temporally until the lid came to a normal height. A central island of conjunctiva was left remaining. The lid came into an excellent position after removal of the scleral shield. Again, the position was checked. The skin was then closed with running 6-0 plain gut suture. Ophthalmic antibiotic ointment was applied to the incision. The patient tolerated the procedure well and left the operating room in stable condition.     MARIBETH  MD ATIYA

## 2024-08-07 NOTE — ANESTHESIA POSTPROCEDURE EVALUATION
Patient: Idalmis Abdul    Procedure: Procedure(s):  REPAIR, RETRACTION, RIGHT RIGHT UPPER EYELID       Anesthesia Type:  MAC    Note:  Disposition: Outpatient   Postop Pain Control: Uneventful            Sign Out: Well controlled pain   PONV: No   Neuro/Psych: Uneventful            Sign Out: Acceptable/Baseline neuro status   Airway/Respiratory: Uneventful            Sign Out: Acceptable/Baseline resp. status   CV/Hemodynamics: Uneventful            Sign Out: Acceptable CV status; No obvious hypovolemia; No obvious fluid overload   Other NRE: NONE   DID A NON-ROUTINE EVENT OCCUR? No       Last vitals:  Vitals Value Taken Time   /65 08/07/24 1225   Temp 36.2  C (97.2  F) 08/07/24 1225   Pulse 67 08/07/24 1225   Resp 16 08/07/24 1225   SpO2 93 % 08/07/24 1225       Electronically Signed By: Liseth Spivey MD  August 7, 2024  12:53 PM

## 2024-08-07 NOTE — DISCHARGE INSTRUCTIONS
Post-operative Instructions    Ophthalmic Plastic and Reconstructive Surgery  Vasile Brasher M.D.  Lexus Gutierrez M.D.    All instructions apply to the operated eye(s) or eyelid(s)      What to expect after surgery:  There will be some swelling, bruising, and likely a black eye (even into the lower eyelids and cheeks). Also expect crusting and discharge from the eye and/or incisions.   A small amount of surface bleeding is normal for the first 48 hours after surgery.  You may notice some bloody tears for the first few days after surgery. This is normal.  Your eye(s) and eyelid(s) may be painful and tender. This is normal after surgery. Use the pain medication as prescribed. If your pain does not improve despite the medication, contact the office.    Wound care and personal care:  Apply ice compresses 15 minutes on 15 minutes off while awake for the first 2 days after surgery, then switch to warm compresses 4 times a day until seen by your physician.   For warm packs you can place a cup of dry uncooked rice in a clean cotton sock. Place sock in microwave 30 seconds to one minute. Next place the warm sock into a plastic bag and wrap the bag with clean warm wet washcloth and place over operated eye.    You may shower or wash your hair the day after surgery. Do not bathe or go swimming for 1 week to prevent contamination of your wounds.  Do not apply make-up to the eyes or eyelids for 2 weeks after surgery.    Activity restrictions and driving:  Avoid heavy lifting, bending, exercise or strenuous activity for 1 week after surgery.  You may resume other activities and return to work as tolerated.  You may not resume driving until have you stopped using narcotic pain medications(such as Norco, Percocet, Tylenol #3).    Medications:  Restart all your regular home medications and eye drops today. If you take Plavix or Aspirin on a regular basis, wait for 3 days after your surgery before restarting these in order to  decrease the risk of bleeding complications.  Avoid aspirin and aspirin-like medications (Motrin, Aleve, Ibuprofen, Erica-Hiawatha etc) for 5 days to reduce the risk of bleeding. You may take Tylenol (acetaminophen) for pain.  In addition to your home medications, take the following post-operative medications as prescribed by your physician:  Apply antibiotic ointment (erythromycin) to all sutures three times a day, and into the operated eye(s) at night.   Instill eye drops (Maxitrol) four times a day until the bottle finished.   Take scheduled extra strength Tylenol for pain.  You may take 1 to 2 pain pills (norco or oxycodone as prescribed) as needed for breakthrough pain up to every 6 hours.  The pain pills may make you drowsy. You must not drive a car, operate heavy machinery or drink alcohol while taking them.  The pain pills may cause constipation and nausea. Take them with some food to prevent a stomach upset. If you continue to experience nausea, call your physician.    WARNING: All the prescription pain medications listed above contain Tylenol (acetaminophen). You must not take more than 4,000 mg of acetaminophen per 24-hour period. This is equivalent to 6 tablets of Darvocet, 8 tablets of Vicodin, or 12 tablets of Norco, Percocet or Tylenol #3. If you take other over-the-counter medications containing acetaminophen, you must take the amount of acetaminophen into account and reduce the number of prescribed pain pills accordingly.    Contact information and follow-up:  Return to the Eye Clinic for a follow-up appointment with your physician as scheduled. If no appointment has been scheduled, call [phone] for an appointment with Dr. Brasher within 1 to 2 weeks from your date of surgery.  -     If your post-operative appointment is a telephone appointment, please email a few photos of your eye(s) or other operative site(s) to [email or web address] prior to your appointment.    For severe pain, bleeding,  or loss of vision, call the Eye Clinic at [phone].  After hours or on weekends and holidays, call [phone] and ask to speak with the ophthalmologist on call.    [facility]  Home Care Following Anesthesia  For 24 hours after surgery:  Get plenty of rest.  A responsible adult must stay with you for at least 24 hours after you leave the surgery center.  Do not drive or use heavy equipment.  If you have weakness or tingling, don't drive or use heavy equipment until this feeling goes away.   Do not drink alcohol.   Avoid strenuous or risky activities.  Ask for help when climbing stairs.  You may feel lightheaded.  IF so, sit for a few minutes before standing.  Have someone help you get up.   If you have nausea (feel sick to your stomach): Drink only clear liquids such as apple juice, ginger ale, broth or 7-Up.  Rest may also help.  Be sure to drink enough fluids.  Move to a regular diet as you feel able.   You may have a slight fever.  Call the doctor if your fever is over 100 F (37.7 C) (taken under the tongue) or lasts longer than 24 hours.  You may have a dry mouth, a sore throat, muscle aches or trouble sleeping. These should go away after 24 hours.  Do not make important or legal decisions.   It is recommended to avoid smoking.               Tips for taking pain medications  To get the best pain relief possible, remember these points:  Take pain medications as directed, before pain becomes severe.  Pain medication can upset your stomach: taking it with food may help.  Constipation is a common side effect of pain medication. Drink plenty of  fluids.  Eat foods high in fiber. Take a stool softener if recommended by your doctor or pharmacist.  Do not drink alcohol, drive or operate machinery while taking pain medications.  Ask about other ways to control pain, such as with heat, ice or relaxation.    Tylenol/Acetaminophen Consumption    If you feel your pain relief is  insufficient, you may take Tylenol/Acetaminophen in addition to your narcotic pain medication.   Be careful not to exceed 4,000 mg of Tylenol/Acetaminophen in a 24 hour period from all sources.  If you are taking extra strength Tylenol/acetaminophen (500 mg), the maximum dose is 8 tablets in 24 hours.  If you are taking regular strength acetaminophen (325 mg), the maximum dose is 12 tablets in 24 hours.    Call a doctor for any of the following:  Signs of infection (fever, growing tenderness at the surgery site, a large amount of drainage or bleeding, severe pain, foul-smelling drainage, redness, swelling).  It has been over 8 to 10 hours since surgery and you are still not able to urinate (pass water).  Headache for over 24 hours.  Signs of Covid-19 infection (temperature over 100 degrees, shortness of breath, cough, loss of taste/smell, generalized body aches, persistent headache, chills, sore throat, nausea/vomiting/diarrhea)  Your doctor is:  Dr. Vasile Brasher, Ophthalmology: [phone]                    Or dial [phone] and ask for the resident on call for:  Ophthalmology  For emergency care, call the:  [facility]:  [phone] (TTY for hearing impaired: [phone])  Tylenol 975 mg given at 1010.   Ok to take more after 410 pm.

## 2024-08-07 NOTE — ANESTHESIA CARE TRANSFER NOTE
Patient: Idalmis Abdul    Procedure: Procedure(s):  REPAIR, RETRACTION, RIGHT RIGHT UPPER EYELID       Diagnosis: Eyelid retraction right upper eyelid [H02.531]  Thyroid eye disease [H57.89, E07.9]  Diagnosis Additional Information: No value filed.    Anesthesia Type:   MAC     Note:    Oropharynx: oropharynx clear of all foreign objects and spontaneously breathing  Level of Consciousness: awake  Oxygen Supplementation: room air    Independent Airway: airway patency satisfactory and stable    Vital Signs Stable: post-procedure vital signs reviewed and stable  Report to RN Given: handoff report given  Patient transferred to: Phase II    Handoff Report: Identifed the Patient, Identified the Reponsible Provider, Reviewed the pertinent medical history, Discussed the surgical course, Reviewed Intra-OP anesthesia mangement and issues during anesthesia, Set expectations for post-procedure period and Allowed opportunity for questions and acknowledgement of understanding      Vitals:  Vitals Value Taken Time   /73 08/07/24 1151   Temp 36.3  C (97.3  F) 08/07/24 1151   Pulse 67 08/07/24 1151   Resp 14 08/07/24 1151   SpO2         Electronically Signed By: JAIME Walker CRNA  August 7, 2024  11:55 AM  
PAST SURGICAL HISTORY:  No Past Surgical History     S/P shoulder surgery Right

## 2024-08-07 NOTE — ANESTHESIA PREPROCEDURE EVALUATION
Anesthesia Pre-Procedure Evaluation    Patient: Idalmis Abdul   MRN: [id number] : 1945        Procedure : Procedure(s):  REPAIR, RETRACTION, RIGHT RIGHT UPPER EYELID          Past Medical History:   Diagnosis Date     ASCVD (arteriosclerotic cardiovascular disease) 2012     Benign neoplasm of duodenum, jejunum, and ileum ,     Gastrointestinal Stromal Tumor, seen at [facility], Dr. Schmitz, GI oncology-Gleevec treatment.  Surgical removal in 2004-no recurrence as of 3/05.     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     Other chronic pain     back     PONV (postoperative nausea and vomiting)      Postsurgical aortocoronary bypass status 2012      S/P CABG x 4 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/2012     Strabismus      Tobacco use disorder     Quit     Unspecified essential hypertension       Past Surgical History:   Procedure Laterality Date     BLEPHAROPLASTY BILATERAL Bilateral 07/17/2019    Procedure: Bilateral Upper Eyelid Blepharoplasty;  Surgeon: Vasile Brasher MD;  Location: [facility] OR     BYPASS GRAFT ARTERY CORONARY  06/14/2012    Procedure: BYPASS GRAFT ARTERY CORONARY;  Median Sternotomy Coronary Artery Bypass Graft  x 3        C THORACOSCOPY,DX W BX  fall 2003    benign tissue     CATARACT IOL, RT/LT      LE     CHOLECYSTECTOMY  01/01/1963     COLONOSCOPY  04/12/2005     CORONARY ARTERY BYPASS      X4     CREATION PERICARDIAL WINDOW  06/15/2012    Procedure: CREATION PERICARDIAL WINDOW;  Mediastinal Exploration, Control of Bleeding;  Surgeon: Dwaine Griffin MD;  [facility]     EYE SURGERY  01/01/2014    left cataract removal     GI SURGERY  2003 and 2007    GIST tumor removal     GYN SURGERY      tubal ligation     HYSTERECTOMY VAGINAL, COLPORRHAPHY ANTERIOR, POSTERIOR, COMBINED N/A 10/17/2017    Procedure: COMBINED HYSTERECTOMY VAGINAL, COLPORRHAPHY ANTERIOR, POSTERIOR;  Total Vaginal Hysterectomy and Posterior Repair,Sacrospinous Vault Suspension;  Surgeon: Ruth Farooq MD;  Location: [facility] OR     l4-5 fusion  2019    Dr. Calvin [facility]     PHACOEMULSIFICATION WITH STANDARD INTRAOCULAR LENS IMPLANT  03/24/2014    Procedure: PHACOEMULSIFICATION WITH STANDARD INTRAOCULAR LENS IMPLANT;  Left Kelman Phacoemulsification with Intraocular Lens Implant;  Surgeon: Magnus Mckeon MD;  Location: [facility] OR     RECESSION RESECTION WITH ADJUSTABLE SUTURE BILATERAL Bilateral 07/14/2016    Procedure: RECESSION RESECTION WITH ADJUSTABLE SUTURE BILATERAL;  Surgeon: Erma Ordaz MD;  Location:  OR     REPAIR ENTROPION Right 08/21/2019    Procedure: Right Upper Lid Entropion Repair;  Surgeon: Vasile Brasher MD;  [facility]     REPAIR  RETRACTION LID BILATERAL Bilateral 2019    Procedure: Bilateral Upper Eyelid Retraction Repair;  Surgeon: Vasile Brasher MD;  Location: [facility] OR     SURGICAL HISTORY OF -   1963    cholecystectomy     SURGICAL HISTORY OF -   1970    Tubal Ligation      SURGICAL HISTORY OF -   2003    Explor. Lap, Excision of Small Bowel Tumor      SURGICAL HISTORY OF -   2010    lung cancer removed right lung      Allergies   Allergen Reactions     Atorvastatin Cramps             Social History     Tobacco Use     Smoking status: Former     Current packs/day: 0.00     Average packs/day: 0.5 packs/day for 49.0 years (24.5 ttl pk-yrs)     Types: Cigarettes     Start date: 1961     Quit date: 2010     Years since quittin.3     Passive exposure: Never     Smokeless tobacco: Never   Substance Use Topics     Alcohol use: No      Wt Readings from Last 1 Encounters:   24 68 kg (150 lb)        Anesthesia Evaluation   Pt has had prior anesthetic. Type: General and MAC.        ROS/MED HX  ENT/Pulmonary: Comment: Lung CA    (+)                tobacco use, Past use,        moderate,  COPD, O2 dependent, during Daytime,            Neurologic: Comment: Lumbar spinal stenosis, s/p lumbar fusion  DDD  Lagophthalmus      Cardiovascular: Comment: Exertion limited by back pain and elizalde/copd; walks half mile, with walker/oxygen, on most days    (+)  hypertension- -  CAD -  CABG- -           ELIZALDE.                           METS/Exercise Tolerance: 1 - Eating, dressing    Hematologic:     (+)      anemia,          Musculoskeletal:   (+)  arthritis,             GI/Hepatic:     (+) GERD, Asymptomatic on medication,                  Renal/Genitourinary:     (+) renal disease, type: CRI,            Endo:     (+)          thyroid problem, hyperthyroidism,           Psychiatric/Substance Use:  - neg psychiatric ROS     Infectious Disease:       Malignancy:   (+) Malignancy, History of Lung and GI.  Lung CA  Remission status post Surgery.  GI CA  Remission status post Surgery and Chemo.      Other:      (+)  , H/O Chronic Pain,         Physical Exam    Airway        Mallampati: II   TM distance: > 3 FB   Neck ROM: full   Mouth opening: > 3 cm    Respiratory Devices and Support         Dental       (+) Multiple crowns, permanant bridges      Cardiovascular   cardiovascular exam normal       Rhythm and rate: regular and normal     Pulmonary           breath sounds clear to auscultation       OUTSIDE LABS:  CBC:   Lab Results   Component Value Date    WBC 7.8 07/24/2024    WBC 6.2 04/04/2024    HGB 10.7 (L) 07/24/2024    HGB 10.2 (L) 04/04/2024    HCT 32.5 (L) 07/24/2024    HCT 31.9 (L) 04/04/2024     07/24/2024     04/04/2024     BMP:   Lab Results   Component Value Date     (L) 07/24/2024     04/04/2024    POTASSIUM 4.1 07/24/2024    POTASSIUM 4.3 04/04/2024    CHLORIDE 98 07/24/2024    CHLORIDE 104 04/04/2024    CO2 22 07/24/2024    CO2 24 04/04/2024    BUN 16.1 07/24/2024    BUN 18.3 04/04/2024    CR 0.8 07/24/2024    CR 0.94 07/24/2024     (H) 07/24/2024    GLC 79 04/04/2024     COAGS:   Lab Results   Component Value Date    PTT 33 07/01/2012    INR 1.01 07/12/2012    FIBR 514 (H) 07/05/2012     POC:   Lab Results   Component Value Date     (H) 06/23/2012     HEPATIC:   Lab Results   Component Value Date    ALBUMIN 3.9 07/24/2024    PROTTOTAL 6.6 07/24/2024    ALT 23 07/24/2024    AST 39 07/24/2024    ALKPHOS 71 07/24/2024    BILITOTAL 0.3 07/24/2024     OTHER:   Lab Results   Component Value Date    PH 7.42 06/15/2012    LACT 1.8 07/29/2014    A1C 5.5 10/17/2017    NAEEM 9.0 07/24/2024    PHOS 3.3 11/25/2019    MAG 2.1 07/14/2021    LIPASE 65 (L) 07/14/2021    TSH 0.41 07/24/2024    T4 1.25 08/27/2020    T3 99 12/07/2018    CRP <2.9 03/19/2021    SED 41 (H) 03/19/2021       Anesthesia Plan    ASA Status:  4    NPO Status:  NPO Appropriate    Anesthesia Type: MAC.     - Reason for  "MAC: chronic cardiopulmonary disease, immobility needed              Consents    Anesthesia Plan(s) and associated risks, benefits, and realistic alternatives discussed. Questions answered and patient/representative(s) expressed understanding.     - Discussed:     - Discussed with:  Patient            Postoperative Care    Pain management: Multi-modal analgesia.   PONV prophylaxis: Ondansetron (or other 5HT-3)     Comments:               Liseth Spivey MD    I have reviewed the pertinent notes and labs in the chart from the past 30 days and (re)examined the patient.  Any updates or changes from those notes are reflected in this note.     # Hyponatremia: Lowest Na = 134 mmol/L in last 30 days, will monitor as appropriate         # Drug Induced Platelet Defect: home medication list includes an antiplatelet medication  # Overweight: Estimated body mass index is 27.43 kg/m  as calculated from the following:    Height as of this encounter: 1.575 m (5' 2.01\").    Weight as of this encounter: 68 kg (150 lb).      "

## 2024-08-07 NOTE — BRIEF OP NOTE
[facility] Brief Operative Note    Pre-operative diagnosis: Eyelid retraction right upper eyelid [H02.531]  Thyroid eye disease [H57.89, E07.9]   Post-operative diagnosis Same as above   Procedure: Procedure(s):  REPAIR, RETRACTION, RIGHT RIGHT UPPER EYELID   Surgeon(s): Surgeons and Role:     * Vasile Brasher MD - Primary     * Cayetano Armenta MD - Resident - Assisting     * Lexus Gutierrez MD - Fellow - Assisting   Estimated blood loss: * No values recorded between 8/7/2024 11:32 AM and 8/7/2024 11:41 AM *    Specimens: * No specimens in log *   Findings: As expected

## 2024-08-08 ENCOUNTER — TELEPHONE (OUTPATIENT)
Dept: OPHTHALMOLOGY | Facility: CLINIC | Age: 79
End: 2024-08-08
Payer: COMMERCIAL

## 2024-08-12 ENCOUNTER — APPOINTMENT (OUTPATIENT)
Dept: GENERAL RADIOLOGY | Facility: CLINIC | Age: 79
End: 2024-08-12
Attending: FAMILY MEDICINE
Payer: COMMERCIAL

## 2024-08-12 ENCOUNTER — HOSPITAL ENCOUNTER (EMERGENCY)
Facility: CLINIC | Age: 79
Discharge: HOME | End: 2024-08-12
Attending: FAMILY MEDICINE | Admitting: FAMILY MEDICINE
Payer: COMMERCIAL

## 2024-08-12 VITALS
RESPIRATION RATE: 18 BRPM | DIASTOLIC BLOOD PRESSURE: 85 MMHG | HEART RATE: 74 BPM | WEIGHT: 150 LBS | BODY MASS INDEX: 27.6 KG/M2 | TEMPERATURE: 97.5 F | HEIGHT: 62 IN | OXYGEN SATURATION: 93 % | SYSTOLIC BLOOD PRESSURE: 148 MMHG

## 2024-08-12 DIAGNOSIS — S82.832A CLOSED FRACTURE OF DISTAL END OF LEFT FIBULA, UNSPECIFIED FRACTURE MORPHOLOGY, INITIAL ENCOUNTER: ICD-10-CM

## 2024-08-12 PROCEDURE — 99284 EMERGENCY DEPT VISIT MOD MDM: CPT | Mod: 57 | Performed by: FAMILY MEDICINE

## 2024-08-12 PROCEDURE — 27786 TREATMENT OF ANKLE FRACTURE: CPT | Mod: 55 | Performed by: PODIATRIST

## 2024-08-12 PROCEDURE — 99284 EMERGENCY DEPT VISIT MOD MDM: CPT | Mod: 25 | Performed by: FAMILY MEDICINE

## 2024-08-12 PROCEDURE — 27786 TREATMENT OF ANKLE FRACTURE: CPT | Mod: 54 | Performed by: FAMILY MEDICINE

## 2024-08-12 PROCEDURE — 73610 X-RAY EXAM OF ANKLE: CPT | Mod: LT

## 2024-08-12 PROCEDURE — 27786 TREATMENT OF ANKLE FRACTURE: CPT | Mod: LT | Performed by: FAMILY MEDICINE

## 2024-08-12 ASSESSMENT — ACTIVITIES OF DAILY LIVING (ADL)
ADLS_ACUITY_SCORE: 38
ADLS_ACUITY_SCORE: 38

## 2024-08-12 ASSESSMENT — COLUMBIA-SUICIDE SEVERITY RATING SCALE - C-SSRS
1. IN THE PAST MONTH, HAVE YOU WISHED YOU WERE DEAD OR WISHED YOU COULD GO TO SLEEP AND NOT WAKE UP?: NO
6. HAVE YOU EVER DONE ANYTHING, STARTED TO DO ANYTHING, OR PREPARED TO DO ANYTHING TO END YOUR LIFE?: NO
2. HAVE YOU ACTUALLY HAD ANY THOUGHTS OF KILLING YOURSELF IN THE PAST MONTH?: NO

## 2024-08-12 NOTE — DISCHARGE INSTRUCTIONS
ICD-10-CM    1. Closed fracture of distal end of left fibula, unspecified fracture morphology, initial encounter  S82.832A Ankle/Foot Bracing Supplies Order Walking Boot; Left; Non-pneumatic; Tall     Orthopedic  Referral    cam boot.,  elevate the leg.  non -weight bearing - use walker.  return for numb/cold/.immobile foot

## 2024-08-12 NOTE — ED TRIAGE NOTES
Pt here via EMS for a fall at home with left ankle injury. Pt reports she was bending over to pick something up and her left knee gave out and she rolled onto her back and heard a snap in her foot. Pt's pain 3/10 when laying down but pain is higher with bearing weight. Pt was able to pivot for EMS. CMS intact.      Triage Assessment (Adult)       Row Name 08/12/24 1405          Triage Assessment    Airway WDL WDL        Respiratory WDL    Respiratory WDL WDL        Cardiac WDL    Cardiac WDL WDL        Peripheral/Neurovascular WDL    Peripheral Neurovascular WDL WDL        Cognitive/Neuro/Behavioral WDL    Cognitive/Neuro/Behavioral WDL WDL

## 2024-08-12 NOTE — ED PROVIDER NOTES
History     Chief Complaint   Patient presents with    Fall     Left ankle injury     HPI  Idalmis Abdul is a 78 year old female who presents with ankle sprain -she was bending over to  an item today when she had her left knee buckle as it sometimes does.  She had an inversion injury of the ankle and heard a snap and felt a snap in the lateral foot.  Pain 3 out of 10 that is increased when she ambulates.  No anticoagulation use.  She has no head or neck injury.  No loss of consciousness.  History of COPD.    Allergies:  Allergies   Allergen Reactions    Atorvastatin Cramps              Problem List:    Patient Active Problem List    Diagnosis Date Noted    Eyelid retraction right upper eyelid 05/13/2024     Priority: Medium    History of lumbar fusion 03/19/2024     Priority: Medium    Centrilobular emphysema (H) 02/01/2024     Priority: Medium    Anemia due to unknown mechanism 07/15/2021     Priority: Medium    Mixed simple and mucopurulent chronic bronchitis (H) 01/07/2020     Priority: Medium    Spinal stenosis of lumbar region with neurogenic claudication 03/22/2018     Priority: Medium    DDD (degenerative disc disease), lumbar 03/22/2018     Priority: Medium    Age-related osteoporosis without current pathological fracture 12/16/2014     Priority: Medium    PVD (posterior vitreous detachment) 11/17/2014     Priority: Medium    History of tobacco abuse 09/02/2014     Priority: Medium     QUIT in 2010      History of non-ST elevation myocardial infarction (NSTEMI) 09/02/2014     Priority: Medium    Chronic back pain 09/02/2014     Priority: Medium     No longer on oxycodone - had been on this long term in the past        Graves' disease 08/19/2014     Priority: Medium    Thyroid eye disease 04/28/2014     Priority: Medium    Eyelid retraction or lag 04/28/2014     Priority: Medium    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 Provider  to review      Hyperthyroidism 02/04/2014     Priority: Medium     Seeing Endoncrinology at [facility]      GERD (gastroesophageal reflux disease) 10/08/2013     Priority: Medium    S/P CABG x 4 08/05/2012     Priority: Medium    ASCVD (arteriosclerotic cardiovascular disease) 06/14/2012     Priority: Medium    Eyelid edema 12/15/2011     Priority: Medium     side effect of gastric cancer medication      History of lung cancer 12/15/2011     Priority: Medium     S/p resection of right lung.  Sees  @ [facility]    Formatting of this note might be different from the original.  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 Schmitz      Hypertension goal BP (blood pressure) < 140/90 03/24/2005     Priority: Medium        Past Medical History:    Past Medical History:   Diagnosis Date    ASCVD (arteriosclerotic cardiovascular disease) 06/14/2012    Benign neoplasm of duodenum, jejunum, and ileum 2003, 2007    CA - lung cancer 04/2010    choledochal cyst 1963    CKD (chronic kidney disease) stage 3, GFR 30-59 ml/min (H) 05/23/2019    Coronary artery disease     DDD (degenerative disc disease), lumbar 03/22/2018    Dislocated finger, left middle PIP 07/26/2013    Dyspnea 08/27/2013    Eyelid edema 12/15/2011    Fall, initial encounter 07/14/2021    GERD (gastroesophageal reflux disease) 10/08/2013    GIST (gastrointestinal stromal tumor), 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    Graves disease     Grief reaction 05/11/2009    History of lung cancer 12/15/2011    Hyperlipidaemia     Hyperlipidemia LDL goal <160 12/17/2013    Hyperthyroidism 02/04/2014    Hypokalemia 08/05/2012    Hyponatremia 07/15/2021    LBP (low back pain) 07/26/2013    Leiomyoma of uterus, unspecified     Non-intractable vomiting with nausea, unspecified vomiting type 07/14/2021     NSTEMI (non-ST elevated myocardial infarction) (H) 06/12/2012    NSTEMI (non-ST elevated myocardial infarction) (H)     osteopenia     Other benign neoplasm of connective and other soft tissue of thorax 2003    Other chronic pain     PONV (postoperative nausea and vomiting)     Postsurgical aortocoronary bypass status 07/04/2012    S/P CABG x 4 08/05/2012    Strabismus     Tobacco use disorder     Unspecified essential hypertension        Past Surgical History:    Past Surgical History:   Procedure Laterality Date    BLEPHAROPLASTY BILATERAL Bilateral 07/17/2019    Procedure: Bilateral Upper Eyelid Blepharoplasty;  Surgeon: Vasile Brasher MD;  Location:  OR    BYPASS GRAFT ARTERY CORONARY  06/14/2012    Procedure: BYPASS GRAFT ARTERY CORONARY;  Median Sternotomy Coronary Artery Bypass Graft  x 3       C THORACOSCOPY,DX W BX  fall 2003    benign tissue    CATARACT IOL, RT/LT      LE    CHOLECYSTECTOMY  01/01/1963    COLONOSCOPY  04/12/2005    CORONARY ARTERY BYPASS      X4    CREATION PERICARDIAL WINDOW  06/15/2012    Procedure: CREATION PERICARDIAL WINDOW;  Mediastinal Exploration, Control of Bleeding;  Surgeon: Dwaine Griffin MD;  Location:  OR    EYE SURGERY  01/01/2014    left cataract removal    GI SURGERY  2003 and 2007    GIST tumor removal    GYN SURGERY      tubal ligation    HYSTERECTOMY VAGINAL, COLPORRHAPHY ANTERIOR, POSTERIOR, COMBINED N/A 10/17/2017    Procedure: COMBINED HYSTERECTOMY VAGINAL, COLPORRHAPHY ANTERIOR, POSTERIOR;  Total Vaginal Hysterectomy and Posterior Repair,Sacrospinous Vault Suspension;  Surgeon: Ruth Farooq MD;  Location: [facility] OR    l4-5 fusion  2019    Dr. [facility]    PHACOEMULSIFICATION WITH STANDARD INTRAOCULAR LENS IMPLANT  03/24/2014    Procedure: PHACOEMULSIFICATION WITH STANDARD INTRAOCULAR LENS IMPLANT;  Left Kelman Phacoemulsification with Intraocular Lens Implant;  Surgeon: Magnus Mckeon MD;  [facility]    [facility]  RESECTION WITH ADJUSTABLE SUTURE BILATERAL Bilateral 2016    Procedure: RECESSION RESECTION WITH ADJUSTABLE SUTURE BILATERAL;  Surgeon: Erma Ordaz MD;  Location: [facility] OR    REPAIR ENTROPION Right 2019    Procedure: Right Upper Lid Entropion Repair;  Surgeon: Vasile Brasher MD;  [facility]    REPAIR RETRACTION LID Right 2024    Procedure: REPAIR, RETRACTION, RIGHT RIGHT UPPER EYELID;  Surgeon: Vasile Brasher MD;  [facility]    REPAIR RETRACTION LID BILATERAL Bilateral 2019    Procedure: Bilateral Upper Eyelid Retraction Repair;  Surgeon: Vasile Brasher MD;  Location: [facility]    SURGICAL HISTORY OF -   1963    cholecystectomy    SURGICAL HISTORY OF -   1970    Tubal Ligation     SURGICAL HISTORY OF -   2003    Explor. Lap, Excision of Small Bowel Tumor     SURGICAL HISTORY OF -   2010    lung cancer removed right lung       Family History:    Family History   Problem Relation Age of Onset    Heart Disease Mother     Osteoporosis Mother     Respiratory Mother         Emphezyma    Hypertension Mother     Heart Disease Father     Alcohol/Drug Father     Hypertension Father     Hypertension Maternal Grandmother     Hypertension Brother     Hypertension Sister     Arthritis Sister     Hypertension Son     Cancer Son         rectal       Social History:  Marital Status:   [5]  Social History     Tobacco Use    Smoking status: Former     Current packs/day: 0.00     Average packs/day: 0.5 packs/day for 49.0 years (24.5 ttl pk-yrs)     Types: Cigarettes     Start date: 1961     Quit date: 2010     Years since quittin.3     Passive exposure: Never    Smokeless tobacco: Never   Vaping Use    Vaping status: Never Used   Substance Use Topics    Alcohol use: No    Drug use: No        Medications:    albuterol (PROAIR HFA/PROVENTIL HFA/VENTOLIN HFA) 108 (90 Base) MCG/ACT inhaler  amLODIPine (NORVASC) 5 MG tablet  aspirin (ASA) 81 MG  "chewable tablet  Blood Pressure Monitoring (ADULT BLOOD PRESSURE CUFF LG) KIT  budesonide-formoterol (SYMBICORT) 160-4.5 MCG/ACT Inhaler  calcium carbonate-vitamin D (CALTRATE) 600-10 MG-MCG per tablet  clobetasol (TEMOVATE) 0.05 % external ointment  cyanocobalamin (VITAMIN B-12) 1000 MCG tablet  erythromycin (ROMYCIN) 5 MG/GM ophthalmic ointment  estradiol (ESTRACE) 0.5 MG tablet  fluticasone-salmeterol (ADVAIR) 100-50 MCG/ACT inhaler  IBANdronate (BONIVA) 150 MG tablet  imatinib (GLEEVEC) 400 MG tablet  melatonin 5 MG tablet  methimazole (TAPAZOLE) 5 MG tablet  methocarbamol (ROBAXIN) 500 MG tablet  metoprolol tartrate (LOPRESSOR) 100 MG tablet  neomycin-polymixin-dexAMETHasone (MAXITROL) 0.1 % ophthalmic suspension  omeprazole (PRILOSEC) 20 MG DR capsule  polyethylene glycol (MIRALAX) 17 g packet  pregabalin (LYRICA) 50 MG capsule  psyllium (METAMUCIL/KONSYL) 58.6 % powder  rosuvastatin (CRESTOR) 5 MG tablet  umeclidinium (INCRUSE ELLIPTA) 62.5 MCG/ACT inhaler  Vitamin D3 (VITAMIN D, CHOLECALCIFEROL,) 25 mcg (1000 units) tablet          Review of Systems    ROS:  5 point ROS negative except as noted above in HPI, including Gen., Resp., CV, GI &  system review.    Physical Exam   BP: (!) 171/77  Pulse: 83  Temp: 97.5  F (36.4  C)  Resp: 18  Height: 157.5 cm (5' 2\")  Weight: 68 kg (150 lb)  SpO2: 93 %      Physical Exam  Lateral ankle tenderness to palpation on the left side.  There is swelling here and asymmetry.  Significant swelling at the lateral malleolus.  Normal dorsalis pedis posterior tibial pulses.  Normal distal coloration.  Normal pulses dorsalis pedis posterior tibial.  The squeeze test is negative and upper tib-fib negative for tenderness.  The Lisfranc joint navicular and fifth metatarsal are all negative for tenderness.    ED Course        Procedures              Critical Care time:  none               Results for orders placed or performed during the hospital encounter of 08/12/24 (from the past " 24 hour(s))   XR Ankle Left G/E 3 Views    Narrative    ANKLE LEFT THREE OR MORE VIEWS August 12, 2024 2:44 PM     HISTORY: Swelling on left ankle.    COMPARISON: None.       Impression    IMPRESSION: Acute minimally displaced oblique fracture of the distal  fibula/lateral malleolus. There is adjacent soft tissue swelling.  Ankle mortise is intact on this nonweightbearing study.  Well-corticated bone fragment adjacent to the medial malleolus is  chronic in appearance.    NOTE: ABNORMAL REPORT    THE DICTATION ABOVE DESCRIBES AN ABNORMAL REPORT FOR WHICH FOLLOW-UP  IS NEEDED.    ARLENE GILMORE MD         SYSTEM ID:  [id number]     *Note: Due to a large number of results and/or encounters for the requested time period, some results have not been displayed. A complete set of results can be found in Results Review.       Medications - No data to display    Assessments & Plan (with Medical Decision Making)     MDM; Idalmis Abdul is a 78 year old female presenting with an ankle sprain that occurred prior to arrival today about an hour ago.  No other injury sustained.  Findings on exam suspicious for fracture await x-ray.  No other injuries  Findings consistent with a distal fibular fracture.  Patient is placed in a cam boot.  Recommended use of nonweightbearing with walker and following up with Ortho.  Precautions given for return.    I have reviewed the nursing notes.    I have reviewed the findings, diagnosis, plan and need for follow up with the patient.           Medical Decision Making  The patient's presentation was of moderate complexity (an acute complicated injury).    The patient's evaluation involved:  ordering and/or review of 1 test(s) in this encounter (see separate area of note for details)    The patient's management necessitated moderate risk (prescription drug management including medications given in the ED).        New Prescriptions    No medications on file       Final diagnoses:   Closed  fracture of distal end of left fibula, unspecified fracture morphology, initial encounter - cam boot.,  elevate the leg.  non -weight bearing - use walker.  return for numb/cold/.immobile foot       8/12/2024   [facility] EMERGENCY DEPT       Rk Acevedo MD  08/12/24 2023

## 2024-08-13 ENCOUNTER — PATIENT OUTREACH (OUTPATIENT)
Dept: FAMILY MEDICINE | Facility: CLINIC | Age: 79
End: 2024-08-13
Payer: COMMERCIAL

## 2024-08-13 NOTE — TELEPHONE ENCOUNTER
Transitions of Care Outreach  Chief Complaint   Patient presents with    Hospital F/U       Most Recent Admission Date: 8/12/2024   Most Recent Admission Diagnosis:      Most Recent Discharge Date: 8/12/2024   Most Recent Discharge Diagnosis: Closed fracture of distal end of left fibula, unspecified fracture morphology, initial encounter - S82.832G     Transitions of Care Assessment    Discharge Assessment  How are you doing now that you are home?: left leg fracture still in pain  How are your symptoms? (Red Flag symptoms escalate to triage hotline per guidelines): Unchanged  Do you know how to contact your clinic care team if you have future questions or changes to your health status? : Yes  Does the patient have their discharge instructions? : Yes  Does the patient have questions regarding their discharge instructions? : No  Were you started on any new medications or were there changes to any of your previous medications? : No  Does the patient have all of their medications?: Yes  Do you have questions regarding any of your medications? : Yes (see comment) (Patient in a lot of pain and wants medication for pain)  Do you have all of your needed medical supplies or equipment (DME)?  (i.e. oxygen tank, CPAP, cane, etc.): Yes    Follow up Plan     Discharge Follow-Up  Discharge follow up appointment scheduled in alignment with recommended follow up timeframe or Transitions of Risk Category? (Low = within 30 days; Moderate= within 14 days; High= within 7 days): Yes  Discharge Follow Up Appointment Date: 08/15/24  Discharge Follow Up Appointment Scheduled with?: Primary Care Provider    Future Appointments   Date Time Provider Department Center   8/15/2024  1:30 PM Gayathri Wisdom APRN CNP [facility]   8/19/2024 11:30 AM Vasile Brasher MD [facility]   8/21/2024  1:15 PM Yovany Crump DPM [facility]   4/3/2025 11:15 AM [facility]   4/3/2025 12:20 PM [facility]   4/7/2025  1:00 PM  Blayne Schmitz MD [facility]       Outpatient Plan as outlined on AVS reviewed with patient.    For any urgent concerns, please contact our 24 hour nurse triage line: [phone] ([phone])       Carol Arenas RN

## 2024-08-15 ENCOUNTER — OFFICE VISIT (OUTPATIENT)
Dept: FAMILY MEDICINE | Facility: CLINIC | Age: 79
End: 2024-08-15
Payer: COMMERCIAL

## 2024-08-15 VITALS
TEMPERATURE: 98.7 F | RESPIRATION RATE: 16 BRPM | SYSTOLIC BLOOD PRESSURE: 132 MMHG | DIASTOLIC BLOOD PRESSURE: 62 MMHG | HEART RATE: 82 BPM | OXYGEN SATURATION: 92 %

## 2024-08-15 DIAGNOSIS — M48.062 SPINAL STENOSIS OF LUMBAR REGION WITH NEUROGENIC CLAUDICATION: ICD-10-CM

## 2024-08-15 DIAGNOSIS — S82.832D CLOSED FRACTURE OF DISTAL END OF LEFT FIBULA WITH ROUTINE HEALING, UNSPECIFIED FRACTURE MORPHOLOGY, SUBSEQUENT ENCOUNTER: Primary | ICD-10-CM

## 2024-08-15 PROCEDURE — 99213 OFFICE O/P EST LOW 20 MIN: CPT | Mod: 24 | Performed by: NURSE PRACTITIONER

## 2024-08-15 RX ORDER — METHOCARBAMOL 500 MG/1
500 TABLET, FILM COATED ORAL
Qty: 30 TABLET | Refills: 0 | Status: SHIPPED | OUTPATIENT
Start: 2024-08-15

## 2024-08-15 RX ORDER — OXYCODONE HYDROCHLORIDE 5 MG/1
5 TABLET ORAL EVERY 6 HOURS PRN
Qty: 28 TABLET | Refills: 0 | Status: SHIPPED | OUTPATIENT
Start: 2024-08-15

## 2024-08-15 NOTE — PROGRESS NOTES
Assessment & Plan     Closed fracture of distal end of left fibula with routine healing, unspecified fracture morphology, subsequent encounter    - oxyCODONE (ROXICODONE) 5 MG tablet; Take 1 tablet (5 mg) by mouth every 6 hours as needed for pain        MED REC REQUIRED  Post Medication Reconciliation Status: patient was not discharged from an inpatient facility or TCU    CONSULTATION/REFERRAL- see ORTHO next week as scheduled. RETURN TO CLINIC sooner PRN new or worsening symptoms.     Jeni Raygoza is a 78 year old, presenting for the following health issues:  ER F/U        8/15/2024     1:05 PM   Additional Questions   Roomed by Irene MAYORGA     [facility]       ED/UC Followup:    Facility:  [facility]   Date of visit: 8/12/24  Reason for visit: closed fracture of distal end of left fibula  Current Status: not able to bear weight. Pain is manageable. Rates pain a 6/10. Taking 1 oxycodone a day- requesting refill.   Having difficulty not weight bearing due to being unsteady.      Review of Systems  Constitutional, HEENT, cardiovascular, pulmonary, gi and gu systems are negative, except as otherwise noted.      Objective    /62   Pulse 82   Temp 98.7  F (37.1  C) (Tympanic)   Resp 16   LMP 01/01/1992   SpO2 92%   There is no height or weight on file to calculate BMI.  Physical Exam   GENERAL: alert and no distress  EYES: Eyes grossly normal to inspection and conjunctivae and sclerae normal  MS: LLE exam shows normal strength and muscle mass and air cast in place  NEURO: Normal strength and tone, mentation intact and speech normal            Signed Electronically by: JAIME Hdez CNP

## 2024-08-15 NOTE — TELEPHONE ENCOUNTER
Refill request for   Methocarbamol 500 mg    Thank you,  Rocio Santana, [facility]   [place]  Phone [phone]  Fax [phone]

## 2024-08-19 ENCOUNTER — TELEPHONE (OUTPATIENT)
Dept: OPHTHALMOLOGY | Facility: CLINIC | Age: 79
End: 2024-08-19

## 2024-08-19 ENCOUNTER — VIRTUAL VISIT (OUTPATIENT)
Dept: OPHTHALMOLOGY | Facility: CLINIC | Age: 79
End: 2024-08-19
Payer: COMMERCIAL

## 2024-08-19 DIAGNOSIS — Z98.890 POSTOPERATIVE EYE STATE: Primary | ICD-10-CM

## 2024-08-19 PROCEDURE — 99024 POSTOP FOLLOW-UP VISIT: CPT | Mod: 93 | Performed by: OPHTHALMOLOGY

## 2024-08-19 NOTE — TELEPHONE ENCOUNTER
Spoke with patient regarding scheduling a Return in about 2 months (around 10/19/2024) for Follow Up. Patient is seeing her Orthopedic on Wednesday and requesting a call back on Thursday once she knows how long she will be laid up. Patient is aware of a pending callback on Thursday at around 12:30pm-Per Patient      Scheduling options: Return to clinic in 2-3 months, patient may prefer another telephone visit given her recent fracture. If it is a telephone visit, could you please have her send in photos?

## 2024-08-19 NOTE — PROGRESS NOTES
Idalmis Abdul is a 78 year old female who is being evaluated via a billable telephone visit.      Chief Complaint:   Post op telephone visit    Subjective:   S/p Right upper eyelid recession by full thickness blepharotomy. (8/7/24)  Doing well, swelling and bruising resolving slowly. No new vision changes, eye pain, discharge.    Review of Systems   Constitutional, HEENT, cardiovascular, pulmonary, gi and gu systems are negative, except as otherwise noted.    Objective:  Vitals:  No vitals were obtained today due to virtual visit.     Physical Exam:   healthy, alert and no distress  PSYCH: Alert and oriented times 3; coherent speech, normal   rate and volume, able to articulate logical thoughts, able   to abstract reason, no tangential thoughts, no hallucinations   or delusions  Her affect is normal  RESP: No cough, no audible wheezing, able to talk in full sentences  Remainder of exam unable to be completed due to telephone visits     No photos sent in for review.    Assessment & Plan  1. Postoperative eye state         Right upper eyelid recession by full thickness blepharotomy. (8/7/24)  -Healing appropriately (per patient)  * Continue antibiotic ointment or bland lubricating ointment (eg vaseline or aquaphor) to the incision site BID. Continue maxitrol drops until finished.  * Massage along the incision BID.  * Warm soaks QID until all edema and ecchymoses resolve    Disposition: Return to clinic in 2-3 months, patient may prefer another telephone visit given her recent fracture     5 minutes spent on the phone.  5 minutes spent by me on the date of the encounter doing patient visit

## 2024-08-21 ENCOUNTER — OFFICE VISIT (OUTPATIENT)
Dept: PODIATRY | Facility: CLINIC | Age: 79
End: 2024-08-21
Payer: COMMERCIAL

## 2024-08-21 VITALS
WEIGHT: 150 LBS | DIASTOLIC BLOOD PRESSURE: 73 MMHG | HEIGHT: 62 IN | HEART RATE: 82 BPM | SYSTOLIC BLOOD PRESSURE: 141 MMHG | BODY MASS INDEX: 27.6 KG/M2

## 2024-08-21 DIAGNOSIS — S82.65XA CLOSED NONDISPLACED FRACTURE OF LATERAL MALLEOLUS OF LEFT FIBULA, INITIAL ENCOUNTER: Primary | ICD-10-CM

## 2024-08-21 ASSESSMENT — PAIN SCALES - GENERAL: PAINLEVEL: MODERATE PAIN (5)

## 2024-08-21 NOTE — NURSING NOTE
"Chief Complaint   Patient presents with    Fracture     Left ankle- fell       Initial BP (!) 141/73   Pulse 82   Ht 1.575 m (5' 2\")   Wt 68 kg (150 lb)   LMP 01/01/1992   BMI 27.44 kg/m   Estimated body mass index is 27.44 kg/m  as calculated from the following:    Height as of this encounter: 1.575 m (5' 2\").    Weight as of this encounter: 68 kg (150 lb).  Medications and allergies reviewed.      Yolanda LANGE MA    "

## 2024-08-21 NOTE — LETTER
8/21/2024      Idalmis Abdul  [address]  [facility] [address]      Dear Colleague,    Thank you for referring your patient, Idalmis Abdul, to the [facility] [place]. Please see a copy of my visit note below.    PATIENT HISTORY:  Idalmis Abdul is a 78 year old female who presents with a chief complaint of a painful left ankle.  The patient relates injuring the left ankle on 08/12/24 while at at home.  The patient states that she was bending over and fell and felt a crack.  The patient relates pain with weight bearing to the left.   The patient relates being seen and treated with ice, elevation, and nonweightbearing with crutches.  The patient has numbness to the toes on the left foot, but this is caused due to her back.    REVIEW OF SYSTEMS:  Constitutional, HEENT, cardiovascular, pulmonary, GI, , musculoskeletal, neuro, skin, endocrine and psych systems are negative, except as otherwise noted.     PAST MEDICAL HISTORY:   Past Medical History:   Diagnosis Date     ASCVD (arteriosclerotic cardiovascular disease) 06/14/2012     Benign neoplasm of duodenum, jejunum, and ileum 2003, 2007    Gastrointestinal Stromal Tumor, seen at [facility], Dr. Schmitz, GI oncology-Gleevec treatment.  Surgical removal in fall 2004-no recurrence as of 3/05.     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     Other chronic pain     back     PONV (postoperative nausea and vomiting)      Postsurgical aortocoronary bypass status 07/04/2012     S/P CABG x 4 08/05/2012     Strabismus      Tobacco use disorder     Quit     Unspecified essential hypertension         PAST SURGICAL HISTORY:   Past Surgical History:   Procedure Laterality Date     BLEPHAROPLASTY BILATERAL Bilateral 07/17/2019    Procedure: Bilateral Upper Eyelid Blepharoplasty;  Surgeon: Vasile Brasher MD;  Location:  OR     BYPASS GRAFT ARTERY CORONARY  06/14/2012    Procedure: BYPASS GRAFT ARTERY CORONARY;  Median Sternotomy Coronary Artery Bypass Graft  x 3        C THORACOSCOPY,DX W BX  fall 2003    benign tissue     CATARACT IOL, RT/LT      LE     CHOLECYSTECTOMY  01/01/1963     COLONOSCOPY  04/12/2005     CORONARY ARTERY BYPASS      X4     CREATION PERICARDIAL WINDOW  06/15/2012    Procedure: CREATION PERICARDIAL WINDOW;  Mediastinal Exploration, Control of Bleeding;  Surgeon: Dwaine Griffin MD;  Location:  OR     EYE SURGERY  01/01/2014    left cataract removal     GI SURGERY  2003 and 2007    GIST tumor removal     GYN SURGERY      tubal ligation     HYSTERECTOMY VAGINAL, COLPORRHAPHY ANTERIOR, POSTERIOR, COMBINED N/A 10/17/2017    Procedure: COMBINED HYSTERECTOMY VAGINAL,  COLPORRHAPHY ANTERIOR, POSTERIOR;  Total Vaginal Hysterectomy and Posterior Repair,Sacrospinous Vault Suspension;  Surgeon: Ruth Farooq MD;  Location: [facility] OR     l4-5 fusion  2019    Dr. [facility]     PHACOEMULSIFICATION WITH STANDARD INTRAOCULAR LENS IMPLANT  03/24/2014    Procedure: PHACOEMULSIFICATION WITH STANDARD INTRAOCULAR LENS IMPLANT;  Left Kelman Phacoemulsification with Intraocular Lens Implant;  Surgeon: Magnus Mckeon MD;  Location: [facility] OR     RECESSION RESECTION WITH ADJUSTABLE SUTURE BILATERAL Bilateral 07/14/2016    Procedure: RECESSION RESECTION WITH ADJUSTABLE SUTURE BILATERAL;  Surgeon: Erma Ordaz MD;  Location: [facility] OR     REPAIR ENTROPION Right 08/21/2019    Procedure: Right Upper Lid Entropion Repair;  Surgeon: Vasile Brasher MD;  Location: [facility]     REPAIR RETRACTION LID Right 8/7/2024    Procedure: REPAIR, RETRACTION, RIGHT RIGHT UPPER EYELID;  Surgeon: Vasile Brasher MD;  [facility]     REPAIR RETRACTION LID BILATERAL Bilateral 07/17/2019    Procedure: Bilateral Upper Eyelid Retraction Repair;  Surgeon: Vasile Brasher MD;  Location[facility]     SURGICAL HISTORY OF -   01/01/1963    cholecystectomy     SURGICAL HISTORY OF -   09/01/1970    Tubal Ligation      SURGICAL HISTORY OF -   08/01/2003    Explor. Lap, Excision of Small Bowel Tumor      SURGICAL HISTORY OF -   04/01/2010    lung cancer removed right lung        MEDICATIONS:   Current Outpatient Medications:      albuterol (PROAIR HFA/PROVENTIL HFA/VENTOLIN HFA) 108 (90 Base) MCG/ACT inhaler, INHALE 2 PUFFS INTO THE LUNGS EVERY 6 HOURS AS NEEDED, Disp: 8.5 g, Rfl: 1     amLODIPine (NORVASC) 5 MG tablet, Take 1 tablet (5 mg) by mouth daily, Disp: 90 tablet, Rfl: 3     aspirin (ASA) 81 MG chewable tablet, Take 81 mg by mouth daily, Disp: , Rfl:      Blood Pressure Monitoring (ADULT BLOOD PRESSURE CUFF LG) KIT, 1 Device 2 times daily, Disp: 1 kit, Rfl: 1     budesonide-formoterol  (SYMBICORT) 160-4.5 MCG/ACT Inhaler, Inhale 2 puffs into the lungs 2 times daily Substitute symbicort instead of Advair while taking Paxlovid., Disp: 6 g, Rfl: 0     calcium carbonate-vitamin D (CALTRATE) 600-10 MG-MCG per tablet, Take 1 tablet by mouth 2 times daily, Disp: , Rfl:      clobetasol (TEMOVATE) 0.05 % external ointment, Apply topically 2 times daily Profile Rx: patient will contact pharmacy when needed, Disp: 60 g, Rfl: 3     cyanocobalamin (VITAMIN B-12) 1000 MCG tablet, Take 1,000 mcg by mouth daily, Disp: , Rfl:      erythromycin (ROMYCIN) 5 MG/GM ophthalmic ointment, Apply antibiotic ointment to all sutures three times a day, and 1/2 inch strip into the operated eye(s) at night. Use until follow up., Disp: 3.5 g, Rfl: 1     estradiol (ESTRACE) 0.5 MG tablet, INSERT ONE TABLET VAGINALLY TWICE WEEKLY AS DIRECTED, Disp: 12 tablet, Rfl: 3     fluticasone-salmeterol (ADVAIR) 100-50 MCG/ACT inhaler, Inhale 1 puff into the lungs every 12 hours, Disp: 60 each, Rfl: 11     IBANdronate (BONIVA) 150 MG tablet, Take 1 tablet (150 mg) by mouth every 30 days, Disp: 3 tablet, Rfl: 3     imatinib (GLEEVEC) 400 MG tablet, Take 1 tablet (400 mg) by mouth daily Take with a meal and a large glass of water., Disp: 30 tablet, Rfl: 11     melatonin 5 MG tablet, Take 5 mg by mouth daily, Disp: , Rfl:      methimazole (TAPAZOLE) 5 MG tablet, Take 0.5 tablets (2.5 mg) by mouth daily, Disp: 45 tablet, Rfl: 1     methocarbamol (ROBAXIN) 500 MG tablet, Take 1 tablet (500 mg) by mouth nightly as needed for muscle spasms (if no sedation, can take during the day), Disp: 30 tablet, Rfl: 0     metoprolol tartrate (LOPRESSOR) 100 MG tablet, Take 1 tablet (100 mg) by mouth 2 times daily, Disp: 180 tablet, Rfl: 3     omeprazole (PRILOSEC) 20 MG DR capsule, Take 1 capsule (20 mg) by mouth daily, Disp: 90 capsule, Rfl: 3     polyethylene glycol (MIRALAX) 17 g packet, Take 1 packet by mouth daily as needed for constipation, Disp: , Rfl:       pregabalin (LYRICA) 50 MG capsule, Take 1 capsule (50 mg) by mouth at bedtime for 7 days, THEN 2 capsules (100 mg) 2 times daily for 30 days., Disp: 120 capsule, Rfl: 2     psyllium (METAMUCIL/KONSYL) 58.6 % powder, Take 1 teaspoonful by mouth daily, Disp: , Rfl:      rosuvastatin (CRESTOR) 5 MG tablet, TAKE 1 TABLET (5 MG) BY MOUTH EVERY OTHER DAY, Disp: 45 tablet, Rfl: 3     umeclidinium (INCRUSE ELLIPTA) 62.5 MCG/ACT inhaler, INHALE 1 PUFF INTO THE LUNGS DAILY, Disp: 30 each, Rfl: 10     Vitamin D3 (VITAMIN D, CHOLECALCIFEROL,) 25 mcg (1000 units) tablet, Take 1 tablet by mouth daily, Disp: , Rfl:      neomycin-polymixin-dexAMETHasone (MAXITROL) 0.1 % ophthalmic suspension, Please instill one drop into the operative eye(s) four times daily until the bottle is finished. (Patient not taking: Reported on 2024), Disp: 5 mL, Rfl: 0     oxyCODONE (ROXICODONE) 5 MG tablet, Take 1 tablet (5 mg) by mouth every 6 hours as needed for pain (Patient not taking: Reported on 2024), Disp: 28 tablet, Rfl: 0     ALLERGIES:    Allergies   Allergen Reactions     Atorvastatin Cramps               SOCIAL HISTORY:   Social History     Socioeconomic History     Marital status:      Spouse name: Not on file     Number of children: Not on file     Years of education: Not on file     Highest education level: Not on file   Occupational History     Not on file   Tobacco Use     Smoking status: Former     Current packs/day: 0.00     Average packs/day: 0.5 packs/day for 49.0 years (24.5 ttl pk-yrs)     Types: Cigarettes     Start date: 1961     Quit date: 2010     Years since quittin.3     Passive exposure: Never     Smokeless tobacco: Never   Vaping Use     Vaping status: Never Used   Substance and Sexual Activity     Alcohol use: No     Drug use: No     Sexual activity: Not Currently     Partners: Male     Comment:    Other Topics Concern      Service No     Blood Transfusions No      Caffeine Concern Yes     Comment: 3 cups     Occupational Exposure No     Hobby Hazards No     Sleep Concern No     Stress Concern No     Comment: son  and much happiness in her life, big burden lifted,      Weight Concern No     Special Diet No     Back Care Yes     Comment: lower back herniated disc 1970's      Exercise No     Bike Helmet No     Seat Belt Yes     Self-Exams Yes     Parent/sibling w/ CABG, MI or angioplasty before 65F 55M? 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  Son (Rk, with wife Марина and grandson) lives next door -- quadriplegic and high functioning, cannot provide physical assistance/support.     Social Determinants of Health     Financial Resource Strain: Low Risk  (1/24/2024)    Financial Resource Strain      Within the past 12 months, have you or your family members you live with been unable to get utilities (heat, electricity) when it was really needed?: No   Food Insecurity: Low Risk  (1/24/2024)    Food Insecurity      Within the past 12 months, did you worry that your food would run out before you got money to buy more?: No      Within the past 12 months, did the food you bought just not last and you didn t have money to get more?: No   Transportation Needs: Low Risk  (1/24/2024)    Transportation Needs      Within the past 12 months, has lack of transportation kept you from medical appointments, getting your medicines, non-medical meetings or appointments, work, or from getting things that you need?: No   Physical Activity: Inactive (7/20/2021)    Exercise Vital Sign      Days of Exercise per Week: 0 days      Minutes of Exercise per Session: 0 min   Stress: Not on file   Social Connections: Unknown (6/23/2022)    [facility] [employer] [facility] [employer] [facility]    Social Connections      Frequency of Communication with  "Friends and Family: Not on file   Interpersonal Safety: Low Risk  (1/24/2024)    Interpersonal Safety      Do you feel physically and emotionally safe where you currently live?: Yes      Within the past 12 months, have you been hit, slapped, kicked or otherwise physically hurt by someone?: No      Within the past 12 months, have you been humiliated or emotionally abused in other ways by your partner or ex-partner?: No   Housing Stability: Low Risk  (1/24/2024)    Housing Stability      Do you have housing? : Yes      Are you worried about losing your housing?: No        FAMILY HISTORY:   Family History   Problem Relation Age of Onset     Heart Disease Mother      Osteoporosis Mother      Respiratory Mother         Emphezyma     Hypertension Mother      Heart Disease Father      Alcohol/Drug Father      Hypertension Father      Hypertension Maternal Grandmother      Hypertension Brother      Hypertension Sister      Arthritis Sister      Hypertension Son      Cancer Son         rectal        EXAM:Vitals: BP (!) 141/73   Pulse 82   Ht 1.575 m (5' 2\")   Wt 68 kg (150 lb)   LMP 01/01/1992   BMI 27.44 kg/m    BMI= Body mass index is 27.44 kg/m .    General appearance: Patient is alert and fully cooperative with history & exam.  No sign of distress is noted during the visit.     Psychiatric: Affect is pleasant & appropriate.  Patient appears motivated to improve health.     Respiratory: Breathing is regular & unlabored while sitting.     HEENT: Hearing is intact to spoken word.  Speech is clear.  No gross evidence of visual impairment that would impact ambulation.     Dermatologic: Skin is intact with no laceration for fracture blisters.        Vascular: DP & PT pulses are difficult to palpate due to the edema.  CFT and skin temperature is normal to both lower extremities.     Neurologic: Lower extremity sensation is intact to light touch.  No evidence of weakness or contracture in the lower extremities.      "   Musculoskeletal: One notes decreased ankle joint ROM due to swelling and pain on the left.  Point of maximum tenderness located over the   lateral aspect of the left ankle.  One notes no pain with palpation over the medial deltoid ligament on the left.  Moderate edema noted.  Positive ecchymosis noted.      Radiograph evaluation including AP, lateral and mortise views of the left ankle reveals a nondisplaced spiral oblique fracture of the lateral malleolus extending at the level of the ankle mortise proximally and posteriorly.              ASSESSMENT / PLAN:     ICD-10-CM    1. Closed nondisplaced fracture of lateral malleolus of left fibula, initial encounter  S82.65XA           I have explained to Idalmis  about the conditions.  We discussed the nature of the condition as well as the treatment plan and expected length of recovery.  At this point, I am recommending conservative care.  The patient was instructed to continue with protected weight bearing with use of a walker and a CAM boot for stabilization and protection.  The patient was instructed to start soaking the ankle in warm water and perform light range of motion exercises as tolerated.  The patient will return in one month for reevaluation and repeat x-rays.    Idalmis verbalized agreement with and understanding of the rational for the diagnosis and treatment plan.  All questions were answered to best of my ability and the patient's satisfaction. The patient was advised to contact the clinic with any questions that may arise.      Disclaimer: This note consists of symbols derived from keyboarding, dictation and/or voice recognition software. As a result, there may be errors in the script that have gone undetected. Please consider this when interpreting information found in this chart.       ESAU Crump, ANISA.P.TIERRA., F.A.C.F.A.S.      Again, thank you for allowing me to participate in the care of your patient.        Sincerely,        Yovany Chappell  TEJAS Crump

## 2024-08-21 NOTE — PROGRESS NOTES
PATIENT HISTORY:  Idalmis Abdul is a 78 year old female who presents with a chief complaint of a painful left ankle.  The patient relates injuring the left ankle on 08/12/24 while at at home.  The patient states that she was bending over and fell and felt a crack.  The patient relates pain with weight bearing to the left.   The patient relates being seen and treated with ice, elevation, and nonweightbearing with crutches.  The patient has numbness to the toes on the left foot, but this is caused due to her back.    REVIEW OF SYSTEMS:  Constitutional, HEENT, cardiovascular, pulmonary, GI, , musculoskeletal, neuro, skin, endocrine and psych systems are negative, except as otherwise noted.     PAST MEDICAL HISTORY:   Past Medical History:   Diagnosis Date    ASCVD (arteriosclerotic cardiovascular disease) 06/14/2012    Benign neoplasm of duodenum, jejunum, and ileum 2003, 2007    Gastrointestinal Stromal Tumor, seen at [facility], Dr. Schmitz, GI oncology-Gleevec treatment.  Surgical removal in fall 2004-no recurrence as of 3/05.    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  Sees  @ [facility]     Hyperlipidaemia     Hyperlipidemia LDL goal <160 12/17/2013    Hyperthyroidism 02/04/2014    Hypokalemia 08/05/2012    Hyponatremia 07/15/2021    LBP (low back pain)  07/26/2013    Leiomyoma of uterus, unspecified     Non-intractable vomiting with nausea, unspecified vomiting type 07/14/2021    NSTEMI (non-ST elevated myocardial infarction) (H) 06/12/2012    NSTEMI (non-ST elevated myocardial infarction) (H)     osteopenia     Other benign neoplasm of connective and other soft tissue of thorax 2003    Hamartoma, lung-?right-s/p  resection 2004    Other chronic pain     back    PONV (postoperative nausea and vomiting)     Postsurgical aortocoronary bypass status 07/04/2012    S/P CABG x 4 08/05/2012    Strabismus     Tobacco use disorder     Quit    Unspecified essential hypertension         PAST SURGICAL HISTORY:   Past Surgical History:   Procedure Laterality Date    BLEPHAROPLASTY BILATERAL Bilateral 07/17/2019    Procedure: Bilateral Upper Eyelid Blepharoplasty;  Surgeon: Vasile Brasher MD;  Location:  OR    BYPASS GRAFT ARTERY CORONARY  06/14/2012    Procedure: BYPASS GRAFT ARTERY CORONARY;  Median Sternotomy Coronary Artery Bypass Graft  x 3       C THORACOSCOPY,DX W BX  fall 2003    benign tissue    CATARACT IOL, RT/LT      LE    CHOLECYSTECTOMY  01/01/1963    COLONOSCOPY  04/12/2005    CORONARY ARTERY BYPASS      X4    CREATION PERICARDIAL WINDOW  06/15/2012    Procedure: CREATION PERICARDIAL WINDOW;  Mediastinal Exploration, Control of Bleeding;  Surgeon: Dwaine Griffin MD;  Location: [facility] OR    EYE SURGERY  01/01/2014    left cataract removal    GI SURGERY  2003 and 2007    GIST tumor removal    GYN SURGERY      tubal ligation    HYSTERECTOMY VAGINAL, COLPORRHAPHY ANTERIOR, POSTERIOR, COMBINED N/A 10/17/2017    Procedure: COMBINED HYSTERECTOMY VAGINAL, COLPORRHAPHY ANTERIOR, POSTERIOR;  Total Vaginal Hysterectomy and Posterior Repair,Sacrospinous Vault Suspension;  Surgeon: Ruth Farooq MD;  Location: [facility] OR    l4-5 fusion  2019    Dr. [facility]    PHACOEMULSIFICATION WITH STANDARD INTRAOCULAR LENS IMPLANT  03/24/2014    Procedure:  PHACOEMULSIFICATION WITH STANDARD INTRAOCULAR LENS IMPLANT;  Left Kelman Phacoemulsification with Intraocular Lens Implant;  Surgeon: Magnus Mckeon MD;  Location: [facility] OR    RECESSION RESECTION WITH ADJUSTABLE SUTURE BILATERAL Bilateral 07/14/2016    Procedure: RECESSION RESECTION WITH ADJUSTABLE SUTURE BILATERAL;  Surgeon: Erma Ordaz MD;  Location: [facility] OR    REPAIR ENTROPION Right 08/21/2019    Procedure: Right Upper Lid Entropion Repair;  Surgeon: Vasile Brasher MD;  [facility]    REPAIR RETRACTION LID Right 8/7/2024    Procedure: REPAIR, RETRACTION, RIGHT RIGHT UPPER EYELID;  Surgeon: Vasile Brasher MD;  [facility]    REPAIR RETRACTION LID BILATERAL Bilateral 07/17/2019    Procedure: Bilateral Upper Eyelid Retraction Repair;  Surgeon: Vasile Brasher MD;  [facility]    SURGICAL HISTORY OF -   01/01/1963    cholecystectomy    SURGICAL HISTORY OF - 09/01/1970    Tubal Ligation     SURGICAL HISTORY OF -   08/01/2003    Explor. Lap, Excision of Small Bowel Tumor     SURGICAL HISTORY OF - 04/01/2010    lung cancer removed right lung        MEDICATIONS:   Current Outpatient Medications:     albuterol (PROAIR HFA/PROVENTIL HFA/VENTOLIN HFA) 108 (90 Base) MCG/ACT inhaler, INHALE 2 PUFFS INTO THE LUNGS EVERY 6 HOURS AS NEEDED, Disp: 8.5 g, Rfl: 1    amLODIPine (NORVASC) 5 MG tablet, Take 1 tablet (5 mg) by mouth daily, Disp: 90 tablet, Rfl: 3    aspirin (ASA) 81 MG chewable tablet, Take 81 mg by mouth daily, Disp: , Rfl:     Blood Pressure Monitoring (ADULT BLOOD PRESSURE CUFF LG) KIT, 1 Device 2 times daily, Disp: 1 kit, Rfl: 1    budesonide-formoterol (SYMBICORT) 160-4.5 MCG/ACT Inhaler, Inhale 2 puffs into the lungs 2 times daily Substitute symbicort instead of Advair while taking Paxlovid., Disp: 6 g, Rfl: 0    calcium carbonate-vitamin D (CALTRATE) 600-10 MG-MCG per tablet, Take 1 tablet by mouth 2 times daily, Disp: , Rfl:     clobetasol (TEMOVATE) 0.05 % external  ointment, Apply topically 2 times daily Profile Rx: patient will contact pharmacy when needed, Disp: 60 g, Rfl: 3    cyanocobalamin (VITAMIN B-12) 1000 MCG tablet, Take 1,000 mcg by mouth daily, Disp: , Rfl:     erythromycin (ROMYCIN) 5 MG/GM ophthalmic ointment, Apply antibiotic ointment to all sutures three times a day, and 1/2 inch strip into the operated eye(s) at night. Use until follow up., Disp: 3.5 g, Rfl: 1    estradiol (ESTRACE) 0.5 MG tablet, INSERT ONE TABLET VAGINALLY TWICE WEEKLY AS DIRECTED, Disp: 12 tablet, Rfl: 3    fluticasone-salmeterol (ADVAIR) 100-50 MCG/ACT inhaler, Inhale 1 puff into the lungs every 12 hours, Disp: 60 each, Rfl: 11    IBANdronate (BONIVA) 150 MG tablet, Take 1 tablet (150 mg) by mouth every 30 days, Disp: 3 tablet, Rfl: 3    imatinib (GLEEVEC) 400 MG tablet, Take 1 tablet (400 mg) by mouth daily Take with a meal and a large glass of water., Disp: 30 tablet, Rfl: 11    melatonin 5 MG tablet, Take 5 mg by mouth daily, Disp: , Rfl:     methimazole (TAPAZOLE) 5 MG tablet, Take 0.5 tablets (2.5 mg) by mouth daily, Disp: 45 tablet, Rfl: 1    methocarbamol (ROBAXIN) 500 MG tablet, Take 1 tablet (500 mg) by mouth nightly as needed for muscle spasms (if no sedation, can take during the day), Disp: 30 tablet, Rfl: 0    metoprolol tartrate (LOPRESSOR) 100 MG tablet, Take 1 tablet (100 mg) by mouth 2 times daily, Disp: 180 tablet, Rfl: 3    omeprazole (PRILOSEC) 20 MG DR capsule, Take 1 capsule (20 mg) by mouth daily, Disp: 90 capsule, Rfl: 3    polyethylene glycol (MIRALAX) 17 g packet, Take 1 packet by mouth daily as needed for constipation, Disp: , Rfl:     pregabalin (LYRICA) 50 MG capsule, Take 1 capsule (50 mg) by mouth at bedtime for 7 days, THEN 2 capsules (100 mg) 2 times daily for 30 days., Disp: 120 capsule, Rfl: 2    psyllium (METAMUCIL/KONSYL) 58.6 % powder, Take 1 teaspoonful by mouth daily, Disp: , Rfl:     rosuvastatin (CRESTOR) 5 MG tablet, TAKE 1 TABLET (5 MG) BY MOUTH  EVERY OTHER DAY, Disp: 45 tablet, Rfl: 3    umeclidinium (INCRUSE ELLIPTA) 62.5 MCG/ACT inhaler, INHALE 1 PUFF INTO THE LUNGS DAILY, Disp: 30 each, Rfl: 10    Vitamin D3 (VITAMIN D, CHOLECALCIFEROL,) 25 mcg (1000 units) tablet, Take 1 tablet by mouth daily, Disp: , Rfl:     neomycin-polymixin-dexAMETHasone (MAXITROL) 0.1 % ophthalmic suspension, Please instill one drop into the operative eye(s) four times daily until the bottle is finished. (Patient not taking: Reported on 2024), Disp: 5 mL, Rfl: 0    oxyCODONE (ROXICODONE) 5 MG tablet, Take 1 tablet (5 mg) by mouth every 6 hours as needed for pain (Patient not taking: Reported on 2024), Disp: 28 tablet, Rfl: 0     ALLERGIES:    Allergies   Allergen Reactions    Atorvastatin Cramps               SOCIAL HISTORY:   Social History     Socioeconomic History    Marital status:      Spouse name: Not on file    Number of children: Not on file    Years of education: Not on file    Highest education level: Not on file   Occupational History    Not on file   Tobacco Use    Smoking status: Former     Current packs/day: 0.00     Average packs/day: 0.5 packs/day for 49.0 years (24.5 ttl pk-yrs)     Types: Cigarettes     Start date: 1961     Quit date: 2010     Years since quittin.3     Passive exposure: Never    Smokeless tobacco: Never   Vaping Use    Vaping status: Never Used   Substance and Sexual Activity    Alcohol use: No    Drug use: No    Sexual activity: Not Currently     Partners: Male     Comment:    Other Topics Concern     Service No    Blood Transfusions No    Caffeine Concern Yes     Comment: 3 cups    Occupational Exposure No    Hobby Hazards No    Sleep Concern No    Stress Concern No     Comment: son  and much happiness in her life, big burden lifted,     Weight Concern No    Special Diet No    Back Care Yes     Comment: lower back herniated disc      Exercise No    Bike Helmet No    Seat Belt Yes     Self-Exams Yes    Parent/sibling w/ CABG, MI or angioplasty before 65F 55M? 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  Son (Rk, with wife Марина and grandson) lives next door -- quadriplegic and high functioning, cannot provide physical assistance/support.     Social Determinants of Health     Financial Resource Strain: Low Risk  (1/24/2024)    Financial Resource Strain     Within the past 12 months, have you or your family members you live with been unable to get utilities (heat, electricity) when it was really needed?: No   Food Insecurity: Low Risk  (1/24/2024)    Food Insecurity     Within the past 12 months, did you worry that your food would run out before you got money to buy more?: No     Within the past 12 months, did the food you bought just not last and you didn t have money to get more?: No   Transportation Needs: Low Risk  (1/24/2024)    Transportation Needs     Within the past 12 months, has lack of transportation kept you from medical appointments, getting your medicines, non-medical meetings or appointments, work, or from getting things that you need?: No   Physical Activity: Inactive (7/20/2021)    Exercise Vital Sign     Days of Exercise per Week: 0 days     Minutes of Exercise per Session: 0 min   Stress: Not on file   Social Connections: Unknown (6/23/2022)    [facility]    Social Connections     Frequency of Communication with Friends and Family: Not on file   Interpersonal Safety: Low Risk  (1/24/2024)    Interpersonal Safety     Do you feel physically and emotionally safe where you currently live?: Yes     Within the past 12 months, have you been hit, slapped, kicked or otherwise physically hurt by someone?: No     Within the past 12 months, have you been humiliated or emotionally abused in other ways by  "your partner or ex-partner?: No   Housing Stability: Low Risk  (1/24/2024)    Housing Stability     Do you have housing? : Yes     Are you worried about losing your housing?: No        FAMILY HISTORY:   Family History   Problem Relation Age of Onset    Heart Disease Mother     Osteoporosis Mother     Respiratory Mother         Emphezyma    Hypertension Mother     Heart Disease Father     Alcohol/Drug Father     Hypertension Father     Hypertension Maternal Grandmother     Hypertension Brother     Hypertension Sister     Arthritis Sister     Hypertension Son     Cancer Son         rectal        EXAM:Vitals: BP (!) 141/73   Pulse 82   Ht 1.575 m (5' 2\")   Wt 68 kg (150 lb)   LMP 01/01/1992   BMI 27.44 kg/m    BMI= Body mass index is 27.44 kg/m .    General appearance: Patient is alert and fully cooperative with history & exam.  No sign of distress is noted during the visit.     Psychiatric: Affect is pleasant & appropriate.  Patient appears motivated to improve health.     Respiratory: Breathing is regular & unlabored while sitting.     HEENT: Hearing is intact to spoken word.  Speech is clear.  No gross evidence of visual impairment that would impact ambulation.     Dermatologic: Skin is intact with no laceration for fracture blisters.        Vascular: DP & PT pulses are difficult to palpate due to the edema.  CFT and skin temperature is normal to both lower extremities.     Neurologic: Lower extremity sensation is intact to light touch.  No evidence of weakness or contracture in the lower extremities.        Musculoskeletal: One notes decreased ankle joint ROM due to swelling and pain on the left.  Point of maximum tenderness located over the   lateral aspect of the left ankle.  One notes no pain with palpation over the medial deltoid ligament on the left.  Moderate edema noted.  Positive ecchymosis noted.      Radiograph evaluation including AP, lateral and mortise views of the left ankle reveals a " nondisplaced spiral oblique fracture of the lateral malleolus extending at the level of the ankle mortise proximally and posteriorly.              ASSESSMENT / PLAN:     ICD-10-CM    1. Closed nondisplaced fracture of lateral malleolus of left fibula, initial encounter  S82.65XA           I have explained to Idalmis  about the conditions.  We discussed the nature of the condition as well as the treatment plan and expected length of recovery.  At this point, I am recommending conservative care.  The patient was instructed to continue with protected weight bearing with use of a walker and a CAM boot for stabilization and protection.  The patient was instructed to start soaking the ankle in warm water and perform light range of motion exercises as tolerated.  The patient will return in one month for reevaluation and repeat x-rays.    Idalmis verbalized agreement with and understanding of the rational for the diagnosis and treatment plan.  All questions were answered to best of my ability and the patient's satisfaction. The patient was advised to contact the clinic with any questions that may arise.      Disclaimer: This note consists of symbols derived from keyboarding, dictation and/or voice recognition software. As a result, there may be errors in the script that have gone undetected. Please consider this when interpreting information found in this chart.       ESAU Crump D.P.M., F.ROSA.C.F.A.S.

## 2024-08-22 ENCOUNTER — TELEPHONE (OUTPATIENT)
Dept: OPHTHALMOLOGY | Facility: CLINIC | Age: 79
End: 2024-08-22
Payer: COMMERCIAL

## 2024-08-22 NOTE — TELEPHONE ENCOUNTER
Spoke with patient regarding scheduling a Return in about 2 months (around 10/19/2024) for Follow Up.      Scheduling options: Return to clinic in 2-3 months, patient may prefer another telephone visit given her recent fracture. If it is a telephone visit, please have her send in photos.     Scheduled patient accordingly for a Telephone Visit and sent reminder letter to confirmed address with instructions for emailing photos prior to appointment.-Per Patient

## 2024-08-27 NOTE — LETTER
"7/22/2024      Idalmis Abdul  [address]  [facility] [address]      Dear Colleague,    Thank you for referring your patient, Idalmis Abdul, to the [facility]. Please see a copy of my visit note below.    Idalmis Abdul is a 78 year old female who is being evaluated via a billable telephone visit.      The patient has been notified of following:      \"This telephone visit will be conducted via a call between you and your physician/provider. We have found that certain health care needs can be provided without the need for an in-person physical exam.  This service lets us provide the care you need with a telephone conversation.  If a prescription is necessary we can send it directly to your pharmacy.  If lab work is needed we can place an order for that and you can then stop by our lab to have the test done at a later time.     Telephone visits are billed at different rates depending on your insurance coverage.  Please reach out to your insurance provider with any questions.     If during the course of the call the physician/provider feels a telephone visit is not appropriate, you will not be charged for this service.\"    I have reviewed and updated the patient's Past Medical History, Social History, Family History and Medication List.    ALLERGIES  Atorvastatin    Spine Surgery Follow Up    REFERRING PHYSICIAN: No ref. provider found   PRIMARY CARE PHYSICIAN: Ale Ordaz           Chief Complaint:   Medication Problem      History of Present Illness:  Symptom Profile Including: location of symptoms, onset, severity, exacerbating/alleviating factors, previous treatments:        Idalmis Abdul is a 78 year old female who presents today for medication management of her radiculopathy. She has a history prior L4-5 fusion in setting of degenerative lumbosacral stenosis and idiopathic scoliosis with symptoms consistent with neurogenic claudication and radiculopathy.  She has " How Severe Are Your Spot(S)?: mild What Type Of Note Output Would You Prefer (Optional)?: Standard Output a lumbosacral fractional curve with radicular pain due to nerve root compression as well as central stenosis with neurogenic claudication.      She has had previous right L5-S1 TFESI with no help. More recently had caudal in April with two days of relief but symptoms then returned to pre-injection.     For medications, she has tried gabapentin, lyrica, and most recently amitriptyline. Amitriptyline caused headaches, stomach pain, nausea. Lyrica caused dry mouth, she was taking 100 BID and stopped it. Gabapentin she called with blurred vision. She stopped gabapentin last Thursday but symptoms are not improved. She noted diplopia when watching TV and when reading the paper. This started 2.5 weeks ago. She also has photophobia. She has a history of Graves disease and has an ophthalmologist that she follows with.     Since going off the meds, she is having a lot of pain in her left hip and thigh to the knee. She takes ASA but is wondering about other pain medications.     JO ANN Scores:  Oswestry (JO ANN) Questionnaire        3/18/2024     9:40 AM   OSWESTRY DISABILITY INDEX   Count 9   Sum 20   Oswestry Score (%) 44.44 %                Physical Exam:   This was a telephone visit, so very limited exam could be performed.  On telephone, patient sounded alert, oriented x 3, cooperative, with coherent speech, and not in cardiorespiratory distress.  Able to respond to questions appropriately and follow instructions.           Assessment and Plan:   Assessment:  78 year old female with radiculopathy, multiple drug allergies     Plan:  Restart lyrica 50 mg hs, titrate up to 50 mg BID. She did have side effects at 100 mg BID but could titrate up to 75 mg BID. Also methocarbamol hs, S/E discussed. Could consider short dose of tramadol but would not recommend long-term use.   Recommend she see her ophthalmologist to follow up on vision changes.     Rosa Maria Fox PA-C, CODY  Orthopaedic Spine Surgery  Dept Orthopaedic Surgery, [facility]    Dictation Disclaimer: Some of this Note has been completed with voice-recognition dictation software. Although errors are generally corrected real-time, there is the potential for a rare error to be present in the completed chart.      Start time of call: 12:51  End time of call: 1:00  Total call duration: 9:30  Location of provider during call: on site        Again, thank you for allowing me to participate in the care of your patient.        Sincerely,        Rosa Maria Fox PA-C   What Is The Reason For Today's Visit?: Annual Full Body Skin Examination with No Concerns What Is The Reason For Today's Visit? (Being Monitored For X): the re-examination of lesions previously examined

## 2024-09-03 DIAGNOSIS — E78.5 HYPERLIPIDEMIA LDL GOAL <160: ICD-10-CM

## 2024-09-04 RX ORDER — ROSUVASTATIN CALCIUM 5 MG/1
5 TABLET, COATED ORAL EVERY OTHER DAY
Qty: 45 TABLET | Refills: 2 | Status: SHIPPED | OUTPATIENT
Start: 2024-09-04

## 2024-09-18 ENCOUNTER — OFFICE VISIT (OUTPATIENT)
Dept: PODIATRY | Facility: CLINIC | Age: 79
End: 2024-09-18
Payer: COMMERCIAL

## 2024-09-18 ENCOUNTER — ANCILLARY PROCEDURE (OUTPATIENT)
Dept: GENERAL RADIOLOGY | Facility: CLINIC | Age: 79
End: 2024-09-18
Attending: PODIATRIST
Payer: COMMERCIAL

## 2024-09-18 VITALS
HEART RATE: 75 BPM | BODY MASS INDEX: 27.6 KG/M2 | DIASTOLIC BLOOD PRESSURE: 73 MMHG | SYSTOLIC BLOOD PRESSURE: 129 MMHG | HEIGHT: 62 IN | WEIGHT: 150 LBS

## 2024-09-18 DIAGNOSIS — S82.65XA CLOSED NONDISPLACED FRACTURE OF LATERAL MALLEOLUS OF LEFT FIBULA, INITIAL ENCOUNTER: Primary | ICD-10-CM

## 2024-09-18 PROCEDURE — 73610 X-RAY EXAM OF ANKLE: CPT | Mod: TC | Performed by: RADIOLOGY

## 2024-09-18 PROCEDURE — 99207 PR FRACTURE CARE IN GLOBAL PERIOD: CPT | Performed by: PODIATRIST

## 2024-09-18 NOTE — LETTER
"9/18/2024      Idalmis Abdul  [address]  [facility] [address]      Dear Colleague,    Thank you for referring your patient, Idalmis Abdul, to the [facility]. Please see a copy of my visit note below.    Idalmis returns to the office for reevaluation of the left ankle.  The patient relates following the instructions given at the last visit with noted overall less pain and more improvement in function of the left ankle.   The patient relates no other problems.    Vitals: /73   Pulse 75   Ht 1.575 m (5' 2\")   Wt 68 kg (150 lb)   LMP 01/01/1992   BMI 27.44 kg/m    BMI= Body mass index is 27.44 kg/m .    Lower Extremity Physical Exam:      Neurovascular status remains unchanged.  Muscular exam is within normal limits to major muscle groups.  Integument is intact.      Noted decreased pain on palpation of the lateral malleolus on the left ankle.  Decreased edema noted.  Improved but still limited ankle range of motion noted.    Diagnostics:  Radiograph evaluation including three views of the left ankle reveals interval healing with increased trabeculation of the lateral malleolus fracture.  I personally evaluated the images as well as reviewed the images with the patient pointing out the findings.      Assessment:     ICD-10-CM    1. Closed nondisplaced fracture of lateral malleolus of left fibula, initial encounter  S82.65XA XR Ankle Left G/E 3 Views          Plan:    I have explained to Idalmis about the progress of the conditions.  At this time, the patient was educated on the importance of offloading supportive shoes and other devices.    The patient was instructed to perform warm soaks with Epson salt after which to also apply over-the-counter Voltaren gel to deeply massage the injured tissue.  The patient was instructed to do this on a daily basis until symptoms resolve.   The patient was fitted with a Dynaflex insert that will aid in offloading the tension " forces to the soft tissues and prevent further inflammation.   The patient may return in four weeks for reevaluation to determine if any further treatment will be needed.      Idalims verbalized agreement with and understanding of the rational for the diagnosis and treatment plan.  All questions were answered to best of my ability and the patient's satisfaction. The patient was advised to contact the clinic with any questions that may arise after the clinic visit.      Disclaimer: This note consists of symbols derived from keyboarding, dictation and/or voice recognition software. As a result, there may be errors in the script that have gone undetected. Please consider this when interpreting information found in this chart.       ESAU Crump D.P.M., F.A.C.F.A.S.      Again, thank you for allowing me to participate in the care of your patient.        Sincerely,        Yovany Crump DPM

## 2024-09-18 NOTE — PROGRESS NOTES
"Idalmis returns to the office for reevaluation of the left ankle.  The patient relates following the instructions given at the last visit with noted overall less pain and more improvement in function of the left ankle.   The patient relates no other problems.    Vitals: /73   Pulse 75   Ht 1.575 m (5' 2\")   Wt 68 kg (150 lb)   LMP 01/01/1992   BMI 27.44 kg/m    BMI= Body mass index is 27.44 kg/m .    Lower Extremity Physical Exam:      Neurovascular status remains unchanged.  Muscular exam is within normal limits to major muscle groups.  Integument is intact.      Noted decreased pain on palpation of the lateral malleolus on the left ankle.  Decreased edema noted.  Improved but still limited ankle range of motion noted.    Diagnostics:  Radiograph evaluation including three views of the left ankle reveals interval healing with increased trabeculation of the lateral malleolus fracture.  I personally evaluated the images as well as reviewed the images with the patient pointing out the findings.      Assessment:     ICD-10-CM    1. Closed nondisplaced fracture of lateral malleolus of left fibula, initial encounter  S82.65XA XR Ankle Left G/E 3 Views          Plan:    I have explained to Idalmis about the progress of the conditions.  At this time, the patient was educated on the importance of offloading supportive shoes and other devices.    The patient was instructed to perform warm soaks with Epson salt after which to also apply over-the-counter Voltaren gel to deeply massage the injured tissue.  The patient was instructed to do this on a daily basis until symptoms resolve.   The patient was fitted with a Dynaflex insert that will aid in offloading the tension forces to the soft tissues and prevent further inflammation.   The patient may return in four weeks for reevaluation to determine if any further treatment will be needed.      Idalmis verbalized agreement with and understanding of the rational for the " diagnosis and treatment plan.  All questions were answered to best of my ability and the patient's satisfaction. The patient was advised to contact the clinic with any questions that may arise after the clinic visit.      Disclaimer: This note consists of symbols derived from keyboarding, dictation and/or voice recognition software. As a result, there may be errors in the script that have gone undetected. Please consider this when interpreting information found in this chart.       ESAU Crump D.P.M., [email or web address].

## 2024-09-18 NOTE — PATIENT INSTRUCTIONS

## 2024-09-18 NOTE — NURSING NOTE
"Chief Complaint   Patient presents with    Fracture Followup     Left ankle- no concerns       Initial /73   Pulse 75   Ht 1.575 m (5' 2\")   Wt 68 kg (150 lb)   LMP 01/01/1992   BMI 27.44 kg/m   Estimated body mass index is 27.44 kg/m  as calculated from the following:    Height as of this encounter: 1.575 m (5' 2\").    Weight as of this encounter: 68 kg (150 lb).  Medications and allergies reviewed.      Yolanda LANGE MA    "

## 2024-10-07 RX ORDER — PREGABALIN 50 MG/1
100 CAPSULE ORAL 2 TIMES DAILY
Qty: 120 CAPSULE | Refills: 1 | OUTPATIENT
Start: 2024-10-07

## 2024-10-08 DIAGNOSIS — J43.2 CENTRILOBULAR EMPHYSEMA (H): ICD-10-CM

## 2024-10-08 RX ORDER — UMECLIDINIUM 62.5 UG/1
AEROSOL, POWDER ORAL
Qty: 30 EACH | Refills: 10 | Status: SHIPPED | OUTPATIENT
Start: 2024-10-08

## 2024-10-09 NOTE — TELEPHONE ENCOUNTER
"Requested Prescriptions   Pending Prescriptions Disp Refills     metoprolol tartrate (LOPRESSOR) 100 MG tablet [Pharmacy Med Name: METOPROLOL TARTRATE 100MG TABS]  Last Written Prescription Date:  5/16/19  Last Fill Quantity: 180,  # refills: 2   Last Office Visit with G, RAHEL or [facility] prescribing provider:  1/7/20   Future Office Visit:      180 tablet 2     Sig: TAKE ONE TABLET BY MOUTH TWICE A DAY       Beta-Blockers Protocol Passed - 2/6/2020  5:02 AM        Passed - Blood pressure under 140/90 in past 12 months     BP Readings from Last 3 Encounters:   01/07/20 138/62   10/28/19 109/72   10/13/19 (!) 147/79                 Passed - Patient is age 6 or older        Passed - Recent (12 mo) or future (30 days) visit within the authorizing provider's specialty     Patient has had an office visit with the authorizing provider or a provider within the authorizing providers department within the previous 12 mos or has a future within next 30 days. See \"Patient Info\" tab in inbasket, or \"Choose Columns\" in Meds & Orders section of the refill encounter.              Passed - Medication is active on med list          " Called pt regarding scheduling shoulder injection. Assisted scheduling next-day appointment. Pt verbalized understanding of appointment date/time/location

## 2024-10-21 ENCOUNTER — VIRTUAL VISIT (OUTPATIENT)
Dept: OPHTHALMOLOGY | Facility: CLINIC | Age: 79
End: 2024-10-21
Payer: COMMERCIAL

## 2024-10-21 DIAGNOSIS — Z98.890 POSTOPERATIVE EYE STATE: Primary | ICD-10-CM

## 2024-10-21 PROCEDURE — 99024 POSTOP FOLLOW-UP VISIT: CPT | Mod: 93 | Performed by: OPHTHALMOLOGY

## 2024-10-21 NOTE — PROGRESS NOTES
Idalmis Abdul is a 78 year old female who is being evaluated via a billable telephone visit.      Chief Complaint:   Post op telephone visit    Subjective:   No recent vision changes or eye pain. Swelling and bruising have resolved since surgery. Using AT and AT marcio for comfort.    Review of Systems   Constitutional, HEENT, cardiovascular, pulmonary, gi and gu systems are negative, except as otherwise noted.    Objective:  Vitals:  No vitals were obtained today due to virtual visit.     Physical Exam:   healthy, alert and no distress  PSYCH: Alert and oriented times 3; coherent speech, normal   rate and volume, able to articulate logical thoughts, able   to abstract reason, no tangential thoughts, no hallucinations   or delusions  Her affect is normal  RESP: No cough, no audible wheezing, able to talk in full sentences  Remainder of exam unable to be completed due to telephone visits     Patient photo reviewed, demonstrates lids healing appropriately, swelling and ecchymosis resolved.    Assessment & Plan  1. Postoperative eye state       Idalmis Abdul is 2 months status post Right upper eyelid recession by full thickness blepharotomy (8/2024)  The incision(s) is healing well.  The lid(s)  is  in excellent position.    I have recommended:  - Continue AT and AT marcio and WC PRN for comfort  - Return to clinic as needed, follow up with local eye provider as scheduled    Disposition: follow up PRN     5 minutes spent on the phone.  5 minutes spent by me on the date of the encounter doing patient visit      Attending Physician Attestation:  I did not speak with the patient, but I reviewed the case with the resident or fellow and edited the care plan as necessary.   -Vasile Brasher MD

## 2024-11-18 ENCOUNTER — OFFICE VISIT (OUTPATIENT)
Dept: ORTHOPEDICS | Facility: CLINIC | Age: 79
End: 2024-11-18
Payer: COMMERCIAL

## 2024-11-18 VITALS — WEIGHT: 155 LBS | BODY MASS INDEX: 28.52 KG/M2 | HEIGHT: 62 IN

## 2024-11-18 DIAGNOSIS — I25.2 HISTORY OF NON-ST ELEVATION MYOCARDIAL INFARCTION (NSTEMI): ICD-10-CM

## 2024-11-18 DIAGNOSIS — M51.369 DEGENERATION OF INTERVERTEBRAL DISC OF LUMBAR REGION, UNSPECIFIED WHETHER PAIN PRESENT: ICD-10-CM

## 2024-11-18 DIAGNOSIS — Z95.1 S/P CABG X 4: ICD-10-CM

## 2024-11-18 DIAGNOSIS — Z85.118 HISTORY OF LUNG CANCER: ICD-10-CM

## 2024-11-18 DIAGNOSIS — Z98.1 HISTORY OF LUMBAR FUSION: Primary | ICD-10-CM

## 2024-11-18 DIAGNOSIS — J43.2 CENTRILOBULAR EMPHYSEMA (H): ICD-10-CM

## 2024-11-18 DIAGNOSIS — J41.8 MIXED SIMPLE AND MUCOPURULENT CHRONIC BRONCHITIS (H): ICD-10-CM

## 2024-11-18 DIAGNOSIS — M48.062 SPINAL STENOSIS OF LUMBAR REGION WITH NEUROGENIC CLAUDICATION: ICD-10-CM

## 2024-11-18 DIAGNOSIS — G89.29 CHRONIC BILATERAL LOW BACK PAIN WITH LEFT-SIDED SCIATICA: ICD-10-CM

## 2024-11-18 DIAGNOSIS — M54.42 CHRONIC BILATERAL LOW BACK PAIN WITH LEFT-SIDED SCIATICA: ICD-10-CM

## 2024-11-18 RX ORDER — PREGABALIN 100 MG/1
100 CAPSULE ORAL 3 TIMES DAILY
Qty: 180 CAPSULE | Refills: 3 | Status: SHIPPED | OUTPATIENT
Start: 2024-11-18 | End: 2025-01-17

## 2024-11-18 NOTE — LETTER
11/18/2024      Idalmis Abdul  [address]  [facility] [address]      Dear Colleague,    Thank you for referring your patient, Idalmis Abdul, to the [facility]. Please see a copy of my visit note below.    Chief concern: Follow-up low back pain with radiculopathy     History of present illness:  Zainab is a very pleasant 78-year-old female with idiopathic scoliosis and past history of L4-5 posterior spinal fusion and adjacent segment degeneration with chronic symptoms of neurogenic claudication, back pain and radiculopathy.  Leg pain radiates to left leg predominantly in L5 distribution but also has some pain in S1 distribution.  Back pain is localized to lower right sided back at lumbosacral junction.      Currently recovering from distal fibula fracture managed nonoperatively which is limiting her as well.     Late summer had left fibula fracture managed conservatively.     Has previously tried gabapentin, lyrica, amitriptyline but was discontinued due to side effects. Have previously restarted lyrica at lower dose as this was well tolerated. Does not seem to be helping now. Previously had right L5-S1 TFESI which was not helpful. Most recent injection was caudal injection performed in April of this year which gave two days of relief but was not helpful after that. She is quite debilitated and frustrated with symptoms at this time.     Past medical history notable for centrilobular emphyseam with chronic bronchitis, history of lung cancer, osteoporosis, NSTEMI with CABG x 4, gastrointestinal stromal tumor resected in 2003 and 2007    On exam there is tenderness to palpation of lumbosacral junction, right greater than left localizing to PSIS and also radiating laterally in band-like distribution. Back pain is exacerbate with facet loading.   Resisted strength testing notable for left sided ankle dorsiflexion and plantarflexion are 4/5.  Otherwise lower extremity testing  5/5  Sensation intact L3-S1 distributions bilaterally  She does have left sided positive straight leg raise  Reflexes are 1+ patella and achilles tendon bilaterally  No sustained clonus    Imaging: Reviewed her prior imaging to include CT CAP from 4/4/2024 which shows very severe spondylosis throughout the lumbar spine. There is loss of disc space at L4-5 and L5-S1 with facet arthropathy resulting in lateral recess stenosis at L4-5 and foraminal stenosis at L5-1 which is very likely source of her radicular pain.  In addition there are multiple levels of moderate to severe central stenosis contributing to neurogenic claudication.  Given the severity of stenosis I've copied the radiologist's interpretation from June 2023 lumbar MRI:     IMPRESSION:    1. Marked multilevel degenerative changes throughout the visualized  lumbar spine as well as the lower thoracic spine. Marked scoliotic  curvature of the thoracolumbar spine.  2. Postoperative changes with posterior fusion hardware and  decompression at L4-L5. No spinal canal narrowing at the postoperative  level; however, there appears to be moderate left neural foraminal  narrowing.   3. Moderate to severe spinal canal narrowing at T10-T11. Moderate  spinal canal narrowing at T11-T12 and L2-L3. Mild spinal canal  narrowing at T12-L1 and L3-L4.  4. Multiple levels of neural foraminal narrowing with particular  narrowings bilaterally at T10-T11 and T11-T12, on the right at T12-L1  and L1-L2, and on the left at L3-L4 and L5-S1.  5. Overall, degenerative findings have progressed since prior MR  11/12/2019.    Impression and Plan:  Very pleasant 78 year old female with severe lumbar spondylosis with multi-level central and foraminal stenosis throughout lumbar spine with facet arthropathy, neurogenic claudication, lumbar radiculopathy.  Lumbar epidural steroid injections no longer beneficial for her.  Right sided back pain has been exacerbated recently.  Had long discussion  regarding options for care. Of course, given her medical complexity surgery is not without substantial risks. Ideally we would attempt a less-invasive or minimally invasive approach to decompress the nerve roots contributing to her radicular pain +/- central stenosis contributing to neurogenic claudication.  Given primary symptoms of left L5/S1 radiculopathy I would advocate for laminectomy at L4 and L5 to relieve pressure on the left L5 and S1 nerve roots. Counseled Idalmis that there is greater tjam 50% liklihood she would still have back pain due to facet arthropathy and degenerative spondylosis. The goal of that surgery would be treating radicular pain.  While she considers this option, we agreed to proceed with lumbar medial branch blocks and RFA to relieve her low back pain.      Time spent on this clinical encounter including previsit chart review, history and physical examination, patient counseling and documentation was 40 minutes on the date of encounter.    Jonathan Mckeon MD  , Department of Orthopedic Surgery  [facility]      Again, thank you for allowing me to participate in the care of your patient.        Sincerely,        Jonathan Mckeon MD

## 2024-11-18 NOTE — NURSING NOTE
"Reason For Visit:   Chief Complaint   Patient presents with    RECHECK     Discuss surgical options and current medication. It's working but not as well as she'd like. Still hurts a lot       Primary MD: Ale Ordaz  Est    Ht 1.575 m (5' 2\")   Wt 70.3 kg (155 lb)   LMP 01/01/1992   BMI 28.35 kg/m           Oswestry (JO ANN) Questionnaire        11/18/2024    11:25 AM   OSWESTRY DISABILITY INDEX   Count 9    Sum 18    Oswestry Score (%) 40 %        Patient-reported            Neck Disability Index (NDI) Questionnaire         No data to display                            Promis 10 Assessment        10/22/2019     1:03 PM   PROMIS 10   In general, would you say your health is: Good   In general, would you say your quality of life is: Good   In general, how would you rate your physical health? Good   In general, how would you rate your mental health, including your mood and your ability to think? Good   In general, how would you rate your satisfaction with your social activities and relationships? Good   In general, please rate how well you carry out your usual social activities and roles Good   To what extent are you able to carry out your everyday physical activities such as walking, climbing stairs, carrying groceries, or moving a chair? Moderately   In the past 7 days, how often have you been bothered by emotional problems such as feeling anxious, depressed, or irritable? Rarely   In the past 7 days, how would you rate your fatigue on average? Mild   In the past 7 days, how would you rate your pain on average, where 0 means no pain, and 10 means worst imaginable pain? 5   In general, would you say your health is: 3    In general, would you say your quality of life is: 3    In general, how would you rate your physical health? 3    In general, how would you rate your mental health, including your mood and your ability to think? 3    In general, how would you rate your satisfaction with your social activities and " relationships? 3    In general, please rate how well you carry out your usual social activities and roles. (This includes activities at home, at work and in your community, and responsibilities as a parent, child, spouse, employee, friend, etc.) 3    To what extent are you able to carry out your everyday physical activities such as walking, climbing stairs, carrying groceries, or moving a chair? 3    In the past 7 days, how often have you been bothered by emotional problems such as feeling anxious, depressed, or irritable? 2    In the past 7 days, how would you rate your fatigue on average? 2    In the past 7 days, how would you rate your pain on average, where 0 means no pain, and 10 means worst imaginable pain? 5    Global Mental Health Score 13   Global Physical Health Score 13   PROMIS TOTAL - SUBSCORES 26       Patient-reported                Arina aMres ATC

## 2024-11-18 NOTE — PROGRESS NOTES
Chief concern: Follow-up low back pain with radiculopathy     History of present illness:  Zainab is a very pleasant 78-year-old female with idiopathic scoliosis and past history of L4-5 posterior spinal fusion and adjacent segment degeneration with chronic symptoms of neurogenic claudication, back pain and radiculopathy.  Leg pain radiates to left leg predominantly in L5 distribution but also has some pain in S1 distribution.  Back pain is localized to lower right sided back at lumbosacral junction.      Currently recovering from distal fibula fracture managed nonoperatively which is limiting her as well.     Late summer had left fibula fracture managed conservatively.     Has previously tried gabapentin, lyrica, amitriptyline but was discontinued due to side effects. Have previously restarted lyrica at lower dose as this was well tolerated. Does not seem to be helping now. Previously had right L5-S1 TFESI which was not helpful. Most recent injection was caudal injection performed in April of this year which gave two days of relief but was not helpful after that. She is quite debilitated and frustrated with symptoms at this time.     Past medical history notable for centrilobular emphyseam with chronic bronchitis, history of lung cancer, osteoporosis, NSTEMI with CABG x 4, gastrointestinal stromal tumor resected in 2003 and 2007    On exam there is tenderness to palpation of lumbosacral junction, right greater than left localizing to PSIS and also radiating laterally in band-like distribution. Back pain is exacerbate with facet loading.   Resisted strength testing notable for left sided ankle dorsiflexion and plantarflexion are 4/5.  Otherwise lower extremity testing 5/5  Sensation intact L3-S1 distributions bilaterally  She does have left sided positive straight leg raise  Reflexes are 1+ patella and achilles tendon bilaterally  No sustained clonus    Imaging: Reviewed her prior imaging to include CT CAP from  4/4/2024 which shows very severe spondylosis throughout the lumbar spine. There is loss of disc space at L4-5 and L5-S1 with facet arthropathy resulting in lateral recess stenosis at L4-5 and foraminal stenosis at L5-1 which is very likely source of her radicular pain.  In addition there are multiple levels of moderate to severe central stenosis contributing to neurogenic claudication.  Given the severity of stenosis I've copied the radiologist's interpretation from June 2023 lumbar MRI:     IMPRESSION:    1. Marked multilevel degenerative changes throughout the visualized  lumbar spine as well as the lower thoracic spine. Marked scoliotic  curvature of the thoracolumbar spine.  2. Postoperative changes with posterior fusion hardware and  decompression at L4-L5. No spinal canal narrowing at the postoperative  level; however, there appears to be moderate left neural foraminal  narrowing.   3. Moderate to severe spinal canal narrowing at T10-T11. Moderate  spinal canal narrowing at T11-T12 and L2-L3. Mild spinal canal  narrowing at T12-L1 and L3-L4.  4. Multiple levels of neural foraminal narrowing with particular  narrowings bilaterally at T10-T11 and T11-T12, on the right at T12-L1  and L1-L2, and on the left at L3-L4 and L5-S1.  5. Overall, degenerative findings have progressed since prior MR  11/12/2019.    Impression and Plan:  Very pleasant 78 year old female with severe lumbar spondylosis with multi-level central and foraminal stenosis throughout lumbar spine with facet arthropathy, neurogenic claudication, lumbar radiculopathy.  Lumbar epidural steroid injections no longer beneficial for her.  Right sided back pain has been exacerbated recently.  Had long discussion regarding options for care. Of course, given her medical complexity surgery is not without substantial risks. Ideally we would attempt a less-invasive or minimally invasive approach to decompress the nerve roots contributing to her radicular pain  +/- central stenosis contributing to neurogenic claudication.  Given primary symptoms of left L5/S1 radiculopathy I would advocate for laminectomy at L4 and L5 to relieve pressure on the left L5 and S1 nerve roots. Counseled Idalmis that there is greater tjam 50% liklihood she would still have back pain due to facet arthropathy and degenerative spondylosis. The goal of that surgery would be treating radicular pain.  While she considers this option, we agreed to proceed with lumbar medial branch blocks and RFA to relieve her low back pain.      Time spent on this clinical encounter including previsit chart review, history and physical examination, patient counseling and documentation was 40 minutes on the date of encounter.    Jonathan Mckeon MD  , Department of Orthopedic Surgery  [facility]

## 2024-11-19 ENCOUNTER — TELEPHONE (OUTPATIENT)
Dept: PALLIATIVE MEDICINE | Facility: CLINIC | Age: 79
End: 2024-11-19
Payer: COMMERCIAL

## 2024-11-19 DIAGNOSIS — M47.817 SPONDYLOSIS OF LUMBOSACRAL REGION WITHOUT MYELOPATHY OR RADICULOPATHY: Primary | ICD-10-CM

## 2024-11-19 NOTE — TELEPHONE ENCOUNTER
Screening questions for MBB Injections:    Injection to be done at which interventional clinic site? [facility]    Procedure ordered by Dr. Mckeon    Procedure ordered? Lumbar Medial Branch Block    What insurance would patient like us to bill for this procedure? [place]    MEDICA: REQUIRES A PA FOR BOTH MBB   Worker's comp- Any injection DO NOT SCHEDULE and route to Damir Chaudhari.    [employer] insurance - ANY INJECTION, DO NOT SCHEDULE and route to Jeannie Cole.     MBBs must be scheduled with elapsed time interval of at least 2 weeks and not more than 6 months between the First MBB and the Second MBB for insurance purposes     [employer] - Any injection besides hip/shoulder/knee joint DO NOT SCHEDULE and route to Jeannie Cole. She will obtain PA and call pt back to schedule procedure or notify pt of denial.     [employer]- PA required for all MBB's    **BCBS- ALL need to be routed to Jeannie for review if a PA is needed**  IF SCHEDULING IN [facility] PLEASE SCHEDULE AT LEAST 7-10         BUSINESS DAYS OUT SO A PA CAN BE OBTAINED    Genicular Nerve blocks- ALL insurances except for- [employer], Medicare (straight not supplement) and [employer]. Need to be reviewed by Jeannie before scheduling.       Is patient scheduled at [facility]? no   If YES, route every encounter to [facility] CARE NAVIGATION POOL [[phone]]    Any chance of pregnancy? NO   If YES, do NOT schedule and route to RN pool  - Dr. aLmbert route to Amy Flores and PM&R Nurse  [[id number]]      Is an  needed? No     Patient has a drive home? (mandatory) Yes     Is patient taking any blood thinners (plavix, coumadin, jantoven, warfarin, heparin, pradaxa or dabigatran )? No    If hold needed, do NOT schedule, route to RN pool/ Dr. Lambert's Team     Does the patient have a bleeding or clotting disorder? No  If YES, okay to schedule AND route to RN nurse pool/ Dr. Lambert's  Team  **For any patients with platelet count <100, must be forwarded to provider**    Is patient diabetic? no If YES, have them bring their glucometer.    Does patient have an active infection or treated for one within the past week? No    Is patient currently taking any antibiotics?  No  For patients on chronic, preventative, or prophylactic antibiotics, procedures may be scheduled.   For patients on antibiotics for active or recent infection:antibiotic course must have been completed for 4 days    Is patient currently taking any steroid medications? (i.e. Prednisone, Medrol)  No   For patients on steroid medications, course must have been completed for 4 days    Is patient actively being treated for cancer or immunocompromised? No   If YES, do NOT schedule and route to RN/ Dr. Lambert's Team    Are you able to get on and off an exam table with minimal or no assistance? Yes   If NO, do NOT schedule and route to RN/ Dr. Lambert's Team    Are you able to roll over and lay on your stomach with minimal or no assistance? Yes   If NO, do NOT schedule and route to RN/ Dr. Lambert's Team    Any allergies to contrast dye, iodine, shellfish, or numbing and steroid medications? No  (If so, inform nursing and note in scheduling comments.)    Allergies: Atorvastatin     Does patient have an MRI/CT?  Not Applicable  Check Procedure Scheduling Grid to see if required.    Was the MRI done within the last 3 years?  NA  If yes, where was the MRI done i.e.[facility], [facility], [facility], [facility] etc?     If no, do not schedule and route to nursing/ Dr. Sotelos Team  If MRI was not done at [facility], [facility] or [facility] do NOT schedule and route to nursing.    If pt has an imaging disc, the injection MAY be scheduled but pt has to bring disc to appt.   If they show up without the disc the injection cannot be done    Is patient able to transfer to a procedure table with minimal or no assistance? Yes  If NO, do NOT schedule  and route to RN/ Dr. Lambert's Team    Medial Branch Block Pre-Procedure Instructions  It is okay to take long acting pain medications (if you are on them) the day of the procedure but try not to take any short acting medications unless absolutely necessary.    YES: ok   Long acting meds would include: Gabapentin (Neurontin), MS Contin, Oxycontin        Short acting meds would include:  Percocet, Oxycodone, Vicodin, Ibuprofen   The day of the procedure, you should try to do things that provoke your pain, since the injection is being done to see if it will relieve your pain . YES: ok   If your pain level is a 4 out of 10 or less on the day of the procedu re, please call [phone] to reschedule.  YES: ok     Reminders:    If you are started on any steroids or antibiotics between now and your appointment, you must contact us because it may affect our ability to perform your procedure ok    Instructed pt to arrive 30 minutes early for IV start if required. (Check Procedure Scheduling Grid) MURPHY     If this is for a cervical MBB aspirin needs to be held for 6 days.  NO     Do not schedule procedures requiring IV placement in the first appointment of the day or first appointment after lunch. Do NOT schedule at 0745, 0815 or 1245.  ok    For patients 85 or older we recommend having an adult stay w/ them for the remainder of the day.      Does the patient have any questions? no

## 2024-11-20 NOTE — TELEPHONE ENCOUNTER
"Per referring provider note: \" Leg pain radiates to left leg. She reports leg pain is greater than back pain. Back pain is localized to lower right sided back at lumbosacral junction.  Late summer had left fibula fracture managed conservatively.  \"    Routing to provider to review.     Taryn Ontiveros RN     "

## 2024-11-20 NOTE — TELEPHONE ENCOUNTER
PA pending additional information - please specify exact level, laterality           Jeannie RUBI  Complex   [facility]

## 2024-11-21 NOTE — TELEPHONE ENCOUNTER
PA pending additional information - office note to be completed and signed       Jeannie Gay   [facility]

## 2024-12-03 NOTE — TELEPHONE ENCOUNTER
PA and clinicals submitted via web portal [facility]      Pending  Tracking #[id number]        Jeannie RUBI    [facility]

## 2024-12-11 NOTE — TELEPHONE ENCOUNTER
Cohere request for additional information:       needing moderate to severe chronic neck or low back pain, predominantly axial, that causes functional deficit measured on pain or disability scale and Pain present for minimum of 3 months with documented failure to respond to noninvasive conservative management      Routing to review and advise.      Jeannie Gya   [facility]

## 2024-12-26 ENCOUNTER — PATIENT OUTREACH (OUTPATIENT)
Dept: CARE COORDINATION | Facility: CLINIC | Age: 79
End: 2024-12-26
Payer: COMMERCIAL

## 2024-12-31 ENCOUNTER — DOCUMENTATION ONLY (OUTPATIENT)
Dept: FAMILY MEDICINE | Facility: CLINIC | Age: 79
End: 2024-12-31
Payer: COMMERCIAL

## 2024-12-31 DIAGNOSIS — J43.2 CENTRILOBULAR EMPHYSEMA (H): Primary | ICD-10-CM

## 2025-01-06 DIAGNOSIS — N95.2 ATROPHIC VAGINITIS: ICD-10-CM

## 2025-01-06 DIAGNOSIS — I10 ESSENTIAL HYPERTENSION WITH GOAL BLOOD PRESSURE LESS THAN 140/90: ICD-10-CM

## 2025-01-06 RX ORDER — ESTRADIOL 0.5 MG/1
TABLET ORAL
Qty: 24 TABLET | Refills: 1 | Status: SHIPPED | OUTPATIENT
Start: 2025-01-06

## 2025-01-06 RX ORDER — AMLODIPINE BESYLATE 5 MG/1
5 TABLET ORAL DAILY
Qty: 90 TABLET | Refills: 1 | Status: SHIPPED | OUTPATIENT
Start: 2025-01-06

## 2025-01-09 ENCOUNTER — PATIENT OUTREACH (OUTPATIENT)
Dept: CARE COORDINATION | Facility: CLINIC | Age: 80
End: 2025-01-09
Payer: COMMERCIAL

## 2025-01-14 ENCOUNTER — RADIOLOGY INJECTION OFFICE VISIT (OUTPATIENT)
Dept: PALLIATIVE MEDICINE | Facility: CLINIC | Age: 80
End: 2025-01-14
Payer: COMMERCIAL

## 2025-01-14 VITALS — OXYGEN SATURATION: 92 % | HEART RATE: 74 BPM | DIASTOLIC BLOOD PRESSURE: 71 MMHG | SYSTOLIC BLOOD PRESSURE: 152 MMHG

## 2025-01-14 DIAGNOSIS — M47.817 SPONDYLOSIS OF LUMBOSACRAL REGION WITHOUT MYELOPATHY OR RADICULOPATHY: ICD-10-CM

## 2025-01-14 PROCEDURE — 64494 INJ PARAVERT F JNT L/S 2 LEV: CPT | Mod: 50 | Performed by: PAIN MEDICINE

## 2025-01-14 PROCEDURE — 64493 INJ PARAVERT F JNT L/S 1 LEV: CPT | Mod: 50 | Performed by: PAIN MEDICINE

## 2025-01-14 ASSESSMENT — PAIN SCALES - GENERAL
PAINLEVEL_OUTOF10: MODERATE PAIN (5)
PAINLEVEL_OUTOF10: MILD PAIN (2)

## 2025-01-14 NOTE — NURSING NOTE
Pre-procedure Intake  If YES to any questions or NO to having a   Please complete laminated checklist and leave on the computer keyboard for Provider, verbally inform provider if able.    For SCS Trial, RFA's or any sedation procedure:  Have you been fasting? NA  If yes, for how long?     Are you taking any any blood thinners such as Coumadin, Warfarin, Jantoven, Pradaxa Xarelto, Eliquis, Edoxaban, Enoxaparin, Lovenox, Heparin, Arixtra, Fondaparinux, or Fragmin? OR Antiplatelet medication such as Plavix, Brilinta, or Effient?   No   If yes, when did you take your last dose?     Do you take aspirin?  Yes -   ASA  If cervical procedure, have you held aspirin for 6 days?   NA    Is the Pt taking any GLP-1 Antagonist (hold needed for sedation patients only)  (semaglutide (Ozempic, Wegovy), dulaglutide (Trulicity), exenatide ER (Bydureon), tirzepatide (Mounjaro), Liraglutide (Saxenda, Victoza), semaglutide (Rybelsus)     NA  If yes, when did you take your last dose?     Do you have any allergies to contrast dye, iodine, steroid and/or numbing medications?  NO    Are you currently taking antibiotics or have an active infection?  NO    Have you had a fever/elevated temperature within the past week? NO    Are you currently taking oral steroids? NO    Do you have a ? Yes    Are you pregnant or breastfeeding?  NO    Have you received any vaccinations in the last week? NO    Notify provider and RNs if systolic BP >170, diastolic BP >100, P >100 or O2 sats < 90%     Amy Rios MA  [facility]

## 2025-01-14 NOTE — PATIENT INSTRUCTIONS
[facility]   Medial Branch Block Discharge Instructions      Your procedure was performed by: Dr. Leonid Mckeon       Medications used include:  Lidocaine  Bupivicaine  Omnipaque  Omniscan  Ropivicaine Normal saline     You will need to complete the Pain Scale Log form and return it to us as soon as possible.  Once we have received the form, we will review it and call you to determine the next steps.     The form can be faxed to [phone] or mailed to:   [facility]   [address]   [place] [address]    You may resume your regular medications  You may resume your regular activities  Be cautious with walking as numbness and/or weakness in the lower extremities may occur for up to 6-8 hours due to the effects of the anesthetic.  Avoid driving for 6 hours. The local anesthetic could slow your reflexes.   You may shower, however no swimming or tub baths or hot tubs for 24 hours following your procedure.  Your pain will return after the numbing medications have worn off.  You may use your current pain medications as needed.  Unless you have been directed to avoid the use of anti-inflammatory medications (NSAIDS), you may use medications such as ibuprofen, Aleve or Tylenol for pain control if needed.  Some people find it helpful to alternate ibuprofen and Tylenol every 3 hours for a couple of days.  You may use ice packs 10-15 minutes three to four times a day at the injection site for comfort.   Do not use heat to painful areas for 6 to 8 hours. This will give the local anesthetic time to wear off and prevent you from accidentally burning your skin.   If you experience any of the following, call the Pain Clinic during work hours (Monday through Friday 8 am-4:30 pm) at [phone] or the Provider Line after hours at [phone]:  -Fever over 100 degree F  -Swelling, bleeding, redness, drainage, warmth at the injection site  -Progressive weakness or  numbness in your legs  -Loss of bowel or bladder function  -Unusual new onset of pain that is not improving

## 2025-01-14 NOTE — NURSING NOTE
Discharge Information    IV Discontiued Time:  NA    Amount of Fluid Infused:  NA    Discharge Criteria = When patient returns to baseline or as per MD order    Consciousness:  Pt is fully awake    Circulation:  BP +/- 20% of pre-procedure level    Respiration:  Patient is able to breathe deeply    O2 Sat:  Patient is able to maintain O2 Sat >92% on room air    Activity:  Moves 4 extremities on command    Ambulation:  Patient is able to stand and walk or stand and pivot into wheelchair    Dressing:  Clean/dry or No Dressing    Notes:   Discharge instructions and AVS given to patient    Patient meets criteria for discharge?  YES    Admitted to PCU?  No    Responsible adult present to accompany patient home?  Yes    Signature/Title:    Taryn Ontiveros RN  RN Care Coordinator  [facility]

## 2025-01-14 NOTE — PROGRESS NOTES
Pre procedure Diagnosis:lumbosacral spondylosis   Post procedure Diagnosis: Same  Procedure performed: Bilateral L2,4,5 medial branch block  Indication:  Diagnostic   Anesthesia: none  Complications: none  Operators: Leonid Mckeon MD   Indications:   Idalmis Abdul is a 79 year old female. The patient has a history of axial lbp.  Exam shows +++ and pain with extension/rotation, and they have tried conservative treatment including PHYSICAL THERAPY  and meds.    Options/alternatives, benefits and risks were discussed with the patient including but not limited to bleeding, infection, tissue trauma, exposure to radiation, reaction to medications, spinal cord injury, weakness, numbness and paralysis.  Questions were answered to her satisfaction and she agrees to proceed. Voluntary informed consent was obtained and signed.     Vitals were reviewed: Yes  Allergies were reviewed:  Yes   Medications were reviewed:  Yes   Pre-procedure pain score: 5/10    Procedure:  After obtaining signed informed consent, the patient was brought into the procedure suite and was placed in a prone position on the procedure table.   A Pause for the Cause was performed.  The patient was prepped and draped in the usual sterile fashion.     Under AP fluoroscopic guidance the L2, L4, L5 vertebral bodies were identified. The C-arm was rotated to the oblique view to afford optimal visualization the pedicles.  Lidocaine 1% was used to anesthetize the skin at each level.  Under intermittent fluoroscopy, 25G 3.5inch spinal needles were positioned inferior and lateral to the intersection of the transverse process and pedicle at the Bilateral L2 L5 and SI . The needle positions were verified and optimized from the AP view.    The anatomic targets for the L2 & L4 medial nerve and L5 dorsal ramus (which functionally incorporates the medial branch) were the  L3 & L5 transverse processes and sacral alar notch, with laterality as described above,  resulting in blockade of the L-3/4 and L5/S1 facet joints.    Bupivacaine 0.25% 1 ml was injected at each location.  The needles were removed.      Hemostasis was achieved, the area was cleaned, and bandaids were placed when appropriate.  The patient tolerated the procedure well, and was taken to the recovery room.    Images were saved to PACS.     The patient will continue to monitor progress, and they were given a pain diary to complete at home.  They will either fax or mail this back to us or bring it to their next appointment. We will determine the treatment plan after we review the diary.      Post-procedure pain score: 2/10  Follow-up includes:   -will await diary for further planning.    Leonid Mckeon MD  [facility]

## 2025-02-06 ENCOUNTER — OFFICE VISIT (OUTPATIENT)
Dept: FAMILY MEDICINE | Facility: CLINIC | Age: 80
End: 2025-02-06
Attending: FAMILY MEDICINE
Payer: COMMERCIAL

## 2025-02-06 VITALS
TEMPERATURE: 97.6 F | DIASTOLIC BLOOD PRESSURE: 70 MMHG | HEIGHT: 62 IN | HEART RATE: 79 BPM | BODY MASS INDEX: 28.34 KG/M2 | SYSTOLIC BLOOD PRESSURE: 130 MMHG | WEIGHT: 154 LBS | OXYGEN SATURATION: 93 %

## 2025-02-06 DIAGNOSIS — E78.5 HYPERLIPIDEMIA LDL GOAL <160: ICD-10-CM

## 2025-02-06 DIAGNOSIS — M81.0 AGE RELATED OSTEOPOROSIS, UNSPECIFIED PATHOLOGICAL FRACTURE PRESENCE: ICD-10-CM

## 2025-02-06 DIAGNOSIS — R01.1 UNDIAGNOSED CARDIAC MURMURS: ICD-10-CM

## 2025-02-06 DIAGNOSIS — C49.A0 MALIGNANT GASTROINTESTINAL STROMAL TUMOR, UNSPECIFIED SITE (H): ICD-10-CM

## 2025-02-06 DIAGNOSIS — K21.9 LPRD (LARYNGOPHARYNGEAL REFLUX DISEASE): ICD-10-CM

## 2025-02-06 DIAGNOSIS — I10 ESSENTIAL HYPERTENSION WITH GOAL BLOOD PRESSURE LESS THAN 140/90: ICD-10-CM

## 2025-02-06 DIAGNOSIS — J43.2 CENTRILOBULAR EMPHYSEMA (H): ICD-10-CM

## 2025-02-06 DIAGNOSIS — Z00.00 ENCOUNTER FOR MEDICARE ANNUAL WELLNESS EXAM: Primary | ICD-10-CM

## 2025-02-06 DIAGNOSIS — R73.01 ELEVATED FASTING GLUCOSE: ICD-10-CM

## 2025-02-06 LAB
ALBUMIN SERPL BCG-MCNC: 4 G/DL (ref 3.5–5.2)
ALP SERPL-CCNC: 70 U/L (ref 40–150)
ALT SERPL W P-5'-P-CCNC: 20 U/L (ref 0–50)
ANION GAP SERPL CALCULATED.3IONS-SCNC: 11 MMOL/L (ref 7–15)
AST SERPL W P-5'-P-CCNC: 34 U/L (ref 0–45)
BILIRUB SERPL-MCNC: 0.3 MG/DL
BUN SERPL-MCNC: 17 MG/DL (ref 8–23)
CALCIUM SERPL-MCNC: 9 MG/DL (ref 8.8–10.4)
CHLORIDE SERPL-SCNC: 100 MMOL/L (ref 98–107)
CHOLEST SERPL-MCNC: 129 MG/DL
CREAT SERPL-MCNC: 1.1 MG/DL (ref 0.51–0.95)
EGFRCR SERPLBLD CKD-EPI 2021: 51 ML/MIN/1.73M2
EST. AVERAGE GLUCOSE BLD GHB EST-MCNC: 108 MG/DL
FASTING STATUS PATIENT QL REPORTED: NO
FASTING STATUS PATIENT QL REPORTED: NO
GLUCOSE SERPL-MCNC: 112 MG/DL (ref 70–99)
HBA1C MFR BLD: 5.4 % (ref 0–5.6)
HCO3 SERPL-SCNC: 23 MMOL/L (ref 22–29)
HDLC SERPL-MCNC: 47 MG/DL
LDLC SERPL CALC-MCNC: 50 MG/DL
NONHDLC SERPL-MCNC: 82 MG/DL
POTASSIUM SERPL-SCNC: 4.4 MMOL/L (ref 3.4–5.3)
PROT SERPL-MCNC: 6.6 G/DL (ref 6.4–8.3)
SODIUM SERPL-SCNC: 134 MMOL/L (ref 135–145)
TRIGL SERPL-MCNC: 159 MG/DL

## 2025-02-06 RX ORDER — OMEPRAZOLE 20 MG/1
20 CAPSULE, DELAYED RELEASE ORAL DAILY
Qty: 90 CAPSULE | Refills: 3 | Status: SHIPPED | OUTPATIENT
Start: 2025-02-06

## 2025-02-06 RX ORDER — METOPROLOL TARTRATE 100 MG/1
100 TABLET ORAL 2 TIMES DAILY
Qty: 180 TABLET | Refills: 3 | Status: SHIPPED | OUTPATIENT
Start: 2025-02-06

## 2025-02-06 RX ORDER — ROSUVASTATIN CALCIUM 5 MG/1
5 TABLET, COATED ORAL EVERY OTHER DAY
Qty: 45 TABLET | Refills: 3 | Status: SHIPPED | OUTPATIENT
Start: 2025-02-06

## 2025-02-06 RX ORDER — IBANDRONATE SODIUM 150 MG/1
150 TABLET, FILM COATED ORAL
Qty: 3 TABLET | Refills: 3 | Status: SHIPPED | OUTPATIENT
Start: 2025-02-06

## 2025-02-06 RX ORDER — UMECLIDINIUM 62.5 UG/1
1 AEROSOL, POWDER ORAL DAILY
Qty: 30 EACH | Refills: 11 | Status: SHIPPED | OUTPATIENT
Start: 2025-02-06

## 2025-02-06 RX ORDER — FLUTICASONE PROPIONATE AND SALMETEROL 100; 50 UG/1; UG/1
1 POWDER RESPIRATORY (INHALATION) EVERY 12 HOURS
Qty: 60 EACH | Refills: 11 | Status: SHIPPED | OUTPATIENT
Start: 2025-02-06

## 2025-02-06 SDOH — HEALTH STABILITY: PHYSICAL HEALTH: ON AVERAGE, HOW MANY DAYS PER WEEK DO YOU ENGAGE IN MODERATE TO STRENUOUS EXERCISE (LIKE A BRISK WALK)?: 0 DAYS

## 2025-02-06 ASSESSMENT — PAIN SCALES - GENERAL: PAINLEVEL_OUTOF10: MODERATE PAIN (5)

## 2025-02-06 ASSESSMENT — SOCIAL DETERMINANTS OF HEALTH (SDOH): HOW OFTEN DO YOU GET TOGETHER WITH FRIENDS OR RELATIVES?: TWICE A WEEK

## 2025-02-06 NOTE — PATIENT INSTRUCTIONS
Patient Education   Preventive Care Advice   This is general advice given by our system to help you stay healthy. However, your care team may have specific advice just for you. Please talk to your care team about your preventive care needs.  Nutrition  Eat 5 or more servings of fruits and vegetables each day.  Try wheat bread, brown rice and whole grain pasta (instead of white bread, rice, and pasta).  Get enough calcium and vitamin D. Check the label on foods and aim for 100% of the RDA (recommended daily allowance).  Lifestyle  Exercise at least 150 minutes each week  (30 minutes a day, 5 days a week).  Do muscle strengthening activities 2 days a week. These help control your weight and prevent disease.  No smoking.  Wear sunscreen to prevent skin cancer.  Have a dental exam and cleaning every 6 months.  Yearly exams  See your health care team every year to talk about:  Any changes in your health.  Any medicines your care team has prescribed.  Preventive care, family planning, and ways to prevent chronic diseases.  Shots (vaccines)   HPV shots (up to age 26), if you've never had them before.  Hepatitis B shots (up to age 59), if you've never had them before.  COVID-19 shot: Get this shot when it's due.  Flu shot: Get a flu shot every year.  Tetanus shot: Get a tetanus shot every 10 years.  Pneumococcal, hepatitis A, and RSV shots: Ask your care team if you need these based on your risk.  Shingles shot (for age 50 and up)  General health tests  Diabetes screening:  Starting at age 35, Get screened for diabetes at least every 3 years.  If you are younger than age 35, ask your care team if you should be screened for diabetes.  Cholesterol test: At age 39, start having a cholesterol test every 5 years, or more often if advised.  Bone density scan (DEXA): At age 50, ask your care team if you should have this scan for osteoporosis (brittle bones).  Hepatitis C: Get tested at least once in your life.  STIs (sexually  transmitted infections)  Before age 24: Ask your care team if you should be screened for STIs.  After age 24: Get screened for STIs if you're at risk. You are at risk for STIs (including HIV) if:  You are sexually active with more than one person.  You don't use condoms every time.  You or a partner was diagnosed with a sexually transmitted infection.  If you are at risk for HIV, ask about PrEP medicine to prevent HIV.  Get tested for HIV at least once in your life, whether you are at risk for HIV or not.  Cancer screening tests  Cervical cancer screening: If you have a cervix, begin getting regular cervical cancer screening tests starting at age 21.  Breast cancer scan (mammogram): If you've ever had breasts, begin having regular mammograms starting at age 40. This is a scan to check for breast cancer.  Colon cancer screening: It is important to start screening for colon cancer at age 45.  Have a colonoscopy test every 10 years (or more often if you're at risk) Or, ask your provider about stool tests like a FIT test every year or Cologuard test every 3 years.  To learn more about your testing options, visit:   .  For help making a decision, visit:   https://bit.ly/sg43610.  Prostate cancer screening test: If you have a prostate, ask your care team if a prostate cancer screening test (PSA) at age 55 is right for you.  Lung cancer screening: If you are a current or former smoker ages 50 to 80, ask your care team if ongoing lung cancer screenings are right for you.  For informational purposes only. Not to replace the advice of your health care provider. Copyright   2023 [employer]. All rights reserved. Clinically reviewed by the [facility] Transitions Program. [employer] [id number] - REV 01/24.

## 2025-02-06 NOTE — PROGRESS NOTES
Preventive Care Visit  [facility]  Ale Ordaz MD, Family Medicine  Feb 6, 2025      Assessment & Plan     Encounter for Medicare annual wellness exam     - Comprehensive metabolic panel (BMP + Alb, Alk Phos, ALT, AST, Total. Bili, TP); Future    Hyperlipidemia LDL goal <160  Continue statin  - Lipid panel reflex to direct LDL Non-fasting; Future  - Comprehensive metabolic panel (BMP + Alb, Alk Phos, ALT, AST, Total. Bili, TP); Future  - rosuvastatin (CRESTOR) 5 MG tablet; Take 1 tablet (5 mg) by mouth every other day.    Centrilobular emphysema (H)  Has home oxygen, uses with activity  Continue advair and incruse.   Patient needs an order for Inogen G4 portable oxygen- family is going to buy it for her but still needs an order/prescription   - umeclidinium (INCRUSE ELLIPTA) 62.5 MCG/ACT inhaler; Inhale 1 puff into the lungs daily.  - fluticasone-salmeterol (ADVAIR) 100-50 MCG/ACT inhaler; Inhale 1 puff into the lungs every 12 hours.  - Miscellaneous DME Supply Order (Use only if a more specific DME order does not already exist)    Malignant gastrointestinal stromal tumor, unspecified site (H)  Follows with Dr. Schmitz  On Gleevec regularly    Elevated fasting glucose     - Comprehensive metabolic panel (BMP + Alb, Alk Phos, ALT, AST, Total. Bili, TP); Future  - Hemoglobin A1c; Future    Essential hypertension with goal blood pressure less than 140/90  Well managed, continue amlodipine also  - metoprolol tartrate (LOPRESSOR) 100 MG tablet; Take 1 tablet (100 mg) by mouth 2 times daily.    LPRD (laryngopharyngeal reflux disease)   stable  - omeprazole (PRILOSEC) 20 MG DR capsule; Take 1 capsule (20 mg) by mouth daily.    Age related osteoporosis, unspecified pathological fracture presence  Continue Boniva  - IBANdronate (BONIVA) 150 MG tablet; Take 1 tablet (150 mg) by mouth every 30 days.    Undiagnosed cardiac murmurs  New murmur, suspect AoStenosis  - Echocardiogram Complete;  "Future    Patient has been advised of split billing requirements and indicates understanding: Yes    The longitudinal plan of care for the diagnosis(es)/condition(s) as documented were addressed during this visit. Due to the added complexity in care, I will continue to support Idalmis in the subsequent management and with ongoing continuity of care.    BMI  Estimated body mass index is 28.17 kg/m  as calculated from the following:    Height as of this encounter: 1.575 m (5' 2\").    Weight as of this encounter: 69.9 kg (154 lb).       Counseling  Appropriate preventive services were addressed with this patient via screening, questionnaire, or discussion as appropriate for fall prevention, nutrition, physical activity, Tobacco-use cessation, social engagement, weight loss and cognition.  Checklist reviewing preventive services available has been given to the patient.  Reviewed patient's diet, addressing concerns and/or questions.   Updated plan of care.  Patient reported difficulty with activities of daily living were addressed today.Information on urinary incontinence and treatment options given to patient.           Subjective   Idalmis is a 79 year old, presenting for the following:  Medicare Visit        2/6/2025     2:44 PM   Additional Questions   Roomed by fermin ferrer CMA   Accompanied by Son           HPI    - She is requesting letter ok'ing her for home oxygen tank and also for her to get Inogen oxygen tank    Hyperlipidemia Follow-Up  Rosuvastatin 5mg qod  Are you regularly taking any medication or supplement to lower your cholesterol?   Yes- statin  Are you having muscle aches or other side effects that you think could be caused by your cholesterol lowering medication?  No    Hypertension Follow-up  Amlodipine 5mg every day, metoprolol 100mg bid  Do you check your blood pressure regularly outside of the clinic? No   Are you following a low salt diet? No  Are your blood pressures ever more than 140 on the top " number (systolic) OR more   than 90 on the bottom number (diastolic), for example 140/90? N/A    BP Readings from Last 6 Encounters:   02/06/25 130/70   01/14/25 (!) 152/71   09/18/24 129/73   08/21/24 (!) 141/73   08/15/24 132/62   08/12/24 (!) 148/85       Chronic/Recurring Back Pain Follow Up    Where is your back pain located? (Select all that apply) Lumbar spine  How would you describe your back pain?  dull ache  Where does your back pain spread? Down front of left thigh  Since your last clinic visit for back pain, how has your pain changed? unchanged  Does your back pain interfere with your job? Not applicable    Health Care Directive  Patient does not have a Health Care Directive: Discussed advance care planning with patient; however, patient declined at this time.      2/6/2025   General Health   How would you rate your overall physical health? Good   Feel stress (tense, anxious, or unable to sleep) Not at all         2/6/2025   Nutrition   Diet: Regular (no restrictions)         2/6/2025   Exercise   Days per week of moderate/strenous exercise 0 days         2/6/2025   Social Factors   Frequency of gathering with friends or relatives Twice a week   Worry food won't last until get money to buy more No   Food not last or not have enough money for food? No   Do you have housing? (Housing is defined as stable permanent housing and does not include staying ouside in a car, in a tent, in an abandoned building, in an overnight shelter, or couch-surfing.) Yes   Are you worried about losing your housing? No   Lack of transportation? No   Unable to get utilities (heat,electricity)? No         9/7/2022   Activities of Daily Living- Home Safety   Needs help with the following daily activites Laundry   Safety concerns in the home None of the above         2/6/2025   Dental   Dentist two times every year? Yes         9/7/2022   Hearing Screening   Hearing concerns? Difficulty following a conversation in a noisy  restaurant or crowded room    Need to ask people to speak up or repeat themselves       Multiple values from one day are sorted in reverse-chronological order            No data to display                       Today's PHQ-2 Score:       2025     2:54 PM   PHQ-2 (  [employer])   Q1: Little interest or pleasure in doing things 0   Q2: Feeling down, depressed or hopeless 0   PHQ-2 Score 0    Q1: Little interest or pleasure in doing things Not at all   Q2: Feeling down, depressed or hopeless Not at all   PHQ-2 Score 0       Patient-reported         2025   Substance Use   Alcohol more than 3/day or more than 7/wk No   Do you have a current opioid prescription? No   How severe/bad is pain from 1 to 10? 5/10   Do you use any other substances recreationally? (!) INHALANTS    (!) OTHER       Multiple values from one day are sorted in reverse-chronological order     Social History     Tobacco Use    Smoking status: Former     Current packs/day: 0.00     Average packs/day: 0.5 packs/day for 49.0 years (24.5 ttl pk-yrs)     Types: Cigarettes     Start date: 1961     Quit date: 2010     Years since quittin.8     Passive exposure: Never    Smokeless tobacco: Never   Vaping Use    Vaping status: Never Used   Substance Use Topics    Alcohol use: No    Drug use: No          Mammogram Screening - After age 74- determine frequency with patient based on health status, life expectancy and patient goals              Reviewed and updated as needed this visit by Provider                      Current providers sharing in care for this patient include:  Patient Care Team:  Ale Ordaz MD as PCP - General (Family Practice)  Blayne Schmitz MD as MD (Oncology)  Ara Guzman APRN CNP as Nurse Practitioner (Nurse Practitioner)  Swetha Antoine MD as MD (INTERNAL MEDICINE - ENDOCRINOLOGY, DIABETES & METABOLISM)  Darlene Sultana MD as MD (Family Practice)  Magnus Mckeon MD as MD  "(Ophthalmology)  Erma Ordaz MD as MD (Ophthalmology)  Yenifer Driver, RN as Nurse Coordinator (Oncology)  Murphy, Thu Fernández as Student  Jessy Osman [facility] as Pharmacist (Pharmacist)  Ale Ordaz MD as Assigned PCP  Blayne Schmitz MD as Assigned Cancer Care Provider  Jonathan Mckeon MD as Assigned Musculoskeletal Provider  Jessy Osman [facility] as Assigned MTM Pharmacist  Garrett James [facility] as MTM Pharmacist  Garrett James [facility] as Pharmacist (Pharmacist)  Vasile Brasher MD as Assigned Surgical Provider    The following health maintenance items are reviewed in Epic and correct as of today:  Health Maintenance   Topic Date Due    ZOSTER IMMUNIZATION (1 of 2) Never done    RSV VACCINE (1 - 1-dose 75+ series) Never done    COVID-19 Vaccine (3 - Moderna risk series) 01/19/2022    DTAP/TDAP/TD IMMUNIZATION (2 - Td or Tdap) 09/13/2023    INFLUENZA VACCINE (1) 09/01/2024    LIPID  01/24/2025    ANNUAL REVIEW OF HM ORDERS  01/24/2025    BMP  07/24/2025    MEDICARE ANNUAL WELLNESS VISIT  02/06/2026    FALL RISK ASSESSMENT  02/06/2026    DEXA  02/28/2026    GLUCOSE  07/24/2027    ADVANCE CARE PLANNING  07/18/2029    SPIROMETRY  Completed    COPD ACTION PLAN  Completed    PHQ-2 (once per calendar year)  Completed    Pneumococcal Vaccine: 50+ Years  Completed    HPV IMMUNIZATION  Aged Out    MENINGITIS IMMUNIZATION  Aged Out    URINE DRUG SCREEN  Discontinued    MAMMO SCREENING  Discontinued    COLORECTAL CANCER SCREENING  Discontinued    LUNG CANCER SCREENING  Discontinued         Review of Systems  Constitutional, HEENT, cardiovascular, pulmonary, gi and gu systems are negative, except as otherwise noted.     Objective    Exam  /70   Pulse 79   Temp 97.6  F (36.4  C) (Tympanic)   Ht 1.575 m (5' 2\")   Wt 69.9 kg (154 lb)   LMP 01/01/1992   SpO2 93%   BMI 28.17 kg/m     Estimated body mass index is 28.17 kg/m  as calculated from the following:    Height as of this " "encounter: 1.575 m (5' 2\").    Weight as of this encounter: 69.9 kg (154 lb).    Physical Exam  GENERAL: alert and no distress  NECK: no adenopathy, no asymmetry, masses, or scars  RESP: lungs clear to auscultation - no rales, rhonchi or wheezes  CV: regular rates and rhythm, normal S1 S2, no S3 or S4, grade 3/6 systolic murmur heard best over the lusb, peripheral pulses strong, and no peripheral edema  ABDOMEN: soft, nontender, no hepatosplenomegaly, no masses and bowel sounds normal  MS: no gross musculoskeletal defects noted, no edema        2/6/2025   Mini Cog   Clock Draw Score 2 Normal   3 Item Recall 3 objects recalled   Mini Cog Total Score 5              Signed Electronically by: Ale Ordaz MD    "

## 2025-02-13 ENCOUNTER — HOSPITAL ENCOUNTER (OUTPATIENT)
Dept: CARDIOLOGY | Facility: CLINIC | Age: 80
End: 2025-02-13
Attending: FAMILY MEDICINE
Payer: COMMERCIAL

## 2025-02-13 DIAGNOSIS — R01.1 UNDIAGNOSED CARDIAC MURMURS: ICD-10-CM

## 2025-02-13 LAB — LVEF ECHO: NORMAL

## 2025-02-13 PROCEDURE — 93306 TTE W/DOPPLER COMPLETE: CPT

## 2025-02-13 NOTE — RESULT ENCOUNTER NOTE
Call patient:    There is aortic sclerosis (hardening of the aortic valve) but no narrowing (stenosis).  This is good, and also explains the murmur that I hear.      Heart function is otherwise normal.     Ale Ordaz M.D.

## 2025-04-03 ENCOUNTER — LAB (OUTPATIENT)
Dept: LAB | Facility: CLINIC | Age: 80
End: 2025-04-03
Payer: COMMERCIAL

## 2025-04-03 ENCOUNTER — HOSPITAL ENCOUNTER (OUTPATIENT)
Dept: CT IMAGING | Facility: CLINIC | Age: 80
Discharge: HOME | End: 2025-04-03
Attending: INTERNAL MEDICINE
Payer: COMMERCIAL

## 2025-04-03 DIAGNOSIS — K21.9 LPRD (LARYNGOPHARYNGEAL REFLUX DISEASE): ICD-10-CM

## 2025-04-03 DIAGNOSIS — H02.849 EDEMA OF EYELID, UNSPECIFIED LATERALITY: ICD-10-CM

## 2025-04-03 DIAGNOSIS — J43.2 CENTRILOBULAR EMPHYSEMA (H): ICD-10-CM

## 2025-04-03 DIAGNOSIS — C49.A0 MALIGNANT GASTROINTESTINAL STROMAL TUMOR, UNSPECIFIED SITE (H): ICD-10-CM

## 2025-04-03 LAB
ALBUMIN SERPL BCG-MCNC: 3.9 G/DL (ref 3.5–5.2)
ALP SERPL-CCNC: 64 U/L (ref 40–150)
ALT SERPL W P-5'-P-CCNC: 18 U/L (ref 0–50)
ANION GAP SERPL CALCULATED.3IONS-SCNC: 12 MMOL/L (ref 7–15)
AST SERPL W P-5'-P-CCNC: 33 U/L (ref 0–45)
BASOPHILS # BLD AUTO: 0.1 10E3/UL (ref 0–0.2)
BASOPHILS NFR BLD AUTO: 1 %
BILIRUB SERPL-MCNC: 0.3 MG/DL
BUN SERPL-MCNC: 13.3 MG/DL (ref 8–23)
CALCIUM SERPL-MCNC: 9.2 MG/DL (ref 8.8–10.4)
CHLORIDE SERPL-SCNC: 102 MMOL/L (ref 98–107)
CREAT SERPL-MCNC: 0.97 MG/DL (ref 0.51–0.95)
EGFRCR SERPLBLD CKD-EPI 2021: 59 ML/MIN/1.73M2
EOSINOPHIL # BLD AUTO: 0.2 10E3/UL (ref 0–0.7)
EOSINOPHIL NFR BLD AUTO: 4 %
ERYTHROCYTE [DISTWIDTH] IN BLOOD BY AUTOMATED COUNT: 16.8 % (ref 10–15)
GLUCOSE SERPL-MCNC: 100 MG/DL (ref 70–99)
HCO3 SERPL-SCNC: 23 MMOL/L (ref 22–29)
HCT VFR BLD AUTO: 33 % (ref 35–47)
HGB BLD-MCNC: 10.8 G/DL (ref 11.7–15.7)
IMM GRANULOCYTES # BLD: 0 10E3/UL
IMM GRANULOCYTES NFR BLD: 0 %
LYMPHOCYTES # BLD AUTO: 1.5 10E3/UL (ref 0.8–5.3)
LYMPHOCYTES NFR BLD AUTO: 27 %
MCH RBC QN AUTO: 28.2 PG (ref 26.5–33)
MCHC RBC AUTO-ENTMCNC: 32.7 G/DL (ref 31.5–36.5)
MCV RBC AUTO: 86 FL (ref 78–100)
MONOCYTES # BLD AUTO: 1.2 10E3/UL (ref 0–1.3)
MONOCYTES NFR BLD AUTO: 21 %
NEUTROPHILS # BLD AUTO: 2.6 10E3/UL (ref 1.6–8.3)
NEUTROPHILS NFR BLD AUTO: 47 %
PLATELET # BLD AUTO: 204 10E3/UL (ref 150–450)
POTASSIUM SERPL-SCNC: 4.6 MMOL/L (ref 3.4–5.3)
PROT SERPL-MCNC: 6.5 G/DL (ref 6.4–8.3)
RBC # BLD AUTO: 3.83 10E6/UL (ref 3.8–5.2)
SODIUM SERPL-SCNC: 137 MMOL/L (ref 135–145)
WBC # BLD AUTO: 5.6 10E3/UL (ref 4–11)

## 2025-04-03 PROCEDURE — 250N000009 HC RX 250: Performed by: RADIOLOGY

## 2025-04-03 PROCEDURE — 250N000011 HC RX IP 250 OP 636: Performed by: RADIOLOGY

## 2025-04-03 PROCEDURE — 71260 CT THORAX DX C+: CPT

## 2025-04-03 PROCEDURE — 74177 CT ABD & PELVIS W/CONTRAST: CPT

## 2025-04-03 RX ORDER — IOPAMIDOL 755 MG/ML
75 INJECTION, SOLUTION INTRAVASCULAR ONCE
Status: COMPLETED | OUTPATIENT
Start: 2025-04-03 | End: 2025-04-03

## 2025-04-03 RX ORDER — OMEPRAZOLE 20 MG/1
20 CAPSULE, DELAYED RELEASE ORAL DAILY
Qty: 90 CAPSULE | Refills: 3 | OUTPATIENT
Start: 2025-04-03

## 2025-04-03 RX ADMIN — IOPAMIDOL 75 ML: 755 INJECTION, SOLUTION INTRAVENOUS at 11:45

## 2025-04-03 RX ADMIN — SODIUM CHLORIDE 59 ML: 9 INJECTION, SOLUTION INTRAVENOUS at 11:46

## 2025-04-07 ENCOUNTER — VIRTUAL VISIT (OUTPATIENT)
Dept: ONCOLOGY | Facility: CLINIC | Age: 80
End: 2025-04-07
Attending: INTERNAL MEDICINE
Payer: COMMERCIAL

## 2025-04-07 VITALS — HEIGHT: 62 IN | BODY MASS INDEX: 27.97 KG/M2 | WEIGHT: 152 LBS

## 2025-04-07 DIAGNOSIS — C49.A0 MALIGNANT GASTROINTESTINAL STROMAL TUMOR, UNSPECIFIED SITE (H): Primary | ICD-10-CM

## 2025-04-07 DIAGNOSIS — Z85.118 HISTORY OF LUNG CANCER: ICD-10-CM

## 2025-04-07 DIAGNOSIS — J43.2 CENTRILOBULAR EMPHYSEMA (H): ICD-10-CM

## 2025-04-07 DIAGNOSIS — H02.849 EDEMA OF EYELID, UNSPECIFIED LATERALITY: ICD-10-CM

## 2025-04-07 PROCEDURE — 98006 SYNCH AUDIO-VIDEO EST MOD 30: CPT | Performed by: INTERNAL MEDICINE

## 2025-04-07 PROCEDURE — G2211 COMPLEX E/M VISIT ADD ON: HCPCS | Mod: 95 | Performed by: INTERNAL MEDICINE

## 2025-04-07 PROCEDURE — 1125F AMNT PAIN NOTED PAIN PRSNT: CPT | Mod: 95 | Performed by: INTERNAL MEDICINE

## 2025-04-07 ASSESSMENT — PAIN SCALES - GENERAL: PAINLEVEL_OUTOF10: MODERATE PAIN (4)

## 2025-04-07 NOTE — LETTER
4/7/2025         RE: Idalmis Abdul  [address]  [facility] [address]          Is the patient currently in the state of [place]? YES    Visit mode: VIDEO    If the visit is dropped, the patient can be reconnected by:VIDEO VISIT: Send to e-mail at: No e-mail address on record    Will anyone else be joining the visit? NO  (If patient encounters technical issues they should call [phone]    Are changes needed to the allergy or medication list? No    Are refills needed on medications prescribed by this physician? NO    Rooming Documentation:  Questionnaire(s) completed    Reason for visit: Video Visit (Follow Up )    Erika Matthews VVF       Virtual Visit Details    Type of service:  Video Visit   Video Start Time:  100  Video End Time: 130  Originating Location (pt. Location): Home  Distant Location (provider location):  On-site  Platform used for Video Visit: Nolberto      I am seeing Idalmis Abdul today in follow-up of metastatic gastrointestinal stromal tumor.    She is 79 years old and had her primary resected 20 years ago and then had intra-abdominal reoccurrence when she discontinued her adjuvant Gleevec.  She resumed Gleevec in 2007 with a complete response and has been free of evidence of recurrence on ongoing therapy ever since.  She also had a stage I lung cancer resected in 2010.  She has had eyelid edema of the severe enough from her Gleevec that she is required surgical correction.  She tells me overall she feels generally well but with what she considers the expected age-related decline in her overall status and other medical problems.  She is now on oxygen because of her chronic lung disease.  She is starting a new therapy for her osteoporosis.  She currently spends much of her time in her easy chair.  She is able to get up and walk with a walker when the weather is nice.  Her appetite continues to be quite good.     On exam via the video link she appears cheerful and well.  She  appears her stated age.  She has no icterus.  She has moderate lid edema  She has no respiratory distress apparent     I personally reviewed her CT scan and went over the results with her.  That shows no evidence of recurrence of her cancer.  She has ongoing nodularity of her thyroid that is not much changed.     Her electrolytes are unremarkable and her renal function is nearly normal.  Her bilirubin, albumin liver enzymes are unremarkable.  She has expected mild macrocytic anemia and otherwise unremarkable blood counts.    The cardiac echo done within the last 2 months shows a normal ejection fraction.     Assessment/plan: Recurrent gastrointestinal stromal tumor with an ongoing complete response on Gleevec.  She is tolerating this quite well and we will continue on with the same active therapy with a reevaluation for progression in a year or sooner if she develops new symptoms.     Total time by me on the date of service inclusive of the above visit, review of imaging and labs, ordering, and documentation was approximately 30 minutes.        Blayne Schmitz MD

## 2025-04-07 NOTE — PROGRESS NOTES
Is the patient currently in the state of [place]? YES    Visit mode: VIDEO    If the visit is dropped, the patient can be reconnected by:VIDEO VISIT: Send to e-mail at: No e-mail address on record    Will anyone else be joining the visit? NO  (If patient encounters technical issues they should call [phone])    Are changes needed to the allergy or medication list? No    Are refills needed on medications prescribed by this physician? NO    Rooming Documentation:  Questionnaire(s) completed    Reason for visit: Video Visit (Follow Up )    Erika GROSS

## 2025-04-07 NOTE — PROGRESS NOTES
Virtual Visit Details    Type of service:  Video Visit   Video Start Time:  100  Video End Time: 130  Originating Location (pt. Location): Home  Distant Location (provider location):  On-site  Platform used for Video Visit: Nolberto      I am seeing Idalmis Abdul today in follow-up of metastatic gastrointestinal stromal tumor.    She is 79 years old and had her primary resected 20 years ago and then had intra-abdominal reoccurrence when she discontinued her adjuvant Gleevec.  She resumed Gleevec in 2007 with a complete response and has been free of evidence of recurrence on ongoing therapy ever since.  She also had a stage I lung cancer resected in 2010.  She has had eyelid edema of the severe enough from her Gleevec that she is required surgical correction.  She tells me overall she feels generally well but with what she considers the expected age-related decline in her overall status and other medical problems.  She is now on oxygen because of her chronic lung disease.  She is starting a new therapy for her osteoporosis.  She currently spends much of her time in her easy chair.  She is able to get up and walk with a walker when the weather is nice.  Her appetite continues to be quite good.     On exam via the video link she appears cheerful and well.  She appears her stated age.  She has no icterus.  She has moderate lid edema  She has no respiratory distress apparent     I personally reviewed her CT scan and went over the results with her.  That shows no evidence of recurrence of her cancer.  She has ongoing nodularity of her thyroid that is not much changed.     Her electrolytes are unremarkable and her renal function is nearly normal.  Her bilirubin, albumin liver enzymes are unremarkable.  She has expected mild macrocytic anemia and otherwise unremarkable blood counts.    The cardiac echo done within the last 2 months shows a normal ejection fraction.     Assessment/plan: Recurrent gastrointestinal stromal  tumor with an ongoing complete response on Gleevec.  She is tolerating this quite well and we will continue on with the same active therapy with a reevaluation for progression in a year or sooner if she develops new symptoms.     Total time by me on the date of service inclusive of the above visit, review of imaging and labs, ordering, and documentation was approximately 30 minutes.

## 2025-04-07 NOTE — LETTER
4/7/2025      Idalmis Abdul  [address]  [facility] [address]      Dear Colleague,    Thank you for referring your patient, Idalmis Abdul, to the [facility]. Please see a copy of my visit note below.    Is the patient currently in the state of [place]? YES    Visit mode: VIDEO    If the visit is dropped, the patient can be reconnected by:VIDEO VISIT: Send to e-mail at: No e-mail address on record    Will anyone else be joining the visit? NO  (If patient encounters technical issues they should call [phone]    Are changes needed to the allergy or medication list? No    Are refills needed on medications prescribed by this physician? NO    Rooming Documentation:  Questionnaire(s) completed    Reason for visit: Video Visit (Follow Up )    Erika GROSS     I am seeing Idalmis Abdul today in follow-up of metastatic gastrointestinal stromal tumor.    She is 79 years old and had her primary resected 20 years ago and then had intra-abdominal reoccurrence when she discontinued her adjuvant Gleevec.  She resumed Gleevec in 2007 with a complete response and has been free of evidence of recurrence on ongoing therapy ever since.  She also had a stage I lung cancer resected in 2010.  She has had eyelid edema of the severe enough from her Gleevec that she is required surgical correction.  She tells me overall she feels generally well but with what she considers the expected age-related decline in her overall status and other medical problems.  She is now on oxygen because of her chronic lung disease.  She is starting a new therapy for her osteoporosis.  She currently spends much of her time in her easy chair.  She is able to get up and walk with a walker when the weather is nice.  Her appetite continues to be quite good.     On exam via the video link she appears cheerful and well.  She appears her stated age.  She has no icterus.  She has moderate lid edema  She has no  respiratory distress apparent     I personally reviewed her CT scan and went over the results with her.  That shows no evidence of recurrence of her cancer.  She has ongoing nodularity of her thyroid that is not much changed.     Her electrolytes are unremarkable and her renal function is nearly normal.  Her bilirubin, albumin liver enzymes are unremarkable.  She has expected mild macrocytic anemia and otherwise unremarkable blood counts.    The cardiac echo done within the last 2 months shows a normal ejection fraction.     Assessment/plan: Recurrent gastrointestinal stromal tumor with an ongoing complete response on Gleevec.  She is tolerating this quite well and we will continue on with the same active therapy with a reevaluation for progression in a year or sooner if she develops new symptoms.     Total time by me on the date of service inclusive of the above visit, review of imaging and labs, ordering, and documentation was approximately 30 minutes.        Again, thank you for allowing me to participate in the care of your patient.      Sincerely,    Blayne Schmitz MD    Electronically signed

## 2025-06-17 ENCOUNTER — TELEPHONE (OUTPATIENT)
Dept: OPHTHALMOLOGY | Facility: CLINIC | Age: 80
End: 2025-06-17
Payer: COMMERCIAL

## 2025-06-17 NOTE — TELEPHONE ENCOUNTER
Patient confirmed scheduled appointment:  Date: 9/15/25  Time: 1:00PM  Visit type: RETURN THYROID EYE  Provider:   Location: [facility]  Testing/imaging: MRI at [facility] and LABS in [facility] on 5/2025 in [place], [place].-Per   Additional notes: Return ELOISA for Double vision and graves disease. MRI at [facility] and LABS in [facility] on 5/2025 in [place], [place].-Per -Appt Per PT       Sent reminder letter and map to confirmed address as requested.

## 2025-06-19 ENCOUNTER — PATIENT OUTREACH (OUTPATIENT)
Dept: PHARMACY | Facility: OTHER | Age: 80
End: 2025-06-19
Payer: COMMERCIAL

## 2025-06-19 NOTE — PROGRESS NOTES
[facility] Recruitment: [facility]     Referral outreach attempt #3 on June 19, 2025      Outcome: patient opted out    Amy Zhang [facility]

## 2025-06-22 DIAGNOSIS — C49.A0 MALIGNANT GASTROINTESTINAL STROMAL TUMOR, UNSPECIFIED SITE (H): Primary | ICD-10-CM

## 2025-06-25 DIAGNOSIS — C49.A0 MALIGNANT GASTROINTESTINAL STROMAL TUMOR, UNSPECIFIED SITE (H): Primary | ICD-10-CM

## 2025-06-25 RX ORDER — IMATINIB MESYLATE 400 MG/1
400 TABLET, FILM COATED ORAL DAILY
Qty: 30 TABLET | Refills: 11 | Status: SHIPPED | OUTPATIENT
Start: 2025-06-25

## 2025-07-07 DIAGNOSIS — I10 ESSENTIAL HYPERTENSION WITH GOAL BLOOD PRESSURE LESS THAN 140/90: ICD-10-CM

## 2025-07-07 RX ORDER — AMLODIPINE BESYLATE 5 MG/1
5 TABLET ORAL DAILY
Qty: 90 TABLET | Refills: 1 | Status: SHIPPED | OUTPATIENT
Start: 2025-07-07

## (undated) DEVICE — ESU HIGH TEMP LOOP TIP AA03

## (undated) DEVICE — TUBING SUCTION 10'X3/16" N510

## (undated) DEVICE — PACK MINOR EYE CUSTOM ASC

## (undated) DEVICE — GLOVE PROTEXIS W/NEU-THERA 6.5  2D73TE65

## (undated) DEVICE — EYE PREP BETADINE 5% SOLUTION 30ML 0065-0411-30

## (undated) DEVICE — LINEN TOWEL PACK X5 5464

## (undated) DEVICE — PREP POVIDONE IODINE SOLUTION 10% 120ML

## (undated) DEVICE — ESU EYE HIGH TEMP 65410-183

## (undated) DEVICE — SOL WATER IRRIG 1000ML BOTTLE 07139-09

## (undated) DEVICE — ESU PENCIL W/COATED BLADE E2450H

## (undated) DEVICE — ESU NDL COLORADO MICRO 3CM STR N103A

## (undated) DEVICE — GLOVE BIOGEL PI MICRO SZ 7.5 48575

## (undated) DEVICE — PACK LAPAROSCOPY/PELVISCOPY STD

## (undated) DEVICE — ESU PENCIL W/HOLSTER

## (undated) DEVICE — SU NDL MAYO 1824-6

## (undated) DEVICE — BLADE KNIFE SURG 15 371115

## (undated) DEVICE — SU VICRYL 3-0 CT-1 36" J944H

## (undated) DEVICE — NDL 30GA 0.5" 305106

## (undated) DEVICE — SU VICRYL 6-0 P-1 18" UND J489G

## (undated) DEVICE — GOWN LG DISP 9515

## (undated) DEVICE — SUCTION TIP YANKAUER STR K87

## (undated) DEVICE — SOL WATER IRRIG 500ML BOTTLE 2F7113

## (undated) DEVICE — PAD PERI INDIV WRAP 11" 2022

## (undated) DEVICE — SU VICRYL 2-0 CT-2 CR 8X18" J726D

## (undated) DEVICE — DRSG TELFA ISLAND 4X8" 7541

## (undated) DEVICE — SU VICRYL 2-0 CT-1 36" UND J945H

## (undated) DEVICE — SU CAPIO 0 TC 48" 833-113

## (undated) DEVICE — ESU LIGASURE IMPACT OPEN SEALER/DVDR CVD LG JAW 36MM LF4318

## (undated) DEVICE — SU PLAIN 6-0 G-1DA 18" 770G

## (undated) DEVICE — SOL NACL 0.9% IRRIG 1000ML BOTTLE 07138-09

## (undated) DEVICE — TUBING SUCTION MEDI-VAC 1/4"X20' N620A

## (undated) DEVICE — SU VICRYL 0 CT-2 CR 8X18" J727D

## (undated) DEVICE — SYR 03ML LL W/O NDL 309657

## (undated) DEVICE — CATH TRAY FOLEY SURESTEP 16FR W/URINE MTR STATLK LF A303416A

## (undated) DEVICE — GLOVE PROTEXIS MICRO 7.5  2D73PM75

## (undated) DEVICE — SU VICRYL 0 CT-1 36" J946H

## (undated) DEVICE — ADHESIVE SWIFTSET 0.8ML OCTYL SS6

## (undated) DEVICE — DECANTER VIAL 2006S

## (undated) DEVICE — PREP POVIDONE-IODINE 10% SOLUTION 4OZ BOTTLE MDS093944

## (undated) DEVICE — DRSG COTTON BALL 6PK LCB62

## (undated) DEVICE — PREP SKIN SCRUB TRAY 4461A

## (undated) DEVICE — ESU GROUND PAD ADULT W/CORD E7507

## (undated) RX ORDER — OXYCODONE HYDROCHLORIDE 5 MG/1
TABLET ORAL
Status: DISPENSED
Start: 2019-08-21

## (undated) RX ORDER — LIDOCAINE HYDROCHLORIDE 10 MG/ML
INJECTION, SOLUTION EPIDURAL; INFILTRATION; INTRACAUDAL; PERINEURAL
Status: DISPENSED
Start: 2024-04-09

## (undated) RX ORDER — DEXAMETHASONE SODIUM PHOSPHATE 4 MG/ML
INJECTION, SOLUTION INTRA-ARTICULAR; INTRALESIONAL; INTRAMUSCULAR; INTRAVENOUS; SOFT TISSUE
Status: DISPENSED
Start: 2017-10-17

## (undated) RX ORDER — ONDANSETRON 2 MG/ML
INJECTION INTRAMUSCULAR; INTRAVENOUS
Status: DISPENSED
Start: 2017-10-17

## (undated) RX ORDER — FENTANYL CITRATE 50 UG/ML
INJECTION, SOLUTION INTRAMUSCULAR; INTRAVENOUS
Status: DISPENSED
Start: 2024-08-07

## (undated) RX ORDER — PROPOFOL 10 MG/ML
INJECTION, EMULSION INTRAVENOUS
Status: DISPENSED
Start: 2019-08-21

## (undated) RX ORDER — FENTANYL CITRATE 50 UG/ML
INJECTION, SOLUTION INTRAMUSCULAR; INTRAVENOUS
Status: DISPENSED
Start: 2019-08-21

## (undated) RX ORDER — PROPOFOL 10 MG/ML
INJECTION, EMULSION INTRAVENOUS
Status: DISPENSED
Start: 2024-08-07

## (undated) RX ORDER — TRANEXAMIC ACID 100 MG/ML
INJECTION, SOLUTION INTRAVENOUS
Status: DISPENSED
Start: 2024-08-07

## (undated) RX ORDER — BUPIVACAINE HYDROCHLORIDE 7.5 MG/ML
INJECTION, SOLUTION EPIDURAL; RETROBULBAR
Status: DISPENSED
Start: 2024-08-07

## (undated) RX ORDER — LIDOCAINE HYDROCHLORIDE 10 MG/ML
INJECTION, SOLUTION EPIDURAL; INFILTRATION; INTRACAUDAL; PERINEURAL
Status: DISPENSED
Start: 2017-10-17

## (undated) RX ORDER — OXYCODONE HCL 10 MG/1
TABLET, FILM COATED, EXTENDED RELEASE ORAL
Status: DISPENSED
Start: 2017-10-17

## (undated) RX ORDER — KETOROLAC TROMETHAMINE 30 MG/ML
INJECTION, SOLUTION INTRAMUSCULAR; INTRAVENOUS
Status: DISPENSED
Start: 2017-10-17

## (undated) RX ORDER — LIDOCAINE HYDROCHLORIDE 10 MG/ML
INJECTION, SOLUTION EPIDURAL; INFILTRATION; INTRACAUDAL; PERINEURAL
Status: DISPENSED
Start: 2018-02-07

## (undated) RX ORDER — PROPOFOL 10 MG/ML
INJECTION, EMULSION INTRAVENOUS
Status: DISPENSED
Start: 2017-10-17

## (undated) RX ORDER — NEOSTIGMINE METHYLSULFATE 1 MG/ML
VIAL (ML) INJECTION
Status: DISPENSED
Start: 2017-10-17

## (undated) RX ORDER — ONDANSETRON 2 MG/ML
INJECTION INTRAMUSCULAR; INTRAVENOUS
Status: DISPENSED
Start: 2019-07-17

## (undated) RX ORDER — BETAMETHASONE SODIUM PHOSPHATE AND BETAMETHASONE ACETATE 3; 3 MG/ML; MG/ML
INJECTION, SUSPENSION INTRA-ARTICULAR; INTRALESIONAL; INTRAMUSCULAR; SOFT TISSUE
Status: DISPENSED
Start: 2024-02-06

## (undated) RX ORDER — LIDOCAINE HYDROCHLORIDE 20 MG/ML
INJECTION, SOLUTION EPIDURAL; INFILTRATION; INTRACAUDAL; PERINEURAL
Status: DISPENSED
Start: 2019-08-21

## (undated) RX ORDER — METOCLOPRAMIDE HYDROCHLORIDE 5 MG/ML
INJECTION INTRAMUSCULAR; INTRAVENOUS
Status: DISPENSED
Start: 2017-10-17

## (undated) RX ORDER — OXYCODONE HYDROCHLORIDE 5 MG/1
TABLET ORAL
Status: DISPENSED
Start: 2017-10-17

## (undated) RX ORDER — FENTANYL CITRATE 50 UG/ML
INJECTION, SOLUTION INTRAMUSCULAR; INTRAVENOUS
Status: DISPENSED
Start: 2017-10-17

## (undated) RX ORDER — PHENYLEPHRINE HCL IN 0.9% NACL 1 MG/10 ML
SYRINGE (ML) INTRAVENOUS
Status: DISPENSED
Start: 2017-10-17

## (undated) RX ORDER — ACETAMINOPHEN 325 MG/1
TABLET ORAL
Status: DISPENSED
Start: 2019-07-17

## (undated) RX ORDER — ACETAMINOPHEN 325 MG/1
TABLET ORAL
Status: DISPENSED
Start: 2024-08-07

## (undated) RX ORDER — FENTANYL CITRATE 50 UG/ML
INJECTION, SOLUTION INTRAMUSCULAR; INTRAVENOUS
Status: DISPENSED
Start: 2019-07-17

## (undated) RX ORDER — BUPIVACAINE HYDROCHLORIDE AND EPINEPHRINE 2.5; 5 MG/ML; UG/ML
INJECTION, SOLUTION INFILTRATION; PERINEURAL
Status: DISPENSED
Start: 2017-10-17

## (undated) RX ORDER — LIDOCAINE HYDROCHLORIDE AND EPINEPHRINE 10; 10 MG/ML; UG/ML
INJECTION, SOLUTION INFILTRATION; PERINEURAL
Status: DISPENSED
Start: 2024-08-07

## (undated) RX ORDER — BETAMETHASONE SODIUM PHOSPHATE AND BETAMETHASONE ACETATE 3; 3 MG/ML; MG/ML
INJECTION, SUSPENSION INTRA-ARTICULAR; INTRALESIONAL; INTRAMUSCULAR; SOFT TISSUE
Status: DISPENSED
Start: 2024-04-09

## (undated) RX ORDER — LIDOCAINE HYDROCHLORIDE 10 MG/ML
INJECTION, SOLUTION EPIDURAL; INFILTRATION; INTRACAUDAL; PERINEURAL
Status: DISPENSED
Start: 2024-02-06

## (undated) RX ORDER — ACETAMINOPHEN 325 MG/1
TABLET ORAL
Status: DISPENSED
Start: 2019-08-21